# Patient Record
Sex: MALE | Race: WHITE | NOT HISPANIC OR LATINO | Employment: OTHER | URBAN - METROPOLITAN AREA
[De-identification: names, ages, dates, MRNs, and addresses within clinical notes are randomized per-mention and may not be internally consistent; named-entity substitution may affect disease eponyms.]

---

## 2018-04-19 ENCOUNTER — TRANSFERRED RECORDS (OUTPATIENT)
Dept: HEALTH INFORMATION MANAGEMENT | Facility: CLINIC | Age: 78
End: 2018-04-19

## 2019-03-07 ENCOUNTER — TRANSFERRED RECORDS (OUTPATIENT)
Dept: HEALTH INFORMATION MANAGEMENT | Facility: CLINIC | Age: 79
End: 2019-03-07

## 2019-03-08 ENCOUNTER — TRANSFERRED RECORDS (OUTPATIENT)
Dept: HEALTH INFORMATION MANAGEMENT | Facility: CLINIC | Age: 79
End: 2019-03-08

## 2019-06-10 NOTE — PROGRESS NOTES
Subjective     Flash Brown is a 78 year old male who presents to clinic today for the following health issues:    HPI   Chief Complaint   Patient presents with     hospitals Care     Past Medical History:   Diagnosis Date     CAD (coronary artery disease)      Depression      Diabetes type 2, controlled (H)      Hyperlipidemia      Hypertension      Lymphoma (H)      Melanoma (H)        Past Surgical History:   Procedure Laterality Date     AXILLARY SURGERY      S/P resection and adjuvant radiation     CHOLECYSTECTOMY       HERNIA REPAIR, UMBILICAL       STENT      PTCA with drug-eluting stent of RCA, circumflex, and LAD       Family History   Problem Relation Age of Onset     Asthma Other      Cancer Other         melanoma     Blood Disease Other         bleeding disorder     Lung Cancer Mother         Smoker     Prostate Cancer Father        Social History     Tobacco Use     Smoking status: Never Smoker     Smokeless tobacco: Never Used   Substance Use Topics     Alcohol use: Yes     Frequency: Monthly or less     Drinks per session: 1 or 2     Binge frequency: Never     Comment: glass of wine couple times per week     Current Outpatient Medications   Medication     amLODIPine (NORVASC) 5 MG tablet     aspirin (ASA) 81 MG EC tablet     atorvastatin (LIPITOR) 20 MG tablet     busPIRone (BUSPAR) 10 MG tablet     carvedilol (COREG) 12.5 MG tablet     CENTRUM OR TABS     cloNIDine (CATAPRES) 0.1 MG tablet     clopidogrel (PLAVIX) 75 MG tablet     COQ10 100 MG OR CAPS     glimepiride (AMARYL) 4 MG tablet     hydrALAZINE (APRESOLINE) 50 MG tablet     NIACIN 500 MG OR TABS     NITROGLYCERIN 0.4 MG SL SUBL     OMEGA-3 1000 MG OR CAPS     pioglitazone (ACTOS) 30 MG tablet     ramipril (ALTACE) 10 MG capsule     tamsulosin (FLOMAX) 0.4 MG capsule     ORDER FOR DME     VOSOL-HC 2-1 % OT SOLN     VYTORIN 10-40 MG OR TABS     No current facility-administered medications for this visit.         Allergies   Allergen  Reactions     Iodine Swelling       Reviewed and updated as needed this visit by Provider  Allergies  Med Hx  Surg Hx  Fam Hx       1. Establish care: Patient just moved here from [place] and currently living with sister.     2. CAD: Patient was hospitalized for unstable angina in 2010 s/p PTCA with drug-eluting stent of the RCA, circumflex, and LAD at [place], [place].  Patient denies any chest pain.  States of intermittent SOB.  Patient is currently on plavix, lipitor, coreg, ramipril, and aspirin.     3. Depression: Patient has been a private person.  With the recent move, it has been stressful.  He moved here 10 days ago.     4. Diabetes f/u: Based on medication list glimepiride and actos.  He does not check his blood sugars.  He has not had his eye checked recently.  Scheduled to have his eye appointment tomorrow.    5. History of melanoma: Patient had lesion of his left nose, right temporal region, and his back region.    6. History of Non-Hodgkin lymphoma: Involving axilla.  S/P radiation treatment.     Review of Systems   Constitutional: Negative for chills and fever.   HENT: Negative for congestion, ear pain, hearing loss and sore throat.    Eyes: Negative for pain and visual disturbance.   Respiratory: Negative for cough and shortness of breath.    Cardiovascular: Negative for chest pain, palpitations and peripheral edema.   Gastrointestinal: Negative for abdominal pain, constipation, diarrhea, heartburn, hematochezia and nausea.   Genitourinary: Negative for discharge, dysuria, frequency, genital sores, hematuria, impotence and urgency.   Musculoskeletal: Negative for arthralgias, joint swelling and myalgias.   Skin: Negative for rash.   Neurological: Negative for dizziness, weakness, headaches and paresthesias.   Psychiatric/Behavioral: Negative for mood changes. The patient is not nervous/anxious.             Objective    /68 (BP Location: Left arm, Cuff Size: Adult Large)   " Pulse 77   Temp 97  F (36.1  C) (Tympanic)   Ht 1.689 m (5' 6.5\")   Wt 111.2 kg (245 lb 3.2 oz)   SpO2 96%   BMI 38.98 kg/m    Body mass index is 38.98 kg/m .  Physical Exam   Constitutional: He is oriented to person, place, and time. He appears well-developed and well-nourished. No distress.   HENT:   Head: Normocephalic and atraumatic.   Nose: Nose normal.   Eyes: Conjunctivae and EOM are normal.   Neck: Normal range of motion. No tracheal deviation present.   Cardiovascular: Normal heart sounds.   No murmur heard.  Pulmonary/Chest: Effort normal and breath sounds normal. No respiratory distress. He has no wheezes.   Musculoskeletal: Normal range of motion.   Neurological: He is alert and oriented to person, place, and time.   Skin: No rash noted.   Healing surgical excision involving left nare   Psychiatric: He has a normal mood and affect. His behavior is normal.      Assessment & Plan     1. Coronary artery disease involving native coronary artery of native heart without angina pectoris  s/p PTCA with drug-eluting stent of the RCA, circumflex, and LAD at [place], [place] in 2010.   - carvedilol (COREG) 12.5 MG tablet; Take 1 tablet (12.5 mg) by mouth 2 times daily (with meals)  Dispense: 180 tablet; Refill: 3  - CARDIOLOGY EVAL ADULT REFERRAL    2. Advanced directives, counseling/discussion  - HONORING CHOICES REFERRAL    3. Morbid obesity (H)    4. 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 Other hyperlipidemia  - Lipid panel reflex to direct LDL Fasting  - atorvastatin (LIPITOR) 20 MG tablet; Take 1 tablet (20 mg) by mouth daily  Dispense: 90 tablet; Refill: 3    6. Type 2 diabetes mellitus with complication, without long-term current use of insulin (H)  Advised to keep upcoming eye appointment tomorrow  - HEMOGLOBIN A1C  - BASIC METABOLIC PANEL  - Albumin Random Urine Quantitative with Creat Ratio  - pioglitazone (ACTOS) 30 MG " tablet; Take 1 tablet (30 mg) by mouth daily    7. Essential hypertension  Stable  - cloNIDine (CATAPRES) 0.1 MG tablet; Take 1 tablet (0.1 mg) by mouth 2 times daily  - amLODIPine (NORVASC) 5 MG tablet; Take 1 tablet (5 mg) by mouth daily  - ramipril (ALTACE) 10 MG capsule; Take 1 capsule (10 mg) by mouth daily  Dispense: 90 capsule; Refill: 3  - hydrALAZINE (APRESOLINE) 50 MG tablet; Take 1 tablet (50 mg) by mouth 2 times daily  - glimepiride (AMARYL) 4 MG tablet; Take 1 tablet (4 mg) by mouth 2 times daily    8. Melanoma of skin (H)  - DERMATOLOGY REFERRAL    9. Other depression  - DEPRESSION ACTION PLAN (DAP)  - busPIRone (BUSPAR) 10 MG tablet; Take 1 tablet (10 mg) by mouth 2 times daily    10. Benign prostatic hyperplasia without lower urinary tract symptoms  - tamsulosin (FLOMAX) 0.4 MG capsule; Take 1 capsule (0.4 mg) by mouth daily     I spent 45 minutes with patient of which 50% was spent counseling and coordinating patient's care as well as discussing patient's plan of care.    See Patient Instructions    Return in about 2 months (around 8/11/2019) for Heart f/u (40 minutes).    Thomas Jackson [facility]

## 2019-06-11 ENCOUNTER — OFFICE VISIT (OUTPATIENT)
Dept: FAMILY MEDICINE | Facility: CLINIC | Age: 79
End: 2019-06-11
Payer: MEDICARE

## 2019-06-11 ENCOUNTER — TELEPHONE (OUTPATIENT)
Dept: ONCOLOGY | Facility: CLINIC | Age: 79
End: 2019-06-11

## 2019-06-11 VITALS
OXYGEN SATURATION: 96 % | HEIGHT: 67 IN | HEART RATE: 77 BPM | TEMPERATURE: 97 F | DIASTOLIC BLOOD PRESSURE: 68 MMHG | WEIGHT: 245.2 LBS | SYSTOLIC BLOOD PRESSURE: 130 MMHG | BODY MASS INDEX: 38.48 KG/M2

## 2019-06-11 DIAGNOSIS — I25.10 CORONARY ARTERY DISEASE INVOLVING NATIVE CORONARY ARTERY OF NATIVE HEART WITHOUT ANGINA PECTORIS: Primary | ICD-10-CM

## 2019-06-11 DIAGNOSIS — C43.9 MELANOMA OF SKIN (H): ICD-10-CM

## 2019-06-11 DIAGNOSIS — F32.89 OTHER DEPRESSION: ICD-10-CM

## 2019-06-11 DIAGNOSIS — I10 ESSENTIAL HYPERTENSION: ICD-10-CM

## 2019-06-11 DIAGNOSIS — E78.49 OTHER HYPERLIPIDEMIA: ICD-10-CM

## 2019-06-11 DIAGNOSIS — N40.0 BENIGN PROSTATIC HYPERPLASIA WITHOUT LOWER URINARY TRACT SYMPTOMS: ICD-10-CM

## 2019-06-11 DIAGNOSIS — E11.8 TYPE 2 DIABETES MELLITUS WITH COMPLICATION, WITHOUT LONG-TERM CURRENT USE OF INSULIN (H): ICD-10-CM

## 2019-06-11 DIAGNOSIS — Z71.89 ADVANCED DIRECTIVES, COUNSELING/DISCUSSION: ICD-10-CM

## 2019-06-11 DIAGNOSIS — E66.01 MORBID OBESITY (H): ICD-10-CM

## 2019-06-11 DIAGNOSIS — C82.24 GRADE 3 FOLLICULAR LYMPHOMA OF LYMPH NODES OF AXILLA (H): ICD-10-CM

## 2019-06-11 LAB
ANION GAP SERPL CALCULATED.3IONS-SCNC: 1 MMOL/L (ref 3–14)
BUN SERPL-MCNC: 17 MG/DL (ref 7–30)
CALCIUM SERPL-MCNC: 9 MG/DL (ref 8.5–10.1)
CHLORIDE SERPL-SCNC: 107 MMOL/L (ref 94–109)
CHOLEST SERPL-MCNC: 110 MG/DL
CO2 SERPL-SCNC: 31 MMOL/L (ref 20–32)
CREAT SERPL-MCNC: 1.04 MG/DL (ref 0.66–1.25)
CREAT UR-MCNC: 223 MG/DL
GFR SERPL CREATININE-BSD FRML MDRD: 69 ML/MIN/{1.73_M2}
GLUCOSE SERPL-MCNC: 95 MG/DL (ref 70–99)
HBA1C MFR BLD: 5.4 % (ref 0–5.6)
HDLC SERPL-MCNC: 47 MG/DL
LDLC SERPL CALC-MCNC: 47 MG/DL
MICROALBUMIN UR-MCNC: 18 MG/L
MICROALBUMIN/CREAT UR: 7.89 MG/G CR (ref 0–17)
NONHDLC SERPL-MCNC: 63 MG/DL
POTASSIUM SERPL-SCNC: 4.2 MMOL/L (ref 3.4–5.3)
SODIUM SERPL-SCNC: 139 MMOL/L (ref 133–144)
TRIGL SERPL-MCNC: 80 MG/DL

## 2019-06-11 PROCEDURE — 80061 LIPID PANEL: CPT | Performed by: FAMILY MEDICINE

## 2019-06-11 PROCEDURE — 99214 OFFICE O/P EST MOD 30 MIN: CPT | Performed by: FAMILY MEDICINE

## 2019-06-11 PROCEDURE — 80048 BASIC METABOLIC PNL TOTAL CA: CPT | Performed by: FAMILY MEDICINE

## 2019-06-11 PROCEDURE — 36415 COLL VENOUS BLD VENIPUNCTURE: CPT | Performed by: FAMILY MEDICINE

## 2019-06-11 PROCEDURE — 82043 UR ALBUMIN QUANTITATIVE: CPT | Performed by: FAMILY MEDICINE

## 2019-06-11 PROCEDURE — 83036 HEMOGLOBIN GLYCOSYLATED A1C: CPT | Performed by: FAMILY MEDICINE

## 2019-06-11 RX ORDER — RAMIPRIL 10 MG/1
10 CAPSULE ORAL DAILY
Qty: 90 CAPSULE | Refills: 3 | Status: SHIPPED | OUTPATIENT
Start: 2019-06-11 | End: 2020-05-27

## 2019-06-11 RX ORDER — GLIMEPIRIDE 4 MG/1
4 TABLET ORAL 2 TIMES DAILY
COMMUNITY
Start: 2019-06-11 | End: 2019-12-26

## 2019-06-11 RX ORDER — PIOGLITAZONEHYDROCHLORIDE 30 MG/1
30 TABLET ORAL DAILY
COMMUNITY
Start: 2019-06-11 | End: 2020-02-27

## 2019-06-11 RX ORDER — CARVEDILOL 12.5 MG/1
12.5 TABLET ORAL 2 TIMES DAILY WITH MEALS
Qty: 180 TABLET | Refills: 3 | Status: SHIPPED | OUTPATIENT
Start: 2019-06-11 | End: 2020-06-02

## 2019-06-11 RX ORDER — CLONIDINE HYDROCHLORIDE 0.1 MG/1
0.1 TABLET ORAL 2 TIMES DAILY
COMMUNITY
Start: 2019-06-11 | End: 2020-03-09

## 2019-06-11 RX ORDER — BUSPIRONE HYDROCHLORIDE 10 MG/1
10 TABLET ORAL 2 TIMES DAILY
COMMUNITY
Start: 2019-06-11 | End: 2020-02-27

## 2019-06-11 RX ORDER — CLOPIDOGREL BISULFATE 75 MG/1
75 TABLET ORAL DAILY
COMMUNITY
Start: 2019-06-11 | End: 2019-09-10

## 2019-06-11 RX ORDER — ATORVASTATIN CALCIUM 20 MG/1
20 TABLET, FILM COATED ORAL DAILY
Qty: 90 TABLET | Refills: 3 | COMMUNITY
Start: 2019-06-11 | End: 2020-03-09

## 2019-06-11 RX ORDER — TAMSULOSIN HYDROCHLORIDE 0.4 MG/1
0.4 CAPSULE ORAL DAILY
COMMUNITY
Start: 2019-06-11 | End: 2019-08-15

## 2019-06-11 RX ORDER — AMLODIPINE BESYLATE 5 MG/1
5 TABLET ORAL DAILY
COMMUNITY
Start: 2019-06-11 | End: 2020-03-03

## 2019-06-11 RX ORDER — HYDRALAZINE HYDROCHLORIDE 50 MG/1
50 TABLET, FILM COATED ORAL 2 TIMES DAILY
COMMUNITY
Start: 2019-06-11 | End: 2020-02-27

## 2019-06-11 SDOH — HEALTH STABILITY: MENTAL HEALTH: HOW MANY STANDARD DRINKS CONTAINING ALCOHOL DO YOU HAVE ON A TYPICAL DAY?: 1 OR 2

## 2019-06-11 SDOH — HEALTH STABILITY: MENTAL HEALTH: HOW OFTEN DO YOU HAVE 6 OR MORE DRINKS ON ONE OCCASION?: NEVER

## 2019-06-11 SDOH — HEALTH STABILITY: MENTAL HEALTH: HOW OFTEN DO YOU HAVE A DRINK CONTAINING ALCOHOL?: MONTHLY OR LESS

## 2019-06-11 ASSESSMENT — ENCOUNTER SYMPTOMS
FEVER: 0
HEMATURIA: 0
NAUSEA: 0
MYALGIAS: 0
HEADACHES: 0
NERVOUS/ANXIOUS: 0
SORE THROAT: 0
CHILLS: 0
DIZZINESS: 0
EYE PAIN: 0
WEAKNESS: 0
DIARRHEA: 0
FREQUENCY: 0
HEMATOCHEZIA: 0
ABDOMINAL PAIN: 0
JOINT SWELLING: 0
COUGH: 0
CONSTIPATION: 0
PALPITATIONS: 0
DYSURIA: 0
PARESTHESIAS: 0
HEARTBURN: 0
ARTHRALGIAS: 0
SHORTNESS OF BREATH: 0

## 2019-06-11 ASSESSMENT — MIFFLIN-ST. JEOR: SCORE: 1792.91

## 2019-06-11 NOTE — PATIENT INSTRUCTIONS
Candis Daniel Brown,    Thank you for allowing [facility] to manage your care.    I ordered some blood work and urine test, please go to the laboratory to get your laboratory studies.    I sent your prescriptions to your pharmacy.    Please get your diabetic eye exam tomorrow.     I made a cardiology, dermatology, and oncology referral, please call to scheduled/they will be in 1-2 weeks to set up your appointment.  If you do not hear from them, please call the specialty number on your after visit.     Please allow 1-2 business days for our office to call you in regards to your laboratory/radiological studies.  If not done so, I encourage you to login into Mychart [email or web address] to review your results as well.     If you have any questions or concerns, please feel free to call us at [phone].    Sincerely,    Dr. Jackson

## 2019-06-11 NOTE — LETTER
My Depression Action Plan  Name: Flash Brown   Date of Birth 1940  Date: 6/11/2019    My doctor: Thomas Jackson   My clinic: [address] [address] [phone]          GREEN    ZONE   Good Control    What it looks like:     Things are going generally well. You have normal up s and down s. You may even feel depressed from time to time, but bad moods usually last less than a day.   What you need to do:  1. Continue to care for yourself (see self care plan)  2. Check your depression survival kit and update it as needed  3. Follow your physician s recommendations including any medication.  4. Do not stop taking medication unless you consult with your physician first.           YELLOW         ZONE Getting Worse    What it looks like:     Depression is starting to interfere with your life.     It may be hard to get out of bed; you may be starting to isolate yourself from others.    Symptoms of depression are starting to last most all day and this has happened for several days.     You may have suicidal thoughts but they are not constant.   What you need to do:     1. Call your care team, your response to treatment will improve if you keep your care team informed of your progress. Yellow periods are signs an adjustment may need to be made.     2. Continue your self-care, even if you have to fake it!    3. Talk to someone in your support network    4. Open up your depression survival kit           RED    ZONE Medical Alert - Get Help    What it looks like:     Depression is seriously interfering with your life.     You may experience these or other symptoms: You can t get out of bed most days, can t work or engage in other necessary activities, you have trouble taking care of basic hygiene, or basic responsibilities, thoughts of suicide or death that will not go away, self-injurious behavior.     What you need to do:  1. Call your care team and  request a same-day appointment. If they are not available (weekends or after hours) call your local crisis line, emergency room or 911.            Depression Self Care Plan / Survival Kit    Self-Care for Depression  Here s the deal. Your body and mind are really not as separate as most people think.  What you do and think affects how you feel and how you feel influences what you do and think. This means if you do things that people who feel good do, it will help you feel better.  Sometimes this is all it takes.  There is also a place for medication and therapy depending on how severe your depression is, so be sure to consult with your medical provider and/ or Behavioral Health Consultant if your symptoms are worsening or not improving.     In order to better manage my stress, I will:    Exercise  Get some form of exercise, every day. This will help reduce pain and release endorphins, the  feel good  chemicals in your brain. This is almost as good as taking antidepressants!  This is not the same as joining a gym and then never going! (they count on that by the way ) It can be as simple as just going for a walk or doing some gardening, anything that will get you moving.      Hygiene   Maintain good hygiene (Get out of bed in the morning, Make your bed, Brush your teeth, Take a shower, and Get dressed like you were going to work, even if you are unemployed).  If your clothes don't fit try to get ones that do.    Diet  I will strive to eat foods that are good for me, drink plenty of water, and avoid excessive sugar, caffeine, alcohol, and other mood-altering substances.  Some foods that are helpful in depression are: complex carbohydrates, B vitamins, flaxseed, fish or fish oil, fresh fruits and vegetables.    Psychotherapy  I agree to participate in Individual Therapy (if recommended).    Medication  If prescribed medications, I agree to take them.  Missing doses can result in serious side effects.  I understand that  drinking alcohol, or other illicit drug use, may cause potential side effects.  I will not stop my medication abruptly without first discussing it with my provider.    Staying Connected With Others  I will stay in touch with my friends, family members, and my primary care provider/team.    Use your imagination  Be creative.  We all have a creative side; it doesn t matter if it s oil painting, sand castles, or mud pies! This will also kick up the endorphins.    Witness Beauty  (AKA stop and smell the roses) Take a look outside, even in mid-winter. Notice colors, textures. Watch the squirrels and birds.     Service to others  Be of service to others.  There is always someone else in need.  By helping others we can  get out of ourselves  and remember the really important things.  This also provides opportunities for practicing all the other parts of the program.    Humor  Laugh and be silly!  Adjust your TV habits for less news and crime-drama and more comedy.    Control your stress  Try breathing deep, massage therapy, biofeedback, and meditation. Find time to relax each day.     My support system    Clinic Contact:  Phone number:    Contact 1:  Phone number:    Contact 2:  Phone number:    [facility]/:  Phone number:    Therapist:  Phone number:    Local crisis center:    Phone number:    Other community support:  Phone number:

## 2019-06-12 ENCOUNTER — TRANSFERRED RECORDS (OUTPATIENT)
Dept: HEALTH INFORMATION MANAGEMENT | Facility: CLINIC | Age: 79
End: 2019-06-12

## 2019-06-12 LAB — RETINOPATHY: NEGATIVE

## 2019-06-14 NOTE — TELEPHONE ENCOUNTER
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 No - Internal referral    Has patient been seen for any external appt for this diagnosis? Per Daylin, PT may still have records in [place].  She couldn't recall if it was [facility] or [facility].  She also said that he was seen in [facility] about 19 years ago but then went to [place] right away.    ADDITIONAL INFORMATION:  PT not on wait list due to requested time for Daylin's work schedule  IB to records team with notification of [facility] records

## 2019-06-17 ENCOUNTER — TRANSFERRED RECORDS (OUTPATIENT)
Dept: HEALTH INFORMATION MANAGEMENT | Facility: CLINIC | Age: 79
End: 2019-06-17

## 2019-06-17 NOTE — TELEPHONE ENCOUNTER
Rec'd call from [facility] - they have no record of the patient in their system.  11:11 AM

## 2019-06-18 ENCOUNTER — OFFICE VISIT (OUTPATIENT)
Dept: DERMATOLOGY | Facility: CLINIC | Age: 79
End: 2019-06-18
Payer: MEDICARE

## 2019-06-18 VITALS — HEART RATE: 67 BPM | OXYGEN SATURATION: 97 % | SYSTOLIC BLOOD PRESSURE: 130 MMHG | DIASTOLIC BLOOD PRESSURE: 62 MMHG

## 2019-06-18 DIAGNOSIS — C44.612 BASAL CELL CARCINOMA OF SHOULDER, RIGHT: ICD-10-CM

## 2019-06-18 DIAGNOSIS — D18.01 HEMANGIOMA OF SKIN: ICD-10-CM

## 2019-06-18 DIAGNOSIS — L81.4 LENTIGO: Primary | ICD-10-CM

## 2019-06-18 DIAGNOSIS — C44.519 BCC (BASAL CELL CARCINOMA), TRUNK: ICD-10-CM

## 2019-06-18 DIAGNOSIS — C44.99 RECURRENT SKIN CANCER: ICD-10-CM

## 2019-06-18 DIAGNOSIS — C44.41 BASAL CELL CARCINOMA (BCC) OF NECK: ICD-10-CM

## 2019-06-18 DIAGNOSIS — C44.311 BASAL CELL CARCINOMA OF NOSE: ICD-10-CM

## 2019-06-18 DIAGNOSIS — C44.519 BASAL CELL CARCINOMA OF ANTERIOR CHEST: ICD-10-CM

## 2019-06-18 DIAGNOSIS — C44.212 BASAL CELL CARCINOMA (BCC) OF ANTIHELIX OF RIGHT EAR: ICD-10-CM

## 2019-06-18 DIAGNOSIS — C44.519 BASAL CELL CARCINOMA (BCC) OF BACK: ICD-10-CM

## 2019-06-18 DIAGNOSIS — C44.310 BASAL CELL CARCINOMA OF FACE: ICD-10-CM

## 2019-06-18 DIAGNOSIS — L82.1 SEBORRHEIC KERATOSIS: ICD-10-CM

## 2019-06-18 DIAGNOSIS — D23.9 DERMAL NEVUS: ICD-10-CM

## 2019-06-18 PROCEDURE — 88342 IMHCHEM/IMCYTCHM 1ST ANTB: CPT | Mod: TC | Performed by: DERMATOLOGY

## 2019-06-18 PROCEDURE — 17311 MOHS 1 STAGE H/N/HF/G: CPT | Performed by: DERMATOLOGY

## 2019-06-18 PROCEDURE — 99203 OFFICE O/P NEW LOW 30 MIN: CPT | Mod: 25 | Performed by: DERMATOLOGY

## 2019-06-18 PROCEDURE — 17313 MOHS 1 STAGE T/A/L: CPT | Mod: 59 | Performed by: DERMATOLOGY

## 2019-06-18 PROCEDURE — 11106 INCAL BX SKN SINGLE LES: CPT | Mod: 59 | Performed by: DERMATOLOGY

## 2019-06-18 PROCEDURE — 88305 TISSUE EXAM BY PATHOLOGIST: CPT | Mod: TC | Performed by: DERMATOLOGY

## 2019-06-18 PROCEDURE — 11107 INCAL BX SKN EA SEP/ADDL: CPT | Mod: 59 | Performed by: DERMATOLOGY

## 2019-06-18 PROCEDURE — 69100 BIOPSY OF EXTERNAL EAR: CPT | Mod: 59 | Performed by: DERMATOLOGY

## 2019-06-18 PROCEDURE — 17311 MOHS 1 STAGE H/N/HF/G: CPT | Mod: 59 | Performed by: DERMATOLOGY

## 2019-06-18 PROCEDURE — 17313 MOHS 1 STAGE T/A/L: CPT | Mod: 51 | Performed by: DERMATOLOGY

## 2019-06-18 PROCEDURE — 88331 PATH CONSLTJ SURG 1 BLK 1SPC: CPT | Mod: 59 | Performed by: DERMATOLOGY

## 2019-06-18 NOTE — PROGRESS NOTES
Flash Brown , a 78 year old year old male patient, I was asked to see by Dr. Jackson for multiple spots on skin.  .     Patient states this has been present for  years.  Patient reports the following symptoms:  bleeding .  Patient reports the following previous treatments excision, ed+C and topicals.  .  Patient reports the following modifying factors none.  Associated symptoms: none.  Patient has no other skin complaints today.  Remainder of the HPI, Meds, PMH, Allergies, FH, and SH was reviewed in chart.      Past Medical History:   Diagnosis Date     CAD (coronary artery disease)      Depression      Diabetes type 2, controlled (H)      Hyperlipidemia      Hypertension      Lymphoma (H)      Melanoma (H)        Past Surgical History:   Procedure Laterality Date     AXILLARY SURGERY      S/P resection and adjuvant radiation     CHOLECYSTECTOMY       HERNIA REPAIR, UMBILICAL       STENT      PTCA with drug-eluting stent of RCA, circumflex, and LAD        Family History   Problem Relation Age of Onset     Asthma Other      Cancer Other         melanoma     Blood Disease Other         bleeding disorder     Lung Cancer Mother         Smoker     Prostate Cancer Father        Social History     Socioeconomic History     Marital status: Single     Spouse name: Not on file     Number of children: 0     Years of education: Not on file     Highest education level: Not on file   Occupational History     Occupation: Retired     Comment: [employer]    Social Needs     Financial resource strain: Not on file     Food insecurity:     Worry: Not on file     Inability: Not on file     Transportation needs:     Medical: Not on file     Non-medical: Not on file   Tobacco Use     Smoking status: Never Smoker     Smokeless tobacco: Never Used   Substance and Sexual Activity     Alcohol use: Yes     Frequency: Monthly or less     Drinks per session: 1 or 2     Binge frequency: Never     Comment: glass of wine couple times  per week     Drug use: Never     Sexual activity: Not on file   Lifestyle     Physical activity:     Days per week: Not on file     Minutes per session: Not on file     Stress: Not on file   Relationships     Social connections:     Talks on phone: Not on file     Gets together: Not on file     Attends [employer] witness service: Not on file     Active member of club or organization: Not on file     Attends meetings of clubs or organizations: Not on file     Relationship status: Not on file     Intimate partner violence:     Fear of current or ex partner: Not on file     Emotionally abused: Not on file     Physically abused: Not on file     Forced sexual activity: Not on file   Other Topics Concern     Not on file   Social History Narrative     Not on file       Outpatient Encounter Medications as of 6/18/2019   Medication Sig Dispense Refill     amLODIPine (NORVASC) 5 MG tablet Take 1 tablet (5 mg) by mouth daily       aspirin (ASA) 81 MG EC tablet Take 1 tablet (81 mg) by mouth daily       atorvastatin (LIPITOR) 20 MG tablet Take 1 tablet (20 mg) by mouth daily 90 tablet 3     busPIRone (BUSPAR) 10 MG tablet Take 1 tablet (10 mg) by mouth 2 times daily       carvedilol (COREG) 12.5 MG tablet Take 1 tablet (12.5 mg) by mouth 2 times daily (with meals) 180 tablet 3     CENTRUM OR TABS 1 TABLET DAILY       cloNIDine (CATAPRES) 0.1 MG tablet Take 1 tablet (0.1 mg) by mouth 2 times daily       clopidogrel (PLAVIX) 75 MG tablet Take 1 tablet (75 mg) by mouth daily       COQ10 100 MG OR CAPS None Entered       glimepiride (AMARYL) 4 MG tablet Take 1 tablet (4 mg) by mouth 2 times daily       hydrALAZINE (APRESOLINE) 50 MG tablet Take 1 tablet (50 mg) by mouth 2 times daily       NIACIN 500 MG OR TABS Take one orally twice daily 180 3     NITROGLYCERIN 0.4 MG SL SUBL ONE TABLET UNDER TONGUE, FOR CHEST PAIN, AS DIRECTED 1 BOTTLE 3 per year     OMEGA-3 1000 MG OR CAPS None Entered       ORDER FOR DME Light Therapy with Full Spectrum  Light (50451 lux) Start with 10-15 minute exposure per day, Increase exposure to 30 to 45 minutes per day, Maximum exposure: 90 minutes per day,Look periodically at light during each session  1 0     pioglitazone (ACTOS) 30 MG tablet Take 1 tablet (30 mg) by mouth daily       ramipril (ALTACE) 10 MG capsule Take 1 capsule (10 mg) by mouth daily 90 capsule 3     tamsulosin (FLOMAX) 0.4 MG capsule Take 1 capsule (0.4 mg) by mouth daily       VOSOL-HC 2-1 % OT SOLN 2-3 drops in the affected ear 3-4 times daily for 7-10 days for itching in the ear 1 bottle 0     VYTORIN 10-40 MG OR TABS 1 TABLET EVERY EVENING 3 months 1     No facility-administered encounter medications on file as of 6/18/2019.              Review Of Systems  Skin: As above  Eyes: negative  Ears/Nose/Throat: negative  Respiratory: No shortness of breath, dyspnea on exertion, cough, or hemoptysis  Cardiovascular: negative  Gastrointestinal: negative  Genitourinary: negative  Musculoskeletal: negative  Neurologic: negative  Psychiatric: negative  Hematologic/Lymphatic/Immunologic: negative  Endocrine: negative      O:   NAD, WDWN, Alert & Oriented, Mood & Affect wnl, Vitals stable   Here today alone   There were no vitals taken for this visit.   General appearance kelly ii   Vitals stable   Alert, oriented and in no acute distress      Following lymph nodes palpated: Occipital, Cervical, Supraclavicular no lad  Mid upper back 1.8cm pink pearly papule in white scar   L upper back 2cm pink pearly papule in white scar   R post shoulder 1.7cm pink pearly papule in scar   R post neck 1.5cm pink pearly papule    L helix 1cm pink pearly papule    Mid sternum 1.6cm red plaque   L sup sternum 1.5cm red plaque   R sternal notch 1.4cm red plaque   R zygoma 5mm pink pearly papule    R jawline 1cm pink pearly papule    L ala/mid cheek 2.5cx2cm pink pearly papule    R ala 7mm pink pearly papule    R post shoulder 1.4cm pink pearly papule in white scar    R antihelix 5mm  pink pearly papule    R IA neck 1.5cm red scaly papule    L cheek irregularly pigmented patch 1.5cm     Stuck on papules and brown macules on trunk and ext    Red papules on trunk   Flesh colored papules on trunk      The remainder of the full exam was unremarkable; the following areas were examined:  conjunctiva/lids, oral mucosa, neck, peripheral vascular system, abdomen, lymph nodes, digits/nails, eccrine and apocrine glands, scalp/hair, face, neck, chest, abdomen, buttocks, back, RUE, LUE, RLE, LLE       Eyes: Conjunctivae/lids:Normal     ENT: Lips, buccal mucosa, tongue: normal    MSK:Normal    Cardiovascular: peripheral edema none    Pulm: Breathing Normal    Lymph Nodes: No Head and Neck Lymphadenopathy     Neuro/Psych: Orientation:Normal; Mood/Affect:Normal      MICRO:     L upper back:Orthokeratosis of epidermis with a proliferation of nests of basaloid cells, with peripheral palisading and a haphazard arrangement in the center extending into the dermis, forming nodules.  The tumor cells have hyperchromatic nuclei. Poor cytoplasm and intercellular bridging.    Mid back:Orthokeratosis of epidermis with a proliferation of nests of basaloid cells, with peripheral palisading and a haphazard arrangement in the center extending into the dermis, forming nodules.  The tumor cells have hyperchromatic nuclei. Poor cytoplasm and intercellular bridging.    R post shouldeR:Orthokeratosis of epidermis with a proliferation of nests of basaloid cells, with peripheral palisading and a haphazard arrangement in the center extending into the dermis, forming nodules.  The tumor cells have hyperchromatic nuclei. Poor cytoplasm and intercellular bridging.    R post neck:Orthokeratosis of epidermis with a proliferation of nests of basaloid cells, with peripheral palisading and a haphazard arrangement in the center extending into the dermis, forming nodules.  The tumor cells have hyperchromatic nuclei. Poor cytoplasm and  intercellular bridging.    R antihelix:Orthokeratosis of epidermis with a proliferation of nests of basaloid cells, with peripheral palisading and a haphazard arrangement in the center extending into the dermis, forming nodules.  The tumor cells have hyperchromatic nuclei. Poor cytoplasm and intercellular bridging.        A/P:  1. Seborrheic keratosis, lentigo, angioma, dermal nevus, hx of non-melanoma skin cancer   2. L cheek r/o melanoma  Incisional BIOPSY SENT OUT:  After consent, anesthesia with LEC and prep, Incisional performed and specimen sent out for permanent section histology.  No complications and routine wound care. Patient told to call our office in 1-2 weeks for result.         3. Multiple concerning sites    Photos of all sites today   Incisional BIOPSY IN HOUSE:  After consent, anesthesia with LEC and prep, incision performed and dx above confirmed with frozen section histology.  No complications and routine wound care.  Patient told result   L upper back basal cell carcinoma   Mid back basal cell carcinoma   R post shoulder basal cell carcinoma   R post neck basal cell carcinoma   R antihelix basal cell carcinoma           BENIGN LESIONS DISCUSSED WITH PATIENT:  I discussed the specifics of tumor, prognosis, and genetics of benign lesions.  I explained that treatment of these lesions would be purely cosmetic and not medically neccessary.  I discussed with patient different removal options including excision, cautery and /or laser.      Nature and genetics of benign skin lesions dicussed with patient.  Signs and Symptoms of skin cancer discussed with patient.  ABCDEs of melanoma reviewed with patient.  Patient encouraged to perform monthly skin exams.  UV precautions reviewed with patient.  Patient to follow up with Primary Care provider regarding elevated blood pressure.    Skin care regimen reviewed with patient: Eliminate harsh soaps, i.e. Dial, zest, irsih spring; Mild soaps such as Cetaphil or  Dove sensitive skin, avoid hot or cold showers, aggressive use of emollients including vanicream, cetaphil or cerave discussed with patient.    Risks of non-melanoma skin cancer discussed with patient   Return to clinic next appt    PROCEDURE NOTE    L upper back basal cell carcinoma rec  MOHS:   Recurrent    After PGACAC discussed with patient, decision for Mohs surgery was made. Indication for Mohs was Recurrent. Patient confirmed the site with Dr. Roque.  After anesthesia with LEC, the tumor was excised using standard Mohs technique in 1 stages(s).  CLEAR MARGINS OBTAINED and Final defect size was 2.5 x 2.4 cm.       REPAIR SECOND INTENT: We discussed the options for wound management in full with the patient including risks/benefits/possible outcomes. Decision made to allow the wound to heal by second intention. EBL minimal; complications none; wound care routine.  The patient was discharged in good condition and will return in one month or prn for wound evaluation.    Mid back basal cell carcinoma rec  MOHS:   Recurrent    After PGACAC discussed with patient, decision for Mohs surgery was made. Indication for Mohs was Recurrent. Patient confirmed the site with Dr. Roque.  After anesthesia with LEC, the tumor was excised using standard Mohs technique in 1 stages(s).  CLEAR MARGINS OBTAINED and Final defect size was 2.2 x 2.5 cm.       REPAIR SECOND INTENT: We discussed the options for wound management in full with the patient including risks/benefits/possible outcomes. Decision made to allow the wound to heal by second intention. EBL minimal; complications none; wound care routine.  The patient was discharged in good condition and will return in one month or prn for wound evaluation.    R post shoulder basal cell carcinoma rec  MOHS:   Recurrent    After PGACAC discussed with patient, decision for Mohs surgery was made. Indication for Mohs was Recurrent. Patient confirmed the site with Dr. Roque.  After  anesthesia with LEC, the tumor was excised using standard Mohs technique in 1 stages(s).  CLEAR MARGINS OBTAINED and Final defect size was 2 x 2.5 cm.       REPAIR SECOND INTENT: We discussed the options for wound management in full with the patient including risks/benefits/possible outcomes. Decision made to allow the wound to heal by second intention. EBL minimal; complications none; wound care routine.  The patient was discharged in good condition and will return in one month or prn for wound evaluation.    R post neck basal cell carcinoma   MOHS:   Location    After PGACAC discussed with patient, decision for Mohs surgery was made. Indication for Mohs was Location. Patient confirmed the site with Dr. Roque.  After anesthesia with LEC, the tumor was excised using standard Mohs technique in 1 stages(s).  CLEAR MARGINS OBTAINED and Final defect size was 2 x 1.8 cm.       REPAIR SECOND INTENT: We discussed the options for wound management in full with the patient including risks/benefits/possible outcomes. Decision made to allow the wound to heal by second intention. EBL minimal; complications none; wound care routine.  The patient was discharged in good condition and will return in one month or prn for wound evaluation.    L antihelix basal cell carcinoma   MOHS:   Location    After PGACAC discussed with patient, decision for Mohs surgery was made. Indication for Mohs was Location. Patient confirmed the site with Dr. Roque.  After anesthesia with LEC, the tumor was excised using standard Mohs technique in 1 stages(s).  CLEAR MARGINS OBTAINED and Final defect size was 0.9 x 0.8 cm.       /REPAIR SECOND INTENT: We discussed the options for wound management in full with the patient including risks/benefits/possible outcomes. Decision made to allow the wound to heal by second intention. EBL minimal; complications none; wound care routine.  The patient was discharged in good condition and will return in one month or  prn for wound evaluation.

## 2019-06-18 NOTE — NURSING NOTE
"Initial /62   Pulse 67   SpO2 97%  Estimated body mass index is 38.98 kg/m  as calculated from the following:    Height as of 6/11/19: 1.689 m (5' 6.5\").    Weight as of 6/11/19: 111.2 kg (245 lb 3.2 oz). .      "

## 2019-06-18 NOTE — PATIENT INSTRUCTIONS
Open Wound Care     for ______________        ? No strenuous activity for 48 hours    ? Take Tylenol as needed for discomfort.                                                .         ? Do not drink alcoholic beverages for 48 hours.    ? Keep the pressure bandage in place for 24 hours. If the bandage becomes blood tinged or loose, reinforce it with gauze and tape.        (Refer to the reverse side of this page for management of bleeding).    ? Remove bandage in 24 hours and begin wound care as follows:     1. Clean area with tap water using a Q tip or gauze pad, (shower / bathe normally)  2. Dry wound with Q tip or gauze pad  3. Apply Aquaphor, Vaseline, Polysporin or Bacitracin Ointment with a Q tip    Do NOT use Neosporin Ointment *  4. Cover the wound with a band-aid or nonstick gauze pad and paper tape.  5. Repeat wound care once a day until wound is completely healed.    It is an old wives tale that a wound heals better when it is exposed to air and allowed to dry out. The wound will heal faster with a better cosmetic result if it is kept moist with ointment and covered with a bandage.  Do not let the wound dry out.      Supplies Needed:                Qtips or gauze pads                Polysporin or Bacitracin Ointment                Bandaids or nonstick gauze pads and paper tape    Wound care kits and brown paper tape are available for purchase at   the pharmacy.    BLEEDIN. Use tightly rolled up gauze or cloth to apply direct pressure over the bandage for 20   minutes.  2. Reapply pressure for an additional 20 minutes if necessary  3. Call the office or go to the nearest emergency room if pressure fails to stop the bleeding.  4. Use additional gauze and tape to maintain pressure once the bleeding has stopped.  5. Begin wound care 24 hours after surgery as directed.                  WOUND HEALING    1. One week after surgery a pink / red halo will form around the outside of the wound.   This is new  skin.  2. The center of the wound will appear yellowish white and produce some drainage.  3. The pink halo will slowly migrate in toward the center of the wound until the wound is covered with new shiny pink skin.  4. There will be no more drainage when the wound is completely healed.  5. It will take six months to one year for the redness to fade.  6. The scar may be itchy, tight and sensitive to extreme temperatures for a year after the surgery.  7. Massaging the area several times a day for several minutes after the wound is completely healed will help the scar soften and normalize faster. Begin massage only after healing is complete.      In case of emergency call: Dr Roque: [phone]     [facility]: [phone]    [facility]: [phone]

## 2019-06-18 NOTE — LETTER
6/18/2019         RE: Flash Brown  [address] [place] [id number]        Dear Colleague,    Thank you for referring your patient, Flash Brown, to the [facility]. Please see a copy of my visit note below.    Flash Brown , a 78 year old year old male patient, I was asked to see by Dr. Jackson for multiple spots on skin.  .     Patient states this has been present for  years.  Patient reports the following symptoms:  bleeding .  Patient reports the following previous treatments excision, ed+C and topicals.  .  Patient reports the following modifying factors none.  Associated symptoms: none.  Patient has no other skin complaints today.  Remainder of the HPI, Meds, PMH, Allergies, FH, and SH was reviewed in chart.      Past Medical History:   Diagnosis Date     CAD (coronary artery disease)      Depression      Diabetes type 2, controlled (H)      Hyperlipidemia      Hypertension      Lymphoma (H)      Melanoma (H)        Past Surgical History:   Procedure Laterality Date     AXILLARY SURGERY      S/P resection and adjuvant radiation     CHOLECYSTECTOMY       HERNIA REPAIR, UMBILICAL       STENT      PTCA with drug-eluting stent of RCA, circumflex, and LAD        Family History   Problem Relation Age of Onset     Asthma Other      Cancer Other         melanoma     Blood Disease Other         bleeding disorder     Lung Cancer Mother         Smoker     Prostate Cancer Father        Social History     Socioeconomic History     Marital status: Single     Spouse name: Not on file     Number of children: 0     Years of education: Not on file     Highest education level: Not on file   Occupational History     Occupation: Retired     Comment: [employer]    Social Needs     Financial resource strain: Not on file     Food insecurity:     Worry: Not on file     Inability: Not on file     Transportation needs:     Medical: Not on file     Non-medical: Not on file   Tobacco Use      Smoking status: Never Smoker     Smokeless tobacco: Never Used   Substance and Sexual Activity     Alcohol use: Yes     Frequency: Monthly or less     Drinks per session: 1 or 2     Binge frequency: Never     Comment: glass of wine couple times per week     Drug use: Never     Sexual activity: Not on file   Lifestyle     Physical activity:     Days per week: Not on file     Minutes per session: Not on file     Stress: Not on file   Relationships     Social connections:     Talks on phone: Not on file     Gets together: Not on file     Attends Druze service: Not on file     Active member of club or organization: Not on file     Attends meetings of clubs or organizations: Not on file     Relationship status: Not on file     Intimate partner violence:     Fear of current or ex partner: Not on file     Emotionally abused: Not on file     Physically abused: Not on file     Forced sexual activity: Not on file   Other Topics Concern     Not on file   Social History Narrative     Not on file       Outpatient Encounter Medications as of 6/18/2019   Medication Sig Dispense Refill     amLODIPine (NORVASC) 5 MG tablet Take 1 tablet (5 mg) by mouth daily       aspirin (ASA) 81 MG EC tablet Take 1 tablet (81 mg) by mouth daily       atorvastatin (LIPITOR) 20 MG tablet Take 1 tablet (20 mg) by mouth daily 90 tablet 3     busPIRone (BUSPAR) 10 MG tablet Take 1 tablet (10 mg) by mouth 2 times daily       carvedilol (COREG) 12.5 MG tablet Take 1 tablet (12.5 mg) by mouth 2 times daily (with meals) 180 tablet 3     CENTRUM OR TABS 1 TABLET DAILY       cloNIDine (CATAPRES) 0.1 MG tablet Take 1 tablet (0.1 mg) by mouth 2 times daily       clopidogrel (PLAVIX) 75 MG tablet Take 1 tablet (75 mg) by mouth daily       COQ10 100 MG OR CAPS None Entered       glimepiride (AMARYL) 4 MG tablet Take 1 tablet (4 mg) by mouth 2 times daily       hydrALAZINE (APRESOLINE) 50 MG tablet Take 1 tablet (50 mg) by mouth 2 times daily       NIACIN 500  MG OR TABS Take one orally twice daily 180 3     NITROGLYCERIN 0.4 MG SL SUBL ONE TABLET UNDER TONGUE, FOR CHEST PAIN, AS DIRECTED 1 BOTTLE 3 per year     OMEGA-3 1000 MG OR CAPS None Entered       ORDER FOR DME Light Therapy with Full Spectrum Light (86274 lux) Start with 10-15 minute exposure per day, Increase exposure to 30 to 45 minutes per day, Maximum exposure: 90 minutes per day,Look periodically at light during each session  1 0     pioglitazone (ACTOS) 30 MG tablet Take 1 tablet (30 mg) by mouth daily       ramipril (ALTACE) 10 MG capsule Take 1 capsule (10 mg) by mouth daily 90 capsule 3     tamsulosin (FLOMAX) 0.4 MG capsule Take 1 capsule (0.4 mg) by mouth daily       VOSOL-HC 2-1 % OT SOLN 2-3 drops in the affected ear 3-4 times daily for 7-10 days for itching in the ear 1 bottle 0     VYTORIN 10-40 MG OR TABS 1 TABLET EVERY EVENING 3 months 1     No facility-administered encounter medications on file as of 6/18/2019.              Review Of Systems  Skin: As above  Eyes: negative  Ears/Nose/Throat: negative  Respiratory: No shortness of breath, dyspnea on exertion, cough, or hemoptysis  Cardiovascular: negative  Gastrointestinal: negative  Genitourinary: negative  Musculoskeletal: negative  Neurologic: negative  Psychiatric: negative  Hematologic/Lymphatic/Immunologic: negative  Endocrine: negative      O:   NAD, WDWN, Alert & Oriented, Mood & Affect wnl, Vitals stable   Here today alone   There were no vitals taken for this visit.   General appearance kelly ii   Vitals stable   Alert, oriented and in no acute distress      Following lymph nodes palpated: Occipital, Cervical, Supraclavicular no lad  Mid upper back 1.8cm pink pearly papule in white scar   L upper back 2cm pink pearly papule in white scar   R post shoulder 1.7cm pink pearly papule in scar   R post neck 1.5cm pink pearly papule    L helix 1cm pink pearly papule    Mid sternum 1.6cm red plaque   L sup sternum 1.5cm red plaque   R sternal  notch 1.4cm red plaque   R zygoma 5mm pink pearly papule    R jawline 1cm pink pearly papule    L ala/mid cheek 2.5cx2cm pink pearly papule    R ala 7mm pink pearly papule    R post shoulder 1.4cm pink pearly papule in white scar    R antihelix 5mm pink pearly papule    R IA neck 1.5cm red scaly papule    L cheek irregularly pigmented patch 1.5cm     Stuck on papules and brown macules on trunk and ext    Red papules on trunk   Flesh colored papules on trunk      The remainder of the full exam was unremarkable; the following areas were examined:  conjunctiva/lids, oral mucosa, neck, peripheral vascular system, abdomen, lymph nodes, digits/nails, eccrine and apocrine glands, scalp/hair, face, neck, chest, abdomen, buttocks, back, RUE, LUE, RLE, LLE       Eyes: Conjunctivae/lids:Normal     ENT: Lips, buccal mucosa, tongue: normal    MSK:Normal    Cardiovascular: peripheral edema none    Pulm: Breathing Normal    Lymph Nodes: No Head and Neck Lymphadenopathy     Neuro/Psych: Orientation:Normal; Mood/Affect:Normal      MICRO:     L upper back:Orthokeratosis of epidermis with a proliferation of nests of basaloid cells, with peripheral palisading and a haphazard arrangement in the center extending into the dermis, forming nodules.  The tumor cells have hyperchromatic nuclei. Poor cytoplasm and intercellular bridging.    Mid back:Orthokeratosis of epidermis with a proliferation of nests of basaloid cells, with peripheral palisading and a haphazard arrangement in the center extending into the dermis, forming nodules.  The tumor cells have hyperchromatic nuclei. Poor cytoplasm and intercellular bridging.    R post shouldeR:Orthokeratosis of epidermis with a proliferation of nests of basaloid cells, with peripheral palisading and a haphazard arrangement in the center extending into the dermis, forming nodules.  The tumor cells have hyperchromatic nuclei. Poor cytoplasm and intercellular bridging.    R post neck:Orthokeratosis  of epidermis with a proliferation of nests of basaloid cells, with peripheral palisading and a haphazard arrangement in the center extending into the dermis, forming nodules.  The tumor cells have hyperchromatic nuclei. Poor cytoplasm and intercellular bridging.    R antihelix:Orthokeratosis of epidermis with a proliferation of nests of basaloid cells, with peripheral palisading and a haphazard arrangement in the center extending into the dermis, forming nodules.  The tumor cells have hyperchromatic nuclei. Poor cytoplasm and intercellular bridging.        A/P:  1. Seborrheic keratosis, lentigo, angioma, dermal nevus, hx of non-melanoma skin cancer   2. L cheek r/o melanoma  Incisional BIOPSY SENT OUT:  After consent, anesthesia with LEC and prep, Incisional performed and specimen sent out for permanent section histology.  No complications and routine wound care. Patient told to call our office in 1-2 weeks for result.         3. Multiple concerning sites    Photos of all sites today   Incisional BIOPSY IN HOUSE:  After consent, anesthesia with LEC and prep, incision performed and dx above confirmed with frozen section histology.  No complications and routine wound care.  Patient told result   L upper back basal cell carcinoma   Mid back basal cell carcinoma   R post shoulder basal cell carcinoma   R post neck basal cell carcinoma   R antihelix basal cell carcinoma           BENIGN LESIONS DISCUSSED WITH PATIENT:  I discussed the specifics of tumor, prognosis, and genetics of benign lesions.  I explained that treatment of these lesions would be purely cosmetic and not medically neccessary.  I discussed with patient different removal options including excision, cautery and /or laser.      Nature and genetics of benign skin lesions dicussed with patient.  Signs and Symptoms of skin cancer discussed with patient.  ABCDEs of melanoma reviewed with patient.  Patient encouraged to perform monthly skin exams.  UV  precautions reviewed with patient.  Patient to follow up with Primary Care provider regarding elevated blood pressure.    Skin care regimen reviewed with patient: Eliminate harsh soaps, i.e. Dial, zest, irsih spring; Mild soaps such as Cetaphil or Dove sensitive skin, avoid hot or cold showers, aggressive use of emollients including vanicream, cetaphil or cerave discussed with patient.    Risks of non-melanoma skin cancer discussed with patient   Return to clinic next appt    PROCEDURE NOTE    L upper back basal cell carcinoma rec  MOHS:   Recurrent    After PGACAC discussed with patient, decision for Mohs surgery was made. Indication for Mohs was Recurrent. Patient confirmed the site with Dr. Roque.  After anesthesia with LEC, the tumor was excised using standard Mohs technique in 1 stages(s).  CLEAR MARGINS OBTAINED and Final defect size was 2.5 x 2.4 cm.       REPAIR SECOND INTENT: We discussed the options for wound management in full with the patient including risks/benefits/possible outcomes. Decision made to allow the wound to heal by second intention. EBL minimal; complications none; wound care routine.  The patient was discharged in good condition and will return in one month or prn for wound evaluation.    Mid back basal cell carcinoma rec  MOHS:   Recurrent    After PGACAC discussed with patient, decision for Mohs surgery was made. Indication for Mohs was Recurrent. Patient confirmed the site with Dr. Roque.  After anesthesia with LEC, the tumor was excised using standard Mohs technique in 1 stages(s).  CLEAR MARGINS OBTAINED and Final defect size was 2.2 x 2.5 cm.       REPAIR SECOND INTENT: We discussed the options for wound management in full with the patient including risks/benefits/possible outcomes. Decision made to allow the wound to heal by second intention. EBL minimal; complications none; wound care routine.  The patient was discharged in good condition and will return in one month or prn for  wound evaluation.    R post shoulder basal cell carcinoma rec  MOHS:   Recurrent    After PGACAC discussed with patient, decision for Mohs surgery was made. Indication for Mohs was Recurrent. Patient confirmed the site with Dr. Roque.  After anesthesia with LEC, the tumor was excised using standard Mohs technique in 1 stages(s).  CLEAR MARGINS OBTAINED and Final defect size was 2 x 2.5 cm.       REPAIR SECOND INTENT: We discussed the options for wound management in full with the patient including risks/benefits/possible outcomes. Decision made to allow the wound to heal by second intention. EBL minimal; complications none; wound care routine.  The patient was discharged in good condition and will return in one month or prn for wound evaluation.    R post neck basal cell carcinoma   MOHS:   Location    After PGACAC discussed with patient, decision for Mohs surgery was made. Indication for Mohs was Location. Patient confirmed the site with Dr. Roque.  After anesthesia with LEC, the tumor was excised using standard Mohs technique in 1 stages(s).  CLEAR MARGINS OBTAINED and Final defect size was 2 x 1.8 cm.       REPAIR SECOND INTENT: We discussed the options for wound management in full with the patient including risks/benefits/possible outcomes. Decision made to allow the wound to heal by second intention. EBL minimal; complications none; wound care routine.  The patient was discharged in good condition and will return in one month or prn for wound evaluation.    L antihelix basal cell carcinoma   MOHS:   Location    After PGACAC discussed with patient, decision for Mohs surgery was made. Indication for Mohs was Location. Patient confirmed the site with Dr. Roque.  After anesthesia with LEC, the tumor was excised using standard Mohs technique in 1 stages(s).  CLEAR MARGINS OBTAINED and Final defect size was 0.9 x 0.8 cm.       /REPAIR SECOND INTENT: We discussed the options for wound management in full with the  patient including risks/benefits/possible outcomes. Decision made to allow the wound to heal by second intention. EBL minimal; complications none; wound care routine.  The patient was discharged in good condition and will return in one month or prn for wound evaluation.          Again, thank you for allowing me to participate in the care of your patient.        Sincerely,        Dinesh Roque MD

## 2019-06-24 LAB — COPATH REPORT: NORMAL

## 2019-06-26 ENCOUNTER — OFFICE VISIT (OUTPATIENT)
Dept: DERMATOLOGY | Facility: CLINIC | Age: 79
End: 2019-06-26
Payer: MEDICARE

## 2019-06-26 VITALS
BODY MASS INDEX: 39.58 KG/M2 | HEIGHT: 66 IN | SYSTOLIC BLOOD PRESSURE: 81 MMHG | DIASTOLIC BLOOD PRESSURE: 32 MMHG | HEART RATE: 61 BPM

## 2019-06-26 DIAGNOSIS — D03.39 MELANOMA IN SITU OF CHEEK (H): Primary | ICD-10-CM

## 2019-06-26 PROCEDURE — 88342 IMHCHEM/IMCYTCHM 1ST ANTB: CPT | Mod: 59 | Performed by: DERMATOLOGY

## 2019-06-26 PROCEDURE — 88341 IMHCHEM/IMCYTCHM EA ADD ANTB: CPT | Performed by: DERMATOLOGY

## 2019-06-26 PROCEDURE — 17311 MOHS 1 STAGE H/N/HF/G: CPT | Performed by: DERMATOLOGY

## 2019-06-26 PROCEDURE — 13133 CMPLX RPR F/C/C/M/N/AX/G/H/F: CPT | Performed by: DERMATOLOGY

## 2019-06-26 PROCEDURE — 13132 CMPLX RPR F/C/C/M/N/AX/G/H/F: CPT | Mod: 51 | Performed by: DERMATOLOGY

## 2019-06-26 RX ORDER — DIAZEPAM 5 MG
5 TABLET ORAL EVERY 6 HOURS PRN
Qty: 1 TABLET | Refills: 0 | Status: SHIPPED | OUTPATIENT
Start: 2019-06-26 | End: 2019-09-30

## 2019-06-26 NOTE — LETTER
6/26/2019         RE: Flash Brown  [address] [place] [id number]        Dear Colleague,    Thank you for referring your patient, Flash Brown, to the [facility]. Please see a copy of my visit note below.    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 Previous sites healing well. Patient reports the following modifying factors none.  Associated symptoms: none.  Patient has no other skin complaints today.  Remainder of the HPI, Meds, PMH, Allergies, FH, and SH was reviewed in chart.      Past Medical History:   Diagnosis Date     Basal cell carcinoma      CAD (coronary artery disease)      Depression      Diabetes type 2, controlled (H)      Hyperlipidemia      Hypertension      Lymphoma (H)      Malignant melanoma (H)      Melanoma (H)      Squamous cell carcinoma        Past Surgical History:   Procedure Laterality Date     AXILLARY SURGERY      S/P resection and adjuvant radiation     CHOLECYSTECTOMY       HERNIA REPAIR, UMBILICAL       STENT      PTCA with drug-eluting stent of RCA, circumflex, and LAD        Family History   Problem Relation Age of Onset     Asthma Other      Cancer Other         melanoma     Blood Disease Other         bleeding disorder     Lung Cancer Mother         Smoker     Prostate Cancer Father        Social History     Socioeconomic History     Marital status: Single     Spouse name: Not on file     Number of children: 0     Years of education: Not on file     Highest education level: Not on file   Occupational History     Occupation: Retired     Comment: [employer]    Social Needs     Financial resource strain: Not on file     Food insecurity:     Worry: Not on file     Inability: Not on file     Transportation needs:     Medical: Not on file     Non-medical: Not on file   Tobacco Use     Smoking status: Never Smoker     Smokeless tobacco: Never Used    Substance and Sexual Activity     Alcohol use: Yes     Frequency: Monthly or less     Drinks per session: 1 or 2     Binge frequency: Never     Comment: glass of wine couple times per week     Drug use: Never     Sexual activity: Not on file   Lifestyle     Physical activity:     Days per week: Not on file     Minutes per session: Not on file     Stress: Not on file   Relationships     Social connections:     Talks on phone: Not on file     Gets together: Not on file     Attends Orthodox service: Not on file     Active member of club or organization: Not on file     Attends meetings of clubs or organizations: Not on file     Relationship status: Not on file     Intimate partner violence:     Fear of current or ex partner: Not on file     Emotionally abused: Not on file     Physically abused: Not on file     Forced sexual activity: Not on file   Other Topics Concern     Not on file   Social History Narrative     Not on file       Outpatient Encounter Medications as of 6/26/2019   Medication Sig Dispense Refill     diazepam (VALIUM) 5 MG tablet Take 1 tablet (5 mg) by mouth every 6 hours as needed for anxiety 1 tablet 0     amLODIPine (NORVASC) 5 MG tablet Take 1 tablet (5 mg) by mouth daily       aspirin (ASA) 81 MG EC tablet Take 1 tablet (81 mg) by mouth daily       atorvastatin (LIPITOR) 20 MG tablet Take 1 tablet (20 mg) by mouth daily 90 tablet 3     busPIRone (BUSPAR) 10 MG tablet Take 1 tablet (10 mg) by mouth 2 times daily       carvedilol (COREG) 12.5 MG tablet Take 1 tablet (12.5 mg) by mouth 2 times daily (with meals) 180 tablet 3     CENTRUM OR TABS 1 TABLET DAILY       cloNIDine (CATAPRES) 0.1 MG tablet Take 1 tablet (0.1 mg) by mouth 2 times daily       clopidogrel (PLAVIX) 75 MG tablet Take 1 tablet (75 mg) by mouth daily       COQ10 100 MG OR CAPS None Entered       glimepiride (AMARYL) 4 MG tablet Take 1 tablet (4 mg) by mouth 2 times daily       hydrALAZINE (APRESOLINE) 50 MG tablet Take 1 tablet  "(50 mg) by mouth 2 times daily       NIACIN 500 MG OR TABS Take one orally twice daily 180 3     NITROGLYCERIN 0.4 MG SL SUBL ONE TABLET UNDER TONGUE, FOR CHEST PAIN, AS DIRECTED 1 BOTTLE 3 per year     OMEGA-3 1000 MG OR CAPS None Entered       ORDER FOR DME Light Therapy with Full Spectrum Light (01491 lux) Start with 10-15 minute exposure per day, Increase exposure to 30 to 45 minutes per day, Maximum exposure: 90 minutes per day,Look periodically at light during each session  1 0     pioglitazone (ACTOS) 30 MG tablet Take 1 tablet (30 mg) by mouth daily       ramipril (ALTACE) 10 MG capsule Take 1 capsule (10 mg) by mouth daily 90 capsule 3     tamsulosin (FLOMAX) 0.4 MG capsule Take 1 capsule (0.4 mg) by mouth daily       VOSOL-HC 2-1 % OT SOLN 2-3 drops in the affected ear 3-4 times daily for 7-10 days for itching in the ear 1 bottle 0     VYTORIN 10-40 MG OR TABS 1 TABLET EVERY EVENING 3 months 1     No facility-administered encounter medications on file as of 6/26/2019.              Review Of Systems  Skin: As above  Eyes: negative  Ears/Nose/Throat: negative  Respiratory: No shortness of breath, dyspnea on exertion, cough, or hemoptysis  Cardiovascular: negative  Gastrointestinal: negative  Genitourinary: negative  Musculoskeletal: negative  Neurologic: negative  Psychiatric: negative  Hematologic/Lymphatic/Immunologic: negative  Endocrine: negative      O:   NAD, WDWN, Alert & Oriented, Mood & Affect wnl, Vitals stable   Here today alone   BP (!) 81/32   Pulse 61   Ht 1.676 m (5' 6\")   BMI 39.58 kg/m      General appearance normal   Vitals stable   Alert, oriented and in no acute distress      Following lymph nodes palpated: Occipital, Cervical, Supraclavicular no lad   L cheek 1.5cm red plaque       Eyes: Conjunctivae/lids:Normal     ENT: Lips, buccal mucosa, tongue: normal    MSK:Normal    Cardiovascular: peripheral edema none    Pulm: Breathing Normal    Lymph Nodes: No Head and Neck Lymphadenopathy "     Neuro/Psych: Orientation:Normal; Mood/Affect:Normal      A/P:  1. L cheek melanoma in situ   MELANOMA DISCUSSED WITH PATIENT:  I discussed the specifics of tumor, prognosis, metachronous melanoma, self exam, and genetics with the patient. I explained the need for monthly skin exams including and taught the patient how to do this. Patient was asked about new or changing moles . I discussed with patient signs and symptoms that could arise in the setting of recurrent locoregional or metastatic disease. In addition, the need to undergo every 4 month dermatologic full skin survey and evaluation given that patients with a diagnosis of melanoma are at risk of recurrence (local and distant) and of subsequent de emilie melanoma.  . I reviewed treatment options, including a discussion of wide excision (the gold standard) versus Mohs surgery with MART-1 immunostains.     Note: MART-1 (Melanoma Antigen Recognized by T-cells) antibody immunostaining was used during Mohs surgery as per standard protocol, in addition to routine processing of all specimens with hematoxylin and eosin. The peripheral margins/edges were processed with the MART-1 stain (4 specimens total). The center was examined with hematoxylin & eosin and MART-1 immunostains. The patient was informed of the procedure and its risk/benefits during the consent for the procedure.    One or more of the reagents used in immunohistochemical testing in this case may not have been cleared or approved by the U.S. Food and Drug Administration (FDA). The FDA has determined that such clearance or approval is not necessary. These tests are used for clinical purposes. They should not be regarded as investigational or for research. These reagents  performance characteristics have been determined by [facility]. This laboratory is certified under the Clinical Laboratory Improvement Amendments of 1988 (CLIA-88) as qualified to perform high complexity clinical  laboratory testing.    After PGACAC discussed with patient, decision for Mohs surgery was made. Indication for Mohs was aggressive histology. Patient confirmed the site with Dr. Roque. After anesthesia with LEC, the tumor was excised using standard Mohs technique in 1 stages(s).  MART 1 stains were performed on 4 specimens. CLEAR MARGINS OBTAINED and Final defect size was 2.7 x 2.3 cm.   REPAIR COMPLEX: Because of the tightness of the surrounding skin and Because of the size and full thickness nature of the defect, a complex closure was planned. After LEC anesthesia and prep, Burow's triangles were excised in the relaxed skin tension lines. The wound edges were widely undermined by dissection in the subcutaneous plane until adequate tissue mobility was obtained. Hemostasis was obtained. The wound edges were closed in a layered fashion using Vicryl and Fast Absorbing Plain Gut sutures. Postoperative length was 7.8 cm.   EBL minimal; complications none; wound care routine.  The patient was discharged in good condition and will return in one week for wound evaluation.  BENIGN LESIONS DISCUSSED WITH PATIENT:  I discussed the specifics of tumor, prognosis, and genetics of benign lesions.  I explained that treatment of these lesions would be purely cosmetic and not medically neccessary.  I discussed with patient different removal options including excision, cautery and /or laser.      Nature and genetics of benign skin lesions dicussed with patient.  Signs and Symptoms of skin cancer discussed with patient.  ABCDEs of melanoma reviewed with patient.  Patient encouraged to perform monthly skin exams.  UV precautions reviewed with patient.  Skin care regimen reviewed with patient: Eliminate harsh soaps, i.e. Dial, zest, irsih spring; Mild soaps such as Cetaphil or Dove sensitive skin, avoid hot or cold showers, aggressive use of emollients including vanicream, cetaphil or cerave discussed with patient.    Risks of  non-melanoma skin cancer discussed with patient   Return to clinic next appt       Again, thank you for allowing me to participate in the care of your patient.        Sincerely,        Dinesh Roque MD

## 2019-06-26 NOTE — NURSING NOTE
The following medication was given:     MEDICATION: Diazepam  ROUTE: PO  TIME: 0815  DOSE: 5 mg  AMOUNT: 1 tablet  LOT #: [id number]  :  mylan  EXPIRATION DATE:  6.2020  NDC: [id number]    Antonia Stack LPN.................6/26/2019

## 2019-06-26 NOTE — PROGRESS NOTES
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 Previous sites healing well. Patient reports the following modifying factors none.  Associated symptoms: none.  Patient has no other skin complaints today.  Remainder of the HPI, Meds, PMH, Allergies, FH, and SH was reviewed in chart.      Past Medical History:   Diagnosis Date     Basal cell carcinoma      CAD (coronary artery disease)      Depression      Diabetes type 2, controlled (H)      Hyperlipidemia      Hypertension      Lymphoma (H)      Malignant melanoma (H)      Melanoma (H)      Squamous cell carcinoma        Past Surgical History:   Procedure Laterality Date     AXILLARY SURGERY      S/P resection and adjuvant radiation     CHOLECYSTECTOMY       HERNIA REPAIR, UMBILICAL       STENT      PTCA with drug-eluting stent of RCA, circumflex, and LAD        Family History   Problem Relation Age of Onset     Asthma Other      Cancer Other         melanoma     Blood Disease Other         bleeding disorder     Lung Cancer Mother         Smoker     Prostate Cancer Father        Social History     Socioeconomic History     Marital status: Single     Spouse name: Not on file     Number of children: 0     Years of education: Not on file     Highest education level: Not on file   Occupational History     Occupation: Retired     Comment: [employer]    Social Needs     Financial resource strain: Not on file     Food insecurity:     Worry: Not on file     Inability: Not on file     Transportation needs:     Medical: Not on file     Non-medical: Not on file   Tobacco Use     Smoking status: Never Smoker     Smokeless tobacco: Never Used   Substance and Sexual Activity     Alcohol use: Yes     Frequency: Monthly or less     Drinks per session: 1 or 2     Binge frequency: Never     Comment: glass of wine couple times per week     Drug use: Never     Sexual activity: Not on file    Lifestyle     Physical activity:     Days per week: Not on file     Minutes per session: Not on file     Stress: Not on file   Relationships     Social connections:     Talks on phone: Not on file     Gets together: Not on file     Attends Sabianist service: Not on file     Active member of club or organization: Not on file     Attends meetings of clubs or organizations: Not on file     Relationship status: Not on file     Intimate partner violence:     Fear of current or ex partner: Not on file     Emotionally abused: Not on file     Physically abused: Not on file     Forced sexual activity: Not on file   Other Topics Concern     Not on file   Social History Narrative     Not on file       Outpatient Encounter Medications as of 6/26/2019   Medication Sig Dispense Refill     diazepam (VALIUM) 5 MG tablet Take 1 tablet (5 mg) by mouth every 6 hours as needed for anxiety 1 tablet 0     amLODIPine (NORVASC) 5 MG tablet Take 1 tablet (5 mg) by mouth daily       aspirin (ASA) 81 MG EC tablet Take 1 tablet (81 mg) by mouth daily       atorvastatin (LIPITOR) 20 MG tablet Take 1 tablet (20 mg) by mouth daily 90 tablet 3     busPIRone (BUSPAR) 10 MG tablet Take 1 tablet (10 mg) by mouth 2 times daily       carvedilol (COREG) 12.5 MG tablet Take 1 tablet (12.5 mg) by mouth 2 times daily (with meals) 180 tablet 3     CENTRUM OR TABS 1 TABLET DAILY       cloNIDine (CATAPRES) 0.1 MG tablet Take 1 tablet (0.1 mg) by mouth 2 times daily       clopidogrel (PLAVIX) 75 MG tablet Take 1 tablet (75 mg) by mouth daily       COQ10 100 MG OR CAPS None Entered       glimepiride (AMARYL) 4 MG tablet Take 1 tablet (4 mg) by mouth 2 times daily       hydrALAZINE (APRESOLINE) 50 MG tablet Take 1 tablet (50 mg) by mouth 2 times daily       NIACIN 500 MG OR TABS Take one orally twice daily 180 3     NITROGLYCERIN 0.4 MG SL SUBL ONE TABLET UNDER TONGUE, FOR CHEST PAIN, AS DIRECTED 1 BOTTLE 3 per year     OMEGA-3 1000 MG OR CAPS None Entered    "    ORDER FOR DME Light Therapy with Full Spectrum Light (58540 lux) Start with 10-15 minute exposure per day, Increase exposure to 30 to 45 minutes per day, Maximum exposure: 90 minutes per day,Look periodically at light during each session  1 0     pioglitazone (ACTOS) 30 MG tablet Take 1 tablet (30 mg) by mouth daily       ramipril (ALTACE) 10 MG capsule Take 1 capsule (10 mg) by mouth daily 90 capsule 3     tamsulosin (FLOMAX) 0.4 MG capsule Take 1 capsule (0.4 mg) by mouth daily       VOSOL-HC 2-1 % OT SOLN 2-3 drops in the affected ear 3-4 times daily for 7-10 days for itching in the ear 1 bottle 0     VYTORIN 10-40 MG OR TABS 1 TABLET EVERY EVENING 3 months 1     No facility-administered encounter medications on file as of 6/26/2019.              Review Of Systems  Skin: As above  Eyes: negative  Ears/Nose/Throat: negative  Respiratory: No shortness of breath, dyspnea on exertion, cough, or hemoptysis  Cardiovascular: negative  Gastrointestinal: negative  Genitourinary: negative  Musculoskeletal: negative  Neurologic: negative  Psychiatric: negative  Hematologic/Lymphatic/Immunologic: negative  Endocrine: negative      O:   NAD, WDWN, Alert & Oriented, Mood & Affect wnl, Vitals stable   Here today alone   BP (!) 81/32   Pulse 61   Ht 1.676 m (5' 6\")   BMI 39.58 kg/m     General appearance normal   Vitals stable   Alert, oriented and in no acute distress      Following lymph nodes palpated: Occipital, Cervical, Supraclavicular no lad   L cheek 1.5cm red plaque       Eyes: Conjunctivae/lids:Normal     ENT: Lips, buccal mucosa, tongue: normal    MSK:Normal    Cardiovascular: peripheral edema none    Pulm: Breathing Normal    Lymph Nodes: No Head and Neck Lymphadenopathy     Neuro/Psych: Orientation:Normal; Mood/Affect:Normal      A/P:  1. L cheek melanoma in situ   MELANOMA DISCUSSED WITH PATIENT:  I discussed the specifics of tumor, prognosis, metachronous melanoma, self exam, and genetics with the patient. " I explained the need for monthly skin exams including and taught the patient how to do this. Patient was asked about new or changing moles . I discussed with patient signs and symptoms that could arise in the setting of recurrent locoregional or metastatic disease. In addition, the need to undergo every 4 month dermatologic full skin survey and evaluation given that patients with a diagnosis of melanoma are at risk of recurrence (local and distant) and of subsequent de emilie melanoma.  . I reviewed treatment options, including a discussion of wide excision (the gold standard) versus Mohs surgery with MART-1 immunostains.     Note: MART-1 (Melanoma Antigen Recognized by T-cells) antibody immunostaining was used during Mohs surgery as per standard protocol, in addition to routine processing of all specimens with hematoxylin and eosin. The peripheral margins/edges were processed with the MART-1 stain (4 specimens total). The center was examined with hematoxylin & eosin and MART-1 immunostains. The patient was informed of the procedure and its risk/benefits during the consent for the procedure.    One or more of the reagents used in immunohistochemical testing in this case may not have been cleared or approved by the U.S. Food and Drug Administration (FDA). The FDA has determined that such clearance or approval is not necessary. These tests are used for clinical purposes. They should not be regarded as investigational or for research. These reagents  performance characteristics have been determined by [facility]. This laboratory is certified under the Clinical Laboratory Improvement Amendments of 1988 (CLIA-88) as qualified to perform high complexity clinical laboratory testing.    After [facility] discussed with patient, decision for Mohs surgery was made. Indication for Mohs was aggressive histology. Patient confirmed the site with Dr. Roque. After anesthesia with LEC, the tumor was excised using standard  Mohs technique in 1 stages(s).  MART 1 stains were performed on 4 specimens. CLEAR MARGINS OBTAINED and Final defect size was 2.7 x 2.3 cm.   REPAIR COMPLEX: Because of the tightness of the surrounding skin and Because of the size and full thickness nature of the defect, a complex closure was planned. After LEC anesthesia and prep, Burow's triangles were excised in the relaxed skin tension lines. The wound edges were widely undermined by dissection in the subcutaneous plane until adequate tissue mobility was obtained. Hemostasis was obtained. The wound edges were closed in a layered fashion using Vicryl and Fast Absorbing Plain Gut sutures. Postoperative length was 7.8 cm.   EBL minimal; complications none; wound care routine.  The patient was discharged in good condition and will return in one week for wound evaluation.  BENIGN LESIONS DISCUSSED WITH PATIENT:  I discussed the specifics of tumor, prognosis, and genetics of benign lesions.  I explained that treatment of these lesions would be purely cosmetic and not medically neccessary.  I discussed with patient different removal options including excision, cautery and /or laser.      Nature and genetics of benign skin lesions dicussed with patient.  Signs and Symptoms of skin cancer discussed with patient.  ABCDEs of melanoma reviewed with patient.  Patient encouraged to perform monthly skin exams.  UV precautions reviewed with patient.  Skin care regimen reviewed with patient: Eliminate harsh soaps, i.e. Dial, zest, irsih spring; Mild soaps such as Cetaphil or Dove sensitive skin, avoid hot or cold showers, aggressive use of emollients including vanicream, cetaphil or cerave discussed with patient.    Risks of non-melanoma skin cancer discussed with patient   Return to clinic next appt

## 2019-06-26 NOTE — PATIENT INSTRUCTIONS
Sutured Wound Care     [facility]: [phone]    [facility]: [phone]    Left cheek  ? No strenuous activity for 48 hours. Resume moderate activity in 48 hours. No heavy exercising until you are seen for follow up in one week.     ? Take Tylenol as needed for discomfort.                         ? Do not drink alcoholic beverages for 48 hours.     ? Keep the pressure bandage in place for 24 hours. If the bandage becomes blood tinged or loose, reinforce it with gauze and tape.        (Refer to the reverse side of this page for management of bleeding).    ? Remove pressure bandage in 24 hours (white gauze and tape)    ? Leave the flat bandage in place until your follow up appointment.(brown)    ? Keep the bandage dry. Wash around it carefully.    ? If the tape becomes soiled or starts to come off, reinforce it with additional paper tape.    ? Do not smoke for 3 weeks; smoking is detrimental to wound healing.    ? It is normal to have swelling and bruising around the surgical site. The bruising will fade in approximately 10-14 days. Elevate the area to reduce swelling.    ? Numbness, itchiness and sensitivity to temperature changes can occur after surgery and may take up to 18 months to normalize.      POSSIBLE COMPLICATIONS    BLEEDIN. Leave the bandage in place.  2. Use tightly rolled up gauze or a cloth to apply direct pressure over the bandage for 20   minutes.  3. Reapply pressure for an additional 20 minutes if necessary  4. Call the office or go to the nearest emergency room if pressure fails to stop the bleeding.  5. Use additional gauze and tape to maintain pressure once the bleeding has stopped.        PAIN:    1. Post operative pain should slowly get better, never worse.  2. A severe increase in pain may indicate a problem. Call the office if this occurs.    In case of emergency phone:Dr Roque [phone]

## 2019-06-27 NOTE — TELEPHONE ENCOUNTER
Called [place] Phone: [phone]. Left a vm requesting an urgent callback. Direct line provided for call back.  Rose Mary Santillan RN     Name band;

## 2019-06-28 ENCOUNTER — HOSPITAL ENCOUNTER (OUTPATIENT)
Dept: LAB | Facility: CLINIC | Age: 79
Discharge: HOME | End: 2019-06-28
Attending: INTERNAL MEDICINE | Admitting: INTERNAL MEDICINE
Payer: MEDICARE

## 2019-06-28 ENCOUNTER — PRE VISIT (OUTPATIENT)
Dept: ONCOLOGY | Facility: CLINIC | Age: 79
End: 2019-06-28

## 2019-06-28 ENCOUNTER — ONCOLOGY VISIT (OUTPATIENT)
Dept: ONCOLOGY | Facility: CLINIC | Age: 79
End: 2019-06-28
Attending: FAMILY MEDICINE
Payer: MEDICARE

## 2019-06-28 VITALS
OXYGEN SATURATION: 96 % | BODY MASS INDEX: 39.98 KG/M2 | HEART RATE: 57 BPM | DIASTOLIC BLOOD PRESSURE: 43 MMHG | WEIGHT: 248.8 LBS | HEIGHT: 66 IN | RESPIRATION RATE: 20 BRPM | TEMPERATURE: 97.7 F | SYSTOLIC BLOOD PRESSURE: 96 MMHG

## 2019-06-28 DIAGNOSIS — E78.2 MIXED HYPERLIPIDEMIA: ICD-10-CM

## 2019-06-28 DIAGNOSIS — C43.59 MALIGNANT MELANOMA OF SKIN OF TRUNK, EXCEPT SCROTUM (H): ICD-10-CM

## 2019-06-28 DIAGNOSIS — I10 ESSENTIAL HYPERTENSION, BENIGN: ICD-10-CM

## 2019-06-28 DIAGNOSIS — E11.8 TYPE 2 DIABETES MELLITUS WITH COMPLICATION, WITHOUT LONG-TERM CURRENT USE OF INSULIN (H): ICD-10-CM

## 2019-06-28 DIAGNOSIS — G47.33 OBSTRUCTIVE SLEEP APNEA: ICD-10-CM

## 2019-06-28 DIAGNOSIS — C82.24 GRADE 3 FOLLICULAR LYMPHOMA OF LYMPH NODES OF AXILLA (H): Primary | ICD-10-CM

## 2019-06-28 LAB
ALBUMIN SERPL-MCNC: 3.1 G/DL (ref 3.4–5)
ALP SERPL-CCNC: 85 U/L (ref 40–150)
ALT SERPL W P-5'-P-CCNC: 19 U/L (ref 0–70)
ANION GAP SERPL CALCULATED.3IONS-SCNC: 6 MMOL/L (ref 3–14)
AST SERPL W P-5'-P-CCNC: 19 U/L (ref 0–45)
BASOPHILS # BLD AUTO: 0 10E9/L (ref 0–0.2)
BASOPHILS NFR BLD AUTO: 1.1 %
BILIRUB SERPL-MCNC: 0.6 MG/DL (ref 0.2–1.3)
BUN SERPL-MCNC: 17 MG/DL (ref 7–30)
CALCIUM SERPL-MCNC: 8.5 MG/DL (ref 8.5–10.1)
CHLORIDE SERPL-SCNC: 108 MMOL/L (ref 94–109)
CO2 SERPL-SCNC: 27 MMOL/L (ref 20–32)
CREAT SERPL-MCNC: 1.14 MG/DL (ref 0.66–1.25)
DIFFERENTIAL METHOD BLD: ABNORMAL
EOSINOPHIL # BLD AUTO: 0.1 10E9/L (ref 0–0.7)
EOSINOPHIL NFR BLD AUTO: 2.7 %
ERYTHROCYTE [DISTWIDTH] IN BLOOD BY AUTOMATED COUNT: 15.4 % (ref 10–15)
GFR SERPL CREATININE-BSD FRML MDRD: 62 ML/MIN/{1.73_M2}
GLUCOSE SERPL-MCNC: 149 MG/DL (ref 70–99)
HCT VFR BLD AUTO: 35.3 % (ref 40–53)
HGB BLD-MCNC: 11 G/DL (ref 13.3–17.7)
IMM GRANULOCYTES # BLD: 0 10E9/L (ref 0–0.4)
IMM GRANULOCYTES NFR BLD: 0.4 %
LDH SERPL L TO P-CCNC: 181 U/L (ref 85–227)
LYMPHOCYTES # BLD AUTO: 0.7 10E9/L (ref 0.8–5.3)
LYMPHOCYTES NFR BLD AUTO: 27.4 %
MCH RBC QN AUTO: 27.8 PG (ref 26.5–33)
MCHC RBC AUTO-ENTMCNC: 31.2 G/DL (ref 31.5–36.5)
MCV RBC AUTO: 89 FL (ref 78–100)
MONOCYTES # BLD AUTO: 0.3 10E9/L (ref 0–1.3)
MONOCYTES NFR BLD AUTO: 11 %
NEUTROPHILS # BLD AUTO: 1.5 10E9/L (ref 1.6–8.3)
NEUTROPHILS NFR BLD AUTO: 57.4 %
NRBC # BLD AUTO: 0 10*3/UL
NRBC BLD AUTO-RTO: 0 /100
PLATELET # BLD AUTO: 85 10E9/L (ref 150–450)
POTASSIUM SERPL-SCNC: 4.1 MMOL/L (ref 3.4–5.3)
PROT SERPL-MCNC: 6.8 G/DL (ref 6.8–8.8)
RBC # BLD AUTO: 3.96 10E12/L (ref 4.4–5.9)
SODIUM SERPL-SCNC: 141 MMOL/L (ref 133–144)
WBC # BLD AUTO: 2.6 10E9/L (ref 4–11)

## 2019-06-28 PROCEDURE — 83615 LACTATE (LD) (LDH) ENZYME: CPT | Performed by: INTERNAL MEDICINE

## 2019-06-28 PROCEDURE — 99205 OFFICE O/P NEW HI 60 MIN: CPT | Performed by: INTERNAL MEDICINE

## 2019-06-28 PROCEDURE — 36415 COLL VENOUS BLD VENIPUNCTURE: CPT | Performed by: INTERNAL MEDICINE

## 2019-06-28 PROCEDURE — 80053 COMPREHEN METABOLIC PANEL: CPT | Performed by: INTERNAL MEDICINE

## 2019-06-28 PROCEDURE — G0463 HOSPITAL OUTPT CLINIC VISIT: HCPCS

## 2019-06-28 PROCEDURE — 85025 COMPLETE CBC W/AUTO DIFF WBC: CPT | Performed by: INTERNAL MEDICINE

## 2019-06-28 ASSESSMENT — MIFFLIN-ST. JEOR: SCORE: 1805.27

## 2019-06-28 ASSESSMENT — PAIN SCALES - GENERAL: PAINLEVEL: NO PAIN (0)

## 2019-06-28 NOTE — PROGRESS NOTES
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      Initial Vitals: BP 96/43 (BP Location: Right arm, Patient Position: Sitting, Cuff Size: Adult Large)   Pulse 57   Temp 97.7  F (36.5  C) (Tympanic)   Resp 20   Ht 1.683 m (5' 6.25\")   Wt 112.9 kg (248 lb 12.8 oz)   SpO2 96%   BMI 39.85 kg/m   Estimated body mass index is 39.85 kg/m  as calculated from the following:    Height as of this encounter: 1.683 m (5' 6.25\").    Weight as of this encounter: 112.9 kg (248 lb 12.8 oz). Body surface area is 2.3 meters squared.  No Pain (0) Comment: Data Unavailable   No LMP for male patient.  Allergies reviewed: Yes  Medications reviewed: Yes    Medications: Medication refills not needed today.  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       Jelly Eagle CMA              "

## 2019-06-28 NOTE — H&P (VIEW-ONLY)
DATE OF VISIT: Jun 28, 2019    REASON FOR REFERRAL: Management of non-Hodgkin lymphoma.    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        HISTORY OF PRESENT ILLNESS:   Flash Brown is a 78-year-old gentleman with multiple comorbidities including type 2 diabetes, hypertension, depression and sleep apnea.  He is known to have history of non-Hodgkin lymphoma which Treated in 1983 on [facility] at that time he underwent right axillary lymph node dissection followed by radiation.  He did not receive any chemotherapy at that time.  Patient has been followed by his primary care physician in [place].  There was no evidence of recurrence up until now.  CT scan done 2010 was negative for any recurrence.  He moved here to live with his sister.  He came to establish care for follow-up for his lymphoma.  His main complaint including fatigue.  He is known to have sleep apnea.  He has been reluctant to use CPAP machine.  He denies any recent history of weight loss or night sweats or fever or chills.  He denies any pain.    REVIEW OF SYSTEMS:   Constitutional: Negative for fever, chills, and night sweats.  Increasing fatigue  Skin: negative.  Eyes: negative.  Ears/Nose/Throat: negative.  Respiratory: No shortness of breath, dyspnea on exertion, cough, or hemoptysis.  Cardiovascular: negative.  Gastrointestinal: negative.  Genitourinary: negative.  Musculoskeletal: negative.  Neurologic: negative.  Psychiatric: negative.  Hematologic/Lymphatic/Immunologic: negative.  Endocrine: negative.    PAST MEDICAL HISTORY:   Past Medical History:   Diagnosis Date     Basal cell carcinoma      CAD (coronary artery disease)      Depression      Diabetes type 2, controlled (H)      Hyperlipidemia      Hypertension      Lymphoma (H)      Malignant melanoma (H)      Melanoma (H)      Squamous cell carcinoma        PAST SURGICAL  HISTORY:   Past Surgical History:   Procedure Laterality Date     AXILLARY SURGERY      S/P resection and adjuvant radiation     CHOLECYSTECTOMY       HERNIA REPAIR, UMBILICAL       STENT      PTCA with drug-eluting stent of RCA, circumflex, and LAD       ALLERGIES:   Allergies as of 06/28/2019 - Reviewed 06/28/2019   Allergen Reaction Noted     Iodine Swelling 01/30/2008       MEDICATIONS:   Current Outpatient Medications   Medication Sig Dispense Refill     amLODIPine (NORVASC) 5 MG tablet Take 1 tablet (5 mg) by mouth daily       aspirin (ASA) 81 MG EC tablet Take 1 tablet (81 mg) by mouth daily       atorvastatin (LIPITOR) 20 MG tablet Take 1 tablet (20 mg) by mouth daily 90 tablet 3     busPIRone (BUSPAR) 10 MG tablet Take 1 tablet (10 mg) by mouth 2 times daily       carvedilol (COREG) 12.5 MG tablet Take 1 tablet (12.5 mg) by mouth 2 times daily (with meals) 180 tablet 3     CENTRUM OR TABS 1 TABLET DAILY       cloNIDine (CATAPRES) 0.1 MG tablet Take 1 tablet (0.1 mg) by mouth 2 times daily       clopidogrel (PLAVIX) 75 MG tablet Take 1 tablet (75 mg) by mouth daily       COQ10 100 MG OR CAPS None Entered       diazepam (VALIUM) 5 MG tablet Take 1 tablet (5 mg) by mouth every 6 hours as needed for anxiety 1 tablet 0     glimepiride (AMARYL) 4 MG tablet Take 1 tablet (4 mg) by mouth 2 times daily       hydrALAZINE (APRESOLINE) 50 MG tablet Take 1 tablet (50 mg) by mouth 2 times daily       NIACIN 500 MG OR TABS Take one orally twice daily 180 3     NITROGLYCERIN 0.4 MG SL SUBL ONE TABLET UNDER TONGUE, FOR CHEST PAIN, AS DIRECTED 1 BOTTLE 3 per year     OMEGA-3 1000 MG OR CAPS None Entered       ORDER FOR DME Light Therapy with Full Spectrum Light (04481 lux) Start with 10-15 minute exposure per day, Increase exposure to 30 to 45 minutes per day, Maximum exposure: 90 minutes per day,Look periodically at light during each session  1 0     pioglitazone (ACTOS) 30 MG tablet Take 1 tablet (30 mg) by mouth daily        ramipril (ALTACE) 10 MG capsule Take 1 capsule (10 mg) by mouth daily 90 capsule 3     tamsulosin (FLOMAX) 0.4 MG capsule Take 1 capsule (0.4 mg) by mouth daily       VOSOL-HC 2-1 % OT SOLN 2-3 drops in the affected ear 3-4 times daily for 7-10 days for itching in the ear 1 bottle 0     VYTORIN 10-40 MG OR TABS 1 TABLET EVERY EVENING 3 months 1        FAMILY HISTORY:   Family History   Problem Relation Age of Onset     Asthma Other      Cancer Other         melanoma     Blood Disease Other         bleeding disorder     Lung Cancer Mother         Smoker     Prostate Cancer Father         SOCIAL HISTORY:   Social History     Socioeconomic History     Marital status: Single     Spouse name: None     Number of children: 0     Years of education: None     Highest education level: None   Occupational History     Occupation: Retired     Comment: [employer]    Social Needs     Financial resource strain: None     Food insecurity:     Worry: None     Inability: None     Transportation needs:     Medical: None     Non-medical: None   Tobacco Use     Smoking status: Never Smoker     Smokeless tobacco: Never Used   Substance and Sexual Activity     Alcohol use: Yes     Frequency: Monthly or less     Drinks per session: 1 or 2     Binge frequency: Never     Comment: glass of wine couple times per week     Drug use: Never     Sexual activity: None   Lifestyle     Physical activity:     Days per week: None     Minutes per session: None     Stress: None   Relationships     Social connections:     Talks on phone: None     Gets together: None     Attends Mormon service: None     Active member of club or organization: None     Attends meetings of clubs or organizations: None     Relationship status: None     Intimate partner violence:     Fear of current or ex partner: None     Emotionally abused: None     Physically abused: None     Forced sexual activity: None   Other Topics Concern     None   Social History  "Narrative     None       PHYSICAL EXAMINATION:   BP 96/43 (BP Location: Right arm, Patient Position: Sitting, Cuff Size: Adult Large)   Pulse 57   Temp 97.7  F (36.5  C) (Tympanic)   Resp 20   Ht 1.683 m (5' 6.25\")   Wt 112.9 kg (248 lb 12.8 oz)   SpO2 96%   BMI 39.85 kg/m    Wt Readings from Last 10 Encounters:   06/28/19 112.9 kg (248 lb 12.8 oz)   06/11/19 111.2 kg (245 lb 3.2 oz)   05/08/08 101.7 kg (224 lb 2 oz)   04/15/08 101.2 kg (223 lb)   03/13/08 102.7 kg (226 lb 6 oz)   01/30/08 106.2 kg (234 lb 2 oz)      GENERAL APPEARANCE: Healthy, alert and in no acute distress.  HEENT: Sclerae anicteric. PERRLA. Oropharynx without ulcers, lesions, or thrush.  NECK: Supple. No asymmetry or masses.  LYMPHATICS: No palpable cervical, supraclavicular, axillary, or inguinal lymphadenopathy.  RESP: Lungs clear to auscultation bilaterally without rales, rhonchi or wheezes.  CARDIOVASCULAR: Regular rate and rhythm. Normal S1, S2; no S3 or S4. No murmur, gallop, or rub.  ABDOMEN: Soft, nontender. Bowel sounds normal. No palpable organomegaly or masses.  MUSCULOSKELETAL: Extremities without gross deformities noted. No edema of bilateral lower extremities.  SKIN: No suspicious lesions or rashes.  NEURO: Alert and oriented x 3. Cranial nerves II-XII grossly intact.  PSYCHIATRIC: Mentation and affect appear normal.    LABORATORY RESULTS:  Office Visit on 06/18/2019   Component Date Value Ref Range Status     Copath Report 06/18/2019    Final                    Value:Patient Name: HOLLIE CARRERO  MR#: [id number]  Specimen #: [id number]  Collected: 6/18/2019  Received: 6/19/2019  Reported: 6/24/2019 14:50  Ordering Phy(s): JAMES BERGERON    For improved result formatting, select 'View Enhanced Report Format' under   Linked Documents section.    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  I have personally reviewed all specimens  and/or slides, including the   listed special stains, and used them  with my medical judgement to determine or confirm the final diagnosis.    Electronically signed out by:  Elvis Hitchcock M.D., [facility]    CLINICAL HISTORY:  The patient is a 78-year-old male.    GROSS:  The specimen is received in formalin with proper patient identification,   labeled \"left cheek\".  The specimen  consists of a 1.1                           x 0.9 x 0.1 cm irregular tan skin shave.  The skin   surface is remarkable for a brown-black,  irregular and indistinct possible lesion.  The resection margin is inked   blue, the specimen is trisected and  submitted in its entirety in cassette A1. (Dictated by: Venkat Patterson   6/19/2019 04:56 PM)    MICROSCOPIC:  Sections of this trisected shave biopsy specimen demonstrate a broad   junctional melanocytic proliferation  characterized by severely cytologically atypical, Melan-A positive   melanocytes demonstrating contiguous  junctional growth with broad pagetoid scatter to all levels of the spinous   layer and focal elimination into  the corneum as well as in irregular fusiform horizontally oriented nests.   Mild to moderate solar elastosis is  present in the visualized dermis. Deeper sections have been cut and   examined. The lesion extends to both  lateral margins and focally extends the deep margin via tracking along   follicular epithelium.    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  Patient was treated with axillary dissection followed by radiation therapy.  No chemotherapy was offered at that time.  There is no clear evidence of recurrence currently.  However CBC shows pancytopenia.  I would recommend to arrange CT scan chest abdomen and pelvis and bone marrow biopsy.      (C43.59) Malignant melanoma s/p resection in [place], [place]  Patient has been followed by dermatology    (I10) Essential hypertension, benign  Blood pressures currently well controlled with amlodipine 5 mg orally daily.  She is also on ramipril 10 mg orally daily.    (E78.2) Mixed hyperlipidemia  Currently on Lipitor 20 mg orally daily    (E11.8) Type 2 diabetes mellitus with complication, without long-term current use of insulin (H)  Diabetes is currently controlled    (G47.33) Obstructive sleep apnea  I strongly emphasized the importance of using CPAP machine and evaluated for sleep apnea.    The patient is ready to learn, no apparent learning barriers were identified, Diagnosis and treatment plans were explained to the patient. The patient expressed understanding of the content. The patient questions were answered to his satisfaction.    Crystal Camacho MD    Chart documentation with Dragon Voice recognition Software. Although reviewed after completion, some words and grammatical errors may remain.

## 2019-06-28 NOTE — PROGRESS NOTES
DATE OF VISIT: Jun 28, 2019    REASON FOR REFERRAL: Management of non-Hodgkin lymphoma.    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        HISTORY OF PRESENT ILLNESS:   Flash Brown is a 78-year-old gentleman with multiple comorbidities including type 2 diabetes, hypertension, depression and sleep apnea.  He is known to have history of non-Hodgkin lymphoma which Treated in 1983 on [facility] at that time he underwent right axillary lymph node dissection followed by radiation.  He did not receive any chemotherapy at that time.  Patient has been followed by his primary care physician in [place].  There was no evidence of recurrence up until now.  CT scan done 2010 was negative for any recurrence.  He moved here to live with his sister.  He came to establish care for follow-up for his lymphoma.  His main complaint including fatigue.  He is known to have sleep apnea.  He has been reluctant to use CPAP machine.  He denies any recent history of weight loss or night sweats or fever or chills.  He denies any pain.    REVIEW OF SYSTEMS:   Constitutional: Negative for fever, chills, and night sweats.  Increasing fatigue  Skin: negative.  Eyes: negative.  Ears/Nose/Throat: negative.  Respiratory: No shortness of breath, dyspnea on exertion, cough, or hemoptysis.  Cardiovascular: negative.  Gastrointestinal: negative.  Genitourinary: negative.  Musculoskeletal: negative.  Neurologic: negative.  Psychiatric: negative.  Hematologic/Lymphatic/Immunologic: negative.  Endocrine: negative.    PAST MEDICAL HISTORY:   Past Medical History:   Diagnosis Date     Basal cell carcinoma      CAD (coronary artery disease)      Depression      Diabetes type 2, controlled (H)      Hyperlipidemia      Hypertension      Lymphoma (H)      Malignant melanoma (H)      Melanoma (H)      Squamous cell carcinoma        PAST SURGICAL  HISTORY:   Past Surgical History:   Procedure Laterality Date     AXILLARY SURGERY      S/P resection and adjuvant radiation     CHOLECYSTECTOMY       HERNIA REPAIR, UMBILICAL       STENT      PTCA with drug-eluting stent of RCA, circumflex, and LAD       ALLERGIES:   Allergies as of 06/28/2019 - Reviewed 06/28/2019   Allergen Reaction Noted     Iodine Swelling 01/30/2008       MEDICATIONS:   Current Outpatient Medications   Medication Sig Dispense Refill     amLODIPine (NORVASC) 5 MG tablet Take 1 tablet (5 mg) by mouth daily       aspirin (ASA) 81 MG EC tablet Take 1 tablet (81 mg) by mouth daily       atorvastatin (LIPITOR) 20 MG tablet Take 1 tablet (20 mg) by mouth daily 90 tablet 3     busPIRone (BUSPAR) 10 MG tablet Take 1 tablet (10 mg) by mouth 2 times daily       carvedilol (COREG) 12.5 MG tablet Take 1 tablet (12.5 mg) by mouth 2 times daily (with meals) 180 tablet 3     CENTRUM OR TABS 1 TABLET DAILY       cloNIDine (CATAPRES) 0.1 MG tablet Take 1 tablet (0.1 mg) by mouth 2 times daily       clopidogrel (PLAVIX) 75 MG tablet Take 1 tablet (75 mg) by mouth daily       COQ10 100 MG OR CAPS None Entered       diazepam (VALIUM) 5 MG tablet Take 1 tablet (5 mg) by mouth every 6 hours as needed for anxiety 1 tablet 0     glimepiride (AMARYL) 4 MG tablet Take 1 tablet (4 mg) by mouth 2 times daily       hydrALAZINE (APRESOLINE) 50 MG tablet Take 1 tablet (50 mg) by mouth 2 times daily       NIACIN 500 MG OR TABS Take one orally twice daily 180 3     NITROGLYCERIN 0.4 MG SL SUBL ONE TABLET UNDER TONGUE, FOR CHEST PAIN, AS DIRECTED 1 BOTTLE 3 per year     OMEGA-3 1000 MG OR CAPS None Entered       ORDER FOR DME Light Therapy with Full Spectrum Light (47080 lux) Start with 10-15 minute exposure per day, Increase exposure to 30 to 45 minutes per day, Maximum exposure: 90 minutes per day,Look periodically at light during each session  1 0     pioglitazone (ACTOS) 30 MG tablet Take 1 tablet (30 mg) by mouth daily        ramipril (ALTACE) 10 MG capsule Take 1 capsule (10 mg) by mouth daily 90 capsule 3     tamsulosin (FLOMAX) 0.4 MG capsule Take 1 capsule (0.4 mg) by mouth daily       VOSOL-HC 2-1 % OT SOLN 2-3 drops in the affected ear 3-4 times daily for 7-10 days for itching in the ear 1 bottle 0     VYTORIN 10-40 MG OR TABS 1 TABLET EVERY EVENING 3 months 1        FAMILY HISTORY:   Family History   Problem Relation Age of Onset     Asthma Other      Cancer Other         melanoma     Blood Disease Other         bleeding disorder     Lung Cancer Mother         Smoker     Prostate Cancer Father         SOCIAL HISTORY:   Social History     Socioeconomic History     Marital status: Single     Spouse name: None     Number of children: 0     Years of education: None     Highest education level: None   Occupational History     Occupation: Retired     Comment: [employer]    Social Needs     Financial resource strain: None     Food insecurity:     Worry: None     Inability: None     Transportation needs:     Medical: None     Non-medical: None   Tobacco Use     Smoking status: Never Smoker     Smokeless tobacco: Never Used   Substance and Sexual Activity     Alcohol use: Yes     Frequency: Monthly or less     Drinks per session: 1 or 2     Binge frequency: Never     Comment: glass of wine couple times per week     Drug use: Never     Sexual activity: None   Lifestyle     Physical activity:     Days per week: None     Minutes per session: None     Stress: None   Relationships     Social connections:     Talks on phone: None     Gets together: None     Attends Zoroastrian service: None     Active member of club or organization: None     Attends meetings of clubs or organizations: None     Relationship status: None     Intimate partner violence:     Fear of current or ex partner: None     Emotionally abused: None     Physically abused: None     Forced sexual activity: None   Other Topics Concern     None   Social History  "Narrative     None       PHYSICAL EXAMINATION:   BP 96/43 (BP Location: Right arm, Patient Position: Sitting, Cuff Size: Adult Large)   Pulse 57   Temp 97.7  F (36.5  C) (Tympanic)   Resp 20   Ht 1.683 m (5' 6.25\")   Wt 112.9 kg (248 lb 12.8 oz)   SpO2 96%   BMI 39.85 kg/m    Wt Readings from Last 10 Encounters:   06/28/19 112.9 kg (248 lb 12.8 oz)   06/11/19 111.2 kg (245 lb 3.2 oz)   05/08/08 101.7 kg (224 lb 2 oz)   04/15/08 101.2 kg (223 lb)   03/13/08 102.7 kg (226 lb 6 oz)   01/30/08 106.2 kg (234 lb 2 oz)      GENERAL APPEARANCE: Healthy, alert and in no acute distress.  HEENT: Sclerae anicteric. PERRLA. Oropharynx without ulcers, lesions, or thrush.  NECK: Supple. No asymmetry or masses.  LYMPHATICS: No palpable cervical, supraclavicular, axillary, or inguinal lymphadenopathy.  RESP: Lungs clear to auscultation bilaterally without rales, rhonchi or wheezes.  CARDIOVASCULAR: Regular rate and rhythm. Normal S1, S2; no S3 or S4. No murmur, gallop, or rub.  ABDOMEN: Soft, nontender. Bowel sounds normal. No palpable organomegaly or masses.  MUSCULOSKELETAL: Extremities without gross deformities noted. No edema of bilateral lower extremities.  SKIN: No suspicious lesions or rashes.  NEURO: Alert and oriented x 3. Cranial nerves II-XII grossly intact.  PSYCHIATRIC: Mentation and affect appear normal.    LABORATORY RESULTS:  Office Visit on 06/18/2019   Component Date Value Ref Range Status     Copath Report 06/18/2019    Final                    Value:Patient Name: HOLLIE CARRERO  MR#: [id number]  Specimen #: [id number]  Collected: 6/18/2019  Received: 6/19/2019  Reported: 6/24/2019 14:50  Ordering Phy(s): JAMES BERGERON    For improved result formatting, select 'View Enhanced Report Format' under   Linked Documents section.    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  I have personally reviewed all specimens  and/or slides, including the   listed special stains, and used them  with my medical judgement to determine or confirm the final diagnosis.    Electronically signed out by:  Elvis Hitchcock M.D., [facility]    CLINICAL HISTORY:  The patient is a 78-year-old male.    GROSS:  The specimen is received in formalin with proper patient identification,   labeled \"left cheek\".  The specimen  consists of a 1.1                           x 0.9 x 0.1 cm irregular tan skin shave.  The skin   surface is remarkable for a brown-black,  irregular and indistinct possible lesion.  The resection margin is inked   blue, the specimen is trisected and  submitted in its entirety in cassette A1. (Dictated by: Venkat Patterson   6/19/2019 04:56 PM)    MICROSCOPIC:  Sections of this trisected shave biopsy specimen demonstrate a broad   junctional melanocytic proliferation  characterized by severely cytologically atypical, Melan-A positive   melanocytes demonstrating contiguous  junctional growth with broad pagetoid scatter to all levels of the spinous   layer and focal elimination into  the corneum as well as in irregular fusiform horizontally oriented nests.   Mild to moderate solar elastosis is  present in the visualized dermis. Deeper sections have been cut and   examined. The lesion extends to both  lateral margins and focally extends the deep margin via tracking along   follicular epithelium.    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  Patient was treated with axillary dissection followed by radiation therapy.  No chemotherapy was offered at that time.  There is no clear evidence of recurrence currently.  However CBC shows pancytopenia.  I would recommend to arrange CT scan chest abdomen and pelvis and bone marrow biopsy.      (C43.59) Malignant melanoma s/p resection in [place], [place]  Patient has been followed by dermatology    (I10) Essential hypertension, benign  Blood pressures currently well controlled with amlodipine 5 mg orally daily.  She is also on ramipril 10 mg orally daily.    (E78.2) Mixed hyperlipidemia  Currently on Lipitor 20 mg orally daily    (E11.8) Type 2 diabetes mellitus with complication, without long-term current use of insulin (H)  Diabetes is currently controlled    (G47.33) Obstructive sleep apnea  I strongly emphasized the importance of using CPAP machine and evaluated for sleep apnea.    The patient is ready to learn, no apparent learning barriers were identified, Diagnosis and treatment plans were explained to the patient. The patient expressed understanding of the content. The patient questions were answered to his satisfaction.    Crystal Camacho MD    Chart documentation with Dragon Voice recognition Software. Although reviewed after completion, some words and grammatical errors may remain.

## 2019-06-28 NOTE — LETTER
"    6/28/2019         RE: Flash Brown  [address] [facility] [address]        Dear Colleague,    Thank you for referring your patient, Flash Brown, to the [facility]. Please see a copy of my visit note below.    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      Initial Vitals: BP 96/43 (BP Location: Right arm, Patient Position: Sitting, Cuff Size: Adult Large)   Pulse 57   Temp 97.7  F (36.5  C) (Tympanic)   Resp 20   Ht 1.683 m (5' 6.25\")   Wt 112.9 kg (248 lb 12.8 oz)   SpO2 96%   BMI 39.85 kg/m    Estimated body mass index is 39.85 kg/m  as calculated from the following:    Height as of this encounter: 1.683 m (5' 6.25\").    Weight as of this encounter: 112.9 kg (248 lb 12.8 oz). Body surface area is 2.3 meters squared.  No Pain (0) Comment: Data Unavailable   No LMP for male patient.  Allergies reviewed: Yes  Medications reviewed: Yes    Medications: Medication refills not needed today.  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       Jelly Eagle CMA                DATE OF VISIT: Jun 28, 2019    REASON FOR REFERRAL: Management of non-Hodgkin lymphoma.    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        HISTORY OF PRESENT ILLNESS:   Flash Brown is a 78-year-old gentleman with multiple comorbidities including type 2 diabetes, hypertension, depression and sleep apnea.  He is known to have history of non-Hodgkin lymphoma which Treated in 1983 on [facility] at that time he underwent right axillary lymph node dissection followed " by radiation.  He did not receive any chemotherapy at that time.  Patient has been followed by his primary care physician in [place].  There was no evidence of recurrence up until now.  CT scan done 2010 was negative for any recurrence.  He moved here to live with his sister.  He came to establish care for follow-up for his lymphoma.  His main complaint including fatigue.  He is known to have sleep apnea.  He has been reluctant to use CPAP machine.  He denies any recent history of weight loss or night sweats or fever or chills.  He denies any pain.    REVIEW OF SYSTEMS:   Constitutional: Negative for fever, chills, and night sweats.  Increasing fatigue  Skin: negative.  Eyes: negative.  Ears/Nose/Throat: negative.  Respiratory: No shortness of breath, dyspnea on exertion, cough, or hemoptysis.  Cardiovascular: negative.  Gastrointestinal: negative.  Genitourinary: negative.  Musculoskeletal: negative.  Neurologic: negative.  Psychiatric: negative.  Hematologic/Lymphatic/Immunologic: negative.  Endocrine: negative.    PAST MEDICAL HISTORY:   Past Medical History:   Diagnosis Date     Basal cell carcinoma      CAD (coronary artery disease)      Depression      Diabetes type 2, controlled (H)      Hyperlipidemia      Hypertension      Lymphoma (H)      Malignant melanoma (H)      Melanoma (H)      Squamous cell carcinoma        PAST SURGICAL HISTORY:   Past Surgical History:   Procedure Laterality Date     AXILLARY SURGERY      S/P resection and adjuvant radiation     CHOLECYSTECTOMY       HERNIA REPAIR, UMBILICAL       STENT      PTCA with drug-eluting stent of RCA, circumflex, and LAD       ALLERGIES:   Allergies as of 06/28/2019 - Reviewed 06/28/2019   Allergen Reaction Noted     Iodine Swelling 01/30/2008       MEDICATIONS:   Current Outpatient Medications   Medication Sig Dispense Refill     amLODIPine (NORVASC) 5 MG tablet Take 1 tablet (5 mg) by mouth daily       aspirin (ASA) 81 MG EC tablet Take 1 tablet (81 mg)  by mouth daily       atorvastatin (LIPITOR) 20 MG tablet Take 1 tablet (20 mg) by mouth daily 90 tablet 3     busPIRone (BUSPAR) 10 MG tablet Take 1 tablet (10 mg) by mouth 2 times daily       carvedilol (COREG) 12.5 MG tablet Take 1 tablet (12.5 mg) by mouth 2 times daily (with meals) 180 tablet 3     CENTRUM OR TABS 1 TABLET DAILY       cloNIDine (CATAPRES) 0.1 MG tablet Take 1 tablet (0.1 mg) by mouth 2 times daily       clopidogrel (PLAVIX) 75 MG tablet Take 1 tablet (75 mg) by mouth daily       COQ10 100 MG OR CAPS None Entered       diazepam (VALIUM) 5 MG tablet Take 1 tablet (5 mg) by mouth every 6 hours as needed for anxiety 1 tablet 0     glimepiride (AMARYL) 4 MG tablet Take 1 tablet (4 mg) by mouth 2 times daily       hydrALAZINE (APRESOLINE) 50 MG tablet Take 1 tablet (50 mg) by mouth 2 times daily       NIACIN 500 MG OR TABS Take one orally twice daily 180 3     NITROGLYCERIN 0.4 MG SL SUBL ONE TABLET UNDER TONGUE, FOR CHEST PAIN, AS DIRECTED 1 BOTTLE 3 per year     OMEGA-3 1000 MG OR CAPS None Entered       ORDER FOR DME Light Therapy with Full Spectrum Light (51526 lux) Start with 10-15 minute exposure per day, Increase exposure to 30 to 45 minutes per day, Maximum exposure: 90 minutes per day,Look periodically at light during each session  1 0     pioglitazone (ACTOS) 30 MG tablet Take 1 tablet (30 mg) by mouth daily       ramipril (ALTACE) 10 MG capsule Take 1 capsule (10 mg) by mouth daily 90 capsule 3     tamsulosin (FLOMAX) 0.4 MG capsule Take 1 capsule (0.4 mg) by mouth daily       VOSOL-HC 2-1 % OT SOLN 2-3 drops in the affected ear 3-4 times daily for 7-10 days for itching in the ear 1 bottle 0     VYTORIN 10-40 MG OR TABS 1 TABLET EVERY EVENING 3 months 1        FAMILY HISTORY:   Family History   Problem Relation Age of Onset     Asthma Other      Cancer Other         melanoma     Blood Disease Other         bleeding disorder     Lung Cancer Mother         Smoker     Prostate Cancer Father   "       SOCIAL HISTORY:   Social History     Socioeconomic History     Marital status: Single     Spouse name: None     Number of children: 0     Years of education: None     Highest education level: None   Occupational History     Occupation: Retired     Comment: [employer]    Social Needs     Financial resource strain: None     Food insecurity:     Worry: None     Inability: None     Transportation needs:     Medical: None     Non-medical: None   Tobacco Use     Smoking status: Never Smoker     Smokeless tobacco: Never Used   Substance and Sexual Activity     Alcohol use: Yes     Frequency: Monthly or less     Drinks per session: 1 or 2     Binge frequency: Never     Comment: glass of wine couple times per week     Drug use: Never     Sexual activity: None   Lifestyle     Physical activity:     Days per week: None     Minutes per session: None     Stress: None   Relationships     Social connections:     Talks on phone: None     Gets together: None     Attends Taoist service: None     Active member of club or organization: None     Attends meetings of clubs or organizations: None     Relationship status: None     Intimate partner violence:     Fear of current or ex partner: None     Emotionally abused: None     Physically abused: None     Forced sexual activity: None   Other Topics Concern     None   Social History Narrative     None       PHYSICAL EXAMINATION:   BP 96/43 (BP Location: Right arm, Patient Position: Sitting, Cuff Size: Adult Large)   Pulse 57   Temp 97.7  F (36.5  C) (Tympanic)   Resp 20   Ht 1.683 m (5' 6.25\")   Wt 112.9 kg (248 lb 12.8 oz)   SpO2 96%   BMI 39.85 kg/m     Wt Readings from Last 10 Encounters:   06/28/19 112.9 kg (248 lb 12.8 oz)   06/11/19 111.2 kg (245 lb 3.2 oz)   05/08/08 101.7 kg (224 lb 2 oz)   04/15/08 101.2 kg (223 lb)   03/13/08 102.7 kg (226 lb 6 oz)   01/30/08 106.2 kg (234 lb 2 oz)      GENERAL APPEARANCE: Healthy, alert and in no acute " "distress.  HEENT: Sclerae anicteric. PERRLA. Oropharynx without ulcers, lesions, or thrush.  NECK: Supple. No asymmetry or masses.  LYMPHATICS: No palpable cervical, supraclavicular, axillary, or inguinal lymphadenopathy.  RESP: Lungs clear to auscultation bilaterally without rales, rhonchi or wheezes.  CARDIOVASCULAR: Regular rate and rhythm. Normal S1, S2; no S3 or S4. No murmur, gallop, or rub.  ABDOMEN: Soft, nontender. Bowel sounds normal. No palpable organomegaly or masses.  MUSCULOSKELETAL: Extremities without gross deformities noted. No edema of bilateral lower extremities.  SKIN: No suspicious lesions or rashes.  NEURO: Alert and oriented x 3. Cranial nerves II-XII grossly intact.  PSYCHIATRIC: Mentation and affect appear normal.    LABORATORY RESULTS:  Office Visit on 06/18/2019   Component Date Value Ref Range Status     Copath Report 06/18/2019    Final                    Value:Patient Name: HOLLIE CARRERO  MR#: [id number]  Specimen #: [id number]  Collected: 6/18/2019  Received: 6/19/2019  Reported: 6/24/2019 14:50  Ordering Phy(s): JAMES BERGERON    For improved result formatting, select 'View Enhanced Report Format' under   Linked Documents section.    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  I have personally reviewed all specimens  and/or slides, including the   listed special stains, and used them  with my medical judgement to determine or confirm the final diagnosis.    Electronically signed out by:  Elvis Hitchcock M.D., [facility]    CLINICAL HISTORY:  The patient is a 78-year-old male.    GROSS:  The specimen is received in formalin with proper patient identification,   labeled \"left cheek\".  The specimen  consists of a 1.1                           x 0.9 x 0.1 cm " irregular tan skin shave.  The skin   surface is remarkable for a brown-black,  irregular and indistinct possible lesion.  The resection margin is inked   blue, the specimen is trisected and  submitted in its entirety in cassette A1. (Dictated by: Venkat Patterson   6/19/2019 04:56 PM)    MICROSCOPIC:  Sections of this trisected shave biopsy specimen demonstrate a broad   junctional melanocytic proliferation  characterized by severely cytologically atypical, Melan-A positive   melanocytes demonstrating contiguous  junctional growth with broad pagetoid scatter to all levels of the spinous   layer and focal elimination into  the corneum as well as in irregular fusiform horizontally oriented nests.   Mild to moderate solar elastosis is  present in the visualized dermis. Deeper sections have been cut and   examined. The lesion extends to both  lateral margins and focally extends the deep margin via tracking along   follicular epithelium.    The technical component of this te                          sting was completed at the [facility], with the professional component performed   at the [facility], [address] [place] [id number] ([phone])    CPT Codes:  A: 36209-XD9.P, 94762-GD2.T, 56820-NYHYD    COLLECTION SITE:  Client: [facility]  Location: [facility])           IMAGING RESULTS:  Medical records from [facility] was reviewed today    ASSESSMENT AND PLAN:  (C82.24) Grade 3 follicular lymphoma of lymph nodes of axilla (H)  (primary encounter diagnosis)  This is 78-year-old gentleman with history of non-Hodgkin lymphoma since 1983.  Patient was treated with axillary dissection followed by radiation therapy.  No chemotherapy was offered at that time.  There is no clear evidence of recurrence currently.  Today we will arrange for laboratory tests including CBC,  CMP and LDH.  I discussed with the patient early symptoms of recurrence of lymphoma.  I will see the patient again in 6 months or sooner if there are new developments or concerns.    (C43.59) Malignant melanoma s/p resection in [place], [place]  Patient has been followed by dermatology    (I10) Essential hypertension, benign  Blood pressures currently well controlled with amlodipine 5 mg orally daily.  She is also on ramipril 10 mg orally daily.    (E78.2) Mixed hyperlipidemia  Currently on Lipitor 20 mg orally daily    (E11.8) Type 2 diabetes mellitus with complication, without long-term current use of insulin (H)  Diabetes is currently controlled    (G47.33) Obstructive sleep apnea  I strongly emphasized the importance of using CPAP machine and evaluated for sleep apnea.    The patient is ready to learn, no apparent learning barriers were identified, Diagnosis and treatment plans were explained to the patient. The patient expressed understanding of the content. The patient questions were answered to his satisfaction.    Crystal Camacho MD    Chart documentation with Dragon Voice recognition Software. Although reviewed after completion, some words and grammatical errors may remain.    Again, thank you for allowing me to participate in the care of your patient.        Sincerely,        Crystal Camacho MD

## 2019-06-28 NOTE — RESULT ENCOUNTER NOTE
Please inform the patient.  Pancytopenia.  We will recommend to arrange for a bone marrow biopsy and CT scan of chest abdomen and pelvis and follow-up with the results

## 2019-07-01 ENCOUNTER — TELEPHONE (OUTPATIENT)
Dept: SPIRITUAL SERVICES | Facility: CLINIC | Age: 79
End: 2019-07-01

## 2019-07-01 NOTE — PROGRESS NOTES
Patient's sister Daylin notified of recommendations for CT an BMBX. Phone number given to schedule CT. She will talk with patient this afternoon to see if he is willing to do the BMBX. Will call sister back to confirm decision.  Rose Mary Santillan RN on 7.1.19 at 10:55 AM.

## 2019-07-01 NOTE — TELEPHONE ENCOUNTER
SPIRITUAL HEALTH SERVICES Phone Encounter Note  [place] [facility]    Reason for Contact: Flash Brown was contacted by phone per reported concerns about spiritual well-being on the Oncology Distress Screening tool.    Intervention: Daniel provided his sister's cell number and gave permission for her to receive information.  I spoke with Daylin and offered the on-going support of  for Daniel in the days ahead.  She stated that she has been receiving many calls from the hospital relating to appointments and CT's and other tests that he will need.  She also commented about Daniel recently moving from [place] because of health reasons.  She expressed appreciation for the offer of support.    Plan: I am available for follow up call if Daniel prefers to talk by phone or to meet with pt at the hospital when he is here for an appointment.  Information was provided to Daylin for either of those options.    Minor Thakkar M.A., [facility]  Staff [facility]  Office [phone]  Cell [phone]  Pager [phone]

## 2019-07-02 DIAGNOSIS — C82.24 GRADE 3 FOLLICULAR LYMPHOMA OF LYMPH NODES OF AXILLA (H): Primary | ICD-10-CM

## 2019-07-02 NOTE — PROGRESS NOTES
Spoke with patient's sister Daylin. She said patient is willing to schedule appointments for BMBX and CT. Patient has an allergy to Iodine. Patient reports that he had a reaction in 1983 when he has surgery on a lymph node under his arm. He had swelling, but could not recall the details.  He did not recall if he was treated for it either. Will send message to Dr. Camacho to review CT orders.  Rose Mary Santillan RN

## 2019-07-02 NOTE — PROGRESS NOTES
CT pended. Message sent to provider to review.  Rose Mary Santillan RN    Order was signed for CT. Left message on patient's sister's personal answering machine to call back clinic. Direct line provided. Need to give scheduling phone number for CT.  Rose Mary Santillan RN on 7.2.19 at 3:35 PM

## 2019-07-03 ENCOUNTER — ALLIED HEALTH/NURSE VISIT (OUTPATIENT)
Dept: DERMATOLOGY | Facility: CLINIC | Age: 79
End: 2019-07-03
Payer: MEDICARE

## 2019-07-03 DIAGNOSIS — Z48.01 ENCOUNTER FOR CHANGE OR REMOVAL OF SURGICAL WOUND DRESSING: Primary | ICD-10-CM

## 2019-07-03 PROCEDURE — 99207 ZZC NO CHARGE NURSE ONLY: CPT

## 2019-07-03 NOTE — PROGRESS NOTES
Per Dr. Camacho, no dye for CT scan due to iodine allergy. CT order is for CAP without contrast.    Patient and Daylin aware of this. Call transferred to our imaging department to set up scan. Advised to call back with any questions or concerns. Direct line provided.

## 2019-07-03 NOTE — PATIENT INSTRUCTIONS
WOUND CARE INSTRUCTIONS  for  ONE WEEK AFTER SURGERY (Left Cheek)          1) Leave flat bandage on your skin for one week after today s bandage change.  2) In one week when you remove the bandage, you may resume your regular skin care routine, including washing with mild soap and water, applying moisturizer, make-up and sunscreen.    3) If there are any open or bleeding areas at the incision/graft site you should begin to cover the area with a bandage daily as follows:    1) Clean and dry the area with plain tap water using a Q-tip or sterile gauze pad.  2) Apply Polysporin or Bacitracin ointment to the open area.  3) Cover the wound with a band-aid or a sterile non-stick gauze pad and micropore paper tape.         SIGNS OF INFECTION  - If you notice any of these signs of infection, call your doctor right away: expanding redness around the wound.  - Yellow or greenish-colored pus or cloudy wound drainage.    - Red streaking spreading from the wound.  - Increased swelling, tenderness, or pain around the wound.   - Fever.    Please remember that yellow and clear drainage from a wound can be normal and related to normal wound healing.  Isolated drainage from a wound without a combination of the above features does not indicate infection.       *Once the bandages are removed, the scar will be red and firm (especially in the lip/chin area). This is normal and will fade in time. It might take 6-12 months for this to happen.     *Massaging the area will help the scar soften and fade quicker. Begin to massage the area one month after the bandages have been removed. To massage apply pressure directly and firmly over the scar with the fingertips and move in a circular motion. Massage the area for a few minutes several times a day. Continue to massage the site for several months.    *Approximately 6-8 weeks after surgery it is not uncommon to see the formation of  tender pimple-like  bump along the scar. This is normal. As  the scar continues to mature and the stitches underneath the skin begin to dissolve, this might occur. Do not pick or squeeze, this will resolve on it s own. Should one break open producing a small amount of drainage, apply Polysporin or Bacitracin ointment a few times a day until the wound is completely healed.    *Numbness in the surgical area is expected. It might take 12-18 months for the feeling to return to normal. During this time sensations of itchiness, tingling and occasional sharp pains might be noted. These feelings are normal and will subside once the nerves have completely healed.         IN CASE OF EMERGENCY: Dr Roque [phone]     If you were seen in [place] call: [phone]    If you were seen in [place] call: [phone]

## 2019-07-03 NOTE — PROGRESS NOTES
Per Dr. Camacho, no dye for CT scan due to iodine allergy. CT order is for CAP without contrast.     CT is scheduled for 007.11.19.    Message left for patient's sister Daylin to return call to clinic to set up f/u with Dr. Camacho for results (CT and BMBX). Direct line provided. Lisseth Guillen RN, BSN, OCN

## 2019-07-03 NOTE — PROGRESS NOTES
Patient returned to clinic for post surgery 1 week follow up bandage change to left cheek . Patient has no complaints, denies pain. Bandage removed, area cleansed with normal saline. Site is healing and wound edges approximating well. Reapplied new steri strips and paper tape. Advised to watch for signs/symptoms of infection; spreading redness,drainage, odor, fever. Call or report promptly to clinic. Patient given written instructions and informed to return to clinic as needed. Patient verbalized understanding.     Buzz BARRY RN   Specialty Clinics

## 2019-07-05 ENCOUNTER — ANESTHESIA EVENT (OUTPATIENT)
Dept: GASTROENTEROLOGY | Facility: CLINIC | Age: 79
End: 2019-07-05
Payer: MEDICARE

## 2019-07-08 ENCOUNTER — ANESTHESIA (OUTPATIENT)
Dept: GASTROENTEROLOGY | Facility: CLINIC | Age: 79
End: 2019-07-08
Payer: MEDICARE

## 2019-07-08 ENCOUNTER — HOSPITAL ENCOUNTER (OUTPATIENT)
Facility: CLINIC | Age: 79
Discharge: HOME | End: 2019-07-08
Attending: PATHOLOGY | Admitting: PATHOLOGY
Payer: MEDICARE

## 2019-07-08 VITALS
TEMPERATURE: 98.6 F | SYSTOLIC BLOOD PRESSURE: 180 MMHG | HEIGHT: 66 IN | DIASTOLIC BLOOD PRESSURE: 78 MMHG | RESPIRATION RATE: 16 BRPM | OXYGEN SATURATION: 98 % | BODY MASS INDEX: 39.86 KG/M2 | WEIGHT: 248 LBS | HEART RATE: 60 BPM

## 2019-07-08 DIAGNOSIS — C82.24 GRADE 3 FOLLICULAR LYMPHOMA OF LYMPH NODES OF AXILLA (H): ICD-10-CM

## 2019-07-08 DIAGNOSIS — D69.6 THROMBOCYTOPENIA (H): Primary | ICD-10-CM

## 2019-07-08 LAB
BASOPHILS # BLD AUTO: 0 10E9/L (ref 0–0.2)
BASOPHILS NFR BLD AUTO: 0.9 %
DIFFERENTIAL METHOD BLD: ABNORMAL
EOSINOPHIL # BLD AUTO: 0.1 10E9/L (ref 0–0.7)
EOSINOPHIL NFR BLD AUTO: 3.2 %
ERYTHROCYTE [DISTWIDTH] IN BLOOD BY AUTOMATED COUNT: 15.6 % (ref 10–15)
HCT VFR BLD AUTO: 35.4 % (ref 40–53)
HGB BLD-MCNC: 11.2 G/DL (ref 13.3–17.7)
IMM GRANULOCYTES # BLD: 0 10E9/L (ref 0–0.4)
IMM GRANULOCYTES NFR BLD: 0 %
LYMPHOCYTES # BLD AUTO: 0.8 10E9/L (ref 0.8–5.3)
LYMPHOCYTES NFR BLD AUTO: 25 %
MCH RBC QN AUTO: 27.6 PG (ref 26.5–33)
MCHC RBC AUTO-ENTMCNC: 31.6 G/DL (ref 31.5–36.5)
MCV RBC AUTO: 87 FL (ref 78–100)
MONOCYTES # BLD AUTO: 0.3 10E9/L (ref 0–1.3)
MONOCYTES NFR BLD AUTO: 10.8 %
NEUTROPHILS # BLD AUTO: 1.9 10E9/L (ref 1.6–8.3)
NEUTROPHILS NFR BLD AUTO: 60.1 %
NRBC # BLD AUTO: 0 10*3/UL
NRBC BLD AUTO-RTO: 0 /100
PLATELET # BLD AUTO: 82 10E9/L (ref 150–450)
RBC # BLD AUTO: 4.06 10E12/L (ref 4.4–5.9)
WBC # BLD AUTO: 3.2 10E9/L (ref 4–11)

## 2019-07-08 PROCEDURE — 37000009 ZZH ANESTHESIA TECHNICAL FEE, EACH ADDTL 15 MIN: Performed by: PATHOLOGY

## 2019-07-08 PROCEDURE — 88305 TISSUE EXAM BY PATHOLOGIST: CPT | Performed by: PATHOLOGY

## 2019-07-08 PROCEDURE — 36415 COLL VENOUS BLD VENIPUNCTURE: CPT | Performed by: PATHOLOGY

## 2019-07-08 PROCEDURE — 88313 SPECIAL STAINS GROUP 2: CPT | Performed by: PATHOLOGY

## 2019-07-08 PROCEDURE — 88305 TISSUE EXAM BY PATHOLOGIST: CPT | Mod: 26,76 | Performed by: PATHOLOGY

## 2019-07-08 PROCEDURE — 40000424 ZZHCL STATISTIC BONE MARROW CORE PERF TC 38221: Performed by: PATHOLOGY

## 2019-07-08 PROCEDURE — 40001005 ZZHCL STATISTIC FLOW >15 ABY TC 88189: Performed by: PATHOLOGY

## 2019-07-08 PROCEDURE — 38221 DX BONE MARROW BIOPSIES: CPT | Performed by: PATHOLOGY

## 2019-07-08 PROCEDURE — 85097 BONE MARROW INTERPRETATION: CPT | Performed by: PATHOLOGY

## 2019-07-08 PROCEDURE — 88161 CYTOPATH SMEAR OTHER SOURCE: CPT | Performed by: PATHOLOGY

## 2019-07-08 PROCEDURE — 25000128 H RX IP 250 OP 636: Performed by: NURSE ANESTHETIST, CERTIFIED REGISTERED

## 2019-07-08 PROCEDURE — 25800030 ZZH RX IP 258 OP 636: Performed by: NURSE ANESTHETIST, CERTIFIED REGISTERED

## 2019-07-08 PROCEDURE — 88185 FLOWCYTOMETRY/TC ADD-ON: CPT | Performed by: PATHOLOGY

## 2019-07-08 PROCEDURE — 88305 TISSUE EXAM BY PATHOLOGIST: CPT | Mod: 26 | Performed by: PATHOLOGY

## 2019-07-08 PROCEDURE — 88280 CHROMOSOME KARYOTYPE STUDY: CPT | Performed by: INTERNAL MEDICINE

## 2019-07-08 PROCEDURE — 88311 DECALCIFY TISSUE: CPT | Mod: 26 | Performed by: PATHOLOGY

## 2019-07-08 PROCEDURE — 88311 DECALCIFY TISSUE: CPT | Performed by: PATHOLOGY

## 2019-07-08 PROCEDURE — 88161 CYTOPATH SMEAR OTHER SOURCE: CPT | Mod: 26 | Performed by: PATHOLOGY

## 2019-07-08 PROCEDURE — 00000008 ZZHCL STATISTIC ADDL BM ASP PERF PF 38220: Performed by: PATHOLOGY

## 2019-07-08 PROCEDURE — 40000948 ZZHCL STATISTIC BONE MARROW ASP TC 85097: Performed by: PATHOLOGY

## 2019-07-08 PROCEDURE — 88237 TISSUE CULTURE BONE MARROW: CPT | Performed by: INTERNAL MEDICINE

## 2019-07-08 PROCEDURE — 85025 COMPLETE CBC W/AUTO DIFF WBC: CPT | Performed by: PATHOLOGY

## 2019-07-08 PROCEDURE — 85060 BLOOD SMEAR INTERPRETATION: CPT | Performed by: PATHOLOGY

## 2019-07-08 PROCEDURE — 25000125 ZZHC RX 250: Performed by: NURSE ANESTHETIST, CERTIFIED REGISTERED

## 2019-07-08 PROCEDURE — 00000058 ZZHCL STATISTIC BONE MARROW ASP PERF TC 38220: Performed by: PATHOLOGY

## 2019-07-08 PROCEDURE — 40000847 ZZHCL STATISTIC MORPHOLOGY W/INTERP HISTOLOGY TC 85060: Performed by: PATHOLOGY

## 2019-07-08 PROCEDURE — 37000008 ZZH ANESTHESIA TECHNICAL FEE, 1ST 30 MIN: Performed by: PATHOLOGY

## 2019-07-08 PROCEDURE — 38222 DX BONE MARROW BX & ASPIR: CPT | Performed by: PATHOLOGY

## 2019-07-08 PROCEDURE — 88313 SPECIAL STAINS GROUP 2: CPT | Mod: 26 | Performed by: PATHOLOGY

## 2019-07-08 PROCEDURE — 88184 FLOWCYTOMETRY/ TC 1 MARKER: CPT | Performed by: PATHOLOGY

## 2019-07-08 PROCEDURE — 88264 CHROMOSOME ANALYSIS 20-25: CPT | Performed by: INTERNAL MEDICINE

## 2019-07-08 RX ORDER — LIDOCAINE HYDROCHLORIDE 10 MG/ML
INJECTION, SOLUTION EPIDURAL; INFILTRATION; INTRACAUDAL; PERINEURAL PRN
Status: DISCONTINUED | OUTPATIENT
Start: 2019-07-08 | End: 2019-07-08

## 2019-07-08 RX ORDER — PROPOFOL 10 MG/ML
INJECTION, EMULSION INTRAVENOUS PRN
Status: DISCONTINUED | OUTPATIENT
Start: 2019-07-08 | End: 2019-07-08

## 2019-07-08 RX ORDER — LIDOCAINE 40 MG/G
CREAM TOPICAL
Status: DISCONTINUED | OUTPATIENT
Start: 2019-07-08 | End: 2019-07-08 | Stop reason: HOSPADM

## 2019-07-08 RX ORDER — OXYCODONE HCL 10 MG/1
10 TABLET, FILM COATED, EXTENDED RELEASE ORAL
Status: DISCONTINUED | OUTPATIENT
Start: 2019-07-08 | End: 2019-07-08 | Stop reason: HOSPADM

## 2019-07-08 RX ORDER — PROPOFOL 10 MG/ML
INJECTION, EMULSION INTRAVENOUS CONTINUOUS PRN
Status: DISCONTINUED | OUTPATIENT
Start: 2019-07-08 | End: 2019-07-08

## 2019-07-08 RX ORDER — SODIUM CHLORIDE, SODIUM LACTATE, POTASSIUM CHLORIDE, CALCIUM CHLORIDE 600; 310; 30; 20 MG/100ML; MG/100ML; MG/100ML; MG/100ML
INJECTION, SOLUTION INTRAVENOUS CONTINUOUS
Status: DISCONTINUED | OUTPATIENT
Start: 2019-07-08 | End: 2019-07-08 | Stop reason: HOSPADM

## 2019-07-08 RX ADMIN — SODIUM CHLORIDE, POTASSIUM CHLORIDE, SODIUM LACTATE AND CALCIUM CHLORIDE: 600; 310; 30; 20 INJECTION, SOLUTION INTRAVENOUS at 06:40

## 2019-07-08 RX ADMIN — PROPOFOL 200 MCG/KG/MIN: 10 INJECTION, EMULSION INTRAVENOUS at 07:42

## 2019-07-08 RX ADMIN — PROPOFOL 100 MG: 10 INJECTION, EMULSION INTRAVENOUS at 07:42

## 2019-07-08 RX ADMIN — LIDOCAINE HYDROCHLORIDE 5 ML: 10 INJECTION, SOLUTION EPIDURAL; INFILTRATION; INTRACAUDAL; PERINEURAL at 07:42

## 2019-07-08 RX ADMIN — LIDOCAINE HYDROCHLORIDE 1 ML: 10 INJECTION, SOLUTION EPIDURAL; INFILTRATION; INTRACAUDAL; PERINEURAL at 06:41

## 2019-07-08 ASSESSMENT — MIFFLIN-ST. JEOR: SCORE: 1801.64

## 2019-07-08 NOTE — ANESTHESIA POSTPROCEDURE EVALUATION
Patient: Flash Brown    Procedure(s):  BIOPSY, BONE MARROW    Diagnosis:Pancytopenia  Diagnosis Additional Information: No value filed.    Anesthesia Type:  MAC    Note:  Anesthesia Post Evaluation    Patient location during evaluation: Phase 2 and Bedside  Patient participation: Able to fully participate in evaluation  Level of consciousness: awake and alert  Pain management: adequate  Airway patency: patent  Cardiovascular status: acceptable  Respiratory status: acceptable  Hydration status: acceptable  PONV: none     Anesthetic complications: None          Last vitals:  Vitals:    07/08/19 0624 07/08/19 0826   BP: 137/71 160/70   Pulse:  57   Resp: 20 16   Temp: 37  C (98.6  F)    SpO2: 96% 97%         Electronically Signed By: Govind Fiore CRNA, APRN CRNA  July 8, 2019  9:04 AM

## 2019-07-08 NOTE — ANESTHESIA PREPROCEDURE EVALUATION
Anesthesia Pre-Procedure Evaluation    Patient: Flash Brown   MRN: [id number] : 1940          Preoperative Diagnosis: Pancytopenia    Procedure(s):  BIOPSY, BONE MARROW    Past Medical History:   Diagnosis Date     Basal cell carcinoma      CAD (coronary artery disease)      Depression      Diabetes type 2, controlled (H)      Hyperlipidemia      Hypertension      Lymphoma (H)      Malignant melanoma (H)      Melanoma (H)      Squamous cell carcinoma      Past Surgical History:   Procedure Laterality Date     AXILLARY SURGERY      S/P resection and adjuvant radiation     CHOLECYSTECTOMY       HERNIA REPAIR, UMBILICAL       STENT      PTCA with drug-eluting stent of RCA, circumflex, and LAD       Anesthesia Evaluation     . Pt has had prior anesthetic. Type: General and MAC    No history of anesthetic complications          ROS/MED HX    ENT/Pulmonary:     (+)sleep apnea, doesn't use CPAP , . .    Neurologic:  - neg neurologic ROS     Cardiovascular: Comment: PTCA with drug-eluting stent of RCA, circumflex, and LAD    (+) hypertension--CAD, --stent,. : . . . :. .       METS/Exercise Tolerance:  >4 METS   Hematologic:     (+) Other Hematologic Disorder-Thrombocytopenia (      Musculoskeletal:  - neg musculoskeletal ROS       GI/Hepatic:  - neg GI/hepatic ROS       Renal/Genitourinary:  - ROS Renal section negative       Endo:     (+) type II DM Obesity, .      Psychiatric:     (+) psychiatric history depression      Infectious Disease:  - neg infectious disease ROS       Malignancy:   (+) Malignancy History of Lymphoma/Leukemia and Skin  Skin CA Active status post Surgery,         Other:    - neg other ROS                      Physical Exam  Normal systems: cardiovascular, pulmonary and dental    Airway   Mallampati: II  TM distance: >3 FB  Neck ROM: full    Dental     Cardiovascular   Rhythm and rate: regular and normal      Pulmonary    breath sounds clear to auscultation            Lab Results  "  Component Value Date    WBC 2.6 (L) 06/28/2019    HGB 11.0 (L) 06/28/2019    HCT 35.3 (L) 06/28/2019    PLT 85 (L) 06/28/2019     06/28/2019    POTASSIUM 4.1 06/28/2019    CHLORIDE 108 06/28/2019    CO2 27 06/28/2019    BUN 17 06/28/2019    CR 1.14 06/28/2019     (H) 06/28/2019    NATALIIA 8.5 06/28/2019    ALBUMIN 3.1 (L) 06/28/2019    PROTTOTAL 6.8 06/28/2019    ALT 19 06/28/2019    AST 19 06/28/2019    ALKPHOS 85 06/28/2019    BILITOTAL 0.6 06/28/2019    TSH 3.14 03/13/2008       Preop Vitals  BP Readings from Last 3 Encounters:   07/08/19 137/71   06/28/19 96/43   06/26/19 (!) 81/32    Pulse Readings from Last 3 Encounters:   06/28/19 57   06/26/19 61   06/18/19 67      Resp Readings from Last 3 Encounters:   07/08/19 20   06/28/19 20    SpO2 Readings from Last 3 Encounters:   07/08/19 96%   06/28/19 96%   06/18/19 97%      Temp Readings from Last 1 Encounters:   07/08/19 37  C (98.6  F) (Oral)    Ht Readings from Last 1 Encounters:   07/08/19 1.683 m (5' 6.25\")      Wt Readings from Last 1 Encounters:   07/08/19 112.5 kg (248 lb)    Estimated body mass index is 39.73 kg/m  as calculated from the following:    Height as of this encounter: 1.683 m (5' 6.25\").    Weight as of this encounter: 112.5 kg (248 lb).       Anesthesia Plan      History & Physical Review  History and physical reviewed and following examination; no interval change.    ASA Status:  3 .    NPO Status:  > 8 hours    Plan for MAC Reason for MAC:  Deep or markedly invasive procedure (G8)         Postoperative Care  Postoperative pain management:  Oral pain medications.      Consents  Anesthetic plan, risks, benefits and alternatives discussed with:  Patient..                 Govind Fiore CRNA, APRN CRNA  "

## 2019-07-08 NOTE — OP NOTE
Bone Marrow Procedure Note    Date: 7/8/2019  Diagnosis or Indication: Pancytopenia  Location: [facility]     Premedication per physician order.    Patient's identification was positively verified by: Husam Issa M.D.  After informed consent was obtained (see the completed Affirmation of Consent for Bone Marrow Aspiration and/or Biopsy procedures form in the patient's chart), the patient was placed in the left lateral decubitus position and the bony landmarks of the pelvis were identified.     Medical staff reconfirmed the patient's name, date of birth, procedure, and procedure site markings.  Medication was administered.(See Anesthesia documentation). The skin surface over the right posterior iliac crest was scrubbed with chlordexidine and draped in a sterile fashion with sterile towels to create a sterile field.  The skin and periosteal surface of the right posterior iliac crest were anesthetized with a total of 5 mL of 1% Lidocaine and a small incision was made through the skin over the corresponding site with a scalpel.     One trephine bone marrow core was obtained from the right posterior iliac crest by Dr. Shane Posada, as an assistant to Dr. Husam Issa.  Dr. Shane Posada obtained bone marrow aspirates from the right posterior iliac crest for routine processing, flow cytometry and cytogenetic studies.     Direct pressure was applied to the biopsy site with sterile gauze. The biopsy site was cleaned with alcohol and sterile dressing was placed over the biopsy incision using a pressure bandage by the [facility] nurse. The patient was then placed in the supine position to maintain pressure on the biopsy site.  The patient's bed/examination table was lowered and the railings were raised.  Post-procedure wound care instructions, including routine dressing instructions and analgesia, were given to the patient and his sister by the [facility] nurse.     The patient tolerated the procedure well.  Complications: None.    Husam Issa MD

## 2019-07-08 NOTE — ANESTHESIA CARE TRANSFER NOTE
Patient: Flash Brown    Procedure(s):  BIOPSY, BONE MARROW    Diagnosis: Pancytopenia  Diagnosis Additional Information: No value filed.    Anesthesia Type:   MAC     Note:  Airway :Nasal Cannula  Patient transferred to:Phase II  Handoff Report: Identifed the Patient, Identified the Reponsible Provider, Reviewed the pertinent medical history, Discussed the surgical course, Reviewed Intra-OP anesthesia mangement and issues during anesthesia, Set expectations for post-procedure period and Allowed opportunity for questions and acknowledgement of understanding      Vitals: (Last set prior to Anesthesia Care Transfer)    CRNA VITALS  7/8/2019 0752 - 7/8/2019 0822      7/8/2019             Pulse:  63    SpO2:  96 %                Electronically Signed By: Govind Fiore CRNA, APRN CRNA  July 8, 2019  8:22 AM

## 2019-07-09 ENCOUNTER — OFFICE VISIT (OUTPATIENT)
Dept: DERMATOLOGY | Facility: CLINIC | Age: 79
End: 2019-07-09
Payer: MEDICARE

## 2019-07-09 VITALS — DIASTOLIC BLOOD PRESSURE: 58 MMHG | OXYGEN SATURATION: 97 % | SYSTOLIC BLOOD PRESSURE: 130 MMHG | HEART RATE: 62 BPM

## 2019-07-09 DIAGNOSIS — Z85.828 HISTORY OF SKIN CANCER: Primary | ICD-10-CM

## 2019-07-09 DIAGNOSIS — C44.41 BASAL CELL CARCINOMA (BCC) OF RIGHT SIDE OF NECK: ICD-10-CM

## 2019-07-09 DIAGNOSIS — C44.529 SQUAMOUS CELL CARCINOMA OF STERNUM: ICD-10-CM

## 2019-07-09 DIAGNOSIS — C44.311 BASAL CELL CARCINOMA (BCC) OF RIGHT ALA NASI: ICD-10-CM

## 2019-07-09 DIAGNOSIS — C44.212 BASAL CELL CARCINOMA (BCC) OF RIGHT EARLOBE: ICD-10-CM

## 2019-07-09 LAB — COPATH REPORT: NORMAL

## 2019-07-09 PROCEDURE — 17313 MOHS 1 STAGE T/A/L: CPT | Mod: 51 | Performed by: DERMATOLOGY

## 2019-07-09 PROCEDURE — 17311 MOHS 1 STAGE H/N/HF/G: CPT | Mod: 59 | Performed by: DERMATOLOGY

## 2019-07-09 PROCEDURE — 15260 FTH/GFT FR N/E/E/L 20 SQCM/<: CPT | Performed by: DERMATOLOGY

## 2019-07-09 PROCEDURE — 88331 PATH CONSLTJ SURG 1 BLK 1SPC: CPT | Mod: 59 | Performed by: DERMATOLOGY

## 2019-07-09 PROCEDURE — 17312 MOHS ADDL STAGE: CPT | Performed by: DERMATOLOGY

## 2019-07-09 PROCEDURE — 11103 TANGNTL BX SKIN EA SEP/ADDL: CPT | Mod: 59 | Performed by: DERMATOLOGY

## 2019-07-09 PROCEDURE — 13132 CMPLX RPR F/C/C/M/N/AX/G/H/F: CPT | Mod: 59 | Performed by: DERMATOLOGY

## 2019-07-09 PROCEDURE — 17314 MOHS ADDL STAGE T/A/L: CPT | Performed by: DERMATOLOGY

## 2019-07-09 PROCEDURE — 99213 OFFICE O/P EST LOW 20 MIN: CPT | Mod: 25 | Performed by: DERMATOLOGY

## 2019-07-09 PROCEDURE — 17311 MOHS 1 STAGE H/N/HF/G: CPT | Mod: 51 | Performed by: DERMATOLOGY

## 2019-07-09 PROCEDURE — 69100 BIOPSY OF EXTERNAL EAR: CPT | Mod: 51 | Performed by: DERMATOLOGY

## 2019-07-09 PROCEDURE — 11102 TANGNTL BX SKIN SINGLE LES: CPT | Mod: 59 | Performed by: DERMATOLOGY

## 2019-07-09 RX ORDER — DIAZEPAM 10 MG
TABLET ORAL
Qty: 1 TABLET | Refills: 0 | Status: SHIPPED | OUTPATIENT
Start: 2019-07-09 | End: 2019-09-30

## 2019-07-09 NOTE — NURSING NOTE
"Initial /58   Pulse 62   SpO2 97%  Estimated body mass index is 39.73 kg/m  as calculated from the following:    Height as of 7/8/19: 1.683 m (5' 6.25\").    Weight as of 7/8/19: 112.5 kg (248 lb). .    Lucila Carey LPN    "

## 2019-07-09 NOTE — PROGRESS NOTES
Flash Brown is a 78 year old year old male patient here today for ongoing non-melanoma skin cancer.  His previous sites have healed well.  HE would like to do one spot on nose today.  Patient reports the following modifying factors none.  Associated symptoms: none.  Patient has no other skin complaints today.  Remainder of the HPI, Meds, PMH, Allergies, FH, and SH was reviewed in chart.      Past Medical History:   Diagnosis Date     Basal cell carcinoma      CAD (coronary artery disease)      Depression      Diabetes type 2, controlled (H)      Hyperlipidemia      Hypertension      Lymphoma (H)      Malignant melanoma (H)      Melanoma (H)      Squamous cell carcinoma        Past Surgical History:   Procedure Laterality Date     AXILLARY SURGERY      S/P resection and adjuvant radiation     BONE MARROW BIOPSY, BONE SPECIMEN, NEEDLE/TROCAR N/A 7/8/2019    Procedure: BIOPSY, BONE MARROW;  Surgeon: Husam Issa MD;  Location: [facility]     CHOLECYSTECTOMY       HERNIA REPAIR, UMBILICAL       STENT      PTCA with drug-eluting stent of RCA, circumflex, and LAD        Family History   Problem Relation Age of Onset     Asthma Other      Cancer Other         melanoma     Blood Disease Other         bleeding disorder     Lung Cancer Mother         Smoker     Prostate Cancer Father      Melanoma No family hx of        Social History     Socioeconomic History     Marital status: Single     Spouse name: Not on file     Number of children: 0     Years of education: Not on file     Highest education level: Not on file   Occupational History     Occupation: Retired     Comment: [employer]    Social Needs     Financial resource strain: Not on file     Food insecurity:     Worry: Not on file     Inability: Not on file     Transportation needs:     Medical: Not on file     Non-medical: Not on file   Tobacco Use     Smoking status: Never Smoker     Smokeless tobacco: Never Used   Substance and Sexual Activity      Alcohol use: Yes     Frequency: Monthly or less     Drinks per session: 1 or 2     Binge frequency: Never     Comment: glass of wine couple times per week     Drug use: Never     Sexual activity: Not on file   Lifestyle     Physical activity:     Days per week: Not on file     Minutes per session: Not on file     Stress: Not on file   Relationships     Social connections:     Talks on phone: Not on file     Gets together: Not on file     Attends Mandaen service: Not on file     Active member of club or organization: Not on file     Attends meetings of clubs or organizations: Not on file     Relationship status: Not on file     Intimate partner violence:     Fear of current or ex partner: Not on file     Emotionally abused: Not on file     Physically abused: Not on file     Forced sexual activity: Not on file   Other Topics Concern     Not on file   Social History Narrative     Not on file       Outpatient Encounter Medications as of 7/9/2019   Medication Sig Dispense Refill     amLODIPine (NORVASC) 5 MG tablet Take 1 tablet (5 mg) by mouth daily       aspirin (ASA) 81 MG EC tablet Take 1 tablet (81 mg) by mouth daily       atorvastatin (LIPITOR) 20 MG tablet Take 1 tablet (20 mg) by mouth daily 90 tablet 3     busPIRone (BUSPAR) 10 MG tablet Take 1 tablet (10 mg) by mouth 2 times daily       carvedilol (COREG) 12.5 MG tablet Take 1 tablet (12.5 mg) by mouth 2 times daily (with meals) 180 tablet 3     CENTRUM OR TABS 1 TABLET DAILY       cloNIDine (CATAPRES) 0.1 MG tablet Take 1 tablet (0.1 mg) by mouth 2 times daily       clopidogrel (PLAVIX) 75 MG tablet Take 1 tablet (75 mg) by mouth daily       COQ10 100 MG OR CAPS None Entered       diazepam (VALIUM) 10 MG tablet Before procedure 1 tablet 0     diazepam (VALIUM) 5 MG tablet Take 1 tablet (5 mg) by mouth every 6 hours as needed for anxiety 1 tablet 0     glimepiride (AMARYL) 4 MG tablet Take 1 tablet (4 mg) by mouth 2 times daily       hydrALAZINE (APRESOLINE)  50 MG tablet Take 1 tablet (50 mg) by mouth 2 times daily       NIACIN 500 MG OR TABS Take one orally twice daily 180 3     NITROGLYCERIN 0.4 MG SL SUBL ONE TABLET UNDER TONGUE, FOR CHEST PAIN, AS DIRECTED 1 BOTTLE 3 per year     OMEGA-3 1000 MG OR CAPS None Entered       ORDER FOR DME Light Therapy with Full Spectrum Light (07189 lux) Start with 10-15 minute exposure per day, Increase exposure to 30 to 45 minutes per day, Maximum exposure: 90 minutes per day,Look periodically at light during each session  1 0     pioglitazone (ACTOS) 30 MG tablet Take 1 tablet (30 mg) by mouth daily       ramipril (ALTACE) 10 MG capsule Take 1 capsule (10 mg) by mouth daily 90 capsule 3     tamsulosin (FLOMAX) 0.4 MG capsule Take 1 capsule (0.4 mg) by mouth daily       VOSOL-HC 2-1 % OT SOLN 2-3 drops in the affected ear 3-4 times daily for 7-10 days for itching in the ear 1 bottle 0     VYTORIN 10-40 MG OR TABS 1 TABLET EVERY EVENING 3 months 1     [DISCONTINUED] lactated ringers infusion        [DISCONTINUED] lidocaine (LMX4) kit        [DISCONTINUED] lidocaine 1 % 0.1-1 mL        [DISCONTINUED] oxyCODONE (oxyCONTIN) 12 hr tablet 10 mg        [DISCONTINUED] sodium chloride (PF) 0.9% PF flush 3 mL        [DISCONTINUED] sodium chloride (PF) 0.9% PF flush 3 mL        No facility-administered encounter medications on file as of 7/9/2019.              Review Of Systems  Skin: As above  Eyes: negative  Ears/Nose/Throat: negative  Respiratory: No shortness of breath, dyspnea on exertion, cough, or hemoptysis  Cardiovascular: negative  Gastrointestinal: negative  Genitourinary: negative  Musculoskeletal: negative  Neurologic: negative  Psychiatric: negative  Hematologic/Lymphatic/Immunologic: negative  Endocrine: negative      O:   NAD, WDWN, Alert & Oriented, Mood & Affect wnl, Vitals stable   Here today alone   /58   Pulse 62   SpO2 97%    General appearance normal   Vitals stable   Alert, oriented and in no acute  distress     R ala 7mm pp p   R IA neck 1.5cm red scaly papule    R sternal notch 1.4cm red plaque   R earlobe 5mm scaly papule    Other concerning lesions    The remainder of expanded problem focused exam was unremarkable; the following areas were examined:  scalp/hair, conjunctiva/lids, face, neck, lips, chest, digits/nails, RUE, LUE.      Eyes: Conjunctivae/lids:Normal     ENT: Lips, buccal mucosa, tongue: normal    MSK:Normal    Cardiovascular: peripheral edema none    Pulm: Breathing Normal    Lymph Nodes: No Head and Neck Lymphadenopathy     Neuro/Psych: Orientation:Normal; Mood/Affect:Normal      MICRO:     R ala:Orthokeratosis of epidermis with a proliferation of nests of basaloid cells, with peripheral palisading and a haphazard arrangement in the center extending into the dermis, forming nodules.  The tumor cells have hyperchromatic nuclei. Poor cytoplasm and intercellular bridging.    R IA neck:Orthokeratosis of epidermis with a proliferation of nests of basaloid cells, with peripheral palisading and a haphazard arrangement in the center extending into the dermis, forming nodules.  The tumor cells have hyperchromatic nuclei. Poor cytoplasm and intercellular bridging.    R sternal notch:There is hyperkeratosis & parakeratosis of the epidermis, with full thickness epidermal involvement by atypical keratinocytes with rare pale vacuolated cells invading dermis.    R earlobe:Orthokeratosis of epidermis with a proliferation of nests of basaloid cells, with peripheral palisading and a haphazard arrangement in the center extending into the dermis, forming nodules.  The tumor cells have hyperchromatic nuclei. Poor cytoplasm and intercellular bridging.    A/P:  1. Hx of non-melanoma skin cancer, r/o non-melanoma skin cancer   TANGENTIAL BIOPSY IN HOUSE:  After consent, anesthesia with LEC and prep, tangential excision performed and dx above confirmed with frozen section histology.  No complications and routine  wound care.  Patient told result   R ala basal cell carcinoma   R IA neck basal cell carcinoma   R sternal notch squamous cell carcinoma   R earlobe basal cell carcinoma         BENIGN LESIONS DISCUSSED WITH PATIENT:  I discussed the specifics of tumor, prognosis, and genetics of benign lesions.  I explained that treatment of these lesions would be purely cosmetic and not medically neccessary.  I discussed with patient different removal options including excision, cautery and /or laser.      Nature and genetics of benign skin lesions dicussed with patient.  Signs and Symptoms of skin cancer discussed with patient.  Patient encouraged to perform monthly skin exams.  UV precautions reviewed with patient.  Skin care regimen reviewed with patient: Eliminate harsh soaps, i.e. Dial, zest, irsih spring; Mild soaps such as Cetaphil or Dove sensitive skin, avoid hot or cold showers, aggressive use of emollients including vanicream, cetaphil or cerave discussed with patient.    Risks of non-melanoma skin cancer discussed with patient   Return to clinic next appt    PROCEDURE NOTE  R ala basal cell carcinoma   MOHS:   Location    After PGACAC discussed with patient, decision for Mohs surgery was made. Indication for Mohs was Location. Patient confirmed the site with Dr. Roque.  After anesthesia with LEC, the tumor was excised using standard Mohs technique in 2 stages(s).  CLEAR MARGINS OBTAINED and Final defect size was 1.5 x 1.3 cm.       REPAIR FTSG FROM POSTAURICULAR: Because of the full-thickness nature of the defect and to avoid distortion, a full-thickness skin graft was planned. After LEC anesthesia and prep, a template was made of the defect and the graft was harvested from the ipsilateral post-auricular sulcus.  The graft was defatted and trimmed to fit the defect. It was sutured into place with Fast Absorbing Plain Gut suture and a taped Bolster dressing was applied.    The donor site was converted to a fusiform  defect, and after hemostasis, was closed with subcutaneous stitches using Vicryl sutures.   EBL minimal; complications none; wound care routine.  The patient was discharged in good condition and will return on one week  for wound evaluation.  R IA neck basal cell carcinoma   MOHS:   Location    After PGACAC discussed with patient, decision for Mohs surgery was made. Indication for Mohs was Location. Patient confirmed the site with Dr. Roque.  After anesthesia with LEC, the tumor was excised using standard Mohs technique in 2 stages(s).  CLEAR MARGINS OBTAINED and Final defect size was 2.2 x 2.3 cm.       REPAIR COMPLEX: Because of the tightness of the surrounding skin and Because of the size and full thickness nature of the defect, a complex closure was planned. After LEC anesthesia and prep, Burow's triangles were excised in the relaxed skin tension lines. The wound edges were widely undermined by dissection in the subcutaneous plane until adequate tissue mobility was obtained. Hemostasis was obtained. The wound edges were closed in a layered fashion using Vicryl and Fast Absorbing Plain Gut sutures. Postoperative length was 4.6 cm.   EBL minimal; complications none; wound care routine.  The patient was discharged in good condition and will return in one week for wound evaluation.  R sternal notch squamous cell carcinoma   MOHS:   Size     After PGACAC discussed with patient, decision for Mohs surgery was made. Indication for Mohs was Size. Patient confirmed the site with Dr. Roque.  After anesthesia with LEC, the tumor was excised using standard Mohs technique in 2 stages(s).  CLEAR MARGINS OBTAINED and Final defect size was 2.4 x 2.5 cm.       REPAIR SECOND INTENT: We discussed the options for wound management in full with the patient including risks/benefits/possible outcomes. Decision made to allow the wound to heal by second intention. EBL minimal; complications none; wound care routine.  The patient was  discharged in good condition and will return in one month or prn for wound evaluation.    R earlobe basal cell carcinoma   MOHS:   Location    After PGACAC discussed with patient, decision for Mohs surgery was made. Indication for Mohs was Location. Patient confirmed the site with Dr. Roque.  After anesthesia with LEC, the tumor was excised using standard Mohs technique in 1 stages(s).  CLEAR MARGINS OBTAINED and Final defect size was 0.9 x 1 cm.       REPAIR SECOND INTENT: We discussed the options for wound management in full with the patient including risks/benefits/possible outcomes. Decision made to allow the wound to heal by second intention. EBL minimal; complications none; wound care routine.  The patient was discharged in good condition and will return in one month or prn for wound evaluation.

## 2019-07-09 NOTE — PATIENT INSTRUCTIONS
Sutured Wound Care     [facility]: [phone]    [facility]: [phone]          ? No strenuous activity for 48 hours. Resume moderate activity in 48 hours. No heavy exercising until you are seen for follow up in one week.     ? Take Tylenol as needed for discomfort.                         ? Do not drink alcoholic beverages for 48 hours.     ? Keep the pressure bandage in place for 24 hours. If the bandage becomes blood tinged or loose, reinforce it with gauze and tape.        (Refer to the reverse side of this page for management of bleeding).    ? Remove pressure bandage in 24 hours     ? Leave the flat bandage in place until your follow up appointment.    ? Keep the bandage dry. Wash around it carefully.    ? If the tape becomes soiled or starts to come off, reinforce it with additional paper tape.    ? Do not smoke for 3 weeks; smoking is detrimental to wound healing.    ? It is normal to have swelling and bruising around the surgical site. The bruising will fade in approximately 10-14 days. Elevate the area to reduce swelling.    ? Numbness, itchiness and sensitivity to temperature changes can occur after surgery and may take up to 18 months to normalize.      POSSIBLE COMPLICATIONS    BLEEDIN. Leave the bandage in place.  2. Use tightly rolled up gauze or a cloth to apply direct pressure over the bandage for 20   minutes.  3. Reapply pressure for an additional 20 minutes if necessary  4. Call the office or go to the nearest emergency room if pressure fails to stop the bleeding.  5. Use additional gauze and tape to maintain pressure once the bleeding has stopped.        PAIN:    1. Post operative pain should slowly get better, never worse.  2. A severe increase in pain may indicate a problem. Call the office if this occurs.    In case of emergency phone:Dr Roque [phone]        Skin Graft Wound Care     [facility] [phone]     ? No strenuous activity for 48  hours. Resume moderate activity in 48 hours.  No heavy exercising until you are seen for follow up in one week.    ? Take Tylenol as needed for discomfort.                       ? No alcoholic beverages for 48 hours.    ? Leave the bandage in place until you come in for follow up in one week.  If the bandage becomes blood tinged or loose, reinforce it with gauze and tape.     ? Keep the bandage dry. Wash around it carefully.    ? If the tape becomes soiled or starts to come off, reinforce it with additional paper tape.    ? Do not smoke for 3 weeks; smoking is detrimental to wound healing and may cause the graft to die.    ? Avoid prolonged exposure to extremely cold temperatures for 3 weeks.    ? It is normal to have swelling and bruising around the surgical site. The bruising will fade in approximately 10-14 days. Elevate the area to reduce swelling.    ? Numbness, itchiness and sensitivity to temperature changes can occur after surgery and may take up to 18 months to normalize.    POSSIBLE COMPLICATIONS    BLEEDIN. Leave the bandage in place.  7. Use tightly rolled up gauze or a cloth to apply direct pressure over the bandage for 20 minutes.  8. Reapply pressure for an additional 20 minutes if necessary  9. Call the office or go to the nearest emergency room if pressure fails to stop the bleeding.  10. Use additional gauze and tape to maintain pressure once the bleeding has stopped.    PAIN:    3. Post operative pain should slowly get better, beginning the evening after surgery.  4. A sudden or severe increase in pain may indicate a problem. Call the office if this occurs.    In case of emergency phone: [facility]: [phone]   or Dr Roque [phone]            Wound Care Instructions     FOR SUPERFICIAL WOUNDS     [facility] [phone]    [facility] [phone]                       AFTER 24 HOURS YOU SHOULD REMOVE THE BANDAGE AND BEGIN DAILY DRESSING CHANGES  AS FOLLOWS:     1) Remove Dressing.     2) Clean and dry the area with tap water using a Q-tip or sterile gauze pad.     3) Apply Vaseline, Aquaphor, Polysporin ointment or Bacitracin ointment over entire wound.  Do NOT use Neosporin ointment.     4) Cover the wound with a band-aid, or a sterile non-stick gauze pad and micropore paper tape      REPEAT THESE INSTRUCTIONS AT LEAST ONCE A DAY UNTIL THE WOUND HAS COMPLETELY HEALED.    It is an old wives tale that a wound heals better when it is exposed to air and allowed to dry out. The wound will heal faster with a better cosmetic result if it is kept moist with ointment and covered with a bandage.    **Do not let the wound dry out.**      Supplies Needed:      *Cotton tipped applicators (Q-tips)    *Polysporin Ointment or Bacitracin Ointment (NOT NEOSPORIN)    *Band-aids or non-stick gauze pads and micropore paper tape.      PATIENT INFORMATION:    During the healing process you will notice a number of changes. All wounds develop a small halo of redness surrounding the wound.  This means healing is occurring. Severe itching with extensive redness usually indicates sensitivity to the ointment or bandage tape used to dress the wound.  You should call our office if this develops.      Swelling  and/or discoloration around your surgical site is common, particularly when performed around the eye.    All wounds normally drain.  The larger the wound the more drainage there will be.  After 7-10 days, you will notice the wound beginning to shrink and new skin will begin to grow.  The wound is healed when you can see skin has formed over the entire area.  A healed wound has a healthy, shiny look to the surface and is red to dark pink in color to normalize.  Wounds may take approximately 4-6 weeks to heal.  Larger wounds may take 6-8 weeks.  After the wound is healed you may discontinue dressing changes.    You may experience a sensation of tightness as your wound heals. This is  normal and will gradually subside.    Your healed wound may be sensitive to temperature changes. This sensitivity improves with time, but if you re having a lot of discomfort, try to avoid temperature extremes.    Patients frequently experience itching after their wound appears to have healed because of the continue healing under the skin.  Plain Vaseline will help relieve the itching.        POSSIBLE COMPLICATIONS    BLEEDIN. Leave the bandage in place.  12. Use tightly rolled up gauze or a cloth to apply direct pressure over the bandage for 30  minutes.  13. Reapply pressure for an additional 30 minutes if necessary  14. Use additional gauze and tape to maintain pressure once the bleeding has stopped.

## 2019-07-09 NOTE — NURSING NOTE
The following medication was given @ 1056    MEDICATION:  Diazepam  ROUTE: PO  SITE: mouth  DOSE: 5mg  LOT #: [id number]  : [employer]   EXPIRATION DATE: 06/2020  NDC#: 01 [id number]   Was there drug waste? No  Multi-dose vial: No    Lucila Carey LPN  July 9, 2019

## 2019-07-09 NOTE — NURSING NOTE
The following medication was given @ 0820.    MEDICATION:  Diazepam  ROUTE: PO  SITE: mouth  DOSE: 5mg  LOT #: [id number]  : [employer]  EXPIRATION DATE: 06/2020  NDC#: 01 [id number]   Was there drug waste? No. Patient will take home (1) other 5mg tablet if not given in clinic.   Multi-dose vial: No    Lucila Carey LPN  July 9, 2019

## 2019-07-09 NOTE — LETTER
7/9/2019         RE: Flash Brown  [address] [facility] [address]        Dear Colleague,    Thank you for referring your patient, Flash Brown, to the [facility]. Please see a copy of my visit note below.    Surgical Office Location :   [facility]  [address] [place] [address]      Flash Brown is a 78 year old year old male patient here today for ongoing non-melanoma skin cancer.  His previous sites have healed well.  HE would like to do one spot on nose today.  Patient reports the following modifying factors none.  Associated symptoms: none.  Patient has no other skin complaints today.  Remainder of the HPI, Meds, PMH, Allergies, FH, and SH was reviewed in chart.      Past Medical History:   Diagnosis Date     Basal cell carcinoma      CAD (coronary artery disease)      Depression      Diabetes type 2, controlled (H)      Hyperlipidemia      Hypertension      Lymphoma (H)      Malignant melanoma (H)      Melanoma (H)      Squamous cell carcinoma        Past Surgical History:   Procedure Laterality Date     AXILLARY SURGERY      S/P resection and adjuvant radiation     BONE MARROW BIOPSY, BONE SPECIMEN, NEEDLE/TROCAR N/A 7/8/2019    Procedure: BIOPSY, BONE MARROW;  Surgeon: Husam Issa MD;  Location: [facility]     CHOLECYSTECTOMY       HERNIA REPAIR, UMBILICAL       STENT      PTCA with drug-eluting stent of RCA, circumflex, and LAD        Family History   Problem Relation Age of Onset     Asthma Other      Cancer Other         melanoma     Blood Disease Other         bleeding disorder     Lung Cancer Mother         Smoker     Prostate Cancer Father      Melanoma No family hx of        Social History     Socioeconomic History     Marital status: Single     Spouse name: Not on file     Number of children: 0     Years of education: Not on file     Highest education level: Not on file   Occupational History     Occupation: Retired     Comment: [employer]     Social Needs     Financial resource strain: Not on file     Food insecurity:     Worry: Not on file     Inability: Not on file     Transportation needs:     Medical: Not on file     Non-medical: Not on file   Tobacco Use     Smoking status: Never Smoker     Smokeless tobacco: Never Used   Substance and Sexual Activity     Alcohol use: Yes     Frequency: Monthly or less     Drinks per session: 1 or 2     Binge frequency: Never     Comment: glass of wine couple times per week     Drug use: Never     Sexual activity: Not on file   Lifestyle     Physical activity:     Days per week: Not on file     Minutes per session: Not on file     Stress: Not on file   Relationships     Social connections:     Talks on phone: Not on file     Gets together: Not on file     Attends Qu[employer] service: Not on file     Active member of club or organization: Not on file     Attends meetings of clubs or organizations: Not on file     Relationship status: Not on file     Intimate partner violence:     Fear of current or ex partner: Not on file     Emotionally abused: Not on file     Physically abused: Not on file     Forced sexual activity: Not on file   Other Topics Concern     Not on file   Social History Narrative     Not on file       Outpatient Encounter Medications as of 7/9/2019   Medication Sig Dispense Refill     amLODIPine (NORVASC) 5 MG tablet Take 1 tablet (5 mg) by mouth daily       aspirin (ASA) 81 MG EC tablet Take 1 tablet (81 mg) by mouth daily       atorvastatin (LIPITOR) 20 MG tablet Take 1 tablet (20 mg) by mouth daily 90 tablet 3     busPIRone (BUSPAR) 10 MG tablet Take 1 tablet (10 mg) by mouth 2 times daily       carvedilol (COREG) 12.5 MG tablet Take 1 tablet (12.5 mg) by mouth 2 times daily (with meals) 180 tablet 3     CENTRUM OR TABS 1 TABLET DAILY       cloNIDine (CATAPRES) 0.1 MG tablet Take 1 tablet (0.1 mg) by mouth 2 times daily       clopidogrel (PLAVIX) 75 MG tablet Take 1 tablet (75 mg) by  mouth daily       COQ10 100 MG OR CAPS None Entered       diazepam (VALIUM) 10 MG tablet Before procedure 1 tablet 0     diazepam (VALIUM) 5 MG tablet Take 1 tablet (5 mg) by mouth every 6 hours as needed for anxiety 1 tablet 0     glimepiride (AMARYL) 4 MG tablet Take 1 tablet (4 mg) by mouth 2 times daily       hydrALAZINE (APRESOLINE) 50 MG tablet Take 1 tablet (50 mg) by mouth 2 times daily       NIACIN 500 MG OR TABS Take one orally twice daily 180 3     NITROGLYCERIN 0.4 MG SL SUBL ONE TABLET UNDER TONGUE, FOR CHEST PAIN, AS DIRECTED 1 BOTTLE 3 per year     OMEGA-3 1000 MG OR CAPS None Entered       ORDER FOR DME Light Therapy with Full Spectrum Light (85615 lux) Start with 10-15 minute exposure per day, Increase exposure to 30 to 45 minutes per day, Maximum exposure: 90 minutes per day,Look periodically at light during each session  1 0     pioglitazone (ACTOS) 30 MG tablet Take 1 tablet (30 mg) by mouth daily       ramipril (ALTACE) 10 MG capsule Take 1 capsule (10 mg) by mouth daily 90 capsule 3     tamsulosin (FLOMAX) 0.4 MG capsule Take 1 capsule (0.4 mg) by mouth daily       VOSOL-HC 2-1 % OT SOLN 2-3 drops in the affected ear 3-4 times daily for 7-10 days for itching in the ear 1 bottle 0     VYTORIN 10-40 MG OR TABS 1 TABLET EVERY EVENING 3 months 1     [DISCONTINUED] lactated ringers infusion        [DISCONTINUED] lidocaine (LMX4) kit        [DISCONTINUED] lidocaine 1 % 0.1-1 mL        [DISCONTINUED] oxyCODONE (oxyCONTIN) 12 hr tablet 10 mg        [DISCONTINUED] sodium chloride (PF) 0.9% PF flush 3 mL        [DISCONTINUED] sodium chloride (PF) 0.9% PF flush 3 mL        No facility-administered encounter medications on file as of 7/9/2019.              Review Of Systems  Skin: As above  Eyes: negative  Ears/Nose/Throat: negative  Respiratory: No shortness of breath, dyspnea on exertion, cough, or hemoptysis  Cardiovascular: negative  Gastrointestinal: negative  Genitourinary: negative  Musculoskeletal:  negative  Neurologic: negative  Psychiatric: negative  Hematologic/Lymphatic/Immunologic: negative  Endocrine: negative      O:   NAD, WDWN, Alert & Oriented, Mood & Affect wnl, Vitals stable   Here today alone   /58   Pulse 62   SpO2 97%    General appearance normal   Vitals stable   Alert, oriented and in no acute distress     R ala 7mm pp p   R IA neck 1.5cm red scaly papule    R sternal notch 1.4cm red plaque   R earlobe 5mm scaly papule    Other concerning lesions    The remainder of expanded problem focused exam was unremarkable; the following areas were examined:  scalp/hair, conjunctiva/lids, face, neck, lips, chest, digits/nails, RUE, LUE.      Eyes: Conjunctivae/lids:Normal     ENT: Lips, buccal mucosa, tongue: normal    MSK:Normal    Cardiovascular: peripheral edema none    Pulm: Breathing Normal    Lymph Nodes: No Head and Neck Lymphadenopathy     Neuro/Psych: Orientation:Normal; Mood/Affect:Normal      MICRO:     R ala:Orthokeratosis of epidermis with a proliferation of nests of basaloid cells, with peripheral palisading and a haphazard arrangement in the center extending into the dermis, forming nodules.  The tumor cells have hyperchromatic nuclei. Poor cytoplasm and intercellular bridging.    R IA neck:Orthokeratosis of epidermis with a proliferation of nests of basaloid cells, with peripheral palisading and a haphazard arrangement in the center extending into the dermis, forming nodules.  The tumor cells have hyperchromatic nuclei. Poor cytoplasm and intercellular bridging.    R sternal notch:There is hyperkeratosis & parakeratosis of the epidermis, with full thickness epidermal involvement by atypical keratinocytes with rare pale vacuolated cells invading dermis.    R earlobe:Orthokeratosis of epidermis with a proliferation of nests of basaloid cells, with peripheral palisading and a haphazard arrangement in the center extending into the dermis, forming nodules.  The tumor cells have  hyperchromatic nuclei. Poor cytoplasm and intercellular bridging.    A/P:  1. Hx of non-melanoma skin cancer, r/o non-melanoma skin cancer   TANGENTIAL BIOPSY IN HOUSE:  After consent, anesthesia with LEC and prep, tangential excision performed and dx above confirmed with frozen section histology.  No complications and routine wound care.  Patient told result   R ala basal cell carcinoma   R IA neck basal cell carcinoma   R sternal notch squamous cell carcinoma   R earlobe basal cell carcinoma         BENIGN LESIONS DISCUSSED WITH PATIENT:  I discussed the specifics of tumor, prognosis, and genetics of benign lesions.  I explained that treatment of these lesions would be purely cosmetic and not medically neccessary.  I discussed with patient different removal options including excision, cautery and /or laser.      Nature and genetics of benign skin lesions dicussed with patient.  Signs and Symptoms of skin cancer discussed with patient.  Patient encouraged to perform monthly skin exams.  UV precautions reviewed with patient.  Skin care regimen reviewed with patient: Eliminate harsh soaps, i.e. Dial, zest, irsih spring; Mild soaps such as Cetaphil or Dove sensitive skin, avoid hot or cold showers, aggressive use of emollients including vanicream, cetaphil or cerave discussed with patient.    Risks of non-melanoma skin cancer discussed with patient   Return to clinic next appt    PROCEDURE NOTE  R ala basal cell carcinoma   MOHS:   Location    After PGACAC discussed with patient, decision for Mohs surgery was made. Indication for Mohs was Location. Patient confirmed the site with Dr. Roque.  After anesthesia with LEC, the tumor was excised using standard Mohs technique in 2 stages(s).  CLEAR MARGINS OBTAINED and Final defect size was 1.5 x 1.3 cm.       REPAIR FTSG FROM POSTAURICULAR: Because of the full-thickness nature of the defect and to avoid distortion, a full-thickness skin graft was planned. After LEC  anesthesia and prep, a template was made of the defect and the graft was harvested from the ipsilateral post-auricular sulcus.  The graft was defatted and trimmed to fit the defect. It was sutured into place with Fast Absorbing Plain Gut suture and a taped Bolster dressing was applied.    The donor site was converted to a fusiform defect, and after hemostasis, was closed with subcutaneous stitches using Vicryl sutures.   EBL minimal; complications none; wound care routine.  The patient was discharged in good condition and will return on one week  for wound evaluation.  R IA neck basal cell carcinoma   MOHS:   Location    After PGACAC discussed with patient, decision for Mohs surgery was made. Indication for Mohs was Location. Patient confirmed the site with Dr. Roque.  After anesthesia with LEC, the tumor was excised using standard Mohs technique in 2 stages(s).  CLEAR MARGINS OBTAINED and Final defect size was 2.2 x 2.3 cm.       REPAIR COMPLEX: Because of the tightness of the surrounding skin and Because of the size and full thickness nature of the defect, a complex closure was planned. After LEC anesthesia and prep, Burow's triangles were excised in the relaxed skin tension lines. The wound edges were widely undermined by dissection in the subcutaneous plane until adequate tissue mobility was obtained. Hemostasis was obtained. The wound edges were closed in a layered fashion using Vicryl and Fast Absorbing Plain Gut sutures. Postoperative length was 4.6 cm.   EBL minimal; complications none; wound care routine.  The patient was discharged in good condition and will return in one week for wound evaluation.  R sternal notch squamous cell carcinoma   MOHS:   Size     After PGACAC discussed with patient, decision for Mohs surgery was made. Indication for Mohs was Size. Patient confirmed the site with Dr. Roque.  After anesthesia with LEC, the tumor was excised using standard Mohs technique in 2 stages(s).  CLEAR  MARGINS OBTAINED and Final defect size was 2.4 x 2.5 cm.       REPAIR SECOND INTENT: We discussed the options for wound management in full with the patient including risks/benefits/possible outcomes. Decision made to allow the wound to heal by second intention. EBL minimal; complications none; wound care routine.  The patient was discharged in good condition and will return in one month or prn for wound evaluation.    R earlobe basal cell carcinoma   MOHS:   Location    After PGACAC discussed with patient, decision for Mohs surgery was made. Indication for Mohs was Location. Patient confirmed the site with Dr. Roque.  After anesthesia with LEC, the tumor was excised using standard Mohs technique in 1 stages(s).  CLEAR MARGINS OBTAINED and Final defect size was 0.9 x 1 cm.       REPAIR SECOND INTENT: We discussed the options for wound management in full with the patient including risks/benefits/possible outcomes. Decision made to allow the wound to heal by second intention. EBL minimal; complications none; wound care routine.  The patient was discharged in good condition and will return in one month or prn for wound evaluation.        Again, thank you for allowing me to participate in the care of your patient.        Sincerely,        Dinesh Roque MD

## 2019-07-10 LAB — COPATH REPORT: NORMAL

## 2019-07-11 ENCOUNTER — HOSPITAL ENCOUNTER (OUTPATIENT)
Dept: CT IMAGING | Facility: CLINIC | Age: 79
Discharge: HOME | End: 2019-07-11
Attending: INTERNAL MEDICINE | Admitting: INTERNAL MEDICINE
Payer: MEDICARE

## 2019-07-11 DIAGNOSIS — C82.24 GRADE 3 FOLLICULAR LYMPHOMA OF LYMPH NODES OF AXILLA (H): ICD-10-CM

## 2019-07-11 PROCEDURE — 25000125 ZZHC RX 250: Performed by: INTERNAL MEDICINE

## 2019-07-11 PROCEDURE — 74177 CT ABD & PELVIS W/CONTRAST: CPT

## 2019-07-11 PROCEDURE — 71260 CT THORAX DX C+: CPT

## 2019-07-11 PROCEDURE — 25000128 H RX IP 250 OP 636: Performed by: INTERNAL MEDICINE

## 2019-07-11 RX ORDER — IOPAMIDOL 755 MG/ML
100 INJECTION, SOLUTION INTRAVASCULAR ONCE
Status: COMPLETED | OUTPATIENT
Start: 2019-07-11 | End: 2019-07-11

## 2019-07-11 RX ADMIN — SODIUM CHLORIDE 70 ML: 9 INJECTION, SOLUTION INTRAVENOUS at 09:48

## 2019-07-11 RX ADMIN — IOPAMIDOL 100 ML: 755 INJECTION, SOLUTION INTRAVENOUS at 09:48

## 2019-07-16 ENCOUNTER — ALLIED HEALTH/NURSE VISIT (OUTPATIENT)
Dept: DERMATOLOGY | Facility: CLINIC | Age: 79
End: 2019-07-16
Payer: MEDICARE

## 2019-07-16 ENCOUNTER — OFFICE VISIT (OUTPATIENT)
Dept: DERMATOLOGY | Facility: CLINIC | Age: 79
End: 2019-07-16
Payer: MEDICARE

## 2019-07-16 VITALS — SYSTOLIC BLOOD PRESSURE: 112 MMHG | OXYGEN SATURATION: 96 % | HEART RATE: 73 BPM | DIASTOLIC BLOOD PRESSURE: 51 MMHG

## 2019-07-16 DIAGNOSIS — D04.5: ICD-10-CM

## 2019-07-16 DIAGNOSIS — D04.61 SQUAMOUS CELL CARCINOMA IN SITU (SCCIS) OF SKIN OF RIGHT UPPER ARM: ICD-10-CM

## 2019-07-16 DIAGNOSIS — C44.219 BASAL CELL CARCINOMA (BCC) OF HELIX OF LEFT EAR: Primary | ICD-10-CM

## 2019-07-16 DIAGNOSIS — Z48.01 ENCOUNTER FOR CHANGE OR REMOVAL OF SURGICAL WOUND DRESSING: Primary | ICD-10-CM

## 2019-07-16 DIAGNOSIS — C44.329 SQUAMOUS CELL CARCINOMA OF FOREHEAD: ICD-10-CM

## 2019-07-16 PROCEDURE — 11602 EXC TR-EXT MAL+MARG 1.1-2 CM: CPT | Mod: 59 | Performed by: DERMATOLOGY

## 2019-07-16 PROCEDURE — 17312 MOHS ADDL STAGE: CPT | Performed by: DERMATOLOGY

## 2019-07-16 PROCEDURE — 88331 PATH CONSLTJ SURG 1 BLK 1SPC: CPT | Mod: 59 | Performed by: DERMATOLOGY

## 2019-07-16 PROCEDURE — 11102 TANGNTL BX SKIN SINGLE LES: CPT | Mod: 59 | Performed by: DERMATOLOGY

## 2019-07-16 PROCEDURE — 11103 TANGNTL BX SKIN EA SEP/ADDL: CPT | Performed by: DERMATOLOGY

## 2019-07-16 PROCEDURE — 17311 MOHS 1 STAGE H/N/HF/G: CPT | Mod: 79 | Performed by: DERMATOLOGY

## 2019-07-16 PROCEDURE — 17314 MOHS ADDL STAGE T/A/L: CPT | Performed by: DERMATOLOGY

## 2019-07-16 PROCEDURE — 99207 ZZC NO CHARGE NURSE ONLY: CPT

## 2019-07-16 PROCEDURE — 17311 MOHS 1 STAGE H/N/HF/G: CPT | Mod: 59 | Performed by: DERMATOLOGY

## 2019-07-16 PROCEDURE — 17313 MOHS 1 STAGE T/A/L: CPT | Mod: 79 | Performed by: DERMATOLOGY

## 2019-07-16 PROCEDURE — 15260 FTH/GFT FR N/E/E/L 20 SQCM/<: CPT | Mod: 79 | Performed by: DERMATOLOGY

## 2019-07-16 RX ORDER — DIAZEPAM 10 MG
10 TABLET ORAL EVERY 6 HOURS PRN
Qty: 1 TABLET | Refills: 0 | Status: SHIPPED | OUTPATIENT
Start: 2019-07-16 | End: 2019-09-30

## 2019-07-16 NOTE — NURSING NOTE
The following medication was given @ 0802.     MEDICATION:  Diazepam  ROUTE: PO  SITE: mouth  DOSE: 5mg tablets x2  To equal 10 mg   LOT #: [id number]  : [employer]  EXPIRATION DATE: 06/2020  NDC#: 01 [id number]   Was there drug waste? No  Multi-dose vial: No    Lucila Carey LPN  July 16, 2019

## 2019-07-16 NOTE — PATIENT INSTRUCTIONS
ONE WEEK DRESSING CHANGE  For SKIN GRAFTS  Right side of nose    The following information has been compiled to offer you assistance with the dressing change or wound evaluation. Please feel free to call our office to speak with one of the nurses if you have any questions or concerns about the progress of the wound healing process especially if there are any signs of graft necrosis or infection. We will be happy to answer any questions you might have.                                                               AFTER 24 HOURS YOU SHOULD REMOVE THE BANDAGE AND BEGIN DAILY DRESSING CHANGES AS FOLLOWS:     1) Remove Dressing.     2) Clean and dry the area with tap water using a Q-tip or sterile gauze pad.     3) Apply Vaseline, Polysporin ointment, Aquaphor or Bacitracin ointment over entire wound.  Do NOT use Neosporin ointment.     4) Cover the wound with a band-aid, or a sterile non-stick gauze pad and micropore paper tape      REPEAT THESE INSTRUCTIONS AT LEAST ONCE A DAY UNTIL THE WOUND HAS COMPLETELY HEALED. DO NOT LET THE WOUND SCAB OVER.    It is an old wives tale that a wound heals better when it is exposed to air and allowed to dry out. The wound will heal faster with a better cosmetic result if it is kept moist with ointment and covered with a bandage.       Massaging the wound site hastens the healing process by softening the scar tissue and fading the scar. Begin massaging the area one month after surgery as often as possible. Continue to massage the area for 2-3 months or until you feel the scar tissue has softened. Moisturizers can be used during the massaging but are not necessary. Ultimately it takes six months for the graft to heal and blend into the surrounding skin.      WOUND CARE INSTRUCTIONS  for  ONE WEEK AFTER SURGERY  Behind right ear          1) Leave flat bandage on your skin for one week after today s bandage change.  2) In one week when you remove the bandage, you may resume your regular  skin care routine, including washing with mild soap and water, applying moisturizer, make-up and sunscreen.    3) If there are any open or bleeding areas at the incision/graft site you should begin to cover the area with a bandage daily as follows:    1) Clean and dry the area with plain tap water using a Q-tip or sterile gauze pad.  2) Apply Polysporin or Bacitracin ointment to the open area.  3) Cover the wound with a band-aid or a sterile non-stick gauze pad and micropore paper tape.         SIGNS OF INFECTION  - If you notice any of these signs of infection, call your doctor right away: expanding redness around the wound.  - Yellow or greenish-colored pus or cloudy wound drainage.    - Red streaking spreading from the wound.  - Increased swelling, tenderness, or pain around the wound.   - Fever.    Please remember that yellow and clear drainage from a wound can be normal and related to normal wound healing.  Isolated drainage from a wound without a combination of the above features does not indicate infection.       *Once the bandages are removed, the scar will be red and firm (especially in the lip/chin area). This is normal and will fade in time. It might take 6-12 months for this to happen.     *Massaging the area will help the scar soften and fade quicker. Begin to massage the area one month after the bandages have been removed. To massage apply pressure directly and firmly over the scar with the fingertips and move in a circular motion. Massage the area for a few minutes several times a day. Continue to massage the site for several months.    *Approximately 6-8 weeks after surgery it is not uncommon to see the formation of  tender pimple-like  bump along the scar. This is normal. As the scar continues to mature and the stitches underneath the skin begin to dissolve, this might occur. Do not pick or squeeze, this will resolve on it s own. Should one break open producing a small amount of drainage, apply  Polysporin or Bacitracin ointment a few times a day until the wound is completely healed.    *Numbness in the surgical area is expected. It might take 12-18 months for the feeling to return to normal. During this time sensations of itchiness, tingling and occasional sharp pains might be noted. These feelings are normal and will subside once the nerves have completely healed.         IN CASE OF EMERGENCY: Dr Roque [phone]     If you were seen in [place] call: [phone]    If you were seen in [place] call: [phone]        Skin Graft Wound Care   LEFT EAR    D[facility]ology [facility] [phone]     ? No strenuous activity for 48 hours. Resume moderate activity in 48 hours.  No heavy exercising until you are seen for follow up in one week.    ? Take Tylenol as needed for discomfort.                       ? No alcoholic beverages for 48 hours.    ? Leave the bandage in place until you come in for follow up in one week.  If the bandage becomes blood tinged or loose, reinforce it with gauze and tape.     ? Keep the bandage dry. Wash around it carefully.    ? If the tape becomes soiled or starts to come off, reinforce it with additional paper tape.    ? Do not smoke for 3 weeks; smoking is detrimental to wound healing and may cause the graft to die.    ? Avoid prolonged exposure to extremely cold temperatures for 3 weeks.    ? It is normal to have swelling and bruising around the surgical site. The bruising will fade in approximately 10-14 days. Elevate the area to reduce swelling.    ? Numbness, itchiness and sensitivity to temperature changes can occur after surgery and may take up to 18 months to normalize.    POSSIBLE COMPLICATIONS    BLEEDIN. Leave the bandage in place.  2. Use tightly rolled up gauze or a cloth to apply direct pressure over the bandage for 20 minutes.  3. Reapply pressure for an additional 20 minutes if necessary  4. Call the office or go to the nearest emergency room if pressure  fails to stop the bleeding.  5. Use additional gauze and tape to maintain pressure once the bleeding has stopped.    PAIN:    1. Post operative pain should slowly get better, beginning the evening after surgery.  2. A sudden or severe increase in pain may indicate a problem. Call the office if this occurs.    In case of emergency phone: Dermatology Clinic: [phone]   or Dr Roque [phone]            Wound Care Instructions STERNUM, RIGHT UPPER ARM, LEFT FOREHEAD    FOR SUPERFICIAL WOUNDS     [facility] [phone]    [facility] [phone]                       AFTER 24 HOURS YOU SHOULD REMOVE THE BANDAGE AND BEGIN DAILY DRESSING CHANGES AS FOLLOWS:     1) Remove Dressing.     2) Clean and dry the area with tap water using a Q-tip or sterile gauze pad.     3) Apply Vaseline, Aquaphor, Polysporin ointment or Bacitracin ointment over entire wound.  Do NOT use Neosporin ointment.     4) Cover the wound with a band-aid, or a sterile non-stick gauze pad and micropore paper tape      REPEAT THESE INSTRUCTIONS AT LEAST ONCE A DAY UNTIL THE WOUND HAS COMPLETELY HEALED.    It is an old wives tale that a wound heals better when it is exposed to air and allowed to dry out. The wound will heal faster with a better cosmetic result if it is kept moist with ointment and covered with a bandage.    **Do not let the wound dry out.**      Supplies Needed:      *Cotton tipped applicators (Q-tips)    *Polysporin Ointment or Bacitracin Ointment (NOT NEOSPORIN)    *Band-aids or non-stick gauze pads and micropore paper tape.      PATIENT INFORMATION:    During the healing process you will notice a number of changes. All wounds develop a small halo of redness surrounding the wound.  This means healing is occurring. Severe itching with extensive redness usually indicates sensitivity to the ointment or bandage tape used to dress the wound.  You should call our office if this develops.      Swelling   and/or discoloration around your surgical site is common, particularly when performed around the eye.    All wounds normally drain.  The larger the wound the more drainage there will be.  After 7-10 days, you will notice the wound beginning to shrink and new skin will begin to grow.  The wound is healed when you can see skin has formed over the entire area.  A healed wound has a healthy, shiny look to the surface and is red to dark pink in color to normalize.  Wounds may take approximately 4-6 weeks to heal.  Larger wounds may take 6-8 weeks.  After the wound is healed you may discontinue dressing changes.    You may experience a sensation of tightness as your wound heals. This is normal and will gradually subside.    Your healed wound may be sensitive to temperature changes. This sensitivity improves with time, but if you re having a lot of discomfort, try to avoid temperature extremes.    Patients frequently experience itching after their wound appears to have healed because of the continue healing under the skin.  Plain Vaseline will help relieve the itching.        POSSIBLE COMPLICATIONS    BLEEDIN. Leave the bandage in place.  7. Use tightly rolled up gauze or a cloth to apply direct pressure over the bandage for 30  minutes.  8. Reapply pressure for an additional 30 minutes if necessary  9. Use additional gauze and tape to maintain pressure once the bleeding has stopped.

## 2019-07-16 NOTE — PROGRESS NOTES
Flash Brown is a 78 year old year old male patient here today for for ongoing evaluation and managment of non-melanoma skin cancer.  He notes his nose is healing well.  He sutton snot want to do left side today.  Patient reports the following modifying factors none.  Associated symptoms: none.  Patient has no other skin complaints today.  Remainder of the HPI, Meds, PMH, Allergies, FH, and SH was reviewed in chart.      Past Medical History:   Diagnosis Date     Basal cell carcinoma      CAD (coronary artery disease)      Depression      Diabetes type 2, controlled (H)      Hyperlipidemia      Hypertension      Lymphoma (H)      Malignant melanoma (H)      Melanoma (H)      Squamous cell carcinoma        Past Surgical History:   Procedure Laterality Date     AXILLARY SURGERY      S/P resection and adjuvant radiation     BONE MARROW BIOPSY, BONE SPECIMEN, NEEDLE/TROCAR N/A 7/8/2019    Procedure: BIOPSY, BONE MARROW;  Surgeon: Husam Issa MD;  Location: [facility]     CHOLECYSTECTOMY       HERNIA REPAIR, UMBILICAL       STENT      PTCA with drug-eluting stent of RCA, circumflex, and LAD        Family History   Problem Relation Age of Onset     Asthma Other      Cancer Other         melanoma     Blood Disease Other         bleeding disorder     Lung Cancer Mother         Smoker     Prostate Cancer Father      Melanoma No family hx of        Social History     Socioeconomic History     Marital status: Single     Spouse name: Not on file     Number of children: 0     Years of education: Not on file     Highest education level: Not on file   Occupational History     Occupation: Retired     Comment: [employer]    Social Needs     Financial resource strain: Not on file     Food insecurity:     Worry: Not on file     Inability: Not on file     Transportation needs:     Medical: Not on file     Non-medical: Not on file   Tobacco Use     Smoking status: Never Smoker     Smokeless tobacco: Never Used   Substance and  Sexual Activity     Alcohol use: Yes     Frequency: Monthly or less     Drinks per session: 1 or 2     Binge frequency: Never     Comment: glass of wine couple times per week     Drug use: Never     Sexual activity: Not on file   Lifestyle     Physical activity:     Days per week: Not on file     Minutes per session: Not on file     Stress: Not on file   Relationships     Social connections:     Talks on phone: Not on file     Gets together: Not on file     Attends Voodoo service: Not on file     Active member of club or organization: Not on file     Attends meetings of clubs or organizations: Not on file     Relationship status: Not on file     Intimate partner violence:     Fear of current or ex partner: Not on file     Emotionally abused: Not on file     Physically abused: Not on file     Forced sexual activity: Not on file   Other Topics Concern     Not on file   Social History Narrative     Not on file       Outpatient Encounter Medications as of 7/16/2019   Medication Sig Dispense Refill     amLODIPine (NORVASC) 5 MG tablet Take 1 tablet (5 mg) by mouth daily       aspirin (ASA) 81 MG EC tablet Take 1 tablet (81 mg) by mouth daily       atorvastatin (LIPITOR) 20 MG tablet Take 1 tablet (20 mg) by mouth daily 90 tablet 3     busPIRone (BUSPAR) 10 MG tablet Take 1 tablet (10 mg) by mouth 2 times daily       carvedilol (COREG) 12.5 MG tablet Take 1 tablet (12.5 mg) by mouth 2 times daily (with meals) 180 tablet 3     CENTRUM OR TABS 1 TABLET DAILY       cloNIDine (CATAPRES) 0.1 MG tablet Take 1 tablet (0.1 mg) by mouth 2 times daily       clopidogrel (PLAVIX) 75 MG tablet Take 1 tablet (75 mg) by mouth daily       COQ10 100 MG OR CAPS None Entered       diazepam (VALIUM) 10 MG tablet Take 1 tablet (10 mg) by mouth every 6 hours as needed for anxiety 1 tablet 0     diazepam (VALIUM) 10 MG tablet Before procedure 1 tablet 0     diazepam (VALIUM) 5 MG tablet Take 1 tablet (5 mg) by mouth every 6 hours as needed  for anxiety 1 tablet 0     glimepiride (AMARYL) 4 MG tablet Take 1 tablet (4 mg) by mouth 2 times daily       hydrALAZINE (APRESOLINE) 50 MG tablet Take 1 tablet (50 mg) by mouth 2 times daily       NIACIN 500 MG OR TABS Take one orally twice daily 180 3     NITROGLYCERIN 0.4 MG SL SUBL ONE TABLET UNDER TONGUE, FOR CHEST PAIN, AS DIRECTED 1 BOTTLE 3 per year     OMEGA-3 1000 MG OR CAPS None Entered       ORDER FOR DME Light Therapy with Full Spectrum Light (85975 lux) Start with 10-15 minute exposure per day, Increase exposure to 30 to 45 minutes per day, Maximum exposure: 90 minutes per day,Look periodically at light during each session  1 0     pioglitazone (ACTOS) 30 MG tablet Take 1 tablet (30 mg) by mouth daily       ramipril (ALTACE) 10 MG capsule Take 1 capsule (10 mg) by mouth daily 90 capsule 3     tamsulosin (FLOMAX) 0.4 MG capsule Take 1 capsule (0.4 mg) by mouth daily       VOSOL-HC 2-1 % OT SOLN 2-3 drops in the affected ear 3-4 times daily for 7-10 days for itching in the ear 1 bottle 0     VYTORIN 10-40 MG OR TABS 1 TABLET EVERY EVENING 3 months 1     No facility-administered encounter medications on file as of 7/16/2019.              Review Of Systems  Skin: As above  Eyes: negative  Ears/Nose/Throat: negative  Respiratory: No shortness of breath, dyspnea on exertion, cough, or hemoptysis  Cardiovascular: negative  Gastrointestinal: negative  Genitourinary: negative  Musculoskeletal: negative  Neurologic: negative  Psychiatric: negative  Hematologic/Lymphatic/Immunologic: negative  Endocrine: negative      O:   NAD, WDWN, Alert & Oriented, Mood & Affect wnl, Vitals stable   Here today alone   /51   Pulse 73   SpO2 96%    General appearance normal   Vitals stable   Alert, oriented and in no acute distress     R ala healing well  L forehead 1.1cm bleeding scaly papule   R bicep bleeding 1.1cm bleeding scaly papule   L helix 1cm pink pearly papule   L helix 1cm pink pearly papule  L sup sternum  1.5cm bleeding red scaly plaque         Eyes: Conjunctivae/lids:Normal     ENT: Lips, buccal mucosa, tongue: normal    MSK:Normal    Cardiovascular: peripheral edema none    Pulm: Breathing Normal    Lymph Nodes: No Head and Neck Lymphadenopathy     Neuro/Psych: Orientation:Normal; Mood/Affect:Normal      MICRO:     L forehead:There is hyperkeratosis, ulceration of the epidermis, with full thickness epidermal involvement by atypical keratinocytes with rare pale vacuolated cells invading dermis.    R bicep:There is hyperkeratosis ulceration of the epidermis, with full thickness epidermal involvement by atypical keratinocytes with rare pale vacuolated cells.  Unremarkable dermis.    Lhelix:Orthokeratosis of epidermis with a proliferation of nests of basaloid cells, with peripheral palisading and a haphazard arrangement in the center extending into the dermis, forming nodules.  The tumor cells have hyperchromatic nuclei. Poor cytoplasm and intercellular bridging.    L sup sternum: There is hyperkeratosis & parakeratosis of the epidermis, with full thickness epidermal involvement by atypical keratinocytes with rare pale vacuolated cells.  Unremarkable dermis.      EXCISION MICRO  R bicep:Unremarkable epidermis and the superficial dermis.  No concerning areas for malignancy.     A/P:  1. R/o non-melanoma skin cancer     TANGENTIAL BIOPSY IN HOUSE:  After consent, anesthesia with LEC and prep, tangential excision performed and dx above confirmed with frozen section histology.  No complications and routine wound care.  Patient told result       L forehead squamous cell carcinoma   MOHS:   Location    After PGACAC discussed with patient, decision for Mohs surgery was made. Indication for Mohs was Location. Patient confirmed the site with Dr. Roque.  After anesthesia with LEC, the tumor was excised using standard Mohs technique in 1 stages(s).  CLEAR MARGINS OBTAINED and Final defect size was 1.5 cm.       REPAIR SECOND  INTENT: We discussed the options for wound management in full with the patient including risks/benefits/possible outcomes. Decision made to allow the wound to heal by second intention. EBL minimal; complications none; wound care routine.  The patient was discharged in good condition and will return in one month or prn for wound evaluation.    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 The incisions were made through the skin and down to and including the superficial dermis.  The lesion was removed en bloc and submitted for frozen section pathologic review. Clear margins obtained (1.6x1.7cm).    REPAIR SECOND INTENT: We discussed the options for wound management in full with the patient including risks/benefits/possible outcomes. Decision made to allow the wound to heal by second intention. EBL minimal; complications none; wound care routine.  The patient was discharged in good condition and will return in one month or prn for wound evaluation.     L helix basal cell carcinoma   MOHS:   Location    After PGACAC discussed with patient, decision for Mohs surgery was made. Indication for Mohs was Location. Patient confirmed the site with Dr. Roque.  After anesthesia with LEC, the tumor was excised using standard Mohs technique in 2 stages(s).  CLEAR MARGINS OBTAINED and Final defect size was 1.4 x 1.6 cm.       REPAIR FTSG FROM POSTAURICULAR: Because of the full-thickness nature of the defect and to avoid distortion, a full-thickness skin graft was planned. After LEC anesthesia and prep, a template was made of the defect and the graft was harvested from the ipsilateral post-auricular sulcus.  The graft was defatted and  trimmed to fit the defect. It was sutured into place with Fast Absorbing Plain Gut suture and a taped Bolster dressing was applied.    The donor site was converted to a fusiform defect, and after hemostasis, was closed with subcutaneous stitches using Vicryl sutures.   EBL minimal; complications none; wound care routine.  The patient was discharged in good condition and will return on one week  for wound evaluation.    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 Indication for Mohs was Size. Patient confirmed the site with Dr. Roque.  After anesthesia with LEC, the tumor was excised using standard Mohs technique in 2 stages(s).  CLEAR MARGINS OBTAINED and Final defect size was 2.5 x 2.2 cm.       REPAIR SECOND INTENT: We discussed the options for wound management in full with the patient including risks/benefits/possible outcomes. Decision made to allow the wound to heal by second intention. EBL minimal; complications none; wound care routine.  The patient was discharged in good condition and will return in one month or prn for wound evaluation.      BENIGN LESIONS DISCUSSED WITH PATIENT:  I discussed the specifics of tumor, prognosis, and genetics of benign lesions.  I explained that treatment of these lesions would be purely cosmetic and not medically neccessary.  I discussed with patient different removal options including excision, cautery and /or laser.      Nature and genetics of benign skin lesions dicussed with patient.  Signs and Symptoms of skin cancer discussed with patient.  Patient encouraged to perform monthly skin exams.  UV precautions reviewed with patient.  Skin care regimen reviewed with patient: Eliminate harsh soaps, i.e. Dial, zest, irsih spring; Mild soaps such as Cetaphil or Dove sensitive skin, avoid hot or cold showers, aggressive use of emollients including vanicream, cetaphil or cerave discussed  with patient.    Risks of non-melanoma skin cancer discussed with patient   Return to clinic next appt

## 2019-07-16 NOTE — LETTER
7/16/2019         RE: Flash Brown  [address] [place] [address]        Dear Colleague,    Thank you for referring your patient, Flash Brown, to the [facility]. Please see a copy of my visit note below.    Surgical Office Location :   [facility]  [address] [place] [address]      Flash Brown is a 78 year old year old male patient here today for for ongoing evaluation and managment of non-melanoma skin cancer.  He notes his nose is healing well.  He sutton snot want to do left side today.  Patient reports the following modifying factors none.  Associated symptoms: none.  Patient has no other skin complaints today.  Remainder of the HPI, Meds, PMH, Allergies, FH, and SH was reviewed in chart.      Past Medical History:   Diagnosis Date     Basal cell carcinoma      CAD (coronary artery disease)      Depression      Diabetes type 2, controlled (H)      Hyperlipidemia      Hypertension      Lymphoma (H)      Malignant melanoma (H)      Melanoma (H)      Squamous cell carcinoma        Past Surgical History:   Procedure Laterality Date     AXILLARY SURGERY      S/P resection and adjuvant radiation     BONE MARROW BIOPSY, BONE SPECIMEN, NEEDLE/TROCAR N/A 7/8/2019    Procedure: BIOPSY, BONE MARROW;  Surgeon: Husam Issa MD;  Location: [facility]     CHOLECYSTECTOMY       HERNIA REPAIR, UMBILICAL       STENT      PTCA with drug-eluting stent of RCA, circumflex, and LAD        Family History   Problem Relation Age of Onset     Asthma Other      Cancer Other         melanoma     Blood Disease Other         bleeding disorder     Lung Cancer Mother         Smoker     Prostate Cancer Father      Melanoma No family hx of        Social History     Socioeconomic History     Marital status: Single     Spouse name: Not on file     Number of children: 0     Years of education: Not on file     Highest education level: Not on file   Occupational History     Occupation: Retired      Comment: [employer]    Social Needs     Financial resource strain: Not on file     Food insecurity:     Worry: Not on file     Inability: Not on file     Transportation needs:     Medical: Not on file     Non-medical: Not on file   Tobacco Use     Smoking status: Never Smoker     Smokeless tobacco: Never Used   Substance and Sexual Activity     Alcohol use: Yes     Frequency: Monthly or less     Drinks per session: 1 or 2     Binge frequency: Never     Comment: glass of wine couple times per week     Drug use: Never     Sexual activity: Not on file   Lifestyle     Physical activity:     Days per week: Not on file     Minutes per session: Not on file     Stress: Not on file   Relationships     Social connections:     Talks on phone: Not on file     Gets together: Not on file     Attends Buddhist service: Not on file     Active member of club or organization: Not on file     Attends meetings of clubs or organizations: Not on file     Relationship status: Not on file     Intimate partner violence:     Fear of current or ex partner: Not on file     Emotionally abused: Not on file     Physically abused: Not on file     Forced sexual activity: Not on file   Other Topics Concern     Not on file   Social History Narrative     Not on file       Outpatient Encounter Medications as of 7/16/2019   Medication Sig Dispense Refill     amLODIPine (NORVASC) 5 MG tablet Take 1 tablet (5 mg) by mouth daily       aspirin (ASA) 81 MG EC tablet Take 1 tablet (81 mg) by mouth daily       atorvastatin (LIPITOR) 20 MG tablet Take 1 tablet (20 mg) by mouth daily 90 tablet 3     busPIRone (BUSPAR) 10 MG tablet Take 1 tablet (10 mg) by mouth 2 times daily       carvedilol (COREG) 12.5 MG tablet Take 1 tablet (12.5 mg) by mouth 2 times daily (with meals) 180 tablet 3     CENTRUM OR TABS 1 TABLET DAILY       cloNIDine (CATAPRES) 0.1 MG tablet Take 1 tablet (0.1 mg) by mouth 2 times daily       clopidogrel (PLAVIX) 75 MG tablet  Take 1 tablet (75 mg) by mouth daily       COQ10 100 MG OR CAPS None Entered       diazepam (VALIUM) 10 MG tablet Take 1 tablet (10 mg) by mouth every 6 hours as needed for anxiety 1 tablet 0     diazepam (VALIUM) 10 MG tablet Before procedure 1 tablet 0     diazepam (VALIUM) 5 MG tablet Take 1 tablet (5 mg) by mouth every 6 hours as needed for anxiety 1 tablet 0     glimepiride (AMARYL) 4 MG tablet Take 1 tablet (4 mg) by mouth 2 times daily       hydrALAZINE (APRESOLINE) 50 MG tablet Take 1 tablet (50 mg) by mouth 2 times daily       NIACIN 500 MG OR TABS Take one orally twice daily 180 3     NITROGLYCERIN 0.4 MG SL SUBL ONE TABLET UNDER TONGUE, FOR CHEST PAIN, AS DIRECTED 1 BOTTLE 3 per year     OMEGA-3 1000 MG OR CAPS None Entered       ORDER FOR DME Light Therapy with Full Spectrum Light (30413 lux) Start with 10-15 minute exposure per day, Increase exposure to 30 to 45 minutes per day, Maximum exposure: 90 minutes per day,Look periodically at light during each session  1 0     pioglitazone (ACTOS) 30 MG tablet Take 1 tablet (30 mg) by mouth daily       ramipril (ALTACE) 10 MG capsule Take 1 capsule (10 mg) by mouth daily 90 capsule 3     tamsulosin (FLOMAX) 0.4 MG capsule Take 1 capsule (0.4 mg) by mouth daily       VOSOL-HC 2-1 % OT SOLN 2-3 drops in the affected ear 3-4 times daily for 7-10 days for itching in the ear 1 bottle 0     VYTORIN 10-40 MG OR TABS 1 TABLET EVERY EVENING 3 months 1     No facility-administered encounter medications on file as of 7/16/2019.              Review Of Systems  Skin: As above  Eyes: negative  Ears/Nose/Throat: negative  Respiratory: No shortness of breath, dyspnea on exertion, cough, or hemoptysis  Cardiovascular: negative  Gastrointestinal: negative  Genitourinary: negative  Musculoskeletal: negative  Neurologic: negative  Psychiatric: negative  Hematologic/Lymphatic/Immunologic: negative  Endocrine: negative      O:   NAD, WDWN, Alert & Oriented, Mood & Affect wnl,  Vitals stable   Here today alone   /51   Pulse 73   SpO2 96%    General appearance normal   Vitals stable   Alert, oriented and in no acute distress     R ala healing well  L forehead 1.1cm bleeding scaly papule   R bicep bleeding 1.1cm bleeding scaly papule   L helix 1cm pink pearly papule   L helix 1cm pink pearly papule  L sup sternum 1.5cm bleeding red scaly plaque         Eyes: Conjunctivae/lids:Normal     ENT: Lips, buccal mucosa, tongue: normal    MSK:Normal    Cardiovascular: peripheral edema none    Pulm: Breathing Normal    Lymph Nodes: No Head and Neck Lymphadenopathy     Neuro/Psych: Orientation:Normal; Mood/Affect:Normal      MICRO:     L forehead:There is hyperkeratosis, ulceration of the epidermis, with full thickness epidermal involvement by atypical keratinocytes with rare pale vacuolated cells invading dermis.    R bicep:There is hyperkeratosis ulceration of the epidermis, with full thickness epidermal involvement by atypical keratinocytes with rare pale vacuolated cells.  Unremarkable dermis.    Lhelix:Orthokeratosis of epidermis with a proliferation of nests of basaloid cells, with peripheral palisading and a haphazard arrangement in the center extending into the dermis, forming nodules.  The tumor cells have hyperchromatic nuclei. Poor cytoplasm and intercellular bridging.    L sup sternum: There is hyperkeratosis & parakeratosis of the epidermis, with full thickness epidermal involvement by atypical keratinocytes with rare pale vacuolated cells.  Unremarkable dermis.      EXCISION MICRO  R bicep:Unremarkable epidermis and the superficial dermis.  No concerning areas for malignancy.     A/P:  1. R/o non-melanoma skin cancer     TANGENTIAL BIOPSY IN HOUSE:  After consent, anesthesia with LEC and prep, tangential excision performed and dx above confirmed with frozen section histology.  No complications and routine wound care.  Patient told result       L forehead squamous cell carcinoma    MOHS:   Location    After PGACAC discussed with patient, decision for Mohs surgery was made. Indication for Mohs was Location. Patient confirmed the site with Dr. Roque.  After anesthesia with LEC, the tumor was excised using standard Mohs technique in 1 stages(s).  CLEAR MARGINS OBTAINED and Final defect size was 1.5 cm.       REPAIR SECOND INTENT: We discussed the options for wound management in full with the patient including risks/benefits/possible outcomes. Decision made to allow the wound to heal by second intention. EBL minimal; complications none; wound care routine.  The patient was discharged in good condition and will return in one month or prn for wound evaluation.    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 The incisions were made through the skin and down to and including the superficial dermis.  The lesion was removed en bloc and submitted for frozen section pathologic review. Clear margins obtained (1.6x1.7cm).    REPAIR SECOND INTENT: We discussed the options for wound management in full with the patient including risks/benefits/possible outcomes. Decision made to allow the wound to heal by second intention. EBL minimal; complications none; wound care routine.  The patient was discharged in good condition and will return in one month or prn for wound evaluation.     L helix basal cell carcinoma   MOHS:   Location    After PGACAC discussed with patient, decision for Mohs surgery was made. Indication for Mohs was Location. Patient confirmed the site with Dr. Roque.  After anesthesia with LEC, the tumor was excised using standard Mohs technique in 2 stages(s).  CLEAR MARGINS OBTAINED and Final defect  size was 1.4 x 1.6 cm.       REPAIR FTSG FROM POSTAURICULAR: Because of the full-thickness nature of the defect and to avoid distortion, a full-thickness skin graft was planned. After LEC anesthesia and prep, a template was made of the defect and the graft was harvested from the ipsilateral post-auricular sulcus.  The graft was defatted and trimmed to fit the defect. It was sutured into place with Fast Absorbing Plain Gut suture and a taped Bolster dressing was applied.    The donor site was converted to a fusiform defect, and after hemostasis, was closed with subcutaneous stitches using Vicryl sutures.   EBL minimal; complications none; wound care routine.  The patient was discharged in good condition and will return on one week  for wound evaluation.    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 Indication for Mohs was Size. Patient confirmed the site with Dr. Roque.  After anesthesia with LEC, the tumor was excised using standard Mohs technique in 2 stages(s).  CLEAR MARGINS OBTAINED and Final defect size was 2.5 x 2.2 cm.       REPAIR SECOND INTENT: We discussed the options for wound management in full with the patient including risks/benefits/possible outcomes. Decision made to allow the wound to heal by second intention. EBL minimal; complications none; wound care routine.  The patient was discharged in good condition and will return in one month or prn for wound evaluation.      BENIGN LESIONS DISCUSSED WITH PATIENT:  I discussed the specifics of tumor, prognosis, and genetics of benign lesions.  I explained that treatment of these lesions would be purely cosmetic and not medically neccessary.  I discussed with patient different removal options including excision, cautery and /or laser.      Nature and genetics of benign skin lesions dicussed with patient.  Signs and Symptoms of skin cancer discussed with  patient.  Patient encouraged to perform monthly skin exams.  UV precautions reviewed with patient.  Skin care regimen reviewed with patient: Eliminate harsh soaps, i.e. Dial, zest, irsih spring; Mild soaps such as Cetaphil or Dove sensitive skin, avoid hot or cold showers, aggressive use of emollients including vanicream, cetaphil or cerave discussed with patient.    Risks of non-melanoma skin cancer discussed with patient   Return to clinic next appt     Again, thank you for allowing me to participate in the care of your patient.        Sincerely,        Dinesh Roque MD

## 2019-07-16 NOTE — PATIENT INSTRUCTIONS
ONE WEEK DRESSING CHANGE  for  SKIN GRAFTS  nose  The following information has been compiled to offer you assistance with the dressing change or wound evaluation. Please feel free to call our office to speak with one of the nurses if you have any questions or concerns about the progress of the wound healing process especially if there are any signs of graft necrosis or infection. We will be happy to answer any questions you might have.                                                               AFTER 24 HOURS YOU SHOULD REMOVE THE BANDAGE AND BEGIN DAILY DRESSING CHANGES AS FOLLOWS:     1) Remove Dressing.     2) Clean and dry the area with tap water using a Q-tip or sterile gauze pad.     3) Apply Vaseline, Polysporin ointment, Aquaphor or Bacitracin ointment over entire wound.  Do NOT use Neosporin ointment.     4) Cover the wound with a band-aid, or a sterile non-stick gauze pad and micropore paper tape      REPEAT THESE INSTRUCTIONS AT LEAST ONCE A DAY UNTIL THE WOUND HAS COMPLETELY HEALED. DO NOT LET THE WOUND SCAB OVER.    It is an old wives tale that a wound heals better when it is exposed to air and allowed to dry out. The wound will heal faster with a better cosmetic result if it is kept moist with ointment and covered with a bandage.       Massaging the wound site hastens the healing process by softening the scar tissue and fading the scar. Begin massaging the area one month after surgery as often as possible. Continue to massage the area for 2-3 months or until you feel the scar tissue has softened. Moisturizers can be used during the massaging but are not necessary. Ultimately it takes six months for the graft to heal and blend into the surrounding skin.    WOUND CARE INSTRUCTIONS  for  ONE WEEK AFTER SURGERY    Behind right ear      1) Leave flat bandage on your skin for one week after today s bandage change.  2) In one week when you remove the bandage, you may resume your regular skin care routine,  including washing with mild soap and water, applying moisturizer, make-up and sunscreen.    3) If there are any open or bleeding areas at the incision/graft site you should begin to cover the area with a bandage daily as follows:    1) Clean and dry the area with plain tap water using a Q-tip or sterile gauze pad.  2) Apply Polysporin or Bacitracin ointment to the open area.  3) Cover the wound with a band-aid or a sterile non-stick gauze pad and micropore paper tape.         SIGNS OF INFECTION  - If you notice any of these signs of infection, call your doctor right away: expanding redness around the wound.  - Yellow or greenish-colored pus or cloudy wound drainage.    - Red streaking spreading from the wound.  - Increased swelling, tenderness, or pain around the wound.   - Fever.    Please remember that yellow and clear drainage from a wound can be normal and related to normal wound healing.  Isolated drainage from a wound without a combination of the above features does not indicate infection.       *Once the bandages are removed, the scar will be red and firm (especially in the lip/chin area). This is normal and will fade in time. It might take 6-12 months for this to happen.     *Massaging the area will help the scar soften and fade quicker. Begin to massage the area one month after the bandages have been removed. To massage apply pressure directly and firmly over the scar with the fingertips and move in a circular motion. Massage the area for a few minutes several times a day. Continue to massage the site for several months.    *Approximately 6-8 weeks after surgery it is not uncommon to see the formation of  tender pimple-like  bump along the scar. This is normal. As the scar continues to mature and the stitches underneath the skin begin to dissolve, this might occur. Do not pick or squeeze, this will resolve on it s own. Should one break open producing a small amount of drainage, apply Polysporin or  Bacitracin ointment a few times a day until the wound is completely healed.    *Numbness in the surgical area is expected. It might take 12-18 months for the feeling to return to normal. During this time sensations of itchiness, tingling and occasional sharp pains might be noted. These feelings are normal and will subside once the nerves have completely healed.         IN CASE OF EMERGENCY: Dr Roque [phone]     If you were seen in [place] call: [phone]    If you were seen in [place] call: [phone]

## 2019-07-16 NOTE — NURSING NOTE
Pt returned to clinic for post surgery 1 week follow up bandage change. Pt has no complaints, denies pain. Bandage removed from behind right ear and right nose, area cleansed with normal saline. Site is healing and wound edges approximating well. Reapplied new steri strips and paper tape.  Area on nose bandage removed, area cleaned, applied ointment.    Advised to watch for signs/sx of infection; spreading redness, drainage, odor, fever. Call or report promptly to clinic. Pt given written instructions and informed to rtc as needed. Patient verbalized understanding.     Antonia Stack LPN.................7/16/2019

## 2019-07-16 NOTE — NURSING NOTE
Chief Complaint   Patient presents with     Derm Problem     mohs       Vitals:    07/16/19 0751   BP: 112/51   Pulse: 73   SpO2: 96%     Wt Readings from Last 1 Encounters:   07/08/19 112.5 kg (248 lb)       Antonia Stack LPN.................7/16/2019

## 2019-07-17 ENCOUNTER — ONCOLOGY VISIT (OUTPATIENT)
Dept: ONCOLOGY | Facility: CLINIC | Age: 79
End: 2019-07-17
Attending: INTERNAL MEDICINE
Payer: MEDICARE

## 2019-07-17 VITALS
WEIGHT: 246 LBS | SYSTOLIC BLOOD PRESSURE: 134 MMHG | OXYGEN SATURATION: 95 % | HEART RATE: 70 BPM | HEIGHT: 66 IN | BODY MASS INDEX: 39.53 KG/M2 | TEMPERATURE: 98.8 F | RESPIRATION RATE: 20 BRPM | DIASTOLIC BLOOD PRESSURE: 53 MMHG

## 2019-07-17 DIAGNOSIS — K70.31 ALCOHOLIC CIRRHOSIS OF LIVER WITH ASCITES (H): ICD-10-CM

## 2019-07-17 DIAGNOSIS — G47.33 OBSTRUCTIVE SLEEP APNEA: ICD-10-CM

## 2019-07-17 DIAGNOSIS — E78.2 MIXED HYPERLIPIDEMIA: ICD-10-CM

## 2019-07-17 DIAGNOSIS — C43.59 MALIGNANT MELANOMA OF SKIN OF TRUNK, EXCEPT SCROTUM (H): ICD-10-CM

## 2019-07-17 DIAGNOSIS — I10 ESSENTIAL HYPERTENSION, BENIGN: ICD-10-CM

## 2019-07-17 DIAGNOSIS — E11.8 TYPE 2 DIABETES MELLITUS WITH COMPLICATION, WITHOUT LONG-TERM CURRENT USE OF INSULIN (H): ICD-10-CM

## 2019-07-17 DIAGNOSIS — C82.24 GRADE 3 FOLLICULAR LYMPHOMA OF LYMPH NODES OF AXILLA (H): Primary | ICD-10-CM

## 2019-07-17 DIAGNOSIS — E78.49 OTHER HYPERLIPIDEMIA: ICD-10-CM

## 2019-07-17 PROCEDURE — G0463 HOSPITAL OUTPT CLINIC VISIT: HCPCS

## 2019-07-17 PROCEDURE — 99214 OFFICE O/P EST MOD 30 MIN: CPT | Performed by: INTERNAL MEDICINE

## 2019-07-17 ASSESSMENT — MIFFLIN-ST. JEOR: SCORE: 1792.73

## 2019-07-17 ASSESSMENT — PAIN SCALES - GENERAL: PAINLEVEL: NO PAIN (0)

## 2019-07-17 NOTE — ASSESSMENT & PLAN NOTE
Flash Brown is a 78-year-old gentleman with multiple comorbidities including type 2 diabetes, hypertension, depression and sleep apnea.  He is known to have history of non-Hodgkin lymphoma which Treated in 1983 on [facility] at that time he underwent right axillary lymph node dissection followed by radiation.  He did not receive any chemotherapy at that time.  Patient has been followed by his primary care physician in [place].  There was no evidence of recurrence up until now.  CT scan done 2010 was negative for any recurrence.

## 2019-07-17 NOTE — LETTER
"    7/17/2019         RE: Flash Brown  [address] [facility] [place] [address]        Dear Colleague,    Thank you for referring your patient, Flash Brown, to the [facility]. Please see a copy of my visit note below.    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    Weight as of this encounter: 111.6 kg (246 lb). Body surface area is 2.28 meters squared.  No Pain (0) Comment: Data Unavailable   No LMP for male patient.  Allergies reviewed: Yes  Medications reviewed: Yes    Medications: Medication refills not needed today.  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  He is known to have history of non-Hodgkin lymphoma which Treated in 1983 on [facility] at that time he underwent right axillary " lymph node dissection followed by radiation.  He did not receive any chemotherapy at that time.  Patient has been followed by his primary care physician in [place].  There was no evidence of recurrence up until now.  CT scan done 2010 was negative for any recurrence.       Interval History:  Patient returning today for follow-up visit and to review most recent imaging studies and bone marrow biopsy.  He has been feeling well without any recent complaints weight loss night sweats fever or chills.  He denies any nausea vomiting or diarrhea.  He denies any shortness of breath or cough or wheezing.    Review Of Systems:  Constitutional: Negative for fever, chills, and night sweats.  Skin: negative.  Eyes: negative.  Ears/Nose/Throat: negative.  Respiratory: No shortness of breath, dyspnea on exertion, cough, or hemoptysis.  Cardiovascular: negative.  Gastrointestinal: negative.  Genitourinary: negative.  Musculoskeletal: negative.  Neurologic: negative.  Psychiatric: negative.  Hematologic/Lymphatic/Immunologic: negative.  Endocrine: negative.    All other ROS negative unless mentioned in interval history.    Past medical, social, surgical, and family histories reviewed.    Allergies:  Allergies as of 07/17/2019 - Reviewed 07/17/2019   Allergen Reaction Noted     Iodine Swelling 01/30/2008       Current Medications:  Current Outpatient Medications   Medication Sig Dispense Refill     amLODIPine (NORVASC) 5 MG tablet Take 1 tablet (5 mg) by mouth daily       aspirin (ASA) 81 MG EC tablet Take 1 tablet (81 mg) by mouth daily       atorvastatin (LIPITOR) 20 MG tablet Take 1 tablet (20 mg) by mouth daily 90 tablet 3     busPIRone (BUSPAR) 10 MG tablet Take 1 tablet (10 mg) by mouth 2 times daily       carvedilol (COREG) 12.5 MG tablet Take 1 tablet (12.5 mg) by mouth 2 times daily (with meals) 180 tablet 3     CENTRUM OR TABS 1 TABLET DAILY       cloNIDine (CATAPRES) 0.1 MG tablet Take 1 tablet (0.1 mg) by mouth 2 times daily   "     clopidogrel (PLAVIX) 75 MG tablet Take 1 tablet (75 mg) by mouth daily       COQ10 100 MG OR CAPS None Entered       diazepam (VALIUM) 10 MG tablet Take 1 tablet (10 mg) by mouth every 6 hours as needed for anxiety 1 tablet 0     diazepam (VALIUM) 10 MG tablet Before procedure 1 tablet 0     diazepam (VALIUM) 5 MG tablet Take 1 tablet (5 mg) by mouth every 6 hours as needed for anxiety 1 tablet 0     glimepiride (AMARYL) 4 MG tablet Take 1 tablet (4 mg) by mouth 2 times daily       hydrALAZINE (APRESOLINE) 50 MG tablet Take 1 tablet (50 mg) by mouth 2 times daily       NIACIN 500 MG OR TABS Take one orally twice daily 180 3     NITROGLYCERIN 0.4 MG SL SUBL ONE TABLET UNDER TONGUE, FOR CHEST PAIN, AS DIRECTED 1 BOTTLE 3 per year     OMEGA-3 1000 MG OR CAPS None Entered       ORDER FOR DME Light Therapy with Full Spectrum Light (17284 lux) Start with 10-15 minute exposure per day, Increase exposure to 30 to 45 minutes per day, Maximum exposure: 90 minutes per day,Look periodically at light during each session  1 0     pioglitazone (ACTOS) 30 MG tablet Take 1 tablet (30 mg) by mouth daily       ramipril (ALTACE) 10 MG capsule Take 1 capsule (10 mg) by mouth daily 90 capsule 3     tamsulosin (FLOMAX) 0.4 MG capsule Take 1 capsule (0.4 mg) by mouth daily       VOSOL-HC 2-1 % OT SOLN 2-3 drops in the affected ear 3-4 times daily for 7-10 days for itching in the ear 1 bottle 0     VYTORIN 10-40 MG OR TABS 1 TABLET EVERY EVENING 3 months 1        Physical Exam:  /53 (BP Location: Right arm, Patient Position: Sitting, Cuff Size: Adult Large)   Pulse 70   Temp 98.8  F (37.1  C) (Tympanic)   Resp 20   Ht 1.683 m (5' 6.26\")   Wt 111.6 kg (246 lb)   SpO2 95%   BMI 39.39 kg/m     Wt Readings from Last 12 Encounters:   07/17/19 111.6 kg (246 lb)   07/08/19 112.5 kg (248 lb)   06/28/19 112.9 kg (248 lb 12.8 oz)   06/11/19 111.2 kg (245 lb 3.2 oz)   05/08/08 101.7 kg (224 lb 2 oz)   04/15/08 101.2 kg (223 lb) "   03/13/08 102.7 kg (226 lb 6 oz)   01/30/08 106.2 kg (234 lb 2 oz)     ECOG performance status: 0  GENERAL APPEARANCE: Healthy, alert and in no acute distress.  HEENT: Sclerae anicteric. PERRLA. Oropharynx without ulcers, lesions, or thrush.  NECK: Supple. No asymmetry or masses.  LYMPHATICS: No palpable cervical, supraclavicular, axillary, or inguinal lymphadenopathy.  RESP: Lungs clear to auscultation bilaterally without rales, rhonchi or wheezes.  CARDIOVASCULAR: Regular rate and rhythm. Normal S1, S2; no S3 or S4. No murmur, gallop, or rub.  ABDOMEN: Soft, nontender. Bowel sounds normal. No palpable organomegaly or masses.  MUSCULOSKELETAL: Extremities without gross deformities noted. No edema of bilateral lower extremities.  SKIN: No suspicious lesions or rashes.  NEURO: Alert and oriented x 3. Cranial nerves II-XII grossly intact.  PSYCHIATRIC: Mentation and affect appear normal.    Laboratory/Imaging Studies:  No visits with results within 2 Week(s) from this visit.   Latest known visit with results is:   Oncology Visit on 06/28/2019   Component Date Value Ref Range Status     Lactate Dehydrogenase 06/28/2019 181  85 - 227 U/L Final     Sodium 06/28/2019 141  133 - 144 mmol/L Final     Potassium 06/28/2019 4.1  3.4 - 5.3 mmol/L Final     Chloride 06/28/2019 108  94 - 109 mmol/L Final     Carbon Dioxide 06/28/2019 27  20 - 32 mmol/L Final     Anion Gap 06/28/2019 6  3 - 14 mmol/L Final     Glucose 06/28/2019 149* 70 - 99 mg/dL Final     Urea Nitrogen 06/28/2019 17  7 - 30 mg/dL Final     Creatinine 06/28/2019 1.14  0.66 - 1.25 mg/dL Final     GFR Estimate 06/28/2019 62  >60 mL/min/[1.73_m2] Final    Comment: Non  GFR Calc  Starting 12/18/2018, serum creatinine based estimated GFR (eGFR) will be   calculated using the Chronic Kidney Disease Epidemiology Collaboration   (CKD-EPI) equation.       GFR Estimate If Black 06/28/2019 72  >60 mL/min/[1.73_m2] Final    Comment:  GFR  Calc  Starting 12/18/2018, serum creatinine based estimated GFR (eGFR) will be   calculated using the Chronic Kidney Disease Epidemiology Collaboration   (CKD-EPI) equation.       Calcium 06/28/2019 8.5  8.5 - 10.1 mg/dL Final     Bilirubin Total 06/28/2019 0.6  0.2 - 1.3 mg/dL Final     Albumin 06/28/2019 3.1* 3.4 - 5.0 g/dL Final     Protein Total 06/28/2019 6.8  6.8 - 8.8 g/dL Final     Alkaline Phosphatase 06/28/2019 85  40 - 150 U/L Final     ALT 06/28/2019 19  0 - 70 U/L Final     AST 06/28/2019 19  0 - 45 U/L Final     WBC 06/28/2019 2.6* 4.0 - 11.0 10e9/L Final     RBC Count 06/28/2019 3.96* 4.4 - 5.9 10e12/L Final     Hemoglobin 06/28/2019 11.0* 13.3 - 17.7 g/dL Final     Hematocrit 06/28/2019 35.3* 40.0 - 53.0 % Final     MCV 06/28/2019 89  78 - 100 fl Final     MCH 06/28/2019 27.8  26.5 - 33.0 pg Final     MCHC 06/28/2019 31.2* 31.5 - 36.5 g/dL Final     RDW 06/28/2019 15.4* 10.0 - 15.0 % Final     Platelet Count 06/28/2019 85* 150 - 450 10e9/L Final     Diff Method 06/28/2019 Automated Method   Final     % Neutrophils 06/28/2019 57.4  % Final     % Lymphocytes 06/28/2019 27.4  % Final     % Monocytes 06/28/2019 11.0  % Final     % Eosinophils 06/28/2019 2.7  % Final     % Basophils 06/28/2019 1.1  % Final     % Immature Granulocytes 06/28/2019 0.4  % Final     Nucleated RBCs 06/28/2019 0  0 /100 Final     Absolute Neutrophil 06/28/2019 1.5* 1.6 - 8.3 10e9/L Final     Absolute Lymphocytes 06/28/2019 0.7* 0.8 - 5.3 10e9/L Final     Absolute Monocytes 06/28/2019 0.3  0.0 - 1.3 10e9/L Final     Absolute Eosinophils 06/28/2019 0.1  0.0 - 0.7 10e9/L Final     Absolute Basophils 06/28/2019 0.0  0.0 - 0.2 10e9/L Final     Abs Immature Granulocytes 06/28/2019 0.0  0 - 0.4 10e9/L Final     Absolute Nucleated RBC 06/28/2019 0.0   Final        Recent Results (from the past 744 hour(s))   CT Chest/Abdomen/Pelvis w Contrast    Narrative    CT CHEST/ABDOMEN/PELVIS WITH CONTRAST   7/11/2019 9:58 AM     HISTORY: Grade 3  follicular lymphoma.    TECHNIQUE: 100 mL Isovue 370 IV were administered. After contrast  administration, volumetric helical sections were acquired from the  thoracic inlet to the ischial tuberosities. Coronal images were also  reconstructed. Radiation dose for this scan was reduced using  automated exposure control, adjustment of the mA and/or kV according  to patient size, or iterative reconstruction technique.    COMPARISON: None.    FINDINGS:    Chest: There are a few small calcified granulomas in both lungs. There  is a 1.7 cm mixed solid and groundglass nodule along the right minor  fissure laterally (series 6 image 94). Mild scattered scarring and/or  atelectasis at both lung bases posteriorly. Atherosclerotic  calcification of the thoracic aorta and coronary arteries. No pleural  or pericardial effusions. No enlarged lymph nodes are identified in  the chest.    Abdomen and Pelvis: Prior cholecystectomy. Mild nodularity of the  liver contour suggests underlying cirrhosis. There is mild  splenomegaly. The spleen measures up to 15.5 cm in length. The adrenal  glands, pancreas, and kidneys are unremarkable. No hydronephrosis.  Mild atherosclerotic aortoiliac calcification. No bowel obstruction.  Colonic diverticulosis, without convincing evidence for  diverticulitis. No free fluid in the pelvis. Mild prostatic  enlargement. No enlarged lymph nodes are identified in the abdomen or  pelvis. Degenerative changes are noted throughout the thoracolumbar  spine.       Impression    IMPRESSION:   1. Mild splenomegaly.  2. Mild nodularity of the liver contour suggests underlying cirrhosis.  3. A 1.7 cm mixed solid and groundglass nodule along the right minor  fissure is indeterminate. Please refer to pulmonary nodule follow-up  guidelines below.   4. Colonic diverticulosis, without convincing evidence for  diverticulitis.    Recommendations for incidental groundglass nodule(s):  Solitary part-solid nodule = or > 6mm: CT  at 3-6 months to confirm  persistence. If unchanged and solid component remains < 6mm, annual CT  should be performed for 5 years.    *GGN = Ground glass nodule (subsolid nodule).  *Recommendations based on Guidelines for the Management of Incidental  Pulmonary Nodules Detected at CT: From the Fleischner Society 2017,  Radiology 2017.     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  There is no evidence of lymphoma recurrence.  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  I will see the patient on annual basis or sooner if there are new developments or concerns.    (C43.59) Malignant melanoma s/p resection in [place], [place]  There is no evidence of recurrence of melanoma.  The patient has been following with dermatology.    (I10) Essential hypertension, benign  Pressures currently well controlled on Norvasc 5 mg orally daily, he is also on clonidine and ramipril, hydralazine    (E11.8) Type 2 diabetes mellitus with complication, without long-term current use of insulin (H)  Diabetes is currently well controlled.  The patient currently on Amaryl, Actos    (G47.33) Obstructive sleep apnea  I discussed with the patient the use of CPAP machine regular basis.    (E78.49) Other hyperlipidemia  Currently on Lipitor 20 mg orally daily.    The patient is ready to learn, no apparent learning barriers were identified.  Diagnosis and treatment plans were explained to the patient. The patient expressed understanding of the content. The patient asked appropriate questions. The patient questions were answered to his satisfaction.    Chart documentation with Dragon Voice recognition Software. Although reviewed after completion, some words and grammatical errors may remain.    Again, thank you for allowing  me to participate in the care of your patient.        Sincerely,        Crystal Camacho MD     Improved

## 2019-07-17 NOTE — PROGRESS NOTES
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    Weight as of this encounter: 111.6 kg (246 lb). Body surface area is 2.28 meters squared.  No Pain (0) Comment: Data Unavailable   No LMP for male patient.  Allergies reviewed: Yes  Medications reviewed: Yes    Medications: Medication refills not needed today.  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       Jelly Eagle CMA              "

## 2019-07-18 ENCOUNTER — OFFICE VISIT (OUTPATIENT)
Dept: CARDIOLOGY | Facility: CLINIC | Age: 79
End: 2019-07-18
Attending: FAMILY MEDICINE
Payer: MEDICARE

## 2019-07-18 VITALS
BODY MASS INDEX: 39.07 KG/M2 | DIASTOLIC BLOOD PRESSURE: 61 MMHG | HEART RATE: 66 BPM | SYSTOLIC BLOOD PRESSURE: 119 MMHG | WEIGHT: 244 LBS

## 2019-07-18 DIAGNOSIS — I25.10 CORONARY ARTERY DISEASE INVOLVING NATIVE CORONARY ARTERY OF NATIVE HEART WITHOUT ANGINA PECTORIS: Primary | ICD-10-CM

## 2019-07-18 PROCEDURE — 99204 OFFICE O/P NEW MOD 45 MIN: CPT | Mod: 24 | Performed by: INTERNAL MEDICINE

## 2019-07-18 NOTE — PROGRESS NOTES
HPI and Plan:   Today I had the pleasure of seeing Flash Brown at [facility] in [facility]. He is a pleasant 78 year old patient who has recently moved here from [place] and wants to establish care in our clinic.  He has a history of significant coronary artery disease which was managed previously in [place]. He is status post PTCA with LUDIVINA to proximal and mid RCA, distal circumflex, proximal and mid LAD in 08/2010.   He had another coronary angiogram on 11/2011 for recurrent chest pain on which he was noted to have 70% angiographic stenosis of proximal circumflex.  FFR of this lesion was 0.93 and hence no intervention was performed.  He then had a Lexiscan done on 06/2015 which showed a moderate area of ischemia in the anterolateral walls.  However, as far as I can tell the patient did not have intervention on this vessel.  The most recent echocardiogram that I have is from 2/29/2016 which showed normal sized left ventricle with an ejection fraction of 60 to 65% and no segmental wall motion abnormalities.  There is no valvular disease noted.  His other medical history is significant for hyperlipidemia, depression, non-Hodgkin's lymphoma status post treatment and now in remission.    Today, he tells me that he has gained weight over the last year or so.  He has not been exercising and blames it on being lazy.  He can walk slowly with a walker and does not develop chest pain or shortness of breath when walking.  He says the only reason he does not exercise is because he does not want to do it.  He has been taking all his medications without missing doses.    Assessment and plan  1.  Coronary artery disease status post PCI of LAD, RCA and circumflex in 2010  2.  Proximal circumflex stenosis of 70% with positive nuclear stress test in 2015  3.  Hypertension  4.  Hyperlipidemia  5.  History of Hodgkin's lymphoma 1983    As far as the history of coronary artery disease is  concerned the patient is currently asymptomatic.  I will continue to monitor him for now.  He did have positive stress test in 2015 and I am not sure why coronary angiogram was not performed.  If he develops symptoms I would send him straight for left heart catheterization due to a known high risk lesion. On physical examination today he has multiple ecchymosis and bruises and I think it is fair to stop Plavix at this time.  I would continue all his other medications at the current dose. His most recent LDL was 43 mg/dL.  His blood pressure today in clinic is 119/61.    I have discussed with him the need to eat healthy, exercise regularly lose weight ,and continue to take all his medications as prescribed.  The patient understands and will try to make necessary lifestyle modifications.  He is currently taking both, aspirin and Plavix.  Thank you for allowing me to participate in the care of Flash Tate MD  Cardiology    Orders Placed This Encounter   Procedures     Follow-Up with Cardiologist       Encounter Diagnosis   Name Primary?     Coronary artery disease involving native coronary artery of native heart without angina pectoris Yes       CURRENT MEDICATIONS:  Current Outpatient Medications   Medication Sig Dispense Refill     amLODIPine (NORVASC) 5 MG tablet Take 1 tablet (5 mg) by mouth daily       aspirin (ASA) 81 MG EC tablet Take 1 tablet (81 mg) by mouth daily       atorvastatin (LIPITOR) 20 MG tablet Take 1 tablet (20 mg) by mouth daily 90 tablet 3     busPIRone (BUSPAR) 10 MG tablet Take 1 tablet (10 mg) by mouth 2 times daily       carvedilol (COREG) 12.5 MG tablet Take 1 tablet (12.5 mg) by mouth 2 times daily (with meals) 180 tablet 3     CENTRUM OR TABS 1 TABLET DAILY       cloNIDine (CATAPRES) 0.1 MG tablet Take 1 tablet (0.1 mg) by mouth 2 times daily       clopidogrel (PLAVIX) 75 MG tablet Take 1 tablet (75 mg) by mouth daily       COQ10 100 MG OR CAPS None Entered        diazepam (VALIUM) 10 MG tablet Take 1 tablet (10 mg) by mouth every 6 hours as needed for anxiety 1 tablet 0     diazepam (VALIUM) 10 MG tablet Before procedure 1 tablet 0     diazepam (VALIUM) 5 MG tablet Take 1 tablet (5 mg) by mouth every 6 hours as needed for anxiety 1 tablet 0     glimepiride (AMARYL) 4 MG tablet Take 1 tablet (4 mg) by mouth 2 times daily       hydrALAZINE (APRESOLINE) 50 MG tablet Take 1 tablet (50 mg) by mouth 2 times daily       NIACIN 500 MG OR TABS Take one orally twice daily 180 3     NITROGLYCERIN 0.4 MG SL SUBL ONE TABLET UNDER TONGUE, FOR CHEST PAIN, AS DIRECTED 1 BOTTLE 3 per year     OMEGA-3 1000 MG OR CAPS None Entered       ORDER FOR DME Light Therapy with Full Spectrum Light (49574 lux) Start with 10-15 minute exposure per day, Increase exposure to 30 to 45 minutes per day, Maximum exposure: 90 minutes per day,Look periodically at light during each session  1 0     pioglitazone (ACTOS) 30 MG tablet Take 1 tablet (30 mg) by mouth daily       ramipril (ALTACE) 10 MG capsule Take 1 capsule (10 mg) by mouth daily 90 capsule 3     tamsulosin (FLOMAX) 0.4 MG capsule Take 1 capsule (0.4 mg) by mouth daily       VOSOL-HC 2-1 % OT SOLN 2-3 drops in the affected ear 3-4 times daily for 7-10 days for itching in the ear 1 bottle 0     VYTORIN 10-40 MG OR TABS 1 TABLET EVERY EVENING 3 months 1       ALLERGIES     Allergies   Allergen Reactions     Iodine Swelling       PAST MEDICAL HISTORY:  Past Medical History:   Diagnosis Date     Basal cell carcinoma      CAD (coronary artery disease)      Depression      Diabetes type 2, controlled (H)      Hyperlipidemia      Hypertension      Lymphoma (H)      Malignant melanoma (H)      Melanoma (H)      Squamous cell carcinoma        PAST SURGICAL HISTORY:  Past Surgical History:   Procedure Laterality Date     AXILLARY SURGERY      S/P resection and adjuvant radiation     BONE MARROW BIOPSY, BONE SPECIMEN, NEEDLE/TROCAR N/A 7/8/2019    Procedure:  BIOPSY, BONE MARROW;  Surgeon: Husam Issa MD;  Location: [facility]     CHOLECYSTECTOMY       HERNIA REPAIR, UMBILICAL       STENT      PTCA with drug-eluting stent of RCA, circumflex, and LAD       FAMILY HISTORY:  Family History   Problem Relation Age of Onset     Asthma Other      Cancer Other         melanoma     Blood Disease Other         bleeding disorder     Lung Cancer Mother         Smoker     Prostate Cancer Father      Melanoma No family hx of        SOCIAL HISTORY:  Social History     Socioeconomic History     Marital status: Single     Spouse name: None     Number of children: 0     Years of education: None     Highest education level: None   Occupational History     Occupation: Retired     Comment: [employer]    Social Needs     Financial resource strain: None     Food insecurity:     Worry: None     Inability: None     Transportation needs:     Medical: None     Non-medical: None   Tobacco Use     Smoking status: Never Smoker     Smokeless tobacco: Never Used   Substance and Sexual Activity     Alcohol use: Yes     Frequency: Monthly or less     Drinks per session: 1 or 2     Binge frequency: Never     Comment: glass of wine couple times per week     Drug use: Never     Sexual activity: None   Lifestyle     Physical activity:     Days per week: None     Minutes per session: None     Stress: None   Relationships     Social connections:     Talks on phone: None     Gets together: None     Attends Orthodox service: None     Active member of club or organization: None     Attends meetings of clubs or organizations: None     Relationship status: None     Intimate partner violence:     Fear of current or ex partner: None     Emotionally abused: None     Physically abused: None     Forced sexual activity: None   Other Topics Concern     None   Social History Narrative     None       Review of Systems:  Skin:  Negative       Eyes:  Negative      ENT:  Negative      Respiratory:  Negative        Cardiovascular:  Negative      Gastroenterology: Negative      Genitourinary:  Negative      Musculoskeletal:  Negative      Neurologic:  Negative      Psychiatric:  Negative      Heme/Lymph/Imm:  Negative      Endocrine:  Positive for diabetes      Physical Exam:  Vitals: /61   Pulse 66   Wt 110.7 kg (244 lb)   BMI 39.07 kg/m    Constitutional: awake, alert, no distress  Skin: Warm and dry to touch multiple areas of ecchymosis and bruising on the skin,   Head: Normocephalic, atraumatic  Eyes: Conjunctivae and lids unremarkable, sclera white  ENT: No pallor or cyanosis  Respiratory: Normal breath sounds, clear to auscultation  Cardiac: Regular rate and rhythm, S1-S2 normal.  No murmurs gallops or rubs.   No pedal edema.   Extremities and musculoskeletal: No gross motor deficit  Neurological.  Affect normal  Psych: Alert and oriented x3    Recent Lab Results:  LIPID RESULTS:  Lab Results   Component Value Date    CHOL 110 06/11/2019    HDL 47 06/11/2019    LDL 47 06/11/2019    TRIG 80 06/11/2019    CHOLHDLRATIO 4.0 03/13/2008       LIVER ENZYME RESULTS:  Lab Results   Component Value Date    AST 19 06/28/2019    ALT 19 06/28/2019       CBC RESULTS:  Lab Results   Component Value Date    WBC 3.2 (L) 07/08/2019    RBC 4.06 (L) 07/08/2019    HGB 11.2 (L) 07/08/2019    HCT 35.4 (L) 07/08/2019    MCV 87 07/08/2019    MCH 27.6 07/08/2019    MCHC 31.6 07/08/2019    RDW 15.6 (H) 07/08/2019    PLT 82 (L) 07/08/2019       BMP RESULTS:  Lab Results   Component Value Date     06/28/2019    POTASSIUM 4.1 06/28/2019    CHLORIDE 108 06/28/2019    CO2 27 06/28/2019    ANIONGAP 6 06/28/2019     (H) 06/28/2019    BUN 17 06/28/2019    CR 1.14 06/28/2019    GFRESTIMATED 62 06/28/2019    GFRESTBLACK 72 06/28/2019    NATALIIA 8.5 06/28/2019        A1C RESULTS:  Lab Results   Component Value Date    A1C 5.4 06/11/2019       INR RESULTS:  No results found for: INR    CC  Thomas Jackson DO  [address] [place]  [place] [id number]    All medical records were reviewed in detail and discussed with the patient. Greater than 45 mins were spent with the patient, 50% of this time was spent on counseling and coordination of care.  After visit summary was printed and given to the patient.

## 2019-07-18 NOTE — LETTER
7/18/2019    Thomas GHADA Jackson,   [address]  [place] [address]    RE: Flash FELICIA Brown       Dear Colleague,    [facility]    HPI and Plan:   Today I had the pleasure of seeing Flash Brown at [facility] in [facility]. He is a pleasant 78 year old patient who has recently moved here from [place] and wants to establish care in our clinic.  He has a history of significant coronary artery disease which was managed previously in [place]. He is status post PTCA with LUDIVINA to proximal and mid RCA, distal circumflex, proximal and mid LAD in 08/2010.   He had another coronary angiogram on 11/2011 for recurrent chest pain on which he was noted to have 70% angiographic stenosis of proximal circumflex.  FFR of this lesion was 0.93 and hence no intervention was performed.  He then had a Lexiscan done on 06/2015 which showed a moderate area of ischemia in the anterolateral walls.  However, as far as I can tell the patient did not have intervention on this vessel.  The most recent echocardiogram that I have is from 2/29/2016 which showed normal sized left ventricle with an ejection fraction of 60 to 65% and no segmental wall motion abnormalities.  There is no valvular disease noted.  His other medical history is significant for hyperlipidemia, depression, non-Hodgkin's lymphoma status post treatment and now in remission.    Today, he tells me that he has gained weight over the last year or so.  He has not been exercising and blames it on being lazy.  He can walk slowly with a walker and does not develop chest pain or shortness of breath when walking.  He says the only reason he does not exercise is because he does not want to do it.  He has been taking all his medications without missing doses.    Assessment and plan  1.  Coronary artery disease status post PCI of LAD, RCA and circumflex in  2010  2.  Proximal circumflex stenosis of 70% with positive nuclear stress test in 2015  3.  Hypertension  4.  Hyperlipidemia  5.  History of Hodgkin's lymphoma 1983    As far as the history of coronary artery disease is concerned the patient is currently asymptomatic.  I will continue to monitor him for now.  He did have positive stress test in 2015 and I am not sure why coronary angiogram was not performed.  If he develops symptoms I would send him straight for left heart catheterization due to a known high risk lesion. On physical examination today he has multiple ecchymosis and bruises and I think it is fair to stop Plavix at this time.  I would continue all his other medications at the current dose. His most recent LDL was 43 mg/dL.  His blood pressure today in clinic is 119/61.    I have discussed with him the need to eat healthy, exercise regularly lose weight ,and continue to take all his medications as prescribed.  The patient understands and will try to make necessary lifestyle modifications.  He is currently taking both, aspirin and Plavix.  Thank you for allowing me to participate in the care of Flash Tate MD  Cardiology    Orders Placed This Encounter   Procedures     Follow-Up with Cardiologist       Encounter Diagnosis   Name Primary?     Coronary artery disease involving native coronary artery of native heart without angina pectoris Yes       CURRENT MEDICATIONS:  Current Outpatient Medications   Medication Sig Dispense Refill     amLODIPine (NORVASC) 5 MG tablet Take 1 tablet (5 mg) by mouth daily       aspirin (ASA) 81 MG EC tablet Take 1 tablet (81 mg) by mouth daily       atorvastatin (LIPITOR) 20 MG tablet Take 1 tablet (20 mg) by mouth daily 90 tablet 3     busPIRone (BUSPAR) 10 MG tablet Take 1 tablet (10 mg) by mouth 2 times daily       carvedilol (COREG) 12.5 MG tablet Take 1 tablet (12.5 mg) by mouth 2 times daily (with meals) 180 tablet 3     CENTRUM OR TABS 1 TABLET  DAILY       cloNIDine (CATAPRES) 0.1 MG tablet Take 1 tablet (0.1 mg) by mouth 2 times daily       clopidogrel (PLAVIX) 75 MG tablet Take 1 tablet (75 mg) by mouth daily       COQ10 100 MG OR CAPS None Entered       diazepam (VALIUM) 10 MG tablet Take 1 tablet (10 mg) by mouth every 6 hours as needed for anxiety 1 tablet 0     diazepam (VALIUM) 10 MG tablet Before procedure 1 tablet 0     diazepam (VALIUM) 5 MG tablet Take 1 tablet (5 mg) by mouth every 6 hours as needed for anxiety 1 tablet 0     glimepiride (AMARYL) 4 MG tablet Take 1 tablet (4 mg) by mouth 2 times daily       hydrALAZINE (APRESOLINE) 50 MG tablet Take 1 tablet (50 mg) by mouth 2 times daily       NIACIN 500 MG OR TABS Take one orally twice daily 180 3     NITROGLYCERIN 0.4 MG SL SUBL ONE TABLET UNDER TONGUE, FOR CHEST PAIN, AS DIRECTED 1 BOTTLE 3 per year     OMEGA-3 1000 MG OR CAPS None Entered       ORDER FOR DME Light Therapy with Full Spectrum Light (83146 lux) Start with 10-15 minute exposure per day, Increase exposure to 30 to 45 minutes per day, Maximum exposure: 90 minutes per day,Look periodically at light during each session  1 0     pioglitazone (ACTOS) 30 MG tablet Take 1 tablet (30 mg) by mouth daily       ramipril (ALTACE) 10 MG capsule Take 1 capsule (10 mg) by mouth daily 90 capsule 3     tamsulosin (FLOMAX) 0.4 MG capsule Take 1 capsule (0.4 mg) by mouth daily       VOSOL-HC 2-1 % OT SOLN 2-3 drops in the affected ear 3-4 times daily for 7-10 days for itching in the ear 1 bottle 0     VYTORIN 10-40 MG OR TABS 1 TABLET EVERY EVENING 3 months 1       ALLERGIES     Allergies   Allergen Reactions     Iodine Swelling       PAST MEDICAL HISTORY:  Past Medical History:   Diagnosis Date     Basal cell carcinoma      CAD (coronary artery disease)      Depression      Diabetes type 2, controlled (H)      Hyperlipidemia      Hypertension      Lymphoma (H)      Malignant melanoma (H)      Melanoma (H)      Squamous cell carcinoma        PAST  SURGICAL HISTORY:  Past Surgical History:   Procedure Laterality Date     AXILLARY SURGERY      S/P resection and adjuvant radiation     BONE MARROW BIOPSY, BONE SPECIMEN, NEEDLE/TROCAR N/A 7/8/2019    Procedure: BIOPSY, BONE MARROW;  Surgeon: Husam Issa MD;  Location: [facility]     CHOLECYSTECTOMY       HERNIA REPAIR, UMBILICAL       STENT      PTCA with drug-eluting stent of RCA, circumflex, and LAD       FAMILY HISTORY:  Family History   Problem Relation Age of Onset     Asthma Other      Cancer Other         melanoma     Blood Disease Other         bleeding disorder     Lung Cancer Mother         Smoker     Prostate Cancer Father      Melanoma No family hx of        SOCIAL HISTORY:  Social History     Socioeconomic History     Marital status: Single     Spouse name: None     Number of children: 0     Years of education: None     Highest education level: None   Occupational History     Occupation: Retired     Comment: [employer]    Social Needs     Financial resource strain: None     Food insecurity:     Worry: None     Inability: None     Transportation needs:     Medical: None     Non-medical: None   Tobacco Use     Smoking status: Never Smoker     Smokeless tobacco: Never Used   Substance and Sexual Activity     Alcohol use: Yes     Frequency: Monthly or less     Drinks per session: 1 or 2     Binge frequency: Never     Comment: glass of wine couple times per week     Drug use: Never     Sexual activity: None   Lifestyle     Physical activity:     Days per week: None     Minutes per session: None     Stress: None   Relationships     Social connections:     Talks on phone: None     Gets together: None     Attends Yarsani service: None     Active member of club or organization: None     Attends meetings of clubs or organizations: None     Relationship status: None     Intimate partner violence:     Fear of current or ex partner: None     Emotionally abused: None     Physically abused: None      Forced sexual activity: None   Other Topics Concern     None   Social History Narrative     None       Review of Systems:  Skin:  Negative       Eyes:  Negative      ENT:  Negative      Respiratory:  Negative       Cardiovascular:  Negative      Gastroenterology: Negative      Genitourinary:  Negative      Musculoskeletal:  Negative      Neurologic:  Negative      Psychiatric:  Negative      Heme/Lymph/Imm:  Negative      Endocrine:  Positive for diabetes      Physical Exam:  Vitals: /61   Pulse 66   Wt 110.7 kg (244 lb)   BMI 39.07 kg/m     Constitutional: awake, alert, no distress  Skin: Warm and dry to touch multiple areas of ecchymosis and bruising on the skin,   Head: Normocephalic, atraumatic  Eyes: Conjunctivae and lids unremarkable, sclera white  ENT: No pallor or cyanosis  Respiratory: Normal breath sounds, clear to auscultation  Cardiac: Regular rate and rhythm, S1-S2 normal.  No murmurs gallops or rubs.   No pedal edema.   Extremities and musculoskeletal: No gross motor deficit  Neurological.  Affect normal  Psych: Alert and oriented x3    Recent Lab Results:  LIPID RESULTS:  Lab Results   Component Value Date    CHOL 110 06/11/2019    HDL 47 06/11/2019    LDL 47 06/11/2019    TRIG 80 06/11/2019    CHOLHDLRATIO 4.0 03/13/2008       LIVER ENZYME RESULTS:  Lab Results   Component Value Date    AST 19 06/28/2019    ALT 19 06/28/2019       CBC RESULTS:  Lab Results   Component Value Date    WBC 3.2 (L) 07/08/2019    RBC 4.06 (L) 07/08/2019    HGB 11.2 (L) 07/08/2019    HCT 35.4 (L) 07/08/2019    MCV 87 07/08/2019    MCH 27.6 07/08/2019    MCHC 31.6 07/08/2019    RDW 15.6 (H) 07/08/2019    PLT 82 (L) 07/08/2019       BMP RESULTS:  Lab Results   Component Value Date     06/28/2019    POTASSIUM 4.1 06/28/2019    CHLORIDE 108 06/28/2019    CO2 27 06/28/2019    ANIONGAP 6 06/28/2019     (H) 06/28/2019    BUN 17 06/28/2019    CR 1.14 06/28/2019    GFRESTIMATED 62 06/28/2019    GFRESTBLACK 72  06/28/2019    NATALIIA 8.5 06/28/2019        A1C RESULTS:  Lab Results   Component Value Date    A1C 5.4 06/11/2019       INR RESULTS:  No results found for: INR    CC  Thomas Fishman DO Renetta  [address] [place] [address]    All medical records were reviewed in detail and discussed with the patient. Greater than 45 mins were spent with the patient, 50% of this time was spent on counseling and coordination of care.  After visit summary was printed and given to the patient.    Thank you for allowing me to participate in the care of your patient.    Sincerely,     Micah Tate MD     [facility]

## 2019-07-19 ENCOUNTER — TELEPHONE (OUTPATIENT)
Dept: NUTRITION | Facility: CLINIC | Age: 79
End: 2019-07-19

## 2019-07-22 ENCOUNTER — OFFICE VISIT (OUTPATIENT)
Dept: DERMATOLOGY | Facility: CLINIC | Age: 79
End: 2019-07-22
Payer: MEDICARE

## 2019-07-22 VITALS — DIASTOLIC BLOOD PRESSURE: 84 MMHG | SYSTOLIC BLOOD PRESSURE: 149 MMHG | OXYGEN SATURATION: 97 % | HEART RATE: 66 BPM

## 2019-07-22 DIAGNOSIS — C44.320 SQUAMOUS CELL CARCINOMA OF FACE: ICD-10-CM

## 2019-07-22 DIAGNOSIS — C44.329 SQUAMOUS CELL CANCER OF SKIN OF RIGHT CHEEK: ICD-10-CM

## 2019-07-22 DIAGNOSIS — Z85.828 HISTORY OF SKIN CANCER: Primary | ICD-10-CM

## 2019-07-22 LAB — COPATH REPORT: NORMAL

## 2019-07-22 PROCEDURE — 88331 PATH CONSLTJ SURG 1 BLK 1SPC: CPT | Mod: 59 | Performed by: DERMATOLOGY

## 2019-07-22 PROCEDURE — 17312 MOHS ADDL STAGE: CPT | Performed by: DERMATOLOGY

## 2019-07-22 PROCEDURE — 17311 MOHS 1 STAGE H/N/HF/G: CPT | Mod: 79 | Performed by: DERMATOLOGY

## 2019-07-22 PROCEDURE — 11102 TANGNTL BX SKIN SINGLE LES: CPT | Mod: 59 | Performed by: DERMATOLOGY

## 2019-07-22 PROCEDURE — 11103 TANGNTL BX SKIN EA SEP/ADDL: CPT | Performed by: DERMATOLOGY

## 2019-07-22 PROCEDURE — 17311 MOHS 1 STAGE H/N/HF/G: CPT | Mod: 59 | Performed by: DERMATOLOGY

## 2019-07-22 PROCEDURE — 13132 CMPLX RPR F/C/C/M/N/AX/G/H/F: CPT | Mod: 59 | Performed by: DERMATOLOGY

## 2019-07-22 RX ORDER — DIAZEPAM 10 MG
10 TABLET ORAL EVERY 6 HOURS PRN
Qty: 1 TABLET | Refills: 0 | Status: SHIPPED | OUTPATIENT
Start: 2019-07-22 | End: 2019-09-30

## 2019-07-22 NOTE — PATIENT INSTRUCTIONS
ONE WEEK DRESSING CHANGE  for  SKIN GRAFTS  FOR LEFT EAR    The following information has been compiled to offer you assistance with the dressing change or wound evaluation. Please feel free to call our office to speak with one of the nurses if you have any questions or concerns about the progress of the wound healing process especially if there are any signs of graft necrosis or infection. We will be happy to answer any questions you might have.                                                               AFTER 24 HOURS YOU SHOULD REMOVE THE BANDAGE AND BEGIN DAILY DRESSING CHANGES AS FOLLOWS:     1) Remove Dressing.     2) Clean and dry the area with tap water using a Q-tip or sterile gauze pad.     3) Apply Vaseline, Polysporin ointment, Aquaphor or Bacitracin ointment over entire wound.  Do NOT use Neosporin ointment.     4) Cover the wound with a band-aid, or a sterile non-stick gauze pad and micropore paper tape      REPEAT THESE INSTRUCTIONS AT LEAST ONCE A DAY UNTIL THE WOUND HAS COMPLETELY HEALED. DO NOT LET THE WOUND SCAB OVER.    It is an old wives tale that a wound heals better when it is exposed to air and allowed to dry out. The wound will heal faster with a better cosmetic result if it is kept moist with ointment and covered with a bandage.       Massaging the wound site hastens the healing process by softening the scar tissue and fading the scar. Begin massaging the area one month after surgery as often as possible. Continue to massage the area for 2-3 months or until you feel the scar tissue has softened. Moisturizers can be used during the massaging but are not necessary. Ultimately it takes six months for the graft to heal and blend into the surrounding skin.        Sutured Wound Care     [facility]: [phone]    [facility]: [phone]    RIGHT CHEEK      ? No strenuous activity for 48 hours. Resume moderate activity in 48 hours. No heavy exercising until you are seen for  follow up in one week.     ? Take Tylenol as needed for discomfort.                         ? Do not drink alcoholic beverages for 48 hours.     ? Keep the pressure bandage in place for 24 hours. If the bandage becomes blood tinged or loose, reinforce it with gauze and tape.        (Refer to the reverse side of this page for management of bleeding).    ? Remove pressure bandage in 24 hours     ? Leave the flat bandage in place until your follow up appointment.    ? Keep the bandage dry. Wash around it carefully.    ? If the tape becomes soiled or starts to come off, reinforce it with additional paper tape.    ? Do not smoke for 3 weeks; smoking is detrimental to wound healing.    ? It is normal to have swelling and bruising around the surgical site. The bruising will fade in approximately 10-14 days. Elevate the area to reduce swelling.    ? Numbness, itchiness and sensitivity to temperature changes can occur after surgery and may take up to 18 months to normalize.      POSSIBLE COMPLICATIONS    BLEEDIN. Leave the bandage in place.  2. Use tightly rolled up gauze or a cloth to apply direct pressure over the bandage for 20   minutes.  3. Reapply pressure for an additional 20 minutes if necessary  4. Call the office or go to the nearest emergency room if pressure fails to stop the bleeding.  5. Use additional gauze and tape to maintain pressure once the bleeding has stopped.        PAIN:    1. Post operative pain should slowly get better, never worse.  2. A severe increase in pain may indicate a problem. Call the office if this occurs.    In case of emergency phone:Dr Roque [phone]        Open Wound Care     for RIGHT FOREHEAD ABOVE EYEBROW        ? No strenuous activity for 48 hours    ? Take Tylenol as needed for discomfort.                                                .         ? Do not drink alcoholic beverages for 48 hours.    ? Keep the pressure bandage in place for 24 hours. If the bandage  becomes blood tinged or loose, reinforce it with gauze and tape.        (Refer to the reverse side of this page for management of bleeding).    ? Remove bandage in 24 hours and begin wound care as follows:     1. Clean area with tap water using a Q tip or gauze pad, (shower / bathe normally)  2. Dry wound with Q tip or gauze pad  3. Apply Aquaphor, Vaseline, Polysporin or Bacitracin Ointment with a Q tip    Do NOT use Neosporin Ointment *  4. Cover the wound with a band-aid or nonstick gauze pad and paper tape.  5. Repeat wound care once a day until wound is completely healed.    It is an old wives tale that a wound heals better when it is exposed to air and allowed to dry out. The wound will heal faster with a better cosmetic result if it is kept moist with ointment and covered with a bandage.  Do not let the wound dry out.      Supplies Needed:                Qtips or gauze pads                Polysporin or Bacitracin Ointment                Bandaids or nonstick gauze pads and paper tape    Wound care kits and brown paper tape are available for purchase at   the pharmacy.    BLEEDIN. Use tightly rolled up gauze or cloth to apply direct pressure over the bandage for 20   minutes.  2. Reapply pressure for an additional 20 minutes if necessary  3. Call the office or go to the nearest emergency room if pressure fails to stop the bleeding.  4. Use additional gauze and tape to maintain pressure once the bleeding has stopped.  5. Begin wound care 24 hours after surgery as directed.                  WOUND HEALING    1. One week after surgery a pink / red halo will form around the outside of the wound.   This is new skin.  2. The center of the wound will appear yellowish white and produce some drainage.  3. The pink halo will slowly migrate in toward the center of the wound until the wound is covered with new shiny pink skin.  4. There will be no more drainage when the wound is completely healed.  5. It will take  six months to one year for the redness to fade.  6. The scar may be itchy, tight and sensitive to extreme temperatures for a year after the surgery.  7. Massaging the area several times a day for several minutes after the wound is completely healed will help the scar soften and normalize faster. Begin massage only after healing is complete.      In case of emergency call: Dr Roque: [phone]     [facility]: [phone]    [facility]: [phone]

## 2019-07-22 NOTE — NURSING NOTE
The following medication was given:     MEDICATION: Diazapm  ROUTE: PO  TIME: 0841  DOSE: 5 mg  AMOUNT: 2 tabs  LOT #: [id number]  :  [employer]  EXPIRATION DATE:  02/21    Enedelia iN LPN

## 2019-07-22 NOTE — PROGRESS NOTES
Flash Brown is a 78 year old year old male patient here today for ongoing non-melanoma skin cancer.  Previous sites healing well.  Patient reports the following modifying factors none.  Associated symptoms: none.  Patient has no other skin complaints today.  Remainder of the HPI, Meds, PMH, Allergies, FH, and SH was reviewed in chart.      Past Medical History:   Diagnosis Date     Basal cell carcinoma      CAD (coronary artery disease)      Depression      Diabetes type 2, controlled (H)      Hyperlipidemia      Hypertension      Lymphoma (H)      Malignant melanoma (H)      Melanoma (H)      Squamous cell carcinoma        Past Surgical History:   Procedure Laterality Date     AXILLARY SURGERY      S/P resection and adjuvant radiation     BONE MARROW BIOPSY, BONE SPECIMEN, NEEDLE/TROCAR N/A 7/8/2019    Procedure: BIOPSY, BONE MARROW;  Surgeon: Husam Issa MD;  Location: [facility]     CHOLECYSTECTOMY       HERNIA REPAIR, UMBILICAL       STENT      PTCA with drug-eluting stent of RCA, circumflex, and LAD        Family History   Problem Relation Age of Onset     Asthma Other      Cancer Other         melanoma     Blood Disease Other         bleeding disorder     Lung Cancer Mother         Smoker     Prostate Cancer Father      Melanoma No family hx of        Social History     Socioeconomic History     Marital status: Single     Spouse name: Not on file     Number of children: 0     Years of education: Not on file     Highest education level: Not on file   Occupational History     Occupation: Retired     Comment: [employer]    Social Needs     Financial resource strain: Not on file     Food insecurity:     Worry: Not on file     Inability: Not on file     Transportation needs:     Medical: Not on file     Non-medical: Not on file   Tobacco Use     Smoking status: Never Smoker     Smokeless tobacco: Never Used   Substance and Sexual Activity     Alcohol use: Yes     Frequency: Monthly or less     Drinks  per session: 1 or 2     Binge frequency: Never     Comment: glass of wine couple times per week     Drug use: Never     Sexual activity: Not on file   Lifestyle     Physical activity:     Days per week: Not on file     Minutes per session: Not on file     Stress: Not on file   Relationships     Social connections:     Talks on phone: Not on file     Gets together: Not on file     Attends Sabianist service: Not on file     Active member of club or organization: Not on file     Attends meetings of clubs or organizations: Not on file     Relationship status: Not on file     Intimate partner violence:     Fear of current or ex partner: Not on file     Emotionally abused: Not on file     Physically abused: Not on file     Forced sexual activity: Not on file   Other Topics Concern     Not on file   Social History Narrative     Not on file       Outpatient Encounter Medications as of 7/22/2019   Medication Sig Dispense Refill     amLODIPine (NORVASC) 5 MG tablet Take 1 tablet (5 mg) by mouth daily       aspirin (ASA) 81 MG EC tablet Take 1 tablet (81 mg) by mouth daily       atorvastatin (LIPITOR) 20 MG tablet Take 1 tablet (20 mg) by mouth daily 90 tablet 3     busPIRone (BUSPAR) 10 MG tablet Take 1 tablet (10 mg) by mouth 2 times daily       carvedilol (COREG) 12.5 MG tablet Take 1 tablet (12.5 mg) by mouth 2 times daily (with meals) 180 tablet 3     CENTRUM OR TABS 1 TABLET DAILY       cloNIDine (CATAPRES) 0.1 MG tablet Take 1 tablet (0.1 mg) by mouth 2 times daily       clopidogrel (PLAVIX) 75 MG tablet Take 1 tablet (75 mg) by mouth daily       COQ10 100 MG OR CAPS None Entered       diazepam (VALIUM) 10 MG tablet Take 1 tablet (10 mg) by mouth every 6 hours as needed for anxiety 1 tablet 0     diazepam (VALIUM) 10 MG tablet Take 1 tablet (10 mg) by mouth every 6 hours as needed for anxiety 1 tablet 0     diazepam (VALIUM) 10 MG tablet Before procedure 1 tablet 0     diazepam (VALIUM) 5 MG tablet Take 1 tablet (5 mg)  by mouth every 6 hours as needed for anxiety 1 tablet 0     glimepiride (AMARYL) 4 MG tablet Take 1 tablet (4 mg) by mouth 2 times daily       hydrALAZINE (APRESOLINE) 50 MG tablet Take 1 tablet (50 mg) by mouth 2 times daily       NIACIN 500 MG OR TABS Take one orally twice daily 180 3     OMEGA-3 1000 MG OR CAPS None Entered       ORDER FOR DME Light Therapy with Full Spectrum Light (32108 lux) Start with 10-15 minute exposure per day, Increase exposure to 30 to 45 minutes per day, Maximum exposure: 90 minutes per day,Look periodically at light during each session  1 0     pioglitazone (ACTOS) 30 MG tablet Take 1 tablet (30 mg) by mouth daily       ramipril (ALTACE) 10 MG capsule Take 1 capsule (10 mg) by mouth daily 90 capsule 3     tamsulosin (FLOMAX) 0.4 MG capsule Take 1 capsule (0.4 mg) by mouth daily       VOSOL-HC 2-1 % OT SOLN 2-3 drops in the affected ear 3-4 times daily for 7-10 days for itching in the ear 1 bottle 0     VYTORIN 10-40 MG OR TABS 1 TABLET EVERY EVENING 3 months 1     NITROGLYCERIN 0.4 MG SL SUBL ONE TABLET UNDER TONGUE, FOR CHEST PAIN, AS DIRECTED (Patient not taking: No sig reported) 1 BOTTLE 3 per year     No facility-administered encounter medications on file as of 7/22/2019.              Review Of Systems  Skin: As above  Eyes: negative  Ears/Nose/Throat: negative  Respiratory: No shortness of breath, dyspnea on exertion, cough, or hemoptysis  Cardiovascular: negative  Gastrointestinal: negative  Genitourinary: negative  Musculoskeletal: negative  Neurologic: negative  Psychiatric: negative  Hematologic/Lymphatic/Immunologic: negative  Endocrine: negative      O:   NAD, WDWN, Alert & Oriented, Mood & Affect wnl, Vitals stable   Here today alone   /84   Pulse 66   SpO2 97%    General appearance normal   Vitals stable   Alert, oriented and in no acute distress      R zygoma 5mm pink scaly papule    R brow 9mm tender keratotic scaly papule       Eyes: Conjunctivae/lids:Normal      ENT: Lips, buccal mucosa, tongue: normal    MSK:Normal    Cardiovascular: peripheral edema none    Pulm: Breathing Normal    Lymph Nodes: No Head and Neck Lymphadenopathy     Neuro/Psych: Orientation:Normal; Mood/Affect:Normal      MICRO:     R zygoma: There is hyperkeratosis & parakeratosis of the epidermis, with full thickness epidermal involvement by atypical keratinocytes with rare pale vacuolated cells invading dermis.    R brow:There is hyperkeratosis & parakeratosis of the epidermis, with full thickness epidermal involvement by atypical keratinocytes with rare pale vacuolated cells invading dermis.    A/P:  1. R/o squamous cell carcinoma   TANGENTIAL BIOPSY IN HOUSE:  After consent, anesthesia with LEC and prep, tangential excision performed and dx above confirmed with frozen section histology.  No complications and routine wound care.  Patient told result   R brow squamous cell carcinoma   MOHS:   Location    After PGACAC discussed with patient, decision for Mohs surgery was made. Indication for Mohs was Location. Patient confirmed the site with Dr. Roque.  After anesthesia with LEC, the tumor was excised using standard Mohs technique in 2 stages(s).  CLEAR MARGINS OBTAINED and Final defect size was 1.3 x 1.5 cm.       REPAIR SECOND INTENT: We discussed the options for wound management in full with the patient including risks/benefits/possible outcomes. Decision made to allow the wound to heal by second intention. EBL minimal; complications none; wound care routine.  The patient was discharged in good condition and will return in one month or prn for wound evaluation.    R cheek squamous cell carcinoma   MOHS:   Location    After PGACAC discussed with patient, decision for Mohs surgery was made. Indication for Mohs was Location. Patient confirmed the site with Dr. Roque.  After anesthesia with LEC, the tumor was excised using standard Mohs technique in 1 stages(s).  CLEAR MARGINS OBTAINED and Final  defect size was 1 cm.       REPAIR COMPLEX: Because of the tightness of the surrounding skin and Because of the size and full thickness nature of the defect, a complex closure was planned. After LEC anesthesia and prep, Burow's triangles were excised in the relaxed skin tension lines. The wound edges were widely undermined by dissection in the subcutaneous plane until adequate tissue mobility was obtained. Hemostasis was obtained. The wound edges were closed in a layered fashion using Vicryl and Fast Absorbing Plain Gut sutures. Postoperative length was 3.6 cm.   EBL minimal; complications none; wound care routine.  The patient was discharged in good condition and will return in one week for wound evaluation.        BENIGN LESIONS DISCUSSED WITH PATIENT:  I discussed the specifics of tumor, prognosis, and genetics of benign lesions.  I explained that treatment of these lesions would be purely cosmetic and not medically neccessary.  I discussed with patient different removal options including excision, cautery and /or laser.      Nature and genetics of benign skin lesions dicussed with patient.  Signs and Symptoms of skin cancer discussed with patient.  Patient encouraged to perform monthly skin exams.  UV precautions reviewed with patient.  Skin care regimen reviewed with patient: Eliminate harsh soaps, i.e. Dial, zest, irsih spring; Mild soaps such as Cetaphil or Dove sensitive skin, avoid hot or cold showers, aggressive use of emollients including vanicream, cetaphil or cerave discussed with patient.    Risks of non-melanoma skin cancer discussed with patient   Return to clinic next appt

## 2019-07-22 NOTE — LETTER
7/22/2019         RE: Flash Brown  [address] [address]        Dear Colleague,    Thank you for referring your patient, Flash Brown, to the [facility]. Please see a copy of my visit note below.    Surgical Office Location :   [facility]  [address] [place] [address]      Flash Brown is a 78 year old year old male patient here today for ongoing non-melanoma skin cancer.  Previous sites healing well.  Patient reports the following modifying factors none.  Associated symptoms: none.  Patient has no other skin complaints today.  Remainder of the HPI, Meds, PMH, Allergies, FH, and SH was reviewed in chart.      Past Medical History:   Diagnosis Date     Basal cell carcinoma      CAD (coronary artery disease)      Depression      Diabetes type 2, controlled (H)      Hyperlipidemia      Hypertension      Lymphoma (H)      Malignant melanoma (H)      Melanoma (H)      Squamous cell carcinoma        Past Surgical History:   Procedure Laterality Date     AXILLARY SURGERY      S/P resection and adjuvant radiation     BONE MARROW BIOPSY, BONE SPECIMEN, NEEDLE/TROCAR N/A 7/8/2019    Procedure: BIOPSY, BONE MARROW;  Surgeon: Husam Issa MD;  Location: [facility]     CHOLECYSTECTOMY       HERNIA REPAIR, UMBILICAL       STENT      PTCA with drug-eluting stent of RCA, circumflex, and LAD        Family History   Problem Relation Age of Onset     Asthma Other      Cancer Other         melanoma     Blood Disease Other         bleeding disorder     Lung Cancer Mother         Smoker     Prostate Cancer Father      Melanoma No family hx of        Social History     Socioeconomic History     Marital status: Single     Spouse name: Not on file     Number of children: 0     Years of education: Not on file     Highest education level: Not on file   Occupational History     Occupation: Retired     Comment: [employer]    Social Needs     Financial resource  strain: Not on file     Food insecurity:     Worry: Not on file     Inability: Not on file     Transportation needs:     Medical: Not on file     Non-medical: Not on file   Tobacco Use     Smoking status: Never Smoker     Smokeless tobacco: Never Used   Substance and Sexual Activity     Alcohol use: Yes     Frequency: Monthly or less     Drinks per session: 1 or 2     Binge frequency: Never     Comment: glass of wine couple times per week     Drug use: Never     Sexual activity: Not on file   Lifestyle     Physical activity:     Days per week: Not on file     Minutes per session: Not on file     Stress: Not on file   Relationships     Social connections:     Talks on phone: Not on file     Gets together: Not on file     Attends Restoration service: Not on file     Active member of club or organization: Not on file     Attends meetings of clubs or organizations: Not on file     Relationship status: Not on file     Intimate partner violence:     Fear of current or ex partner: Not on file     Emotionally abused: Not on file     Physically abused: Not on file     Forced sexual activity: Not on file   Other Topics Concern     Not on file   Social History Narrative     Not on file       Outpatient Encounter Medications as of 7/22/2019   Medication Sig Dispense Refill     amLODIPine (NORVASC) 5 MG tablet Take 1 tablet (5 mg) by mouth daily       aspirin (ASA) 81 MG EC tablet Take 1 tablet (81 mg) by mouth daily       atorvastatin (LIPITOR) 20 MG tablet Take 1 tablet (20 mg) by mouth daily 90 tablet 3     busPIRone (BUSPAR) 10 MG tablet Take 1 tablet (10 mg) by mouth 2 times daily       carvedilol (COREG) 12.5 MG tablet Take 1 tablet (12.5 mg) by mouth 2 times daily (with meals) 180 tablet 3     CENTRUM OR TABS 1 TABLET DAILY       cloNIDine (CATAPRES) 0.1 MG tablet Take 1 tablet (0.1 mg) by mouth 2 times daily       clopidogrel (PLAVIX) 75 MG tablet Take 1 tablet (75 mg) by mouth daily       COQ10 100 MG OR CAPS None Entered        diazepam (VALIUM) 10 MG tablet Take 1 tablet (10 mg) by mouth every 6 hours as needed for anxiety 1 tablet 0     diazepam (VALIUM) 10 MG tablet Take 1 tablet (10 mg) by mouth every 6 hours as needed for anxiety 1 tablet 0     diazepam (VALIUM) 10 MG tablet Before procedure 1 tablet 0     diazepam (VALIUM) 5 MG tablet Take 1 tablet (5 mg) by mouth every 6 hours as needed for anxiety 1 tablet 0     glimepiride (AMARYL) 4 MG tablet Take 1 tablet (4 mg) by mouth 2 times daily       hydrALAZINE (APRESOLINE) 50 MG tablet Take 1 tablet (50 mg) by mouth 2 times daily       NIACIN 500 MG OR TABS Take one orally twice daily 180 3     OMEGA-3 1000 MG OR CAPS None Entered       ORDER FOR DME Light Therapy with Full Spectrum Light (10293 lux) Start with 10-15 minute exposure per day, Increase exposure to 30 to 45 minutes per day, Maximum exposure: 90 minutes per day,Look periodically at light during each session  1 0     pioglitazone (ACTOS) 30 MG tablet Take 1 tablet (30 mg) by mouth daily       ramipril (ALTACE) 10 MG capsule Take 1 capsule (10 mg) by mouth daily 90 capsule 3     tamsulosin (FLOMAX) 0.4 MG capsule Take 1 capsule (0.4 mg) by mouth daily       VOSOL-HC 2-1 % OT SOLN 2-3 drops in the affected ear 3-4 times daily for 7-10 days for itching in the ear 1 bottle 0     VYTORIN 10-40 MG OR TABS 1 TABLET EVERY EVENING 3 months 1     NITROGLYCERIN 0.4 MG SL SUBL ONE TABLET UNDER TONGUE, FOR CHEST PAIN, AS DIRECTED (Patient not taking: No sig reported) 1 BOTTLE 3 per year     No facility-administered encounter medications on file as of 7/22/2019.              Review Of Systems  Skin: As above  Eyes: negative  Ears/Nose/Throat: negative  Respiratory: No shortness of breath, dyspnea on exertion, cough, or hemoptysis  Cardiovascular: negative  Gastrointestinal: negative  Genitourinary: negative  Musculoskeletal: negative  Neurologic: negative  Psychiatric: negative  Hematologic/Lymphatic/Immunologic:  negative  Endocrine: negative      O:   NAD, WDWN, Alert & Oriented, Mood & Affect wnl, Vitals stable   Here today alone   /84   Pulse 66   SpO2 97%    General appearance normal   Vitals stable   Alert, oriented and in no acute distress      R zygoma 5mm pink scaly papule    R brow 9mm tender keratotic scaly papule       Eyes: Conjunctivae/lids:Normal     ENT: Lips, buccal mucosa, tongue: normal    MSK:Normal    Cardiovascular: peripheral edema none    Pulm: Breathing Normal    Lymph Nodes: No Head and Neck Lymphadenopathy     Neuro/Psych: Orientation:Normal; Mood/Affect:Normal      MICRO:     R zygoma: There is hyperkeratosis & parakeratosis of the epidermis, with full thickness epidermal involvement by atypical keratinocytes with rare pale vacuolated cells invading dermis.    R brow:There is hyperkeratosis & parakeratosis of the epidermis, with full thickness epidermal involvement by atypical keratinocytes with rare pale vacuolated cells invading dermis.    A/P:  1. R/o squamous cell carcinoma   TANGENTIAL BIOPSY IN HOUSE:  After consent, anesthesia with LEC and prep, tangential excision performed and dx above confirmed with frozen section histology.  No complications and routine wound care.  Patient told result   R brow squamous cell carcinoma   MOHS:   Location    After PGACAC discussed with patient, decision for Mohs surgery was made. Indication for Mohs was Location. Patient confirmed the site with Dr. Roque.  After anesthesia with LEC, the tumor was excised using standard Mohs technique in 2 stages(s).  CLEAR MARGINS OBTAINED and Final defect size was 1.3 x 1.5 cm.       REPAIR SECOND INTENT: We discussed the options for wound management in full with the patient including risks/benefits/possible outcomes. Decision made to allow the wound to heal by second intention. EBL minimal; complications none; wound care routine.  The patient was discharged in good condition and will return in one month or prn  for wound evaluation.    R cheek squamous cell carcinoma   MOHS:   Location    After PGACAC discussed with patient, decision for Mohs surgery was made. Indication for Mohs was Location. Patient confirmed the site with Dr. Roque.  After anesthesia with LEC, the tumor was excised using standard Mohs technique in 1 stages(s).  CLEAR MARGINS OBTAINED and Final defect size was 1 cm.       REPAIR COMPLEX: Because of the tightness of the surrounding skin and Because of the size and full thickness nature of the defect, a complex closure was planned. After LEC anesthesia and prep, Burow's triangles were excised in the relaxed skin tension lines. The wound edges were widely undermined by dissection in the subcutaneous plane until adequate tissue mobility was obtained. Hemostasis was obtained. The wound edges were closed in a layered fashion using Vicryl and Fast Absorbing Plain Gut sutures. Postoperative length was 3.6 cm.   EBL minimal; complications none; wound care routine.  The patient was discharged in good condition and will return in one week for wound evaluation.        BENIGN LESIONS DISCUSSED WITH PATIENT:  I discussed the specifics of tumor, prognosis, and genetics of benign lesions.  I explained that treatment of these lesions would be purely cosmetic and not medically neccessary.  I discussed with patient different removal options including excision, cautery and /or laser.      Nature and genetics of benign skin lesions dicussed with patient.  Signs and Symptoms of skin cancer discussed with patient.  Patient encouraged to perform monthly skin exams.  UV precautions reviewed with patient.  Skin care regimen reviewed with patient: Eliminate harsh soaps, i.e. Dial, zest, irsih spring; Mild soaps such as Cetaphil or Dove sensitive skin, avoid hot or cold showers, aggressive use of emollients including vanicream, cetaphil or cerave discussed with patient.    Risks of non-melanoma skin cancer discussed with patient    Return to clinic next appt      Again, thank you for allowing me to participate in the care of your patient.        Sincerely,        Dinesh Roque MD

## 2019-07-24 NOTE — PROGRESS NOTES
Pt returned to clinic for post surgery 1 week follow up bandage change. Pt has no complaints, denies pain. Bandage removed from the face and right cheek, area cleansed with normal saline. Site is healing and wound edges approximating well. Reapplied new steri strips and paper tape. Also, applied Aquaphor and Band-Aid to the nose on both side and the left and right ear.    Advised to watch for signs/sx of infection; spreading redness, drainage, odor, fever. Call or report promptly to clinic. Pt given written instructions and informed to rtc as needed. Patient verbalized understanding.     Jon MIRELES CMA ([facility])

## 2019-07-25 ENCOUNTER — TELEPHONE (OUTPATIENT)
Dept: DERMATOLOGY | Facility: CLINIC | Age: 79
End: 2019-07-25

## 2019-07-25 NOTE — TELEPHONE ENCOUNTER
Spoke to patient's Sister, adalid Mao, (Consent to communicate on file) and advised to expect swelling and bruising to increase for the first 24 to 48 hours after Mohs surgery. Eyes can even swell shut and bruising can be all the way to the jaw area. Expect swelling and bruising to gradually decrease over 2 weeks time.     She denies any symptoms of infection or spreading redness are present.     Kay Paul RN

## 2019-07-25 NOTE — TELEPHONE ENCOUNTER
Reason for Call:  Other call back    Detailed comments: Daylin calling stating pt had mohs done on Monday around his eye. Has some swelling, is this normal.     Phone Number Patient can be reached at: Home number on file [phone] (home)    Best Time: any     Can we leave a detailed message on this number? YES    Call taken on 7/25/2019 at 10:08 AM by Virginia Ruvalcaba

## 2019-07-30 ENCOUNTER — ALLIED HEALTH/NURSE VISIT (OUTPATIENT)
Dept: DERMATOLOGY | Facility: CLINIC | Age: 79
End: 2019-07-30
Payer: MEDICARE

## 2019-07-30 DIAGNOSIS — Z48.01 ENCOUNTER FOR CHANGE OR REMOVAL OF SURGICAL WOUND DRESSING: Primary | ICD-10-CM

## 2019-07-30 PROCEDURE — 99207 ZZC NO CHARGE NURSE ONLY: CPT

## 2019-07-30 NOTE — PATIENT INSTRUCTIONS
Apply a large amount of Aquaphor or Vaseline to the nose and the left ear to keep it moist. Please do not use Neosporin on these areas.    WOUND CARE INSTRUCTIONS  for  ONE WEEK AFTER SURGERY          1) Leave flat bandage on your skin for one week after today s bandage change.  2) In one week when you remove the bandage, you may resume your regular skin care routine, including washing with mild soap and water, applying moisturizer, make-up and sunscreen.    3) If there are any open or bleeding areas at the incision/graft site you should begin to cover the area with a bandage daily as follows:    1) Clean and dry the area with plain tap water using a Q-tip or sterile gauze pad.  2) Apply Polysporin or Bacitracin ointment to the open area.  3) Cover the wound with a band-aid or a sterile non-stick gauze pad and micropore paper tape.         SIGNS OF INFECTION  - If you notice any of these signs of infection, call your doctor right away: expanding redness around the wound.  - Yellow or greenish-colored pus or cloudy wound drainage.    - Red streaking spreading from the wound.  - Increased swelling, tenderness, or pain around the wound.   - Fever.    Please remember that yellow and clear drainage from a wound can be normal and related to normal wound healing.  Isolated drainage from a wound without a combination of the above features does not indicate infection.       *Once the bandages are removed, the scar will be red and firm (especially in the lip/chin area). This is normal and will fade in time. It might take 6-12 months for this to happen.     *Massaging the area will help the scar soften and fade quicker. Begin to massage the area one month after the bandages have been removed. To massage apply pressure directly and firmly over the scar with the fingertips and move in a circular motion. Massage the area for a few minutes several times a day. Continue to massage the site for several months.    *Approximately  6-8 weeks after surgery it is not uncommon to see the formation of  tender pimple-like  bump along the scar. This is normal. As the scar continues to mature and the stitches underneath the skin begin to dissolve, this might occur. Do not pick or squeeze, this will resolve on it s own. Should one break open producing a small amount of drainage, apply Polysporin or Bacitracin ointment a few times a day until the wound is completely healed.    *Numbness in the surgical area is expected. It might take 12-18 months for the feeling to return to normal. During this time sensations of itchiness, tingling and occasional sharp pains might be noted. These feelings are normal and will subside once the nerves have completely healed.         IN CASE OF EMERGENCY: Dr Roque [phone]     If you were seen in [place] call: [phone]    If you were seen in [place] call: [phone]

## 2019-07-30 NOTE — LETTER
7/30/2019         RE: Flash Brown  [address] [place] [id number]        Dear Colleague,    Thank you for referring your patient, Flash Brown, to the [facility]. Please see a copy of my visit note below.    Pt returned to clinic for post surgery 1 week follow up bandage change. Pt has no complaints, denies pain. Bandage removed from the face and right cheek, area cleansed with normal saline. Site is healing and wound edges approximating well. Reapplied new steri strips and paper tape. Also, applied Aquaphor and Band-Aid to the nose on both side and the left and right ear.    Advised to watch for signs/sx of infection; spreading redness, drainage, odor, fever. Call or report promptly to clinic. Pt given written instructions and informed to rtc as needed. Patient verbalized understanding.     Jon MIRELES CMA ([facility])      Again, thank you for allowing me to participate in the care of your patient.        Sincerely,        Dermatology Nurse

## 2019-08-02 ENCOUNTER — TELEPHONE (OUTPATIENT)
Dept: DERMATOLOGY | Facility: CLINIC | Age: 79
End: 2019-08-02

## 2019-08-02 NOTE — TELEPHONE ENCOUNTER
Reason for Call:  Other     Detailed comments: Pt's sister-in-law, Daylin, calling (consent on file to communicate):  Pt is scheduled for Mohs on 08/05 at 7:30. Requesting a prescription for valium to take before appt. States they were told to come to clinic at 7:15 to take the valium - rx needs to be placed. Please contact Daylin once this has been sent in.     Phone Number Patient can be reached at: Home number on file [phone] (home)    Best Time: Any    Can we leave a detailed message on this number? YES    Call taken on 8/2/2019 at 9:23 AM by Denise Behrendt

## 2019-08-02 NOTE — TELEPHONE ENCOUNTER
Spoke to Sister-in-law, Daylin, and advised we will give Valium in clinic the am of Surgery. She verbalized understanding. Kay Paul RN

## 2019-08-05 ENCOUNTER — OFFICE VISIT (OUTPATIENT)
Dept: DERMATOLOGY | Facility: CLINIC | Age: 79
End: 2019-08-05
Payer: MEDICARE

## 2019-08-05 VITALS — OXYGEN SATURATION: 98 % | HEART RATE: 64 BPM | DIASTOLIC BLOOD PRESSURE: 72 MMHG | SYSTOLIC BLOOD PRESSURE: 160 MMHG

## 2019-08-05 DIAGNOSIS — Z85.828 HISTORY OF SKIN CANCER: Primary | ICD-10-CM

## 2019-08-05 DIAGNOSIS — C44.329 SQUAMOUS CELL CANCER OF SKIN OF JAWLINE: ICD-10-CM

## 2019-08-05 PROCEDURE — 88331 PATH CONSLTJ SURG 1 BLK 1SPC: CPT | Mod: 59 | Performed by: DERMATOLOGY

## 2019-08-05 PROCEDURE — 13132 CMPLX RPR F/C/C/M/N/AX/G/H/F: CPT | Mod: 59 | Performed by: DERMATOLOGY

## 2019-08-05 PROCEDURE — 11102 TANGNTL BX SKIN SINGLE LES: CPT | Mod: 59 | Performed by: DERMATOLOGY

## 2019-08-05 PROCEDURE — 17311 MOHS 1 STAGE H/N/HF/G: CPT | Mod: 79 | Performed by: DERMATOLOGY

## 2019-08-05 RX ORDER — DIAZEPAM 10 MG
10 TABLET ORAL EVERY 6 HOURS PRN
Qty: 1 TABLET | Refills: 0 | Status: SHIPPED | OUTPATIENT
Start: 2019-08-05 | End: 2019-09-30

## 2019-08-05 NOTE — PROGRESS NOTES
Flash Brown is a 78 year old year old male patient here today for evaluation and managment of lesion on right jalwine. Previous siteds helaing well.  Patient reports the following modifying factors none.  Associated symptoms: none.  Patient has no other skin complaints today.  Remainder of the HPI, Meds, PMH, Allergies, FH, and SH was reviewed in chart.      Past Medical History:   Diagnosis Date     Basal cell carcinoma      CAD (coronary artery disease)      Depression      Diabetes type 2, controlled (H)      Hyperlipidemia      Hypertension      Lymphoma (H)      Malignant melanoma (H)      Melanoma (H)      Squamous cell carcinoma        Past Surgical History:   Procedure Laterality Date     AXILLARY SURGERY      S/P resection and adjuvant radiation     BONE MARROW BIOPSY, BONE SPECIMEN, NEEDLE/TROCAR N/A 7/8/2019    Procedure: BIOPSY, BONE MARROW;  Surgeon: Husam Issa MD;  Location: [facility]     CHOLECYSTECTOMY       HERNIA REPAIR, UMBILICAL       STENT      PTCA with drug-eluting stent of RCA, circumflex, and LAD        Family History   Problem Relation Age of Onset     Asthma Other      Cancer Other         melanoma     Blood Disease Other         bleeding disorder     Lung Cancer Mother         Smoker     Prostate Cancer Father      Melanoma No family hx of        Social History     Socioeconomic History     Marital status: Single     Spouse name: Not on file     Number of children: 0     Years of education: Not on file     Highest education level: Not on file   Occupational History     Occupation: Retired     Comment: [employer]    Social Needs     Financial resource strain: Not on file     Food insecurity:     Worry: Not on file     Inability: Not on file     Transportation needs:     Medical: Not on file     Non-medical: Not on file   Tobacco Use     Smoking status: Never Smoker     Smokeless tobacco: Never Used   Substance and Sexual Activity     Alcohol use: Yes     Frequency: Monthly  or less     Drinks per session: 1 or 2     Binge frequency: Never     Comment: glass of wine couple times per week     Drug use: Never     Sexual activity: Not on file   Lifestyle     Physical activity:     Days per week: Not on file     Minutes per session: Not on file     Stress: Not on file   Relationships     Social connections:     Talks on phone: Not on file     Gets together: Not on file     Attends Religion service: Not on file     Active member of club or organization: Not on file     Attends meetings of clubs or organizations: Not on file     Relationship status: Not on file     Intimate partner violence:     Fear of current or ex partner: Not on file     Emotionally abused: Not on file     Physically abused: Not on file     Forced sexual activity: Not on file   Other Topics Concern     Not on file   Social History Narrative     Not on file       Outpatient Encounter Medications as of 8/5/2019   Medication Sig Dispense Refill     amLODIPine (NORVASC) 5 MG tablet Take 1 tablet (5 mg) by mouth daily       aspirin (ASA) 81 MG EC tablet Take 1 tablet (81 mg) by mouth daily       atorvastatin (LIPITOR) 20 MG tablet Take 1 tablet (20 mg) by mouth daily 90 tablet 3     busPIRone (BUSPAR) 10 MG tablet Take 1 tablet (10 mg) by mouth 2 times daily       carvedilol (COREG) 12.5 MG tablet Take 1 tablet (12.5 mg) by mouth 2 times daily (with meals) 180 tablet 3     CENTRUM OR TABS 1 TABLET DAILY       cloNIDine (CATAPRES) 0.1 MG tablet Take 1 tablet (0.1 mg) by mouth 2 times daily       clopidogrel (PLAVIX) 75 MG tablet Take 1 tablet (75 mg) by mouth daily       COQ10 100 MG OR CAPS None Entered       diazepam (VALIUM) 10 MG tablet Take 1 tablet (10 mg) by mouth every 6 hours as needed for anxiety 1 tablet 0     diazepam (VALIUM) 10 MG tablet Take 1 tablet (10 mg) by mouth every 6 hours as needed for anxiety 1 tablet 0     diazepam (VALIUM) 10 MG tablet Take 1 tablet (10 mg) by mouth every 6 hours as needed for  anxiety 1 tablet 0     diazepam (VALIUM) 10 MG tablet Before procedure 1 tablet 0     diazepam (VALIUM) 5 MG tablet Take 1 tablet (5 mg) by mouth every 6 hours as needed for anxiety 1 tablet 0     glimepiride (AMARYL) 4 MG tablet Take 1 tablet (4 mg) by mouth 2 times daily       hydrALAZINE (APRESOLINE) 50 MG tablet Take 1 tablet (50 mg) by mouth 2 times daily       NIACIN 500 MG OR TABS Take one orally twice daily 180 3     OMEGA-3 1000 MG OR CAPS None Entered       ORDER FOR DME Light Therapy with Full Spectrum Light (48504 lux) Start with 10-15 minute exposure per day, Increase exposure to 30 to 45 minutes per day, Maximum exposure: 90 minutes per day,Look periodically at light during each session  1 0     pioglitazone (ACTOS) 30 MG tablet Take 1 tablet (30 mg) by mouth daily       ramipril (ALTACE) 10 MG capsule Take 1 capsule (10 mg) by mouth daily 90 capsule 3     tamsulosin (FLOMAX) 0.4 MG capsule Take 1 capsule (0.4 mg) by mouth daily       VOSOL-HC 2-1 % OT SOLN 2-3 drops in the affected ear 3-4 times daily for 7-10 days for itching in the ear 1 bottle 0     VYTORIN 10-40 MG OR TABS 1 TABLET EVERY EVENING 3 months 1     NITROGLYCERIN 0.4 MG SL SUBL ONE TABLET UNDER TONGUE, FOR CHEST PAIN, AS DIRECTED (Patient not taking: No sig reported) 1 BOTTLE 3 per year     No facility-administered encounter medications on file as of 8/5/2019.              Review Of Systems  Skin: As above  Eyes: negative  Ears/Nose/Throat: negative  Respiratory: No shortness of breath, dyspnea on exertion, cough, or hemoptysis  Cardiovascular: negative  Gastrointestinal: negative  Genitourinary: negative  Musculoskeletal: negative  Neurologic: negative  Psychiatric: negative  Hematologic/Lymphatic/Immunologic: negative  Endocrine: negative      O:   NAD, WDWN, Alert & Oriented, Mood & Affect wnl, Vitals stable   Here today alone   BP (!) 160/72   Pulse 64   SpO2 98%    General appearance normal   Vitals stable   Alert, oriented and  in no acute distress      Following lymph nodes palpated: Occipital, Cervical, Supraclavicular no lad   R jawlien 1cm red plaque      Eyes: Conjunctivae/lids:Normal     ENT: Lips, buccal mucosa, tongue: normal    MSK:Normal    Cardiovascular: peripheral edema none    Pulm: Breathing Normal    Lymph Nodes: No Head and Neck Lymphadenopathy     Neuro/Psych: Orientation:Normal; Mood/Affect:Normal      MICRO:   R jawline:There is hyperkeratosis & parakeratosis of the epidermis, with full thickness epidermal involvement by atypical keratinocytes with rare pale vacuolated cells invading dermis.    A/P:  1. R jawline r/o squamous cell carcinoma   TANGENTIAL BIOPSY IN HOUSE:  After consent, anesthesia with LEC and prep, tangential excision performed and dx above confirmed with frozen section histology.  No complications and routine wound care.  Patient told result squamous cell carcinoma  MOHS:   Location    After PGACAC discussed with patient, decision for Mohs surgery was made. Indication for Mohs was Location. Patient confirmed the site with Dr. Roque.  After anesthesia with LEC, the tumor was excised using standard Mohs technique in 1 stages(s).  CLEAR MARGINS OBTAINED and Final defect size was 1.5 x1.2cm.       REPAIR COMPLEX: Because of the tightness of the surrounding skin and Because of the size and full thickness nature of the defect, a complex closure was planned. After LEC anesthesia and prep, Burow's triangles were excised in the relaxed skin tension lines. The wound edges were widely undermined by dissection in the subcutaneous plane until adequate tissue mobility was obtained. Hemostasis was obtained. The wound edges were closed in a layered fashion using Vicryl and Fast Absorbing Plain Gut sutures. Postoperative length was 4.3 cm.   EBL minimal; complications none; wound care routine.  The patient was discharged in good condition and will return in one week for wound evaluation.  .      BENIGN LESIONS  DISCUSSED WITH PATIENT:  I discussed the specifics of tumor, prognosis, and genetics of benign lesions.  I explained that treatment of these lesions would be purely cosmetic and not medically neccessary.  I discussed with patient different removal options including excision, cautery and /or laser.      Nature and genetics of benign skin lesions dicussed with patient.  Signs and Symptoms of skin cancer discussed with patient.  Patient encouraged to perform monthly skin exams.  UV precautions reviewed with patient.  Skin care regimen reviewed with patient: Eliminate harsh soaps, i.e. Dial, zest, irsih spring; Mild soaps such as Cetaphil or Dove sensitive skin, avoid hot or cold showers, aggressive use of emollients including vanicream, cetaphil or cerave discussed with patient.    Risks of non-melanoma skin cancer discussed with patient   Return to clinic one week

## 2019-08-05 NOTE — PATIENT INSTRUCTIONS
Sutured Wound Care     [facility]: [phone]    [facility]: [phone]    Right jaw    ? No strenuous activity for 48 hours. Resume moderate activity in 48 hours. No heavy exercising until you are seen for follow up in one week.     ? Take Tylenol as needed for discomfort.                         ? Do not drink alcoholic beverages for 48 hours.     ? Keep the pressure bandage in place for 24 hours. If the bandage becomes blood tinged or loose, reinforce it with gauze and tape.        (Refer to the reverse side of this page for management of bleeding).    ? Remove pressure bandage in 24 hours (WHITE)    ? Leave the flat bandage in place until your follow up appointment. (BROWN)    ? Keep the bandage dry. Wash around it carefully.    ? If the tape becomes soiled or starts to come off, reinforce it with additional paper tape.    ? Do not smoke for 3 weeks; smoking is detrimental to wound healing.    ? It is normal to have swelling and bruising around the surgical site. The bruising will fade in approximately 10-14 days. Elevate the area to reduce swelling.    ? Numbness, itchiness and sensitivity to temperature changes can occur after surgery and may take up to 18 months to normalize.  POSSIBLE COMPLICATIONS    BLEEDIN. Leave the bandage in place.  2. Use tightly rolled up gauze or a cloth to apply direct pressure over the bandage for 20   minutes.  3. Reapply pressure for an additional 20 minutes if necessary  4. Call the office or go to the nearest emergency room if pressure fails to stop the bleeding.  5. Use additional gauze and tape to maintain pressure once the bleeding has stopped.  PAIN:    1. Post operative pain should slowly get better, never worse.  2. A severe increase in pain may indicate a problem. Call the office if this occurs.    In case of emergency phone:Dr Roque [phone]      Keep ointment on the right side of nose.

## 2019-08-05 NOTE — LETTER
8/5/2019         RE: Flash Brown  [address] [address]        Dear Colleague,    Thank you for referring your patient, Flash Brown, to the [facility]. Please see a copy of my visit note below.    The following medication was given @ 0744.    MEDICATION: Diazepam   ROUTE: PO  SITE: mouth  DOSE: 10 mg (5mg tablets x 2)   LOT #: [id number]  : [employer]  EXPIRATION DATE: 02/2021  NDC#: [id number]   Was there drug waste? No  Multi-dose vial: No    Lucila Carey, TIFFANY  August 5, 2019                              Flash Brown is a 78 year old year old male patient here today for evaluation and managment of lesion on right jalwine. Previous siteds helaing well.  Patient reports the following modifying factors none.  Associated symptoms: none.  Patient has no other skin complaints today.  Remainder of the HPI, Meds, PMH, Allergies, FH, and SH was reviewed in chart.      Past Medical History:   Diagnosis Date     Basal cell carcinoma      CAD (coronary artery disease)      Depression      Diabetes type 2, controlled (H)      Hyperlipidemia      Hypertension      Lymphoma (H)      Malignant melanoma (H)      Melanoma (H)      Squamous cell carcinoma        Past Surgical History:   Procedure Laterality Date     AXILLARY SURGERY      S/P resection and adjuvant radiation     BONE MARROW BIOPSY, BONE SPECIMEN, NEEDLE/TROCAR N/A 7/8/2019    Procedure: BIOPSY, BONE MARROW;  Surgeon: Husam Issa MD;  Location: [facility]     CHOLECYSTECTOMY       HERNIA REPAIR, UMBILICAL       STENT      PTCA with drug-eluting stent of RCA, circumflex, and LAD        Family History   Problem Relation Age of Onset     Asthma Other      Cancer Other         melanoma     Blood Disease Other         bleeding disorder     Lung Cancer Mother         Smoker     Prostate Cancer Father      Melanoma No family hx of        Social History     Socioeconomic History     Marital status: Single      Spouse name: Not on file     Number of children: 0     Years of education: Not on file     Highest education level: Not on file   Occupational History     Occupation: Retired     Comment: [employer]    Social Needs     Financial resource strain: Not on file     Food insecurity:     Worry: Not on file     Inability: Not on file     Transportation needs:     Medical: Not on file     Non-medical: Not on file   Tobacco Use     Smoking status: Never Smoker     Smokeless tobacco: Never Used   Substance and Sexual Activity     Alcohol use: Yes     Frequency: Monthly or less     Drinks per session: 1 or 2     Binge frequency: Never     Comment: glass of wine couple times per week     Drug use: Never     Sexual activity: Not on file   Lifestyle     Physical activity:     Days per week: Not on file     Minutes per session: Not on file     Stress: Not on file   Relationships     Social connections:     Talks on phone: Not on file     Gets together: Not on file     Attends Sabianist service: Not on file     Active member of club or organization: Not on file     Attends meetings of clubs or organizations: Not on file     Relationship status: Not on file     Intimate partner violence:     Fear of current or ex partner: Not on file     Emotionally abused: Not on file     Physically abused: Not on file     Forced sexual activity: Not on file   Other Topics Concern     Not on file   Social History Narrative     Not on file       Outpatient Encounter Medications as of 8/5/2019   Medication Sig Dispense Refill     amLODIPine (NORVASC) 5 MG tablet Take 1 tablet (5 mg) by mouth daily       aspirin (ASA) 81 MG EC tablet Take 1 tablet (81 mg) by mouth daily       atorvastatin (LIPITOR) 20 MG tablet Take 1 tablet (20 mg) by mouth daily 90 tablet 3     busPIRone (BUSPAR) 10 MG tablet Take 1 tablet (10 mg) by mouth 2 times daily       carvedilol (COREG) 12.5 MG tablet Take 1 tablet (12.5 mg) by mouth 2 times daily (with meals) 180  tablet 3     CENTRUM OR TABS 1 TABLET DAILY       cloNIDine (CATAPRES) 0.1 MG tablet Take 1 tablet (0.1 mg) by mouth 2 times daily       clopidogrel (PLAVIX) 75 MG tablet Take 1 tablet (75 mg) by mouth daily       COQ10 100 MG OR CAPS None Entered       diazepam (VALIUM) 10 MG tablet Take 1 tablet (10 mg) by mouth every 6 hours as needed for anxiety 1 tablet 0     diazepam (VALIUM) 10 MG tablet Take 1 tablet (10 mg) by mouth every 6 hours as needed for anxiety 1 tablet 0     diazepam (VALIUM) 10 MG tablet Take 1 tablet (10 mg) by mouth every 6 hours as needed for anxiety 1 tablet 0     diazepam (VALIUM) 10 MG tablet Before procedure 1 tablet 0     diazepam (VALIUM) 5 MG tablet Take 1 tablet (5 mg) by mouth every 6 hours as needed for anxiety 1 tablet 0     glimepiride (AMARYL) 4 MG tablet Take 1 tablet (4 mg) by mouth 2 times daily       hydrALAZINE (APRESOLINE) 50 MG tablet Take 1 tablet (50 mg) by mouth 2 times daily       NIACIN 500 MG OR TABS Take one orally twice daily 180 3     OMEGA-3 1000 MG OR CAPS None Entered       ORDER FOR DME Light Therapy with Full Spectrum Light (07684 lux) Start with 10-15 minute exposure per day, Increase exposure to 30 to 45 minutes per day, Maximum exposure: 90 minutes per day,Look periodically at light during each session  1 0     pioglitazone (ACTOS) 30 MG tablet Take 1 tablet (30 mg) by mouth daily       ramipril (ALTACE) 10 MG capsule Take 1 capsule (10 mg) by mouth daily 90 capsule 3     tamsulosin (FLOMAX) 0.4 MG capsule Take 1 capsule (0.4 mg) by mouth daily       VOSOL-HC 2-1 % OT SOLN 2-3 drops in the affected ear 3-4 times daily for 7-10 days for itching in the ear 1 bottle 0     VYTORIN 10-40 MG OR TABS 1 TABLET EVERY EVENING 3 months 1     NITROGLYCERIN 0.4 MG SL SUBL ONE TABLET UNDER TONGUE, FOR CHEST PAIN, AS DIRECTED (Patient not taking: No sig reported) 1 BOTTLE 3 per year     No facility-administered encounter medications on file as of 8/5/2019.               Review Of Systems  Skin: As above  Eyes: negative  Ears/Nose/Throat: negative  Respiratory: No shortness of breath, dyspnea on exertion, cough, or hemoptysis  Cardiovascular: negative  Gastrointestinal: negative  Genitourinary: negative  Musculoskeletal: negative  Neurologic: negative  Psychiatric: negative  Hematologic/Lymphatic/Immunologic: negative  Endocrine: negative      O:   NAD, WDWN, Alert & Oriented, Mood & Affect wnl, Vitals stable   Here today alone   BP (!) 160/72   Pulse 64   SpO2 98%    General appearance normal   Vitals stable   Alert, oriented and in no acute distress      Following lymph nodes palpated: Occipital, Cervical, Supraclavicular no lad   R jawlien 1cm red plaque      Eyes: Conjunctivae/lids:Normal     ENT: Lips, buccal mucosa, tongue: normal    MSK:Normal    Cardiovascular: peripheral edema none    Pulm: Breathing Normal    Lymph Nodes: No Head and Neck Lymphadenopathy     Neuro/Psych: Orientation:Normal; Mood/Affect:Normal      MICRO:   R jawline:There is hyperkeratosis & parakeratosis of the epidermis, with full thickness epidermal involvement by atypical keratinocytes with rare pale vacuolated cells invading dermis.    A/P:  1. R jawline r/o squamous cell carcinoma   TANGENTIAL BIOPSY IN HOUSE:  After consent, anesthesia with LEC and prep, tangential excision performed and dx above confirmed with frozen section histology.  No complications and routine wound care.  Patient told result squamous cell carcinoma  MOHS:   Location    After PGACAC discussed with patient, decision for Mohs surgery was made. Indication for Mohs was Location. Patient confirmed the site with Dr. Roque.  After anesthesia with LEC, the tumor was excised using standard Mohs technique in 1 stages(s).  CLEAR MARGINS OBTAINED and Final defect size was 1.5 x1.2cm.       REPAIR COMPLEX: Because of the tightness of the surrounding skin and Because of the size and full thickness nature of the defect, a  complex closure was planned. After LEC anesthesia and prep, Burow's triangles were excised in the relaxed skin tension lines. The wound edges were widely undermined by dissection in the subcutaneous plane until adequate tissue mobility was obtained. Hemostasis was obtained. The wound edges were closed in a layered fashion using Vicryl and Fast Absorbing Plain Gut sutures. Postoperative length was 4.3 cm.   EBL minimal; complications none; wound care routine.  The patient was discharged in good condition and will return in one week for wound evaluation.  .      BENIGN LESIONS DISCUSSED WITH PATIENT:  I discussed the specifics of tumor, prognosis, and genetics of benign lesions.  I explained that treatment of these lesions would be purely cosmetic and not medically neccessary.  I discussed with patient different removal options including excision, cautery and /or laser.      Nature and genetics of benign skin lesions dicussed with patient.  Signs and Symptoms of skin cancer discussed with patient.  Patient encouraged to perform monthly skin exams.  UV precautions reviewed with patient.  Skin care regimen reviewed with patient: Eliminate harsh soaps, i.e. Dial, zest, irsih spring; Mild soaps such as Cetaphil or Dove sensitive skin, avoid hot or cold showers, aggressive use of emollients including vanicream, cetaphil or cerave discussed with patient.    Risks of non-melanoma skin cancer discussed with patient   Return to clinic one week       Again, thank you for allowing me to participate in the care of your patient.        Sincerely,        Dinesh Roque MD

## 2019-08-05 NOTE — PROGRESS NOTES
The following medication was given @ 0744.    MEDICATION: Diazepam   ROUTE: PO  SITE: mouth  DOSE: 10 mg (5mg tablets x 2)   LOT #: [id number]  : [employer]  EXPIRATION DATE: 02/2021  NDC#: [id number]   Was there drug waste? No  Multi-dose vial: No    Lucila Carey LPN  August 5, 2019

## 2019-08-12 ENCOUNTER — OFFICE VISIT (OUTPATIENT)
Dept: DERMATOLOGY | Facility: CLINIC | Age: 79
End: 2019-08-12
Payer: MEDICARE

## 2019-08-12 ENCOUNTER — ALLIED HEALTH/NURSE VISIT (OUTPATIENT)
Dept: DERMATOLOGY | Facility: CLINIC | Age: 79
End: 2019-08-12
Payer: MEDICARE

## 2019-08-12 VITALS — HEART RATE: 66 BPM | SYSTOLIC BLOOD PRESSURE: 139 MMHG | DIASTOLIC BLOOD PRESSURE: 62 MMHG | OXYGEN SATURATION: 98 %

## 2019-08-12 DIAGNOSIS — C44.311 BASAL CELL CARCINOMA (BCC) OF DORSUM OF NOSE: Primary | ICD-10-CM

## 2019-08-12 DIAGNOSIS — Z48.01 ENCOUNTER FOR CHANGE OR REMOVAL OF SURGICAL WOUND DRESSING: Primary | ICD-10-CM

## 2019-08-12 PROCEDURE — 13152 CMPLX RPR E/N/E/L 2.6-7.5 CM: CPT | Mod: 59 | Performed by: DERMATOLOGY

## 2019-08-12 PROCEDURE — 99207 ZZC NO CHARGE NURSE ONLY: CPT

## 2019-08-12 PROCEDURE — 14041 TIS TRNFR F/C/C/M/N/A/G/H/F: CPT | Mod: 79 | Performed by: DERMATOLOGY

## 2019-08-12 PROCEDURE — 17311 MOHS 1 STAGE H/N/HF/G: CPT | Mod: 79 | Performed by: DERMATOLOGY

## 2019-08-12 PROCEDURE — 15731 FOREHEAD FLAP W/VASC PEDICLE: CPT | Mod: 79 | Performed by: DERMATOLOGY

## 2019-08-12 PROCEDURE — 17312 MOHS ADDL STAGE: CPT | Performed by: DERMATOLOGY

## 2019-08-12 PROCEDURE — 15002 WOUND PREP TRK/ARM/LEG: CPT | Mod: 79 | Performed by: DERMATOLOGY

## 2019-08-12 PROCEDURE — 21235 EAR CARTILAGE GRAFT: CPT | Mod: 79 | Performed by: DERMATOLOGY

## 2019-08-12 RX ORDER — OXYCODONE AND ACETAMINOPHEN 5; 325 MG/1; MG/1
1 TABLET ORAL EVERY 6 HOURS PRN
Qty: 15 TABLET | Refills: 0 | Status: SHIPPED | OUTPATIENT
Start: 2019-08-12 | End: 2019-09-10

## 2019-08-12 RX ORDER — DIAZEPAM 10 MG
10 TABLET ORAL EVERY 6 HOURS PRN
Qty: 1 TABLET | Refills: 0 | Status: SHIPPED | OUTPATIENT
Start: 2019-08-12 | End: 2019-09-30

## 2019-08-12 NOTE — PATIENT INSTRUCTIONS
WOUND CARE INSTRUCTIONS  for  ONE WEEK AFTER SURGERY      RIGHT JAWLINE  1) Leave flat bandage on your skin for one week after today s bandage change.  2) In one week when you remove the bandage, you may resume your regular skin care routine, including washing with mild soap and water, applying moisturizer, make-up and sunscreen.    3) If there are any open or bleeding areas at the incision/graft site you should begin to cover the area with a bandage daily as follows:    1) Clean and dry the area with plain tap water using a Q-tip or sterile gauze pad.  2) Apply Polysporin or Bacitracin ointment to the open area.  3) Cover the wound with a band-aid or a sterile non-stick gauze pad and micropore paper tape.     SIGNS OF INFECTION  - If you notice any of these signs of infection, call your doctor right away: expanding redness around the wound.  - Yellow or greenish-colored pus or cloudy wound drainage.    - Red streaking spreading from the wound.  - Increased swelling, tenderness, or pain around the wound.   - Fever.    Please remember that yellow and clear drainage from a wound can be normal and related to normal wound healing.  Isolated drainage from a wound without a combination of the above features does not indicate infection.     *Once the bandages are removed, the scar will be red and firm (especially in the lip/chin area). This is normal and will fade in time. It might take 6-12 months for this to happen.     *Massaging the area will help the scar soften and fade quicker. Begin to massage the area one month after the bandages have been removed. To massage apply pressure directly and firmly over the scar with the fingertips and move in a circular motion. Massage the area for a few minutes several times a day. Continue to massage the site for several months.    *Approximately 6-8 weeks after surgery it is not uncommon to see the formation of  tender pimple-like  bump along the scar. This is normal. As the scar  continues to mature and the stitches underneath the skin begin to dissolve, this might occur. Do not pick or squeeze, this will resolve on it s own. Should one break open producing a small amount of drainage, apply Polysporin or Bacitracin ointment a few times a day until the wound is completely healed.    *Numbness in the surgical area is expected. It might take 12-18 months for the feeling to return to normal. During this time sensations of itchiness, tingling and occasional sharp pains might be noted. These feelings are normal and will subside once the nerves have completely healed.     IN CASE OF EMERGENCY: Dr Roque [phone]     If you were seen in [place] call: [phone]    If you were seen in [place] call: [phone]    Open Wound Care and Staples    for Upper Forehead, left ear, flap of skin connected to forehead and nose  ? No strenuous activity for 48 hours    ? Take Tylenol as needed for discomfort.                                                .         ? Do not drink alcoholic beverages for 48 hours.    ? Keep the pressure bandage in place for 24 hours. If the bandage becomes blood tinged or loose, reinforce it with gauze and tape.        (Refer to the reverse side of this page for management of bleeding).    ? Remove bandage in 24 hours and begin wound care as follows:     1. Clean area with tap water using a Q tip or gauze pad, (shower / bathe normally)  2. Dry wound with Q tip or gauze pad  3. Apply Aquaphor, Vaseline, Polysporin or Bacitracin Ointment with a Q tip    Do NOT use Neosporin Ointment *  4. Cover the wound with a band-aid or nonstick gauze pad and paper tape.  5. Repeat wound care once a day until wound is completely healed.    It is an old wives tale that a wound heals better when it is exposed to air and allowed to dry out. The wound will heal faster with a better cosmetic result if it is kept moist with ointment and covered with a bandage.  Do not let the wound dry  out.  Supplies Needed:                Qtips or gauze pads                Polysporin or Bacitracin Ointment                Bandaids or nonstick gauze pads and paper tape    Wound care kits and brown paper tape are available for purchase at   the pharmacy.    BLEEDIN. Use tightly rolled up gauze or cloth to apply direct pressure over the bandage for 20   minutes.  2. Reapply pressure for an additional 20 minutes if necessary  3. Call the office or go to the nearest emergency room if pressure fails to stop the bleeding.  4. Use additional gauze and tape to maintain pressure once the bleeding has stopped.  5. Begin wound care 24 hours after surgery as directed.             WOUND HEALING    1. One week after surgery a pink / red halo will form around the outside of the wound.   This is new skin.  2. The center of the wound will appear yellowish white and produce some drainage.  3. The pink halo will slowly migrate in toward the center of the wound until the wound is covered with new shiny pink skin.  4. There will be no more drainage when the wound is completely healed.  5. It will take six months to one year for the redness to fade.  6. The scar may be itchy, tight and sensitive to extreme temperatures for a year after the surgery.  7. Massaging the area several times a day for several minutes after the wound is completely healed will help the scar soften and normalize faster. Begin massage only after healing is complete.    In case of emergency call: Dr Roque: [phone]     [facility]: [phone]    [facility]: [phone]  Sutured Wound Care     [facility]: [phone]    [facility]: [phone]  Left Nasal Tip and Lower forehead  ? No strenuous activity for 48 hours. Resume moderate activity in 48 hours. No heavy exercising until you are seen for follow up in one week.     ? Take Tylenol as needed for discomfort.                         ? Do not drink alcoholic  beverages for 48 hours.     ? Keep the pressure bandage in place for 24 hours. If the bandage becomes blood tinged or loose, reinforce it with gauze and tape.        (Refer to the reverse side of this page for management of bleeding).    ? Remove pressure bandage in 24 hours     ? Leave the flat bandage in place until your follow up appointment.    ? Keep the bandage dry. Wash around it carefully.    ? If the tape becomes soiled or starts to come off, reinforce it with additional paper tape.    ? Do not smoke for 3 weeks; smoking is detrimental to wound healing.    ? It is normal to have swelling and bruising around the surgical site. The bruising will fade in approximately 10-14 days. Elevate the area to reduce swelling.    ? Numbness, itchiness and sensitivity to temperature changes can occur after surgery and may take up to 18 months to normalize.      POSSIBLE COMPLICATIONS    BLEEDIN. Leave the bandage in place.  2. Use tightly rolled up gauze or a cloth to apply direct pressure over the bandage for 20   minutes.  3. Reapply pressure for an additional 20 minutes if necessary  4. Call the office or go to the nearest emergency room if pressure fails to stop the bleeding.  5. Use additional gauze and tape to maintain pressure once the bleeding has stopped.    PAIN:    1. Post operative pain should slowly get better, never worse.  2. A severe increase in pain may indicate a problem. Call the office if this occurs.    In case of emergency phone:Dr Roque [phone]

## 2019-08-12 NOTE — NURSING NOTE
The following medication was given:     MEDICATION: Diazepam  ROUTE: PO  TIME: 0745  DOSE: 5mg  AMOUNT: 2 tabs  LOT #: [id number]  :  Britta  EXPIRATION DATE:  2/21    Enedelia Balbuena LPN

## 2019-08-12 NOTE — NURSING NOTE
Pt returned to clinic for post surgery 1 week follow up bandage change. Pt has no complaints, denies pain. Bandage removed from right jawline, area cleansed with normal saline. Site is healing and wound edges approximating well. Reapplied new steri strips and paper tape.    Advised to watch for signs/sx of infection; spreading redness, drainage, odor, fever. Call or report promptly to clinic. Pt given written instructions and informed to rtc as needed. Patient verbalized understanding.

## 2019-08-12 NOTE — LETTER
8/12/2019         RE: Flash Brown  [address] [address]        Dear Colleague,    Thank you for referring your patient, Flash Brown, to the [facility]. Please see a copy of my visit note below.    Surgical Office Location :   [facility]  [address] [place] [address]      Flash Brown is a 78 year old year old male patient here today for evaluation and managment of basal cell carcinoma on left ala and cheek.  Patient reports the following modifying factors none.  Associated symptoms: none.  Patient has no other skin complaints today.  Remainder of the HPI, Meds, PMH, Allergies, FH, and SH was reviewed in chart.      Past Medical History:   Diagnosis Date     Basal cell carcinoma      CAD (coronary artery disease)      Depression      Diabetes type 2, controlled (H)      Hyperlipidemia      Hypertension      Lymphoma (H)      Malignant melanoma (H)      Melanoma (H)      Squamous cell carcinoma        Past Surgical History:   Procedure Laterality Date     AXILLARY SURGERY      S/P resection and adjuvant radiation     BONE MARROW BIOPSY, BONE SPECIMEN, NEEDLE/TROCAR N/A 7/8/2019    Procedure: BIOPSY, BONE MARROW;  Surgeon: Husam Issa MD;  Location: [facility]     CHOLECYSTECTOMY       HERNIA REPAIR, UMBILICAL       STENT      PTCA with drug-eluting stent of RCA, circumflex, and LAD        Family History   Problem Relation Age of Onset     Asthma Other      Cancer Other         melanoma     Blood Disease Other         bleeding disorder     Lung Cancer Mother         Smoker     Prostate Cancer Father      Melanoma No family hx of        Social History     Socioeconomic History     Marital status: Single     Spouse name: Not on file     Number of children: 0     Years of education: Not on file     Highest education level: Not on file   Occupational History     Occupation: Retired     Comment: [employer]    Social Needs     Financial  resource strain: Not on file     Food insecurity:     Worry: Not on file     Inability: Not on file     Transportation needs:     Medical: Not on file     Non-medical: Not on file   Tobacco Use     Smoking status: Never Smoker     Smokeless tobacco: Never Used   Substance and Sexual Activity     Alcohol use: Yes     Frequency: Monthly or less     Drinks per session: 1 or 2     Binge frequency: Never     Comment: glass of wine couple times per week     Drug use: Never     Sexual activity: Not on file   Lifestyle     Physical activity:     Days per week: Not on file     Minutes per session: Not on file     Stress: Not on file   Relationships     Social connections:     Talks on phone: Not on file     Gets together: Not on file     Attends Holiness service: Not on file     Active member of club or organization: Not on file     Attends meetings of clubs or organizations: Not on file     Relationship status: Not on file     Intimate partner violence:     Fear of current or ex partner: Not on file     Emotionally abused: Not on file     Physically abused: Not on file     Forced sexual activity: Not on file   Other Topics Concern     Not on file   Social History Narrative     Not on file       Outpatient Encounter Medications as of 8/12/2019   Medication Sig Dispense Refill     amLODIPine (NORVASC) 5 MG tablet Take 1 tablet (5 mg) by mouth daily       aspirin (ASA) 81 MG EC tablet Take 1 tablet (81 mg) by mouth daily       atorvastatin (LIPITOR) 20 MG tablet Take 1 tablet (20 mg) by mouth daily 90 tablet 3     busPIRone (BUSPAR) 10 MG tablet Take 1 tablet (10 mg) by mouth 2 times daily       carvedilol (COREG) 12.5 MG tablet Take 1 tablet (12.5 mg) by mouth 2 times daily (with meals) 180 tablet 3     CENTRUM OR TABS 1 TABLET DAILY       cloNIDine (CATAPRES) 0.1 MG tablet Take 1 tablet (0.1 mg) by mouth 2 times daily       clopidogrel (PLAVIX) 75 MG tablet Take 1 tablet (75 mg) by mouth daily       COQ10 100 MG OR CAPS  None Entered       diazepam (VALIUM) 10 MG tablet Take 1 tablet (10 mg) by mouth every 6 hours as needed for anxiety 1 tablet 0     diazepam (VALIUM) 10 MG tablet Take 1 tablet (10 mg) by mouth every 6 hours as needed for anxiety 1 tablet 0     diazepam (VALIUM) 10 MG tablet Take 1 tablet (10 mg) by mouth every 6 hours as needed for anxiety 1 tablet 0     diazepam (VALIUM) 10 MG tablet Take 1 tablet (10 mg) by mouth every 6 hours as needed for anxiety 1 tablet 0     diazepam (VALIUM) 10 MG tablet Before procedure 1 tablet 0     diazepam (VALIUM) 5 MG tablet Take 1 tablet (5 mg) by mouth every 6 hours as needed for anxiety 1 tablet 0     glimepiride (AMARYL) 4 MG tablet Take 1 tablet (4 mg) by mouth 2 times daily       hydrALAZINE (APRESOLINE) 50 MG tablet Take 1 tablet (50 mg) by mouth 2 times daily       NIACIN 500 MG OR TABS Take one orally twice daily 180 3     NITROGLYCERIN 0.4 MG SL SUBL ONE TABLET UNDER TONGUE, FOR CHEST PAIN, AS DIRECTED 1 BOTTLE 3 per year     OMEGA-3 1000 MG OR CAPS None Entered       ORDER FOR DME Light Therapy with Full Spectrum Light (34046 lux) Start with 10-15 minute exposure per day, Increase exposure to 30 to 45 minutes per day, Maximum exposure: 90 minutes per day,Look periodically at light during each session  1 0     oxyCODONE-acetaminophen (PERCOCET) 5-325 MG tablet Take 1 tablet by mouth every 6 hours as needed for pain 15 tablet 0     pioglitazone (ACTOS) 30 MG tablet Take 1 tablet (30 mg) by mouth daily       ramipril (ALTACE) 10 MG capsule Take 1 capsule (10 mg) by mouth daily 90 capsule 3     tamsulosin (FLOMAX) 0.4 MG capsule Take 1 capsule (0.4 mg) by mouth daily       VOSOL-HC 2-1 % OT SOLN 2-3 drops in the affected ear 3-4 times daily for 7-10 days for itching in the ear 1 bottle 0     VYTORIN 10-40 MG OR TABS 1 TABLET EVERY EVENING 3 months 1     No facility-administered encounter medications on file as of 8/12/2019.              Review Of Systems  Skin: As above  Eyes:  negative  Ears/Nose/Throat: negative  Respiratory: No shortness of breath, dyspnea on exertion, cough, or hemoptysis  Cardiovascular: negative  Gastrointestinal: negative  Genitourinary: negative  Musculoskeletal: negative  Neurologic: negative  Psychiatric: negative  Hematologic/Lymphatic/Immunologic: negative  Endocrine: negative      O:   NAD, WDWN, Alert & Oriented, Mood & Affect wnl, Vitals stable   Here today alone   /62   Pulse 66   SpO2 98%    General appearance normal   Vitals stable   Alert, oriented and in no acute distress     L ala and medial cheek 2.5x2cm pink pearly papule       Eyes: Conjunctivae/lids:Normal     ENT: Lips, buccal mucosa, tongue: normal    MSK:Normal    Cardiovascular: peripheral edema none    Pulm: Breathing Normal    Neuro/Psych: Orientation:Normal; Mood/Affect:Normal      A/P:  1. L ala and cheek basal cell carcinoma   MOHS:   Location    After PGACAC discussed with patient, decision for Mohs surgery was made. Indication for Mohs was Location. Patient confirmed the site with Dr. Roque.  After anesthesia with LEC, the tumor was excised using standard Mohs technique in 3 stages(s).  CLEAR MARGINS OBTAINED and Final defect size was 3.9 x 3.3 cm.       This left thru and thru defect on lfet ala, and defect on left NSW and L cheek    L cheek and L NSW   REPAIR COMPLEX: Because of the tightness of the surrounding skin and Because of the size and full thickness nature of the defect, a complex closure was planned. After LEC anesthesia and prep, Burow's triangles were excised in the relaxed skin [facility]. The wound edges were widely undermined by dissection in the subcutaneous plane until adequate tissue mobility was obtained. Hemostasis was obtained. The wound edges were closed in a layered fashion using Vicryl and Fast Absorbing Plain Gut sutures. Postoperative length was 4.6 cm.   EBL minimal; complications none; wound care routine.  The patient was  discharged in good condition and will return in one week for wound evaluation.  cartilage graft  In order to avoid distortion on lft ala a cartilage graft was planned.  After LEC anesthesia and prep, a strip of exposed cartilage was also elevated from the conchal bowl measuring 2.5 cm in length.  On the nose, two pockets were made medially and laterally to receive the cartilage and this was inset into the pockets and sutured into place with Vicryl suture.  Hemostasis was obtained at the donor site which was then bandaged to heal by second intention.  EBL minimal; complications none; wound care routine.  The patient was discharged in good condition and will return in one week for wound evaluation.  Because of the size and full-thickness nature of the defect, and to maintain form and function of the nose, a forehead flap with bilateral forehead advancement flap closure of the donor site was planned.  Excisional preparation of the recipient site was performed by outlining the cosmetic unit of the nasal tip.  Hemostasis was obtained.  A template was then made of the defect and transferred with the appropriate length to the upper forehead.  The forehead flap was incised and elevated based on the supratrochlear neurovascular bundle.  This was carefully dissected over the orbital rim to the nasion.  It was distally thinned and transferred to the nasal tip.  This was sutured in a layered fashion.  To close the donor site defect on the forehead, a large Burow s triangle was excised superiorly and posteriorly into the scalp, and two large flaps were elevated by undermining the entire anterior scalp and forehead to the temples bilaterally, and over the supraorbital rim inferiorly.  After hemostasis was obtained, these flaps were advanced and closed in a layered fashion and a dressing applied.  The patient tolerated the procedure well without complications; EBL minimal.  Patient will return in one week for bandage change and in  3 weeks for possible division and inset of the pedicle.            Again, thank you for allowing me to participate in the care of your patient.        Sincerely,        Dinesh Roque MD

## 2019-08-12 NOTE — PATIENT INSTRUCTIONS
WOUND CARE INSTRUCTIONS  for  ONE WEEK AFTER SURGERY    RIGHT JAWLINE      1) Leave flat bandage on your skin for one week after today s bandage change.  2) In one week when you remove the bandage, you may resume your regular skin care routine, including washing with mild soap and water, applying moisturizer, make-up and sunscreen.    3) If there are any open or bleeding areas at the incision/graft site you should begin to cover the area with a bandage daily as follows:    1) Clean and dry the area with plain tap water using a Q-tip or sterile gauze pad.  2) Apply Polysporin or Bacitracin ointment to the open area.  3) Cover the wound with a band-aid or a sterile non-stick gauze pad and micropore paper tape.         SIGNS OF INFECTION  - If you notice any of these signs of infection, call your doctor right away: expanding redness around the wound.  - Yellow or greenish-colored pus or cloudy wound drainage.    - Red streaking spreading from the wound.  - Increased swelling, tenderness, or pain around the wound.   - Fever.    Please remember that yellow and clear drainage from a wound can be normal and related to normal wound healing.  Isolated drainage from a wound without a combination of the above features does not indicate infection.       *Once the bandages are removed, the scar will be red and firm (especially in the lip/chin area). This is normal and will fade in time. It might take 6-12 months for this to happen.     *Massaging the area will help the scar soften and fade quicker. Begin to massage the area one month after the bandages have been removed. To massage apply pressure directly and firmly over the scar with the fingertips and move in a circular motion. Massage the area for a few minutes several times a day. Continue to massage the site for several months.    *Approximately 6-8 weeks after surgery it is not uncommon to see the formation of  tender pimple-like  bump along the scar. This is normal. As  the scar continues to mature and the stitches underneath the skin begin to dissolve, this might occur. Do not pick or squeeze, this will resolve on it s own. Should one break open producing a small amount of drainage, apply Polysporin or Bacitracin ointment a few times a day until the wound is completely healed.    *Numbness in the surgical area is expected. It might take 12-18 months for the feeling to return to normal. During this time sensations of itchiness, tingling and occasional sharp pains might be noted. These feelings are normal and will subside once the nerves have completely healed.         IN CASE OF EMERGENCY: Dr Roque [phone]     If you were seen in [place] call: [phone]    If you were seen in [place] call: [phone]

## 2019-08-12 NOTE — PROGRESS NOTES
Flash Brown is a 78 year old year old male patient here today for evaluation and managment of basal cell carcinoma on left ala and cheek.  Patient reports the following modifying factors none.  Associated symptoms: none.  Patient has no other skin complaints today.  Remainder of the HPI, Meds, PMH, Allergies, FH, and SH was reviewed in chart.      Past Medical History:   Diagnosis Date     Basal cell carcinoma      CAD (coronary artery disease)      Depression      Diabetes type 2, controlled (H)      Hyperlipidemia      Hypertension      Lymphoma (H)      Malignant melanoma (H)      Melanoma (H)      Squamous cell carcinoma        Past Surgical History:   Procedure Laterality Date     AXILLARY SURGERY      S/P resection and adjuvant radiation     BONE MARROW BIOPSY, BONE SPECIMEN, NEEDLE/TROCAR N/A 7/8/2019    Procedure: BIOPSY, BONE MARROW;  Surgeon: Husam Issa MD;  Location: [facility]     CHOLECYSTECTOMY       HERNIA REPAIR, UMBILICAL       STENT      PTCA with drug-eluting stent of RCA, circumflex, and LAD        Family History   Problem Relation Age of Onset     Asthma Other      Cancer Other         melanoma     Blood Disease Other         bleeding disorder     Lung Cancer Mother         Smoker     Prostate Cancer Father      Melanoma No family hx of        Social History     Socioeconomic History     Marital status: Single     Spouse name: Not on file     Number of children: 0     Years of education: Not on file     Highest education level: Not on file   Occupational History     Occupation: Retired     Comment: [employer]    Social Needs     Financial resource strain: Not on file     Food insecurity:     Worry: Not on file     Inability: Not on file     Transportation needs:     Medical: Not on file     Non-medical: Not on file   Tobacco Use     Smoking status: Never Smoker     Smokeless tobacco: Never Used   Substance and Sexual Activity     Alcohol use: Yes     Frequency: Monthly or less      Drinks per session: 1 or 2     Binge frequency: Never     Comment: glass of wine couple times per week     Drug use: Never     Sexual activity: Not on file   Lifestyle     Physical activity:     Days per week: Not on file     Minutes per session: Not on file     Stress: Not on file   Relationships     Social connections:     Talks on phone: Not on file     Gets together: Not on file     Attends [employer] service: Not on file     Active member of club or organization: Not on file     Attends meetings of clubs or organizations: Not on file     Relationship status: Not on file     Intimate partner violence:     Fear of current or ex partner: Not on file     Emotionally abused: Not on file     Physically abused: Not on file     Forced sexual activity: Not on file   Other Topics Concern     Not on file   Social History Narrative     Not on file       Outpatient Encounter Medications as of 8/12/2019   Medication Sig Dispense Refill     amLODIPine (NORVASC) 5 MG tablet Take 1 tablet (5 mg) by mouth daily       aspirin (ASA) 81 MG EC tablet Take 1 tablet (81 mg) by mouth daily       atorvastatin (LIPITOR) 20 MG tablet Take 1 tablet (20 mg) by mouth daily 90 tablet 3     busPIRone (BUSPAR) 10 MG tablet Take 1 tablet (10 mg) by mouth 2 times daily       carvedilol (COREG) 12.5 MG tablet Take 1 tablet (12.5 mg) by mouth 2 times daily (with meals) 180 tablet 3     CENTRUM OR TABS 1 TABLET DAILY       cloNIDine (CATAPRES) 0.1 MG tablet Take 1 tablet (0.1 mg) by mouth 2 times daily       clopidogrel (PLAVIX) 75 MG tablet Take 1 tablet (75 mg) by mouth daily       COQ10 100 MG OR CAPS None Entered       diazepam (VALIUM) 10 MG tablet Take 1 tablet (10 mg) by mouth every 6 hours as needed for anxiety 1 tablet 0     diazepam (VALIUM) 10 MG tablet Take 1 tablet (10 mg) by mouth every 6 hours as needed for anxiety 1 tablet 0     diazepam (VALIUM) 10 MG tablet Take 1 tablet (10 mg) by mouth every 6 hours as needed for anxiety 1 tablet  0     diazepam (VALIUM) 10 MG tablet Take 1 tablet (10 mg) by mouth every 6 hours as needed for anxiety 1 tablet 0     diazepam (VALIUM) 10 MG tablet Before procedure 1 tablet 0     diazepam (VALIUM) 5 MG tablet Take 1 tablet (5 mg) by mouth every 6 hours as needed for anxiety 1 tablet 0     glimepiride (AMARYL) 4 MG tablet Take 1 tablet (4 mg) by mouth 2 times daily       hydrALAZINE (APRESOLINE) 50 MG tablet Take 1 tablet (50 mg) by mouth 2 times daily       NIACIN 500 MG OR TABS Take one orally twice daily 180 3     NITROGLYCERIN 0.4 MG SL SUBL ONE TABLET UNDER TONGUE, FOR CHEST PAIN, AS DIRECTED 1 BOTTLE 3 per year     OMEGA-3 1000 MG OR CAPS None Entered       ORDER FOR DME Light Therapy with Full Spectrum Light (15264 lux) Start with 10-15 minute exposure per day, Increase exposure to 30 to 45 minutes per day, Maximum exposure: 90 minutes per day,Look periodically at light during each session  1 0     oxyCODONE-acetaminophen (PERCOCET) 5-325 MG tablet Take 1 tablet by mouth every 6 hours as needed for pain 15 tablet 0     pioglitazone (ACTOS) 30 MG tablet Take 1 tablet (30 mg) by mouth daily       ramipril (ALTACE) 10 MG capsule Take 1 capsule (10 mg) by mouth daily 90 capsule 3     tamsulosin (FLOMAX) 0.4 MG capsule Take 1 capsule (0.4 mg) by mouth daily       VOSOL-HC 2-1 % OT SOLN 2-3 drops in the affected ear 3-4 times daily for 7-10 days for itching in the ear 1 bottle 0     VYTORIN 10-40 MG OR TABS 1 TABLET EVERY EVENING 3 months 1     No facility-administered encounter medications on file as of 8/12/2019.              Review Of Systems  Skin: As above  Eyes: negative  Ears/Nose/Throat: negative  Respiratory: No shortness of breath, dyspnea on exertion, cough, or hemoptysis  Cardiovascular: negative  Gastrointestinal: negative  Genitourinary: negative  Musculoskeletal: negative  Neurologic: negative  Psychiatric: negative  Hematologic/Lymphatic/Immunologic: negative  Endocrine: negative      O:   NAD,  WDWN, Alert & Oriented, Mood & Affect wnl, Vitals stable   Here today alone   /62   Pulse 66   SpO2 98%    General appearance normal   Vitals stable   Alert, oriented and in no acute distress     L ala and medial cheek 2.5x2cm pink pearly papule       Eyes: Conjunctivae/lids:Normal     ENT: Lips, buccal mucosa, tongue: normal    MSK:Normal    Cardiovascular: peripheral edema none    Pulm: Breathing Normal    Neuro/Psych: Orientation:Normal; Mood/Affect:Normal      A/P:  1. L ala and cheek basal cell carcinoma   MOHS:   Location    After PGACAC discussed with patient, decision for Mohs surgery was made. Indication for Mohs was Location. Patient confirmed the site with Dr. Roque.  After anesthesia with LEC, the tumor was excised using standard Mohs technique in 3 stages(s).  CLEAR MARGINS OBTAINED and Final defect size was 3.9 x 3.3 cm.       This left thru and thru defect on lfet ala, and defect on left NSW and L cheek    L cheek and L NSW   REPAIR COMPLEX: Because of the tightness of the surrounding skin and Because of the size and full thickness nature of the defect, a complex closure was planned. After LEC anesthesia and prep, Burow's triangles were excised in the relaxed skin tension lines on to [facility]. The wound edges were widely undermined by dissection in the subcutaneous plane until adequate tissue mobility was obtained. Hemostasis was obtained. The wound edges were closed in a layered fashion using Vicryl and Fast Absorbing Plain Gut sutures. Postoperative length was 4.6 cm.   EBL minimal; complications none; wound care routine.  The patient was discharged in good condition and will return in one week for wound evaluation.  cartilage graft  In order to avoid distortion on lft ala a cartilage graft was planned.  After LEC anesthesia and prep, a strip of exposed cartilage was also elevated from the conchal bowl measuring 2.5 cm in length.  On the nose, two pockets were made medially and laterally  to receive the cartilage and this was inset into the pockets and sutured into place with Vicryl suture.  Hemostasis was obtained at the donor site which was then bandaged to heal by second intention.  EBL minimal; complications none; wound care routine.  The patient was discharged in good condition and will return in one week for wound evaluation.  Because of the size and full-thickness nature of the defect, and to maintain form and function of the nose, a forehead flap with bilateral forehead advancement flap closure of the donor site was planned.  Excisional preparation of the recipient site was performed by outlining the cosmetic unit of the nasal tip.  Hemostasis was obtained.  A template was then made of the defect and transferred with the appropriate length to the upper forehead.  The forehead flap was incised and elevated based on the supratrochlear neurovascular bundle.  This was carefully dissected over the orbital rim to the nasion.  It was distally thinned and transferred to the nasal tip.  This was sutured in a layered fashion.  To close the donor site defect on the forehead, a large Burow s triangle was excised superiorly and posteriorly into the scalp, and two large flaps were elevated by undermining the entire anterior scalp and forehead to the temples bilaterally, and over the supraorbital rim inferiorly.  After hemostasis was obtained, these flaps were advanced and closed in a layered fashion and a dressing applied.  The patient tolerated the procedure well without complications; EBL minimal.  Patient will return in one week for bandage change and in 3 weeks for possible division and inset of the pedicle.

## 2019-08-14 ENCOUNTER — ALLIED HEALTH/NURSE VISIT (OUTPATIENT)
Dept: DERMATOLOGY | Facility: CLINIC | Age: 79
End: 2019-08-14
Payer: MEDICARE

## 2019-08-14 ENCOUNTER — TELEPHONE (OUTPATIENT)
Dept: DERMATOLOGY | Facility: CLINIC | Age: 79
End: 2019-08-14

## 2019-08-14 DIAGNOSIS — Z48.01 ENCOUNTER FOR CHANGE OR REMOVAL OF SURGICAL WOUND DRESSING: Primary | ICD-10-CM

## 2019-08-14 PROCEDURE — 99207 ZZC NO CHARGE NURSE ONLY: CPT

## 2019-08-14 NOTE — TELEPHONE ENCOUNTER
Spoke with patients sister Daylin.  Advised Daylin to bring Don to be seen.   Patient to be in around 10am-nurse schedule    Melly Levin RN

## 2019-08-14 NOTE — TELEPHONE ENCOUNTER
Reason for Call:  Other     Detailed comments: Pt's sister, Daylin, calling (consent to communicate on file):  Pt had BCC removal from nose on 08/12. Had a tube inserted in nose and now he is having trouble breathing. States there is a lot of bleeding and they are wondering if the tube needs to be changed - Please advise    Phone Number Patient can be reached at: Home number on file [phone]     Best Time: Any    Can we leave a detailed message on this number? YES    Call taken on 8/14/2019 at 8:08 AM by Denise Behrendt

## 2019-08-15 DIAGNOSIS — N40.0 BENIGN PROSTATIC HYPERPLASIA WITHOUT LOWER URINARY TRACT SYMPTOMS: ICD-10-CM

## 2019-08-15 NOTE — PROGRESS NOTES
Patient returned to clinic to have site looked at and to assist with the bandage change.     Forehead, nose and left nasolabial fold bandages were removed and cleaned with normal saline. Sites are healing well. Bruising and swelling seen today, which is normal. There was a small amount of oozing from the tissue that is connected to the nose.     Ointment, telfa and paper tape applied to upper forehead and the tissue connected to the nose.    Steri strips and brown paper tape applied to the left nasolabial fold and lower forehead.     Patient and patient's sister were advised to call with questions or concerns and that they could return for help changing the bandages if needed.     Katherine Lema MA

## 2019-08-15 NOTE — PATIENT INSTRUCTIONS
Patient is to follow the same instructions from his Monday appointment. No print out needed today.

## 2019-08-15 NOTE — TELEPHONE ENCOUNTER
"Requested Prescriptions   Pending Prescriptions Disp Refills     tamsulosin (FLOMAX) 0.4 MG capsule       Sig: Take 1 capsule (0.4 mg) by mouth daily  Last Written Prescription Date:  None, historical  Last filled - not provided  Last office visit: 6/11/2019 TAMEKA Jackson     Future Office Visit:   Next 5 appointments (look out 90 days)    Aug 20, 2019  1:00 PM CDT  Nurse Only with Wy Derm Nurse  [facility] [address] [address]  [phone]   Aug 27, 2019  1:00 PM CDT  Nurse Only with Wy Derm Nurse  [facility] [address] [address]  [phone]   Sep 04, 2019  8:15 AM CDT  Return Visit with Dinesh Roque MD  [facility] ([facility] [address] [address]  [phone]   Sep 10, 2019  8:00 AM CDT  SHORT with Thomas Jackson DO  [facility] ([facility]) [address] [address] [phone]                Alpha Blockers Passed - 8/15/2019 11:09 AM        Passed - Blood pressure under 140/90 in past 12 months     BP Readings from Last 3 Encounters:   08/12/19 139/62   08/05/19 (!) 160/72   07/22/19 149/84                 Passed - Recent (12 mo) or future (30 days) visit within the authorizing provider's specialty     Patient had office visit in the last 12 months or has a visit in the next 30 days with authorizing provider or within the authorizing provider's specialty.  See \"Patient Info\" tab in inbasket, or \"Choose Columns\" in Meds & Orders section of the refill encounter.              Passed - Patient does not have Tadalafil, Vardenafil, or Sildenafil on their medication list        Passed - Medication is active on med list        Passed - Patient is 18 years of age or older          "

## 2019-08-16 NOTE — TELEPHONE ENCOUNTER
Routing refill request to provider for review/approval because:  Medication is reported/historical  Karissa EATON RN

## 2019-08-16 NOTE — TELEPHONE ENCOUNTER
"Patient came to clinic accompanied by sister 8/12/19. See dressing change note. Patient tolerated well, no obvious signs of infection or excess bleeding. Discussed sleeping with HOB elevated to allow drainage, minimize swelling,  and hopefully alleviate some anxiety upon waking.     Called patient this am to ensure no further needs/questions prior to weekend.     Patient's sister stated he is \"doing much better, less swelling and no bleeding\". No questions at this time.    Buzz BARRY RN   Specialty Clinics    "

## 2019-08-19 RX ORDER — TAMSULOSIN HYDROCHLORIDE 0.4 MG/1
0.4 CAPSULE ORAL DAILY
Qty: 90 CAPSULE | Refills: 3 | Status: SHIPPED | OUTPATIENT
Start: 2019-08-19 | End: 2020-05-01

## 2019-08-20 ENCOUNTER — ALLIED HEALTH/NURSE VISIT (OUTPATIENT)
Dept: DERMATOLOGY | Facility: CLINIC | Age: 79
End: 2019-08-20
Payer: MEDICARE

## 2019-08-20 DIAGNOSIS — Z48.01 ENCOUNTER FOR CHANGE OR REMOVAL OF SURGICAL WOUND DRESSING: Primary | ICD-10-CM

## 2019-08-20 PROCEDURE — 99207 ZZC NO CHARGE NURSE ONLY: CPT

## 2019-08-20 NOTE — PROGRESS NOTES
Pt returned to clinic for post surgery 1 week follow up bandage change.  Bandage removed from forehead, nose and left upper lip, area cleansed with normal saline. #9 Staples were removed from the upper forehead/frontal scalp. Site is healing and wound edges approximating well. Reapplied new steri strips and paper tape and the lower part of the forehead. Ointment applied to the upper forehead, flap on nose and nasal tip.     No steri strips were put on the left upper lip due to long facial hair.     Advised to watch for signs/sx of infection; spreading redness, drainage, odor, fever. Call or report promptly to clinic. Pt given written instructions and informed to rtc as needed. Patient verbalized understanding.     Katherine Lema MA

## 2019-08-27 ENCOUNTER — ALLIED HEALTH/NURSE VISIT (OUTPATIENT)
Dept: DERMATOLOGY | Facility: CLINIC | Age: 79
End: 2019-08-27
Payer: MEDICARE

## 2019-08-27 DIAGNOSIS — Z48.01 ENCOUNTER FOR CHANGE OR REMOVAL OF SURGICAL WOUND DRESSING: Primary | ICD-10-CM

## 2019-08-27 PROCEDURE — 99207 ZZC NO CHARGE NURSE ONLY: CPT

## 2019-08-27 NOTE — PATIENT INSTRUCTIONS
Wound Care Instructions     FOR SUPERFICIAL WOUNDS     [facility] [phone]    [facility] [phone]                         AFTER 24 HOURS YOU SHOULD REMOVE THE BANDAGE AND BEGIN DAILY DRESSING CHANGES AS FOLLOWS:     1) Remove Dressing.     2) Clean and dry the area with tap water using a Q-tip or sterile gauze pad.     3) Apply Vaseline, Aquaphor, Polysporin ointment or Bacitracin ointment over entire wound.  Do NOT use Neosporin ointment.     4) Cover the wound with a band-aid, or a sterile non-stick gauze pad and micropore paper tape      REPEAT THESE INSTRUCTIONS AT LEAST ONCE A DAY UNTIL THE WOUND HAS COMPLETELY HEALED.    It is an old wives tale that a wound heals better when it is exposed to air and allowed to dry out. The wound will heal faster with a better cosmetic result if it is kept moist with ointment and covered with a bandage.    **Do not let the wound dry out.**      Supplies Needed:      *Cotton tipped applicators (Q-tips)    *Polysporin Ointment or Bacitracin Ointment (NOT NEOSPORIN)    *Band-aids or non-stick gauze pads and micropore paper tape.      PATIENT INFORMATION:    During the healing process you will notice a number of changes. All wounds develop a small halo of redness surrounding the wound.  This means healing is occurring. Severe itching with extensive redness usually indicates sensitivity to the ointment or bandage tape used to dress the wound.  You should call our office if this develops.      Swelling  and/or discoloration around your surgical site is common, particularly when performed around the eye.    All wounds normally drain.  The larger the wound the more drainage there will be.  After 7-10 days, you will notice the wound beginning to shrink and new skin will begin to grow.  The wound is healed when you can see skin has formed over the entire area.  A healed wound has a healthy, shiny look to the surface and is red to dark pink in  color to normalize.  Wounds may take approximately 4-6 weeks to heal.  Larger wounds may take 6-8 weeks.  After the wound is healed you may discontinue dressing changes.    You may experience a sensation of tightness as your wound heals. This is normal and will gradually subside.    Your healed wound may be sensitive to temperature changes. This sensitivity improves with time, but if you re having a lot of discomfort, try to avoid temperature extremes.    Patients frequently experience itching after their wound appears to have healed because of the continue healing under the skin.  Plain Vaseline will help relieve the itching.        POSSIBLE COMPLICATIONS    BLEEDIN. Leave the bandage in place.  2. Use tightly rolled up gauze or a cloth to apply direct pressure over the bandage for 30  minutes.  3. Reapply pressure for an additional 30 minutes if necessary  4. Use additional gauze and tape to maintain pressure once the bleeding has stopped.

## 2019-08-27 NOTE — PROGRESS NOTES
Pt returned to clinic for post surgery 2 week follow up forehead flap bandage change. Pt has no complaints, denies pain. Bandage removed from forehead and nose. Areas cleansed with normal saline. Sites are healing and wound edges approximating well. Dr. Roque removed the nasal airway dressing from the left nostril. Left nostril was cleaned with normal saline. Open wound care done on open area on forehead. Aquaphor and telfa applied under and on forehead flap. Aquaphor also applied in left nostril.   Patient will follow-up next Wednesday for removal of the forehead flap with Dr. Roque.     Advised to watch for signs/sx of infection; spreading redness, drainage, odor, fever. Call or report promptly to clinic. Pt given written instructions and informed to rtc as needed. Patient verbalized understanding.     Lucial Carey LPN

## 2019-09-04 ENCOUNTER — OFFICE VISIT (OUTPATIENT)
Dept: DERMATOLOGY | Facility: CLINIC | Age: 79
End: 2019-09-04
Payer: MEDICARE

## 2019-09-04 VITALS — SYSTOLIC BLOOD PRESSURE: 118 MMHG | DIASTOLIC BLOOD PRESSURE: 52 MMHG | OXYGEN SATURATION: 97 % | HEART RATE: 72 BPM

## 2019-09-04 DIAGNOSIS — C44.311 BASAL CELL CARCINOMA OF NOSE: Primary | ICD-10-CM

## 2019-09-04 PROCEDURE — 15630 DELAY FLAP EYE/NOS/EAR/LIP: CPT | Mod: 58 | Performed by: DERMATOLOGY

## 2019-09-04 RX ORDER — DIAZEPAM 5 MG
TABLET ORAL
Qty: 2 TABLET | Refills: 0 | Status: SHIPPED | OUTPATIENT
Start: 2019-09-04 | End: 2019-10-07

## 2019-09-04 NOTE — NURSING NOTE
The following medication was given @ 0809.    MEDICATION:  Diazepam   ROUTE: PO  SITE: mouth  DOSE: 10mg (5mg x2 tablets)  LOT #: [id number]  : [employer]  EXPIRATION DATE: 06/2020 and 02/2021  NDC#: [id number]   Was there drug waste? No  Multi-dose vial: No    Patient was also given 1,000mg of acetaminophen (500mg x2) @0810 per Dr. Roque.    Lucila Carey, TIFFANY  September 4, 2019

## 2019-09-04 NOTE — PATIENT INSTRUCTIONS
Sutured Wound Care     [facility]: [phone]    [facility]: [phone]          ? No strenuous activity for 48 hours. Resume moderate activity in 48 hours. No heavy exercising until you are seen for follow up in one week.     ? Take Tylenol as needed for discomfort.                         ? Do not drink alcoholic beverages for 48 hours.     ? Keep the pressure bandage in place for 24 hours. If the bandage becomes blood tinged or loose, reinforce it with gauze and tape.        (Refer to the reverse side of this page for management of bleeding).    ? Remove pressure bandage in 24 hours     ? Leave the flat bandage in place until your follow up appointment.    ? Keep the bandage dry. Wash around it carefully.    ? If the tape becomes soiled or starts to come off, reinforce it with additional paper tape.    ? Do not smoke for 3 weeks; smoking is detrimental to wound healing.    ? It is normal to have swelling and bruising around the surgical site. The bruising will fade in approximately 10-14 days. Elevate the area to reduce swelling.    ? Numbness, itchiness and sensitivity to temperature changes can occur after surgery and may take up to 18 months to normalize.      POSSIBLE COMPLICATIONS    BLEEDIN. Leave the bandage in place.  2. Use tightly rolled up gauze or a cloth to apply direct pressure over the bandage for 20   minutes.  3. Reapply pressure for an additional 20 minutes if necessary  4. Call the office or go to the nearest emergency room if pressure fails to stop the bleeding.  5. Use additional gauze and tape to maintain pressure once the bleeding has stopped.        PAIN:    1. Post operative pain should slowly get better, never worse.  2. A severe increase in pain may indicate a problem. Call the office if this occurs.    In case of emergency phone:Dr Roque [phone]

## 2019-09-04 NOTE — LETTER
9/4/2019         RE: Flash Brown  [address] [place] [id number]        Dear Colleague,    Thank you for referring your patient, Flash Brown, to the [facility]. Please see a copy of my visit note below.    Flash Brown is a 78 year old year old male patient here today for take down fhf, doing well. Patient reports the following modifying factors none.  Associated symptoms: none.  Patient has no other skin complaints today.  Remainder of the HPI, Meds, PMH, Allergies, FH, and SH was reviewed in chart.      Past Medical History:   Diagnosis Date     Basal cell carcinoma      CAD (coronary artery disease)      Depression      Diabetes type 2, controlled (H)      Hyperlipidemia      Hypertension      Lymphoma (H)      Malignant melanoma (H)      Melanoma (H)      Squamous cell carcinoma        Past Surgical History:   Procedure Laterality Date     AXILLARY SURGERY      S/P resection and adjuvant radiation     BONE MARROW BIOPSY, BONE SPECIMEN, NEEDLE/TROCAR N/A 7/8/2019    Procedure: BIOPSY, BONE MARROW;  Surgeon: Husam Issa MD;  Location: [facility]     CHOLECYSTECTOMY       HERNIA REPAIR, UMBILICAL       STENT      PTCA with drug-eluting stent of RCA, circumflex, and LAD        Family History   Problem Relation Age of Onset     Asthma Other      Cancer Other         melanoma     Blood Disease Other         bleeding disorder     Lung Cancer Mother         Smoker     Prostate Cancer Father      Melanoma No family hx of        Social History     Socioeconomic History     Marital status: Single     Spouse name: Not on file     Number of children: 0     Years of education: Not on file     Highest education level: Not on file   Occupational History     Occupation: Retired     Comment: [employer]    Social Needs     Financial resource strain: Not on file     Food insecurity:     Worry: Not on file     Inability: Not on file     Transportation needs:     Medical: Not on file      Non-medical: Not on file   Tobacco Use     Smoking status: Never Smoker     Smokeless tobacco: Never Used   Substance and Sexual Activity     Alcohol use: Yes     Frequency: Monthly or less     Drinks per session: 1 or 2     Binge frequency: Never     Comment: glass of wine couple times per week     Drug use: Never     Sexual activity: Not on file   Lifestyle     Physical activity:     Days per week: Not on file     Minutes per session: Not on file     Stress: Not on file   Relationships     Social connections:     Talks on phone: Not on file     Gets together: Not on file     Attends Latter day service: Not on file     Active member of club or organization: Not on file     Attends meetings of clubs or organizations: Not on file     Relationship status: Not on file     Intimate partner violence:     Fear of current or ex partner: Not on file     Emotionally abused: Not on file     Physically abused: Not on file     Forced sexual activity: Not on file   Other Topics Concern     Not on file   Social History Narrative     Not on file       Outpatient Encounter Medications as of 9/4/2019   Medication Sig Dispense Refill     amLODIPine (NORVASC) 5 MG tablet Take 1 tablet (5 mg) by mouth daily       aspirin (ASA) 81 MG EC tablet Take 1 tablet (81 mg) by mouth daily       atorvastatin (LIPITOR) 20 MG tablet Take 1 tablet (20 mg) by mouth daily 90 tablet 3     busPIRone (BUSPAR) 10 MG tablet Take 1 tablet (10 mg) by mouth 2 times daily       carvedilol (COREG) 12.5 MG tablet Take 1 tablet (12.5 mg) by mouth 2 times daily (with meals) 180 tablet 3     CENTRUM OR TABS 1 TABLET DAILY       cloNIDine (CATAPRES) 0.1 MG tablet Take 1 tablet (0.1 mg) by mouth 2 times daily       clopidogrel (PLAVIX) 75 MG tablet Take 1 tablet (75 mg) by mouth daily       COQ10 100 MG OR CAPS None Entered       diazepam (VALIUM) 10 MG tablet Take 1 tablet (10 mg) by mouth every 6 hours as needed for anxiety 1 tablet 0     diazepam (VALIUM) 10 MG  tablet Take 1 tablet (10 mg) by mouth every 6 hours as needed for anxiety 1 tablet 0     diazepam (VALIUM) 10 MG tablet Take 1 tablet (10 mg) by mouth every 6 hours as needed for anxiety 1 tablet 0     diazepam (VALIUM) 10 MG tablet Take 1 tablet (10 mg) by mouth every 6 hours as needed for anxiety 1 tablet 0     diazepam (VALIUM) 10 MG tablet Before procedure 1 tablet 0     diazepam (VALIUM) 5 MG tablet Two tablets before procedure 2 tablet 0     diazepam (VALIUM) 5 MG tablet Take 1 tablet (5 mg) by mouth every 6 hours as needed for anxiety 1 tablet 0     glimepiride (AMARYL) 4 MG tablet Take 1 tablet (4 mg) by mouth 2 times daily       hydrALAZINE (APRESOLINE) 50 MG tablet Take 1 tablet (50 mg) by mouth 2 times daily       naloxone (NARCAN) 4 MG/0.1ML nasal spray Spray 1 spray (4 mg) into one nostril alternating nostrils once as needed for opioid reversal Every 2-3 minutes until patient responsive or EMS arrives 0.2 mL 0     NIACIN 500 MG OR TABS Take one orally twice daily 180 3     NITROGLYCERIN 0.4 MG SL SUBL ONE TABLET UNDER TONGUE, FOR CHEST PAIN, AS DIRECTED 1 BOTTLE 3 per year     OMEGA-3 1000 MG OR CAPS None Entered       ORDER FOR DME Light Therapy with Full Spectrum Light (94022 lux) Start with 10-15 minute exposure per day, Increase exposure to 30 to 45 minutes per day, Maximum exposure: 90 minutes per day,Look periodically at light during each session  1 0     pioglitazone (ACTOS) 30 MG tablet Take 1 tablet (30 mg) by mouth daily       ramipril (ALTACE) 10 MG capsule Take 1 capsule (10 mg) by mouth daily 90 capsule 3     tamsulosin (FLOMAX) 0.4 MG capsule Take 1 capsule (0.4 mg) by mouth daily 90 capsule 3     VOSOL-HC 2-1 % OT SOLN 2-3 drops in the affected ear 3-4 times daily for 7-10 days for itching in the ear 1 bottle 0     VYTORIN 10-40 MG OR TABS 1 TABLET EVERY EVENING 3 months 1     [] oxyCODONE-acetaminophen (PERCOCET) 5-325 MG tablet Take 1 tablet by mouth every 6 hours as needed for  pain 15 tablet 0     No facility-administered encounter medications on file as of 9/4/2019.              Review Of Systems  Skin: As above  Eyes: negative  Ears/Nose/Throat: negative  Respiratory: No shortness of breath, dyspnea on exertion, cough, or hemoptysis  Cardiovascular: negative  Gastrointestinal: negative  Genitourinary: negative  Musculoskeletal: negative  Neurologic: negative  Psychiatric: negative  Hematologic/Lymphatic/Immunologic: negative  Endocrine: negative      O:   NAD, WDWN, Alert & Oriented, Mood & Affect wnl, Vitals stable   Here today alone   /52   Pulse 72   SpO2 97%    General appearance normal   Vitals stable   Alert, oriented and in no acute distress     Flap well vascularized healing well    A/p basal cell carcinoma nose  DEBULKING AND INSET OF FLAP:  After review of the procedure and PGACAC, anesthesia with LEC obtained.  The patient was prepped and draped in the usual fashion.  A triangular incision was made at the base of the proximal pedicle on the glabella and the pedicle was divided.  A proximal stump was incised in a triangular fashion and debulked of fibrofatty tissue.  The surrounding skin was undermined and after hemostasis was obtained, the triangular flap was inset and sutured in a layered fashion.  Next, attention was directed to the nose.  A transverse incision was made on the nose then extended inferiorly one ach side where Burow s triangles were excised on the distal stmp and a horizontal incision was made.  This was debulked of fibrofatty tissue and the surrounding skin was undermined.  After hemostasis obtained, the flap was inset, and the wound edges were closed in a layered fashion, using Vicryl and Fast Absorbing Gut sutures.  Dressing applied.  The final size of the flap measured 4.5 x 4.3 cm.  EBL minimal; complications none; wound care routine.  The patient was discharged in good condition and will return in one week for wound evaluation.      Again, thank  you for allowing me to participate in the care of your patient.        Sincerely,        Dinesh Roque MD

## 2019-09-04 NOTE — PROGRESS NOTES
Flash Brown is a 78 year old year old male patient here today for take down fhf, doing well. Patient reports the following modifying factors none.  Associated symptoms: none.  Patient has no other skin complaints today.  Remainder of the HPI, Meds, PMH, Allergies, FH, and SH was reviewed in chart.      Past Medical History:   Diagnosis Date     Basal cell carcinoma      CAD (coronary artery disease)      Depression      Diabetes type 2, controlled (H)      Hyperlipidemia      Hypertension      Lymphoma (H)      Malignant melanoma (H)      Melanoma (H)      Squamous cell carcinoma        Past Surgical History:   Procedure Laterality Date     AXILLARY SURGERY      S/P resection and adjuvant radiation     BONE MARROW BIOPSY, BONE SPECIMEN, NEEDLE/TROCAR N/A 7/8/2019    Procedure: BIOPSY, BONE MARROW;  Surgeon: Husam Issa MD;  Location: [facility]     CHOLECYSTECTOMY       HERNIA REPAIR, UMBILICAL       STENT      PTCA with drug-eluting stent of RCA, circumflex, and LAD        Family History   Problem Relation Age of Onset     Asthma Other      Cancer Other         melanoma     Blood Disease Other         bleeding disorder     Lung Cancer Mother         Smoker     Prostate Cancer Father      Melanoma No family hx of        Social History     Socioeconomic History     Marital status: Single     Spouse name: Not on file     Number of children: 0     Years of education: Not on file     Highest education level: Not on file   Occupational History     Occupation: Retired     Comment: [employer]line    Social Needs     Financial resource strain: Not on file     Food insecurity:     Worry: Not on file     Inability: Not on file     Transportation needs:     Medical: Not on file     Non-medical: Not on file   Tobacco Use     Smoking status: Never Smoker     Smokeless tobacco: Never Used   Substance and Sexual Activity     Alcohol use: Yes     Frequency: Monthly or less     Drinks per session: 1 or 2     Binge  frequency: Never     Comment: glass of wine couple times per week     Drug use: Never     Sexual activity: Not on file   Lifestyle     Physical activity:     Days per week: Not on file     Minutes per session: Not on file     Stress: Not on file   Relationships     Social connections:     Talks on phone: Not on file     Gets together: Not on file     Attends Anabaptism service: Not on file     Active member of club or organization: Not on file     Attends meetings of clubs or organizations: Not on file     Relationship status: Not on file     Intimate partner violence:     Fear of current or ex partner: Not on file     Emotionally abused: Not on file     Physically abused: Not on file     Forced sexual activity: Not on file   Other Topics Concern     Not on file   Social History Narrative     Not on file       Outpatient Encounter Medications as of 9/4/2019   Medication Sig Dispense Refill     amLODIPine (NORVASC) 5 MG tablet Take 1 tablet (5 mg) by mouth daily       aspirin (ASA) 81 MG EC tablet Take 1 tablet (81 mg) by mouth daily       atorvastatin (LIPITOR) 20 MG tablet Take 1 tablet (20 mg) by mouth daily 90 tablet 3     busPIRone (BUSPAR) 10 MG tablet Take 1 tablet (10 mg) by mouth 2 times daily       carvedilol (COREG) 12.5 MG tablet Take 1 tablet (12.5 mg) by mouth 2 times daily (with meals) 180 tablet 3     CENTRUM OR TABS 1 TABLET DAILY       cloNIDine (CATAPRES) 0.1 MG tablet Take 1 tablet (0.1 mg) by mouth 2 times daily       clopidogrel (PLAVIX) 75 MG tablet Take 1 tablet (75 mg) by mouth daily       COQ10 100 MG OR CAPS None Entered       diazepam (VALIUM) 10 MG tablet Take 1 tablet (10 mg) by mouth every 6 hours as needed for anxiety 1 tablet 0     diazepam (VALIUM) 10 MG tablet Take 1 tablet (10 mg) by mouth every 6 hours as needed for anxiety 1 tablet 0     diazepam (VALIUM) 10 MG tablet Take 1 tablet (10 mg) by mouth every 6 hours as needed for anxiety 1 tablet 0     diazepam (VALIUM) 10 MG tablet  Take 1 tablet (10 mg) by mouth every 6 hours as needed for anxiety 1 tablet 0     diazepam (VALIUM) 10 MG tablet Before procedure 1 tablet 0     diazepam (VALIUM) 5 MG tablet Two tablets before procedure 2 tablet 0     diazepam (VALIUM) 5 MG tablet Take 1 tablet (5 mg) by mouth every 6 hours as needed for anxiety 1 tablet 0     glimepiride (AMARYL) 4 MG tablet Take 1 tablet (4 mg) by mouth 2 times daily       hydrALAZINE (APRESOLINE) 50 MG tablet Take 1 tablet (50 mg) by mouth 2 times daily       naloxone (NARCAN) 4 MG/0.1ML nasal spray Spray 1 spray (4 mg) into one nostril alternating nostrils once as needed for opioid reversal Every 2-3 minutes until patient responsive or EMS arrives 0.2 mL 0     NIACIN 500 MG OR TABS Take one orally twice daily 180 3     NITROGLYCERIN 0.4 MG SL SUBL ONE TABLET UNDER TONGUE, FOR CHEST PAIN, AS DIRECTED 1 BOTTLE 3 per year     OMEGA-3 1000 MG OR CAPS None Entered       ORDER FOR DME Light Therapy with Full Spectrum Light (13771 lux) Start with 10-15 minute exposure per day, Increase exposure to 30 to 45 minutes per day, Maximum exposure: 90 minutes per day,Look periodically at light during each session  1 0     pioglitazone (ACTOS) 30 MG tablet Take 1 tablet (30 mg) by mouth daily       ramipril (ALTACE) 10 MG capsule Take 1 capsule (10 mg) by mouth daily 90 capsule 3     tamsulosin (FLOMAX) 0.4 MG capsule Take 1 capsule (0.4 mg) by mouth daily 90 capsule 3     VOSOL-HC 2-1 % OT SOLN 2-3 drops in the affected ear 3-4 times daily for 7-10 days for itching in the ear 1 bottle 0     VYTORIN 10-40 MG OR TABS 1 TABLET EVERY EVENING 3 months 1     [] oxyCODONE-acetaminophen (PERCOCET) 5-325 MG tablet Take 1 tablet by mouth every 6 hours as needed for pain 15 tablet 0     No facility-administered encounter medications on file as of 2019.              Review Of Systems  Skin: As above  Eyes: negative  Ears/Nose/Throat: negative  Respiratory: No shortness of breath, dyspnea on  exertion, cough, or hemoptysis  Cardiovascular: negative  Gastrointestinal: negative  Genitourinary: negative  Musculoskeletal: negative  Neurologic: negative  Psychiatric: negative  Hematologic/Lymphatic/Immunologic: negative  Endocrine: negative      O:   NAD, WDWN, Alert & Oriented, Mood & Affect wnl, Vitals stable   Here today alone   /52   Pulse 72   SpO2 97%    General appearance normal   Vitals stable   Alert, oriented and in no acute distress     Flap well vascularized healing well    A/p basal cell carcinoma nose  DEBULKING AND INSET OF FLAP:  After review of the procedure and PGACAC, anesthesia with LEC obtained.  The patient was prepped and draped in the usual fashion.  A triangular incision was made at the base of the proximal pedicle on the glabella and the pedicle was divided.  A proximal stump was incised in a triangular fashion and debulked of fibrofatty tissue.  The surrounding skin was undermined and after hemostasis was obtained, the triangular flap was inset and sutured in a layered fashion.  Next, attention was directed to the nose.  A transverse incision was made on the nose then extended inferiorly one ach side where Burow s triangles were excised on the distal stmp and a horizontal incision was made.  This was debulked of fibrofatty tissue and the surrounding skin was undermined.  After hemostasis obtained, the flap was inset, and the wound edges were closed in a layered fashion, using Vicryl and Fast Absorbing Gut sutures.  Dressing applied.  The final size of the flap measured 4.5 x 4.3 cm.  EBL minimal; complications none; wound care routine.  The patient was discharged in good condition and will return in one week for wound evaluation.

## 2019-09-09 NOTE — PROGRESS NOTES
Subjective     Flash Brown is a 78 year old male who presents to clinic today for the following health issues:    HPI       Patient Active Problem List   Diagnosis     Essential hypertension, benign     Malignant melanoma s/p resection in [place], [place]     RT axillary lymphoma s/p resection, with adjuvant radiation - [place], [place]     Basal cell skin cancer over nose s/p resection - [place], [place]     Mixed hyperlipidemia     Diabetes mellitus, type 2 (H)     Obstructive sleep apnea     Depressive disorder, not elsewhere classified     ? 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)     Hyperlipidemia     Alcoholic cirrhosis of liver with ascites (H)     Past Surgical History:   Procedure Laterality Date     AXILLARY SURGERY      S/P resection and adjuvant radiation     BONE MARROW BIOPSY, BONE SPECIMEN, NEEDLE/TROCAR N/A 7/8/2019    Procedure: BIOPSY, BONE MARROW;  Surgeon: Husam Issa MD;  Location: [facility]     CHOLECYSTECTOMY       HERNIA REPAIR, UMBILICAL       STENT      PTCA with drug-eluting stent of RCA, circumflex, and LAD       Social History     Tobacco Use     Smoking status: Never Smoker     Smokeless tobacco: Never Used   Substance Use Topics     Alcohol use: Yes     Frequency: Monthly or less     Drinks per session: 1 or 2     Binge frequency: Never     Comment: glass of wine couple times per week     Family History   Problem Relation Age of Onset     Asthma Other      Cancer Other         melanoma     Blood Disease Other         bleeding disorder     Lung Cancer Mother         Smoker     Prostate Cancer Father      Melanoma No family hx of          Current Outpatient Medications   Medication Sig Dispense Refill     amLODIPine (NORVASC) 5 MG tablet Take 1 tablet (5 mg) by mouth daily       aspirin (ASA) 81  MG EC tablet Take 1 tablet (81 mg) by mouth daily       atorvastatin (LIPITOR) 20 MG tablet Take 1 tablet (20 mg) by mouth daily 90 tablet 3     busPIRone (BUSPAR) 10 MG tablet Take 1 tablet (10 mg) by mouth 2 times daily       carvedilol (COREG) 12.5 MG tablet Take 1 tablet (12.5 mg) by mouth 2 times daily (with meals) 180 tablet 3     CENTRUM OR TABS 1 TABLET DAILY       cloNIDine (CATAPRES) 0.1 MG tablet Take 1 tablet (0.1 mg) by mouth 2 times daily       clopidogrel (PLAVIX) 75 MG tablet Take 1 tablet (75 mg) by mouth daily       COQ10 100 MG OR CAPS None Entered       glimepiride (AMARYL) 4 MG tablet Take 1 tablet (4 mg) by mouth 2 times daily       hydrALAZINE (APRESOLINE) 50 MG tablet Take 1 tablet (50 mg) by mouth 2 times daily       OMEGA-3 1000 MG OR CAPS None Entered       pioglitazone (ACTOS) 30 MG tablet Take 1 tablet (30 mg) by mouth daily       ramipril (ALTACE) 10 MG capsule Take 1 capsule (10 mg) by mouth daily 90 capsule 3     tamsulosin (FLOMAX) 0.4 MG capsule Take 1 capsule (0.4 mg) by mouth daily 90 capsule 3     diazepam (VALIUM) 10 MG tablet Take 1 tablet (10 mg) by mouth every 6 hours as needed for anxiety 1 tablet 0     diazepam (VALIUM) 10 MG tablet Take 1 tablet (10 mg) by mouth every 6 hours as needed for anxiety 1 tablet 0     diazepam (VALIUM) 10 MG tablet Take 1 tablet (10 mg) by mouth every 6 hours as needed for anxiety 1 tablet 0     diazepam (VALIUM) 10 MG tablet Take 1 tablet (10 mg) by mouth every 6 hours as needed for anxiety 1 tablet 0     diazepam (VALIUM) 10 MG tablet Before procedure 1 tablet 0     diazepam (VALIUM) 5 MG tablet Two tablets before procedure 2 tablet 0     diazepam (VALIUM) 5 MG tablet Take 1 tablet (5 mg) by mouth every 6 hours as needed for anxiety 1 tablet 0     naloxone (NARCAN) 4 MG/0.1ML nasal spray Spray 1 spray (4 mg) into one nostril alternating nostrils once as needed for opioid reversal Every 2-3 minutes until patient responsive or EMS arrives 0.2 mL  0     NIACIN 500 MG OR TABS Take one orally twice daily 180 3     NITROGLYCERIN 0.4 MG SL SUBL ONE TABLET UNDER TONGUE, FOR CHEST PAIN, AS DIRECTED 1 BOTTLE 3 per year     ORDER FOR DME Light Therapy with Full Spectrum Light (19025 lux) Start with 10-15 minute exposure per day, Increase exposure to 30 to 45 minutes per day, Maximum exposure: 90 minutes per day,Look periodically at light during each session  1 0     VOSOL-HC 2-1 % OT SOLN 2-3 drops in the affected ear 3-4 times daily for 7-10 days for itching in the ear 1 bottle 0     VYTORIN 10-40 MG OR TABS 1 TABLET EVERY EVENING 3 months 1     Allergies   Allergen Reactions     Iodine Swelling     1. Basal Cell Carcinoma f/u: Patient has had multiple biopsies performed by dermatology involving left cheek, neck, and nose.  Patient's sister is currently helping with the wound changes.    2. History of lymphoma: Patient had a CT scan chest/Abd/pelvis 7/11/19 which did not show any suspicious findings in regards to lymphoma reoccurrence.     3. CAD: Seen by cardiology.  Had plavix discontinued but was advised to continue with his current medications.  Unfortunately, he is unable to to exercise at this time.    4. Depression: Patient is currently buspar.  Patient feels like he is losing all his independence.  He feels he can not drive around anymore and this is affecting him mentally.  He would like some freedom to get away.  Again, he moved from [place] to [facility] to be his sister who is main caregiver.  He would like to meet other people at the senior center.  He stressed again the need for independence.  He appreciates his sister but wish that he can do things on his own.     5. Diabetes f/u: He is currently on actos.  He has an upcoming eye appointment.  His last HgA1c is 5.4.  He understands that he has to lose some weight.     Review of Systems   Constitutional: Negative for chills and fever.   HENT: Negative for congestion, ear pain, hearing loss and sore throat.   "  Respiratory: Negative for cough and shortness of breath.    Cardiovascular: Negative for chest pain, palpitations and peripheral edema.   Musculoskeletal: Negative for arthralgias, joint swelling and myalgias.   Skin: Negative for rash.   Neurological: Negative for dizziness, weakness, headaches and paresthesias.   Psychiatric/Behavioral: Negative for mood changes. The patient is not nervous/anxious.             Objective    /56   Pulse 90   Temp 97.3  F (36.3  C) (Tympanic)   Resp 16   Ht 1.683 m (5' 6.26\")   Wt 109 kg (240 lb 6 oz)   BMI 38.49 kg/m    Body mass index is 38.49 kg/m .  Physical Exam   Constitutional: He is oriented to person, place, and time. He appears well-developed and well-nourished. No distress.   HENT:   Head: Normocephalic and atraumatic.   Nose: Nose normal.   Eyes: Conjunctivae and EOM are normal.   Neck: Normal range of motion. No tracheal deviation present.   Cardiovascular: Normal rate, regular rhythm and normal heart sounds.   Pulmonary/Chest: Effort normal. He has wheezes.   Musculoskeletal: Normal range of motion.   Neurological: He is alert and oriented to person, place, and time.   Diabetic foot exam: decreased sensation to microfilament test, trophic changes involving bilateral feet, dry cracking heels     Skin: No rash noted. No erythema.   Multiple healing surgical scars involving frontal region, nose and cheek region from skin biopsies   Psychiatric: He has a normal mood and affect. His behavior is normal.            Component      Latest Ref Rng & Units 3/13/2008 6/11/2019   Cholesterol      <200 mg/dL 137 110   Triglycerides      <150 mg/dL 116 80   HDL Cholesterol      >39 mg/dL 37 (L) 47   LDL Cholesterol Calculated      <100 mg/dL 76 47   VLDL-Cholesterol      0 - 30 mg/dL 23    Cholesterol/HDL Ratio      0.0 - 5.0 4.0    Hemoglobin A1C      0 - 5.6 % 6.1 (H) 5.4   Non HDL Cholesterol      <130 mg/dL  63       Assessment & Plan     1. Depression, unspecified " "depression type  Currently on buspar.  Patient would like a sense of independence.  Feels like he is a burden on his sister (primary care giver).  Recently moved her from [place].  He would like resources in regards to transportation and senior centers so he can interact with others.   - MENTAL HEALTH REFERRAL  - Adult; Outpatient Treatment; Individual/Couples/Family/Group Therapy/Health Psychology; G: [facility] [phone]; We will contact you to schedule the appointment or please call with any questions  - CARE COORDINATION REFERRAL    2. Need for pneumococcal vaccination  - Pneumococcal vaccine 13 valent PCV13 IM (Prevnar) [17820]  - ADMIN MEDICARE: Pneumococcal Vaccine ()    3. Coronary artery disease involving native coronary artery of native heart without angina pectoris  Stable.  Continue with current medications.     4. BCC (basal cell carcinoma), face  S/P multiple biopsies.  Appreciate dermatology follow-up.     5. Non-insulin dependent type 2 diabetes mellitus (H)  Stable.  Stressed the importance of keeping eye appointment.        BMI:   Estimated body mass index is 39.07 kg/m  as calculated from the following:    Height as of 7/17/19: 1.683 m (5' 6.26\").    Weight as of 7/18/19: 110.7 kg (244 lb).   Weight management plan: Discussed healthy diet and exercise guidelines    See Patient Instructions    No follow-ups on file.    Thomas Jackson, DO  [facility]        "

## 2019-09-10 ENCOUNTER — OFFICE VISIT (OUTPATIENT)
Dept: FAMILY MEDICINE | Facility: CLINIC | Age: 79
End: 2019-09-10
Payer: MEDICARE

## 2019-09-10 VITALS
WEIGHT: 240.38 LBS | RESPIRATION RATE: 16 BRPM | SYSTOLIC BLOOD PRESSURE: 100 MMHG | DIASTOLIC BLOOD PRESSURE: 56 MMHG | TEMPERATURE: 97.3 F | HEART RATE: 90 BPM | BODY MASS INDEX: 38.63 KG/M2 | HEIGHT: 66 IN

## 2019-09-10 DIAGNOSIS — E11.9 NON-INSULIN DEPENDENT TYPE 2 DIABETES MELLITUS (H): ICD-10-CM

## 2019-09-10 DIAGNOSIS — Z23 NEED FOR PNEUMOCOCCAL VACCINATION: ICD-10-CM

## 2019-09-10 DIAGNOSIS — I25.10 CORONARY ARTERY DISEASE INVOLVING NATIVE CORONARY ARTERY OF NATIVE HEART WITHOUT ANGINA PECTORIS: ICD-10-CM

## 2019-09-10 DIAGNOSIS — F32.A DEPRESSION, UNSPECIFIED DEPRESSION TYPE: Primary | ICD-10-CM

## 2019-09-10 DIAGNOSIS — C44.310 BCC (BASAL CELL CARCINOMA), FACE: ICD-10-CM

## 2019-09-10 PROCEDURE — G0009 ADMIN PNEUMOCOCCAL VACCINE: HCPCS | Performed by: FAMILY MEDICINE

## 2019-09-10 PROCEDURE — 90670 PCV13 VACCINE IM: CPT | Performed by: FAMILY MEDICINE

## 2019-09-10 PROCEDURE — 99214 OFFICE O/P EST MOD 30 MIN: CPT | Mod: 25 | Performed by: FAMILY MEDICINE

## 2019-09-10 ASSESSMENT — ENCOUNTER SYMPTOMS
SORE THROAT: 0
FEVER: 0
PARESTHESIAS: 0
PALPITATIONS: 0
SHORTNESS OF BREATH: 0
CHILLS: 0
HEADACHES: 0
COUGH: 0
NERVOUS/ANXIOUS: 0
WEAKNESS: 0
JOINT SWELLING: 0
MYALGIAS: 0
ARTHRALGIAS: 0
DIZZINESS: 0

## 2019-09-10 ASSESSMENT — PAIN SCALES - GENERAL: PAINLEVEL: NO PAIN (0)

## 2019-09-10 ASSESSMENT — MIFFLIN-ST. JEOR: SCORE: 1767.21

## 2019-09-10 NOTE — PATIENT INSTRUCTIONS
Abeletings Don,    Thank you for allowing [facility] to manage your care.    Please keep your upcoming eye appointment.     I made a therapy and care coordinator referral, they will be in 1-2 weeks to set up your appointment.  If you do not hear from them, please call the specialty number on your after visit.     If you have any questions or concerns, please feel free to call us at [phone].    Sincerely,    Dr. Jackson

## 2019-09-11 ENCOUNTER — ALLIED HEALTH/NURSE VISIT (OUTPATIENT)
Dept: DERMATOLOGY | Facility: CLINIC | Age: 79
End: 2019-09-11
Payer: MEDICARE

## 2019-09-11 DIAGNOSIS — Z48.02 ATTENTION TO DRESSINGS AND SUTURES: Primary | ICD-10-CM

## 2019-09-11 DIAGNOSIS — Z48.00 ATTENTION TO DRESSINGS AND SUTURES: Primary | ICD-10-CM

## 2019-09-11 PROCEDURE — 99207 ZZC NO CHARGE NURSE ONLY: CPT

## 2019-09-11 NOTE — PATIENT INSTRUCTIONS

## 2019-09-11 NOTE — PROGRESS NOTES
Pt returned to clinic for post surgery 1 week follow up bandage change. Pt has no complaints, denies pain. Bandage removed from top of nose and left ala area cleansed with normal saline. Site is healing and wound edges approximating well. Reapplied new steri strips and paper tape.    Advised to watch for signs/sx of infection; spreading redness, drainage, odor, fever. Call or report promptly to clinic. Pt given written instructions and informed to rtc as needed. Patient verbalized understanding.

## 2019-09-12 ENCOUNTER — PATIENT OUTREACH (OUTPATIENT)
Dept: CARE COORDINATION | Facility: CLINIC | Age: 79
End: 2019-09-12

## 2019-09-12 ASSESSMENT — ACTIVITIES OF DAILY LIVING (ADL): DEPENDENT_IADLS:: TRANSPORTATION

## 2019-09-12 NOTE — PROGRESS NOTES
Clinic Care Coordination Contact    Clinic Care Coordination Contact  OUTREACH    Referral Information:  Referral Source: PCP.  Complex Medical Concerns/Education: Chronic diagnosis Depression and Patient/Caregiver Support: Transportation, Senior Centers:  79 yo male with multiple medical conditions feels like he has loss his independence.  This has made his depression worse.  He would like transportation information and senior center information so he does not feel alone.     Primary Diagnosis: Oncology    ANETTE reviewed pt's EMR and then placed a call to the pt via his sister Daylin, per EMR.  SWCC introduced self, title, role & explained that a referral for care coordination was placed by pt's care team and this writer is calling to offer assistance.  Daylin put the pt on her cell phone speaker phone & both agreed to speak with this writer.    Pt is interested in community activities & becoming less reliant on his sister for transportation, social activities, etc.  ANETTE did some research prior to the phone call and discussed options such as the [facility], 's , metro mobility, etc.  Pt said all sound good and requested this writer mail him this information.  Daylin also said they will research via the Internet as well.    SW to mail pt a packet of information that includes:  1.  [facility]   2.  [facility] Resource Guide  3.  [facility] Information & Application  4.  [facility] 's Services information  5.  [facility] Family & Caregiver Connection    Chief Complaint   Patient presents with     Clinic Care Coordination - Initial     social work      Universal Utilization: Clinic Utilization  Difficulty keeping appointments:: No  Compliance Concerns: No  No-Show Concerns: No  No PCP office visit in Past Year: No  Utilization    Last refreshed: 9/12/2019  1:07 PM:  Hospital Admissions 0           Last refreshed: 9/12/2019  1:07 PM:  ED  Visits 0           Last refreshed: 9/12/2019  1:07 PM:  No Show Count (past year) 0              Current as of: 9/12/2019  1:07 PM            Clinical Concerns:  Current Medical Concerns:  Complex medical issues    Current Behavioral Concerns: Depression    Education Provided to patient: See above     Pain  Pain (GOAL):: No  Health Maintenance Reviewed: Due/Overdue:  Health Maintenance Due   Topic Date Due     EYE EXAM  1940     PHQ-9  1940     ZOSTER IMMUNIZATION (1 of 2) 11/02/1992     FALL RISK ASSESSMENT  11/02/2007     MEDICARE ANNUAL WELLNESS VISIT  03/13/2009     TSH W/FREE T4 REFLEX  03/13/2010     DTAP/TDAP/TD IMMUNIZATION (2 - Td) 03/13/2014     COLONOSCOPY  03/13/2016     INFLUENZA VACCINE (1) 09/01/2019     Clinical Pathway: None    Medication Management:  Pt states that he & Daylin set up his medications.     Functional Status:  Dependent ADLs:: Independent  Dependent IADLs:: Transportation  Bed or wheelchair confined:: No  Mobility Status: Independent  Fallen 2 or more times in the past year?: No  Any fall with injury in the past year?: No    Living Situation:  Current living arrangement:: I live in a private home with family(Sister, Daylin)  Type of residence:: Private home - stairs    Diet/Exercise/Sleep:  Diet:: Diabetic diet  Inadequate nutrition (GOAL):: No  Food Insecurity: No  Tube Feeding: No  Exercise:: Currently not exercising  Inadequate activity/exercise (GOAL):: No  Significant changes in sleep pattern (GOAL): No    Transportation:  Transportation concerns (GOAL):: No  Transportation means:: Family, Regular car(Pt would like to have transporation for social events.)     Psychosocial:  Anabaptist or spiritual beliefs that impact treatment:: No  Mental health DX:: Yes  Mental health DX how managed:: Medication  Mental health management concern (GOAL):: No  Informal Support system:: Family     Financial/Insurance: Medicare & AARP/ Social Security  Financial/Insurance concerns (GOAL)::  No     Resources and Interventions:  Current Resources: Family   Community Resources: None  Supplies used at home:: Wound Care Supplies, Diabetic Supplies  Equipment Currently Used at Home: none    Advance Care Plan/Directive  Advanced Care Plans/Directives on file:: No  Advanced Care Plan/Directive Status: Considering Options    Referrals Placed: Transportation, [facility], Senior Center activities & [facility] guide     Goals:   Goals        General    Functional (pt-stated)     Notes - Note created  9/12/2019  1:18 PM by Paola Rapp LSW    Goal Statement: I will find transportation for social events  Measure of Success: Pt will complete the [employer] application  Supportive Steps to Achieve: Pt's sister, pt and SW to assist with completion the [employer] application.  Barriers: None  Strengths: Pt motivated to regain independence.  Date to Achieve By: December 1, 2019  Patient expressed understanding of goal: Yes            Psychosocial (pt-stated)     Notes - Note edited  9/12/2019  1:16 PM by Paola Rapp LSW    #2 Goal Statement: I will become more active in my community  Measure of Success: Pt will engage in community programs for senior citizens  Supportive Steps to Achieve: Pt, his sister & SW will find programs that appeal to the pt to engage in.  Barriers: Pt cannot drive, relies upon family & transportation resources  Strengths: Pt appears motivated to engage in the community  Date to Achieve By: December 1, 2019  Patient expressed understanding of goal: Yes                  Patient/Caregiver understanding: Pt to review the packet of information this writer sent to him, complete applications as desired, and follow up with SW in 1 month.    Outreach Frequency: monthly  Future Appointments              In 2 months Dinehs Roque MD [facility]    In 2 months Thomas Jackson DO [facility]    In 3 months Doug  Crystal ANDERSON MD [facility]    In 10 months  [facility]    In 10 months Crystal Camacho MD [facility]          Plan: SW to continue to follow.    Paola Santos  Primary Care Clinic- Social Work Care Coordinator  [place] [facility]  9/12/2019 1:39 PM  [phone]

## 2019-09-12 NOTE — LETTER
[facility]  [address] [place] [address]  [phone]      September 12, 2019    Flash Pollardman  [address] [address]      Dear Flash,    I am a clinic care coordinator- that works with Thomas Jackson DO at the [facility].  Thank you so much for speaking with me today regarding ways you can regain some of your independence & engage with your community.    Per your request, I am sending you information on the following programs that may appeal to you:  1.  [facility] - They offer many programs right in your area.  2.  [facility] Resource Guide - lots of great information in here.  3.  [employer] Information & Application  4.  [facility]'s Services information  5.  [facility] Family & Caregiver Connection    I would love to continue to work with you and assist with further needs.  Please feel free to contact me at [phone], with any questions or concerns. We at [facility] are focused on providing you with the highest-quality healthcare experience possible and that all starts with you.     Sincerely,     Paola Ray  Primary Care Clinic- Social Work Care Coordinator  [facility]  [phone]

## 2019-09-27 ENCOUNTER — ANESTHESIA EVENT (OUTPATIENT)
Dept: SURGERY | Facility: CLINIC | Age: 79
End: 2019-09-27
Payer: MEDICARE

## 2019-09-29 NOTE — PROGRESS NOTES
Chief Complaint   Patient presents with     Ear Problem     needs ears cleaned     History of Present Illness   Flash Brown is a 78 year old male who presents to me today for ear evaluation.  The patient after he presents today looking quite diaphoretic.  His systolic blood pressure was 70 mmHg.  He had several recent Mohs procedures and skin cancer removals.  He recently had a forehead flap surgery.  He is not sure what medications he is been taking.  He has had problems with his ears being plugged and feels like there is earwax plugging his ears after a shower.  He is not having any ear pain or drainage.  He does feel very lightheaded but no vicente vertigo.  No previous ear surgery.      Past Medical History  Patient Active Problem List   Diagnosis     Essential hypertension, benign     Malignant melanoma s/p resection in [place], [place]     RT axillary lymphoma s/p resection, with adjuvant radiation - [place], [place]     Basal cell skin cancer over nose s/p resection - [place], [place]     Mixed hyperlipidemia     Diabetes mellitus, type 2 (H)     Obstructive sleep apnea     Depressive disorder, not elsewhere classified     ? 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     Current Outpatient Medications:      amLODIPine (NORVASC) 5 MG tablet, Take 1 tablet (5 mg) by mouth daily, Disp: , Rfl:      aspirin (ASA) 81 MG EC tablet, Take 1 tablet (81 mg) by mouth daily, Disp: , Rfl:      atorvastatin (LIPITOR) 20 MG tablet, Take 1 tablet (20 mg) by mouth daily, Disp: 90 tablet, Rfl: 3     busPIRone (BUSPAR) 10 MG tablet, Take 1 tablet (10 mg) by mouth 2 times daily, Disp: , Rfl:       carvedilol (COREG) 12.5 MG tablet, Take 1 tablet (12.5 mg) by mouth 2 times daily (with meals), Disp: 180 tablet, Rfl: 3     CENTRUM OR TABS, 1 TABLET DAILY, Disp: , Rfl:      cloNIDine (CATAPRES) 0.1 MG tablet, Take 1 tablet (0.1 mg) by mouth 2 times daily, Disp: , Rfl:      COQ10 100 MG OR CAPS, None Entered, Disp: , Rfl:      diazepam (VALIUM) 10 MG tablet, Take 1 tablet (10 mg) by mouth every 6 hours as needed for anxiety, Disp: 1 tablet, Rfl: 0     diazepam (VALIUM) 10 MG tablet, Take 1 tablet (10 mg) by mouth every 6 hours as needed for anxiety, Disp: 1 tablet, Rfl: 0     diazepam (VALIUM) 10 MG tablet, Take 1 tablet (10 mg) by mouth every 6 hours as needed for anxiety, Disp: 1 tablet, Rfl: 0     diazepam (VALIUM) 10 MG tablet, Take 1 tablet (10 mg) by mouth every 6 hours as needed for anxiety, Disp: 1 tablet, Rfl: 0     diazepam (VALIUM) 10 MG tablet, Before procedure, Disp: 1 tablet, Rfl: 0     diazepam (VALIUM) 5 MG tablet, Two tablets before procedure, Disp: 2 tablet, Rfl: 0     diazepam (VALIUM) 5 MG tablet, Take 1 tablet (5 mg) by mouth every 6 hours as needed for anxiety, Disp: 1 tablet, Rfl: 0     glimepiride (AMARYL) 4 MG tablet, Take 1 tablet (4 mg) by mouth 2 times daily, Disp: , Rfl:      hydrALAZINE (APRESOLINE) 50 MG tablet, Take 1 tablet (50 mg) by mouth 2 times daily, Disp: , Rfl:      naloxone (NARCAN) 4 MG/0.1ML nasal spray, Spray 1 spray (4 mg) into one nostril alternating nostrils once as needed for opioid reversal Every 2-3 minutes until patient responsive or EMS arrives, Disp: 0.2 mL, Rfl: 0     NIACIN 500 MG OR TABS, Take one orally twice daily, Disp: 180, Rfl: 3     NITROGLYCERIN 0.4 MG SL SUBL, ONE TABLET UNDER TONGUE, FOR CHEST PAIN, AS DIRECTED, Disp: 1 BOTTLE, Rfl: 3 per year     OMEGA-3 1000 MG OR CAPS, None Entered, Disp: , Rfl:      ORDER FOR DME, Light Therapy with Full Spectrum Light (69597 lux) Start with 10-15 minute exposure per day, Increase exposure to 30 to 45  minutes per day, Maximum exposure: 90 minutes per day,Look periodically at light during each session , Disp: 1, Rfl: 0     pioglitazone (ACTOS) 30 MG tablet, Take 1 tablet (30 mg) by mouth daily, Disp: , Rfl:      ramipril (ALTACE) 10 MG capsule, Take 1 capsule (10 mg) by mouth daily, Disp: 90 capsule, Rfl: 3     tamsulosin (FLOMAX) 0.4 MG capsule, Take 1 capsule (0.4 mg) by mouth daily, Disp: 90 capsule, Rfl: 3     VOSOL-HC 2-1 % OT SOLN, 2-3 drops in the affected ear 3-4 times daily for 7-10 days for itching in the ear, Disp: 1 bottle, Rfl: 0     VYTORIN 10-40 MG OR TABS, 1 TABLET EVERY EVENING, Disp: 3 months, Rfl: 1    Facility-Administered Medications Ordered in Other Visits:      0.9% sodium chloride BOLUS, 1,000 mL, Intravenous, Once **FOLLOWED BY** sodium chloride 0.9% infusion, 1,000 mL, Intravenous, Continuous, Shane Hall MD    Allergies  Allergies   Allergen Reactions     Iodine Swelling       Social History  Social History     Socioeconomic History     Marital status: Single     Spouse name: Not on file     Number of children: 0     Years of education: Not on file     Highest education level: Not on file   Occupational History     Occupation: Retired     Comment: [employer]    Social Needs     Financial resource strain: Not on file     Food insecurity:     Worry: Not on file     Inability: Not on file     Transportation needs:     Medical: Not on file     Non-medical: Not on file   Tobacco Use     Smoking status: Never Smoker     Smokeless tobacco: Never Used   Substance and Sexual Activity     Alcohol use: Yes     Frequency: Monthly or less     Drinks per session: 1 or 2     Binge frequency: Never     Comment: glass of wine couple times per week     Drug use: Never     Sexual activity: Not on file   Lifestyle     Physical activity:     Days per week: Not on file     Minutes per session: Not on file     Stress: Not on file   Relationships     Social connections:     Talks on phone:  "Not on file     Gets together: Not on file     Attends [employer] service: Not on file     Active member of club or organization: Not on file     Attends meetings of clubs or organizations: Not on file     Relationship status: Not on file     Intimate partner violence:     Fear of current or ex partner: Not on file     Emotionally abused: Not on file     Physically abused: Not on file     Forced sexual activity: Not on file   Other Topics Concern     Not on file   Social History Narrative     Not on file       Family History  Family History   Problem Relation Age of Onset     Asthma Other      Cancer Other         melanoma     Blood Disease Other         bleeding disorder     Lung Cancer Mother         Smoker     Prostate Cancer Father      Melanoma No family hx of        Review of Systems  As per HPI and PMHx, otherwise 7 system review of the head and neck negative.    Physical Exam  BP (!) 73/48 (BP Location: Right arm, Patient Position: Chair, Cuff Size: Adult Large)   Pulse 69   Temp 97.2  F (36.2  C) (Tympanic)   Resp 18   Ht 1.683 m (5' 6.26\")   Wt 109 kg (240 lb 4.8 oz)   BMI 38.48 kg/m    GENERAL: Patient is clammy and diaphoretic.  He is cooperative.  He is in no acute distress but is very somnolent.  HEAD: Normocephalic, atraumatic.  Hair and scalp are normal.  EYES: Pupils are equal, round, reactive to light and accommodation.  Extraocular movements are intact.  The sclera nonicteric without injection.  The extraocular structures are normal.  EARS: See below.  NOSE: The patient appears to have had a paramedian forehead flap reconstruction of a nasal skin cancer removal.  Nares are patent.  Nasal mucosa is pink and moist.  Negative anterior rhinoscopy.  NEUROLOGIC: Cranial nerves II through XII are grossly intact.  Voice is strong.  Patient is House-Brackman I/VI bilaterally.  CARDIOVASCULAR: Extremities cool and clammy.  No significant peripheral edema.    RESPIRATORY: Patient has nonlabored " breathing without cough, wheeze, stridor.  PSYCHIATRIC: Patient is alert and oriented.  Mood and affect appear normal.  SKIN: Cold and clammy.  Patient is diaphoretic.  Patient has well-healing facial incisions.      Physical Exam and Procedure    EARS: Normal shape and symmetry.  No tenderness when palpating the mastoid or tragal areas bilaterally.  The ears were then examined under the otic binocular microscope to perform cerumen removal.  Otoscopic exam on the right reveals impacted cerumen down to the level of the tympanic membrane.  The cerumen was removed with #7 suction.  The right tympanic membrane was round, intact without evidence of effusion.  No retraction, granulation, drainage, or evidence of cholesteatoma.      Attention was turned to the left ear.  Otoscopic exam on the left reveals impacted cerumen down to the level of the tympanic membrane.  The cerumen was removed with #7 suction.  The left tympanic membrane was round, intact without evidence of effusion.  No retraction, granulation, drainage, or evidence of cholesteatoma.      Assessment and Plan     ICD-10-CM    1. Bilateral impacted cerumen H61.23 Remove Cerumen     It was my pleasure seeing Flash Brown today in clinic.  Bilateral cerumen impactions that we removed today in office.  Given the patient's appearance and his significant hypotension, I am concerned the patient had adverse cardiovascular health.  We did call a rapid response and the patient was taken to the emergency room for further evaluation.     Tahir Quiroz MD  Department of Otolarygology-Head and Neck Surgery  [facility]

## 2019-09-29 NOTE — ANESTHESIA PREPROCEDURE EVALUATION
Anesthesia Pre-Procedure Evaluation    Patient: Flash Brown   MRN: [id number] : 1940          Preoperative Diagnosis: Cataract [H26.9]    Procedure(s):  Cataract Removal with Implant    Past Medical History:   Diagnosis Date     Basal cell carcinoma      CAD (coronary artery disease)      Depression      Diabetes type 2, controlled (H)      Hyperlipidemia      Hypertension      Lymphoma (H)      Malignant melanoma (H)      Melanoma (H)      Squamous cell carcinoma      Past Surgical History:   Procedure Laterality Date     AXILLARY SURGERY      S/P resection and adjuvant radiation     BONE MARROW BIOPSY, BONE SPECIMEN, NEEDLE/TROCAR N/A 2019    Procedure: BIOPSY, BONE MARROW;  Surgeon: Husam Issa MD;  Location: [facility]     CHOLECYSTECTOMY       HERNIA REPAIR, UMBILICAL       STENT      PTCA with drug-eluting stent of RCA, circumflex, and LAD       Anesthesia Evaluation     . Pt has had prior anesthetic. Type: General and MAC    No history of anesthetic complications          ROS/MED HX    ENT/Pulmonary:     (+)sleep apnea, , . .    Neurologic:  - neg neurologic ROS     Cardiovascular:     (+) Dyslipidemia, hypertension--CAD, --stent,. : . . . :. . Previous cardiac testing Echodate:8-25-21wgnujpt:LVH, EF 60 - 65%, left atrial enlargement, grade I diastolic dysfunction, mild atrial and tricuspid regurgdate: results:ECG reviewed date:18 results:SB; 1st degree AV block, occasional PVC; volt criteria for LVH; T wave abnormality, consider lateral ischemia; abnormal EKGCath date: 11-15-11 results:70% stenosis proximal circumflex          METS/Exercise Tolerance:     Hematologic: Comments: Thrombocytopenia         Musculoskeletal:   (+) arthritis,  -       GI/Hepatic: Comment: Alcoholic cirrhosis of liver with ascites    (+) liver disease, Other GI/Hepatic       Renal/Genitourinary: Comment: Proteinuria        Endo:     (+) type II DM Last HgA1c: 5.4 date:  Not using insulin - not using  "insulin pump Obesity, Other Endocrine Disorder morbic obeisty.      Psychiatric:     (+) psychiatric history depression      Infectious Disease:  - neg infectious disease ROS       Malignancy:   (+) Malignancy History of Skin and Lymphoma/Leukemia  Skin CA Remission status post Surgery, Lymph CA status post Radiation and Surgery, Lymphoma  SCC  Melanoma  BCC  Malignant melanoma s/p resection in [place], [place]  RT axillary lymphoma s/p resection, with adjuvant radiation - [place], [place]  Basal cell skin cancer over nose s/p resection - [place], [place]  ? 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        Other:    - neg other ROS                      Physical Exam  Normal systems: pulmonary    Airway   Mallampati: IV  TM distance: <3 FB  Neck ROM: limited    Dental   (+) missing    Cardiovascular   Rhythm and rate: irregular and normal      Pulmonary             Lab Results   Component Value Date    WBC 3.2 (L) 07/08/2019    HGB 11.2 (L) 07/08/2019    HCT 35.4 (L) 07/08/2019    PLT 82 (L) 07/08/2019     06/28/2019    POTASSIUM 4.1 06/28/2019    CHLORIDE 108 06/28/2019    CO2 27 06/28/2019    BUN 17 06/28/2019    CR 1.14 06/28/2019     (H) 06/28/2019    NATALIIA 8.5 06/28/2019    ALBUMIN 3.1 (L) 06/28/2019    PROTTOTAL 6.8 06/28/2019    ALT 19 06/28/2019    AST 19 06/28/2019    ALKPHOS 85 06/28/2019    BILITOTAL 0.6 06/28/2019    TSH 3.14 03/13/2008       Preop Vitals  BP Readings from Last 3 Encounters:   09/10/19 100/56   09/04/19 118/52   08/12/19 139/62    Pulse Readings from Last 3 Encounters:   09/10/19 90   09/04/19 72   08/12/19 66      Resp Readings from Last 3 Encounters:   09/10/19 16   07/17/19 20   07/08/19 16    SpO2 Readings from Last 3 Encounters:   09/04/19 97%   08/12/19 98%   08/05/19 98%      Temp Readings from Last 1 Encounters:   09/10/19 36.3  C (97.3  F) (Tympanic)    Ht Readings from Last 1 Encounters:   09/10/19 1.683 m (5' 6.26\") " "     Wt Readings from Last 1 Encounters:   09/10/19 109 kg (240 lb 6 oz)    Estimated body mass index is 38.49 kg/m  as calculated from the following:    Height as of 9/10/19: 1.683 m (5' 6.26\").    Weight as of 9/10/19: 109 kg (240 lb 6 oz).       Anesthesia Plan      History & Physical Review  History and physical reviewed and following examination; no interval change.    ASA Status:  3 .    NPO Status:  > 6 hours    Plan for MAC Maintenance will be Balanced.  Reason for MAC:  Procedure to face, neck, head or breast         Postoperative Care  Postoperative pain management:  IV analgesics.      Consents  Anesthetic plan, risks, benefits and alternatives discussed with:  Patient..                 Dinesh Hawkins CRNA, APRN CRNA  "

## 2019-09-30 ENCOUNTER — APPOINTMENT (OUTPATIENT)
Dept: GENERAL RADIOLOGY | Facility: CLINIC | Age: 79
End: 2019-09-30
Attending: EMERGENCY MEDICINE
Payer: MEDICARE

## 2019-09-30 ENCOUNTER — HOSPITAL ENCOUNTER (EMERGENCY)
Facility: CLINIC | Age: 79
Discharge: HOME | End: 2019-09-30
Attending: EMERGENCY MEDICINE | Admitting: EMERGENCY MEDICINE
Payer: MEDICARE

## 2019-09-30 ENCOUNTER — APPOINTMENT (OUTPATIENT)
Dept: CT IMAGING | Facility: CLINIC | Age: 79
End: 2019-09-30
Attending: EMERGENCY MEDICINE
Payer: MEDICARE

## 2019-09-30 ENCOUNTER — OFFICE VISIT (OUTPATIENT)
Dept: OTOLARYNGOLOGY | Facility: CLINIC | Age: 79
End: 2019-09-30
Payer: MEDICARE

## 2019-09-30 VITALS
WEIGHT: 240.3 LBS | HEIGHT: 66 IN | BODY MASS INDEX: 38.62 KG/M2 | DIASTOLIC BLOOD PRESSURE: 48 MMHG | RESPIRATION RATE: 18 BRPM | HEART RATE: 69 BPM | TEMPERATURE: 97.2 F | SYSTOLIC BLOOD PRESSURE: 73 MMHG

## 2019-09-30 VITALS
DIASTOLIC BLOOD PRESSURE: 69 MMHG | HEART RATE: 48 BPM | RESPIRATION RATE: 19 BRPM | WEIGHT: 240 LBS | SYSTOLIC BLOOD PRESSURE: 148 MMHG | BODY MASS INDEX: 38.57 KG/M2 | HEIGHT: 66 IN | TEMPERATURE: 98.1 F | OXYGEN SATURATION: 100 %

## 2019-09-30 DIAGNOSIS — H61.23 BILATERAL IMPACTED CERUMEN: Primary | ICD-10-CM

## 2019-09-30 DIAGNOSIS — R55 SYNCOPE, UNSPECIFIED SYNCOPE TYPE: ICD-10-CM

## 2019-09-30 LAB
ALBUMIN SERPL-MCNC: 3.2 G/DL (ref 3.4–5)
ALBUMIN UR-MCNC: NEGATIVE MG/DL
ALP SERPL-CCNC: 85 U/L (ref 40–150)
ALT SERPL W P-5'-P-CCNC: 25 U/L (ref 0–70)
ANION GAP SERPL CALCULATED.3IONS-SCNC: 10 MMOL/L (ref 3–14)
APPEARANCE UR: ABNORMAL
AST SERPL W P-5'-P-CCNC: 30 U/L (ref 0–45)
BASOPHILS # BLD AUTO: 0 10E9/L (ref 0–0.2)
BASOPHILS NFR BLD AUTO: 1 %
BILIRUB SERPL-MCNC: 1 MG/DL (ref 0.2–1.3)
BILIRUB UR QL STRIP: NEGATIVE
BUN SERPL-MCNC: 16 MG/DL (ref 7–30)
CALCIUM SERPL-MCNC: 8.5 MG/DL (ref 8.5–10.1)
CHLORIDE SERPL-SCNC: 106 MMOL/L (ref 94–109)
CO2 SERPL-SCNC: 20 MMOL/L (ref 20–32)
COLOR UR AUTO: YELLOW
CREAT SERPL-MCNC: 1.03 MG/DL (ref 0.66–1.25)
DIFFERENTIAL METHOD BLD: ABNORMAL
EOSINOPHIL # BLD AUTO: 0.1 10E9/L (ref 0–0.7)
EOSINOPHIL NFR BLD AUTO: 2.3 %
ERYTHROCYTE [DISTWIDTH] IN BLOOD BY AUTOMATED COUNT: 15.1 % (ref 10–15)
GFR SERPL CREATININE-BSD FRML MDRD: 70 ML/MIN/{1.73_M2}
GLUCOSE BLDC GLUCOMTR-MCNC: 195 MG/DL (ref 70–99)
GLUCOSE SERPL-MCNC: 210 MG/DL (ref 70–99)
GLUCOSE UR STRIP-MCNC: NEGATIVE MG/DL
HCT VFR BLD AUTO: 37.8 % (ref 40–53)
HGB BLD-MCNC: 11.9 G/DL (ref 13.3–17.7)
HGB UR QL STRIP: NEGATIVE
HYALINE CASTS #/AREA URNS LPF: 37 /LPF (ref 0–2)
IMM GRANULOCYTES # BLD: 0.1 10E9/L (ref 0–0.4)
IMM GRANULOCYTES NFR BLD: 1.3 %
KETONES UR STRIP-MCNC: NEGATIVE MG/DL
LACTATE BLD-SCNC: 1.3 MMOL/L (ref 0.7–2)
LACTATE BLD-SCNC: 3 MMOL/L (ref 0.7–2)
LEUKOCYTE ESTERASE UR QL STRIP: NEGATIVE
LYMPHOCYTES # BLD AUTO: 1 10E9/L (ref 0.8–5.3)
LYMPHOCYTES NFR BLD AUTO: 24.6 %
MCH RBC QN AUTO: 27 PG (ref 26.5–33)
MCHC RBC AUTO-ENTMCNC: 31.5 G/DL (ref 31.5–36.5)
MCV RBC AUTO: 86 FL (ref 78–100)
MONOCYTES # BLD AUTO: 0.5 10E9/L (ref 0–1.3)
MONOCYTES NFR BLD AUTO: 11.9 %
MUCOUS THREADS #/AREA URNS LPF: PRESENT /LPF
NEUTROPHILS # BLD AUTO: 2.3 10E9/L (ref 1.6–8.3)
NEUTROPHILS NFR BLD AUTO: 58.9 %
NITRATE UR QL: NEGATIVE
NRBC # BLD AUTO: 0 10*3/UL
NRBC BLD AUTO-RTO: 0 /100
PH UR STRIP: 6 PH (ref 5–7)
PLATELET # BLD AUTO: 103 10E9/L (ref 150–450)
POTASSIUM SERPL-SCNC: 4 MMOL/L (ref 3.4–5.3)
PROT SERPL-MCNC: 7 G/DL (ref 6.8–8.8)
RBC # BLD AUTO: 4.4 10E12/L (ref 4.4–5.9)
RBC #/AREA URNS AUTO: 0 /HPF (ref 0–2)
SODIUM SERPL-SCNC: 136 MMOL/L (ref 133–144)
SOURCE: ABNORMAL
SP GR UR STRIP: 1.02 (ref 1–1.03)
SQUAMOUS #/AREA URNS AUTO: <1 /HPF (ref 0–1)
TROPONIN I SERPL-MCNC: <0.015 UG/L (ref 0–0.04)
TROPONIN I SERPL-MCNC: <0.015 UG/L (ref 0–0.04)
UROBILINOGEN UR STRIP-MCNC: 2 MG/DL (ref 0–2)
WBC # BLD AUTO: 3.9 10E9/L (ref 4–11)
WBC #/AREA URNS AUTO: 2 /HPF (ref 0–5)

## 2019-09-30 PROCEDURE — 69210 REMOVE IMPACTED EAR WAX UNI: CPT | Performed by: OTOLARYNGOLOGY

## 2019-09-30 PROCEDURE — 70450 CT HEAD/BRAIN W/O DYE: CPT

## 2019-09-30 PROCEDURE — 85025 COMPLETE CBC W/AUTO DIFF WBC: CPT | Performed by: EMERGENCY MEDICINE

## 2019-09-30 PROCEDURE — 99285 EMERGENCY DEPT VISIT HI MDM: CPT | Mod: 25 | Performed by: EMERGENCY MEDICINE

## 2019-09-30 PROCEDURE — 93010 ELECTROCARDIOGRAM REPORT: CPT | Mod: Z6 | Performed by: EMERGENCY MEDICINE

## 2019-09-30 PROCEDURE — 93005 ELECTROCARDIOGRAM TRACING: CPT | Performed by: EMERGENCY MEDICINE

## 2019-09-30 PROCEDURE — 84484 ASSAY OF TROPONIN QUANT: CPT | Performed by: EMERGENCY MEDICINE

## 2019-09-30 PROCEDURE — 81001 URINALYSIS AUTO W/SCOPE: CPT | Performed by: EMERGENCY MEDICINE

## 2019-09-30 PROCEDURE — 00000146 ZZHCL STATISTIC GLUCOSE BY METER IP

## 2019-09-30 PROCEDURE — 83605 ASSAY OF LACTIC ACID: CPT | Mod: 91 | Performed by: EMERGENCY MEDICINE

## 2019-09-30 PROCEDURE — 71046 X-RAY EXAM CHEST 2 VIEWS: CPT

## 2019-09-30 PROCEDURE — 99203 OFFICE O/P NEW LOW 30 MIN: CPT | Mod: 25 | Performed by: OTOLARYNGOLOGY

## 2019-09-30 PROCEDURE — 25000128 H RX IP 250 OP 636: Performed by: EMERGENCY MEDICINE

## 2019-09-30 PROCEDURE — 96360 HYDRATION IV INFUSION INIT: CPT | Performed by: EMERGENCY MEDICINE

## 2019-09-30 PROCEDURE — 80053 COMPREHEN METABOLIC PANEL: CPT | Performed by: EMERGENCY MEDICINE

## 2019-09-30 RX ORDER — SODIUM CHLORIDE 9 MG/ML
1000 INJECTION, SOLUTION INTRAVENOUS CONTINUOUS
Status: DISCONTINUED | OUTPATIENT
Start: 2019-09-30 | End: 2019-09-30 | Stop reason: HOSPADM

## 2019-09-30 RX ADMIN — SODIUM CHLORIDE 1000 ML: 9 INJECTION, SOLUTION INTRAVENOUS at 10:28

## 2019-09-30 ASSESSMENT — MIFFLIN-ST. JEOR
SCORE: 1766.88
SCORE: 1761.38

## 2019-09-30 NOTE — ED NOTES
Pt had RAPID RESPONSE in ENT clinic - found to be very diaphoretic and minimally responsive and pale - transferred to stretcher without incident - patient had BM along the way - denies any pain at this time - does admit to shortness of breath at times.

## 2019-09-30 NOTE — ED PROVIDER NOTES
History     Chief Complaint   Patient presents with     Loss of Consciousness     Pt had a syncopal event in clinic. Unresponsive when ED RN avviced. Pt had loss of bowel and is pale, diaphoretic, and low BP. Moved to room 18 with a RAT team awaiting pt.     HPI  Flash Brown is a 78 year old male who presents the emergency department from ENT clinic after a rapid response reaction secondary to syncopal episode.  Patient was at ENT clinic to get his ears cleaned out.  Patient was in the ENT chair when he suddenly became unresponsive.  Rapid response was called patient lost bowel function became diaphoretic and pale with low blood pressure.  He was very confused and lethargic for some time and then has slowly come out of it.  On arrival to the emergency department he is alert and oriented x3 stating he is tired and apologizing for losing his bowels.  He states he just felt really weak and dizzy all of a sudden he does not recall any chest pain abdominal pain.  He has been in his normal state of health recently.  He currently denies any headache visual changes chest pain states he is a bit short of breath but that may be usual.  He denies any abdominal pain or back pain.  He denies any current focal numbness weakness in extremity.  Patient states he was aware of his surroundings throughout the time of his episode and does not think he completely passed out.    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 Provider to review       Proteinuria 04/15/2008     Priority: Medium     Basal cell skin cancer over nose s/p resection - [place], [place] 01/30/2008     Priority: Medium     January 30, 2008  Recurrence of basal cell on back, arm, shoulder, face - pending excision  Dermatology: Dr. Cynthia FRYE update changed this record. Please review for accuracy       Mixed hyperlipidemia 01/30/2008     Priority: Medium     Last Exam: April 15, 2008  Last Lipids: CHOL      137   03/13/2008 LDL       76   03/13/2008 HDL       37   03/13/2008  Last LFTs:: N  ALT       47   03/13/2008    Meds: Vytorin 10/40, Niacin 500 bid       Diabetes mellitus, type 2 (H) 01/30/2008     Priority: Medium     April 15, 2008  BP: 128/74  Body mass index is 36.01 kg/(m^2).  A1C      6.1   03/13/2008 GLC      104   03/13/2008  LDL       76   03/13/2008    Meds: Metformin 1000      Problem list name updated by automated process. Provider to review       Obstructive sleep apnea 01/30/2008     Priority: Medium     Problem list name updated by automated process. Provider to review       Depressive disorder, not elsewhere classified 01/30/2008     Priority: Medium     January 30, 2008  Meds: Starting Celexa 20  PHQ-9: 11.       ? exposure predisposing to CA 01/30/2008     Priority: Medium     Essential hypertension, benign 01/30/2004     Priority: Medium     Last exam: April 15, 2008  BPs: 128/74  Meds: Atenolol 50, ASA 81, Prinizide 20/25         RT axillary lymphoma s/p resection, with adjuvant radiation - [place], [place] 01/30/1983     Priority: Medium     Malignant melanoma s/p resection in [place], [place] 01/30/1978     Priority: Medium        Past Medical History:    Past Medical History:   Diagnosis Date     Basal cell carcinoma      CAD (coronary artery disease)      Depression      Diabetes type 2, controlled (H)      Hyperlipidemia      Hypertension      Lymphoma  (H)      Malignant melanoma (H)      Melanoma (H)      Squamous cell carcinoma        Past Surgical History:    Past Surgical History:   Procedure Laterality Date     AXILLARY SURGERY      S/P resection and adjuvant radiation     BONE MARROW BIOPSY, BONE SPECIMEN, NEEDLE/TROCAR N/A 7/8/2019    Procedure: BIOPSY, BONE MARROW;  Surgeon: Husam Issa MD;  Location: [facility]     CHOLECYSTECTOMY       HERNIA REPAIR, UMBILICAL       STENT      PTCA with drug-eluting stent of RCA, circumflex, and LAD       Family History:    Family History   Problem Relation Age of Onset     Asthma Other      Cancer Other         melanoma     Blood Disease Other         bleeding disorder     Lung Cancer Mother         Smoker     Prostate Cancer Father      Melanoma No family hx of        Social History:  Marital Status:  Single [1]  Social History     Tobacco Use     Smoking status: Never Smoker     Smokeless tobacco: Never Used   Substance Use Topics     Alcohol use: Yes     Frequency: Monthly or less     Drinks per session: 1 or 2     Binge frequency: Never     Comment: glass of wine couple times per week     Drug use: Never        Medications:    amLODIPine (NORVASC) 5 MG tablet  aspirin (ASA) 81 MG EC tablet  atorvastatin (LIPITOR) 20 MG tablet  busPIRone (BUSPAR) 10 MG tablet  carvedilol (COREG) 12.5 MG tablet  CENTRUM OR TABS  cloNIDine (CATAPRES) 0.1 MG tablet  COQ10 100 MG OR CAPS  diazepam (VALIUM) 10 MG tablet  diazepam (VALIUM) 10 MG tablet  diazepam (VALIUM) 10 MG tablet  diazepam (VALIUM) 10 MG tablet  diazepam (VALIUM) 10 MG tablet  diazepam (VALIUM) 5 MG tablet  diazepam (VALIUM) 5 MG tablet  glimepiride (AMARYL) 4 MG tablet  hydrALAZINE (APRESOLINE) 50 MG tablet  naloxone (NARCAN) 4 MG/0.1ML nasal spray  NIACIN 500 MG OR TABS  NITROGLYCERIN 0.4 MG SL SUBL  OMEGA-3 1000 MG OR CAPS  ORDER FOR DME  pioglitazone (ACTOS) 30 MG tablet  ramipril (ALTACE) 10 MG capsule  tamsulosin (FLOMAX) 0.4 MG capsule  VOSOL-HC 2-1 % OT  "SOLN  VYTORIN 10-40 MG OR TABS          Review of Systems  All systems reviewed and other than pertinent positives and negatives in HPI all other systems are negative.  Physical Exam   BP: 96/57  Pulse: 61  Resp: 16  Height: 167.6 cm (5' 6\")  Weight: 108.9 kg (240 lb)  SpO2: 98 %      Physical Exam  Vitals signs and nursing note reviewed.   Constitutional:       General: He is not in acute distress.     Appearance: Normal appearance. He is not ill-appearing or toxic-appearing.      Comments: Multiple healing scars on face secondary to removal of skin cancer.   HENT:      Head: Normocephalic.      Comments: Right ear with moderate cerumen visualized TM is clear.  Left ear with significant cerumen.  Minimal visualization of TM.     Nose: Nose normal.      Mouth/Throat:      Mouth: Mucous membranes are moist.      Pharynx: Oropharynx is clear. No oropharyngeal exudate.   Eyes:      Extraocular Movements: Extraocular movements intact.      Conjunctiva/sclera: Conjunctivae normal.      Pupils: Pupils are equal, round, and reactive to light.      Comments: Nystagmus is not present.   Neck:      Musculoskeletal: Normal range of motion and neck supple. No neck rigidity or muscular tenderness.      Vascular: No carotid bruit.   Cardiovascular:      Rate and Rhythm: Normal rate and regular rhythm.      Pulses: Normal pulses.      Heart sounds: No murmur.   Pulmonary:      Effort: Pulmonary effort is normal.      Breath sounds: Normal breath sounds. No wheezing.   Chest:      Chest wall: No tenderness.   Abdominal:      General: Abdomen is flat. There is no distension.      Palpations: Abdomen is soft.      Tenderness: There is no tenderness.   Musculoskeletal: Normal range of motion.         General: No swelling, tenderness, deformity or signs of injury.      Comments: Mild bilateral peripheral edema.  Nonpitting in nature.  No erythema present moving all extremities well.  No calf tenderness is present.  Patient had no " midline back tenderness.  Pulses and sensation are symmetrical.   Skin:     General: Skin is warm and dry.      Capillary Refill: Capillary refill takes less than 2 seconds.      Findings: No rash.   Neurological:      Mental Status: He is alert.      Comments: Patient is alert and oriented x3.  Cranial nerves intact.  There is no focal neurologic deficit.  Patient has no drift in his upper or lower extremities.         ED Course        Procedures               EKG Interpretation:      Interpreted by Shane Hall MD  Rhythm: normal sinus   Rate: normal  Axis: normal  Ectopy: none  Conduction: normal  ST Segments/ T Waves: No ST-T wave changes  Q Waves: none  Comparison to prior: Unchanged from 4/2018    Clinical Impression: normal EKG    Critical Care time:  none             Labs Ordered and Resulted from Time of ED Arrival Up to the Time of Departure from the ED   CBC WITH PLATELETS DIFFERENTIAL - Abnormal; Notable for the following components:       Result Value    WBC 3.9 (*)     Hemoglobin 11.9 (*)     Hematocrit 37.8 (*)     RDW 15.1 (*)     Platelet Count 103 (*)     All other components within normal limits   COMPREHENSIVE METABOLIC PANEL - Abnormal; Notable for the following components:    Glucose 210 (*)     Albumin 3.2 (*)     All other components within normal limits   LACTIC ACID WHOLE BLOOD - Abnormal; Notable for the following components:    Lactic Acid 3.0 (*)     All other components within normal limits   URINE MACROSCOPIC WITH REFLEX TO MICRO - Abnormal; Notable for the following components:    Mucous Urine Present (*)     Hyaline Casts 37 (*)     All other components within normal limits   GLUCOSE BY METER - Abnormal; Notable for the following components:    Glucose 195 (*)     All other components within normal limits   TROPONIN I   LACTIC ACID WHOLE BLOOD   TROPONIN I   PERIPHERAL IV CATHETER   IV ACCESS     Results for orders placed or performed during the hospital encounter of 09/30/19    CT Head w/o Contrast    Narrative    CT SCAN OF THE HEAD WITHOUT CONTRAST   9/30/2019 10:03 AM     HISTORY:  Syncopal episode.    TECHNIQUE:  Axial images of the head and coronal reformations without  IV contrast material. Radiation dose for this scan was reduced using  automated exposure control, adjustment of the mA and/or kV according  to patient size, or iterative reconstruction technique.    COMPARISON: None.    FINDINGS:  The ventricles are normal in size and configuration. There  is moderate patchy hypoattenuation in the deep cerebral white matter,  nonspecific, but most likely representing sequela of small vessel  ischemic disease. There is mild global age-commensurate volume loss  without a lobar predilection. No findings of acute intracranial  hemorrhage, mass lesion, mass effect or shift/herniation. Moderate  scattered intracranial atherosclerotic calcifications are present,  predominantly involving the left vertebral artery, basilar artery, and  carotid siphons.    There is mild soft tissue swelling along the midline frontal scalp,  without evidence for underlying fracture. There is mild asymmetric  attenuation of the subcutaneous soft tissues along the right lateral  frontal calvarial region, which appears chronic in nature. The  paranasal sinuses are free of significant disease. The mastoid and  middle ear cavities are clear. No fracture is appreciated.      Impression    IMPRESSION:  1. No CT findings of acute intracranial abnormality. Specifically, no  intracranial hemorrhage or mass effect.  2. Scattered patchy hypoattenuation in the deep cerebral white matter,  nonspecific, but most likely related to small vessel ischemic disease.    CELSO KINNEY MD   Chest XR,  PA & LAT    Narrative    CHEST TWO VIEWS   9/30/2019 10:02 AM    HISTORY:  Shortness of breath.    COMPARISON:  None.    FINDINGS:  The heart size is normal. No mediastinal pathology is seen.  The lungs are clear. The pulmonary  vasculature is normal. No  pneumothorax or pleural effusion is seen. There are surgical clips  along the right lateral chest wall.       Impression    IMPRESSION:  Unremarkable chest.     JARRETT WILLIS MD         Medications   0.9% sodium chloride BOLUS (0 mLs Intravenous Stopped 9/30/19 1144)       Assessments & Plan (with Medical Decision Making) records were reviewed.  Labs were obtained.  Patient was given IV fluids.  CT scan of the head was obtained.  Chest x-ray was obtained.  EKG revealed a normal sinus rhythm with no ST-T wave changes.  Patient was placed on monitor.  Patient's white count was 3.9 which is baseline for patient.  Hemoglobin is 11.9.  This is improved from previous readings.  Patient's platelet count was 103.  Comprehensive metabolic panel significant for glucose of 210.  UA without evidence of infection.   lactic acid was 1.3.  Troponin was within normal limits.  Chest x-ray revealed no obvious infiltrate or other abnormality.  CT scan of the head was consistent with small vessel ischemic disease.  No other acute intracranial abnormality was noted.  Findings were discussed in detail with patient.  He feels fine at this time.  On reexamination there is no focal abnormality he is alert and orientated and his vital signs are stable.  I discussed his symptoms with the patient and his wife.  He had a possible syncopal event with incontinence.  I felt it would be best to admit the patient overnight for observation and cardiac monitoring as well as possible MRI scan if any further neurologic symptoms.  Patient understands that I feel he needs to be admitted but he stated he feels fine and thinks his incontinence was secondary just to needing to go to the bathroom and not feeling well at the time when he was on the chair.  He does not think he completely lost consciousness.  Patient does not want to be admitted to the hospital.  He and his wife state he has had similar episodes in the past.  He  has been worked up for these and they have not found anything.  They feel comfortable going home at this time and will follow up with their primary care.  I again advised admission but patient did not want to be admitted.  He understands he could have another syncopal event a cardiac event or a stroke.  He understands this and wants to go home.  He will return if symptoms return or new symptoms develop.     I have reviewed the nursing notes.    I have reviewed the findings, diagnosis, plan and need for follow up with the patient.       New Prescriptions    No medications on file       Final diagnoses:   Syncope, unspecified syncope type - Versus near syncope       9/30/2019   [facility] EMERGENCY DEPARTMENT     Shane Hall MD  10/01/19 1927

## 2019-09-30 NOTE — ED AVS SNAPSHOT
[facility] Emergency Department  [address] [address]  Phone:  [phone]  Fax:  [phone]                                    Flash Brown   MRN: [id number]    Department:  [facility] Emergency Department   Date of Visit:  9/30/2019           After Visit Summary Signature Page    I have received my discharge instructions, and my questions have been answered. I have discussed any challenges I see with this plan with the nurse or doctor.    ..........................................................................................................................................  Patient/Patient Representative Signature      ..........................................................................................................................................  Patient Representative Print Name and Relationship to Patient    ..................................................               ................................................  Date                                   Time    ..........................................................................................................................................  Reviewed by Signature/Title    ...................................................              ..............................................  Date                                               Time          22EPIC Rev 08/18

## 2019-09-30 NOTE — LETTER
9/30/2019         RE: Flash Brown  [address]  [facility] [address]        Dear Colleague,    Thank you for referring your patient, Flash Brown, to the [facility]. Please see a copy of my visit note below.    Chief Complaint   Patient presents with     Ear Problem     needs ears cleaned     History of Present Illness   Flash Brown is a 78 year old male who presents to me today for ear evaluation.  The patient after he presents today looking quite diaphoretic.  His systolic blood pressure was 70 mmHg.  He had several recent Mohs procedures and skin cancer removals.  He recently had a forehead flap surgery.  He is not sure what medications he is been taking.  He has had problems with his ears being plugged and feels like there is earwax plugging his ears after a shower.  He is not having any ear pain or drainage.  He does feel very lightheaded but no vicente vertigo.  No previous ear surgery.      Past Medical History  Patient Active Problem List   Diagnosis     Essential hypertension, benign     Malignant melanoma s/p resection in [place], [place]     RT axillary lymphoma s/p resection, with adjuvant radiation - [place], [place]     Basal cell skin cancer over nose s/p resection - [place], [place]     Mixed hyperlipidemia     Diabetes mellitus, type 2 (H)     Obstructive sleep apnea     Depressive disorder, not elsewhere classified     ? 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     Current Outpatient Medications:      amLODIPine (NORVASC) 5 MG tablet, Take 1 tablet (5 mg) by mouth daily, Disp: , Rfl:      aspirin (ASA) 81 MG EC tablet, Take 1  tablet (81 mg) by mouth daily, Disp: , Rfl:      atorvastatin (LIPITOR) 20 MG tablet, Take 1 tablet (20 mg) by mouth daily, Disp: 90 tablet, Rfl: 3     busPIRone (BUSPAR) 10 MG tablet, Take 1 tablet (10 mg) by mouth 2 times daily, Disp: , Rfl:      carvedilol (COREG) 12.5 MG tablet, Take 1 tablet (12.5 mg) by mouth 2 times daily (with meals), Disp: 180 tablet, Rfl: 3     CENTRUM OR TABS, 1 TABLET DAILY, Disp: , Rfl:      cloNIDine (CATAPRES) 0.1 MG tablet, Take 1 tablet (0.1 mg) by mouth 2 times daily, Disp: , Rfl:      COQ10 100 MG OR CAPS, None Entered, Disp: , Rfl:      diazepam (VALIUM) 10 MG tablet, Take 1 tablet (10 mg) by mouth every 6 hours as needed for anxiety, Disp: 1 tablet, Rfl: 0     diazepam (VALIUM) 10 MG tablet, Take 1 tablet (10 mg) by mouth every 6 hours as needed for anxiety, Disp: 1 tablet, Rfl: 0     diazepam (VALIUM) 10 MG tablet, Take 1 tablet (10 mg) by mouth every 6 hours as needed for anxiety, Disp: 1 tablet, Rfl: 0     diazepam (VALIUM) 10 MG tablet, Take 1 tablet (10 mg) by mouth every 6 hours as needed for anxiety, Disp: 1 tablet, Rfl: 0     diazepam (VALIUM) 10 MG tablet, Before procedure, Disp: 1 tablet, Rfl: 0     diazepam (VALIUM) 5 MG tablet, Two tablets before procedure, Disp: 2 tablet, Rfl: 0     diazepam (VALIUM) 5 MG tablet, Take 1 tablet (5 mg) by mouth every 6 hours as needed for anxiety, Disp: 1 tablet, Rfl: 0     glimepiride (AMARYL) 4 MG tablet, Take 1 tablet (4 mg) by mouth 2 times daily, Disp: , Rfl:      hydrALAZINE (APRESOLINE) 50 MG tablet, Take 1 tablet (50 mg) by mouth 2 times daily, Disp: , Rfl:      naloxone (NARCAN) 4 MG/0.1ML nasal spray, Spray 1 spray (4 mg) into one nostril alternating nostrils once as needed for opioid reversal Every 2-3 minutes until patient responsive or EMS arrives, Disp: 0.2 mL, Rfl: 0     NIACIN 500 MG OR TABS, Take one orally twice daily, Disp: 180, Rfl: 3     NITROGLYCERIN 0.4 MG SL SUBL, ONE TABLET UNDER TONGUE, FOR CHEST PAIN, AS  DIRECTED, Disp: 1 BOTTLE, Rfl: 3 per year     OMEGA-3 1000 MG OR CAPS, None Entered, Disp: , Rfl:      ORDER FOR DME, Light Therapy with Full Spectrum Light (06517 lux) Start with 10-15 minute exposure per day, Increase exposure to 30 to 45 minutes per day, Maximum exposure: 90 minutes per day,Look periodically at light during each session , Disp: 1, Rfl: 0     pioglitazone (ACTOS) 30 MG tablet, Take 1 tablet (30 mg) by mouth daily, Disp: , Rfl:      ramipril (ALTACE) 10 MG capsule, Take 1 capsule (10 mg) by mouth daily, Disp: 90 capsule, Rfl: 3     tamsulosin (FLOMAX) 0.4 MG capsule, Take 1 capsule (0.4 mg) by mouth daily, Disp: 90 capsule, Rfl: 3     VOSOL-HC 2-1 % OT SOLN, 2-3 drops in the affected ear 3-4 times daily for 7-10 days for itching in the ear, Disp: 1 bottle, Rfl: 0     VYTORIN 10-40 MG OR TABS, 1 TABLET EVERY EVENING, Disp: 3 months, Rfl: 1    Facility-Administered Medications Ordered in Other Visits:      0.9% sodium chloride BOLUS, 1,000 mL, Intravenous, Once **FOLLOWED BY** sodium chloride 0.9% infusion, 1,000 mL, Intravenous, Continuous, Shane Hall MD    Allergies  Allergies   Allergen Reactions     Iodine Swelling       Social History  Social History     Socioeconomic History     Marital status: Single     Spouse name: Not on file     Number of children: 0     Years of education: Not on file     Highest education level: Not on file   Occupational History     Occupation: Retired     Comment: [employer]    Social Needs     Financial resource strain: Not on file     Food insecurity:     Worry: Not on file     Inability: Not on file     Transportation needs:     Medical: Not on file     Non-medical: Not on file   Tobacco Use     Smoking status: Never Smoker     Smokeless tobacco: Never Used   Substance and Sexual Activity     Alcohol use: Yes     Frequency: Monthly or less     Drinks per session: 1 or 2     Binge frequency: Never     Comment: glass of wine couple times per week  "    Drug use: Never     Sexual activity: Not on file   Lifestyle     Physical activity:     Days per week: Not on file     Minutes per session: Not on file     Stress: Not on file   Relationships     Social connections:     Talks on phone: Not on file     Gets together: Not on file     Attends Faith service: Not on file     Active member of club or organization: Not on file     Attends meetings of clubs or organizations: Not on file     Relationship status: Not on file     Intimate partner violence:     Fear of current or ex partner: Not on file     Emotionally abused: Not on file     Physically abused: Not on file     Forced sexual activity: Not on file   Other Topics Concern     Not on file   Social History Narrative     Not on file       Family History  Family History   Problem Relation Age of Onset     Asthma Other      Cancer Other         melanoma     Blood Disease Other         bleeding disorder     Lung Cancer Mother         Smoker     Prostate Cancer Father      Melanoma No family hx of        Review of Systems  As per HPI and PMHx, otherwise 7 system review of the head and neck negative.    Physical Exam  BP (!) 73/48 (BP Location: Right arm, Patient Position: Chair, Cuff Size: Adult Large)   Pulse 69   Temp 97.2  F (36.2  C) (Tympanic)   Resp 18   Ht 1.683 m (5' 6.26\")   Wt 109 kg (240 lb 4.8 oz)   BMI 38.48 kg/m     GENERAL: Patient is clammy and diaphoretic.  He is cooperative.  He is in no acute distress but is very somnolent.  HEAD: Normocephalic, atraumatic.  Hair and scalp are normal.  EYES: Pupils are equal, round, reactive to light and accommodation.  Extraocular movements are intact.  The sclera nonicteric without injection.  The extraocular structures are normal.  EARS: See below.  NOSE: The patient appears to have had a paramedian forehead flap reconstruction of a nasal skin cancer removal.  Nares are patent.  Nasal mucosa is pink and moist.  Negative anterior rhinoscopy.  NEUROLOGIC: " Cranial nerves II through XII are grossly intact.  Voice is strong.  Patient is House-Brackman I/VI bilaterally.  CARDIOVASCULAR: Extremities cool and clammy.  No significant peripheral edema.    RESPIRATORY: Patient has nonlabored breathing without cough, wheeze, stridor.  PSYCHIATRIC: Patient is alert and oriented.  Mood and affect appear normal.  SKIN: Cold and clammy.  Patient is diaphoretic.  Patient has well-healing facial incisions.      Physical Exam and Procedure    EARS: Normal shape and symmetry.  No tenderness when palpating the mastoid or tragal areas bilaterally.  The ears were then examined under the otic binocular microscope to perform cerumen removal.  Otoscopic exam on the right reveals impacted cerumen down to the level of the tympanic membrane.  The cerumen was removed with #7 suction.  The right tympanic membrane was round, intact without evidence of effusion.  No retraction, granulation, drainage, or evidence of cholesteatoma.      Attention was turned to the left ear.  Otoscopic exam on the left reveals impacted cerumen down to the level of the tympanic membrane.  The cerumen was removed with #7 suction.  The left tympanic membrane was round, intact without evidence of effusion.  No retraction, granulation, drainage, or evidence of cholesteatoma.      Assessment and Plan     ICD-10-CM    1. Bilateral impacted cerumen H61.23 Remove Cerumen     It was my pleasure seeing Flash Brown today in clinic.  Bilateral cerumen impactions that we removed today in office.  Given the patient's appearance and his significant hypotension, I am concerned the patient had adverse cardiovascular health.  We did call a rapid response and the patient was taken to the emergency room for further evaluation.     Tahir Quiroz MD  Department of Otolarygology-Head and Neck Surgery  [facility]    Patient noted to have BP 73/48 P-68. Noted to become diaphoretic and have increased lethargy. Denied pain,  "SOA. O2 95% room air. Stated feeling dizzy and \"confused\". RRT called. ED RN and ERT arrived with stretcher. Transferred patient from chair to cart. Flash stated he had \"not taken some of my medications for the last couple days\". Could not remember which ones at time of discussion. Transferred to ED room 19.     Buzz BARRY RN   Specialty Clinics     Again, thank you for allowing me to participate in the care of your patient.        Sincerely,        Tahir Quiroz MD    "

## 2019-09-30 NOTE — PATIENT INSTRUCTIONS
Per physician instructions.    If you have questions or concerns on any instructions given to you by your provider today or if you need to schedule an appointment, you can reach us at [phone].    Thank you!

## 2019-09-30 NOTE — DISCHARGE INSTRUCTIONS
Return if symptoms worsen or new symptoms develop.  Follow-up with primary care t later this week for recheck.  You are offered admission which was what I felt would be the safest route to be observed overnight but you felt comfortable going home at this time.  Take your medications as directed.  If any further dizziness syncopal events chest pain shortness of breath abdominal pain back pain or other symptoms present please return for further evaluation and care.

## 2019-09-30 NOTE — PROGRESS NOTES
"Patient noted to have BP 73/48 P-68. Noted to become diaphoretic and have increased lethargy. Denied pain, SOA. O2 95% room air. Stated feeling dizzy and \"confused\". RRT called. ED RN and ERT arrived with stretcher. Transferred patient from chair to cart. Flash stated he had \"not taken some of my medications for the last couple days\". Could not remember which ones at time of discussion. Transferred to ED room 19.     Buzz BARRY RN   Specialty Clinics   "

## 2019-09-30 NOTE — NURSING NOTE
"Chief Complaint   Patient presents with     Ear Problem     needs ears cleaned       Initial BP (!) 73/48 (BP Location: Right arm, Patient Position: Chair, Cuff Size: Adult Large)   Pulse 69   Temp 97.2  F (36.2  C) (Tympanic)   Resp 18   Ht 1.683 m (5' 6.26\")   Wt 109 kg (240 lb 4.8 oz)   BMI 38.48 kg/m   Estimated body mass index is 38.48 kg/m  as calculated from the following:    Height as of this encounter: 1.683 m (5' 6.26\").    Weight as of this encounter: 109 kg (240 lb 4.8 oz).  Medications and allergies reviewed.    Jon MIRELES CMA (AAMA)    "

## 2019-10-01 ENCOUNTER — PATIENT OUTREACH (OUTPATIENT)
Dept: CASE MANAGEMENT | Facility: CLINIC | Age: 79
End: 2019-10-01

## 2019-10-01 ASSESSMENT — ACTIVITIES OF DAILY LIVING (ADL): DEPENDENT_IADLS:: TRANSPORTATION

## 2019-10-01 NOTE — H&P
[facility]  [facility]  [address]  [facility] [address]  [phone]  Dept: [phone]    OPHTHALMOLOGY PRE-OPERATIVE  HISTORY AND PHYSICAL    DATE OF H/P:  2019    DATE OF SURGERY:  2019  PROCEDURE:  Procedure(s):  Cataract Removal with Implant, Right Eye  LENS IMPLANT:  [id number] +22.0  REFRACTIVE GOAL:  PL Sph  SURGEON:  Dinesh Ivey MD    ANESTHESIA:  TOPICAL / MAC    OR CASE REQUIREMENTS:    DEMOGRAPHICS:  Demographic Information on Flash Brown:    Flash Brown  Gender: male  : 1940  [address] [address]  [phone] (home)     Medical Record: [id number]  Social Security Number: [id number]  Pharmacy: [facility] [id number] [place] [id number]  Primary Care Provider: Thomas Jackson    Parent's names are: Data Unavailable (mother) and Data Unavailable (father).    Insurance: Payor: MEDICARE / Plan: MEDICARE / Product Type: Medicare /     OCULAR HISTORY:  Cataracts, each eye.    HISTORIES:  Past Medical History:   Diagnosis Date     Basal cell carcinoma      CAD (coronary artery disease)      Depression      Diabetes type 2, controlled (H)      Hyperlipidemia      Hypertension      Lymphoma (H)      Malignant melanoma (H)      Melanoma (H)      Squamous cell carcinoma        Past Surgical History:   Procedure Laterality Date     AXILLARY SURGERY      S/P resection and adjuvant radiation     BONE MARROW BIOPSY, BONE SPECIMEN, NEEDLE/TROCAR N/A 2019    Procedure: BIOPSY, BONE MARROW;  Surgeon: Husam Issa MD;  Location: [facility]     CHOLECYSTECTOMY       HERNIA REPAIR, UMBILICAL       STENT      PTCA with drug-eluting stent of RCA, circumflex, and LAD       Family History   Problem Relation Age of Onset     Asthma Other      Cancer Other         melanoma     Blood Disease Other         bleeding disorder     Lung Cancer Mother         Smoker     Prostate Cancer Father      Melanoma No family hx of         Social History     Tobacco Use     Smoking status: Never Smoker     Smokeless tobacco: Never Used   Substance Use Topics     Alcohol use: Yes     Frequency: Monthly or less     Drinks per session: 1 or 2     Binge frequency: Never     Comment: glass of wine couple times per week       MEDICATIONS:  No current facility-administered medications for this encounter.      Current Outpatient Medications   Medication Sig     amLODIPine (NORVASC) 5 MG tablet Take 1 tablet (5 mg) by mouth daily     aspirin (ASA) 81 MG EC tablet Take 1 tablet (81 mg) by mouth daily     atorvastatin (LIPITOR) 20 MG tablet Take 1 tablet (20 mg) by mouth daily     busPIRone (BUSPAR) 10 MG tablet Take 1 tablet (10 mg) by mouth 2 times daily     carvedilol (COREG) 12.5 MG tablet Take 1 tablet (12.5 mg) by mouth 2 times daily (with meals)     CENTRUM OR TABS 1 TABLET DAILY     cloNIDine (CATAPRES) 0.1 MG tablet Take 1 tablet (0.1 mg) by mouth 2 times daily     COQ10 100 MG OR CAPS Take 1 capsule by mouth daily      diazepam (VALIUM) 5 MG tablet Two tablets before procedure     glimepiride (AMARYL) 4 MG tablet Take 1 tablet (4 mg) by mouth 2 times daily     hydrALAZINE (APRESOLINE) 50 MG tablet Take 1 tablet (50 mg) by mouth 2 times daily     naloxone (NARCAN) 4 MG/0.1ML nasal spray Spray 1 spray (4 mg) into one nostril alternating nostrils once as needed for opioid reversal Every 2-3 minutes until patient responsive or EMS arrives     NIACIN 500 MG OR TABS Take one orally twice daily (Patient taking differently: Take 500 mg by mouth 2 times daily (with meals) )     NITROGLYCERIN 0.4 MG SL SUBL ONE TABLET UNDER TONGUE, FOR CHEST PAIN, AS DIRECTED (Patient taking differently: Place 0.4 mg under the tongue every 5 minutes as needed )     OMEGA-3 1000 MG OR CAPS Take 1 g by mouth daily      ORDER FOR DME Light Therapy with Full Spectrum Light (72550 lux) Start with 10-15 minute exposure per day, Increase exposure to 30 to 45 minutes per day,  Maximum exposure: 90 minutes per day,Look periodically at light during each session      pioglitazone (ACTOS) 30 MG tablet Take 1 tablet (30 mg) by mouth daily     ramipril (ALTACE) 10 MG capsule Take 1 capsule (10 mg) by mouth daily     tamsulosin (FLOMAX) 0.4 MG capsule Take 1 capsule (0.4 mg) by mouth daily     VOSOL-HC 2-1 % OT SOLN 2-3 drops in the affected ear 3-4 times daily for 7-10 days for itching in the ear     VYTORIN 10-40 MG OR TABS 1 TABLET EVERY EVENING (Patient taking differently: Take 1 tablet by mouth At Bedtime )       ALLERGIES:     Allergies   Allergen Reactions     Iodine Swelling       PERTINENT SYSTEMS REVIEW:    1. Yes: CAD, s/p stents - Do you have a history of heart attack, stroke, stent, bypass or surgery on an artery in the head, neck, heart or legs?  2. No - Do you ever have any pain or discomfort in your chest?  3. No - Do you have a history of  Heart Failure?  4. No - Are you troubled by shortness of breath when walking: On the level, up a slight hill or at night?  5. No - Do you currently have a cold, bronchitis or other respiratory infection?  6. No - Do you have a cough, shortness of breath or wheezing?  7. No - Do you sometimes get pains in the calves of your legs when you walk?  8. No - Do you or anyone in your family have previous history of blood clots?  9. No - Do you or does anyone in your family have a serious bleeding problem such as prolonged bleeding following surgeries or cuts?  10. No - Have you ever had problems with anemia or been told to take iron pills?  11. No - Have you had any abnormal blood loss such as black, tarry or bloody stools, or abnormal vaginal bleeding?  12. No - Have you ever had a blood transfusion?  13. No - Have you or any of your relatives ever had problems with anesthesia?  14. No - Do you have sleep apnea, excessive snoring or daytime drowsiness?  15. No - Do you have any prosthetic heart valves?  16. No - Do you have prosthetic  joints?    EXAMINATION:  Vitals were reviewed                     Vison:  Va, right - 20/70, left - 20/70;   BAT, right - 20/100, left - 20/100;  HEENT:  Cataract, otherwise unremarkable.  LUNGS:  Clear  CV:  Regular rate and rhythm without murmur  ABD:  Soft and nontender  NEURO:  Alert and nonfocal    IMPRESSION:  Patient cleared for ophthalmic surgery.  Low risk with monitored, light sedation.  I have assessed the patient's DVT risk, and no additional orders necessary.    PLAN:  Procedure(s):  Cataract Removal with Implant, Right Eye      Dinesh Ivey MD

## 2019-10-01 NOTE — PROGRESS NOTES
Clinic Care Coordination Contact    Follow Up Progress Note      Assessment: ANETTE placed call to pt & sister, Daylin, for ED follow up.  LENIN on file.    Daylin shared that the pt is doing much better today, but that they went away for the weekend & the pt lost his medications, so was without all meds for 3 days.    Daylin stated they found the medications & restarted.  Pt has cataract surgery tomorrow & they are doing eye drops & meds as required.    Pt continues to utilize Daylin for transportation resources, but they have reviewed the packet of information this writer mailed to them.  Pt still desires to be more active in the community independently.    Goals addressed this encounter:   Goals Addressed                 This Visit's Progress       Patient Stated      Functional (pt-stated)   On track     Goal Statement: I will find transportation for social events  Measure of Success: Pt will complete the [employer] application  Supportive Steps to Achieve: Pt's sister, pt and SW to assist with completion the [employer] application.  Barriers: None  Strengths: Pt motivated to regain independence.  Date to Achieve By: December 1, 2019  Patient expressed understanding of goal: Yes              Psychosocial (pt-stated)   On track     #2 Goal Statement: I will become more active in my community  Measure of Success: Pt will engage in community programs for senior citizens  Supportive Steps to Achieve: Pt, his sister & SW will find programs that appeal to the pt to engage in.  Barriers: Pt cannot drive, relies upon family & transportation resources  Strengths: Pt appears motivated to engage in the community  Date to Achieve By: December 1, 2019  Patient expressed understanding of goal: Yes                Intervention/Education provided during outreach: Pt lost medications for 3 days; SW impressed the importance of sharing that information with his sister in a more timely manner     Outreach Frequency: monthly    Plan:   Pt  to continue to attend appointments.  Care Coordinator will follow up in 1 month.    [facility]  Primary Care Clinic- Social Work Care Coordinator  [place] [facility]  10/1/2019 8:47 AM  [phone]

## 2019-10-02 ENCOUNTER — TRANSFERRED RECORDS (OUTPATIENT)
Dept: HEALTH INFORMATION MANAGEMENT | Facility: CLINIC | Age: 79
End: 2019-10-02

## 2019-10-02 ENCOUNTER — HOSPITAL ENCOUNTER (OUTPATIENT)
Facility: CLINIC | Age: 79
Discharge: HOME | End: 2019-10-02
Attending: OPHTHALMOLOGY | Admitting: OPHTHALMOLOGY
Payer: MEDICARE

## 2019-10-02 ENCOUNTER — ANESTHESIA (OUTPATIENT)
Dept: SURGERY | Facility: CLINIC | Age: 79
End: 2019-10-02
Payer: MEDICARE

## 2019-10-02 VITALS
BODY MASS INDEX: 38.57 KG/M2 | WEIGHT: 240 LBS | HEIGHT: 66 IN | OXYGEN SATURATION: 100 % | RESPIRATION RATE: 16 BRPM | SYSTOLIC BLOOD PRESSURE: 153 MMHG | DIASTOLIC BLOOD PRESSURE: 87 MMHG | TEMPERATURE: 97.6 F

## 2019-10-02 LAB
GLUCOSE BLDC GLUCOMTR-MCNC: 109 MG/DL (ref 70–99)
RETINOPATHY: NORMAL

## 2019-10-02 PROCEDURE — 25000125 ZZHC RX 250: Performed by: NURSE ANESTHETIST, CERTIFIED REGISTERED

## 2019-10-02 PROCEDURE — 25800030 ZZH RX IP 258 OP 636: Performed by: NURSE ANESTHETIST, CERTIFIED REGISTERED

## 2019-10-02 PROCEDURE — 25000128 H RX IP 250 OP 636: Performed by: OPHTHALMOLOGY

## 2019-10-02 PROCEDURE — 37000012 ZZH ANESTHESIA CATARACT PACKAGE: Performed by: OPHTHALMOLOGY

## 2019-10-02 PROCEDURE — 25000125 ZZHC RX 250: Performed by: OPHTHALMOLOGY

## 2019-10-02 PROCEDURE — 82962 GLUCOSE BLOOD TEST: CPT

## 2019-10-02 PROCEDURE — 25000128 H RX IP 250 OP 636: Performed by: NURSE ANESTHETIST, CERTIFIED REGISTERED

## 2019-10-02 PROCEDURE — 36000025 ZZH CATARACT SURGICAL PACKAGE: Performed by: OPHTHALMOLOGY

## 2019-10-02 PROCEDURE — 71000022 ZZH RECOVERY CATRACT PACKAGE: Performed by: OPHTHALMOLOGY

## 2019-10-02 PROCEDURE — V2632 POST CHMBR INTRAOCULAR LENS: HCPCS | Performed by: OPHTHALMOLOGY

## 2019-10-02 DEVICE — EYE IMP IOL AMO PCL TECNIS ZCB00 22.0: Type: IMPLANTABLE DEVICE | Site: EYE | Status: FUNCTIONAL

## 2019-10-02 RX ORDER — ONDANSETRON 4 MG/1
4 TABLET, ORALLY DISINTEGRATING ORAL EVERY 30 MIN PRN
Status: CANCELLED | OUTPATIENT
Start: 2019-10-02

## 2019-10-02 RX ORDER — LIDOCAINE 40 MG/G
CREAM TOPICAL
Status: DISCONTINUED | OUTPATIENT
Start: 2019-10-02 | End: 2019-10-02 | Stop reason: HOSPADM

## 2019-10-02 RX ORDER — SODIUM CHLORIDE, SODIUM LACTATE, POTASSIUM CHLORIDE, CALCIUM CHLORIDE 600; 310; 30; 20 MG/100ML; MG/100ML; MG/100ML; MG/100ML
INJECTION, SOLUTION INTRAVENOUS CONTINUOUS
Status: CANCELLED | OUTPATIENT
Start: 2019-10-02

## 2019-10-02 RX ORDER — ONDANSETRON 2 MG/ML
4 INJECTION INTRAMUSCULAR; INTRAVENOUS EVERY 30 MIN PRN
Status: CANCELLED | OUTPATIENT
Start: 2019-10-02

## 2019-10-02 RX ORDER — DEXAMETHASONE SODIUM PHOSPHATE 4 MG/ML
4 INJECTION, SOLUTION INTRA-ARTICULAR; INTRALESIONAL; INTRAMUSCULAR; INTRAVENOUS; SOFT TISSUE EVERY 10 MIN PRN
Status: CANCELLED | OUTPATIENT
Start: 2019-10-02

## 2019-10-02 RX ORDER — TROPICAMIDE 10 MG/ML
1 SOLUTION/ DROPS OPHTHALMIC
Status: COMPLETED | OUTPATIENT
Start: 2019-10-02 | End: 2019-10-02

## 2019-10-02 RX ORDER — NALOXONE HYDROCHLORIDE 0.4 MG/ML
.1-.4 INJECTION, SOLUTION INTRAMUSCULAR; INTRAVENOUS; SUBCUTANEOUS
Status: CANCELLED | OUTPATIENT
Start: 2019-10-02 | End: 2019-10-03

## 2019-10-02 RX ORDER — SODIUM CHLORIDE, SODIUM LACTATE, POTASSIUM CHLORIDE, CALCIUM CHLORIDE 600; 310; 30; 20 MG/100ML; MG/100ML; MG/100ML; MG/100ML
INJECTION, SOLUTION INTRAVENOUS CONTINUOUS
Status: DISCONTINUED | OUTPATIENT
Start: 2019-10-02 | End: 2019-10-02 | Stop reason: HOSPADM

## 2019-10-02 RX ORDER — PHENYLEPHRINE HYDROCHLORIDE 25 MG/ML
1 SOLUTION/ DROPS OPHTHALMIC
Status: COMPLETED | OUTPATIENT
Start: 2019-10-02 | End: 2019-10-02

## 2019-10-02 RX ORDER — LIDOCAINE HYDROCHLORIDE 20 MG/ML
JELLY TOPICAL PRN
Status: DISCONTINUED | OUTPATIENT
Start: 2019-10-02 | End: 2019-10-02 | Stop reason: HOSPADM

## 2019-10-02 RX ORDER — ALBUTEROL SULFATE 0.83 MG/ML
2.5 SOLUTION RESPIRATORY (INHALATION) EVERY 4 HOURS PRN
Status: CANCELLED | OUTPATIENT
Start: 2019-10-02

## 2019-10-02 RX ORDER — CYCLOPENTOLATE HYDROCHLORIDE 10 MG/ML
1 SOLUTION/ DROPS OPHTHALMIC
Status: COMPLETED | OUTPATIENT
Start: 2019-10-02 | End: 2019-10-02

## 2019-10-02 RX ORDER — PROPARACAINE HYDROCHLORIDE 5 MG/ML
SOLUTION/ DROPS OPHTHALMIC PRN
Status: DISCONTINUED | OUTPATIENT
Start: 2019-10-02 | End: 2019-10-02 | Stop reason: HOSPADM

## 2019-10-02 RX ADMIN — TROPICAMIDE 1 DROP: 10 SOLUTION/ DROPS OPHTHALMIC at 10:56

## 2019-10-02 RX ADMIN — CYCLOPENTOLATE HYDROCHLORIDE 1 DROP: 10 SOLUTION/ DROPS OPHTHALMIC at 11:04

## 2019-10-02 RX ADMIN — PHENYLEPHRINE HYDROCHLORIDE 1 DROP: 25 SOLUTION/ DROPS OPHTHALMIC at 10:41

## 2019-10-02 RX ADMIN — TROPICAMIDE 1 DROP: 10 SOLUTION/ DROPS OPHTHALMIC at 11:05

## 2019-10-02 RX ADMIN — PHENYLEPHRINE HYDROCHLORIDE 1 DROP: 25 SOLUTION/ DROPS OPHTHALMIC at 11:04

## 2019-10-02 RX ADMIN — SODIUM CHLORIDE, POTASSIUM CHLORIDE, SODIUM LACTATE AND CALCIUM CHLORIDE: 600; 310; 30; 20 INJECTION, SOLUTION INTRAVENOUS at 11:09

## 2019-10-02 RX ADMIN — LIDOCAINE HYDROCHLORIDE 1 ML: 10 INJECTION, SOLUTION EPIDURAL; INFILTRATION; INTRACAUDAL; PERINEURAL at 11:09

## 2019-10-02 RX ADMIN — MIDAZOLAM 1 MG: 1 INJECTION INTRAMUSCULAR; INTRAVENOUS at 11:42

## 2019-10-02 RX ADMIN — TROPICAMIDE 1 DROP: 10 SOLUTION/ DROPS OPHTHALMIC at 10:41

## 2019-10-02 RX ADMIN — CYCLOPENTOLATE HYDROCHLORIDE 1 DROP: 10 SOLUTION/ DROPS OPHTHALMIC at 10:41

## 2019-10-02 RX ADMIN — PHENYLEPHRINE HYDROCHLORIDE 1 DROP: 25 SOLUTION/ DROPS OPHTHALMIC at 10:56

## 2019-10-02 RX ADMIN — MIDAZOLAM 1 MG: 1 INJECTION INTRAMUSCULAR; INTRAVENOUS at 11:40

## 2019-10-02 RX ADMIN — CYCLOPENTOLATE HYDROCHLORIDE 1 DROP: 10 SOLUTION/ DROPS OPHTHALMIC at 10:56

## 2019-10-02 ASSESSMENT — MIFFLIN-ST. JEOR: SCORE: 1761.38

## 2019-10-02 NOTE — ANESTHESIA POSTPROCEDURE EVALUATION
Patient: Flash Brown    Procedure(s):  Cataract Removal with Implant    Diagnosis:Cataract [H26.9]  Diagnosis Additional Information: No value filed.    Anesthesia Type:  No value filed.    Note:  Anesthesia Post Evaluation    Patient location during evaluation: Phase 2  Patient participation: Able to fully participate in evaluation  Level of consciousness: awake and alert  Pain management: adequate  Airway patency: patent  Cardiovascular status: acceptable and hemodynamically stable  Respiratory status: acceptable, room air and spontaneous ventilation  Hydration status: acceptable  PONV: none     Anesthetic complications: None          Last vitals:  Vitals:    10/02/19 1033   BP: (!) 145/72   Resp: 18   Temp: 36.4  C (97.6  F)   SpO2: 99%         Electronically Signed By: FIONA Solomon CRNA  October 2, 2019  12:05 PM

## 2019-10-02 NOTE — OP NOTE
CATARACT OPERATIVE NOTE    PATIENT: Flash Brown  DATE OF SURGERY: 10/2/2019  PREOPERATIVE DIAGNOSIS:  Senile Nuclear Cataract, Right eye  POSTOPERATIVE DIAGNOSIS:  Senile Nuclear Cataract, and intraoperative floppy iris syndrome, Right eye  OPERATIVE PROCEDURE:  Complex Phacoemulsification and placement of intraocular lens, requiring Malyugin Ring  SURGEON:  Dinesh Ivey MD  ANESTHESIA:  Topical / MAC  EBL:  None  SPECIMENS:  None  COMPLICATIONS:  None    PROCEDURE:  The patient was brought to the operating room at [facility].  The right eye was prepped and draped in the usual fashion for cataract surgery.  A wire lid speculum was inserted.  A super sharp blade was used to make a paracentesis at the 11 O'clock position.  The super sharp blade was used to make a partial thickness temporal groove, which was 3 mm in length.  0.8 mL of non-preserved epi-Shugarcaine was injected into the anterior chamber. Viscoelastic was used to inflate the anterior chamber through a cannula.  A 2.5 mm microkeratome was used to make a temporal clear corneal incision in a two-plane fashion.  Due to intraoperative floppy iris, a malyugin ring was inserted to dilate and secure the iris in the usual manner.  A cystotome needle and forceps were used to make a capsulorrhexis.  Hydrodissection and hydrodelineation were performed with Balance Salt Solution.  The lens was then phacoemulsified and removed without complications.  The cortical material was removed with bimanual irrigation and aspiration.  The capsular bag was filled with viscoelastic.  A posterior chamber intraocular lens, preselected and recorded, was folded and inserted into the capsular bag.  The malyugin ring was dis-inserted from the anterior chamber in the usual manner.  The viscoelastic was removed with the irrigation and aspiration tip.  Balanced Salt Solution with Vigamox, 150mg/0.1mL, was used to refill the anterior chamber.  The  wounds were checked for water tightness and required no suture.  The wire lid speculum was removed.  The patient's right eye was cleaned and a drop of each post-operative drop was placed, followed by a berg shield.  The patient tolerated the procedure well, and there were no complications.      Dinesh Ivey MD

## 2019-10-04 NOTE — PROGRESS NOTES
Subjective     Flash Brown is a 78 year old male who presents to clinic today for the following health issues:  *declined flu today  *patient does not know what medications he is taking  HPI   ED/UC Followup:    Facility:  [facility]  Date of visit: 09/30/2019  Reason for visit: syncope  Current Status: improved     Patient Active Problem List   Diagnosis     Essential hypertension, benign     Malignant melanoma s/p resection in [place], [place]     RT axillary lymphoma s/p resection, with adjuvant radiation - [place], [place]     Basal cell skin cancer over nose s/p resection - [place], [place]     Mixed hyperlipidemia     Diabetes mellitus, type 2 (H)     Obstructive sleep apnea     Depressive disorder, not elsewhere classified     ? 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)     Hyperlipidemia     Alcoholic cirrhosis of liver with ascites (H)     Past Surgical History:   Procedure Laterality Date     AXILLARY SURGERY      S/P resection and adjuvant radiation     BONE MARROW BIOPSY, BONE SPECIMEN, NEEDLE/TROCAR N/A 7/8/2019    Procedure: BIOPSY, BONE MARROW;  Surgeon: Husam Issa MD;  Location: [facility]     CARDIAC SURGERY       CHOLECYSTECTOMY       HERNIA REPAIR, UMBILICAL       PHACOEMULSIFICATION WITH STANDARD INTRAOCULAR LENS IMPLANT Right 10/2/2019    Procedure: Cataract Removal with Implant;  Surgeon: Dinesh Ivey MD;  Location: [facility]     STENT      PTCA with drug-eluting stent of RCA, circumflex, and LAD     VASCULAR SURGERY         Social History     Tobacco Use     Smoking status: Never Smoker     Smokeless tobacco: Never Used   Substance Use Topics     Alcohol use: Yes     Frequency: Monthly or less     Drinks per session: 1 or 2     Binge frequency: Never     Comment: glass of wine couple times per  week     Family History   Problem Relation Age of Onset     Asthma Other      Cancer Other         melanoma     Blood Disease Other         bleeding disorder     Lung Cancer Mother         Smoker     Prostate Cancer Father      Melanoma No family hx of          Current Outpatient Medications   Medication Sig Dispense Refill     amLODIPine (NORVASC) 5 MG tablet Take 1 tablet (5 mg) by mouth daily       aspirin (ASA) 81 MG EC tablet Take 1 tablet (81 mg) by mouth daily       atorvastatin (LIPITOR) 20 MG tablet Take 1 tablet (20 mg) by mouth daily 90 tablet 3     busPIRone (BUSPAR) 10 MG tablet Take 1 tablet (10 mg) by mouth 2 times daily       carvedilol (COREG) 12.5 MG tablet Take 1 tablet (12.5 mg) by mouth 2 times daily (with meals) 180 tablet 3     CENTRUM OR TABS 1 TABLET DAILY       cloNIDine (CATAPRES) 0.1 MG tablet Take 1 tablet (0.1 mg) by mouth 2 times daily       COQ10 100 MG OR CAPS Take 1 capsule by mouth daily        diazepam (VALIUM) 5 MG tablet Two tablets before procedure 2 tablet 0     glimepiride (AMARYL) 4 MG tablet Take 1 tablet (4 mg) by mouth 2 times daily       hydrALAZINE (APRESOLINE) 50 MG tablet Take 1 tablet (50 mg) by mouth 2 times daily       naloxone (NARCAN) 4 MG/0.1ML nasal spray Spray 1 spray (4 mg) into one nostril alternating nostrils once as needed for opioid reversal Every 2-3 minutes until patient responsive or EMS arrives 0.2 mL 0     NIACIN 500 MG OR TABS Take one orally twice daily (Patient taking differently: Take 500 mg by mouth 2 times daily (with meals) ) 180 3     NITROGLYCERIN 0.4 MG SL SUBL ONE TABLET UNDER TONGUE, FOR CHEST PAIN, AS DIRECTED (Patient taking differently: Place 0.4 mg under the tongue every 5 minutes as needed ) 1 BOTTLE 3 per year     OMEGA-3 1000 MG OR CAPS Take 1 g by mouth daily        ORDER FOR DME Light Therapy with Full Spectrum Light (73686 lux) Start with 10-15 minute exposure per day, Increase exposure to 30 to 45 minutes per day, Maximum  "exposure: 90 minutes per day,Look periodically at light during each session  1 0     pioglitazone (ACTOS) 30 MG tablet Take 1 tablet (30 mg) by mouth daily       ramipril (ALTACE) 10 MG capsule Take 1 capsule (10 mg) by mouth daily 90 capsule 3     tamsulosin (FLOMAX) 0.4 MG capsule Take 1 capsule (0.4 mg) by mouth daily 90 capsule 3     VOSOL-HC 2-1 % OT SOLN 2-3 drops in the affected ear 3-4 times daily for 7-10 days for itching in the ear 1 bottle 0     VYTORIN 10-40 MG OR TABS 1 TABLET EVERY EVENING (Patient taking differently: Take 1 tablet by mouth At Bedtime ) 3 months 1     Allergies   Allergen Reactions     Iodine Swelling     1. ER f/u: Patient had LOC/disoriented while getting his ears cleaned out.  Transferred to ER, CT head, EKG, and labs did not show any acute findings.  Since the ER, patient states that he is now back now.  As of note, patient did not take his medications for 3 days due to unable to locate his medications.  Per patient, he is currently being followed by dermatology for his multiple skin cancers.  He has been compliant with his medications since he was discharged from ER.     Review of Systems   Constitutional: Negative for chills and fever.   HENT: Negative for congestion, ear pain, hearing loss and sore throat.    Respiratory: Negative for cough and shortness of breath.    Cardiovascular: Negative for chest pain, palpitations and peripheral edema.   Musculoskeletal: Negative for arthralgias, joint swelling and myalgias.   Skin: Negative for rash.   Neurological: Negative for dizziness, weakness, headaches and paresthesias.   Psychiatric/Behavioral: Negative for mood changes. The patient is not nervous/anxious.              Objective    /60 (BP Location: Left arm, Cuff Size: Adult Large)   Pulse 61   Temp 97.4  F (36.3  C) (Tympanic)   Resp 20   Ht 1.676 m (5' 6\")   Wt 110.1 kg (242 lb 12.8 oz)   SpO2 98%   BMI 39.19 kg/m    Body mass index is 39.19 kg/m .  Physical " Exam  Constitutional:       General: He is not in acute distress.     Appearance: He is well-developed.   HENT:      Head: Normocephalic and atraumatic.      Nose: Nose normal.   Eyes:      Conjunctiva/sclera: Conjunctivae normal.   Neck:      Musculoskeletal: Normal range of motion.      Trachea: No tracheal deviation.   Cardiovascular:      Rate and Rhythm: Normal rate and regular rhythm.      Heart sounds: Normal heart sounds.   Pulmonary:      Effort: Pulmonary effort is normal.      Breath sounds: No wheezing.   Musculoskeletal: Normal range of motion.   Skin:     Findings: No erythema or rash.   Neurological:      Mental Status: He is alert and oriented to person, place, and time.   Psychiatric:         Behavior: Behavior normal.        9/30/19  IMPRESSION:  1. No CT findings of acute intracranial abnormality. Specifically, no  intracranial hemorrhage or mass effect.  2. Scattered patchy hypoattenuation in the deep cerebral white matter,  nonspecific, but most likely related to small vessel ischemic disease.    Assessment & Plan     1. Near syncope  Now resolved.  Does not appear due to cardiac, neurologic, or metabolic.  Patient was not taking his medications 3 days leading up the the episode.  Close f/u as needed.     No follow-ups on file.    Thomas Jackson DO  [facility]

## 2019-10-07 ENCOUNTER — OFFICE VISIT (OUTPATIENT)
Dept: FAMILY MEDICINE | Facility: CLINIC | Age: 79
End: 2019-10-07
Payer: MEDICARE

## 2019-10-07 VITALS
HEIGHT: 66 IN | TEMPERATURE: 97.4 F | WEIGHT: 242.8 LBS | DIASTOLIC BLOOD PRESSURE: 60 MMHG | SYSTOLIC BLOOD PRESSURE: 110 MMHG | BODY MASS INDEX: 39.02 KG/M2 | RESPIRATION RATE: 20 BRPM | OXYGEN SATURATION: 98 % | HEART RATE: 61 BPM

## 2019-10-07 DIAGNOSIS — R55 NEAR SYNCOPE: Primary | ICD-10-CM

## 2019-10-07 PROCEDURE — 99213 OFFICE O/P EST LOW 20 MIN: CPT | Performed by: FAMILY MEDICINE

## 2019-10-07 ASSESSMENT — ANXIETY QUESTIONNAIRES
7. FEELING AFRAID AS IF SOMETHING AWFUL MIGHT HAPPEN: NOT AT ALL
3. WORRYING TOO MUCH ABOUT DIFFERENT THINGS: NOT AT ALL
GAD7 TOTAL SCORE: 0
1. FEELING NERVOUS, ANXIOUS, OR ON EDGE: NOT AT ALL
6. BECOMING EASILY ANNOYED OR IRRITABLE: NOT AT ALL
IF YOU CHECKED OFF ANY PROBLEMS ON THIS QUESTIONNAIRE, HOW DIFFICULT HAVE THESE PROBLEMS MADE IT FOR YOU TO DO YOUR WORK, TAKE CARE OF THINGS AT HOME, OR GET ALONG WITH OTHER PEOPLE: NOT DIFFICULT AT ALL
5. BEING SO RESTLESS THAT IT IS HARD TO SIT STILL: NOT AT ALL
2. NOT BEING ABLE TO STOP OR CONTROL WORRYING: NOT AT ALL

## 2019-10-07 ASSESSMENT — ENCOUNTER SYMPTOMS
COUGH: 0
NERVOUS/ANXIOUS: 0
SHORTNESS OF BREATH: 0
PALPITATIONS: 0
ARTHRALGIAS: 0
FEVER: 0
WEAKNESS: 0
JOINT SWELLING: 0
PARESTHESIAS: 0
CHILLS: 0
HEADACHES: 0
SORE THROAT: 0
MYALGIAS: 0
DIZZINESS: 0

## 2019-10-07 ASSESSMENT — PATIENT HEALTH QUESTIONNAIRE - PHQ9
SUM OF ALL RESPONSES TO PHQ QUESTIONS 1-9: 5
5. POOR APPETITE OR OVEREATING: NOT AT ALL

## 2019-10-07 ASSESSMENT — MIFFLIN-ST. JEOR: SCORE: 1774.08

## 2019-10-07 NOTE — PATIENT INSTRUCTIONS
Monika Don,    Thank you for allowing [facility] to manage your care.    Please continue with your current medications at this time.     If you have any questions or concerns, please feel free to call us at [phone].    Sincerely,    Dr. Jackson    Did you know?  You can schedule an e-Visit for certain simple non-emergent issue for your convenience.  To learn more about or start an eVisit, simply login to [employer], click  Visits  on top banner, click  Start a Virtual Visit  drop down, and click  Symptom-Specific E-Visit

## 2019-10-08 ASSESSMENT — ANXIETY QUESTIONNAIRES: GAD7 TOTAL SCORE: 0

## 2019-10-16 ENCOUNTER — ANESTHESIA EVENT (OUTPATIENT)
Dept: SURGERY | Facility: CLINIC | Age: 79
End: 2019-10-16
Payer: MEDICARE

## 2019-10-16 RX ORDER — ACETAMINOPHEN 325 MG/1
975 TABLET ORAL ONCE
Status: CANCELLED | OUTPATIENT
Start: 2019-10-16 | End: 2019-10-16

## 2019-10-16 RX ORDER — GABAPENTIN 300 MG/1
300 CAPSULE ORAL ONCE
Status: CANCELLED | OUTPATIENT
Start: 2019-10-16 | End: 2019-10-16

## 2019-10-16 NOTE — ANESTHESIA PREPROCEDURE EVALUATION
Anesthesia Pre-Procedure Evaluation    Patient: Flash Brown   MRN: [id number] : 1940          Preoperative Diagnosis: Cataract [H26.9]    Procedure(s):  Cataract Removal with Implant    Past Medical History:   Diagnosis Date     Basal cell carcinoma      CAD (coronary artery disease)      Depression      Diabetes type 2, controlled (H)      Hyperlipidemia      Hypertension      Lymphoma (H)      Malignant melanoma (H)      Melanoma (H)      Squamous cell carcinoma      Past Surgical History:   Procedure Laterality Date     AXILLARY SURGERY      S/P resection and adjuvant radiation     BONE MARROW BIOPSY, BONE SPECIMEN, NEEDLE/TROCAR N/A 2019    Procedure: BIOPSY, BONE MARROW;  Surgeon: Husam Issa MD;  Location: [facility]     CARDIAC SURGERY       CHOLECYSTECTOMY       HERNIA REPAIR, UMBILICAL       PHACOEMULSIFICATION WITH STANDARD INTRAOCULAR LENS IMPLANT Right 10/2/2019    Procedure: Cataract Removal with Implant;  Surgeon: Dinesh Ivey MD;  Location: [facility]     STENT      PTCA with drug-eluting stent of RCA, circumflex, and LAD     VASCULAR SURGERY         Anesthesia Evaluation     . Pt has had prior anesthetic. Type: General and MAC           ROS/MED HX    ENT/Pulmonary:     (+)sleep apnea, , . .    Neurologic:       Cardiovascular:     (+) Dyslipidemia, hypertension--CAD, --stent,  3 . : . . . :. . Previous cardiac testing date:results:date: results:ECG reviewed date:19 results:Sinus Rhythm   WITHIN NORMAL LIMITS date: results:          METS/Exercise Tolerance:     Hematologic:  - neg hematologic  ROS       Musculoskeletal:   (+) arthritis,  -       GI/Hepatic:     (+) liver disease, Other GI/Hepatic ETOH abuse; Cirrhosis      Renal/Genitourinary:  - ROS Renal section negative       Endo:     (+) type II DM Obesity, .      Psychiatric:     (+) psychiatric history depression      Infectious Disease:  - neg infectious disease ROS       Malignancy:   (+) Malignancy History of  "Skin and Lymphoma/Leukemia  Skin CA Remission status post Surgery, Lymph CA status post Surgery and Radiation,         Other:    - neg other ROS                      Physical Exam  Normal systems: cardiovascular, pulmonary and dental    Airway   Mallampati: III  TM distance: >3 FB  Neck ROM: full    Dental     Cardiovascular       Pulmonary             Lab Results   Component Value Date    WBC 3.9 (L) 09/30/2019    HGB 11.9 (L) 09/30/2019    HCT 37.8 (L) 09/30/2019     (L) 09/30/2019     09/30/2019    POTASSIUM 4.0 09/30/2019    CHLORIDE 106 09/30/2019    CO2 20 09/30/2019    BUN 16 09/30/2019    CR 1.03 09/30/2019     (H) 09/30/2019    ANTALIIA 8.5 09/30/2019    ALBUMIN 3.2 (L) 09/30/2019    PROTTOTAL 7.0 09/30/2019    ALT 25 09/30/2019    AST 30 09/30/2019    ALKPHOS 85 09/30/2019    BILITOTAL 1.0 09/30/2019    TSH 3.14 03/13/2008       Preop Vitals  BP Readings from Last 3 Encounters:   10/07/19 110/60   10/02/19 (!) 153/87   09/30/19 (!) 148/69    Pulse Readings from Last 3 Encounters:   10/07/19 61   09/30/19 (!) 48   09/30/19 69      Resp Readings from Last 3 Encounters:   10/07/19 20   10/02/19 16   09/30/19 19    SpO2 Readings from Last 3 Encounters:   10/07/19 98%   10/02/19 100%   09/30/19 100%      Temp Readings from Last 1 Encounters:   10/07/19 36.3  C (97.4  F) (Tympanic)    Ht Readings from Last 1 Encounters:   10/07/19 1.676 m (5' 6\")      Wt Readings from Last 1 Encounters:   10/07/19 110.1 kg (242 lb 12.8 oz)    Estimated body mass index is 39.19 kg/m  as calculated from the following:    Height as of 10/7/19: 1.676 m (5' 6\").    Weight as of 10/7/19: 110.1 kg (242 lb 12.8 oz).       Anesthesia Plan      History & Physical Review  History and physical reviewed and following examination; no interval change.    ASA Status:  3 .    NPO Status:  > 6 hours    Plan for MAC Reason for MAC:  Procedure to face, neck, head or breast  PONV prophylaxis:  Ondansetron (or other 5HT-3) and " Dexamethasone or Solumedrol       Postoperative Care  Postoperative pain management:  Multi-modal analgesia.      Consents  Anesthetic plan, risks, benefits and alternatives discussed with:  Patient..                 Richi Diaz CRNA, APRN CRNA

## 2019-10-17 NOTE — H&P
[facility]  [facility]  [address]  [facility] [address]  [phone]  Dept: [phone]    OPHTHALMOLOGY PRE-OPERATIVE  HISTORY AND PHYSICAL    DATE OF H/P:  10/16/2019    DATE OF SURGERY:  2019  PROCEDURE:  Procedure(s):  Cataract Removal with Implant, Left Eye  LENS IMPLANT:  [id number] +22.5  REFRACTIVE GOAL:  PL Sph  SURGEON:  Dinesh Ivey MD    ANESTHESIA:  TOPICAL / MAC    OR CASE REQUIREMENTS:    DEMOGRAPHICS:  Demographic Information on Flash Brown:    Flash Brown  Gender: male  : 1940  [address] [place] [address]  [phone] (home)     Medical Record: [id number]  Social Security Number: [id number]  Pharmacy: [facility] [address] [place] [address] [employer]  Primary Care Provider: Thomas Jackson    Parent's names are: Data Unavailable (mother) and Data Unavailable (father).    Insurance: Payor: MEDICARE / Plan: MEDICARE / Product Type: Medicare /     OCULAR HISTORY:  Cataracts, s/p IOL right eye.    HISTORIES:  Past Medical History:   Diagnosis Date     Basal cell carcinoma      CAD (coronary artery disease)      Depression      Diabetes type 2, controlled (H)      Hyperlipidemia      Hypertension      Lymphoma (H)      Malignant melanoma (H)      Melanoma (H)      Squamous cell carcinoma        Past Surgical History:   Procedure Laterality Date     AXILLARY SURGERY      S/P resection and adjuvant radiation     BONE MARROW BIOPSY, BONE SPECIMEN, NEEDLE/TROCAR N/A 2019    Procedure: BIOPSY, BONE MARROW;  Surgeon: Husam Issa MD;  Location: [facility]     CARDIAC SURGERY       CHOLECYSTECTOMY       HERNIA REPAIR, UMBILICAL       PHACOEMULSIFICATION WITH STANDARD INTRAOCULAR LENS IMPLANT Right 10/2/2019    Procedure: Cataract Removal with Implant;  Surgeon: Dinesh Ivey MD;  Location: [facility]     STENT      PTCA with drug-eluting stent of RCA, circumflex, and LAD     VASCULAR SURGERY         Family History    Problem Relation Age of Onset     Asthma Other      Cancer Other         melanoma     Blood Disease Other         bleeding disorder     Lung Cancer Mother         Smoker     Prostate Cancer Father      Melanoma No family hx of        Social History     Tobacco Use     Smoking status: Never Smoker     Smokeless tobacco: Never Used   Substance Use Topics     Alcohol use: Yes     Frequency: Monthly or less     Drinks per session: 1 or 2     Binge frequency: Never     Comment: glass of wine couple times per week       MEDICATIONS:  No current facility-administered medications for this encounter.      Current Outpatient Medications   Medication Sig     amLODIPine (NORVASC) 5 MG tablet Take 1 tablet (5 mg) by mouth daily     aspirin (ASA) 81 MG EC tablet Take 1 tablet (81 mg) by mouth daily     atorvastatin (LIPITOR) 20 MG tablet Take 1 tablet (20 mg) by mouth daily     busPIRone (BUSPAR) 10 MG tablet Take 1 tablet (10 mg) by mouth 2 times daily     carvedilol (COREG) 12.5 MG tablet Take 1 tablet (12.5 mg) by mouth 2 times daily (with meals)     CENTRUM OR TABS 1 TABLET DAILY     cloNIDine (CATAPRES) 0.1 MG tablet Take 1 tablet (0.1 mg) by mouth 2 times daily     COQ10 100 MG OR CAPS Take 1 capsule by mouth daily      glimepiride (AMARYL) 4 MG tablet Take 1 tablet (4 mg) by mouth 2 times daily     hydrALAZINE (APRESOLINE) 50 MG tablet Take 1 tablet (50 mg) by mouth 2 times daily     naloxone (NARCAN) 4 MG/0.1ML nasal spray Spray 1 spray (4 mg) into one nostril alternating nostrils once as needed for opioid reversal Every 2-3 minutes until patient responsive or EMS arrives     NIACIN 500 MG OR TABS Take one orally twice daily (Patient taking differently: Take 500 mg by mouth 2 times daily (with meals) )     NITROGLYCERIN 0.4 MG SL SUBL ONE TABLET UNDER TONGUE, FOR CHEST PAIN, AS DIRECTED (Patient taking differently: Place 0.4 mg under the tongue every 5 minutes as needed )     OMEGA-3 1000 MG OR CAPS Take 1 g by mouth  daily      ORDER FOR DME Light Therapy with Full Spectrum Light (33800 lux) Start with 10-15 minute exposure per day, Increase exposure to 30 to 45 minutes per day, Maximum exposure: 90 minutes per day,Look periodically at light during each session      pioglitazone (ACTOS) 30 MG tablet Take 1 tablet (30 mg) by mouth daily     ramipril (ALTACE) 10 MG capsule Take 1 capsule (10 mg) by mouth daily     tamsulosin (FLOMAX) 0.4 MG capsule Take 1 capsule (0.4 mg) by mouth daily     VOSOL-HC 2-1 % OT SOLN 2-3 drops in the affected ear 3-4 times daily for 7-10 days for itching in the ear     VYTORIN 10-40 MG OR TABS 1 TABLET EVERY EVENING (Patient taking differently: Take 1 tablet by mouth At Bedtime )     Facility-Administered Medications Ordered in Other Encounters   Medication     sodium hyaluronate (HEALON DUET)       ALLERGIES:     Allergies   Allergen Reactions     Iodine Swelling       PERTINENT SYSTEMS REVIEW:    1. Yes: CAD, s/p stent - Do you have a history of heart attack, stroke, stent, bypass or surgery on an artery in the head, neck, heart or legs?  2. No - Do you ever have any pain or discomfort in your chest?  3. No - Do you have a history of  Heart Failure?  4. No - Are you troubled by shortness of breath when walking: On the level, up a slight hill or at night?  5. No - Do you currently have a cold, bronchitis or other respiratory infection?  6. No - Do you have a cough, shortness of breath or wheezing?  7. No - Do you sometimes get pains in the calves of your legs when you walk?  8. No - Do you or anyone in your family have previous history of blood clots?  9. No - Do you or does anyone in your family have a serious bleeding problem such as prolonged bleeding following surgeries or cuts?  10. No - Have you ever had problems with anemia or been told to take iron pills?  11. No - Have you had any abnormal blood loss such as black, tarry or bloody stools, or abnormal vaginal bleeding?  12. No - Have you ever  had a blood transfusion?  13. No - Have you or any of your relatives ever had problems with anesthesia?  14. No - Do you have sleep apnea, excessive snoring or daytime drowsiness?  15. No - Do you have any prosthetic heart valves?  16. No - Do you have prosthetic joints?    EXAMINATION:  Vitals were reviewed                     Vison:  Va, left - 20/70;   BAT, left - 20/100;  HEENT:  Cataract, otherwise unremarkable.  LUNGS:  Clear  CV:  Regular rate and rhythm without murmur  ABD:  Soft and nontender  NEURO:  Alert and nonfocal    IMPRESSION:  Patient cleared for ophthalmic surgery.  Low risk with monitored, light sedation.  I have assessed the patient's DVT risk, and no additional orders necessary.    PLAN:  Procedure(s):  Cataract Removal with Implant, Left Eye      Dinesh Ivey MD

## 2019-10-21 ENCOUNTER — ANESTHESIA (OUTPATIENT)
Dept: SURGERY | Facility: CLINIC | Age: 79
End: 2019-10-21
Payer: MEDICARE

## 2019-10-21 ENCOUNTER — HOSPITAL ENCOUNTER (OUTPATIENT)
Facility: CLINIC | Age: 79
Discharge: HOME | End: 2019-10-21
Attending: OPHTHALMOLOGY | Admitting: OPHTHALMOLOGY
Payer: MEDICARE

## 2019-10-21 VITALS
BODY MASS INDEX: 38.89 KG/M2 | HEIGHT: 66 IN | OXYGEN SATURATION: 97 % | RESPIRATION RATE: 16 BRPM | DIASTOLIC BLOOD PRESSURE: 71 MMHG | WEIGHT: 242 LBS | SYSTOLIC BLOOD PRESSURE: 142 MMHG | TEMPERATURE: 97.6 F

## 2019-10-21 LAB — GLUCOSE BLDC GLUCOMTR-MCNC: 88 MG/DL (ref 70–99)

## 2019-10-21 PROCEDURE — 71000022 ZZH RECOVERY CATRACT PACKAGE: Performed by: OPHTHALMOLOGY

## 2019-10-21 PROCEDURE — 37000012 ZZH ANESTHESIA CATARACT PACKAGE: Performed by: OPHTHALMOLOGY

## 2019-10-21 PROCEDURE — 36000025 ZZH CATARACT SURGICAL PACKAGE: Performed by: OPHTHALMOLOGY

## 2019-10-21 PROCEDURE — 25800030 ZZH RX IP 258 OP 636: Performed by: NURSE ANESTHETIST, CERTIFIED REGISTERED

## 2019-10-21 PROCEDURE — 25000125 ZZHC RX 250: Performed by: OPHTHALMOLOGY

## 2019-10-21 PROCEDURE — 25000125 ZZHC RX 250: Performed by: NURSE ANESTHETIST, CERTIFIED REGISTERED

## 2019-10-21 PROCEDURE — 82962 GLUCOSE BLOOD TEST: CPT

## 2019-10-21 PROCEDURE — 25000128 H RX IP 250 OP 636: Performed by: NURSE ANESTHETIST, CERTIFIED REGISTERED

## 2019-10-21 PROCEDURE — 25000128 H RX IP 250 OP 636: Performed by: OPHTHALMOLOGY

## 2019-10-21 PROCEDURE — V2632 POST CHMBR INTRAOCULAR LENS: HCPCS | Performed by: OPHTHALMOLOGY

## 2019-10-21 DEVICE — EYE IMP IOL AMO PCL TECNIS ZCB00 22.5: Type: IMPLANTABLE DEVICE | Site: EYE | Status: FUNCTIONAL

## 2019-10-21 RX ORDER — LIDOCAINE 40 MG/G
CREAM TOPICAL
Status: DISCONTINUED | OUTPATIENT
Start: 2019-10-21 | End: 2019-10-21 | Stop reason: HOSPADM

## 2019-10-21 RX ORDER — TROPICAMIDE 10 MG/ML
1 SOLUTION/ DROPS OPHTHALMIC
Status: COMPLETED | OUTPATIENT
Start: 2019-10-21 | End: 2019-10-21

## 2019-10-21 RX ORDER — SODIUM CHLORIDE, SODIUM LACTATE, POTASSIUM CHLORIDE, CALCIUM CHLORIDE 600; 310; 30; 20 MG/100ML; MG/100ML; MG/100ML; MG/100ML
INJECTION, SOLUTION INTRAVENOUS CONTINUOUS
Status: DISCONTINUED | OUTPATIENT
Start: 2019-10-21 | End: 2019-10-21 | Stop reason: HOSPADM

## 2019-10-21 RX ORDER — CYCLOPENTOLATE HYDROCHLORIDE 10 MG/ML
1 SOLUTION/ DROPS OPHTHALMIC
Status: DISCONTINUED | OUTPATIENT
Start: 2019-10-21 | End: 2019-10-21 | Stop reason: HOSPADM

## 2019-10-21 RX ORDER — DEXAMETHASONE SODIUM PHOSPHATE 4 MG/ML
4 INJECTION, SOLUTION INTRA-ARTICULAR; INTRALESIONAL; INTRAMUSCULAR; INTRAVENOUS; SOFT TISSUE EVERY 10 MIN PRN
Status: DISCONTINUED | OUTPATIENT
Start: 2019-10-21 | End: 2019-10-21 | Stop reason: HOSPADM

## 2019-10-21 RX ORDER — ONDANSETRON 2 MG/ML
4 INJECTION INTRAMUSCULAR; INTRAVENOUS EVERY 30 MIN PRN
Status: DISCONTINUED | OUTPATIENT
Start: 2019-10-21 | End: 2019-10-21 | Stop reason: HOSPADM

## 2019-10-21 RX ORDER — ONDANSETRON 4 MG/1
4 TABLET, ORALLY DISINTEGRATING ORAL EVERY 30 MIN PRN
Status: DISCONTINUED | OUTPATIENT
Start: 2019-10-21 | End: 2019-10-21 | Stop reason: HOSPADM

## 2019-10-21 RX ORDER — PROPARACAINE HYDROCHLORIDE 5 MG/ML
SOLUTION/ DROPS OPHTHALMIC PRN
Status: DISCONTINUED | OUTPATIENT
Start: 2019-10-21 | End: 2019-10-21 | Stop reason: HOSPADM

## 2019-10-21 RX ORDER — ALBUTEROL SULFATE 0.83 MG/ML
2.5 SOLUTION RESPIRATORY (INHALATION) EVERY 4 HOURS PRN
Status: DISCONTINUED | OUTPATIENT
Start: 2019-10-21 | End: 2019-10-21 | Stop reason: HOSPADM

## 2019-10-21 RX ORDER — BALANCED SALT SOLUTION 6.4; .75; .48; .3; 3.9; 1.7 MG/ML; MG/ML; MG/ML; MG/ML; MG/ML; MG/ML
SOLUTION OPHTHALMIC PRN
Status: DISCONTINUED | OUTPATIENT
Start: 2019-10-21 | End: 2019-10-21 | Stop reason: HOSPADM

## 2019-10-21 RX ORDER — PHENYLEPHRINE HYDROCHLORIDE 25 MG/ML
1 SOLUTION/ DROPS OPHTHALMIC
Status: COMPLETED | OUTPATIENT
Start: 2019-10-21 | End: 2019-10-21

## 2019-10-21 RX ORDER — NALOXONE HYDROCHLORIDE 0.4 MG/ML
.1-.4 INJECTION, SOLUTION INTRAMUSCULAR; INTRAVENOUS; SUBCUTANEOUS
Status: DISCONTINUED | OUTPATIENT
Start: 2019-10-21 | End: 2019-10-21 | Stop reason: HOSPADM

## 2019-10-21 RX ORDER — LIDOCAINE HYDROCHLORIDE 20 MG/ML
JELLY TOPICAL PRN
Status: DISCONTINUED | OUTPATIENT
Start: 2019-10-21 | End: 2019-10-21 | Stop reason: HOSPADM

## 2019-10-21 RX ADMIN — PHENYLEPHRINE HYDROCHLORIDE 1 DROP: 25 SOLUTION/ DROPS OPHTHALMIC at 11:15

## 2019-10-21 RX ADMIN — MIDAZOLAM 1 MG: 1 INJECTION INTRAMUSCULAR; INTRAVENOUS at 12:06

## 2019-10-21 RX ADMIN — CYCLOPENTOLATE HYDROCHLORIDE 1 DROP: 10 SOLUTION/ DROPS OPHTHALMIC at 11:18

## 2019-10-21 RX ADMIN — TROPICAMIDE 1 DROP: 10 SOLUTION/ DROPS OPHTHALMIC at 11:14

## 2019-10-21 RX ADMIN — TROPICAMIDE 1 DROP: 10 SOLUTION/ DROPS OPHTHALMIC at 11:27

## 2019-10-21 RX ADMIN — CYCLOPENTOLATE HYDROCHLORIDE 1 DROP: 10 SOLUTION/ DROPS OPHTHALMIC at 11:15

## 2019-10-21 RX ADMIN — PHENYLEPHRINE HYDROCHLORIDE 1 DROP: 25 SOLUTION/ DROPS OPHTHALMIC at 11:18

## 2019-10-21 RX ADMIN — PHENYLEPHRINE HYDROCHLORIDE 1 DROP: 25 SOLUTION/ DROPS OPHTHALMIC at 11:28

## 2019-10-21 RX ADMIN — SODIUM CHLORIDE, POTASSIUM CHLORIDE, SODIUM LACTATE AND CALCIUM CHLORIDE: 600; 310; 30; 20 INJECTION, SOLUTION INTRAVENOUS at 11:16

## 2019-10-21 RX ADMIN — TROPICAMIDE 1 DROP: 10 SOLUTION/ DROPS OPHTHALMIC at 11:18

## 2019-10-21 RX ADMIN — LIDOCAINE HYDROCHLORIDE 0.2 ML: 10 INJECTION, SOLUTION EPIDURAL; INFILTRATION; INTRACAUDAL; PERINEURAL at 11:16

## 2019-10-21 ASSESSMENT — MIFFLIN-ST. JEOR: SCORE: 1770.45

## 2019-10-21 NOTE — ANESTHESIA POSTPROCEDURE EVALUATION
Patient: Flash Brown    Procedure(s):  Cataract Removal with Implant    Diagnosis:Cataract [H26.9]  Diagnosis Additional Information: No value filed.    Anesthesia Type:  MAC    Note:  Anesthesia Post Evaluation    Patient location during evaluation: Phase 2 and Bedside  Patient participation: Able to fully participate in evaluation  Level of consciousness: awake and alert  Pain management: adequate  Airway patency: patent  Cardiovascular status: acceptable and hemodynamically stable  Respiratory status: acceptable and room air  Hydration status: acceptable  PONV: none     Anesthetic complications: None          Last vitals:  Vitals:    10/21/19 1100   BP: 125/60   Resp: 16   Temp: 36.4  C (97.6  F)   SpO2: 97%         Electronically Signed By: FIONA Newsome CRNA  October 21, 2019  12:30 PM

## 2019-10-21 NOTE — OP NOTE
CATARACT OPERATIVE NOTE    PATIENT: Flash Brown  DATE OF SURGERY: 10/21/2019  PREOPERATIVE DIAGNOSIS:  Senile Nuclear Cataract, Left eye  POSTOPERATIVE DIAGNOSIS:  Senile Nuclear Cataract, and intraoperative floppy iris syndrome, Left eye  OPERATIVE PROCEDURE:  Complex Phacoemulsification and placement of intraocular lens, requiring Malyugin Ring  SURGEON:  Dinesh Ivey MD  ANESTHESIA:  Topical / MAC  EBL:  None  SPECIMENS:  None  COMPLICATIONS:  None    PROCEDURE:  The patient was brought to the operating room at [facility].  The left eye was prepped and draped in the usual fashion for cataract surgery.  A wire lid speculum was inserted.  A super sharp blade was used to make a paracentesis at the 5 O'clock position.  The super sharp blade was used to make a partial thickness temporal groove, which was 3 mm in length.  0.8 mL of non-preserved epi-Shugarcaine was injected into the anterior chamber. Viscoelastic was used to inflate the anterior chamber through a cannula.  A 2.5 mm microkeratome was used to make a temporal clear corneal incision in a two-plane fashion.  Due to intraoperative floppy iris, a malyugin ring was inserted to dilate and secure the iris in the usual manner.  A cystotome needle and forceps were used to make a capsulorrhexis.  Hydrodissection and hydrodelineation were performed with Balance Salt Solution.  The lens was then phacoemulsified and removed without complications.  The cortical material was removed with bimanual irrigation and aspiration.  The capsular bag was filled with viscoelastic.  A posterior chamber intraocular lens, preselected and recorded, was folded and inserted into the capsular bag.  The malyugin ring was dis-inserted from the anterior chamber in the usual manner.  The viscoelastic was removed with the irrigation and aspiration tip.  Balanced Salt Solution with Vigamox, 150mg/0.1mL, was used to refill the anterior chamber.  The wounds  were checked for water tightness and required no suture.  The wire lid speculum was removed.  The patient's left eye was cleaned and a drop of each post-operative drop was placed, followed by a berg shield.  The patient tolerated the procedure well, and there were no complications.      Dinesh Ivey MD

## 2019-10-21 NOTE — ANESTHESIA CARE TRANSFER NOTE
Patient: Flash Brown    Procedure(s):  Cataract Removal with Implant    Diagnosis: Cataract [H26.9]  Diagnosis Additional Information: No value filed.    Anesthesia Type:   MAC     Note:  Airway :Room Air  Patient transferred to:Phase II  Handoff Report: Identifed the Patient, Identified the Reponsible Provider, Reviewed the pertinent medical history, Discussed the surgical course, Reviewed Intra-OP anesthesia mangement and issues during anesthesia, Set expectations for post-procedure period and Allowed opportunity for questions and acknowledgement of understanding      Vitals: (Last set prior to Anesthesia Care Transfer)    CRNA VITALS  10/21/2019 1158 - 10/21/2019 1229      10/21/2019             SpO2:  93 %                Electronically Signed By: FIONA Newsome CRNA  October 21, 2019  12:29 PM

## 2019-10-22 ENCOUNTER — PATIENT OUTREACH (OUTPATIENT)
Dept: CARE COORDINATION | Facility: CLINIC | Age: 79
End: 2019-10-22

## 2019-10-22 ASSESSMENT — ACTIVITIES OF DAILY LIVING (ADL): DEPENDENT_IADLS:: TRANSPORTATION

## 2019-10-22 NOTE — LETTER
[facility] Home  Complex Care Plan  About Me:    Patient Name:  Flash Brown    YOB: 1940  Age:         78 year old   [place] MRN:    [id number] Telephone Information:  Home Phone [phone]   Mobile [phone]       Address:  [address]  [facility] [address] Email address:  [email or web address]      Emergency Contact(s)    Name Relationship Lgl Grd Work Phone Home Phone Mobile Phone   WYATT GUZMÁN Sister   [phone]           Primary language:  English    Other communication barriers: None  Preferred Method of Communication:  Mail    Current living arrangement: I live in a private home with sisterDaylin.  Mobility Status/ Medical Equipment: Independent      Health Maintenance:  Health Maintenance Reviewed: Due/Overdue:  Health Maintenance Due   Topic Date Due     EYE EXAM  1940     ZOSTER IMMUNIZATION (1 of 2) 11/02/1992     MEDICARE ANNUAL WELLNESS VISIT  03/13/2009     TSH W/FREE T4 REFLEX  03/13/2010     DTAP/TDAP/TD IMMUNIZATION (2 - Td) 03/13/2014     COLONOSCOPY  03/13/2016     INFLUENZA VACCINE (1) 09/01/2019       My Access Plan  Medical Emergency 911   Primary Clinic Line [facility] - [phone]   24 Hour Appointment Line [phone] or  [phone]) (toll-free)   24 Hour Nurse Line [phone] (toll-free)   Preferred Urgent Care [facility] [phone]   Preferred Hospital [place], [place]  [phone]   Preferred Pharmacy [facility] [address] [place] [facility] - [place], [place] - [address]     Behavioral Health Crisis Line The National Suicide Prevention Lifeline at 1-770.776.1605 or 911     My Care Team Members  Patient Care Team       Relationship Specialty Notifications Start End    Thomas Jackson DO PCP - General Family Practice  5/21/19     Phone: [phone] Fax: [phone] [address] [place] [address]    Rose Mary Santillan, RN Specialty Care  Coordinator Oncology Admissions 6/28/19     Phone: [phone]         Paola Rapp LSW Lead Care Coordinator Primary Care - CC Admissions 9/12/19     Phone: [phone] Fax: [phone]                My Care Plans  Self Management and Treatment Plan  Goals and (Comments)  Goals        General    Functional (pt-stated)     Notes - Note created  9/12/2019  1:18 PM by Paola Rapp LSW    Goal Statement: I will find transportation for social events  Measure of Success: Pt will complete the [employer] application  Supportive Steps to Achieve: Pt's sister, pt and SW to assist with completion the [employer] application.  Barriers: None  Strengths: Pt motivated to regain independence.  Date to Achieve By: December 1, 2019  Patient expressed understanding of goal: Yes            Psychosocial (pt-stated)     Notes - Note edited  9/12/2019  1:16 PM by Paola Rapp LSW    #2 Goal Statement: I will become more active in my community  Measure of Success: Pt will engage in community programs for senior citizens  Supportive Steps to Achieve: Pt, his sister & SW will find programs that appeal to the pt to engage in.  Barriers: Pt cannot drive, relies upon family & transportation resources  Strengths: Pt appears motivated to engage in the community  Date to Achieve By: December 1, 2019  Patient expressed understanding of goal: Yes                   Action Plans on File:Depression    Advance Care Plans/Directives Type: None       My Medical and Care Information  Problem List   Patient Active Problem List   Diagnosis     Essential hypertension, benign     Malignant melanoma s/p resection in [place], [place]     RT axillary lymphoma s/p resection, with adjuvant radiation - [place], [place]     Basal cell skin cancer over nose s/p resection - [place], [place]     Mixed hyperlipidemia     Diabetes mellitus, type 2 (H)     Obstructive sleep apnea     Depressive disorder, not elsewhere classified     ?  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    Care Coordination Start Date: 9/12/2019   Frequency of Care Coordination: monthly   Form Last Updated: 10/22/2019

## 2019-10-22 NOTE — PROGRESS NOTES
Clinic Care Coordination Contact  [facility] [[email or web address]]/Voicemail    Referral Source: PCP.  SW received Epic notification that pt had been seen for cataract surgery in each eye.      Clinical Data: Care Coordinator Outreach    Outreach attempted x 1.  Left message on patient's voicemail with call back information and requested return call.    Plan: Care Coordinator will send unable to contact letter with care coordinator contact information via mail. Care Coordinator will try to reach patient again in 10 business days.    Per EMR review, pt has an appt with Hadley ANDERSON at [facility] on 10-29-19 for therapy.        Paola Yeh [facility]  Primary Care Clinic- Social Work Care Coordinator  [facility]  10/22/2019 4:39 PM  [phone]

## 2019-10-22 NOTE — LETTER
[facility] Coordination  [address]   [place] [address]  [phone]      October 22, 2019    Flash Brown  [address] [facility] [place] [address]      Dear Flash,    I have been attempting to reach you since our last contact. I would like to continue to work with you and provide any additional support you may need on achieving your health care related goals. I would appreciate if you would give me a call at [phone] to let me know if you would like to continue working together. I know that there are many things that can affect our ability to communicate and I hope we can continue to work together.    All of us at the [facility] are invested in your health and are here to assist you in meeting your goals.     Sincerely,    ROSALIO Rodrigues

## 2019-10-29 ENCOUNTER — OFFICE VISIT (OUTPATIENT)
Dept: PSYCHOLOGY | Facility: CLINIC | Age: 79
End: 2019-10-29
Payer: MEDICARE

## 2019-10-29 DIAGNOSIS — F43.21 ADJUSTMENT DISORDER WITH DEPRESSED MOOD: Primary | ICD-10-CM

## 2019-10-29 PROCEDURE — 90791 PSYCH DIAGNOSTIC EVALUATION: CPT | Performed by: SOCIAL WORKER

## 2019-10-29 ASSESSMENT — COLUMBIA-SUICIDE SEVERITY RATING SCALE - C-SSRS
2. HAVE YOU ACTUALLY HAD ANY THOUGHTS OF KILLING YOURSELF LIFETIME?: NO
1. IN THE PAST MONTH, HAVE YOU WISHED YOU WERE DEAD OR WISHED YOU COULD GO TO SLEEP AND NOT WAKE UP?: NO
TOTAL  NUMBER OF ABORTED OR SELF INTERRUPTED ATTEMPTS PAST LIFETIME: NO
TOTAL  NUMBER OF INTERRUPTED ATTEMPTS LIFETIME: NO
6. HAVE YOU EVER DONE ANYTHING, STARTED TO DO ANYTHING, OR PREPARED TO DO ANYTHING TO END YOUR LIFE?: NO
6. HAVE YOU EVER DONE ANYTHING, STARTED TO DO ANYTHING, OR PREPARED TO DO ANYTHING TO END YOUR LIFE?: NO
ATTEMPT LIFETIME: NO
2. HAVE YOU ACTUALLY HAD ANY THOUGHTS OF KILLING YOURSELF?: NO
1. IN THE PAST MONTH, HAVE YOU WISHED YOU WERE DEAD OR WISHED YOU COULD GO TO SLEEP AND NOT WAKE UP?: NO
TOTAL  NUMBER OF INTERRUPTED ATTEMPTS PAST 3 MONTHS: NO
ATTEMPT PAST THREE MONTHS: NO
TOTAL  NUMBER OF ABORTED OR SELF INTERRUPTED ATTEMPTS PAST 3 MONTHS: NO

## 2019-10-29 ASSESSMENT — ANXIETY QUESTIONNAIRES
2. NOT BEING ABLE TO STOP OR CONTROL WORRYING: NOT AT ALL
7. FEELING AFRAID AS IF SOMETHING AWFUL MIGHT HAPPEN: NOT AT ALL
6. BECOMING EASILY ANNOYED OR IRRITABLE: NOT AT ALL
1. FEELING NERVOUS, ANXIOUS, OR ON EDGE: NOT AT ALL
GAD7 TOTAL SCORE: 0
5. BEING SO RESTLESS THAT IT IS HARD TO SIT STILL: NOT AT ALL
3. WORRYING TOO MUCH ABOUT DIFFERENT THINGS: NOT AT ALL

## 2019-10-29 ASSESSMENT — PATIENT HEALTH QUESTIONNAIRE - PHQ9
5. POOR APPETITE OR OVEREATING: NOT AT ALL
SUM OF ALL RESPONSES TO PHQ QUESTIONS 1-9: 5

## 2019-10-29 NOTE — PROGRESS NOTES
Progress Note - Initial Session    Client Name:  Flash Brown Date: 10/29/19         Service Type: Individual  Video Visit: No     Session Start Time: 12  Session End Time: 12:45 pm     Session Length: 45 min    Session #: 1    Attendees: Client attended alone     DATA:  Diagnostic Assessment in progress.  Unable to complete documentation at the conclusion of the first session due to time constraints.    Interactive Complexity: No  Crisis: No    Intervention:  Assessed functioning. Went over the results of the phq/rik. Educated on confidentiality. Assessed for safety. Educated on depression. Processed feelings about recent move and surgical procedures and wanting to get [place] drivers license.    ASSESSMENT:  Mental Status Assessment:  Appearance:   Appropriate   Eye Contact:   Poor  Psychomotor Behavior: Normal   Attitude:   Cooperative   Orientation:   All  Speech   Rate / Production: Normal    Volume:  Normal   Mood:    Depressed  Normal  Affect:    Appropriate   Thought Content:  Clear   Thought Form:  Coherent  Logical   Insight:    Fair       Safety Issues and Plan for Safety and Risk Management:  Client denies current fears or concerns for personal safety.  Client denies current or recent suicidal ideation or behaviors.  Client denies current or recent homicidal ideation or behaviors.  Client denies current or recent self injurious behavior or ideation.  Client denies other safety concerns.  Recommended that patient call 911 or go to the local ED should there be a change in any of these risk factors.  Client reports there are firearms in the house. The firearms are secured in a locked space.      Diagnostic Criteria:  A. The development of emotional or behavioral symptoms in response to an identifiable stressor(s) occurring within 3 months of the onset of the stressor(s)  B. These symptoms or behaviors are clinically significant, as evidenced by one or both of the following:  C. The  stress-related disturbance does not meet criteria for another disorder & is not not an exacerbation of another mental disorder  D. The symptoms do not represent normal bereavement  E. 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  WHODAS 2.0 (12 item)- see DA please.     Collateral Reports Completed:  Routed note to PCP      PLAN: (Homework, other):  Client stated that he may follow up for ongoing services with [facility].  Scheduled to return.      DEENA Abreu

## 2019-10-30 ENCOUNTER — PATIENT OUTREACH (OUTPATIENT)
Dept: CARE COORDINATION | Facility: CLINIC | Age: 79
End: 2019-10-30

## 2019-10-30 ASSESSMENT — ACTIVITIES OF DAILY LIVING (ADL): DEPENDENT_IADLS:: TRANSPORTATION

## 2019-10-30 ASSESSMENT — ANXIETY QUESTIONNAIRES: GAD7 TOTAL SCORE: 0

## 2019-10-30 NOTE — TELEPHONE ENCOUNTER
Social Work Care Coordination    Received a call from pt. He was responding to a letter he received from Paola SANTAMARIA.  Pt. Reported he is doing very well living with his Sister and Brother in law . They are leaving for [place] , on Friday.   He did see his therapist yesterday and has a follow up appt after he returns.   He feels he is doing well.  Commented on how many appts.  he has had since moving to [place].     Plan: Will route to Primary CC.to decide if case should be closed.        ROSALIO Dorsey / Covering for Paola SANTAMARIA   [facility] Primary Care   Care Coordination  [facility]  10/30/2019 12:24 PM

## 2019-11-07 ENCOUNTER — FCC EXTENDED DOCUMENTATION (OUTPATIENT)
Dept: PSYCHOLOGY | Facility: CLINIC | Age: 79
End: 2019-11-07

## 2019-11-07 NOTE — PROGRESS NOTES
"                                                                                                                                                                      Adult Intake Structured Interview  Standard Diagnostic Assessment      CLIENT'S NAME: Flash Brown  MRN:   [id number]  :   1940  ACCT. NUMBER: [id number]  DATE OF SERVICE: 19  VIDEO VISIT: No    Identifying Information:  Client is a 79 year old, , single male. Client was referred for counseling by \"Dr. Jackson\". Client is currently unemployed. Client attended the session alone.        Client's Statement of Presenting Concern:  Client reports the reason for seeking therapy at this time as \"Dr. SMITH. Dr. JACKSON?\".  Client stated that his symptoms have resulted in the following functional impairments: management of the household and or completion of tasks and self-care      History of Presenting Concern:  Client reports that these problem(s) began prior to his sister encouraging him to move to the area from [place], [place] so she could watch over him. Client has not attempted to resolve these concerns in the past. Client reports that other professional(s) are involved in providing support / services. His PCP recommended counseling.      Social History:  Client reported he grew up in [facility] [place]. They were the first born of 10 children. his parents remained . Client described his current relationships with family of origin as \"I am old, sometime confused about life!!\".    Client reported a history of no committed relationships or marriages. Client reported having no children. Client identified few stable and meaningful social connections. Client reported that he has not been involved with the legal system. Client's highest education level was some college. Client did identify the following learning problems: attention, concentration, hearing, reading and writing. There are no ethnic, cultural or " Zoroastrian factors that may be relevant for therapy. Client identified his preferred language to be English. Client reported he does not need the assistance of an  or other support involved in therapy. Modifications will not be used to assist communication in therapy. Client did not serve in the .      Client reports family history includes Asthma in an other family member; Blood Disease in an other family member; Cancer in an other family member; Lung Cancer in his mother; Prostate Cancer in his father.    Mental Health History:  Client reported no family history of mental health issues.  Client has not been previously diagnosed with a mental health diagnosis.  Client has not received mental health services in the past.  Hospitalizations: None.  Client is not currently receiving any mental health services.       Chemical Health History:  Client reported no family history of chemical health issues. Client has not received chemical dependency treatment in the past. Client is not currently receiving any chemical dependency treatment. Client reports no problems as a result of their drinking / drug use.       Client Reports:  Client reports using alcohol 2-3 times per week and has 1/2 glass of wine when his sister offers glasses of wine at a time. Patient first started drinking at age 27.  Patient reported date of last use was recently.  Patient reports heaviest use was in the past.  Client denies using tobacco.  Client denies using marijuana.  Client reports using caffeine 1 times per day and drinks 1/2 cup at a time. Patient started using caffeine at age mid to late twenties.  Client denies using street drugs.  Client denies the non-medical use of prescription or over the counter drugs.    CAGE: None of the patient's responses to the CAGE screening were positive / Negative CAGE score   Based on the negative Cage-Aid score and clinical interview there  are not indications of drug or alcohol  "abuse.    Discussed the general effects of drugs and alcohol on health and well-being. Therapist gave client printed information about the effects of chemical use on his health and well being.      Significant Losses / Trauma / Abuse / Neglect Issues:  There are indications or report of significant loss, trauma, abuse or neglect issues related to: death of sister 18 years ago and lost his only brother this year.    Issues of possible neglect are not present.      Medical Issues:  Client has had a physical exam to rule out medical causes for current symptoms. Date of last physical exam was within the past year. Client was encouraged to follow up with PCP if symptoms were to develop. The client has a [place] Primary Care Provider, who is named Thomas Jackson. The client reports not having a psychiatrist. Client reports the following current medical concerns: \"stents in heart 4. Alot of skin cancer surgeries. 2 cataract surgeries.. The client denies the presence of chronic or episodic pain. There are significant nutritional concerns. He wrote \"over weight. Laziness\".    Patient reports current meds as:   No outpatient medications have been marked as taking for the 11/7/19 encounter ([facility] Extended Documentation) with Raheem Guo LICSW.       Client Allergies:  Allergies   Allergen Reactions     Iodine Swelling     the following allergies to medications: \"iodine. 1983 surery\"    Medical History:  Past Medical History:   Diagnosis Date     Basal cell carcinoma      CAD (coronary artery disease)      Depression      Diabetes type 2, controlled (H)      Hyperlipidemia      Hypertension      Lymphoma (H)      Malignant melanoma (H)      Melanoma (H)      Squamous cell carcinoma          Medication Adherence:  N/A - Client does not have prescribed psychiatric medications.    Client was provided recommendation to follow-up with prescribing physician.    Mental Status Assessment:  Appearance:   Appropriate   Eye " Contact:   Poor  Psychomotor Behavior: Normal   Attitude:   Cooperative   Orientation:   All  Speech   Rate / Production: Normal    Volume:  Normal   Mood:    Depressed  Normal  Affect:    Appropriate   Thought Content:  Clear   Thought Form:  Coherent  Logical   Insight:    Fair       Review of Symptoms:  Depression: [employer]  Daniella:  No symptoms  Psychosis: No symptoms  Anxiety: No symptoms  Panic:  No symptoms  Post Traumatic Stress Disorder: No symptoms  Obsessive Compulsive Disorder: No symptoms  Eating Disorder: No symptoms  Oppositional Defiant Disorder: No symptoms  ADD / ADHD: No symptoms  Conduct Disorder: No symptoms      Safety Assessment:    History of Safety Concerns:   Client denied a history of suicidal ideation.    Client denied a history of suicide attempts.    Client denied a history of homicidal ideation.    Client denied a history of self-injurious ideation and behaviors.    Client denied a history of personal safety concerns.    Client denied a history of assaultive behaviors.        Current Safety Concerns:  Client denies current suicidal ideation.    Client denies current homicidal ideation and behaviors.  Client denies current self-injurious ideation and behaviors.    Client denies current concerns for personal safety.    Client reports the following protective factors: positive relationships positive family connections, forward/future oriented thinking, dedication to family/friends, safe and stable environment, effectively controls impulses, secure attachment, living with other people and access to a variety of clinical interventions    Client reports there are firearms in the house. The firearms are secured in a locked space.     Plan for Safety and Risk Management:  Recommended that patient call 911 or go to the local ED should there be a change in any of these risk factors.    Client's Strengths and Limitations:  Client identified the following strengths or resources that  "will help him succeed in counseling: \"Family-?-\". Client identified the following supports: family and friends. Things that may interfere with the client's success in counseling include: \"financial confusion\".      Diagnostic Criteria:  A. The development of emotional or behavioral symptoms in response to an identifiable stressor(s) occurring within 3 months of the onset of the stressor(s)  B. These symptoms or behaviors are clinically significant, as evidenced by one or both of the following:  C. The stress-related disturbance does not meet criteria for another disorder & is not not an exacerbation of another mental disorder  D. The symptoms do not represent normal bereavement  E. Once the stressor or its consequences have terminated, the symptoms do not persist for more than an additional 6 months       * Adjustment Disorder with Depressed Mood: The predominant manifestations are symptoms such as low mood, tearfulness, or feelings of hopelessness      Functional Status:  Client's symptoms have caused and are causing reduced functional status in the following areas: Activities of Daily Living -  taking care of household responsibilities, washing whole body and getting dressed  Financial management he mentioned confusion; sister helping him      DSM5 Diagnoses: (Sustained by DSM5 Criteria Listed Above)  Diagnoses: Adjustment Disorders  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  WHODAS 2.0 (12 item)            This questionnaire asks about difficulties due to health conditions. Health conditions  include  disease or illnesses, other health problems that may be short or long lasting,  injuries, mental health or emotional problems, and problems with alcohol or drugs.                     Think back over the past 30 days and answer these questions, thinking about how much  difficulty you had doing the following activities. For each question, please [place] " only  one response.    S1 Standing for long periods such as 30 minutes? Extreme / or cannot do = 5   S2 Taking care of household responsibilities? Mild =           2   S3 Learning a new task, for example, learning how to get to a new place? Mild =           2   S4 How much of a problem do you have joining community activities (for example, festivals, Baptism or other activities) in the same way as anyone else can? Mild =           2   S5 How much have you been emotionally affected by your health problems? Mild =           2     In the past 30 days, how much difficulty did you have in:   S6 Concentrating on doing something for ten minutes? None =         1   S7 Walking a long distance such as a kilometer (or equivalent)? Extreme / or cannot do = 5   S8 Washing your whole body? Mild =           2   S9 Getting dressed? Mild =           2   S10 Dealing with people you do not know? None =         1   S11 Maintaining a friendship? None =         1   S12 Your day to day work? None =         1     H1 Overall, in the past 30 days, how many days were these difficulties present? Record number of days 1 or 2   H2 In the past 30 days, for how many days were you totally unable to carry out your usual activities or work because of any health condition? Record number of days  0   H3 In the past 30 days, not counting the days that you were totally unable, for how many days did you cut back or reduce your usual activities or work because of any health condition? Record number of days 0     Attendance Agreement:  Client has signed Attendance Agreement:Yes      Collaboration:  Treatment team will be advised to coordinate care with the aforementioned support professionals.      Preliminary Treatment Plan:  The client reports no currently identified Baptism, ethnic or cultural issues relevant to therapy.     services are not indicated.    Modifications to assist communication are not indicated.    The concerns identified  by the client will be addressed in therapy.    Initial Treatment will focus on: Adjustment Difficulties related to: new move; sister's involvement; not having his 's license.    As a preliminary treatment goal, client will develop coping/problem-solving skills to facilitate more adaptive adjustment.    The focus of initial interventions will be to alleviate anxiety, alleviate depressed mood, increase coping skills, teach communication skills, teach conflict management skills and teach stress mangement techniques.    Referral to another professional/service is not indicated at this time.    A Release of Information is not needed at this time.    Report to child / adult protection services was NA.    Patient will have open access to their mental health medical record.    Raheem Guo, MaineGeneral Medical CenterSW  November 7, 2019

## 2019-11-08 ENCOUNTER — HEALTH MAINTENANCE LETTER (OUTPATIENT)
Age: 79
End: 2019-11-08

## 2019-12-04 ENCOUNTER — TELEPHONE (OUTPATIENT)
Dept: DERMATOLOGY | Facility: CLINIC | Age: 79
End: 2019-12-04

## 2019-12-04 ENCOUNTER — OFFICE VISIT (OUTPATIENT)
Dept: DERMATOLOGY | Facility: CLINIC | Age: 79
End: 2019-12-04
Payer: MEDICARE

## 2019-12-04 VITALS — SYSTOLIC BLOOD PRESSURE: 130 MMHG | HEART RATE: 55 BPM | DIASTOLIC BLOOD PRESSURE: 64 MMHG | OXYGEN SATURATION: 100 %

## 2019-12-04 DIAGNOSIS — L82.1 SEBORRHEIC KERATOSIS: ICD-10-CM

## 2019-12-04 DIAGNOSIS — Z85.828 HISTORY OF SKIN CANCER: Primary | ICD-10-CM

## 2019-12-04 DIAGNOSIS — C44.519 BASAL CELL CARCINOMA OF ANTERIOR CHEST: ICD-10-CM

## 2019-12-04 DIAGNOSIS — L81.4 LENTIGO: ICD-10-CM

## 2019-12-04 DIAGNOSIS — C44.612 BASAL CELL CARCINOMA (BCC) OF RIGHT SHOULDER: ICD-10-CM

## 2019-12-04 DIAGNOSIS — D18.01 ANGIOMA OF SKIN: ICD-10-CM

## 2019-12-04 DIAGNOSIS — D23.9 DERMAL NEVUS: ICD-10-CM

## 2019-12-04 PROCEDURE — 11102 TANGNTL BX SKIN SINGLE LES: CPT | Performed by: DERMATOLOGY

## 2019-12-04 PROCEDURE — 88331 PATH CONSLTJ SURG 1 BLK 1SPC: CPT | Performed by: DERMATOLOGY

## 2019-12-04 PROCEDURE — 11103 TANGNTL BX SKIN EA SEP/ADDL: CPT | Performed by: DERMATOLOGY

## 2019-12-04 PROCEDURE — 99214 OFFICE O/P EST MOD 30 MIN: CPT | Mod: 25 | Performed by: DERMATOLOGY

## 2019-12-04 NOTE — PROGRESS NOTES
Flash Brown is a 79 year old year old male patient here today for f/u hx of multiple non-melanoma skin cancer.  FHF has helaed well. No issues per patient.  He denies any new or changing skin lesions.  Patient has no other skin complaints today.  Remainder of the HPI, Meds, PMH, Allergies, FH, and SH was reviewed in chart.      Past Medical History:   Diagnosis Date     Basal cell carcinoma      CAD (coronary artery disease)      Depression      Diabetes type 2, controlled (H)      Hyperlipidemia      Hypertension      Lymphoma (H)      Malignant melanoma (H)      Melanoma (H)      Squamous cell carcinoma        Past Surgical History:   Procedure Laterality Date     AXILLARY SURGERY      S/P resection and adjuvant radiation     BONE MARROW BIOPSY, BONE SPECIMEN, NEEDLE/TROCAR N/A 7/8/2019    Procedure: BIOPSY, BONE MARROW;  Surgeon: Husam Issa MD;  Location: [facility]     CARDIAC SURGERY       CHOLECYSTECTOMY       HERNIA REPAIR, UMBILICAL       PHACOEMULSIFICATION WITH STANDARD INTRAOCULAR LENS IMPLANT Right 10/2/2019    Procedure: Cataract Removal with Implant;  Surgeon: Dinesh Ivey MD;  Location: [facility]     PHACOEMULSIFICATION WITH STANDARD INTRAOCULAR LENS IMPLANT Left 10/21/2019    Procedure: Cataract Removal with Implant;  Surgeon: Dinesh Ivey MD;  Location: [facility]     STENT      PTCA with drug-eluting stent of RCA, circumflex, and LAD     VASCULAR SURGERY          Family History   Problem Relation Age of Onset     Asthma Other      Cancer Other         melanoma     Blood Disease Other         bleeding disorder     Lung Cancer Mother         Smoker     Prostate Cancer Father      Melanoma No family hx of        Social History     Socioeconomic History     Marital status: Single     Spouse name: Not on file     Number of children: 0     Years of education: Not on file     Highest education level: Not on file   Occupational History     Occupation: Retired     Comment: [employer]  counter   Social Needs     Financial resource strain: Not on file     Food insecurity:     Worry: Not on file     Inability: Not on file     Transportation needs:     Medical: Not on file     Non-medical: Not on file   Tobacco Use     Smoking status: Never Smoker     Smokeless tobacco: Never Used   Substance and Sexual Activity     Alcohol use: Yes     Frequency: Monthly or less     Drinks per session: 1 or 2     Binge frequency: Never     Comment: glass of wine couple times per week     Drug use: Never     Sexual activity: Not on file   Lifestyle     Physical activity:     Days per week: Not on file     Minutes per session: Not on file     Stress: Not on file   Relationships     Social connections:     Talks on phone: Not on file     Gets together: Not on file     Attends Mu-ism service: Not on file     Active member of club or organization: Not on file     Attends meetings of clubs or organizations: Not on file     Relationship status: Not on file     Intimate partner violence:     Fear of current or ex partner: Not on file     Emotionally abused: Not on file     Physically abused: Not on file     Forced sexual activity: Not on file   Other Topics Concern     Parent/sibling w/ CABG, MI or angioplasty before 65F 55M? Not Asked   Social History Narrative     Not on file       Outpatient Encounter Medications as of 12/4/2019   Medication Sig Dispense Refill     amLODIPine (NORVASC) 5 MG tablet Take 1 tablet (5 mg) by mouth daily       aspirin (ASA) 81 MG EC tablet Take 1 tablet (81 mg) by mouth daily       atorvastatin (LIPITOR) 20 MG tablet Take 1 tablet (20 mg) by mouth daily 90 tablet 3     busPIRone (BUSPAR) 10 MG tablet Take 1 tablet (10 mg) by mouth 2 times daily       carvedilol (COREG) 12.5 MG tablet Take 1 tablet (12.5 mg) by mouth 2 times daily (with meals) 180 tablet 3     CENTRUM OR TABS 1 TABLET DAILY       cloNIDine (CATAPRES) 0.1 MG tablet Take 1 tablet (0.1 mg) by mouth 2 times daily       COQ10  100 MG OR CAPS Take 1 capsule by mouth daily        glimepiride (AMARYL) 4 MG tablet Take 1 tablet (4 mg) by mouth 2 times daily       hydrALAZINE (APRESOLINE) 50 MG tablet Take 1 tablet (50 mg) by mouth 2 times daily       naloxone (NARCAN) 4 MG/0.1ML nasal spray Spray 1 spray (4 mg) into one nostril alternating nostrils once as needed for opioid reversal Every 2-3 minutes until patient responsive or EMS arrives 0.2 mL 0     NIACIN 500 MG OR TABS Take one orally twice daily (Patient taking differently: Take 500 mg by mouth 2 times daily (with meals) ) 180 3     NITROGLYCERIN 0.4 MG SL SUBL ONE TABLET UNDER TONGUE, FOR CHEST PAIN, AS DIRECTED (Patient taking differently: Place 0.4 mg under the tongue every 5 minutes as needed ) 1 BOTTLE 3 per year     OMEGA-3 1000 MG OR CAPS Take 1 g by mouth daily        ORDER FOR DME Light Therapy with Full Spectrum Light (63670 lux) Start with 10-15 minute exposure per day, Increase exposure to 30 to 45 minutes per day, Maximum exposure: 90 minutes per day,Look periodically at light during each session  1 0     pioglitazone (ACTOS) 30 MG tablet Take 1 tablet (30 mg) by mouth daily       ramipril (ALTACE) 10 MG capsule Take 1 capsule (10 mg) by mouth daily 90 capsule 3     tamsulosin (FLOMAX) 0.4 MG capsule Take 1 capsule (0.4 mg) by mouth daily 90 capsule 3     VOSOL-HC 2-1 % OT SOLN 2-3 drops in the affected ear 3-4 times daily for 7-10 days for itching in the ear 1 bottle 0     VYTORIN 10-40 MG OR TABS 1 TABLET EVERY EVENING (Patient taking differently: Take 1 tablet by mouth At Bedtime ) 3 months 1     Facility-Administered Encounter Medications as of 12/4/2019   Medication Dose Route Frequency Provider Last Rate Last Dose     sodium hyaluronate (HEALON DUET)    Dinesh Matos MD   1 kit at 10/02/19 1149             Review Of Systems  Skin: As above  Eyes: negative  Ears/Nose/Throat: negative  Respiratory: No shortness of breath, dyspnea on exertion, cough, or  hemoptysis  Cardiovascular: negative  Gastrointestinal: negative  Genitourinary: negative  Musculoskeletal: negative  Neurologic: negative  Psychiatric: negative  Hematologic/Lymphatic/Immunologic: negative  Endocrine: negative      O:   NAD, WDWN, Alert & Oriented, Mood & Affect wnl, Vitals stable   Here today alone   /64 (BP Location: Left arm, Patient Position: Sitting, Cuff Size: Adult Large)   Pulse 55   SpO2 100%    General appearance normal   Vitals stable   Alert, oriented and in no acute distress      Following lymph nodes palpated: Occipital, Cervical, Supraclavicular no lad   Forehead well healed   Nose healing well with some edema still on left nostril overall looks good   R post shoulder 1.2cm red scaly papule    Mid chest 1.5cm red scaly patch        Stuck on papules and brown macules on trunk and ext   Red papules on trunk  Flesh colored papules on trunk     The remainder of the full exam was unremarkable; the following areas were examined:  conjunctiva/lids, oral mucosa, neck, peripheral vascular system, abdomen, lymph nodes, digits/nails, eccrine and apocrine glands, scalp/hair, face, neck, chest, abdomen, buttocks, back, RUE, LUE, RLE, LLE       Eyes: Conjunctivae/lids:Normal     ENT: Lips, buccal mucosa, tongue: normal    MSK:Normal    Cardiovascular: peripheral edema none    Pulm: Breathing Normal    Lymph Nodes: No Head and Neck Lymphadenopathy     Neuro/Psych: Orientation:Alert and Orientedx3 ; Mood/Affect:normal       MICRO:     R post shoulder:Orthokeratosis of epidermis with a proliferation of nests of basaloid cells, with peripheral palisading and a haphazard arrangement in the center extending into the dermis, forming infiltrative strands.  The tumor cells have hyperchromatic nuclei. Poor cytoplasm and intercellular bridging.    Mid chest :Orthokeratosis of epidermis with a proliferation of nests of basaloid cells, with peripheral palisading and a haphazard arrangement in the  center extending into the dermis, forming infiltrative strands.  The tumor cells have hyperchromatic nuclei. Poor cytoplasm and intercellular bridging.      A/P:  1. Seborrheic keratosis, lentigo, angioma, dermal nevus  2. Hx of non-melanoma skin cancer  3. FHF healing well  Wound healing discussed with patient he is happy today  4. R/o basal cell carcinoma   TANGENTIAL BIOPSY IN HOUSE:  After consent, anesthesia with LEC and prep, tangential excision performed and dx above confirmed with frozen section histology.  No complications and routine wound care.      I have personally reviewed all specimens and/or slides and used them with my medical judgement to determine or confirm the final diagnosis.     Patient told result   R post shoulder basal cell carcinoma   Mid chest  Basal cell carcinoma   Schedule excision one appt    BENIGN LESIONS DISCUSSED WITH PATIENT:  I discussed the specifics of tumor, prognosis, and genetics of benign lesions.  I explained that treatment of these lesions would be purely cosmetic and not medically neccessary.  I discussed with patient different removal options including excision, cautery and /or laser.      Nature and genetics of benign skin lesions dicussed with patient.  Signs and Symptoms of skin cancer discussed with patient.  ABCDEs of melanoma reviewed with patient.  Patient encouraged to perform monthly skin exams.  UV precautions reviewed with patient.  Patient to follow up with Primary Care provider regarding elevated blood pressure.  Skin care regimen reviewed with patient: Eliminate harsh soaps, i.e. Dial, zest, irsih spring; Mild soaps such as Cetaphil or Dove sensitive skin, avoid hot or cold showers, aggressive use of emollients including vanicream, cetaphil or cerave discussed with patient.    Risks of non-melanoma skin cancer discussed with patient   Return to clinic next appt

## 2019-12-04 NOTE — LETTER
[facility]  [address]  [facility] [address]  Phone: [phone]     December 4, 2019     Flash Brown  [address] [facility] [address]            Dear Flash:    You are scheduled for Mohs Surgery on:  Wednesday January 8 th at 7:30 am  Tuesday January 14 th at 7:45 am.        We are located at the [facility] in [place]. Please check in at the Dermatology Clinic located on the 2nd floor at the end of the bob.    You don't need to arrive more than 5-10 minutes prior to your appointment time.    Be sure to eat a good breakfast and bathe and wash your hair prior to Surgery.   If you are taking any anti-coagulants that are prescribed by your Doctor (such as Coumadin/warfarin, Plavix, Aspirin, Ibuprofen), please continue taking them.    However, If you are taking anti-coagulants over the counter without a Doctor's order for a Medical condition, please discontinue them 10 days prior to Surgery.      Please wear comfortable clothing as you could possibly be in the clinic for 4-6 hours for your surgery.  Dinesh Roque PHD/MD/ Kay Paul RN

## 2019-12-04 NOTE — NURSING NOTE
"Initial /64 (BP Location: Left arm, Patient Position: Sitting, Cuff Size: Adult Large)   Pulse 55   SpO2 100%  Estimated body mass index is 39.06 kg/m  as calculated from the following:    Height as of 10/21/19: 1.676 m (5' 6\").    Weight as of 10/21/19: 109.8 kg (242 lb). .      "

## 2019-12-04 NOTE — PATIENT INSTRUCTIONS
Wound Care Instructions     FOR SUPERFICIAL WOUNDS     [facility] [phone]           Mid chest and right back  AFTER 24 HOURS YOU SHOULD REMOVE THE BANDAGE AND BEGIN DAILY DRESSING CHANGES AS FOLLOWS:     1) Remove Dressing.     2) Clean and dry the area with tap water using a Q-tip or sterile gauze pad.     3) Apply Vaseline, Aquaphor, Polysporin ointment or Bacitracin ointment over entire wound.  Do NOT use Neosporin ointment.     4) Cover the wound with a band-aid, or a sterile non-stick gauze pad and micropore paper tape      REPEAT THESE INSTRUCTIONS AT LEAST ONCE A DAY UNTIL THE WOUND HAS COMPLETELY HEALED.    It is an old wives tale that a wound heals better when it is exposed to air and allowed to dry out. The wound will heal faster with a better cosmetic result if it is kept moist with ointment and covered with a bandage.    **Do not let the wound dry out.**    Supplies Needed:      *Cotton tipped applicators (Q-tips)    *Polysporin Ointment or Bacitracin Ointment (NOT NEOSPORIN)    *Band-aids or non-stick gauze pads and micropore paper tape.    PATIENT INFORMATION:    During the healing process you will notice a number of changes. All wounds develop a small halo of redness surrounding the wound.  This means healing is occurring. Severe itching with extensive redness usually indicates sensitivity to the ointment or bandage tape used to dress the wound.  You should call our office if this develops.      Swelling  and/or discoloration around your surgical site is common, particularly when performed around the eye.    All wounds normally drain.  The larger the wound the more drainage there will be.  After 7-10 days, you will notice the wound beginning to shrink and new skin will begin to grow.  The wound is healed when you can see skin has formed over the entire area.  A healed wound has a healthy, shiny look to the surface and is red to dark pink in color to normalize.  Wounds may take  approximately 4-6 weeks to heal.  Larger wounds may take 6-8 weeks.  After the wound is healed you may discontinue dressing changes.    You may experience a sensation of tightness as your wound heals. This is normal and will gradually subside.    Your healed wound may be sensitive to temperature changes. This sensitivity improves with time, but if you re having a lot of discomfort, try to avoid temperature extremes.    Patients frequently experience itching after their wound appears to have healed because of the continue healing under the skin.  Plain Vaseline will help relieve the itching.    POSSIBLE COMPLICATIONS    BLEEDIN. Leave the bandage in place.  2. Use tightly rolled up gauze or a cloth to apply direct pressure over the bandage for 30  minutes.  3. Reapply pressure for an additional 30 minutes if necessary  4. Use additional gauze and tape to maintain pressure once the bleeding has stopped.    We will notify you of your biopsy results in 1-2 business days.

## 2019-12-04 NOTE — LETTER
12/4/2019         RE: Flash Brown  [address]  [facility] [address]        Dear Colleague,    Thank you for referring your patient, Flash Brown, to the [facility]. Please see a copy of my visit note below.    Flash Brown is a 79 year old year old male patient here today for f/u hx of multiple non-melanoma skin cancer.  FHF has helaed well. No issues per patient.  He denies any new or changing skin lesions.  Patient has no other skin complaints today.  Remainder of the HPI, Meds, PMH, Allergies, FH, and SH was reviewed in chart.      Past Medical History:   Diagnosis Date     Basal cell carcinoma      CAD (coronary artery disease)      Depression      Diabetes type 2, controlled (H)      Hyperlipidemia      Hypertension      Lymphoma (H)      Malignant melanoma (H)      Melanoma (H)      Squamous cell carcinoma        Past Surgical History:   Procedure Laterality Date     AXILLARY SURGERY      S/P resection and adjuvant radiation     BONE MARROW BIOPSY, BONE SPECIMEN, NEEDLE/TROCAR N/A 7/8/2019    Procedure: BIOPSY, BONE MARROW;  Surgeon: Husam Issa MD;  Location: [facility]     CARDIAC SURGERY       CHOLECYSTECTOMY       HERNIA REPAIR, UMBILICAL       PHACOEMULSIFICATION WITH STANDARD INTRAOCULAR LENS IMPLANT Right 10/2/2019    Procedure: Cataract Removal with Implant;  Surgeon: Dinesh Ivey MD;  Location: [facility]     PHACOEMULSIFICATION WITH STANDARD INTRAOCULAR LENS IMPLANT Left 10/21/2019    Procedure: Cataract Removal with Implant;  Surgeon: Dinesh Ivey MD;  Location: [facility]     STENT      PTCA with drug-eluting stent of RCA, circumflex, and LAD     VASCULAR SURGERY          Family History   Problem Relation Age of Onset     Asthma Other      Cancer Other         melanoma     Blood Disease Other         bleeding disorder     Lung Cancer Mother         Smoker     Prostate Cancer Father      Melanoma No family hx of        Social History      Socioeconomic History     Marital status: Single     Spouse name: Not on file     Number of children: 0     Years of education: Not on file     Highest education level: Not on file   Occupational History     Occupation: Retired     Comment: [employer]    Social Needs     Financial resource strain: Not on file     Food insecurity:     Worry: Not on file     Inability: Not on file     Transportation needs:     Medical: Not on file     Non-medical: Not on file   Tobacco Use     Smoking status: Never Smoker     Smokeless tobacco: Never Used   Substance and Sexual Activity     Alcohol use: Yes     Frequency: Monthly or less     Drinks per session: 1 or 2     Binge frequency: Never     Comment: glass of wine couple times per week     Drug use: Never     Sexual activity: Not on file   Lifestyle     Physical activity:     Days per week: Not on file     Minutes per session: Not on file     Stress: Not on file   Relationships     Social connections:     Talks on phone: Not on file     Gets together: Not on file     Attends Christianity service: Not on file     Active member of club or organization: Not on file     Attends meetings of clubs or organizations: Not on file     Relationship status: Not on file     Intimate partner violence:     Fear of current or ex partner: Not on file     Emotionally abused: Not on file     Physically abused: Not on file     Forced sexual activity: Not on file   Other Topics Concern     Parent/sibling w/ CABG, MI or angioplasty before 65F 55M? Not Asked   Social History Narrative     Not on file       Outpatient Encounter Medications as of 12/4/2019   Medication Sig Dispense Refill     amLODIPine (NORVASC) 5 MG tablet Take 1 tablet (5 mg) by mouth daily       aspirin (ASA) 81 MG EC tablet Take 1 tablet (81 mg) by mouth daily       atorvastatin (LIPITOR) 20 MG tablet Take 1 tablet (20 mg) by mouth daily 90 tablet 3     busPIRone (BUSPAR) 10 MG tablet Take 1 tablet (10 mg) by mouth 2  times daily       carvedilol (COREG) 12.5 MG tablet Take 1 tablet (12.5 mg) by mouth 2 times daily (with meals) 180 tablet 3     CENTRUM OR TABS 1 TABLET DAILY       cloNIDine (CATAPRES) 0.1 MG tablet Take 1 tablet (0.1 mg) by mouth 2 times daily       COQ10 100 MG OR CAPS Take 1 capsule by mouth daily        glimepiride (AMARYL) 4 MG tablet Take 1 tablet (4 mg) by mouth 2 times daily       hydrALAZINE (APRESOLINE) 50 MG tablet Take 1 tablet (50 mg) by mouth 2 times daily       naloxone (NARCAN) 4 MG/0.1ML nasal spray Spray 1 spray (4 mg) into one nostril alternating nostrils once as needed for opioid reversal Every 2-3 minutes until patient responsive or EMS arrives 0.2 mL 0     NIACIN 500 MG OR TABS Take one orally twice daily (Patient taking differently: Take 500 mg by mouth 2 times daily (with meals) ) 180 3     NITROGLYCERIN 0.4 MG SL SUBL ONE TABLET UNDER TONGUE, FOR CHEST PAIN, AS DIRECTED (Patient taking differently: Place 0.4 mg under the tongue every 5 minutes as needed ) 1 BOTTLE 3 per year     OMEGA-3 1000 MG OR CAPS Take 1 g by mouth daily        ORDER FOR DME Light Therapy with Full Spectrum Light (07210 lux) Start with 10-15 minute exposure per day, Increase exposure to 30 to 45 minutes per day, Maximum exposure: 90 minutes per day,Look periodically at light during each session  1 0     pioglitazone (ACTOS) 30 MG tablet Take 1 tablet (30 mg) by mouth daily       ramipril (ALTACE) 10 MG capsule Take 1 capsule (10 mg) by mouth daily 90 capsule 3     tamsulosin (FLOMAX) 0.4 MG capsule Take 1 capsule (0.4 mg) by mouth daily 90 capsule 3     VOSOL-HC 2-1 % OT SOLN 2-3 drops in the affected ear 3-4 times daily for 7-10 days for itching in the ear 1 bottle 0     VYTORIN 10-40 MG OR TABS 1 TABLET EVERY EVENING (Patient taking differently: Take 1 tablet by mouth At Bedtime ) 3 months 1     Facility-Administered Encounter Medications as of 12/4/2019   Medication Dose Route Frequency Provider Last Rate Last Dose      sodium hyaluronate (HEALON DUET)    Dinesh Matos MD   1 kit at 10/02/19 1149             Review Of Systems  Skin: As above  Eyes: negative  Ears/Nose/Throat: negative  Respiratory: No shortness of breath, dyspnea on exertion, cough, or hemoptysis  Cardiovascular: negative  Gastrointestinal: negative  Genitourinary: negative  Musculoskeletal: negative  Neurologic: negative  Psychiatric: negative  Hematologic/Lymphatic/Immunologic: negative  Endocrine: negative      O:   NAD, WDWN, Alert & Oriented, Mood & Affect wnl, Vitals stable   Here today alone   /64 (BP Location: Left arm, Patient Position: Sitting, Cuff Size: Adult Large)   Pulse 55   SpO2 100%    General appearance normal   Vitals stable   Alert, oriented and in no acute distress      Following lymph nodes palpated: Occipital, Cervical, Supraclavicular no lad   Forehead well healed   Nose healing well with some edema still on left nostril overall looks good   R post shoulder 1.2cm red scaly papule    Mid chest 1.5cm red scaly patch        Stuck on papules and brown macules on trunk and ext   Red papules on trunk  Flesh colored papules on trunk     The remainder of the full exam was unremarkable; the following areas were examined:  conjunctiva/lids, oral mucosa, neck, peripheral vascular system, abdomen, lymph nodes, digits/nails, eccrine and apocrine glands, scalp/hair, face, neck, chest, abdomen, buttocks, back, RUE, LUE, RLE, LLE       Eyes: Conjunctivae/lids:Normal     ENT: Lips, buccal mucosa, tongue: normal    MSK:Normal    Cardiovascular: peripheral edema none    Pulm: Breathing Normal    Lymph Nodes: No Head and Neck Lymphadenopathy     Neuro/Psych: Orientation:Alert and Orientedx3 ; Mood/Affect:normal       MICRO:     R post shoulder:Orthokeratosis of epidermis with a proliferation of nests of basaloid cells, with peripheral palisading and a haphazard arrangement in the center extending into the dermis, forming infiltrative  strands.  The tumor cells have hyperchromatic nuclei. Poor cytoplasm and intercellular bridging.    Mid chest :Orthokeratosis of epidermis with a proliferation of nests of basaloid cells, with peripheral palisading and a haphazard arrangement in the center extending into the dermis, forming infiltrative strands.  The tumor cells have hyperchromatic nuclei. Poor cytoplasm and intercellular bridging.      A/P:  1. Seborrheic keratosis, lentigo, angioma, dermal nevus  2. Hx of non-melanoma skin cancer  3. FHF healing well  Wound healing discussed with patient he is happy today  4. R/o basal cell carcinoma   TANGENTIAL BIOPSY IN HOUSE:  After consent, anesthesia with LEC and prep, tangential excision performed and dx above confirmed with frozen section histology.  No complications and routine wound care.      I have personally reviewed all specimens and/or slides and used them with my medical judgement to determine or confirm the final diagnosis.     Patient told result   R post shoulder basal cell carcinoma   Mid chest  Basal cell carcinoma   Schedule excision one appt    BENIGN LESIONS DISCUSSED WITH PATIENT:  I discussed the specifics of tumor, prognosis, and genetics of benign lesions.  I explained that treatment of these lesions would be purely cosmetic and not medically neccessary.  I discussed with patient different removal options including excision, cautery and /or laser.      Nature and genetics of benign skin lesions dicussed with patient.  Signs and Symptoms of skin cancer discussed with patient.  ABCDEs of melanoma reviewed with patient.  Patient encouraged to perform monthly skin exams.  UV precautions reviewed with patient.  Patient to follow up with Primary Care provider regarding elevated blood pressure.  Skin care regimen reviewed with patient: Eliminate harsh soaps, i.e. Dial, zest, irsih spring; Mild soaps such as Cetaphil or Dove sensitive skin, avoid hot or cold showers, aggressive use of  emollients including vanicream, cetaphil or cerave discussed with patient.    Risks of non-melanoma skin cancer discussed with patient   Return to clinic next appt      Again, thank you for allowing me to participate in the care of your patient.        Sincerely,        Dinesh Roque MD

## 2019-12-04 NOTE — TELEPHONE ENCOUNTER
Spoke to patient and reviewed results. Pt was unable to make appt at time of call and will call back to make appt. He will need one mohs appt for two spots. Letter pended.  Lucero ADAM RN BSN PHN  [facility]ty Clin[facility]

## 2019-12-04 NOTE — TELEPHONE ENCOUNTER
----- Message from Dinesh Roque MD sent at 12/4/2019  1:23 PM CST -----  R post shoulder basal cell carcinoma   Mid chest  Basal cell carcinoma   Schedule excision one appt

## 2019-12-04 NOTE — LETTER
[facility]  [address] [facility] [address]  Phone: [phone]    December 10, 2019    Flash A Stephanie                                                                                                          [address] [place] [id number]            Dear Mr. Brown,    This is a reminder letter.  We recently provided you with the following     skin biopsy test results:     -- Message from Dinesh Roque MD sent at 12/4/2019  1:23 PM CST -----    R post shoulder: basal cell carcinoma   Mid chest:  Basal cell carcinoma     Please call us soon to schedule Mohs surgery to treat these Skin cancers. We are     currently booking Mohs surgery appointments into the 1st week of January at this time.    Thank you,      Dinesh Roque MD/ Kay Paul RN

## 2019-12-09 ENCOUNTER — OFFICE VISIT (OUTPATIENT)
Dept: PSYCHOLOGY | Facility: CLINIC | Age: 79
End: 2019-12-09
Payer: MEDICARE

## 2019-12-09 ENCOUNTER — OFFICE VISIT (OUTPATIENT)
Dept: FAMILY MEDICINE | Facility: CLINIC | Age: 79
End: 2019-12-09
Payer: MEDICARE

## 2019-12-09 VITALS
SYSTOLIC BLOOD PRESSURE: 128 MMHG | TEMPERATURE: 97.3 F | WEIGHT: 240 LBS | HEART RATE: 56 BPM | BODY MASS INDEX: 38.57 KG/M2 | HEIGHT: 66 IN | DIASTOLIC BLOOD PRESSURE: 64 MMHG | RESPIRATION RATE: 18 BRPM | OXYGEN SATURATION: 98 %

## 2019-12-09 DIAGNOSIS — E78.49 OTHER HYPERLIPIDEMIA: ICD-10-CM

## 2019-12-09 DIAGNOSIS — F32.89 OTHER DEPRESSION: ICD-10-CM

## 2019-12-09 DIAGNOSIS — Z23 NEED FOR VACCINATION: ICD-10-CM

## 2019-12-09 DIAGNOSIS — F43.21 ADJUSTMENT DISORDER WITH DEPRESSED MOOD: Primary | ICD-10-CM

## 2019-12-09 DIAGNOSIS — E11.9 NON-INSULIN DEPENDENT TYPE 2 DIABETES MELLITUS (H): Primary | ICD-10-CM

## 2019-12-09 DIAGNOSIS — E08.42 DIABETIC POLYNEUROPATHY ASSOCIATED WITH DIABETES MELLITUS DUE TO UNDERLYING CONDITION (H): ICD-10-CM

## 2019-12-09 LAB
CREAT UR-MCNC: 233 MG/DL
HBA1C MFR BLD: 5.5 % (ref 0–5.6)
MICROALBUMIN UR-MCNC: 16 MG/L
MICROALBUMIN/CREAT UR: 6.91 MG/G CR (ref 0–17)

## 2019-12-09 PROCEDURE — 36415 COLL VENOUS BLD VENIPUNCTURE: CPT | Performed by: FAMILY MEDICINE

## 2019-12-09 PROCEDURE — G0009 ADMIN PNEUMOCOCCAL VACCINE: HCPCS | Performed by: FAMILY MEDICINE

## 2019-12-09 PROCEDURE — 90834 PSYTX W PT 45 MINUTES: CPT | Performed by: SOCIAL WORKER

## 2019-12-09 PROCEDURE — 90670 PCV13 VACCINE IM: CPT | Performed by: FAMILY MEDICINE

## 2019-12-09 PROCEDURE — 99207 C ROUTINE FOOT CARE BY A PHYSICIAN OF A DIABETIC PATIENT WITH DIABETICC SENSORY: CPT | Performed by: FAMILY MEDICINE

## 2019-12-09 PROCEDURE — 83036 HEMOGLOBIN GLYCOSYLATED A1C: CPT | Performed by: FAMILY MEDICINE

## 2019-12-09 PROCEDURE — 99214 OFFICE O/P EST MOD 30 MIN: CPT | Mod: 25 | Performed by: FAMILY MEDICINE

## 2019-12-09 PROCEDURE — 82043 UR ALBUMIN QUANTITATIVE: CPT | Performed by: FAMILY MEDICINE

## 2019-12-09 RX ORDER — CHOLECALCIFEROL (VITAMIN D3) 10(400)/ML
50 DROPS ORAL DAILY
COMMUNITY
End: 2020-08-19

## 2019-12-09 RX ORDER — CLOPIDOGREL BISULFATE 75 MG/1
75 TABLET ORAL DAILY
COMMUNITY
End: 2020-02-27

## 2019-12-09 ASSESSMENT — PATIENT HEALTH QUESTIONNAIRE - PHQ9
5. POOR APPETITE OR OVEREATING: NOT AT ALL
SUM OF ALL RESPONSES TO PHQ QUESTIONS 1-9: 5
SUM OF ALL RESPONSES TO PHQ QUESTIONS 1-9: 2
5. POOR APPETITE OR OVEREATING: NOT AT ALL

## 2019-12-09 ASSESSMENT — ENCOUNTER SYMPTOMS
SORE THROAT: 0
HEADACHES: 0
JOINT SWELLING: 0
SHORTNESS OF BREATH: 0
WEAKNESS: 0
COUGH: 0
NERVOUS/ANXIOUS: 0
CHILLS: 0
MYALGIAS: 0
PALPITATIONS: 0
DIZZINESS: 0
ARTHRALGIAS: 0
FEVER: 0
PARESTHESIAS: 0

## 2019-12-09 ASSESSMENT — ANXIETY QUESTIONNAIRES
GAD7 TOTAL SCORE: 0
6. BECOMING EASILY ANNOYED OR IRRITABLE: NOT AT ALL
7. FEELING AFRAID AS IF SOMETHING AWFUL MIGHT HAPPEN: NOT AT ALL
3. WORRYING TOO MUCH ABOUT DIFFERENT THINGS: NOT AT ALL
5. BEING SO RESTLESS THAT IT IS HARD TO SIT STILL: NOT AT ALL
5. BEING SO RESTLESS THAT IT IS HARD TO SIT STILL: NOT AT ALL
2. NOT BEING ABLE TO STOP OR CONTROL WORRYING: SEVERAL DAYS
2. NOT BEING ABLE TO STOP OR CONTROL WORRYING: NOT AT ALL
IF YOU CHECKED OFF ANY PROBLEMS ON THIS QUESTIONNAIRE, HOW DIFFICULT HAVE THESE PROBLEMS MADE IT FOR YOU TO DO YOUR WORK, TAKE CARE OF THINGS AT HOME, OR GET ALONG WITH OTHER PEOPLE: NOT DIFFICULT AT ALL
1. FEELING NERVOUS, ANXIOUS, OR ON EDGE: NOT AT ALL
6. BECOMING EASILY ANNOYED OR IRRITABLE: NOT AT ALL
7. FEELING AFRAID AS IF SOMETHING AWFUL MIGHT HAPPEN: NOT AT ALL

## 2019-12-09 ASSESSMENT — MIFFLIN-ST. JEOR: SCORE: 1756.38

## 2019-12-09 NOTE — PATIENT INSTRUCTIONS
Monika Don,    Thank you for allowing [facility] to manage your care.    I ordered some blood work and urine sample, please go to the laboratory to get your laboratory studies.    I made an orthoticreferral, they will be calling in approximately 1 week to set up your appointment.  If you do not hear from them, please call the specialty number on your after visit.     Please allow 1-2 business days for our office to call you in regards to your laboratory/radiological studies.  If not done so, I encourage you to login into [employer] ([email or web address] to review your results as well.     Encourage moisturizer (eucercine, vaseline) to both your legs.  Please avoiding scratching your legs.     If you have any questions or concerns, please feel free to call us at [phone].    Renée Cervantes to you and your family!    Sincerely,    Dr. Jackson    Did you know?  You can schedule an e-Visit for certain simple non-emergent issue for your convenience.  To learn more about or start an eVisit, simply login to [employer], click  Visits  on top banner, click  Start a Virtual Visit  drop down, and click  Symptom-Specific E-Visit

## 2019-12-09 NOTE — PROGRESS NOTES
Progress Note    Patient Name: Flash Brown  Date: 12/9/19         Service Type: Individual  Video Visit: No     Session Start Time: 12  Session End Time: 12:45 pm     Session Length: 45 min    Session #: 2    Attendees: Client attended alone     Treatment Plan Last Reviewed: due 3/9/20  PHQ-9 / SHAVONNE-7 : phq=2; shavonne=0.    DATA  Interactive Complexity: No  Crisis: No       Progress Since Last Session (Related to Symptoms / Goals / Homework):   Symptoms: Improving phq improvement    Homework: Partially completed. Practice relaxation ideas from handout given.      Episode of Care Goals: Satisfactory progress - PREPARATION (Decided to change - considering how); Intervened by negotiating a change plan and determining options / strategies for behavior change, identifying triggers, exploring social supports, and working towards setting a date to begin behavior change     Current / Ongoing Stressors and Concerns:   Would like to get his [place] drivers license this month. Saving for a van.     Treatment Objective(s) Addressed in This Session:   Would like to gain more freedom. Appreciated sister and brother in law. Would like to pay them more in rent but they are not open to it.        Intervention:   Assessed functioning. Went over the results of the phq/shavonne. Developing rapport. Wrote goals. Educated on gratitude practice. Gave handout with many relaxation ideas to practice.        ASSESSMENT: Current Emotional / Mental Status (status of significant symptoms):   Risk status (Self / Other harm or suicidal ideation)   Patient denies current fears or concerns for personal safety.   Patient denies current or recent suicidal ideation or behaviors.   Patientdenies current or recent homicidal ideation or behaviors.   Patient denies current or recent self injurious behavior or ideation.   Patient denies other safety concerns.   Patient Patient reports there has been no change in risk factors  "since their last session.     PatientPatient reports there has been no change in protective factors since their last session.     Recommended that patient call 911 or go to the local ED should there be a change in any of these risk factors.     Appearance:   Appropriate    Eye Contact:   Good    Psychomotor Behavior: Normal    Attitude:   Cooperative    Orientation:   All   Speech    Rate / Production: Normal  Slow     Volume:  Normal    Mood:    Depressed  Normal   Affect:    Appropriate    Thought Content:  Clear    Thought Form:  Coherent  Logical    Insight:    Fair      Medication Review:   No changes to current psychiatric medication(s)     Medication Compliance:   Yes     Changes in Health Issues:   None reported     Chemical Use Review:   Substance Use: Chemical use reviewed, no active concerns identified      Tobacco Use: No current tobacco use.      Diagnosis:  No diagnosis found.    Collateral Reports Completed:   Routed note to PCP    PLAN: (Patient Tasks / Therapist Tasks / Other)  He would like to meet 6-8 weeks due to doing well. Practice relaxation ideas daily for 21 consecutive days.        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  WHODAS: 28    Referral / Collaboration:  Referral to another professional/service is not indicated at this time.    Anticipated number of session or this episode of care: 10      MeasurableTreatment Goal(s) related to diagnosis / functional impairment(s)  Goal 1: Patient will report higher levels of personal freedom.    I will know I've met my goal when \"I am self sufficient in transportation and able to relax.  "     Objective #A (Patient Action)    Patient will practice a relaxation idea at least once per day for 21 consecutive days.  Status: New - Date: 12/9/19     Intervention(s)  Therapist will .    Objective #B  Patient will work on obtaining his [place] drivers license by this month.  Status: New - Date: 12/9/19     Intervention(s)  Therapist will monitor progress.    Objective #C  Patient will .  Status:      Intervention(s)  Therapist will .           Patient has reviewed and agreed to the above plan.      Raheem Guo, [facility] CenterSW  December 9, 2019

## 2019-12-09 NOTE — PROGRESS NOTES
Subjective     Flash Brown is a 79 year old male who presents to clinic today for the following health issues:    HPI   Diabetes Follow-up      How often are you checking your blood sugar? Not at all    What concerns do you have today about your diabetes? None     Do you have any of these symptoms? (Select all that apply)  No numbness or tingling in feet.  No redness, sores or blisters on feet.  No complaints of excessive thirst.  No reports of blurry vision.  No significant changes to weight.     Have you had a diabetic eye exam in the last 12 months? Yes- Date of last eye exam: 09/2020    Diabetes Management Resources    Hyperlipidemia Follow-Up      Are you regularly taking any medication or supplement to lower your cholesterol?   No    Are you having muscle aches or other side effects that you think could be caused by your cholesterol lowering medication?  No    Hypertension Follow-up      Do you check your blood pressure regularly outside of the clinic? No     Are you following a low salt diet? Yes    Are your blood pressures ever more than 140 on the top number (systolic) OR more   than 90 on the bottom number (diastolic), for example 140/90? No  - not takling at home    BP Readings from Last 2 Encounters:   12/09/19 128/64   12/04/19 130/64     Hemoglobin A1C (%)   Date Value   06/11/2019 5.4   03/13/2008 6.1 (H)     LDL Cholesterol Calculated (mg/dL)   Date Value   06/11/2019 47   03/13/2008 76       Depression Followup    How are you doing with your depression since your last visit? No change    Are you having other symptoms that might be associated with depression? No    Have you had a significant life event?  No     Are you feeling anxious or having panic attacks?   No    Do you have any concerns with your use of alcohol or other drugs? No    Social History     Tobacco Use     Smoking status: Never Smoker     Smokeless tobacco: Never Used   Substance Use Topics     Alcohol use: Yes     Frequency:  Monthly or less     Drinks per session: 1 or 2     Binge frequency: Never     Comment: glass of wine couple times per week     Drug use: Never     PHQ 10/7/2019 10/29/2019 12/9/2019   PHQ-9 Total Score 5 5 5   Q9: Thoughts of better off dead/self-harm past 2 weeks Not at all Not at all Not at all     SHAVONNE-7 SCORE 10/7/2019 10/29/2019   Total Score 0 0     Last PHQ-9 12/9/2019   1.  Little interest or pleasure in doing things 1   2.  Feeling down, depressed, or hopeless 1   3.  Trouble falling or staying asleep, or sleeping too much 0   4.  Feeling tired or having little energy 3   5.  Poor appetite or overeating 0   6.  Feeling bad about yourself 0   7.  Trouble concentrating 0   8.  Moving slowly or restless 0   Q9: Thoughts of better off dead/self-harm past 2 weeks 0   PHQ-9 Total Score 5   Difficulty at work, home, or with people -     SHAVONNE-7  12/9/2019   1. Feeling nervous, anxious, or on edge -   2. Not being able to stop or control worrying 1   3. Worrying too much about different things -   4. Trouble relaxing 0   5. Being so restless that it is hard to sit still 0   6. Becoming easily annoyed or irritable 0   7. Feeling afraid, as if something awful might happen 0   SHAVONNE-7 Total Score -   If you checked any problems, how difficult have they made it for you to do your work, take care of things at home, or get along with other people? -     Suicide Assessment Five-step Evaluation and Treatment (SAFE-T)      How many servings of fruits and vegetables do you eat daily?  2-3    On average, how many sweetened beverages do you drink each day (Examples: soda, juice, sweet tea, etc.  Do NOT count diet or artificially sweetened beverages)?   0    How many days per week do you miss taking your medication? 0    Patient Active Problem List   Diagnosis     Essential hypertension, benign     Malignant melanoma s/p resection in [place], [place]     RT axillary lymphoma s/p resection, with adjuvant radiation - [place], [place]     Basal  cell skin cancer over nose s/p resection - [place], [place]     Mixed hyperlipidemia     Diabetes mellitus, type 2 (H)     Obstructive sleep apnea     Depressive disorder, not elsewhere classified     ? 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)     Hyperlipidemia     Alcoholic cirrhosis of liver with ascites (H)     Past Surgical History:   Procedure Laterality Date     AXILLARY SURGERY      S/P resection and adjuvant radiation     BONE MARROW BIOPSY, BONE SPECIMEN, NEEDLE/TROCAR N/A 7/8/2019    Procedure: BIOPSY, BONE MARROW;  Surgeon: Husam Issa MD;  Location: [facility]     CARDIAC SURGERY       CHOLECYSTECTOMY       HERNIA REPAIR, UMBILICAL       PHACOEMULSIFICATION WITH STANDARD INTRAOCULAR LENS IMPLANT Right 10/2/2019    Procedure: Cataract Removal with Implant;  Surgeon: Dinesh Ivey MD;  Location: [facility]     PHACOEMULSIFICATION WITH STANDARD INTRAOCULAR LENS IMPLANT Left 10/21/2019    Procedure: Cataract Removal with Implant;  Surgeon: Dinesh Ivey MD;  Location: [facility]     STENT      PTCA with drug-eluting stent of RCA, circumflex, and LAD     VASCULAR SURGERY         Social History     Tobacco Use     Smoking status: Never Smoker     Smokeless tobacco: Never Used   Substance Use Topics     Alcohol use: Yes     Frequency: Monthly or less     Drinks per session: 1 or 2     Binge frequency: Never     Comment: glass of wine couple times per week     Family History   Problem Relation Age of Onset     Asthma Other      Cancer Other         melanoma     Blood Disease Other         bleeding disorder     Lung Cancer Mother         Smoker     Prostate Cancer Father      Melanoma No family hx of          Current Outpatient Medications   Medication Sig Dispense Refill     amLODIPine (NORVASC) 5 MG tablet  Take 1 tablet (5 mg) by mouth daily       aspirin (ASA) 81 MG EC tablet Take 1 tablet (81 mg) by mouth daily       atorvastatin (LIPITOR) 20 MG tablet Take 1 tablet (20 mg) by mouth daily 90 tablet 3     busPIRone (BUSPAR) 10 MG tablet Take 1 tablet (10 mg) by mouth 2 times daily       carvedilol (COREG) 12.5 MG tablet Take 1 tablet (12.5 mg) by mouth 2 times daily (with meals) 180 tablet 3     CENTRUM OR TABS 1 TABLET DAILY       Cholecalciferol (VITAMIN D3) 10 MCG/ML LIQD Take 50 mcg by mouth daily       cloNIDine (CATAPRES) 0.1 MG tablet Take 1 tablet (0.1 mg) by mouth 2 times daily       clopidogrel (PLAVIX) 75 MG tablet Take 75 mg by mouth daily       COQ10 100 MG OR CAPS Take 1 capsule by mouth daily        ferrous fumarate 65 mg, [place]. FE,-Vitamin C 125 mg (VITRON C)  MG TABS tablet Take 1 tablet by mouth daily       glimepiride (AMARYL) 4 MG tablet Take 1 tablet (4 mg) by mouth 2 times daily       hydrALAZINE (APRESOLINE) 50 MG tablet Take 1 tablet (50 mg) by mouth 2 times daily       naloxone (NARCAN) 4 MG/0.1ML nasal spray Spray 1 spray (4 mg) into one nostril alternating nostrils once as needed for opioid reversal Every 2-3 minutes until patient responsive or EMS arrives 0.2 mL 0     NITROGLYCERIN 0.4 MG SL SUBL ONE TABLET UNDER TONGUE, FOR CHEST PAIN, AS DIRECTED (Patient taking differently: Place 0.4 mg under the tongue every 5 minutes as needed ) 1 BOTTLE 3 per year     OMEGA-3 1000 MG OR CAPS Take 1 g by mouth daily        ORDER FOR DME Light Therapy with Full Spectrum Light (97632 lux) Start with 10-15 minute exposure per day, Increase exposure to 30 to 45 minutes per day, Maximum exposure: 90 minutes per day,Look periodically at light during each session  1 0     pioglitazone (ACTOS) 30 MG tablet Take 1 tablet (30 mg) by mouth daily       ramipril (ALTACE) 10 MG capsule Take 1 capsule (10 mg) by mouth daily 90 capsule 3     tamsulosin (FLOMAX) 0.4 MG capsule Take 1 capsule (0.4 mg) by mouth  "daily 90 capsule 3     NIACIN 500 MG OR TABS Take one orally twice daily (Patient not taking: No sig reported) 180 3     VOSOL-HC 2-1 % OT SOLN 2-3 drops in the affected ear 3-4 times daily for 7-10 days for itching in the ear (Patient not taking: No sig reported) 1 bottle 0     VYTORIN 10-40 MG OR TABS 1 TABLET EVERY EVENING (Patient not taking: No sig reported) 3 months 1     Allergies   Allergen Reactions     Iodine Swelling       1. Diabetes f/u: Patient is currently on pioglitatzone and glimeperide.  His last labs show that his diabetes control is good.  He is overweight.     2. Depression: Patient is scheduled to see psychiatrist today.  He would like to pass the  test so he can have his 's license.  [place] is very important to him.     3. Hyperlipidemia: He is currently on lipitor.  Tolerating medications.     Review of Systems   Constitutional: Negative for chills and fever.   HENT: Negative for congestion, ear pain, hearing loss and sore throat.    Respiratory: Negative for cough and shortness of breath.    Cardiovascular: Negative for chest pain, palpitations and peripheral edema.   Musculoskeletal: Negative for arthralgias, joint swelling and myalgias.   Skin: Negative for rash.   Neurological: Negative for dizziness, weakness, headaches and paresthesias.   Psychiatric/Behavioral: Negative for mood changes. The patient is not nervous/anxious.          Objective    /64 (BP Location: Left arm, Patient Position: Sitting, Cuff Size: Adult Large)   Pulse 56   Temp 97.3  F (36.3  C) (Tympanic)   Resp 18   Ht 1.676 m (5' 6\")   Wt 108.9 kg (240 lb)   SpO2 98%   BMI 38.74 kg/m    Body mass index is 38.74 kg/m .  Physical Exam  Constitutional:       General: He is not in acute distress.  HENT:      Head: Normocephalic and atraumatic.   Eyes:      Conjunctiva/sclera: Conjunctivae normal.   Cardiovascular:      Rate and Rhythm: Normal rate and regular rhythm.      Heart sounds: Normal " heart sounds. No murmur.   Pulmonary:      Effort: Pulmonary effort is normal. No respiratory distress.      Breath sounds: No wheezing or rales.   Musculoskeletal: Normal range of motion.      Comments: Diabetic foot exam: normal DP and PT pulses, reduced sensation from microfilament test, trophic changes involving the guzman aspect of both feet and dry cracking heels     Skin:     Findings: No rash.   Neurological:      Mental Status: He is alert and oriented to person, place, and time.          Component      Latest Ref Rng & Units 3/13/2008 6/11/2019   Cholesterol      <200 mg/dL 137 110   Triglycerides      <150 mg/dL 116 80   HDL Cholesterol      >39 mg/dL 37 (L) 47   LDL Cholesterol Calculated      <100 mg/dL 76 47   VLDL-Cholesterol      0 - 30 mg/dL 23    Cholesterol/HDL Ratio      0.0 - 5.0 4.0    Hemoglobin A1C      0 - 5.6 % 6.1 (H) 5.4   Non HDL Cholesterol      <130 mg/dL  63       Assessment & Plan     1. Non-insulin dependent type 2 diabetes mellitus (H)  Per patient he had his retinopathy screening this year when he was evaluated for his cataract surgery.  Otherwise continue with current medications.   - HEMOGLOBIN A1C  - Albumin Random Urine Quantitative with Creat Ratio    2. Diabetic polyneuropathy associated with diabetes mellitus due to underlying condition (H)  Patient would benefit from diabetic shoes  - ORTHOTICS REFERRAL  - ROUTINE FOOT CARE BY A PHYSICIAN OF A DIABETIC PATIENT WITH DIABETIC SENSORY    3. Other hyperlipidemia  Continue with lipitor    4. Other depression  Currently on buspar.  Patient has an upcoming appointment with psychiatry.     5. Need for vaccination  - Pneumococcal vaccine 13 valent PCV13 IM (Prevnar) [43595]  - 1st  Administration  [38707]    I spent 25 minutes with patient of which 50% was spent counseling and coordinating patient's care as well as discussing patient's plan of care.    See Patient Instructions    Return in about 3 months (around 3/9/2020) for  Medicare Annual Wellness Visit (40 minutes).    Thomas Jackson DO  [facility]

## 2019-12-10 ASSESSMENT — ANXIETY QUESTIONNAIRES: GAD7 TOTAL SCORE: 0

## 2019-12-11 ENCOUNTER — PATIENT OUTREACH (OUTPATIENT)
Dept: CARE COORDINATION | Facility: CLINIC | Age: 79
End: 2019-12-11

## 2019-12-11 ENCOUNTER — HOSPITAL ENCOUNTER (OUTPATIENT)
Dept: LAB | Facility: CLINIC | Age: 79
Discharge: HOME | End: 2019-12-11
Attending: INTERNAL MEDICINE | Admitting: INTERNAL MEDICINE
Payer: MEDICARE

## 2019-12-11 ENCOUNTER — ONCOLOGY VISIT (OUTPATIENT)
Dept: ONCOLOGY | Facility: CLINIC | Age: 79
End: 2019-12-11
Attending: INTERNAL MEDICINE
Payer: MEDICARE

## 2019-12-11 VITALS
OXYGEN SATURATION: 99 % | SYSTOLIC BLOOD PRESSURE: 104 MMHG | WEIGHT: 242.3 LBS | HEIGHT: 66 IN | HEART RATE: 63 BPM | DIASTOLIC BLOOD PRESSURE: 46 MMHG | TEMPERATURE: 96.4 F | BODY MASS INDEX: 38.94 KG/M2 | RESPIRATION RATE: 18 BRPM

## 2019-12-11 DIAGNOSIS — E78.49 OTHER HYPERLIPIDEMIA: ICD-10-CM

## 2019-12-11 DIAGNOSIS — I10 ESSENTIAL HYPERTENSION, BENIGN: ICD-10-CM

## 2019-12-11 DIAGNOSIS — C82.24 GRADE 3 FOLLICULAR LYMPHOMA OF LYMPH NODES OF AXILLA (H): Primary | ICD-10-CM

## 2019-12-11 DIAGNOSIS — C82.24 GRADE 3 FOLLICULAR LYMPHOMA OF LYMPH NODES OF AXILLA (H): ICD-10-CM

## 2019-12-11 DIAGNOSIS — E11.9 TYPE 2 DIABETES MELLITUS WITHOUT COMPLICATION, WITHOUT LONG-TERM CURRENT USE OF INSULIN (H): ICD-10-CM

## 2019-12-11 LAB
ALBUMIN SERPL-MCNC: 3.2 G/DL (ref 3.4–5)
ALP SERPL-CCNC: 84 U/L (ref 40–150)
ALT SERPL W P-5'-P-CCNC: 23 U/L (ref 0–70)
ANION GAP SERPL CALCULATED.3IONS-SCNC: 5 MMOL/L (ref 3–14)
AST SERPL W P-5'-P-CCNC: 22 U/L (ref 0–45)
BASOPHILS # BLD AUTO: 0 10E9/L (ref 0–0.2)
BASOPHILS NFR BLD AUTO: 0.9 %
BILIRUB SERPL-MCNC: 0.7 MG/DL (ref 0.2–1.3)
BUN SERPL-MCNC: 17 MG/DL (ref 7–30)
CALCIUM SERPL-MCNC: 8.9 MG/DL (ref 8.5–10.1)
CHLORIDE SERPL-SCNC: 111 MMOL/L (ref 94–109)
CO2 SERPL-SCNC: 27 MMOL/L (ref 20–32)
CREAT SERPL-MCNC: 1.27 MG/DL (ref 0.66–1.25)
DIFFERENTIAL METHOD BLD: ABNORMAL
EOSINOPHIL # BLD AUTO: 0.1 10E9/L (ref 0–0.7)
EOSINOPHIL NFR BLD AUTO: 3.4 %
ERYTHROCYTE [DISTWIDTH] IN BLOOD BY AUTOMATED COUNT: 16.3 % (ref 10–15)
GFR SERPL CREATININE-BSD FRML MDRD: 54 ML/MIN/{1.73_M2}
GLUCOSE SERPL-MCNC: 112 MG/DL (ref 70–99)
HCT VFR BLD AUTO: 35.5 % (ref 40–53)
HGB BLD-MCNC: 11.2 G/DL (ref 13.3–17.7)
IMM GRANULOCYTES # BLD: 0 10E9/L (ref 0–0.4)
IMM GRANULOCYTES NFR BLD: 0.3 %
LDH SERPL L TO P-CCNC: 218 U/L (ref 85–227)
LYMPHOCYTES # BLD AUTO: 0.8 10E9/L (ref 0.8–5.3)
LYMPHOCYTES NFR BLD AUTO: 24.2 %
MCH RBC QN AUTO: 27.6 PG (ref 26.5–33)
MCHC RBC AUTO-ENTMCNC: 31.5 G/DL (ref 31.5–36.5)
MCV RBC AUTO: 87 FL (ref 78–100)
MONOCYTES # BLD AUTO: 0.3 10E9/L (ref 0–1.3)
MONOCYTES NFR BLD AUTO: 9.5 %
NEUTROPHILS # BLD AUTO: 2 10E9/L (ref 1.6–8.3)
NEUTROPHILS NFR BLD AUTO: 61.7 %
NRBC # BLD AUTO: 0 10*3/UL
NRBC BLD AUTO-RTO: 0 /100
PLATELET # BLD AUTO: 78 10E9/L (ref 150–450)
POTASSIUM SERPL-SCNC: 4.3 MMOL/L (ref 3.4–5.3)
PROT SERPL-MCNC: 6.9 G/DL (ref 6.8–8.8)
RBC # BLD AUTO: 4.06 10E12/L (ref 4.4–5.9)
SODIUM SERPL-SCNC: 143 MMOL/L (ref 133–144)
WBC # BLD AUTO: 3.3 10E9/L (ref 4–11)

## 2019-12-11 PROCEDURE — 80053 COMPREHEN METABOLIC PANEL: CPT | Performed by: INTERNAL MEDICINE

## 2019-12-11 PROCEDURE — G0463 HOSPITAL OUTPT CLINIC VISIT: HCPCS

## 2019-12-11 PROCEDURE — 99214 OFFICE O/P EST MOD 30 MIN: CPT | Performed by: INTERNAL MEDICINE

## 2019-12-11 PROCEDURE — 36415 COLL VENOUS BLD VENIPUNCTURE: CPT | Performed by: INTERNAL MEDICINE

## 2019-12-11 PROCEDURE — 83615 LACTATE (LD) (LDH) ENZYME: CPT | Performed by: INTERNAL MEDICINE

## 2019-12-11 PROCEDURE — 85025 COMPLETE CBC W/AUTO DIFF WBC: CPT | Performed by: INTERNAL MEDICINE

## 2019-12-11 ASSESSMENT — MIFFLIN-ST. JEOR: SCORE: 1766.82

## 2019-12-11 ASSESSMENT — ACTIVITIES OF DAILY LIVING (ADL): DEPENDENT_IADLS:: TRANSPORTATION

## 2019-12-11 ASSESSMENT — PAIN SCALES - GENERAL: PAINLEVEL: NO PAIN (0)

## 2019-12-11 NOTE — PROGRESS NOTES
Clinic Care Coordination Contact    Follow Up Progress Note      Assessment: [facility] placed call to pt today for follow up care coordination services.  SW introduced self & title.  Pt & sister, Daylin, agreed to speak with this writer via Daylin's cell speaker-phone.    Pt and Daylin stated that pt is much improved with his depression, no longer sleeping for hours/day out of boredom.  Pt is engaged with his family & goes on outings with them.  Just completed [employer] & mailed his gifts to family out yesterday.      Pt shared he is attending Therapy appts via [facility] and finds this beneficial.  Pt also continues to work towards obtaining his 's license, but states he is aware of community transportation if needed.    Goals addressed this encounter:   Goals Addressed                 This Visit's Progress       Patient Stated      COMPLETED: Functional (pt-stated)        Goal Statement: I will find transportation for social events  Measure of Success: Pt will complete the [employer] application  Supportive Steps to Achieve: Pt's sister, pt and SW to assist with completion the [employer] application.  Barriers: None  Strengths: Pt motivated to regain independence.  Date to Achieve By: December 1, 2019  Patient expressed understanding of goal: Yes              COMPLETED: Psychosocial (pt-stated)        #2 Goal Statement: I will become more active in my community  Measure of Success: Pt will engage in community programs for senior citizens  Supportive Steps to Achieve: Pt, his sister & SW will find programs that appeal to the pt to engage in.  Barriers: Pt cannot drive, relies upon family & transportation resources  Strengths: Pt appears motivated to engage in the community  Date to Achieve By: December 1, 2019  Patient expressed understanding of goal: Yes                Intervention/Education provided during outreach: N/A       Plan:  Pt and Daylin feel that they no longer need Care Coordination services,  stating that the pt is doing well.  They both have this writer's contact information & will call if future needs arise.    Paola Yeh [facility]  Primary Care Clinic- Social Work Care Coordinator  [facility]  12/11/2019 8:48 AM  [phone]

## 2019-12-11 NOTE — PROGRESS NOTES
"Oncology Rooming Note    December 11, 2019 1:42 PM   Flash Brown is a 79 year old male who presents for:    Chief Complaint   Patient presents with     Oncology Clinic Visit     6 month follow up grade 3 follicular lymphoma of lymph nodes of axilla.      Initial Vitals: /46 (BP Location: Right arm, Patient Position: Sitting, Cuff Size: Adult Large)   Pulse 63   Temp 96.4  F (35.8  C) (Tympanic)   Resp 18   Ht 1.676 m (5' 6\")   Wt 109.9 kg (242 lb 4.8 oz)   SpO2 99%   BMI 39.11 kg/m   Estimated body mass index is 39.11 kg/m  as calculated from the following:    Height as of this encounter: 1.676 m (5' 6\").    Weight as of this encounter: 109.9 kg (242 lb 4.8 oz). Body surface area is 2.26 meters squared.  No Pain (0) Comment: Data Unavailable   No LMP for male patient.  Allergies reviewed: Yes  Medications reviewed: Yes    Medications: Medication refills not needed today.  Pharmacy name entered into [facility]: [id number] [place] [address]    Clinical concerns: 6 month follow up grade 3 follicular lymphoma of lymph nodes of axilla.       Yamileth Croft, [facility]            "

## 2019-12-11 NOTE — LETTER
Health Care Home - Access Care Plan    About Me:    Patient Name:  Flash Brown    YOB: 1940  Age:                      79 year old   [place] MRN:     [id number] Telephone Information:   Home Phone [phone]   Mobile [phone]       Address:  [address]  [facility] [address] Email address:  [email or web address]      Emergency Contact(s)   Name Relationship Lgl Grd Work Phone Home Phone Mobile Phone   1. W[place]ATT HENSON Sister   [phone]   2. DECLINED, PER * Declined                 Health Maintenance: Routine Health maintenance Reviewed: Due/Overdue ***    My Access Plan  Medical Emergency 911   Questions or concerns during clinic hours Primary Clinic Line, I will call the clinic directly: [facility] - [phone]   24 Hour Appointment Line [phone] or  [phone]) (toll free)   24 Hour Nurse Line [phone] (toll free)   Questions or concerns outside clinic hours 24 Hour Appointment Line, I will call the after-hours on-call line:   {Clinics - Healthcare Home:[id number]}   Preferred Urgent Care [facility], [phone]   Preferred Hospital [place], [place]  [phone]   Preferred [facility]acy [facility] [place] [facility] [place], [place] - [address]     Behavioral Health Crisis Line The National Suicide Prevention Lifeline at 1-217.222.9575 or 931                     My Care Team Members  Patient Care Team       Relationship Specialty Notifications Start End    Thomas Jackson DO PCP - General Family Practice  5/21/19     Phone: [phone] [address] [place] [address]    Thomas Jackson DO Assigned PCP   5/16/19     Phone: [phone] [address] [place] [address]    Rose Mary Santilaln, RN Specialty Care Coordinator Oncology Admissions 6/28/19     Phone: [phone]                My Medical and Care Information  Problem List   Patient Active  Problem List   Diagnosis     Essential hypertension, benign     Malignant melanoma s/p resection in [place], [place]     RT axillary lymphoma s/p resection, with adjuvant radiation - [place], [place]     Basal cell skin cancer over nose s/p resection - [place], [place]     Mixed hyperlipidemia     Diabetes mellitus, type 2 (H)     Obstructive sleep apnea     Depressive disorder, not elsewhere classified     ? 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

## 2019-12-11 NOTE — LETTER
[facility] Care Coordination  [address] [place] [address]    December 11, 2019    Flash Brown  [address]  [facility] [address]    Dear Flash,  Your Care Team congratulates you on your journey to maintain wellness. This document will help guide you on your journey to maintain a healthy lifestyle.  You can use this to help you overcome any barriers you may encounter.  If you should have any questions or concerns, you can contact the members of your Care Team or contact your Primary Care Clinic for assistance.       Health Maintenance:  Health Maintenance Reviewed: Due/Overdue:  Health Maintenance Due   Topic Date Due     EYE EXAM  1940     ZOSTER IMMUNIZATION (1 of 2) 11/02/1992     MEDICARE ANNUAL WELLNESS VISIT  03/13/2009     TSH W/FREE T4 REFLEX  03/13/2010     DTAP/TDAP/TD IMMUNIZATION (2 - Td) 03/13/2014         My Access Plan  Medical Emergency 911   Primary Clinic Line [facility] - [phone]   24 Hour Appointment Line [phone] or  [phone]) (toll-free)   24 Hour Nurse Line [phone] (toll-free)   Preferred Urgent Care [facility], [phone]   Preferred Hospital [place], [place]  [phone]   Preferred Pharmacy [facility] [address] [facility] [employer] - [place], [place] - [address]     Behavioral Health Crisis Line The National Suicide Prevention Lifeline at 1-947.931.5718 or 911       My Care Team Members  Patient Care Team       Relationship Specialty Notifications Start End    Thomas Jackson DO PCP - General Family Practice  5/21/19     Phone: [phone] [address] [place] [address]    RN Specialty Care Coordinator Oncology Admissions 6/28/19     Phone: [phone]                   Goals        Patient Stated      COMPLETED: Functional (pt-stated)      Goal Statement: I will find transportation for social events  Measure of Success: Pt will complete the [employer]  Mobility application  Supportive Steps to Achieve: Pt's sister, pt and SW to assist with completion the [employer] application.  Barriers: None  Strengths: Pt motivated to regain independence.  Date to Achieve By: December 1, 2019  Patient expressed understanding of goal: Yes              COMPLETED: Psychosocial (pt-stated)      #2 Goal Statement: I will become more active in my community  Measure of Success: Pt will engage in community programs for senior citizens  Supportive Steps to Achieve: Pt, his sister & SW will find programs that appeal to the pt to engage in.  Barriers: Pt cannot drive, relies upon family & transportation resources  Strengths: Pt appears motivated to engage in the community  Date to Achieve By: December 1, 2019  Patient expressed understanding of goal: Yes                   Advance Care Plans/Directives Type:      We notice that you do not have an Advance Directive on file. Enclosed with this letter we have provided you information for our [employer] team. They can assist you in establishing a Health Care Directive and answer any questions you may have.    It has been your Clinic Care Team's pleasure to work with you on your goals.    Regards,  Your Clinic Care Team

## 2019-12-11 NOTE — ASSESSMENT & PLAN NOTE
Flash Brown is a 78-year-old gentleman with multiple comorbidities including type 2 diabetes, hypertension, depression and sleep apnea.  He is known to have history of non-Hodgkin lymphoma which Treated in 1983 on [facility] at that time he underwent right axillary lymph node dissection followed by radiation.  He did not receive any chemotherapy at that time.  Patient has been followed by his primary care physician in [place].  There was no evidence of recurrence up until now.  CT scan done 2010 was negative for any recurrence.

## 2019-12-11 NOTE — LETTER
12/11/2019         RE: Flash Brown  [address]  [place] [address]        Dear Colleague,    Thank you for referring your patient, Flash Brown, to the [facility]. Please see a copy of my visit note below.    Hematology/ Oncology Follow-up Visit:  Dec 11, 2019    Reason for Visit:   Chief Complaint   Patient presents with     Oncology Clinic Visit     6 month follow up grade 3 follicular lymphoma of lymph nodes of axilla.        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  He is known to have history of non-Hodgkin lymphoma which Treated in 1983 on [facility] at that time he underwent right axillary lymph node dissection followed by radiation.  He did not receive any chemotherapy at that time.  Patient has been followed by his primary care physician in [place].  There was no evidence of recurrence up until now.  CT scan done 2010 was negative for any recurrence.       Interval History:  Returning today for follow-up visit.  He has been feeling well without recent complaints weight loss night sweats fever or chills.  He denies any nausea vomiting or diarrhea.  Denies any shortness of breath or cough or wheezing.    Review Of Systems:  Constitutional: Negative for fever, chills, and night sweats.  Skin: negative.  Eyes: negative.  Ears/Nose/Throat: negative.  Respiratory: No shortness of breath, dyspnea on exertion, cough, or hemoptysis.  Cardiovascular: negative.  Gastrointestinal: negative.  Genitourinary: negative.  Musculoskeletal: negative.  Neurologic: negative.  Psychiatric: negative.  Hematologic/Lymphatic/Immunologic: negative.  Endocrine: negative.    All other ROS negative unless mentioned in interval history.    Past medical, social, surgical, and family histories reviewed.    Allergies:  Allergies as of  12/11/2019 - Reviewed 12/11/2019   Allergen Reaction Noted     Iodine Swelling 01/30/2008       Current Medications:  Current Outpatient Medications   Medication Sig Dispense Refill     amLODIPine (NORVASC) 5 MG tablet Take 1 tablet (5 mg) by mouth daily       aspirin (ASA) 81 MG EC tablet Take 1 tablet (81 mg) by mouth daily       atorvastatin (LIPITOR) 20 MG tablet Take 1 tablet (20 mg) by mouth daily 90 tablet 3     busPIRone (BUSPAR) 10 MG tablet Take 1 tablet (10 mg) by mouth 2 times daily       carvedilol (COREG) 12.5 MG tablet Take 1 tablet (12.5 mg) by mouth 2 times daily (with meals) 180 tablet 3     CENTRUM OR TABS 1 TABLET DAILY       Cholecalciferol (VITAMIN D3) 10 MCG/ML LIQD Take 50 mcg by mouth daily       cloNIDine (CATAPRES) 0.1 MG tablet Take 1 tablet (0.1 mg) by mouth 2 times daily       clopidogrel (PLAVIX) 75 MG tablet Take 75 mg by mouth daily       COQ10 100 MG OR CAPS Take 1 capsule by mouth daily        ferrous fumarate 65 mg, [place]. FE,-Vitamin C 125 mg (VITRON C)  MG TABS tablet Take 1 tablet by mouth daily       glimepiride (AMARYL) 4 MG tablet Take 1 tablet (4 mg) by mouth 2 times daily       hydrALAZINE (APRESOLINE) 50 MG tablet Take 1 tablet (50 mg) by mouth 2 times daily       OMEGA-3 1000 MG OR CAPS Take 1 g by mouth daily        naloxone (NARCAN) 4 MG/0.1ML nasal spray Spray 1 spray (4 mg) into one nostril alternating nostrils once as needed for opioid reversal Every 2-3 minutes until patient responsive or EMS arrives (Patient not taking: Reported on 12/11/2019) 0.2 mL 0     NIACIN 500 MG OR TABS Take one orally twice daily (Patient not taking: No sig reported) 180 3     NITROGLYCERIN 0.4 MG SL SUBL ONE TABLET UNDER TONGUE, FOR CHEST PAIN, AS DIRECTED (Patient not taking: No sig reported) 1 BOTTLE 3 per year     ORDER FOR DME Light Therapy with Full Spectrum Light (34104 lux) Start with 10-15 minute exposure per day, Increase exposure to 30 to 45 minutes per day, Maximum  "exposure: 90 minutes per day,Look periodically at light during each session  1 0     pioglitazone (ACTOS) 30 MG tablet Take 1 tablet (30 mg) by mouth daily       ramipril (ALTACE) 10 MG capsule Take 1 capsule (10 mg) by mouth daily 90 capsule 3     tamsulosin (FLOMAX) 0.4 MG capsule Take 1 capsule (0.4 mg) by mouth daily 90 capsule 3     VOSOL-HC 2-1 % OT SOLN 2-3 drops in the affected ear 3-4 times daily for 7-10 days for itching in the ear (Patient not taking: No sig reported) 1 bottle 0     VYTORIN 10-40 MG OR TABS 1 TABLET EVERY EVENING (Patient not taking: No sig reported) 3 months 1        Physical Exam:  /46 (BP Location: Right arm, Patient Position: Sitting, Cuff Size: Adult Large)   Pulse 63   Temp 96.4  F (35.8  C) (Tympanic)   Resp 18   Ht 1.676 m (5' 6\")   Wt 109.9 kg (242 lb 4.8 oz)   SpO2 99%   BMI 39.11 kg/m     Wt Readings from Last 12 Encounters:   12/11/19 109.9 kg (242 lb 4.8 oz)   12/09/19 108.9 kg (240 lb)   10/21/19 109.8 kg (242 lb)   10/07/19 110.1 kg (242 lb 12.8 oz)   10/02/19 108.9 kg (240 lb)   09/30/19 108.9 kg (240 lb)   09/30/19 109 kg (240 lb 4.8 oz)   09/10/19 109 kg (240 lb 6 oz)   07/18/19 110.7 kg (244 lb)   07/17/19 111.6 kg (246 lb)   07/08/19 112.5 kg (248 lb)   06/28/19 112.9 kg (248 lb 12.8 oz)     GENERAL APPEARANCE: Healthy, alert and in no acute distress.  HEENT: Sclerae anicteric. PERRLA. Oropharynx without ulcers, lesions, or thrush.  NECK: Supple. No asymmetry or masses.  LYMPHATICS: No palpable cervical, supraclavicular, axillary, or inguinal lymphadenopathy.  RESP: Lungs clear to auscultation bilaterally without rales, rhonchi or wheezes.  CARDIOVASCULAR: Regular rate and rhythm. Normal S1, S2; no S3 or S4. No murmur, gallop, or rub.  ABDOMEN: Soft, nontender. Bowel sounds normal. No palpable organomegaly or masses.  MUSCULOSKELETAL: Extremities without gross deformities noted. No edema of bilateral lower extremities.  SKIN: No suspicious lesions or " rashes.  NEURO: Alert and oriented x 3. Cranial nerves II-XII grossly intact.  PSYCHIATRIC: Mentation and affect appear normal.    Laboratory/Imaging Studies:  Office Visit on 12/09/2019   Component Date Value Ref Range Status     Hemoglobin A1C 12/09/2019 5.5  0 - 5.6 % Final    Comment: Normal <5.7% Prediabetes 5.7-6.4%  Diabetes 6.5% or higher - adopted from ADA   consensus guidelines.       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  We will continue to monitor patient symptoms.  I will see the patient again annually or sooner if there are new developments or concerns.    (E11.9) Type 2 diabetes mellitus without complication, without long-term current use of insulin (H)  Jamie is currently well controlled    (E78.49) Other hyperlipidemia  She currently on atorvastatin 20 mg orally daily.    (I10) Essential hypertension, benign  Blood pressure is currently well controlled.  Patient currently on Norvasc 5 mg orally daily.  Is also on Altace 10 mg orally daily.  And he is also on carvedilol 12.5 mg 2 times daily.    The patient is ready to learn, no apparent learning barriers were identified.  Diagnosis and treatment plans were explained to the patient. The patient expressed understanding of the content. The patient asked appropriate questions. The patient questions were answered to his satisfaction.    Chart documentation with Dragon Voice recognition Software. Although reviewed after completion, some words and grammatical errors may remain.    Oncology Rooming Note    December 11, 2019 1:42 PM   Flash Brown is a 79 year old male who presents for:    Chief Complaint   Patient presents with      "Oncology Clinic Visit     6 month follow up grade 3 follicular lymphoma of lymph nodes of axilla.      Initial Vitals: /46 (BP Location: Right arm, Patient Position: Sitting, Cuff Size: Adult Large)   Pulse 63   Temp 96.4  F (35.8  C) (Tympanic)   Resp 18   Ht 1.676 m (5' 6\")   Wt 109.9 kg (242 lb 4.8 oz)   SpO2 99%   BMI 39.11 kg/m    Estimated body mass index is 39.11 kg/m  as calculated from the following:    Height as of this encounter: 1.676 m (5' 6\").    Weight as of this encounter: 109.9 kg (242 lb 4.8 oz). Body surface area is 2.26 meters squared.  No Pain (0) Comment: Data Unavailable   No LMP for male patient.  Allergies reviewed: Yes  Medications reviewed: Yes    Medications: Medication refills not needed today.  Pharmacy name entered into [employer]: [id number] [place] [address]    Clinical concerns: 6 month follow up grade 3 follicular lymphoma of lymph nodes of axilla.       Yamileth Croft CMA              Again, thank you for allowing me to participate in the care of your patient.        Sincerely,        Crystal Camacho MD    "

## 2019-12-11 NOTE — PROGRESS NOTES
Hematology/ Oncology Follow-up Visit:  Dec 11, 2019    Reason for Visit:   Chief Complaint   Patient presents with     Oncology Clinic Visit     6 month follow up grade 3 follicular lymphoma of lymph nodes of axilla.        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  He is known to have history of non-Hodgkin lymphoma which Treated in 1983 on [facility] at that time he underwent right axillary lymph node dissection followed by radiation.  He did not receive any chemotherapy at that time.  Patient has been followed by his primary care physician in [place].  There was no evidence of recurrence up until now.  CT scan done 2010 was negative for any recurrence.       Interval History:  Returning today for follow-up visit.  He has been feeling well without recent complaints weight loss night sweats fever or chills.  He denies any nausea vomiting or diarrhea.  Denies any shortness of breath or cough or wheezing.    Review Of Systems:  Constitutional: Negative for fever, chills, and night sweats.  Skin: negative.  Eyes: negative.  Ears/Nose/Throat: negative.  Respiratory: No shortness of breath, dyspnea on exertion, cough, or hemoptysis.  Cardiovascular: negative.  Gastrointestinal: negative.  Genitourinary: negative.  Musculoskeletal: negative.  Neurologic: negative.  Psychiatric: negative.  Hematologic/Lymphatic/Immunologic: negative.  Endocrine: negative.    All other ROS negative unless mentioned in interval history.    Past medical, social, surgical, and family histories reviewed.    Allergies:  Allergies as of 12/11/2019 - Reviewed 12/11/2019   Allergen Reaction Noted     Iodine Swelling 01/30/2008       Current Medications:  Current Outpatient Medications   Medication Sig Dispense Refill     amLODIPine (NORVASC) 5 MG tablet Take 1 tablet (5 mg)  by mouth daily       aspirin (ASA) 81 MG EC tablet Take 1 tablet (81 mg) by mouth daily       atorvastatin (LIPITOR) 20 MG tablet Take 1 tablet (20 mg) by mouth daily 90 tablet 3     busPIRone (BUSPAR) 10 MG tablet Take 1 tablet (10 mg) by mouth 2 times daily       carvedilol (COREG) 12.5 MG tablet Take 1 tablet (12.5 mg) by mouth 2 times daily (with meals) 180 tablet 3     CENTRUM OR TABS 1 TABLET DAILY       Cholecalciferol (VITAMIN D3) 10 MCG/ML LIQD Take 50 mcg by mouth daily       cloNIDine (CATAPRES) 0.1 MG tablet Take 1 tablet (0.1 mg) by mouth 2 times daily       clopidogrel (PLAVIX) 75 MG tablet Take 75 mg by mouth daily       COQ10 100 MG OR CAPS Take 1 capsule by mouth daily        ferrous fumarate 65 mg, [place]. FE,-Vitamin C 125 mg (VITRON C)  MG TABS tablet Take 1 tablet by mouth daily       glimepiride (AMARYL) 4 MG tablet Take 1 tablet (4 mg) by mouth 2 times daily       hydrALAZINE (APRESOLINE) 50 MG tablet Take 1 tablet (50 mg) by mouth 2 times daily       OMEGA-3 1000 MG OR CAPS Take 1 g by mouth daily        naloxone (NARCAN) 4 MG/0.1ML nasal spray Spray 1 spray (4 mg) into one nostril alternating nostrils once as needed for opioid reversal Every 2-3 minutes until patient responsive or EMS arrives (Patient not taking: Reported on 12/11/2019) 0.2 mL 0     NIACIN 500 MG OR TABS Take one orally twice daily (Patient not taking: No sig reported) 180 3     NITROGLYCERIN 0.4 MG SL SUBL ONE TABLET UNDER TONGUE, FOR CHEST PAIN, AS DIRECTED (Patient not taking: No sig reported) 1 BOTTLE 3 per year     ORDER FOR DME Light Therapy with Full Spectrum Light (20154 lux) Start with 10-15 minute exposure per day, Increase exposure to 30 to 45 minutes per day, Maximum exposure: 90 minutes per day,Look periodically at light during each session  1 0     pioglitazone (ACTOS) 30 MG tablet Take 1 tablet (30 mg) by mouth daily       ramipril (ALTACE) 10 MG capsule Take 1 capsule (10 mg) by mouth daily 90 capsule 3  "    tamsulosin (FLOMAX) 0.4 MG capsule Take 1 capsule (0.4 mg) by mouth daily 90 capsule 3     VOSOL-HC 2-1 % OT SOLN 2-3 drops in the affected ear 3-4 times daily for 7-10 days for itching in the ear (Patient not taking: No sig reported) 1 bottle 0     VYTORIN 10-40 MG OR TABS 1 TABLET EVERY EVENING (Patient not taking: No sig reported) 3 months 1        Physical Exam:  /46 (BP Location: Right arm, Patient Position: Sitting, Cuff Size: Adult Large)   Pulse 63   Temp 96.4  F (35.8  C) (Tympanic)   Resp 18   Ht 1.676 m (5' 6\")   Wt 109.9 kg (242 lb 4.8 oz)   SpO2 99%   BMI 39.11 kg/m    Wt Readings from Last 12 Encounters:   12/11/19 109.9 kg (242 lb 4.8 oz)   12/09/19 108.9 kg (240 lb)   10/21/19 109.8 kg (242 lb)   10/07/19 110.1 kg (242 lb 12.8 oz)   10/02/19 108.9 kg (240 lb)   09/30/19 108.9 kg (240 lb)   09/30/19 109 kg (240 lb 4.8 oz)   09/10/19 109 kg (240 lb 6 oz)   07/18/19 110.7 kg (244 lb)   07/17/19 111.6 kg (246 lb)   07/08/19 112.5 kg (248 lb)   06/28/19 112.9 kg (248 lb 12.8 oz)     GENERAL APPEARANCE: Healthy, alert and in no acute distress.  HEENT: Sclerae anicteric. PERRLA. Oropharynx without ulcers, lesions, or thrush.  NECK: Supple. No asymmetry or masses.  LYMPHATICS: No palpable cervical, supraclavicular, axillary, or inguinal lymphadenopathy.  RESP: Lungs clear to auscultation bilaterally without rales, rhonchi or wheezes.  CARDIOVASCULAR: Regular rate and rhythm. Normal S1, S2; no S3 or S4. No murmur, gallop, or rub.  ABDOMEN: Soft, nontender. Bowel sounds normal. No palpable organomegaly or masses.  MUSCULOSKELETAL: Extremities without gross deformities noted. No edema of bilateral lower extremities.  SKIN: No suspicious lesions or rashes.  NEURO: Alert and oriented x 3. Cranial nerves II-XII grossly intact.  PSYCHIATRIC: Mentation and affect appear normal.    Laboratory/Imaging Studies:  Office Visit on 12/09/2019   Component Date Value Ref Range Status     Hemoglobin A1C " 12/09/2019 5.5  0 - 5.6 % Final    Comment: Normal <5.7% Prediabetes 5.7-6.4%  Diabetes 6.5% or higher - adopted from ADA   consensus guidelines.       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  We will continue to monitor patient symptoms.  I will see the patient again annually or sooner if there are new developments or concerns.    (E11.9) Type 2 diabetes mellitus without complication, without long-term current use of insulin (H)  Jamie is currently well controlled    (E78.49) Other hyperlipidemia  She currently on atorvastatin 20 mg orally daily.    (I10) Essential hypertension, benign  Blood pressure is currently well controlled.  Patient currently on Norvasc 5 mg orally daily.  Is also on Altace 10 mg orally daily.  And he is also on carvedilol 12.5 mg 2 times daily.    The patient is ready to learn, no apparent learning barriers were identified.  Diagnosis and treatment plans were explained to the patient. The patient expressed understanding of the content. The patient asked appropriate questions. The patient questions were answered to his satisfaction.    Chart documentation with Dragon Voice recognition Software. Although reviewed after completion, some words and grammatical errors may remain.

## 2019-12-12 NOTE — PROGRESS NOTES
Lab results released to my chart. Message left for patient to return call to clinic with questions or concerns. Direct line provided. Lisseth Guillen RN, BSN, OCN on 12/12/2019 at 11:19 AM

## 2020-01-08 ENCOUNTER — OFFICE VISIT (OUTPATIENT)
Dept: DERMATOLOGY | Facility: CLINIC | Age: 80
End: 2020-01-08
Payer: MEDICARE

## 2020-01-08 VITALS — HEART RATE: 65 BPM | OXYGEN SATURATION: 95 % | SYSTOLIC BLOOD PRESSURE: 117 MMHG | DIASTOLIC BLOOD PRESSURE: 58 MMHG

## 2020-01-08 DIAGNOSIS — C44.612 BASAL CELL CARCINOMA (BCC) OF RIGHT SHOULDER: ICD-10-CM

## 2020-01-08 DIAGNOSIS — C44.519 BASAL CELL CARCINOMA OF ANTERIOR CHEST: ICD-10-CM

## 2020-01-08 DIAGNOSIS — L30.9 DERMATITIS: ICD-10-CM

## 2020-01-08 DIAGNOSIS — Z85.828 HISTORY OF SKIN CANCER: Primary | ICD-10-CM

## 2020-01-08 PROCEDURE — 99213 OFFICE O/P EST LOW 20 MIN: CPT | Mod: 25 | Performed by: DERMATOLOGY

## 2020-01-08 PROCEDURE — 17313 MOHS 1 STAGE T/A/L: CPT | Performed by: DERMATOLOGY

## 2020-01-08 PROCEDURE — 17313 MOHS 1 STAGE T/A/L: CPT | Mod: 59 | Performed by: DERMATOLOGY

## 2020-01-08 PROCEDURE — 17314 MOHS ADDL STAGE T/A/L: CPT | Performed by: DERMATOLOGY

## 2020-01-08 RX ORDER — DIAZEPAM 10 MG
10 TABLET ORAL EVERY 6 HOURS PRN
Qty: 1 TABLET | Refills: 0 | Status: SHIPPED | OUTPATIENT
Start: 2020-01-08 | End: 2020-03-09

## 2020-01-08 RX ORDER — TRIAMCINOLONE ACETONIDE 1 MG/G
CREAM TOPICAL
Qty: 454 G | Refills: 3 | Status: SHIPPED | OUTPATIENT
Start: 2020-01-08 | End: 2024-04-26

## 2020-01-08 NOTE — PATIENT INSTRUCTIONS
Proper skin care from Dr. Roque- [facility]  **Prescription cream was sent to pharmacy for legs     Eliminate harsh soaps, i.e. Dial, Zest, [place];   Use mild soaps such as Cetaphil or Dove Sensitive Skin   Avoid hot or cold showers   After showering, lightly dry off.    Aggressive use of a moisturizer (including Vanicream, Cetaphil, Aquaphor or Cerave)   We recommend using a tub that needs to be scooped out, not a pump. This has more of an oil base. It will hold moisture in your skin much better than a water base moisturizer. The ones recommended are non- pore clogging.       If you have any questions call [phone] and follow the prompts to Dr. Roque's office.       Open Wound Care     for _chest and right shoulder__        ? No strenuous activity for 48 hours    ? Take Tylenol as needed for discomfort.                                                .         ? Do not drink alcoholic beverages for 48 hours.    ? Keep the pressure bandage in place for 24 hours. If the bandage becomes blood tinged or loose, reinforce it with gauze and tape.        (Refer to the reverse side of this page for management of bleeding).    ? Remove bandage in 24 hours and begin wound care as follows:     1. Clean area with tap water using a Q tip or gauze pad, (shower / bathe normally)  2. Dry wound with Q tip or gauze pad  3. Apply Aquaphor, Vaseline, Polysporin or Bacitracin Ointment with a Q tip    Do NOT use Neosporin Ointment *  4. Cover the wound with a band-aid or nonstick gauze pad and paper tape.  5. Repeat wound care once a day until wound is completely healed.    It is an old wives tale that a wound heals better when it is exposed to air and allowed to dry out. The wound will heal faster with a better cosmetic result if it is kept moist with ointment and covered with a bandage.  Do not let the wound dry out.      Supplies Needed:                Qtips or gauze pads                Polysporin or Bacitracin  Ointment                Bandaids or nonstick gauze pads and paper tape    Wound care kits and brown paper tape are available for purchase at   the pharmacy.    BLEEDIN. Use tightly rolled up gauze or cloth to apply direct pressure over the bandage for 20   minutes.  2. Reapply pressure for an additional 20 minutes if necessary  3. Call the office or go to the nearest emergency room if pressure fails to stop the bleeding.  4. Use additional gauze and tape to maintain pressure once the bleeding has stopped.  5. Begin wound care 24 hours after surgery as directed.                  WOUND HEALING    1. One week after surgery a pink / red halo will form around the outside of the wound.   This is new skin.  2. The center of the wound will appear yellowish white and produce some drainage.  3. The pink halo will slowly migrate in toward the center of the wound until the wound is covered with new shiny pink skin.  4. There will be no more drainage when the wound is completely healed.  5. It will take six months to one year for the redness to fade.  6. The scar may be itchy, tight and sensitive to extreme temperatures for a year after the surgery.  7. Massaging the area several times a day for several minutes after the wound is completely healed will help the scar soften and normalize faster. Begin massage only after healing is complete.      In case of emergency call: Dr Roque: [phone]     [facility]: [phone]    [facility]: [phone]

## 2020-01-08 NOTE — PROGRESS NOTES
Flash Brown is a 79 year old year old male patient here today for evaluation and managment of basal cell carcinoma x2. Today he notes itching on legs.  Not sure what soap he is uinsg  NO other treatments.   Patient has no other skin complaints today.  Remainder of the HPI, Meds, PMH, Allergies, FH, and SH was reviewed in chart.      Past Medical History:   Diagnosis Date     Basal cell carcinoma      CAD (coronary artery disease)      Depression      Diabetes type 2, controlled (H)      Hyperlipidemia      Hypertension      Lymphoma (H)      Malignant melanoma (H)      Melanoma (H)      Squamous cell carcinoma        Past Surgical History:   Procedure Laterality Date     AXILLARY SURGERY      S/P resection and adjuvant radiation     BONE MARROW BIOPSY, BONE SPECIMEN, NEEDLE/TROCAR N/A 7/8/2019    Procedure: BIOPSY, BONE MARROW;  Surgeon: Husam Issa MD;  Location: [facility]     CARDIAC SURGERY       CHOLECYSTECTOMY       HERNIA REPAIR, UMBILICAL       PHACOEMULSIFICATION WITH STANDARD INTRAOCULAR LENS IMPLANT Right 10/2/2019    Procedure: Cataract Removal with Implant;  Surgeon: Dinesh Ivey MD;  Location: [facility]     PHACOEMULSIFICATION WITH STANDARD INTRAOCULAR LENS IMPLANT Left 10/21/2019    Procedure: Cataract Removal with Implant;  Surgeon: Dinesh Ivey MD;  Location: [facility]     STENT      PTCA with drug-eluting stent of RCA, circumflex, and LAD     VASCULAR SURGERY          Family History   Problem Relation Age of Onset     Asthma Other      Cancer Other         melanoma     Blood Disease Other         bleeding disorder     Lung Cancer Mother         Smoker     Prostate Cancer Father      Melanoma No family hx of        Social History     Socioeconomic History     Marital status: Single     Spouse name: Not on file     Number of children: 0     Years of education: Not on file     Highest education level: Not on file   Occupational History     Occupation: Retired     Comment: [employer]     Social Needs     Financial resource strain: Not on file     Food insecurity:     Worry: Not on file     Inability: Not on file     Transportation needs:     Medical: Not on file     Non-medical: Not on file   Tobacco Use     Smoking status: Never Smoker     Smokeless tobacco: Never Used   Substance and Sexual Activity     Alcohol use: Yes     Frequency: Monthly or less     Drinks per session: 1 or 2     Binge frequency: Never     Comment: glass of wine couple times per week     Drug use: Never     Sexual activity: Not on file   Lifestyle     Physical activity:     Days per week: Not on file     Minutes per session: Not on file     Stress: Not on file   Relationships     Social connections:     Talks on phone: Not on file     Gets together: Not on file     Attends [employer] service: Not on file     Active member of club or organization: Not on file     Attends meetings of clubs or organizations: Not on file     Relationship status: Not on file     Intimate partner violence:     Fear of current or ex partner: Not on file     Emotionally abused: Not on file     Physically abused: Not on file     Forced sexual activity: Not on file   Other Topics Concern     Parent/sibling w/ CABG, MI or angioplasty before 65F 55M? Not Asked   Social History Narrative     Not on file       Outpatient Encounter Medications as of 1/8/2020   Medication Sig Dispense Refill     diazepam (VALIUM) 10 MG tablet Take 1 tablet (10 mg) by mouth every 6 hours as needed for anxiety 1 tablet 0     amLODIPine (NORVASC) 5 MG tablet Take 1 tablet (5 mg) by mouth daily       aspirin (ASA) 81 MG EC tablet Take 1 tablet (81 mg) by mouth daily       atorvastatin (LIPITOR) 20 MG tablet Take 1 tablet (20 mg) by mouth daily 90 tablet 3     busPIRone (BUSPAR) 10 MG tablet Take 1 tablet (10 mg) by mouth 2 times daily       carvedilol (COREG) 12.5 MG tablet Take 1 tablet (12.5 mg) by mouth 2 times daily (with meals) 180 tablet 3     CENTRUM OR  TABS 1 TABLET DAILY       Cholecalciferol (VITAMIN D3) 10 MCG/ML LIQD Take 50 mcg by mouth daily       cloNIDine (CATAPRES) 0.1 MG tablet Take 1 tablet (0.1 mg) by mouth 2 times daily       clopidogrel (PLAVIX) 75 MG tablet Take 75 mg by mouth daily       COQ10 100 MG OR CAPS Take 1 capsule by mouth daily        ezetimibe-simvastatin (VYTORIN) 10-40 MG tablet Take 1 tablet by mouth At Bedtime 90 tablet 1     ferrous fumarate 65 mg, [place]. FE,-Vitamin C 125 mg (VITRON C)  MG TABS tablet Take 1 tablet by mouth daily       glimepiride (AMARYL) 4 MG tablet Take 1 tablet (4 mg) by mouth 2 times daily 180 tablet 1     hydrALAZINE (APRESOLINE) 50 MG tablet Take 1 tablet (50 mg) by mouth 2 times daily       naloxone (NARCAN) 4 MG/0.1ML nasal spray Spray 1 spray (4 mg) into one nostril alternating nostrils once as needed for opioid reversal Every 2-3 minutes until patient responsive or EMS arrives (Patient not taking: Reported on 12/11/2019) 0.2 mL 0     NIACIN 500 MG OR TABS Take one orally twice daily (Patient not taking: No sig reported) 180 3     NITROGLYCERIN 0.4 MG SL SUBL ONE TABLET UNDER TONGUE, FOR CHEST PAIN, AS DIRECTED (Patient not taking: No sig reported) 1 BOTTLE 3 per year     OMEGA-3 1000 MG OR CAPS Take 1 g by mouth daily        ORDER FOR DME Light Therapy with Full Spectrum Light (89340 lux) Start with 10-15 minute exposure per day, Increase exposure to 30 to 45 minutes per day, Maximum exposure: 90 minutes per day,Look periodically at light during each session  1 0     pioglitazone (ACTOS) 30 MG tablet Take 1 tablet (30 mg) by mouth daily       ramipril (ALTACE) 10 MG capsule Take 1 capsule (10 mg) by mouth daily 90 capsule 3     tamsulosin (FLOMAX) 0.4 MG capsule Take 1 capsule (0.4 mg) by mouth daily 90 capsule 3     VOSOL-HC 2-1 % OT SOLN 2-3 drops in the affected ear 3-4 times daily for 7-10 days for itching in the ear (Patient not taking: No sig reported) 1 bottle 0     Facility-Administered  Encounter Medications as of 1/8/2020   Medication Dose Route Frequency Provider Last Rate Last Dose     sodium hyaluronate (HEALON DUET)    PRN Dinesh Ivey MD   1 kit at 10/02/19 1149             Review Of Systems  Skin: As above  Eyes: negative  Ears/Nose/Throat: negative  Respiratory: No shortness of breath, dyspnea on exertion, cough, or hemoptysis  Cardiovascular: negative  Gastrointestinal: negative  Genitourinary: negative  Musculoskeletal: negative  Neurologic: negative  Psychiatric: negative  Hematologic/Lymphatic/Immunologic: negative  Endocrine: negative      O:   NAD, WDWN, Alert & Oriented, Mood & Affect wnl, Vitals stable   Here today alone   /58 (BP Location: Left arm, Patient Position: Chair, Cuff Size: Adult Large)   Pulse 65   SpO2 95%    General appearance normal   Vitals stable   Alert, oriented and in no acute distress     R post shoulder 1.4cm red scaly plaque   Mid chest 1.6cm red scaly papule   Asteatotic dermatitis on legs   Remainder of head, neck, arms, legs, chest, back, ab, all normal     Eyes: Conjunctivae/lids:Normal     ENT: Lips, buccal mucosa, tongue: normal    MSK:Normal    Cardiovascular: peripheral edema none    Pulm: Breathing Normal    Neuro/Psych: Orientation:Alert and Orientedx3 ; Mood/Affect:normal       A/P:  1. Basal cell carcinoma shoulder and chest  2. Asteatotic dermatitis  Skin care discussed with patient   Emollient use discussed with patient   TAC prn   BENIGN LESIONS DISCUSSED WITH PATIENT:  I discussed the specifics of tumor, prognosis, and genetics of benign lesions.  I explained that treatment of these lesions would be purely cosmetic and not medically neccessary.  I discussed with patient different removal options including excision, cautery and /or laser.      Nature and genetics of benign skin lesions dicussed with patient.  Signs and Symptoms of skin cancer discussed with patient.  ABCDEs of melanoma reviewed with patient.  Patient  encouraged to perform monthly skin exams.  UV precautions reviewed with patient.  Patient to follow up with Primary Care provider regarding elevated blood pressure.  Skin care regimen reviewed with patient: Eliminate harsh soaps, i.e. Dial, zest, irsih spring; Mild soaps such as Cetaphil or Dove sensitive skin, avoid hot or cold showers, aggressive use of emollients including vanicream, cetaphil or cerave discussed with patient.    Risks of non-melanoma skin cancer discussed with patient   Return to clinic 6 months  PROCEDURE NOTE    1. R post shoulder basal cell carcinoma   MOHS:   Size    After PGACAC discussed with patient, decision for Mohs surgery was made. Indication for Mohs was Size. Patient confirmed the site with Dr. Roque.  After anesthesia with LEC, the tumor was excised using standard Mohs technique in 1 stages(s).  CLEAR MARGINS OBTAINED and Final defect size was 2.3 x 2.1 cm.       REPAIR SECOND INTENT: We discussed the options for wound management in full with the patient including risks/benefits/possible outcomes. Decision made to allow the wound to heal by second intention. EBL minimal; complications none; wound care routine.  The patient was discharged in good condition and will return in one month or prn for wound evaluation.    2. Mid chest basal cell carcinoma   MOHS:   Size    After PGACAC discussed with patient, decision for Mohs surgery was made. Indication for Mohs was Size. Patient confirmed the site with Dr. Roque.  After anesthesia with LEC, the tumor was excised using standard Mohs technique in 2 stages(s).  CLEAR MARGINS OBTAINED and Final defect size was 2.5 x 2.4 cm.       REPAIR SECOND INTENT: We discussed the options for wound management in full with the patient including risks/benefits/possible outcomes. Decision made to allow the wound to heal by second intention. EBL minimal; complications none; wound care routine.  The patient was discharged in good condition and will  return in one month or prn for wound evaluation.

## 2020-01-08 NOTE — NURSING NOTE
The following medication was given:     MEDICATION: diazepam  ROUTE: PO  TIME: 0735  DOSE: 5 mg   AMOUNT: 2 tabs  LOT #: [id number]  :  tom  EXPIRATION DATE:  12/22/2020  NDC: 21-L085    Antonia Stack LPN.................1/8/2020

## 2020-01-08 NOTE — LETTER
1/8/2020         RE: Flash Brown  [address]  [facility] [id number]        Dear Colleague,    Thank you for referring your patient, Flash Brown, to the [facility]. Please see a copy of my visit note below.    Surgical Office Location :   [facility]  [address] [place] [address]      Flash Brown is a 79 year old year old male patient here today for evaluation and managment of basal cell carcinoma x2. Today he notes itching on legs.  Not sure what soap he is uinsg  NO other treatments.   Patient has no other skin complaints today.  Remainder of the HPI, Meds, PMH, Allergies, FH, and SH was reviewed in chart.      Past Medical History:   Diagnosis Date     Basal cell carcinoma      CAD (coronary artery disease)      Depression      Diabetes type 2, controlled (H)      Hyperlipidemia      Hypertension      Lymphoma (H)      Malignant melanoma (H)      Melanoma (H)      Squamous cell carcinoma        Past Surgical History:   Procedure Laterality Date     AXILLARY SURGERY      S/P resection and adjuvant radiation     BONE MARROW BIOPSY, BONE SPECIMEN, NEEDLE/TROCAR N/A 7/8/2019    Procedure: BIOPSY, BONE MARROW;  Surgeon: Husam Issa MD;  Location: [facility]     CARDIAC SURGERY       CHOLECYSTECTOMY       HERNIA REPAIR, UMBILICAL       PHACOEMULSIFICATION WITH STANDARD INTRAOCULAR LENS IMPLANT Right 10/2/2019    Procedure: Cataract Removal with Implant;  Surgeon: Dinesh Ivey MD;  Location: [facility]     PHACOEMULSIFICATION WITH STANDARD INTRAOCULAR LENS IMPLANT Left 10/21/2019    Procedure: Cataract Removal with Implant;  Surgeon: Dinesh Ivey MD;  Location: [facility]     STENT      PTCA with drug-eluting stent of RCA, circumflex, and LAD     VASCULAR SURGERY          Family History   Problem Relation Age of Onset     Asthma Other      Cancer Other         melanoma     Blood Disease Other         bleeding disorder     Lung Cancer Mother          Smoker     Prostate Cancer Father      Melanoma No family hx of        Social History     Socioeconomic History     Marital status: Single     Spouse name: Not on file     Number of children: 0     Years of education: Not on file     Highest education level: Not on file   Occupational History     Occupation: Retired     Comment: [employer]    Social Needs     Financial resource strain: Not on file     Food insecurity:     Worry: Not on file     Inability: Not on file     Transportation needs:     Medical: Not on file     Non-medical: Not on file   Tobacco Use     Smoking status: Never Smoker     Smokeless tobacco: Never Used   Substance and Sexual Activity     Alcohol use: Yes     Frequency: Monthly or less     Drinks per session: 1 or 2     Binge frequency: Never     Comment: glass of wine couple times per week     Drug use: Never     Sexual activity: Not on file   Lifestyle     Physical activity:     Days per week: Not on file     Minutes per session: Not on file     Stress: Not on file   Relationships     Social connections:     Talks on phone: Not on file     Gets together: Not on file     Attends [employer] service: Not on file     Active member of club or organization: Not on file     Attends meetings of clubs or organizations: Not on file     Relationship status: Not on file     Intimate partner violence:     Fear of current or ex partner: Not on file     Emotionally abused: Not on file     Physically abused: Not on file     Forced sexual activity: Not on file   Other Topics Concern     Parent/sibling w/ CABG, MI or angioplasty before 65F 55M? Not Asked   Social History Narrative     Not on file       Outpatient Encounter Medications as of 1/8/2020   Medication Sig Dispense Refill     diazepam (VALIUM) 10 MG tablet Take 1 tablet (10 mg) by mouth every 6 hours as needed for anxiety 1 tablet 0     amLODIPine (NORVASC) 5 MG tablet Take 1 tablet (5 mg) by mouth daily       aspirin (ASA) 81 MG EC  tablet Take 1 tablet (81 mg) by mouth daily       atorvastatin (LIPITOR) 20 MG tablet Take 1 tablet (20 mg) by mouth daily 90 tablet 3     busPIRone (BUSPAR) 10 MG tablet Take 1 tablet (10 mg) by mouth 2 times daily       carvedilol (COREG) 12.5 MG tablet Take 1 tablet (12.5 mg) by mouth 2 times daily (with meals) 180 tablet 3     CENTRUM OR TABS 1 TABLET DAILY       Cholecalciferol (VITAMIN D3) 10 MCG/ML LIQD Take 50 mcg by mouth daily       cloNIDine (CATAPRES) 0.1 MG tablet Take 1 tablet (0.1 mg) by mouth 2 times daily       clopidogrel (PLAVIX) 75 MG tablet Take 75 mg by mouth daily       COQ10 100 MG OR CAPS Take 1 capsule by mouth daily        ezetimibe-simvastatin (VYTORIN) 10-40 MG tablet Take 1 tablet by mouth At Bedtime 90 tablet 1     ferrous fumarate 65 mg, [place]. FE,-Vitamin C 125 mg (VITRON C)  MG TABS tablet Take 1 tablet by mouth daily       glimepiride (AMARYL) 4 MG tablet Take 1 tablet (4 mg) by mouth 2 times daily 180 tablet 1     hydrALAZINE (APRESOLINE) 50 MG tablet Take 1 tablet (50 mg) by mouth 2 times daily       naloxone (NARCAN) 4 MG/0.1ML nasal spray Spray 1 spray (4 mg) into one nostril alternating nostrils once as needed for opioid reversal Every 2-3 minutes until patient responsive or EMS arrives (Patient not taking: Reported on 12/11/2019) 0.2 mL 0     NIACIN 500 MG OR TABS Take one orally twice daily (Patient not taking: No sig reported) 180 3     NITROGLYCERIN 0.4 MG SL SUBL ONE TABLET UNDER TONGUE, FOR CHEST PAIN, AS DIRECTED (Patient not taking: No sig reported) 1 BOTTLE 3 per year     OMEGA-3 1000 MG OR CAPS Take 1 g by mouth daily        ORDER FOR DME Light Therapy with Full Spectrum Light (13163 lux) Start with 10-15 minute exposure per day, Increase exposure to 30 to 45 minutes per day, Maximum exposure: 90 minutes per day,Look periodically at light during each session  1 0     pioglitazone (ACTOS) 30 MG tablet Take 1 tablet (30 mg) by mouth daily       ramipril (ALTACE)  10 MG capsule Take 1 capsule (10 mg) by mouth daily 90 capsule 3     tamsulosin (FLOMAX) 0.4 MG capsule Take 1 capsule (0.4 mg) by mouth daily 90 capsule 3     VOSOL-HC 2-1 % OT SOLN 2-3 drops in the affected ear 3-4 times daily for 7-10 days for itching in the ear (Patient not taking: No sig reported) 1 bottle 0     Facility-Administered Encounter Medications as of 1/8/2020   Medication Dose Route Frequency Provider Last Rate Last Dose     sodium hyaluronate (HEALON DUET)    PRDinesh Ornelas MD   1 kit at 10/02/19 1149             Review Of Systems  Skin: As above  Eyes: negative  Ears/Nose/Throat: negative  Respiratory: No shortness of breath, dyspnea on exertion, cough, or hemoptysis  Cardiovascular: negative  Gastrointestinal: negative  Genitourinary: negative  Musculoskeletal: negative  Neurologic: negative  Psychiatric: negative  Hematologic/Lymphatic/Immunologic: negative  Endocrine: negative      O:   NAD, WDWN, Alert & Oriented, Mood & Affect wnl, Vitals stable   Here today alone   /58 (BP Location: Left arm, Patient Position: Chair, Cuff Size: Adult Large)   Pulse 65   SpO2 95%    General appearance normal   Vitals stable   Alert, oriented and in no acute distress     R post shoulder 1.4cm red scaly plaque   Mid chest 1.6cm red scaly papule   Asteatotic dermatitis on legs   Remainder of head, neck, arms, legs, chest, back, ab, all normal     Eyes: Conjunctivae/lids:Normal     ENT: Lips, buccal mucosa, tongue: normal    MSK:Normal    Cardiovascular: peripheral edema none    Pulm: Breathing Normal    Neuro/Psych: Orientation:Alert and Orientedx3 ; Mood/Affect:normal       A/P:  1. Basal cell carcinoma shoulder and chest  2. Asteatotic dermatitis  Skin care discussed with patient   Emollient use discussed with patient   TAC prn   BENIGN LESIONS DISCUSSED WITH PATIENT:  I discussed the specifics of tumor, prognosis, and genetics of benign lesions.  I explained that treatment of these  lesions would be purely cosmetic and not medically neccessary.  I discussed with patient different removal options including excision, cautery and /or laser.      Nature and genetics of benign skin lesions dicussed with patient.  Signs and Symptoms of skin cancer discussed with patient.  ABCDEs of melanoma reviewed with patient.  Patient encouraged to perform monthly skin exams.  UV precautions reviewed with patient.  Patient to follow up with Primary Care provider regarding elevated blood pressure.  Skin care regimen reviewed with patient: Eliminate harsh soaps, i.e. Dial, zest, irsih spring; Mild soaps such as Cetaphil or Dove sensitive skin, avoid hot or cold showers, aggressive use of emollients including vanicream, cetaphil or cerave discussed with patient.    Risks of non-melanoma skin cancer discussed with patient   Return to clinic 6 months  PROCEDURE NOTE    1. R post shoulder basal cell carcinoma   MOHS:   Size    After PGACAC discussed with patient, decision for Mohs surgery was made. Indication for Mohs was Size. Patient confirmed the site with Dr. Roque.  After anesthesia with LEC, the tumor was excised using standard Mohs technique in 1 stages(s).  CLEAR MARGINS OBTAINED and Final defect size was 2.3 x 2.1 cm.       REPAIR SECOND INTENT: We discussed the options for wound management in full with the patient including risks/benefits/possible outcomes. Decision made to allow the wound to heal by second intention. EBL minimal; complications none; wound care routine.  The patient was discharged in good condition and will return in one month or prn for wound evaluation.    2. Mid chest basal cell carcinoma   MOHS:   Size    After PGACAC discussed with patient, decision for Mohs surgery was made. Indication for Mohs was Size. Patient confirmed the site with Dr. Roque.  After anesthesia with LEC, the tumor was excised using standard Mohs technique in 2 stages(s).  CLEAR MARGINS OBTAINED and Final defect  size was 2.5 x 2.4 cm.       REPAIR SECOND INTENT: We discussed the options for wound management in full with the patient including risks/benefits/possible outcomes. Decision made to allow the wound to heal by second intention. EBL minimal; complications none; wound care routine.  The patient was discharged in good condition and will return in one month or prn for wound evaluation.      Again, thank you for allowing me to participate in the care of your patient.        Sincerely,        Dinesh Roque MD

## 2020-01-08 NOTE — NURSING NOTE
"Chief Complaint   Patient presents with     Derm Problem     mohs       Initial /58 (BP Location: Left arm, Patient Position: Chair, Cuff Size: Adult Large)   Pulse 65   SpO2 95%  Estimated body mass index is 39.11 kg/m  as calculated from the following:    Height as of 12/11/19: 1.676 m (5' 6\").    Weight as of 12/11/19: 109.9 kg (242 lb 4.8 oz).  Medications and allergies reviewed.    Jon MIRELES CMA (AAMA)    "

## 2020-01-27 ENCOUNTER — OFFICE VISIT (OUTPATIENT)
Dept: PSYCHOLOGY | Facility: CLINIC | Age: 80
End: 2020-01-27
Payer: MEDICARE

## 2020-01-27 DIAGNOSIS — F43.21 ADJUSTMENT DISORDER WITH DEPRESSED MOOD: Primary | ICD-10-CM

## 2020-01-27 PROCEDURE — 90834 PSYTX W PT 45 MINUTES: CPT | Performed by: SOCIAL WORKER

## 2020-01-27 ASSESSMENT — PATIENT HEALTH QUESTIONNAIRE - PHQ9
SUM OF ALL RESPONSES TO PHQ QUESTIONS 1-9: 2
5. POOR APPETITE OR OVEREATING: SEVERAL DAYS

## 2020-01-27 ASSESSMENT — ANXIETY QUESTIONNAIRES
IF YOU CHECKED OFF ANY PROBLEMS ON THIS QUESTIONNAIRE, HOW DIFFICULT HAVE THESE PROBLEMS MADE IT FOR YOU TO DO YOUR WORK, TAKE CARE OF THINGS AT HOME, OR GET ALONG WITH OTHER PEOPLE: NOT DIFFICULT AT ALL
3. WORRYING TOO MUCH ABOUT DIFFERENT THINGS: NOT AT ALL
5. BEING SO RESTLESS THAT IT IS HARD TO SIT STILL: NOT AT ALL
GAD7 TOTAL SCORE: 1
7. FEELING AFRAID AS IF SOMETHING AWFUL MIGHT HAPPEN: NOT AT ALL
1. FEELING NERVOUS, ANXIOUS, OR ON EDGE: NOT AT ALL
2. NOT BEING ABLE TO STOP OR CONTROL WORRYING: NOT AT ALL
6. BECOMING EASILY ANNOYED OR IRRITABLE: NOT AT ALL

## 2020-01-27 NOTE — Clinical Note
He is ready for discharge maintaining nice low scores and feeling ready. He knows he can return at any time.

## 2020-01-27 NOTE — PROGRESS NOTES
Discharge Summary  Multiple Sessions    Client Name: Flash Brown MRN#: [id number] YOB: 1940    Discharge Date:   January 27, 2020      Service Type: Individual      Session Start Time: 1  Session End Time: 1:45 pm      Session Length: 45 - 50     Session #: 3     Attendees: Client attended alone    Focus of Treatment Objective(s):  Client's presenting concerns included: Adjustment Difficulties related to: move  Stage of Change at time of Discharge: MAINTENANCE (Working to maintain change, with risk of relapse)    Medication Adherence:  NA    Chemical Use:  NA    Assessment: Current Emotional / Mental Status (status of significant symptoms):    Risk status (Self / Other harm or suicidal ideation)  Client denies current fears or concerns for personal safety.  Client denies current or recent suicidal ideation or behaviors.  Client denies current or recent homicidal ideation or behaviors.  Client denies current or recent self injurious behavior or ideation.  Client denies other safety concerns.  A safety and risk management plan has not been developed at this time, however client was given the after-hours number should there be a change in any of these risk factors.    Appearance:   Appropriate   Eye Contact:   Fair   Psychomotor Behavior: Normal   Attitude:   Cooperative   Orientation:   All  Speech   Rate / Production: Normal    Volume:  Normal   Mood:    Normal  Affect:    Appropriate   Thought Content:  Clear   Thought Form:  Coherent  Logical   Insight:   Fair     DSM5 Diagnoses: (Sustained by DSM5 Criteria Listed Above)  Diagnoses: Adjustment Disorders  309.0 (F43.21) With depressed mood  Psychosocial & Contextual Factors: adjusting to move to [place] and in with sister.  WHODAS 2.0 (12 item) Score:      Reason for Discharge:  Client is satisfied with progress      Aftercare Plan:  Client may resume counseling services at any time in the future by calling the [facility] Intake Office, [phone].      Raheem Guo, [facility]

## 2020-01-28 ASSESSMENT — ANXIETY QUESTIONNAIRES: GAD7 TOTAL SCORE: 1

## 2020-02-19 ENCOUNTER — OFFICE VISIT (OUTPATIENT)
Dept: CARDIOLOGY | Facility: CLINIC | Age: 80
End: 2020-02-19
Attending: INTERNAL MEDICINE
Payer: MEDICARE

## 2020-02-19 VITALS
DIASTOLIC BLOOD PRESSURE: 65 MMHG | WEIGHT: 244 LBS | BODY MASS INDEX: 39.38 KG/M2 | OXYGEN SATURATION: 96 % | HEART RATE: 60 BPM | SYSTOLIC BLOOD PRESSURE: 117 MMHG

## 2020-02-19 DIAGNOSIS — I25.10 CORONARY ARTERY DISEASE INVOLVING NATIVE CORONARY ARTERY OF NATIVE HEART WITHOUT ANGINA PECTORIS: ICD-10-CM

## 2020-02-19 PROCEDURE — 99213 OFFICE O/P EST LOW 20 MIN: CPT | Performed by: INTERNAL MEDICINE

## 2020-02-19 NOTE — PROGRESS NOTES
HPI and Plan:   Today I had the pleasure of seeing Flash Brown at [facility] in [facility]. He is a pleasant 78 year old male with history of coronary artery disease and is status post PTCA with LUDIVINA to proximal and mid RCA, distal circumflex, proximal and mid LAD in 08/2010.   He had another coronary angiogram on 11/2011 for recurrent chest pain on which he was noted to have 70% angiographic stenosis of proximal circumflex.  FFR of this lesion was 0.93 and hence no intervention was performed.  He then had a Lexiscan done on 06/2015 which showed a moderate area of ischemia in the anterolateral walls.  However, as far as I can tell the patient did not have intervention on this vessel.  The most recent echocardiogram that I have is from 2/29/2016 which showed normal sized left ventricle with an ejection fraction of 60 to 65% and no segmental wall motion abnormalities.  There is no valvular disease noted.  His other medical history is significant for hyperlipidemia, depression, non-Hodgkin's lymphoma status post treatment and now in remission.    Today, he tells me that he has gained weight over the last year or so.  He has not been exercising and blames it on being lazy.  He can walk slowly with a walker and does not develop chest pain or shortness of breath when walking.  He says the only reason he does not exercise is because he does not want to do it.  He has been taking all his medications without missing doses.    Assessment and plan  1.  Coronary artery disease status post PCI of LAD, RCA and circumflex in 2010  2.  Proximal circumflex stenosis of 70% with positive nuclear stress test in 2015  3.  Hypertension  4.  Hyperlipidemia  5.  History of Hodgkin's lymphoma 1983  6:  CKD    As far as the history of coronary artery disease is concerned the patient is currently asymptomatic.  I will continue to monitor him for now.  He did have positive stress test in 2015 and I  am not sure why coronary angiogram was not performed.  If he develops symptoms I would send him straight for left heart catheterization due to a known proximal Cx lesion. I would continue all his other medications at the current dose. I stopped plavix last visit. His most recent LDL was 43 mg/dL.  His blood pressure today in clinic is 117/65.    I have discussed with him the need to eat healthy, exercise regularly lose weight ,and continue to take all his medications as prescribed.  The patient understands and will try to make necessary lifestyle modifications.      Thank you for allowing me to participate in the care of Flash Tate MD  Cardiology    No orders of the defined types were placed in this encounter.      Encounter Diagnosis   Name Primary?     Coronary artery disease involving native coronary artery of native heart without angina pectoris        CURRENT MEDICATIONS:  Current Outpatient Medications   Medication Sig Dispense Refill     amLODIPine (NORVASC) 5 MG tablet Take 1 tablet (5 mg) by mouth daily       aspirin (ASA) 81 MG EC tablet Take 1 tablet (81 mg) by mouth daily       atorvastatin (LIPITOR) 20 MG tablet Take 1 tablet (20 mg) by mouth daily 90 tablet 3     busPIRone (BUSPAR) 10 MG tablet Take 1 tablet (10 mg) by mouth 2 times daily       carvedilol (COREG) 12.5 MG tablet Take 1 tablet (12.5 mg) by mouth 2 times daily (with meals) 180 tablet 3     CENTRUM OR TABS 1 TABLET DAILY       Cholecalciferol (VITAMIN D3) 10 MCG/ML LIQD Take 50 mcg by mouth daily       cloNIDine (CATAPRES) 0.1 MG tablet Take 1 tablet (0.1 mg) by mouth 2 times daily       clopidogrel (PLAVIX) 75 MG tablet Take 75 mg by mouth daily       COQ10 100 MG OR CAPS Take 1 capsule by mouth daily        diazepam (VALIUM) 10 MG tablet Take 1 tablet (10 mg) by mouth every 6 hours as needed for anxiety 1 tablet 0     ezetimibe-simvastatin (VYTORIN) 10-40 MG tablet Take 1 tablet by mouth At Bedtime 90 tablet 1      ferrous fumarate 65 mg, [place]. FE,-Vitamin C 125 mg (VITRON C)  MG TABS tablet Take 1 tablet by mouth daily       glimepiride (AMARYL) 4 MG tablet Take 1 tablet (4 mg) by mouth 2 times daily 180 tablet 1     hydrALAZINE (APRESOLINE) 50 MG tablet Take 1 tablet (50 mg) by mouth 2 times daily       naloxone (NARCAN) 4 MG/0.1ML nasal spray Spray 1 spray (4 mg) into one nostril alternating nostrils once as needed for opioid reversal Every 2-3 minutes until patient responsive or EMS arrives 0.2 mL 0     NIACIN 500 MG OR TABS Take one orally twice daily 180 3     NITROGLYCERIN 0.4 MG SL SUBL ONE TABLET UNDER TONGUE, FOR CHEST PAIN, AS DIRECTED 1 BOTTLE 3 per year     OMEGA-3 1000 MG OR CAPS Take 1 g by mouth daily        ORDER FOR DME Light Therapy with Full Spectrum Light (35799 lux) Start with 10-15 minute exposure per day, Increase exposure to 30 to 45 minutes per day, Maximum exposure: 90 minutes per day,Look periodically at light during each session  1 0     pioglitazone (ACTOS) 30 MG tablet Take 1 tablet (30 mg) by mouth daily       ramipril (ALTACE) 10 MG capsule Take 1 capsule (10 mg) by mouth daily 90 capsule 3     tamsulosin (FLOMAX) 0.4 MG capsule Take 1 capsule (0.4 mg) by mouth daily 90 capsule 3     triamcinolone (KENALOG) 0.1 % external cream Twice daily to legs prn itching 454 g 3     VOSOL-HC 2-1 % OT SOLN 2-3 drops in the affected ear 3-4 times daily for 7-10 days for itching in the ear 1 bottle 0       ALLERGIES     Allergies   Allergen Reactions     Iodine Swelling       PAST MEDICAL HISTORY:  Past Medical History:   Diagnosis Date     Basal cell carcinoma      CAD (coronary artery disease)      Depression      Diabetes type 2, controlled (H)      Hyperlipidemia      Hypertension      Lymphoma (H)      Malignant melanoma (H)      Melanoma (H)      Squamous cell carcinoma        PAST SURGICAL HISTORY:  Past Surgical History:   Procedure Laterality Date     AXILLARY SURGERY      S/P resection and  adjuvant radiation     BONE MARROW BIOPSY, BONE SPECIMEN, NEEDLE/TROCAR N/A 7/8/2019    Procedure: BIOPSY, BONE MARROW;  Surgeon: Husam Issa MD;  Location: [facility]     CARDIAC SURGERY       CHOLECYSTECTOMY       HERNIA REPAIR, UMBILICAL       PHACOEMULSIFICATION WITH STANDARD INTRAOCULAR LENS IMPLANT Right 10/2/2019    Procedure: Cataract Removal with Implant;  Surgeon: Dinesh Ivey MD;  Location: [facility]     PHACOEMULSIFICATION WITH STANDARD INTRAOCULAR LENS IMPLANT Left 10/21/2019    Procedure: Cataract Removal with Implant;  Surgeon: Dinesh Ivey MD;  Location: [facility]     STENT      PTCA with drug-eluting stent of RCA, circumflex, and LAD     VASCULAR SURGERY         FAMILY HISTORY:  Family History   Problem Relation Age of Onset     Asthma Other      Cancer Other         melanoma     Blood Disease Other         bleeding disorder     Lung Cancer Mother         Smoker     Prostate Cancer Father      Melanoma No family hx of        SOCIAL HISTORY:  Social History     Socioeconomic History     Marital status: Single     Spouse name: None     Number of children: 0     Years of education: None     Highest education level: None   Occupational History     Occupation: Retired     Comment: [employer]    Social Needs     Financial resource strain: None     Food insecurity:     Worry: None     Inability: None     Transportation needs:     Medical: None     Non-medical: None   Tobacco Use     Smoking status: Never Smoker     Smokeless tobacco: Never Used   Substance and Sexual Activity     Alcohol use: Yes     Frequency: Monthly or less     Drinks per session: 1 or 2     Binge frequency: Never     Comment: glass of wine couple times per week     Drug use: Never     Sexual activity: None   Lifestyle     Physical activity:     Days per week: None     Minutes per session: None     Stress: None   Relationships     Social connections:     Talks on phone: None     Gets together: None     Attends  Denominational service: None     Active member of club or organization: None     Attends meetings of clubs or organizations: None     Relationship status: None     Intimate partner violence:     Fear of current or ex partner: None     Emotionally abused: None     Physically abused: None     Forced sexual activity: None   Other Topics Concern     Parent/sibling w/ CABG, MI or angioplasty before 65F 55M? Not Asked   Social History Narrative     None       Review of Systems:  Skin:  Negative       Eyes:  Negative      ENT:  Negative      Respiratory:  Positive for shortness of breath     Cardiovascular:  Negative      Gastroenterology: Negative      Genitourinary:  Negative      Musculoskeletal:  Negative      Neurologic:  Negative      Psychiatric:  Negative      Heme/Lymph/Imm:  Negative      Endocrine:  Positive for diabetes      Physical Exam:  Vitals: /65   Pulse 60   Wt 110.7 kg (244 lb)   SpO2 96%   BMI 39.38 kg/m    Constitutional: awake, alert, no distress  Skin: Warm and dry to touch multiple areas of ecchymosis and bruising on the skin,   Head: Normocephalic, atraumatic  Eyes: Conjunctivae and lids unremarkable, sclera white  ENT: No pallor or cyanosis  Respiratory: Normal breath sounds, clear to auscultation  Cardiac: Regular rate and rhythm, S1-S2 normal.  No murmurs gallops or rubs.   No pedal edema.   Extremities and musculoskeletal: No gross motor deficit  Neurological.  Affect normal  Psych: Alert and oriented x3    Recent Lab Results:  LIPID RESULTS:  Lab Results   Component Value Date    CHOL 110 06/11/2019    HDL 47 06/11/2019    LDL 47 06/11/2019    TRIG 80 06/11/2019    CHOLHDLRATIO 4.0 03/13/2008       LIVER ENZYME RESULTS:  Lab Results   Component Value Date    AST 22 12/11/2019    ALT 23 12/11/2019       CBC RESULTS:  Lab Results   Component Value Date    WBC 3.3 (L) 12/11/2019    RBC 4.06 (L) 12/11/2019    HGB 11.2 (L) 12/11/2019    HCT 35.5 (L) 12/11/2019    MCV 87 12/11/2019    MCH 27.6  12/11/2019    MCHC 31.5 12/11/2019    RDW 16.3 (H) 12/11/2019    PLT 78 (L) 12/11/2019       BMP RESULTS:  Lab Results   Component Value Date     12/11/2019    POTASSIUM 4.3 12/11/2019    CHLORIDE 111 (H) 12/11/2019    CO2 27 12/11/2019    ANIONGAP 5 12/11/2019     (H) 12/11/2019    BUN 17 12/11/2019    CR 1.27 (H) 12/11/2019    GFRESTIMATED 54 (L) 12/11/2019    GFRESTBLACK 63 12/11/2019    NATALIIA 8.9 12/11/2019        A1C RESULTS:  Lab Results   Component Value Date    A1C 5.5 12/09/2019       INR RESULTS:  No results found for: INR    CC  Thomas Jackson DO  [address] [place] [address]    All medical records were reviewed in detail and discussed with the patient. Greater than 30 mins were spent with the patient, 50% of this time was spent on counseling and coordination of care.  After visit summary was printed and given to the patient.

## 2020-02-19 NOTE — LETTER
2/19/2020    Thomas URRUTIA DO Renetta  [address]  [place] [address]    RE: Flash Brown       Dear Colleague,    I had the pleasure of seeing Flash Brown in the [facility].    HPI and Plan:   Today I had the pleasure of seeing Flash Brown at [facility] in [facility]. He is a pleasant 78 year old male with history of coronary artery disease and is status post PTCA with LUDIVINA to proximal and mid RCA, distal circumflex, proximal and mid LAD in 08/2010.   He had another coronary angiogram on 11/2011 for recurrent chest pain on which he was noted to have 70% angiographic stenosis of proximal circumflex.  FFR of this lesion was 0.93 and hence no intervention was performed.  He then had a Lexiscan done on 06/2015 which showed a moderate area of ischemia in the anterolateral walls.  However, as far as I can tell the patient did not have intervention on this vessel.  The most recent echocardiogram that I have is from 2/29/2016 which showed normal sized left ventricle with an ejection fraction of 60 to 65% and no segmental wall motion abnormalities.  There is no valvular disease noted.  His other medical history is significant for hyperlipidemia, depression, non-Hodgkin's lymphoma status post treatment and now in remission.    Today, he tells me that he has gained weight over the last year or so.  He has not been exercising and blames it on being lazy.  He can walk slowly with a walker and does not develop chest pain or shortness of breath when walking.  He says the only reason he does not exercise is because he does not want to do it.  He has been taking all his medications without missing doses.    Assessment and plan  1.  Coronary artery disease status post PCI of LAD, RCA and circumflex in 2010  2.  Proximal circumflex stenosis of 70% with positive nuclear stress test in 2015  3.  Hypertension  4.  Hyperlipidemia  5.  History of  Hodgkin's lymphoma 1983  6:  CKD    As far as the history of coronary artery disease is concerned the patient is currently asymptomatic.  I will continue to monitor him for now.  He did have positive stress test in 2015 and I am not sure why coronary angiogram was not performed.  If he develops symptoms I would send him straight for left heart catheterization due to a known proximal Cx lesion. I would continue all his other medications at the current dose. I stopped plavix last visit. His most recent LDL was 43 mg/dL.  His blood pressure today in clinic is 117/65.    I have discussed with him the need to eat healthy, exercise regularly lose weight ,and continue to take all his medications as prescribed.  The patient understands and will try to make necessary lifestyle modifications.      Thank you for allowing me to participate in the care of Flash Tate MD  Cardiology    No orders of the defined types were placed in this encounter.      Encounter Diagnosis   Name Primary?     Coronary artery disease involving native coronary artery of native heart without angina pectoris        CURRENT MEDICATIONS:  Current Outpatient Medications   Medication Sig Dispense Refill     amLODIPine (NORVASC) 5 MG tablet Take 1 tablet (5 mg) by mouth daily       aspirin (ASA) 81 MG EC tablet Take 1 tablet (81 mg) by mouth daily       atorvastatin (LIPITOR) 20 MG tablet Take 1 tablet (20 mg) by mouth daily 90 tablet 3     busPIRone (BUSPAR) 10 MG tablet Take 1 tablet (10 mg) by mouth 2 times daily       carvedilol (COREG) 12.5 MG tablet Take 1 tablet (12.5 mg) by mouth 2 times daily (with meals) 180 tablet 3     CENTRUM OR TABS 1 TABLET DAILY       Cholecalciferol (VITAMIN D3) 10 MCG/ML LIQD Take 50 mcg by mouth daily       cloNIDine (CATAPRES) 0.1 MG tablet Take 1 tablet (0.1 mg) by mouth 2 times daily       clopidogrel (PLAVIX) 75 MG tablet Take 75 mg by mouth daily       COQ10 100 MG OR CAPS Take 1 capsule by  mouth daily        diazepam (VALIUM) 10 MG tablet Take 1 tablet (10 mg) by mouth every 6 hours as needed for anxiety 1 tablet 0     ezetimibe-simvastatin (VYTORIN) 10-40 MG tablet Take 1 tablet by mouth At Bedtime 90 tablet 1     ferrous fumarate 65 mg, [place]. FE,-Vitamin C 125 mg (VITRON C)  MG TABS tablet Take 1 tablet by mouth daily       glimepiride (AMARYL) 4 MG tablet Take 1 tablet (4 mg) by mouth 2 times daily 180 tablet 1     hydrALAZINE (APRESOLINE) 50 MG tablet Take 1 tablet (50 mg) by mouth 2 times daily       naloxone (NARCAN) 4 MG/0.1ML nasal spray Spray 1 spray (4 mg) into one nostril alternating nostrils once as needed for opioid reversal Every 2-3 minutes until patient responsive or EMS arrives 0.2 mL 0     NIACIN 500 MG OR TABS Take one orally twice daily 180 3     NITROGLYCERIN 0.4 MG SL SUBL ONE TABLET UNDER TONGUE, FOR CHEST PAIN, AS DIRECTED 1 BOTTLE 3 per year     OMEGA-3 1000 MG OR CAPS Take 1 g by mouth daily        ORDER FOR DME Light Therapy with Full Spectrum Light (79178 lux) Start with 10-15 minute exposure per day, Increase exposure to 30 to 45 minutes per day, Maximum exposure: 90 minutes per day,Look periodically at light during each session  1 0     pioglitazone (ACTOS) 30 MG tablet Take 1 tablet (30 mg) by mouth daily       ramipril (ALTACE) 10 MG capsule Take 1 capsule (10 mg) by mouth daily 90 capsule 3     tamsulosin (FLOMAX) 0.4 MG capsule Take 1 capsule (0.4 mg) by mouth daily 90 capsule 3     triamcinolone (KENALOG) 0.1 % external cream Twice daily to legs prn itching 454 g 3     VOSOL-HC 2-1 % OT SOLN 2-3 drops in the affected ear 3-4 times daily for 7-10 days for itching in the ear 1 bottle 0       ALLERGIES     Allergies   Allergen Reactions     Iodine Swelling       PAST MEDICAL HISTORY:  Past Medical History:   Diagnosis Date     Basal cell carcinoma      CAD (coronary artery disease)      Depression      Diabetes type 2, controlled (H)      Hyperlipidemia       Hypertension      Lymphoma (H)      Malignant melanoma (H)      Melanoma (H)      Squamous cell carcinoma        PAST SURGICAL HISTORY:  Past Surgical History:   Procedure Laterality Date     AXILLARY SURGERY      S/P resection and adjuvant radiation     BONE MARROW BIOPSY, BONE SPECIMEN, NEEDLE/TROCAR N/A 7/8/2019    Procedure: BIOPSY, BONE MARROW;  Surgeon: Husam Issa MD;  Location: [facility]     CARDIAC SURGERY       CHOLECYSTECTOMY       HERNIA REPAIR, UMBILICAL       PHACOEMULSIFICATION WITH STANDARD INTRAOCULAR LENS IMPLANT Right 10/2/2019    Procedure: Cataract Removal with Implant;  Surgeon: Dinesh Ivey MD;  Location: [facility]     PHACOEMULSIFICATION WITH STANDARD INTRAOCULAR LENS IMPLANT Left 10/21/2019    Procedure: Cataract Removal with Implant;  Surgeon: Dinesh Ivey MD;  Location: [facility]     STENT      PTCA with drug-eluting stent of RCA, circumflex, and LAD     VASCULAR SURGERY         FAMILY HISTORY:  Family History   Problem Relation Age of Onset     Asthma Other      Cancer Other         melanoma     Blood Disease Other         bleeding disorder     Lung Cancer Mother         Smoker     Prostate Cancer Father      Melanoma No family hx of        SOCIAL HISTORY:  Social History     Socioeconomic History     Marital status: Single     Spouse name: None     Number of children: 0     Years of education: None     Highest education level: None   Occupational History     Occupation: Retired     Comment: [employer]    Social Needs     Financial resource strain: None     Food insecurity:     Worry: None     Inability: None     Transportation needs:     Medical: None     Non-medical: None   Tobacco Use     Smoking status: Never Smoker     Smokeless tobacco: Never Used   Substance and Sexual Activity     Alcohol use: Yes     Frequency: Monthly or less     Drinks per session: 1 or 2     Binge frequency: Never     Comment: glass of wine couple times per week     Drug use: Never      Sexual activity: None   Lifestyle     Physical activity:     Days per week: None     Minutes per session: None     Stress: None   Relationships     Social connections:     Talks on phone: None     Gets together: None     Attends Hindu service: None     Active member of club or organization: None     Attends meetings of clubs or organizations: None     Relationship status: None     Intimate partner violence:     Fear of current or ex partner: None     Emotionally abused: None     Physically abused: None     Forced sexual activity: None   Other Topics Concern     Parent/sibling w/ CABG, MI or angioplasty before 65F 55M? Not Asked   Social History Narrative     None       Review of Systems:  Skin:  Negative       Eyes:  Negative      ENT:  Negative      Respiratory:  Positive for shortness of breath     Cardiovascular:  Negative      Gastroenterology: Negative      Genitourinary:  Negative      Musculoskeletal:  Negative      Neurologic:  Negative      Psychiatric:  Negative      Heme/Lymph/Imm:  Negative      Endocrine:  Positive for diabetes      Physical Exam:  Vitals: /65   Pulse 60   Wt 110.7 kg (244 lb)   SpO2 96%   BMI 39.38 kg/m     Constitutional: awake, alert, no distress  Skin: Warm and dry to touch multiple areas of ecchymosis and bruising on the skin,   Head: Normocephalic, atraumatic  Eyes: Conjunctivae and lids unremarkable, sclera white  ENT: No pallor or cyanosis  Respiratory: Normal breath sounds, clear to auscultation  Cardiac: Regular rate and rhythm, S1-S2 normal.  No murmurs gallops or rubs.   No pedal edema.   Extremities and musculoskeletal: No gross motor deficit  Neurological.  Affect normal  Psych: Alert and oriented x3    Recent Lab Results:  LIPID RESULTS:  Lab Results   Component Value Date    CHOL 110 06/11/2019    HDL 47 06/11/2019    LDL 47 06/11/2019    TRIG 80 06/11/2019    CHOLHDLRATIO 4.0 03/13/2008       LIVER ENZYME RESULTS:  Lab Results   Component Value Date     AST 22 12/11/2019    ALT 23 12/11/2019       CBC RESULTS:  Lab Results   Component Value Date    WBC 3.3 (L) 12/11/2019    RBC 4.06 (L) 12/11/2019    HGB 11.2 (L) 12/11/2019    HCT 35.5 (L) 12/11/2019    MCV 87 12/11/2019    MCH 27.6 12/11/2019    MCHC 31.5 12/11/2019    RDW 16.3 (H) 12/11/2019    PLT 78 (L) 12/11/2019       BMP RESULTS:  Lab Results   Component Value Date     12/11/2019    POTASSIUM 4.3 12/11/2019    CHLORIDE 111 (H) 12/11/2019    CO2 27 12/11/2019    ANIONGAP 5 12/11/2019     (H) 12/11/2019    BUN 17 12/11/2019    CR 1.27 (H) 12/11/2019    GFRESTIMATED 54 (L) 12/11/2019    GFRESTBLACK 63 12/11/2019    NATALIIA 8.9 12/11/2019        A1C RESULTS:  Lab Results   Component Value Date    A1C 5.5 12/09/2019       INR RESULTS:  No results found for: INR      All medical records were reviewed in detail and discussed with the patient. Greater than 30 mins were spent with the patient, 50% of this time was spent on counseling and coordination of care.  After visit summary was printed and given to the patient.    Thank you for allowing me to participate in the care of your patient.    Sincerely,     Micah Tate MD     [facility]

## 2020-02-23 ENCOUNTER — HEALTH MAINTENANCE LETTER (OUTPATIENT)
Age: 80
End: 2020-02-23

## 2020-02-26 DIAGNOSIS — I10 ESSENTIAL HYPERTENSION: ICD-10-CM

## 2020-02-26 DIAGNOSIS — F32.89 OTHER DEPRESSION: ICD-10-CM

## 2020-02-26 DIAGNOSIS — E11.8 TYPE 2 DIABETES MELLITUS WITH COMPLICATION, WITHOUT LONG-TERM CURRENT USE OF INSULIN (H): ICD-10-CM

## 2020-02-26 DIAGNOSIS — I25.10 CORONARY ARTERY DISEASE INVOLVING NATIVE CORONARY ARTERY OF NATIVE HEART WITHOUT ANGINA PECTORIS: Primary | ICD-10-CM

## 2020-02-26 NOTE — TELEPHONE ENCOUNTER
"Routing refill request to provider for review/approval because:  Labs out of range:  See below  Medication is reported/historical        Requested Prescriptions   Pending Prescriptions Disp Refills     pioglitazone (ACTOS) 30 MG tablet       Sig: Take 1 tablet (30 mg) by mouth daily       Thiazolidinedione Agents (TZDs)  Failed - 2/26/2020 11:07 AM        Failed - Patient has a normal serum Creatinine in the past 12 months     Recent Labs   Lab Test 12/11/19  1447   CR 1.27*             Passed - Blood pressure less than 140/90 in past 6 months     BP Readings from Last 3 Encounters:   02/19/20 117/65   01/08/20 117/58   12/11/19 104/46                 Passed - Patient has documented LDL within the past 12 mos.     Recent Labs   Lab Test 06/11/19  0957   LDL 47             Passed - Patient has a normal ALT within the past 12 mos.     Recent Labs   Lab Test 12/11/19  1447   ALT 23             Passed - Patient has a normal AST within the past 12 mos.      Recent Labs   Lab Test 12/11/19  1447   AST 22             Passed - Patient has had a Microalbumin in the past 12 mos.     Recent Labs   Lab Test 12/09/19  1114   MICROL 16   UMALCR 6.91             Passed - Patient has documented A1c within the specified period of time.     If HgbA1C is 8 or greater, it needs to be on file within the past 3 months.  If less than 8, must be on file within the past 6 months.     Recent Labs   Lab Test 12/09/19  1107   A1C 5.5             Passed - Diagnosis not CHF        Passed - Medication is active on med list        Passed - Patient is age 18 or older        Passed - Recent (6 mo) or future (30 days) visit within the authorizing provider's specialty     Patient had office visit in the last 6 months or has a visit in the next 30 days with authorizing provider or within the authorizing provider's specialty.  See \"Patient Info\" tab in inbasket, or \"Choose Columns\" in Meds & Orders section of the refill encounter.              "

## 2020-02-27 RX ORDER — CLOPIDOGREL BISULFATE 75 MG/1
75 TABLET ORAL DAILY
Qty: 90 TABLET | Refills: 1 | Status: SHIPPED | OUTPATIENT
Start: 2020-02-27 | End: 2020-08-31

## 2020-02-27 RX ORDER — PIOGLITAZONEHYDROCHLORIDE 30 MG/1
30 TABLET ORAL DAILY
Qty: 90 TABLET | Refills: 1 | Status: SHIPPED | OUTPATIENT
Start: 2020-02-27 | End: 2020-08-31

## 2020-02-27 RX ORDER — HYDRALAZINE HYDROCHLORIDE 50 MG/1
50 TABLET, FILM COATED ORAL 2 TIMES DAILY
Qty: 180 TABLET | Refills: 1 | Status: SHIPPED | OUTPATIENT
Start: 2020-02-27 | End: 2020-08-31

## 2020-02-27 RX ORDER — BUSPIRONE HYDROCHLORIDE 10 MG/1
10 TABLET ORAL 2 TIMES DAILY
Qty: 180 TABLET | Refills: 1 | Status: SHIPPED | OUTPATIENT
Start: 2020-02-27 | End: 2020-08-31

## 2020-03-03 ENCOUNTER — TELEPHONE (OUTPATIENT)
Dept: FAMILY MEDICINE | Facility: CLINIC | Age: 80
End: 2020-03-03

## 2020-03-03 DIAGNOSIS — I10 ESSENTIAL HYPERTENSION: ICD-10-CM

## 2020-03-03 NOTE — TELEPHONE ENCOUNTER
"Requested Prescriptions   Pending Prescriptions Disp Refills     amLODIPine (NORVASC) 5 MG tablet       Sig: Take 1 tablet (5 mg) by mouth daily  Last Written Prescription Date:  06/11/2019 historical  Last filled - not provided  Last office visit: 12/9/2019 TAMEKA Jackson    Note: medication is historical on the current med list  Note: Requesting a 90 day supply     Future Office Visit:   Next 5 appointments (look out 90 days)    Mar 09, 2020 10:20 AM CDT  [facility] [address] [facility] [id number]  [phone]                Calcium Channel Blockers Protocol  Failed - 3/3/2020  1:19 PM        Failed - Normal serum creatinine on file in past 12 months     Recent Labs   Lab Test 12/11/19  1447   CR 1.27*             Passed - Blood pressure under 140/90 in past 12 months     BP Readings from Last 3 Encounters:   02/19/20 117/65   01/08/20 117/58   12/11/19 104/46                 Passed - Recent (12 mo) or future (30 days) visit within the authorizing provider's specialty     Patient has had an office visit with the authorizing provider or a provider within the authorizing providers department within the previous 12 mos or has a future within next 30 days. See \"Patient Info\" tab in inbasket, or \"Choose Columns\" in Meds & Orders section of the refill encounter.              Passed - Medication is active on med list        Passed - Patient is age 18 or older          "

## 2020-03-04 RX ORDER — AMLODIPINE BESYLATE 5 MG/1
5 TABLET ORAL DAILY
Qty: 90 TABLET | Refills: 3 | Status: SHIPPED | OUTPATIENT
Start: 2020-03-04 | End: 2021-05-25

## 2020-03-04 NOTE — TELEPHONE ENCOUNTER
RECORDS STATUS - ALL OTHER DIAGNOSIS      RECORDS RECEIVED FROM: [employer]   DATE RECEIVED: 6/28/19   NOTES STATUS DETAILS   OFFICE NOTE from referring provider Complete  Thomas Jackson DO   OFFICE NOTE from medical oncologist Complete  EPIC   DISCHARGE SUMMARY from hospital N/A    DISCHARGE REPORT from the ER N/A    OPERATIVE REPORT N/A    MEDICATION LIST Complete  Epic/   CLINICAL TRIAL TREATMENTS TO DATE N/A    LABS     PATHOLOGY REPORTS N/A    ANYTHING RELATED TO DIAGNOSIS     GENONOMIC TESTING     TYPE:     IMAGING (NEED IMAGES & REPORT)     CT SCANS     MRI     MAMMO     ULTRASOUND     PET       Action    Action Taken 6/14/19 10:37am     Called MR at [facility] and they recommended calling [facility] or [facility] Ph: [phone].     Called [facility] and they recommended talking to the Hematology/Oncology Dept. The Oncology Dept is closed today but their contact info is Ph: [phone] Fax: [phone]. Sent a formal fax request to the Onc Dept and follow up with them early next week.      Action    Action Taken 6/17/19 12:44pm     Called Hematology/Oncology private practice to check on fax request. They confirmed that the pt is not in their system.     [facility] is part of [facility] and Donald already got a call back stating that the pt isn't in the system.     Called [facility] back to double check for MR. Pt only came in on 3/7/19 and 4/12/18 for lab work related to Cardiology.     Will call [facility] to see if they have any clues for where the pt was seen last in [place].        Action    Action Taken 6/27/19 8am     Call [facility] after 8:30am Phone: [phone]. Left a  requesting an urgent callback.     Called pt to ask more about his care in [place]. Pt mentioned that he has been seen at the [facility] for the past . The main campus mentioned that they don't have anything.     Pt did provide doctor's name : Dr. Cruz ([facility]). Ph: [phone]. Called number and facility confirmed that they have pt on file. Ph: [phone] and Fax: [phone].  I mentioned that this is an urgent request and will send a formal fax request over.     MR (Dr. Cruz's Office) arrived and were sent urgently to HIM.               Apixaban/Eliquis is used to treat and prevent blood clots. If you are not able to swallow the tablets whole, they may be crushed and mixed in water, apple juice, or applesauce and promptly taken within four hours. Never skip a dose of Apixaban/Eliquis. If you forget to take your Apixaban/Eliquis, take a dose as soon as you remember. If it is almost time for your next Apixaban/Eliquis dose, wait until then and take a regular dose. DO NOT take an extra pill to ‘catch up’.  NEVER TAKE A DOUBLE DOSE. Notify your doctor that you missed a dose. Take Apixaban/Eliquis at the same time each morning and evening. Apixaban/Eliquis may be taken with other medication or food.

## 2020-03-09 ENCOUNTER — OFFICE VISIT (OUTPATIENT)
Dept: FAMILY MEDICINE | Facility: CLINIC | Age: 80
End: 2020-03-09
Payer: MEDICARE

## 2020-03-09 VITALS
SYSTOLIC BLOOD PRESSURE: 122 MMHG | HEART RATE: 67 BPM | OXYGEN SATURATION: 95 % | TEMPERATURE: 97.8 F | DIASTOLIC BLOOD PRESSURE: 66 MMHG | HEIGHT: 66 IN | BODY MASS INDEX: 37.41 KG/M2 | WEIGHT: 232.8 LBS

## 2020-03-09 DIAGNOSIS — Z00.00 ENCOUNTER FOR MEDICARE ANNUAL WELLNESS EXAM: Primary | ICD-10-CM

## 2020-03-09 DIAGNOSIS — I10 ESSENTIAL HYPERTENSION: ICD-10-CM

## 2020-03-09 DIAGNOSIS — I25.10 CORONARY ARTERY DISEASE INVOLVING NATIVE CORONARY ARTERY OF NATIVE HEART WITHOUT ANGINA PECTORIS: ICD-10-CM

## 2020-03-09 DIAGNOSIS — Z23 NEED FOR VACCINATION: ICD-10-CM

## 2020-03-09 DIAGNOSIS — E78.49 OTHER HYPERLIPIDEMIA: ICD-10-CM

## 2020-03-09 PROCEDURE — G0438 PPPS, INITIAL VISIT: HCPCS | Performed by: FAMILY MEDICINE

## 2020-03-09 PROCEDURE — 90732 PPSV23 VACC 2 YRS+ SUBQ/IM: CPT | Performed by: FAMILY MEDICINE

## 2020-03-09 PROCEDURE — G0009 ADMIN PNEUMOCOCCAL VACCINE: HCPCS | Performed by: FAMILY MEDICINE

## 2020-03-09 RX ORDER — ATORVASTATIN CALCIUM 20 MG/1
20 TABLET, FILM COATED ORAL DAILY
Qty: 90 TABLET | Refills: 3 | Status: SHIPPED | OUTPATIENT
Start: 2020-03-09 | End: 2021-02-19

## 2020-03-09 RX ORDER — NITROGLYCERIN 0.4 MG/1
0.4 TABLET SUBLINGUAL SEE ADMIN INSTRUCTIONS
Qty: 30 TABLET | Status: SHIPPED | OUTPATIENT
Start: 2020-03-09 | End: 2020-03-30

## 2020-03-09 RX ORDER — CLONIDINE HYDROCHLORIDE 0.1 MG/1
0.1 TABLET ORAL 2 TIMES DAILY
Qty: 180 TABLET | Refills: 1 | Status: SHIPPED | OUTPATIENT
Start: 2020-03-09 | End: 2020-08-31

## 2020-03-09 ASSESSMENT — ACTIVITIES OF DAILY LIVING (ADL)
CURRENT_FUNCTION: TELEPHONE REQUIRES ASSISTANCE
CURRENT_FUNCTION: LAUNDRY REQUIRES ASSISTANCE
CURRENT_FUNCTION: TRANSPORTATION REQUIRES ASSISTANCE

## 2020-03-09 ASSESSMENT — MIFFLIN-ST. JEOR: SCORE: 1715.78

## 2020-03-09 NOTE — NURSING NOTE
"Initial There were no vitals taken for this visit. Estimated body mass index is 39.38 kg/m  as calculated from the following:    Height as of 12/11/19: 1.676 m (5' 6\").    Weight as of 2/19/20: 110.7 kg (244 lb). .    "

## 2020-03-09 NOTE — PROGRESS NOTES
"SUBJECTIVE:   Flash Brown is a 79 year old male who presents for Preventive Visit.    Are you in the first 12 months of your Medicare coverage?  No    Healthy Habits:     In general, how would you rate your overall health?  Good    Frequency of exercise:  2-3 days/week    Duration of exercise:  Less than 15 minutes    Do you usually eat at least 4 servings of fruit and vegetables a day, include whole grains    & fiber and avoid regularly eating high fat or \"junk\" foods?  No    Taking medications regularly:  Yes    Barriers to taking medications:  Cost of medication    Medication side effects:  None    Ability to successfully perform activities of daily living:  Telephone requires assistance, transportation requires assistance and laundry requires assistance    Home Safety:  No safety concerns identified    Hearing Impairment:  No hearing concerns    In the past 6 months, have you been bothered by leaking of urine?  No    In general, how would you rate your overall mental or emotional health?  Good      PHQ-2 Total Score: 0    Additional concerns today:  No    Do you feel safe in your environment? Yes    Have you ever done Advance Care Planning? (For example, a Health Directive, POLST, or a discussion with a medical provider or your loved ones about your wishes): No, advance care planning information given to patient to review.  Advanced care planning was discussed at today's visit.      Fall risk  Fallen 2 or more times in the past year?: No  Any fall with injury in the past year?: No    Cognitive Screening   1) Repeat 3 items (Leader, Season, Table)    2) Clock draw: NORMAL  3) 3 item recall: Recalls 1 object   Results: NORMAL clock, 1-2 items recalled: COGNITIVE IMPAIRMENT LESS LIKELY    Mini-CogTM Copyright HIRA Boyd. Licensed by the author for use in [facility]; reprinted with permission (lino@.[facility]). All rights reserved.      Do you have sleep apnea, excessive snoring or daytime drowsiness?: " "yes    Reviewed and updated as needed this visit by clinical staff  Tobacco  Allergies  Meds  Med Hx  Surg Hx  Fam Hx  Soc Hx        Reviewed and updated as needed this visit by Provider        Social History     Tobacco Use     Smoking status: Never Smoker     Smokeless tobacco: Never Used   Substance Use Topics     Alcohol use: Yes     Frequency: Monthly or less     Drinks per session: 1 or 2     Binge frequency: Never     Comment: glass of wine couple times per week     If you drink alcohol do you typically have >3 drinks per day or >7 drinks per week? No    No flowsheet data found.            Current providers sharing in care for this patient include:   Patient Care Team:  Thomas Jackson DO as PCP - General (Family Practice)  Thomas Jackson DO as Assigned PCP  Rose Mary Santillan RN as Specialty Care Coordinator (Oncology)    The following health maintenance items are reviewed in Epic and correct as of today:  Health Maintenance   Topic Date Due     EYE EXAM  1940     ZOSTER IMMUNIZATION (1 of 2) 11/02/1992     MEDICARE ANNUAL WELLNESS VISIT  03/13/2009     DTAP/TDAP/TD IMMUNIZATION (2 - Td) 03/13/2014     PNEUMOCOCCAL IMMUNIZATION 65+ HIGH/HIGHEST RISK (2 of 2 - PPSV23) 02/03/2020     A1C  06/09/2020     LIPID  06/11/2020     PHQ-9  07/27/2020     MICROALBUMIN  12/09/2020     DIABETIC FOOT EXAM  12/09/2020     FALL RISK ASSESSMENT  12/09/2020     BMP  12/11/2020     ADVANCE CARE PLANNING  06/11/2024     DEPRESSION ACTION PLAN  Completed     INFLUENZA VACCINE  Completed     IPV IMMUNIZATION  Aged Out     MENINGITIS IMMUNIZATION  Aged Out     Review of Systems  Constitutional, HEENT, cardiovascular, pulmonary, GI, , musculoskeletal, neuro, skin, endocrine and psych systems are negative, except as otherwise noted.    OBJECTIVE:   /66 (BP Location: Left arm, Cuff Size: Adult Large)   Pulse 67   Temp 97.8  F (36.6  C) (Tympanic)   Ht 1.664 m (5' 5.5\")   Wt 105.6 kg (232 lb 12.8 oz)   " "SpO2 95%   BMI 38.15 kg/m   Estimated body mass index is 38.15 kg/m  as calculated from the following:    Height as of this encounter: 1.664 m (5' 5.5\").    Weight as of this encounter: 105.6 kg (232 lb 12.8 oz).  Physical Exam  Constitutional:       General: He is not in acute distress.     Appearance: He is well-developed.   HENT:      Head: Normocephalic and atraumatic.      Nose: Nose normal.   Eyes:      Conjunctiva/sclera: Conjunctivae normal.   Neck:      Musculoskeletal: Normal range of motion.      Trachea: No tracheal deviation.   Cardiovascular:      Rate and Rhythm: Normal rate and regular rhythm.      Heart sounds: Normal heart sounds.   Pulmonary:      Effort: Pulmonary effort is normal.      Breath sounds: No wheezing.   Musculoskeletal: Normal range of motion.   Skin:     Findings: No erythema or rash.   Neurological:      Mental Status: He is alert and oriented to person, place, and time.   Psychiatric:         Behavior: Behavior normal.         ASSESSMENT / PLAN:   1. Encounter for Medicare annual wellness exam    2. Other hyperlipidemia  - atorvastatin (LIPITOR) 20 MG tablet; Take 1 tablet (20 mg) by mouth daily  Dispense: 90 tablet; Refill: 3    3. Essential hypertension  - cloNIDine (CATAPRES) 0.1 MG tablet; Take 1 tablet (0.1 mg) by mouth 2 times daily  Dispense: 180 tablet; Refill: 1    4. Coronary artery disease involving native coronary artery of native heart without angina pectoris  - nitroGLYcerin (NITROSTAT) 0.4 MG sublingual tablet; Place 1 tablet (0.4 mg) under the tongue See Admin Instructions for chest pain  Dispense: 30 tablet; Refill: 3 per year    5. Need for vaccination  - PNEUMOCOCCAL VACCINE,ADULT,SQ OR IM  - ADMIN 1st VACCINE    COUNSELING:  Reviewed preventive health counseling, as reflected in patient instructions       Regular exercise       Healthy diet/nutrition    Estimated body mass index is 38.15 kg/m  as calculated from the following:    Height as of this encounter: " "1.664 m (5' 5.5\").    Weight as of this encounter: 105.6 kg (232 lb 12.8 oz).    Weight management plan: Discussed healthy diet and exercise guidelines     reports that he has never smoked. He has never used smokeless tobacco.      Appropriate preventive services were discussed with this patient, including applicable screening as appropriate for cardiovascular disease, diabetes, osteopenia/osteoporosis, and glaucoma.  As appropriate for age/gender, discussed screening for colorectal cancer, prostate cancer, breast cancer, and cervical cancer. Checklist reviewing preventive services available has been given to the patient.    Reviewed patients plan of care and provided an AVS. The Basic Care Plan (routine screening as documented in Health Maintenance) for Flash meets the Care Plan requirement. This Care Plan has been established and reviewed with the Patient.    Counseling Resources:  ATP IV Guidelines  Pooled Cohorts Equation Calculator  Breast Cancer Risk Calculator  FRAX Risk Assessment  ICSI Preventive Guidelines  Dietary Guidelines for Americans, 2010  [employer] MyPlate  ASA Prophylaxis  Lung CA Screening    Thomas Jackson DO  [facility]    Identified Health Risks:    The patient was counseled and encouraged to consider modifying their diet and eating habits. He was provided with information on recommended healthy diet options.  The patient reports that he has difficulty with activities of daily living. I have asked that the patient make a follow up appointment in 6 months where this issue will be further evaluated and addressed.  "

## 2020-03-09 NOTE — PATIENT INSTRUCTIONS
Monika Sanchez,    Thank you for allowing [facility] to manage your care.    Please discontinue glimepiride.    I sent your prescriptions to your pharmacy.    If you have any questions or concerns, please feel free to call us at [phone].    Sincerely,    Dr. Jackson    Did you know?  You can schedule an e-Visit for certain simple non-emergent issue for your convenience.  To learn more about or start an eVisit, simply login to [employer], click  Visits  on top banner, click  Start a Virtual Visit  drop down, and click  Symptom-Specific E-Visit     Patient Education   Personalized Prevention Plan  You are due for the preventive services outlined below.  Your care team is available to assist you in scheduling these services.  If you have already completed any of these items, please share that information with your care team to update in your medical record.  Health Maintenance Due   Topic Date Due     Eye Exam  1940     Zoster (Shingles) Vaccine (1 of 2) 11/02/1992     Annual Wellness Visit  03/13/2009     Diptheria Tetanus Pertussis (DTAP/TDAP/TD) Vaccine (2 - Td) 03/13/2014     Pneumococcal Vaccine (2 of 2 - PPSV23) 02/03/2020       Understanding USDA MyPlate  The USDA (U.S. Department of Agriculture) has guidelines to help you make healthy food choices. These are called MyPlate. MyPlate shows the food groups that make up healthy meals using the image of a place setting. Before you eat, think about the healthiest choices for what to put onto your plate or into your cup or bowl. To learn more about building a healthy plate, visit www.choosemyplate.gov.    The food groups    Fruits. Any fruit or 100% fruit juice counts as part of the Fruit Group. Fruits may be fresh, canned, frozen, or dried, and may be whole, cut-up, or pureed. Make half your plate fruits and vegetables.    Vegetables. Any vegetable or 100% vegetable juice counts as a member of the Vegetable Group. Vegetables may be fresh, frozen,  canned, or dried. They can be served raw or cooked and may be whole, cut-up, or mashed. Make half your plate fruits and vegetables.    Grains. All foods made from grains are part of the Grains Group. These include wheat, rice, oats, cornmeal, and barley such as bread, pasta, oatmeal, cereal, tortillas, and grits. Grains should be no more than a quarter of your plate. At least half of your grains should be whole grains.    Protein. This group includes meat, poultry, seafood, beans and peas, eggs, processed soy products (like tofu), nuts (including nut butters), and seeds. Make protein choices no more than a quarter of your plate. Meat and poultry choices should be lean or low fat.    Dairy. All fluid milk products and foods made from milk that contain calcium, like yogurt and cheese, are part of the Dairy Group. (Foods that have little calcium, such as cream, butter, and cream cheese, are not part of the group.) Most dairy choices should be low-fat or fat-free.    Oils. These are fats that are liquid at room temperature. They include canola, corn, olive, soybean, and sunflower oil. Foods that are mainly oil include mayonnaise, certain salad dressings, and soft margarines. You should have only 5 to 7 teaspoons of oils a day. You probably already get this much from the food you eat.  Date Last Reviewed: 8/1/2017 2000-2019 [employer]. [address] [place] [address], [place] [address]. All rights reserved. This information is not intended as a substitute for professional medical care. Always follow your healthcare professional's instructions.        Activities of Daily Living    Your Health Risk Assessment indicates you have difficulties with activities of daily living such as housework, bathing, preparing meals, taking medication, etc. Please make a follow up appointment for us to address this issue in more detail.

## 2020-03-30 DIAGNOSIS — I25.10 CORONARY ARTERY DISEASE INVOLVING NATIVE CORONARY ARTERY OF NATIVE HEART WITHOUT ANGINA PECTORIS: ICD-10-CM

## 2020-03-30 RX ORDER — NITROGLYCERIN 0.4 MG/1
0.4 TABLET SUBLINGUAL SEE ADMIN INSTRUCTIONS
Qty: 30 TABLET | Refills: 0 | Status: SHIPPED | OUTPATIENT
Start: 2020-03-30 | End: 2021-04-23

## 2020-03-30 NOTE — TELEPHONE ENCOUNTER
"Called [facility] (as this looks like it was not E-Prescribed on 3/9/2020 - but 'local print'), to see if pt dropped off a script?   Was informed that they do not have anything on file.     Called and spoke with pt. He was in to see Dr. Jackson on 3/9/2020. He says he does not think he was given a printed Rx at that appt for the NTG.  He would like refilled: \"It's just a precaution in case the pills that I do have are not any good.\"    Denies CP or any concerns. He has not had to use any NTG \"never have.\"     Karissa EATON RN      "

## 2020-03-30 NOTE — TELEPHONE ENCOUNTER
"Requested Prescriptions   Pending Prescriptions Disp Refills     nitroGLYcerin (NITROSTAT) 0.4 MG sublingual tablet 30 tablet 3 per year     Sig: Place 1 tablet (0.4 mg) under the tongue See Admin Instructions for chest pain  Last Written Prescription Date:  03/09/2020 #30 x 3  Last filled - not provided  Last office visit: 3/9/2020 TAMEKA Jackson   Future Office Visit:  None         Nitrates Failed - 3/30/2020  2:01 PM        Failed - Sublingual nitro order needs review     If refill exceeds 1 bottle per month, please forward request to provider.           Passed - Blood pressure under 140/90 in past 12 months     BP Readings from Last 3 Encounters:   03/09/20 122/66   02/19/20 117/65   01/08/20 117/58                 Passed - Pt is not on erectile dysfunction medications        Passed - Recent (12 mo) or future (30 days) visit within the authorizing provider's specialty     Patient has had an office visit with the authorizing provider or a provider within the authorizing providers department within the previous 12 mos or has a future within next 30 days. See \"Patient Info\" tab in inbasket, or \"Choose Columns\" in Meds & Orders section of the refill encounter.              Passed - Medication is active on med list        Passed - Patient is age 18 or older             "

## 2020-05-01 DIAGNOSIS — N40.0 BENIGN PROSTATIC HYPERPLASIA WITHOUT LOWER URINARY TRACT SYMPTOMS: ICD-10-CM

## 2020-05-01 RX ORDER — TAMSULOSIN HYDROCHLORIDE 0.4 MG/1
0.4 CAPSULE ORAL DAILY
Qty: 90 CAPSULE | Refills: 3 | Status: ON HOLD | OUTPATIENT
Start: 2020-05-01 | End: 2022-08-20

## 2020-05-01 NOTE — TELEPHONE ENCOUNTER
Patient  Called for a refill on his flomax he only has 3 pills left.    Melly Frazier [facility]

## 2020-05-27 DIAGNOSIS — I10 ESSENTIAL HYPERTENSION: ICD-10-CM

## 2020-05-27 RX ORDER — RAMIPRIL 10 MG/1
10 CAPSULE ORAL DAILY
Qty: 90 CAPSULE | Refills: 0 | Status: SHIPPED | OUTPATIENT
Start: 2020-05-27 | End: 2020-10-12

## 2020-06-02 DIAGNOSIS — I25.10 CORONARY ARTERY DISEASE INVOLVING NATIVE CORONARY ARTERY OF NATIVE HEART WITHOUT ANGINA PECTORIS: ICD-10-CM

## 2020-06-02 RX ORDER — CARVEDILOL 12.5 MG/1
12.5 TABLET ORAL 2 TIMES DAILY WITH MEALS
Qty: 180 TABLET | Refills: 2 | Status: SHIPPED | OUTPATIENT
Start: 2020-06-02 | End: 2020-09-08

## 2020-06-02 NOTE — TELEPHONE ENCOUNTER
"  .  Requested Prescriptions   Pending Prescriptions Disp Refills     carvedilol (COREG) 12.5 MG tablet 180 tablet 3     Sig: Take 1 tablet (12.5 mg) by mouth 2 times daily (with meals)     Last office visit: 3/9/2020 with prescribing provider:  Renetta   Future Office Visit:        Beta-Blockers Protocol Passed - 6/2/2020  9:49 AM        Passed - Blood pressure under 140/90 in past 12 months     BP Readings from Last 3 Encounters:   03/09/20 122/66   02/19/20 117/65   01/08/20 117/58                 Passed - Patient is age 6 or older        Passed - Recent (12 mo) or future (30 days) visit within the authorizing provider's specialty     Patient has had an office visit with the authorizing provider or a provider within the authorizing providers department within the previous 12 mos or has a future within next 30 days. See \"Patient Info\" tab in inbasket, or \"Choose Columns\" in Meds & Orders section of the refill encounter.              Passed - Medication is active on med list             "

## 2020-06-22 DIAGNOSIS — I10 ESSENTIAL HYPERTENSION: ICD-10-CM

## 2020-06-23 RX ORDER — GLIMEPIRIDE 4 MG/1
4 TABLET ORAL 2 TIMES DAILY
Qty: 180 TABLET | Refills: 1 | Status: CANCELLED | OUTPATIENT
Start: 2020-06-23

## 2020-06-23 NOTE — TELEPHONE ENCOUNTER
Can you please call and see if patient is still taking this? I looked through the chart and I don't see anywhere that someone d/c'd it. Cassandra Breen M.D.

## 2020-08-19 ENCOUNTER — APPOINTMENT (OUTPATIENT)
Dept: CT IMAGING | Facility: CLINIC | Age: 80
End: 2020-08-19
Attending: EMERGENCY MEDICINE
Payer: MEDICARE

## 2020-08-19 ENCOUNTER — HOSPITAL ENCOUNTER (EMERGENCY)
Facility: CLINIC | Age: 80
Discharge: ANOTHER HOSPITAL | End: 2020-08-20
Attending: EMERGENCY MEDICINE | Admitting: EMERGENCY MEDICINE
Payer: MEDICARE

## 2020-08-19 ENCOUNTER — APPOINTMENT (OUTPATIENT)
Dept: GENERAL RADIOLOGY | Facility: CLINIC | Age: 80
End: 2020-08-19
Attending: EMERGENCY MEDICINE
Payer: MEDICARE

## 2020-08-19 DIAGNOSIS — R07.9 CHEST PAIN, UNSPECIFIED TYPE: ICD-10-CM

## 2020-08-19 DIAGNOSIS — R06.00 DYSPNEA, UNSPECIFIED TYPE: ICD-10-CM

## 2020-08-19 DIAGNOSIS — I16.1 HYPERTENSIVE EMERGENCY: ICD-10-CM

## 2020-08-19 LAB
ALBUMIN SERPL-MCNC: 3.6 G/DL (ref 3.4–5)
ALBUMIN UR-MCNC: NEGATIVE MG/DL
ALP SERPL-CCNC: 88 U/L (ref 40–150)
ALT SERPL W P-5'-P-CCNC: 24 U/L (ref 0–70)
AMMONIA PLAS-SCNC: 33 UMOL/L (ref 10–50)
AMORPH CRY #/AREA URNS HPF: ABNORMAL /HPF
ANION GAP SERPL CALCULATED.3IONS-SCNC: 6 MMOL/L (ref 3–14)
APPEARANCE UR: CLEAR
AST SERPL W P-5'-P-CCNC: 27 U/L (ref 0–45)
BASE EXCESS BLDV CALC-SCNC: 3.3 MMOL/L
BASOPHILS # BLD AUTO: 0 10E9/L (ref 0–0.2)
BASOPHILS NFR BLD AUTO: 0.9 %
BILIRUB SERPL-MCNC: 1.3 MG/DL (ref 0.2–1.3)
BILIRUB UR QL STRIP: NEGATIVE
BUN SERPL-MCNC: 12 MG/DL (ref 7–30)
CALCIUM SERPL-MCNC: 9.3 MG/DL (ref 8.5–10.1)
CHLORIDE SERPL-SCNC: 106 MMOL/L (ref 94–109)
CO2 SERPL-SCNC: 26 MMOL/L (ref 20–32)
COLOR UR AUTO: YELLOW
CREAT SERPL-MCNC: 0.94 MG/DL (ref 0.66–1.25)
D DIMER PPP FEU-MCNC: 7.7 UG/ML FEU (ref 0–0.5)
DIFFERENTIAL METHOD BLD: ABNORMAL
EOSINOPHIL # BLD AUTO: 0.1 10E9/L (ref 0–0.7)
EOSINOPHIL NFR BLD AUTO: 1.8 %
ERYTHROCYTE [DISTWIDTH] IN BLOOD BY AUTOMATED COUNT: 14.8 % (ref 10–15)
ETHANOL SERPL-MCNC: <0.01 G/DL
GFR SERPL CREATININE-BSD FRML MDRD: 78 ML/MIN/{1.73_M2}
GLUCOSE SERPL-MCNC: 163 MG/DL (ref 70–99)
GLUCOSE UR STRIP-MCNC: NEGATIVE MG/DL
HCO3 BLDV-SCNC: 26 MMOL/L (ref 21–28)
HCT VFR BLD AUTO: 41.5 % (ref 40–53)
HGB BLD-MCNC: 13.7 G/DL (ref 13.3–17.7)
HGB UR QL STRIP: NEGATIVE
IMM GRANULOCYTES # BLD: 0 10E9/L (ref 0–0.4)
IMM GRANULOCYTES NFR BLD: 0.2 %
KETONES UR STRIP-MCNC: NEGATIVE MG/DL
LACTATE BLD-SCNC: 1.5 MMOL/L (ref 0.7–2)
LEUKOCYTE ESTERASE UR QL STRIP: NEGATIVE
LIPASE SERPL-CCNC: 142 U/L (ref 73–393)
LYMPHOCYTES # BLD AUTO: 0.8 10E9/L (ref 0.8–5.3)
LYMPHOCYTES NFR BLD AUTO: 17.7 %
MCH RBC QN AUTO: 28.7 PG (ref 26.5–33)
MCHC RBC AUTO-ENTMCNC: 33 G/DL (ref 31.5–36.5)
MCV RBC AUTO: 87 FL (ref 78–100)
MONOCYTES # BLD AUTO: 0.3 10E9/L (ref 0–1.3)
MONOCYTES NFR BLD AUTO: 7.8 %
NEUTROPHILS # BLD AUTO: 3.1 10E9/L (ref 1.6–8.3)
NEUTROPHILS NFR BLD AUTO: 71.6 %
NITRATE UR QL: NEGATIVE
NRBC # BLD AUTO: 0 10*3/UL
NRBC BLD AUTO-RTO: 0 /100
NT-PROBNP SERPL-MCNC: 196 PG/ML (ref 0–1800)
O2/TOTAL GAS SETTING VFR VENT: ABNORMAL %
PCO2 BLDV: 32 MM HG (ref 40–50)
PH BLDV: 7.51 PH (ref 7.32–7.43)
PH UR STRIP: 8 PH (ref 5–7)
PLATELET # BLD AUTO: 88 10E9/L (ref 150–450)
PO2 BLDV: 36 MM HG (ref 25–47)
POTASSIUM SERPL-SCNC: 3.6 MMOL/L (ref 3.4–5.3)
PROT SERPL-MCNC: 7.7 G/DL (ref 6.8–8.8)
RBC # BLD AUTO: 4.78 10E12/L (ref 4.4–5.9)
RBC #/AREA URNS AUTO: <1 /HPF (ref 0–2)
SARS-COV-2 RNA SPEC QL NAA+PROBE: NORMAL
SODIUM SERPL-SCNC: 138 MMOL/L (ref 133–144)
SOURCE: ABNORMAL
SP GR UR STRIP: 1.01 (ref 1–1.03)
SPECIMEN SOURCE: NORMAL
SQUAMOUS #/AREA URNS AUTO: <1 /HPF (ref 0–1)
TROPONIN I SERPL-MCNC: <0.015 UG/L (ref 0–0.04)
TROPONIN I SERPL-MCNC: <0.015 UG/L (ref 0–0.04)
UROBILINOGEN UR STRIP-MCNC: 0 MG/DL (ref 0–2)
WBC # BLD AUTO: 4.4 10E9/L (ref 4–11)
WBC #/AREA URNS AUTO: <1 /HPF (ref 0–5)

## 2020-08-19 PROCEDURE — U0003 INFECTIOUS AGENT DETECTION BY NUCLEIC ACID (DNA OR RNA); SEVERE ACUTE RESPIRATORY SYNDROME CORONAVIRUS 2 (SARS-COV-2) (CORONAVIRUS DISEASE [COVID-19]), AMPLIFIED PROBE TECHNIQUE, MAKING USE OF HIGH THROUGHPUT TECHNOLOGIES AS DESCRIBED BY CMS-2020-01-R: HCPCS | Performed by: EMERGENCY MEDICINE

## 2020-08-19 PROCEDURE — 80053 COMPREHEN METABOLIC PANEL: CPT | Performed by: EMERGENCY MEDICINE

## 2020-08-19 PROCEDURE — 25000128 H RX IP 250 OP 636: Performed by: EMERGENCY MEDICINE

## 2020-08-19 PROCEDURE — 99291 CRITICAL CARE FIRST HOUR: CPT | Mod: 25 | Performed by: EMERGENCY MEDICINE

## 2020-08-19 PROCEDURE — 81001 URINALYSIS AUTO W/SCOPE: CPT | Mod: 59 | Performed by: EMERGENCY MEDICINE

## 2020-08-19 PROCEDURE — 80320 DRUG SCREEN QUANTALCOHOLS: CPT | Performed by: EMERGENCY MEDICINE

## 2020-08-19 PROCEDURE — 83880 ASSAY OF NATRIURETIC PEPTIDE: CPT | Performed by: EMERGENCY MEDICINE

## 2020-08-19 PROCEDURE — 25000125 ZZHC RX 250: Performed by: EMERGENCY MEDICINE

## 2020-08-19 PROCEDURE — 93010 ELECTROCARDIOGRAM REPORT: CPT | Mod: Z6 | Performed by: EMERGENCY MEDICINE

## 2020-08-19 PROCEDURE — 93005 ELECTROCARDIOGRAM TRACING: CPT | Performed by: EMERGENCY MEDICINE

## 2020-08-19 PROCEDURE — 99285 EMERGENCY DEPT VISIT HI MDM: CPT | Mod: 25 | Performed by: EMERGENCY MEDICINE

## 2020-08-19 PROCEDURE — 85379 FIBRIN DEGRADATION QUANT: CPT | Performed by: EMERGENCY MEDICINE

## 2020-08-19 PROCEDURE — 83690 ASSAY OF LIPASE: CPT | Performed by: EMERGENCY MEDICINE

## 2020-08-19 PROCEDURE — 25000132 ZZH RX MED GY IP 250 OP 250 PS 637: Mod: GY | Performed by: EMERGENCY MEDICINE

## 2020-08-19 PROCEDURE — 85025 COMPLETE CBC W/AUTO DIFF WBC: CPT | Performed by: EMERGENCY MEDICINE

## 2020-08-19 PROCEDURE — 71275 CT ANGIOGRAPHY CHEST: CPT

## 2020-08-19 PROCEDURE — 84484 ASSAY OF TROPONIN QUANT: CPT | Performed by: EMERGENCY MEDICINE

## 2020-08-19 PROCEDURE — C9803 HOPD COVID-19 SPEC COLLECT: HCPCS | Performed by: EMERGENCY MEDICINE

## 2020-08-19 PROCEDURE — 71045 X-RAY EXAM CHEST 1 VIEW: CPT

## 2020-08-19 PROCEDURE — 83605 ASSAY OF LACTIC ACID: CPT | Performed by: EMERGENCY MEDICINE

## 2020-08-19 PROCEDURE — 96365 THER/PROPH/DIAG IV INF INIT: CPT | Mod: 59 | Performed by: EMERGENCY MEDICINE

## 2020-08-19 PROCEDURE — 82803 BLOOD GASES ANY COMBINATION: CPT | Performed by: EMERGENCY MEDICINE

## 2020-08-19 PROCEDURE — 82140 ASSAY OF AMMONIA: CPT | Performed by: EMERGENCY MEDICINE

## 2020-08-19 PROCEDURE — 96366 THER/PROPH/DIAG IV INF ADDON: CPT | Performed by: EMERGENCY MEDICINE

## 2020-08-19 RX ORDER — IOPAMIDOL 755 MG/ML
91 INJECTION, SOLUTION INTRAVASCULAR ONCE
Status: COMPLETED | OUTPATIENT
Start: 2020-08-19 | End: 2020-08-19

## 2020-08-19 RX ORDER — CLONIDINE HYDROCHLORIDE 0.1 MG/1
0.1 TABLET ORAL ONCE
Status: COMPLETED | OUTPATIENT
Start: 2020-08-19 | End: 2020-08-19

## 2020-08-19 RX ORDER — NITROGLYCERIN 20 MG/100ML
10-200 INJECTION INTRAVENOUS CONTINUOUS
Status: DISCONTINUED | OUTPATIENT
Start: 2020-08-19 | End: 2020-08-20 | Stop reason: HOSPADM

## 2020-08-19 RX ORDER — ASPIRIN 81 MG/1
243 TABLET, CHEWABLE ORAL ONCE
Status: COMPLETED | OUTPATIENT
Start: 2020-08-19 | End: 2020-08-19

## 2020-08-19 RX ORDER — NITROGLYCERIN 0.4 MG/1
0.4 TABLET SUBLINGUAL EVERY 5 MIN PRN
Status: DISCONTINUED | OUTPATIENT
Start: 2020-08-19 | End: 2020-08-20 | Stop reason: HOSPADM

## 2020-08-19 RX ORDER — ASPIRIN 81 MG/1
TABLET, CHEWABLE ORAL
Status: DISCONTINUED
Start: 2020-08-19 | End: 2020-08-19 | Stop reason: HOSPADM

## 2020-08-19 RX ORDER — HYDRALAZINE HYDROCHLORIDE 50 MG/1
50 TABLET, FILM COATED ORAL 4 TIMES DAILY
Status: DISCONTINUED | OUTPATIENT
Start: 2020-08-19 | End: 2020-08-20 | Stop reason: HOSPADM

## 2020-08-19 RX ADMIN — LIDOCAINE HYDROCHLORIDE 30 ML: 20 SOLUTION ORAL; TOPICAL at 21:09

## 2020-08-19 RX ADMIN — NITROGLYCERIN 10 MCG/MIN: 20 INJECTION INTRAVENOUS at 20:38

## 2020-08-19 RX ADMIN — ASPIRIN 81 MG 243 MG: 81 TABLET ORAL at 17:44

## 2020-08-19 RX ADMIN — NITROGLYCERIN 0.4 MG: 0.4 TABLET SUBLINGUAL at 17:41

## 2020-08-19 RX ADMIN — IOPAMIDOL 91 ML: 755 INJECTION, SOLUTION INTRAVENOUS at 20:17

## 2020-08-19 RX ADMIN — NITROGLYCERIN 0.4 MG: 0.4 TABLET SUBLINGUAL at 18:03

## 2020-08-19 RX ADMIN — SODIUM CHLORIDE 100 ML: 9 INJECTION, SOLUTION INTRAVENOUS at 20:17

## 2020-08-19 RX ADMIN — CLONIDINE HYDROCHLORIDE 0.1 MG: 0.1 TABLET ORAL at 18:55

## 2020-08-19 RX ADMIN — HYDRALAZINE HYDROCHLORIDE 50 MG: 50 TABLET ORAL at 20:07

## 2020-08-19 RX ADMIN — NITROGLYCERIN 0.4 MG: 0.4 TABLET SUBLINGUAL at 18:53

## 2020-08-19 ASSESSMENT — MIFFLIN-ST. JEOR: SCORE: 1756.38

## 2020-08-19 NOTE — ED PROVIDER NOTES
History     Chief Complaint   Patient presents with     Shortness of Breath     sudden onset 30 min pta     HPI  Flash Brown is a 79 year old male with history of alcoholic cirrhosis with liver disease, coronary artery disease hyperlipidemia thrombocytopenia obstructive sleep apnea and diabetes type 2 presents emergency department complaining of chest pain shortness of breath and altered mental status.  Patient states he was in his normal state of health except for the fact that he had diarrhea this morning.  About an hour prior to arrival he states he began feeling short of breath like he could not get his breath and also developed some left-sided chest pain.  He states he just feels out of it at this point.  He is answering questions appropriately but takes his time.  He keeps his eyes closed.  He denies any trauma.  He denies headache or visual changes.  He has had not had any neck pain he denies any nausea or abdominal pain.  He has not had any blood in his stool denies any focal numbness weakness in extremity.  He has not had any bowel or bladder dysfunction.  He denies any swelling of his legs or or calf pain.  He states he has been taking his medications.  He was seen in cardiology clinic in February and was stated that if he had any further chest pain due to his past history would be recommended patient have a coronary angiogram gram.  He currently rates his chest pain and ache describes it as a 3 out of 10.    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 Provider to review       Proteinuria 04/15/2008     Priority: Medium     Basal cell skin cancer over nose s/p resection - [place], [place] 01/30/2008     Priority: Medium     January 30, 2008  Recurrence of basal cell on back, arm, shoulder, face - pending excision  Dermatology: Dr. Cynthia FRYE update changed this record. Please review for accuracy       Mixed hyperlipidemia 01/30/2008     Priority: Medium     Last Exam: April 15, 2008  Last Lipids: CHOL      137   03/13/2008 LDL       76   03/13/2008 HDL       37   03/13/2008  Last LFTs:: N  ALT       47   03/13/2008    Meds: Vytorin 10/40, Niacin 500 bid       Diabetes mellitus, type 2 (H) 01/30/2008     Priority: Medium     April 15, 2008  BP: 128/74  Body mass index is 36.01 kg/(m^2).  A1C      6.1   03/13/2008 GLC      104   03/13/2008  LDL       76   03/13/2008    Meds: Metformin 1000      Problem list name updated by automated process. Provider to review       Obstructive sleep apnea 01/30/2008     Priority: Medium     Problem list name updated by automated process. Provider to review       Depressive disorder, not elsewhere classified 01/30/2008     Priority: Medium     January 30, 2008  Meds: Starting Celexa 20  PHQ-9: 11.       ? exposure predisposing to CA 01/30/2008     Priority: Medium     Essential hypertension, benign 01/30/2004     Priority: Medium     Last exam: April 15, 2008  BPs: 128/74  Meds: Atenolol 50, ASA 81, Prinizide 20/25         RT axillary lymphoma s/p resection, with adjuvant radiation - [place], [place] 01/30/1983     Priority: Medium     Malignant melanoma s/p resection in [place], [place] 01/30/1978     Priority: Medium        Past Medical History:    Past Medical History:   Diagnosis Date     Basal cell carcinoma      CAD (coronary artery disease)      Depression      Diabetes type 2,  controlled (H)      Hyperlipidemia      Hypertension      Lymphoma (H)      Malignant melanoma (H)      Melanoma (H)      Squamous cell carcinoma        Past Surgical History:    Past Surgical History:   Procedure Laterality Date     AXILLARY SURGERY      S/P resection and adjuvant radiation     BONE MARROW BIOPSY, BONE SPECIMEN, NEEDLE/TROCAR N/A 7/8/2019    Procedure: BIOPSY, BONE MARROW;  Surgeon: Husam Issa MD;  Location: [facility]     CARDIAC SURGERY       CHOLECYSTECTOMY       HERNIA REPAIR, UMBILICAL       PHACOEMULSIFICATION WITH STANDARD INTRAOCULAR LENS IMPLANT Right 10/2/2019    Procedure: Cataract Removal with Implant;  Surgeon: Dinesh Ivey MD;  Location: [facility]     PHACOEMULSIFICATION WITH STANDARD INTRAOCULAR LENS IMPLANT Left 10/21/2019    Procedure: Cataract Removal with Implant;  Surgeon: Dinesh Ivey MD;  Location: [facility]     STENT      PTCA with drug-eluting stent of RCA, circumflex, and LAD     VASCULAR SURGERY         Family History:    Family History   Problem Relation Age of Onset     Asthma Other      Cancer Other         melanoma     Blood Disease Other         bleeding disorder     Lung Cancer Mother         Smoker     Prostate Cancer Father      Melanoma No family hx of        Social History:  Marital Status:  Single [1]  Social History     Tobacco Use     Smoking status: Never Smoker     Smokeless tobacco: Never Used   Substance Use Topics     Alcohol use: Yes     Frequency: Monthly or less     Drinks per session: 1 or 2     Binge frequency: Never     Comment: glass of wine couple times per week     Drug use: Never        Medications:    amLODIPine (NORVASC) 5 MG tablet  aspirin (ASA) 81 MG EC tablet  atorvastatin (LIPITOR) 20 MG tablet  busPIRone (BUSPAR) 10 MG tablet  carvedilol (COREG) 12.5 MG tablet  CENTRUM OR TABS  Cholecalciferol (VITAMIN D3) 10 MCG/ML LIQD  cloNIDine (CATAPRES) 0.1 MG tablet  clopidogrel (PLAVIX) 75 MG tablet  COQ10 100 MG OR CAPS  ferrous  "fumarate 65 mg, [place]. FE,-Vitamin C 125 mg (VITRON C)  MG TABS tablet  hydrALAZINE (APRESOLINE) 50 MG tablet  naloxone (NARCAN) 4 MG/0.1ML nasal spray  NIACIN 500 MG OR TABS  nitroGLYcerin (NITROSTAT) 0.4 MG sublingual tablet  OMEGA-3 1000 MG OR CAPS  ORDER FOR DME  pioglitazone (ACTOS) 30 MG tablet  ramipril (ALTACE) 10 MG capsule  tamsulosin (FLOMAX) 0.4 MG capsule  triamcinolone (KENALOG) 0.1 % external cream  VOSOL-HC 2-1 % OT SOLN          Review of Systems  All systems reviewed and other than pertinent positives and negatives in HPI all other symptoms are negative.  Physical Exam   BP: (!) 194/100  Temp: 97.7  F (36.5  C)  Resp: 22  Height: 167.6 cm (5' 6\")  Weight: 108.9 kg (240 lb)  SpO2: 98 %      Physical Exam  Vitals signs and nursing note reviewed.   Constitutional:       General: He is in acute distress.      Appearance: He is well-developed. He is not ill-appearing, toxic-appearing or diaphoretic.      Comments: Mild respiratory/chest distress   HENT:      Head: Normocephalic and atraumatic.      Nose: Nose normal.      Mouth/Throat:      Mouth: Mucous membranes are moist.      Pharynx: Oropharynx is clear.   Eyes:      Extraocular Movements: Extraocular movements intact.      Conjunctiva/sclera: Conjunctivae normal.      Pupils: Pupils are equal, round, and reactive to light.   Neck:      Musculoskeletal: Normal range of motion and neck supple.   Cardiovascular:      Rate and Rhythm: Bradycardia present. Rhythm irregular.      Pulses: Normal pulses.      Heart sounds: No murmur. No gallop.    Pulmonary:      Effort: Pulmonary effort is normal.      Breath sounds: No wheezing or rales.      Comments: Breath sound decreased at bases bilaterally.  Abdominal:      Comments: Soft, mild distention, no significant tenderness to palpation.  Bowel sounds distant but positive.  No masses palpated.   Musculoskeletal: Normal range of motion.         General: No swelling or tenderness.      Right lower leg: " No edema.      Left lower leg: No edema.   Skin:     General: Skin is warm and dry.      Findings: No erythema or rash.   Neurological:      General: No focal deficit present.      Mental Status: He is alert and oriented to person, place, and time.      Cranial Nerves: No cranial nerve deficit.      Sensory: No sensory deficit.      Motor: No weakness.      Coordination: Coordination normal.   Psychiatric:         Mood and Affect: Mood normal.         ED Course        Procedures               EKG Interpretation:      Interpreted by Shane Hall MD  Rhythm: normal sinus  and sinus arrhythmia  Rate: Normal  Axis: Normal  Ectopy: none  Conduction: normal , possible LVH  ST Segments/ T Waves: Non-specific ST-T wave changes  Q Waves: none  Comparison to prior: Sinus arrhythmia is new from 9/30/2019    Clinical impression: Sinus arrhythmia with nonspecific ST-T wave changes and possible LVH.                Critical Care time:  was 40  minutes for this patient excluding procedures.  For management of hypertensive emergency and chest pain.               Results for orders placed or performed during the hospital encounter of 08/19/20 (from the past 24 hour(s))   CBC with platelets differential   Result Value Ref Range    WBC 4.4 4.0 - 11.0 10e9/L    RBC Count 4.78 4.4 - 5.9 10e12/L    Hemoglobin 13.7 13.3 - 17.7 g/dL    Hematocrit 41.5 40.0 - 53.0 %    MCV 87 78 - 100 fl    MCH 28.7 26.5 - 33.0 pg    MCHC 33.0 31.5 - 36.5 g/dL    RDW 14.8 10.0 - 15.0 %    Platelet Count 88 (L) 150 - 450 10e9/L    Diff Method Automated Method     % Neutrophils 71.6 %    % Lymphocytes 17.7 %    % Monocytes 7.8 %    % Eosinophils 1.8 %    % Basophils 0.9 %    % Immature Granulocytes 0.2 %    Nucleated RBCs 0 0 /100    Absolute Neutrophil 3.1 1.6 - 8.3 10e9/L    Absolute Lymphocytes 0.8 0.8 - 5.3 10e9/L    Absolute Monocytes 0.3 0.0 - 1.3 10e9/L    Absolute Eosinophils 0.1 0.0 - 0.7 10e9/L    Absolute Basophils 0.0 0.0 - 0.2 10e9/L     Abs Immature Granulocytes 0.0 0 - 0.4 10e9/L    Absolute Nucleated RBC 0.0    Comprehensive metabolic panel   Result Value Ref Range    Sodium 138 133 - 144 mmol/L    Potassium 3.6 3.4 - 5.3 mmol/L    Chloride 106 94 - 109 mmol/L    Carbon Dioxide 26 20 - 32 mmol/L    Anion Gap 6 3 - 14 mmol/L    Glucose 163 (H) 70 - 99 mg/dL    Urea Nitrogen 12 7 - 30 mg/dL    Creatinine 0.94 0.66 - 1.25 mg/dL    GFR Estimate 78 >60 mL/min/[1.73_m2]    GFR Estimate If Black >90 >60 mL/min/[1.73_m2]    Calcium 9.3 8.5 - 10.1 mg/dL    Bilirubin Total 1.3 0.2 - 1.3 mg/dL    Albumin 3.6 3.4 - 5.0 g/dL    Protein Total 7.7 6.8 - 8.8 g/dL    Alkaline Phosphatase 88 40 - 150 U/L    ALT 24 0 - 70 U/L    AST 27 0 - 45 U/L   Lipase   Result Value Ref Range    Lipase 142 73 - 393 U/L   Lactic acid whole blood   Result Value Ref Range    Lactic Acid 1.5 0.7 - 2.0 mmol/L   Ammonia   Result Value Ref Range    Ammonia 33 10 - 50 umol/L   Blood gas venous   Result Value Ref Range    Ph Venous 7.51 (H) 7.32 - 7.43 pH    PCO2 Venous 32 (L) 40 - 50 mm Hg    PO2 Venous 36 25 - 47 mm Hg    Bicarbonate Venous 26 21 - 28 mmol/L    Base Excess Venous 3.3 mmol/L    FIO2 HIDE    Alcohol ethyl   Result Value Ref Range    Ethanol g/dL <0.01 <0.01 g/dL   Nt probnp inpatient   Result Value Ref Range    N-Terminal Pro BNP Inpatient 196 0 - 1,800 pg/mL   Troponin I   Result Value Ref Range    Troponin I ES <0.015 0.000 - 0.045 ug/L   D dimer quantitative   Result Value Ref Range    D Dimer 7.7 (H) 0.0 - 0.50 ug/ml FEU   UA with Microscopic   Result Value Ref Range    Color Urine Yellow     Appearance Urine Clear     Glucose Urine Negative NEG^Negative mg/dL    Bilirubin Urine Negative NEG^Negative    Ketones Urine Negative NEG^Negative mg/dL    Specific Gravity Urine 1.011 1.003 - 1.035    Blood Urine Negative NEG^Negative    pH Urine 8.0 (H) 5.0 - 7.0 pH    Protein Albumin Urine Negative NEG^Negative mg/dL    Urobilinogen mg/dL 0.0 0.0 - 2.0 mg/dL    Nitrite  Urine Negative NEG^Negative    Leukocyte Esterase Urine Negative NEG^Negative    Source Midstream Urine     WBC Urine <1 0 - 5 /HPF    RBC Urine <1 0 - 2 /HPF    Squamous Epithelial /HPF Urine <1 0 - 1 /HPF    Amorphous Crystals Few (A) NEG^Negative /HPF   Symptomatic COVID-19 Virus (Coronavirus) by PCR    Specimen: Nasopharyngeal   Result Value Ref Range    COVID-19 Virus PCR to [facility] - Source Nasopharyngeal     COVID-19 Virus PCR to [facility] - Result       Test received-See reflex to IDDL test SARS CoV2 (COVID-19) Virus RT-PCR   XR Chest Port 1 View    Narrative    CHEST PORTABLE ONE VIEW   8/19/2020 6:07 PM     HISTORY: SOB.    COMPARISON: 9/30/2019.      Impression    IMPRESSION: Bilateral increased vascular and interstitial prominence  that may represent edema. Stable cardiomegaly. No focal airspace  disease identified.    BAM MÁRQUEZ MD   Troponin I   Result Value Ref Range    Troponin I ES <0.015 0.000 - 0.045 ug/L   CT Chest Pulmonary Embolism w Contrast    Narrative    EXAM: CT CHEST PULMONARY EMBOLISM W CONTRAST  LOCATION: [facility]  DATE/TIME: 8/19/2020 8:12 PM    INDICATION: Chest pain, elevated d-dimer  COMPARISON: 07/11/2019  TECHNIQUE: CT chest pulmonary angiogram during arterial phase injection of IV contrast. Multiplanar reformats and MIP reconstructions were performed. Dose reduction techniques were used.   CONTRAST: 91 ml Isovue 370    FINDINGS:  ANGIOGRAM CHEST: No evidence for pulmonary embolism. Pulmonary arteries normal in caliber. Thoracic aorta normal in caliber. No aortic dissection or other acute abnormality.    HEART: Cardiac chambers within normal limits. No pericardial effusion. Severe coronary arterial calcification.    LUNGS AND PLEURA: No pulmonary mass, consolidation, or suspicious pulmonary nodule. Focal groundglass density in the right upper lobe laterally, slightly decreased compared to prior exam. No pleural effusion or pneumothorax.    MEDIASTINUM: Small hiatal  hernia. Mild distal esophageal wall thickening. No adenopathy or mass.    LIMITED UPPER ABDOMEN: Prior cholecystectomy. Nodular hepatic contour suggesting cirrhosis. Trace perihepatic ascites.    MUSCULOSKELETAL: Negative.      Impression    IMPRESSION:  1.  No evidence for pulmonary embolism.   2.  Mild distal esophagitis suggested please correlate clinically.       Medications - No data to display    Assessments & Plan (with Medical Decision Making) records reviewed.  Labs EKG and chest x-ray were obtained.  A COVID test was obtained.  Patient is EKG revealed a sinus rhythm with sinus arrhythmia this is not too significantly changed from previous EKGs but on cardiac monitor was noted patient had QRS complexes dropped once in a while consistent with a Mobitz type II block.  White count was 4.4 hemoglobin 13.7 platelet count 88 platelet count has been decreased in the past.  No left shift.  Apprehensive metabolic panel without significant abnormality.  Lactic acid was within normal limits.  BNP was 196.  Alcohol level is less than 0.01 lipase was within normal limits.  UA without evidence of infection.  Protein was within normal limits.  Venous blood gas with a pH is 7.51 CO2 32 O2 at 36 bicarb was 26.  This was on room air.  At this time it was noted patient's blood pressure increased and was over the 200s over 110 in nature.  In discussing with family member and patient he realized he had not taken his medications today he was given aspirin and also given his clonidine and then his hydralazine.  Chest x-ray revealed increased fluid and cardiomegaly which may be consistent with CHF.  BNP is not elevated.  Legs are not significantly swollen.  D dimer came back at 7.7 and therefore CT PE protocol was obtained.  This revealed no evidence of PE or other abnormality lungs.  Patient may have a mild esophagitis.  He is not complaining of trouble swallowing.  Patient had been given several nitro with improvement of pain.   A repeat troponin was within normal limits.  Was not dropping QRS complexes as he initially was.  Blood pressure remained elevated and he did complain of some mild chest pain and therefore I started on a nitro drip with improvement of his blood pressure and his pain did resolve.  I felt patient needed to be further evaluated by cardiology with possible stress test or cardiac angiogram.  Due to the possible Mobitz type II presentation I felt he needed cardiology involved and therefore needed to be transferred.  Blood pressure did improve with nitroglycerin drip patient denies any chest pain at this time.  Contacted the [facility] and [facility] both of which did not have beds.  [facility] also did not have a bed.  [facility] did not have a bed available.  [facility] did not have a bed available finally contacted Dr. Ocampo at [facility] in [place] and he was in agreement with accepting the patient in transfer.  Findings and plan were discussed with the patient throughout his stay and they are in agreement with patient being transferred to [facility].     I have reviewed the nursing notes.    I have reviewed the findings, diagnosis, plan and need for follow up with the patient.       New Prescriptions    No medications on file       Final diagnoses:   Chest pain, unspecified type   Dyspnea, unspecified type   Hypertensive emergency       8/19/2020   [facility] EMERGENCY DEPARTMENT     Shane Hall MD  08/22/20 [id number]

## 2020-08-19 NOTE — ED NOTES
Medication list updated with sister Rea via phone. Sister went through medication bottles with writer over the phone. States patient is not taking 81 mg Aspirin or vitamins at this time. Rea also states there were no bottles of Amlodipine or Buspirone on patients med table.    No medications appear to have been taken today per sister as meds are still in cup.    Medication list has been updated with this information provided.

## 2020-08-20 ENCOUNTER — TRANSFERRED RECORDS (OUTPATIENT)
Dept: HEALTH INFORMATION MANAGEMENT | Facility: CLINIC | Age: 80
End: 2020-08-20

## 2020-08-20 VITALS
SYSTOLIC BLOOD PRESSURE: 134 MMHG | TEMPERATURE: 97.7 F | BODY MASS INDEX: 38.57 KG/M2 | OXYGEN SATURATION: 93 % | DIASTOLIC BLOOD PRESSURE: 78 MMHG | HEART RATE: 63 BPM | HEIGHT: 66 IN | WEIGHT: 240 LBS | RESPIRATION RATE: 11 BRPM

## 2020-08-20 LAB
HBA1C MFR BLD: 5.8 % (ref 0–5.7)
LABORATORY COMMENT REPORT: NORMAL
SARS-COV-2 RNA SPEC QL NAA+PROBE: NEGATIVE
SPECIMEN SOURCE: NORMAL

## 2020-08-20 NOTE — ED NOTES
Had turned off lights and pt resting comfortably. Pt suddenly woke up in a panic that he had to urinate and he was claustrophobic. Able to urinate in the urinal and pt reoriented. Pt more alert and oriented, able to have a conversation.

## 2020-08-20 NOTE — ED NOTES
Message left for pt's sister, Daylin, on pt's disposition and destination for [facility] ICU.

## 2020-08-20 NOTE — ED NOTES
Pt's HR in the low 50's, sinus rody/second degree heart block type 2, skips a beat at times, P wave there, no QRS. Pt still having chest pain and diaphoretic, feels scared. Pt moved to room 19 and defibrillator pads placed on chest. Pt unable to say if he took his meds today, will call sister to verify.   96

## 2020-08-20 NOTE — ED NOTES
Pt c/o shortness of breath, weakness and chest pain. Pt having a hard time answering questions. Pt is diaphoretic. Lives with sister who brought him in.

## 2020-08-28 DIAGNOSIS — E11.8 TYPE 2 DIABETES MELLITUS WITH COMPLICATION, WITHOUT LONG-TERM CURRENT USE OF INSULIN (H): ICD-10-CM

## 2020-08-28 DIAGNOSIS — I25.10 CORONARY ARTERY DISEASE INVOLVING NATIVE CORONARY ARTERY OF NATIVE HEART WITHOUT ANGINA PECTORIS: ICD-10-CM

## 2020-08-28 DIAGNOSIS — I10 ESSENTIAL HYPERTENSION: ICD-10-CM

## 2020-08-28 DIAGNOSIS — F32.89 OTHER DEPRESSION: ICD-10-CM

## 2020-08-28 NOTE — TELEPHONE ENCOUNTER
"Has appointment scheduled for 9/8/20.    Requested Prescriptions   Pending Prescriptions Disp Refills     busPIRone (BUSPAR) 10 MG tablet 180 tablet 1     Sig: Take 1 tablet (10 mg) by mouth 2 times daily       Atypical Antidepressants Protocol Failed - 8/28/2020 12:04 PM        Failed - Patient has PHQ-9 score less than 5 in past 6 months.     Please review last PHQ-9 score.           Passed - Medication active on med list        Passed - Patient is age 18 or older        Passed - Recent (6 mo) or future (30 days) visit within the authorizing provider's specialty     Patient had office visit in the last 6 months or has a visit in the next 30 days with authorizing provider or within the authorizing provider's specialty.  See \"Patient Info\" tab in inbasket, or \"Choose Columns\" in Meds & Orders section of the refill encounter.               cloNIDine (CATAPRES) 0.1 MG tablet 180 tablet 1     Sig: Take 1 tablet (0.1 mg) by mouth 2 times daily       Central Acting Antiadrenergic Agents Passed - 8/28/2020 12:04 PM        Passed - Blood pressure under 140/90 in past 12 months     BP Readings from Last 3 Encounters:   08/20/20 134/78   03/09/20 122/66   02/19/20 117/65                 Passed - Patient is 6 years of age or older        Passed - Recent (12 mo) or future (30 days) visit within the authorizing provider's specialty     Patient has had an office visit with the authorizing provider or a provider within the authorizing providers department within the previous 12 mos or has a future within next 30 days. See \"Patient Info\" tab in inbasket, or \"Choose Columns\" in Meds & Orders section of the refill encounter.              Passed - Medication is active on med list        Passed - Normal serum creatinine on file within past 12 months     Recent Labs   Lab Test 08/19/20  1717   CR 0.94       Ok to refill medication if creatinine is low         Antiadrenergic Antihypertensives Passed - 8/28/2020 12:04 PM        Passed - " "Blood pressure less than 140/90 in past 6 months     BP Readings from Last 3 Encounters:   08/20/20 134/78   03/09/20 122/66   02/19/20 117/65                 Passed - Medication is active on med list        Passed - Patient is age 18 or older        Passed - Normal serum creatinine on file in past 12 months     Recent Labs   Lab Test 08/19/20  1717   CR 0.94       Ok to refill medication if creatinine is low          Passed - Recent (6 mo) or future (30 days) visit within the authorizing provider's specialty     Patient had office visit in the last 6 months or has a visit in the next 30 days with authorizing provider or within the authorizing provider's specialty.  See \"Patient Info\" tab in inbasket, or \"Choose Columns\" in Meds & Orders section of the refill encounter.               hydrALAZINE (APRESOLINE) 50 MG tablet 180 tablet 1     Sig: Take 1 tablet (50 mg) by mouth 2 times daily       Vasodilators Passed - 8/28/2020 12:04 PM        Passed - Most recent BP less than 140/90 on record     BP Readings from Last 3 Encounters:   08/20/20 134/78   03/09/20 122/66   02/19/20 117/65                 Passed - Most recent encounter is not a hospital encounter. Patient has recent (12 mos) or future (1 mos) visit with authorizing provider's specialty     Patient's most recent encounter is NOT a hospital encounter and has had an office visit in the last 12 months or has a visit in the next 30 days with authorizing provider or within the authorizing provider's specialty.      See \"Patient Info\" tab in inbasket, or \"Choose Columns\" in Meds & Orders section of the refill encounter.      If most recent encounter is a hospital encounter AND the patient does NOT have an appointment scheduled with the authorizing provider or authorizing provider's specialty within the next 30 days, forward refill to authorizing provider for medication review.          Passed - Medication is active on med list        Passed - Patient is of age 18 " "years or older           clopidogrel (PLAVIX) 75 MG tablet 90 tablet 1     Sig: Take 1 tablet (75 mg) by mouth daily       Plavix Failed - 8/28/2020 12:04 PM        Failed - Normal Platelets on file in past 12 months     Recent Labs   Lab Test 08/19/20  1717   PLT 88*               Passed - No active PPI on record unless is Protonix        Passed - Normal HGB on file in past 12 months     Recent Labs   Lab Test 08/19/20  1717   HGB 13.7               Passed - Recent (12 mo) or future (30 days) visit within the authorizing provider's specialty     Patient has had an office visit with the authorizing provider or a provider within the authorizing providers department within the previous 12 mos or has a future within next 30 days. See \"Patient Info\" tab in inbasket, or \"Choose Columns\" in Meds & Orders section of the refill encounter.              Passed - Medication is active on med list        Passed - Patient is age 18 or older           pioglitazone (ACTOS) 30 MG tablet 90 tablet 1     Sig: Take 1 tablet (30 mg) by mouth daily       Thiazolidinedione Agents (TZDs)  Failed - 8/28/2020 12:04 PM        Failed - Patient has documented A1c within the specified period of time.     If HgbA1C is 8 or greater, it needs to be on file within the past 3 months.  If less than 8, must be on file within the past 6 months.     Recent Labs   Lab Test 12/09/19  1107   A1C 5.5             Passed - Patient has a normal ALT within the past 12 mos.     Recent Labs   Lab Test 08/19/20  1717   ALT 24             Passed - Patient has a normal AST within the past 12 mos.      Recent Labs   Lab Test 08/19/20  1717   AST 27             Passed - Diagnosis not CHF        Passed - Medication is active on med list        Passed - Patient is age 18 or older        Passed - Patient has a normal serum Creatinine in the past 12 months     Recent Labs   Lab Test 08/19/20  1717   CR 0.94       Ok to refill medication if creatinine is low          Passed " "- Recent (6 mo) or future (30 days) visit within the authorizing provider's specialty     Patient had office visit in the last 6 months or has a visit in the next 30 days with authorizing provider or within the authorizing provider's specialty.  See \"Patient Info\" tab in inbasket, or \"Choose Columns\" in Meds & Orders section of the refill encounter.                 "

## 2020-08-28 NOTE — TELEPHONE ENCOUNTER
Clonidine last dispensed 06/06/2020  Pioglitazone last dispensed 05/254/2020   Buspirone last dispensed 05/24/2020  Hydralazine last dispensed 05/24/2020  Clopidogrel last dispensed 05/24/2020

## 2020-08-31 RX ORDER — HYDRALAZINE HYDROCHLORIDE 50 MG/1
50 TABLET, FILM COATED ORAL 2 TIMES DAILY
Qty: 180 TABLET | Refills: 1 | Status: SHIPPED | OUTPATIENT
Start: 2020-08-31 | End: 2022-09-02

## 2020-08-31 RX ORDER — PIOGLITAZONEHYDROCHLORIDE 30 MG/1
30 TABLET ORAL DAILY
Qty: 90 TABLET | Refills: 1 | Status: SHIPPED | OUTPATIENT
Start: 2020-08-31 | End: 2021-02-19

## 2020-08-31 RX ORDER — CLOPIDOGREL BISULFATE 75 MG/1
75 TABLET ORAL DAILY
Qty: 90 TABLET | Refills: 1 | Status: SHIPPED | OUTPATIENT
Start: 2020-08-31 | End: 2021-02-19

## 2020-08-31 RX ORDER — BUSPIRONE HYDROCHLORIDE 10 MG/1
10 TABLET ORAL 2 TIMES DAILY
Qty: 180 TABLET | Refills: 1 | Status: SHIPPED | OUTPATIENT
Start: 2020-08-31 | End: 2021-08-03

## 2020-08-31 RX ORDER — CLONIDINE HYDROCHLORIDE 0.1 MG/1
0.1 TABLET ORAL 2 TIMES DAILY
Qty: 180 TABLET | Refills: 1 | Status: SHIPPED | OUTPATIENT
Start: 2020-08-31 | End: 2021-02-23

## 2020-09-08 ENCOUNTER — OFFICE VISIT (OUTPATIENT)
Dept: FAMILY MEDICINE | Facility: CLINIC | Age: 80
End: 2020-09-08
Payer: MEDICARE

## 2020-09-08 VITALS
RESPIRATION RATE: 14 BRPM | HEART RATE: 80 BPM | SYSTOLIC BLOOD PRESSURE: 100 MMHG | WEIGHT: 237.38 LBS | DIASTOLIC BLOOD PRESSURE: 62 MMHG | BODY MASS INDEX: 38.15 KG/M2 | HEIGHT: 66 IN

## 2020-09-08 DIAGNOSIS — I10 ESSENTIAL HYPERTENSION, BENIGN: ICD-10-CM

## 2020-09-08 DIAGNOSIS — E11.8 TYPE 2 DIABETES MELLITUS WITH COMPLICATION, WITHOUT LONG-TERM CURRENT USE OF INSULIN (H): Primary | ICD-10-CM

## 2020-09-08 DIAGNOSIS — I25.10 CORONARY ARTERY DISEASE INVOLVING NATIVE CORONARY ARTERY OF NATIVE HEART WITHOUT ANGINA PECTORIS: ICD-10-CM

## 2020-09-08 DIAGNOSIS — F32.0 CURRENT MILD EPISODE OF MAJOR DEPRESSIVE DISORDER WITHOUT PRIOR EPISODE (H): ICD-10-CM

## 2020-09-08 LAB
CHOLEST SERPL-MCNC: 105 MG/DL
HDLC SERPL-MCNC: 39 MG/DL
LDLC SERPL CALC-MCNC: 49 MG/DL
NONHDLC SERPL-MCNC: 66 MG/DL
TRIGL SERPL-MCNC: 87 MG/DL

## 2020-09-08 PROCEDURE — 80061 LIPID PANEL: CPT | Performed by: FAMILY MEDICINE

## 2020-09-08 PROCEDURE — 36415 COLL VENOUS BLD VENIPUNCTURE: CPT | Performed by: FAMILY MEDICINE

## 2020-09-08 PROCEDURE — 99214 OFFICE O/P EST MOD 30 MIN: CPT | Performed by: FAMILY MEDICINE

## 2020-09-08 RX ORDER — CARVEDILOL 12.5 MG/1
12.5 TABLET ORAL 2 TIMES DAILY WITH MEALS
Qty: 180 TABLET | Refills: 2 | Status: SHIPPED | OUTPATIENT
Start: 2020-09-08 | End: 2021-06-10

## 2020-09-08 ASSESSMENT — MIFFLIN-ST. JEOR: SCORE: 1744.48

## 2020-09-08 ASSESSMENT — ANXIETY QUESTIONNAIRES
3. WORRYING TOO MUCH ABOUT DIFFERENT THINGS: NOT AT ALL
2. NOT BEING ABLE TO STOP OR CONTROL WORRYING: NOT AT ALL
1. FEELING NERVOUS, ANXIOUS, OR ON EDGE: NOT AT ALL
GAD7 TOTAL SCORE: 0
7. FEELING AFRAID AS IF SOMETHING AWFUL MIGHT HAPPEN: NOT AT ALL
5. BEING SO RESTLESS THAT IT IS HARD TO SIT STILL: NOT AT ALL
6. BECOMING EASILY ANNOYED OR IRRITABLE: NOT AT ALL

## 2020-09-08 ASSESSMENT — ENCOUNTER SYMPTOMS
WEAKNESS: 0
SORE THROAT: 0
CHILLS: 0
HEADACHES: 0
FEVER: 0
PARESTHESIAS: 0
JOINT SWELLING: 0
SHORTNESS OF BREATH: 0
ARTHRALGIAS: 0
FATIGUE: 1
DIZZINESS: 0
PALPITATIONS: 0
NERVOUS/ANXIOUS: 0
MYALGIAS: 0
COUGH: 0

## 2020-09-08 ASSESSMENT — PATIENT HEALTH QUESTIONNAIRE - PHQ9
5. POOR APPETITE OR OVEREATING: NOT AT ALL
SUM OF ALL RESPONSES TO PHQ QUESTIONS 1-9: 5

## 2020-09-08 ASSESSMENT — PAIN SCALES - GENERAL: PAINLEVEL: NO PAIN (0)

## 2020-09-08 NOTE — PROGRESS NOTES
Subjective     Flash Brown is a 79 year old male who presents to clinic today for the following health issues:    HPI     Diabetes Follow-up      How often are you checking your blood sugar? Not at all    What concerns do you have today about your diabetes? None     Do you have any of these symptoms? (Select all that apply)  No numbness or tingling in feet.  No redness, sores or blisters on feet.  No complaints of excessive thirst.  No reports of blurry vision.  No significant changes to weight.    Have you had a diabetic eye exam in the last 12 months? Yes-  Location: 10/2/2019                Hyperlipidemia Follow-Up      Are you regularly taking any medication or supplement to lower your cholesterol?   Yes- statin    Are you having muscle aches or other side effects that you think could be caused by your cholesterol lowering medication?  No    Hypertension Follow-up      Do you check your blood pressure regularly outside of the clinic? No     Are you following a low salt diet? Yes    Are your blood pressures ever more than 140 on the top number (systolic) OR more   than 90 on the bottom number (diastolic), for example 140/90? N/A    BP Readings from Last 2 Encounters:   09/08/20 100/62   08/20/20 134/78     Hemoglobin A1C (%)   Date Value   12/09/2019 5.5   06/11/2019 5.4     LDL Cholesterol Calculated (mg/dL)   Date Value   06/11/2019 47   03/13/2008 76       Chronic Kidney Disease Follow-up      Do you take any over the counter pain medicine?: No      Depression Followup    How are you doing with your depression since your last visit? No change, they would like to discuss stopping medication    Are you having other symptoms that might be associated with depression? No    Have you had a significant life event?  No     Are you feeling anxious or having panic attacks?   No    Do you have  any concerns with your use of alcohol or other drugs? No    Social History     Tobacco Use     Smoking status: Never Smoker      Smokeless tobacco: Never Used   Substance Use Topics     Alcohol use: Yes     Frequency: Monthly or less     Drinks per session: 1 or 2     Binge frequency: Never     Comment: glass of wine couple times per week     Drug use: Never     PHQ 12/9/2019 1/27/2020 9/8/2020   PHQ-9 Total Score 2 2 5   Q9: Thoughts of better off dead/self-harm past 2 weeks Not at all Not at all Not at all     SHAVONNE-7 SCORE 12/9/2019 1/27/2020 9/8/2020   Total Score 0 1 0     Last PHQ-9 9/8/2020   1.  Little interest or pleasure in doing things 0   2.  Feeling down, depressed, or hopeless 0   3.  Trouble falling or staying asleep, or sleeping too much 3   4.  Feeling tired or having little energy 1   5.  Poor appetite or overeating 0   6.  Feeling bad about yourself 1   7.  Trouble concentrating 0   8.  Moving slowly or restless 0   Q9: Thoughts of better off dead/self-harm past 2 weeks 0   PHQ-9 Total Score 5   Difficulty at work, home, or with people -     Suicide Assessment Five-step Evaluation and Treatment (SAFE-T)      1. High blood pressure f/u: Patient was recently hospitalized for poorly controlled HTN.  Currently, patient is taking clonidine, hydralazine, carvedilol, ramipril, and amolodipine.  Per patient, he has been compliant with his medications.     2. Diabetes f/u: Patient was on glimepiride and pioglitazone.  Last HgA1c was well controlled.  Patient continues to feel fatigue which is ongoing for the past 10 years.  He is active working in his garden.      3. Depression: He is currently on buspirone but feels like this is working.     Review of Systems   Constitutional: Positive for fatigue. Negative for chills and fever.   HENT: Negative for congestion, ear pain, hearing loss and sore throat.    Respiratory: Negative for cough and shortness of breath.    Cardiovascular: Negative for chest pain, palpitations and peripheral edema.   Musculoskeletal: Negative for arthralgias, joint swelling and myalgias.   Skin: Negative for  "rash.   Neurological: Negative for dizziness, weakness, headaches and paresthesias.   Psychiatric/Behavioral: Negative for mood changes. The patient is not nervous/anxious.           Objective    /62   Pulse 80   Resp 14   Ht 1.676 m (5' 6\")   Wt 107.7 kg (237 lb 6 oz)   BMI 38.31 kg/m    Body mass index is 38.31 kg/m .  Physical Exam  Constitutional:       General: He is not in acute distress.     Appearance: He is well-developed.   HENT:      Head: Normocephalic and atraumatic.      Nose: Nose normal.   Eyes:      Conjunctiva/sclera: Conjunctivae normal.   Neck:      Musculoskeletal: Normal range of motion.      Trachea: No tracheal deviation.   Cardiovascular:      Rate and Rhythm: Normal rate and regular rhythm.      Heart sounds: Normal heart sounds.   Pulmonary:      Effort: Pulmonary effort is normal.      Breath sounds: No wheezing.   Musculoskeletal: Normal range of motion.   Skin:     Findings: No erythema or rash.   Neurological:      Mental Status: He is alert and oriented to person, place, and time.   Psychiatric:         Behavior: Behavior normal.        HgA1c 5.8 (8/20/20)    Assessment & Plan     1. Type 2 diabetes mellitus with complication, without long-term current use of insulin (H)  Well controlled.  Will discontinue glimepiride.  Continue pioglitazone.     2. Coronary artery disease involving native coronary artery of native heart without angina pectoris  Continue with plavix and ramipril.   - carvedilol (COREG) 12.5 MG tablet; Take 1 tablet (12.5 mg) by mouth 2 times daily (with meals)  Dispense: 180 tablet; Refill: 2  - Lipid panel reflex to direct LDL Fasting; Future    3. Essential hypertension, benign  Somewhat low today.  Currently on amlodipine, carvedilol, clonidine, and hydralazine.  Will discontinue flomax.  Encourage blood pressure device.  Blood pressure parameters given to patient.  Close follow-up in 1 month.     4. Current mild episode of major depressive disorder " without prior episode (H)  Will taper off buspar.     See Patient Instructions    Return in about 1 month (around 10/8/2020) for Hypertension f/u.    Thomas Jackson,   [facility]

## 2020-09-08 NOTE — PATIENT INSTRUCTIONS
Monika Sanchez,    Thank you for allowing [facility] to manage your care.    I ordered some fasting blood work, please go to the laboratory to get your laboratory studies.    I sent your prescriptions to your pharmacy.    Please schedule your eye appointment this year.     Please discontinue flomax (tamsulosin), glimepiride (amyrl), and buspirone (buspar).     For buspar (buspirone), may taper to once a day for 7 days, then discontinue.     Encourage a portable blood pressure device.    Call us if BP above 180/90 or below 100/50.      Please allow 1-2 business days for our office to call you in regards to your laboratory/radiological studies.  If not done so, I encourage you to login into [email or web address] ([email or web address] to review your results as well.     If you have any questions or concerns, please feel free to call us at [phone].    Sincerely,    Dr. Jackson    Did you know?  You can schedule an e-Visit for certain simple non-emergent issue for your convenience.  To learn more about or start an eVisit, simply login to [employer], click  Visits  on top banner, click  Start a Virtual Visit  drop down, and click  Symptom-Specific E-Visit

## 2020-09-09 ASSESSMENT — ANXIETY QUESTIONNAIRES: GAD7 TOTAL SCORE: 0

## 2020-10-10 DIAGNOSIS — I10 ESSENTIAL HYPERTENSION: ICD-10-CM

## 2020-10-12 RX ORDER — RAMIPRIL 10 MG/1
CAPSULE ORAL
Qty: 90 CAPSULE | Refills: 0 | Status: SHIPPED | OUTPATIENT
Start: 2020-10-12 | End: 2021-01-06

## 2020-10-14 ENCOUNTER — TRANSFERRED RECORDS (OUTPATIENT)
Dept: HEALTH INFORMATION MANAGEMENT | Facility: CLINIC | Age: 80
End: 2020-10-14

## 2020-10-14 LAB — RETINOPATHY: NEGATIVE

## 2020-10-20 ENCOUNTER — OFFICE VISIT (OUTPATIENT)
Dept: FAMILY MEDICINE | Facility: CLINIC | Age: 80
End: 2020-10-20
Payer: MEDICARE

## 2020-10-20 VITALS
HEIGHT: 66 IN | HEART RATE: 60 BPM | DIASTOLIC BLOOD PRESSURE: 45 MMHG | BODY MASS INDEX: 39.05 KG/M2 | SYSTOLIC BLOOD PRESSURE: 93 MMHG | TEMPERATURE: 97.4 F | WEIGHT: 243 LBS | OXYGEN SATURATION: 99 %

## 2020-10-20 DIAGNOSIS — D61.818 OTHER PANCYTOPENIA (H): ICD-10-CM

## 2020-10-20 DIAGNOSIS — K70.31 ALCOHOLIC CIRRHOSIS OF LIVER WITH ASCITES (H): ICD-10-CM

## 2020-10-20 DIAGNOSIS — C82.24 GRADE 3 FOLLICULAR LYMPHOMA OF LYMPH NODES OF AXILLA (H): ICD-10-CM

## 2020-10-20 DIAGNOSIS — E66.01 MORBID OBESITY (H): ICD-10-CM

## 2020-10-20 DIAGNOSIS — D69.6 THROMBOCYTOPENIA (H): ICD-10-CM

## 2020-10-20 DIAGNOSIS — I10 ESSENTIAL HYPERTENSION: Primary | ICD-10-CM

## 2020-10-20 DIAGNOSIS — E11.8 TYPE 2 DIABETES MELLITUS WITH COMPLICATION, WITHOUT LONG-TERM CURRENT USE OF INSULIN (H): ICD-10-CM

## 2020-10-20 LAB
ALBUMIN SERPL-MCNC: 3 G/DL (ref 3.4–5)
ALP SERPL-CCNC: 79 U/L (ref 40–150)
ALT SERPL W P-5'-P-CCNC: 23 U/L (ref 0–70)
ANION GAP SERPL CALCULATED.3IONS-SCNC: 4 MMOL/L (ref 3–14)
AST SERPL W P-5'-P-CCNC: 21 U/L (ref 0–45)
BILIRUB SERPL-MCNC: 0.7 MG/DL (ref 0.2–1.3)
BUN SERPL-MCNC: 15 MG/DL (ref 7–30)
CALCIUM SERPL-MCNC: 8.4 MG/DL (ref 8.5–10.1)
CHLORIDE SERPL-SCNC: 109 MMOL/L (ref 94–109)
CO2 SERPL-SCNC: 28 MMOL/L (ref 20–32)
CREAT SERPL-MCNC: 0.98 MG/DL (ref 0.66–1.25)
ERYTHROCYTE [DISTWIDTH] IN BLOOD BY AUTOMATED COUNT: 16.3 % (ref 10–15)
GFR SERPL CREATININE-BSD FRML MDRD: 74 ML/MIN/{1.73_M2}
GLUCOSE SERPL-MCNC: 163 MG/DL (ref 70–99)
HCT VFR BLD AUTO: 33.3 % (ref 40–53)
HGB BLD-MCNC: 10.6 G/DL (ref 13.3–17.7)
MCH RBC QN AUTO: 28.9 PG (ref 26.5–33)
MCHC RBC AUTO-ENTMCNC: 31.8 G/DL (ref 31.5–36.5)
MCV RBC AUTO: 91 FL (ref 78–100)
PLATELET # BLD AUTO: 76 10E9/L (ref 150–450)
POTASSIUM SERPL-SCNC: 4.1 MMOL/L (ref 3.4–5.3)
PROT SERPL-MCNC: 6.5 G/DL (ref 6.8–8.8)
RBC # BLD AUTO: 3.67 10E12/L (ref 4.4–5.9)
SODIUM SERPL-SCNC: 141 MMOL/L (ref 133–144)
WBC # BLD AUTO: 2.9 10E9/L (ref 4–11)

## 2020-10-20 PROCEDURE — 85027 COMPLETE CBC AUTOMATED: CPT | Performed by: FAMILY MEDICINE

## 2020-10-20 PROCEDURE — 80053 COMPREHEN METABOLIC PANEL: CPT | Performed by: FAMILY MEDICINE

## 2020-10-20 PROCEDURE — 99214 OFFICE O/P EST MOD 30 MIN: CPT | Performed by: FAMILY MEDICINE

## 2020-10-20 PROCEDURE — 36415 COLL VENOUS BLD VENIPUNCTURE: CPT | Performed by: FAMILY MEDICINE

## 2020-10-20 ASSESSMENT — ENCOUNTER SYMPTOMS
ARTHRALGIAS: 0
PALPITATIONS: 0
DIZZINESS: 0
HEADACHES: 0
FATIGUE: 1
COUGH: 0
SHORTNESS OF BREATH: 0
FEVER: 0
NERVOUS/ANXIOUS: 0
WEAKNESS: 0
SORE THROAT: 0
JOINT SWELLING: 0
MYALGIAS: 0
PARESTHESIAS: 0
CHILLS: 0

## 2020-10-20 ASSESSMENT — PAIN SCALES - GENERAL: PAINLEVEL: NO PAIN (0)

## 2020-10-20 ASSESSMENT — MIFFLIN-ST. JEOR: SCORE: 1769.99

## 2020-10-20 NOTE — PROGRESS NOTES
Subjective     Flash Brown is a 79 year old male who presents to clinic today for the following health issues:    HPI         Hypertension Follow-up      Do you check your blood pressure regularly outside of the clinic? No     Are you following a low salt diet? Yes    Are your blood pressures ever more than 140 on the top number (systolic) OR more   than 90 on the bottom number (diastolic), for example 140/90? No      How many servings of fruits and vegetables do you eat daily?  2-3    On average, how many sweetened beverages do you drink each day (Examples: soda, juice, sweet tea, etc.  Do NOT count diet or artificially sweetened beverages)?   1    How many days per week do you exercise enough to make your heart beat faster? 3 or less    How many minutes a day do you exercise enough to make your heart beat faster? 9 or less    How many days per week do you miss taking your medication? 0    -He would like a form filled out for a handicap parking sticker.     1. Hypertension f/u: Patient had flomax discontinued.  Currently on ramipril, clonidine, amolodipine and hydralazine.  Patient does states of fatigue.    2. Diabetes f/u: Last HgA1c was 5.8 on 8/20/20.  This has been a chronic condition. He is currently on pioglitazone.  No chest pain or SOB.     3. History ofnon-Hodgkin lymphoma: Per patient, he is currently in remission.  History righ axillary lympho node dissection and radiation.      Review of Systems   Constitutional: Positive for fatigue. Negative for chills and fever.   HENT: Negative for congestion, ear pain, hearing loss and sore throat.    Respiratory: Negative for cough and shortness of breath.    Cardiovascular: Negative for chest pain, palpitations and peripheral edema.   Musculoskeletal: Negative for arthralgias, joint swelling and myalgias.   Skin: Negative for rash.   Neurological: Negative for dizziness, weakness, headaches and paresthesias.   Psychiatric/Behavioral: Negative for mood  "changes. The patient is not nervous/anxious.           Objective    BP 93/45   Pulse 60   Temp 97.4  F (36.3  C) (Tympanic)   Ht 1.676 m (5' 6\")   Wt 110.2 kg (243 lb)   SpO2 99%   BMI 39.22 kg/m    Body mass index is 39.22 kg/m .  Physical Exam  Constitutional:       General: He is not in acute distress.     Appearance: He is well-developed.   HENT:      Head: Normocephalic and atraumatic.      Nose: Nose normal.   Eyes:      Conjunctiva/sclera: Conjunctivae normal.   Neck:      Musculoskeletal: Normal range of motion.      Trachea: No tracheal deviation.   Cardiovascular:      Rate and Rhythm: Normal rate and regular rhythm.      Heart sounds: Normal heart sounds.   Pulmonary:      Effort: Pulmonary effort is normal.      Breath sounds: No wheezing.   Musculoskeletal: Normal range of motion.   Skin:     Findings: No erythema or rash.   Neurological:      Mental Status: He is alert and oriented to person, place, and time.   Psychiatric:         Behavior: Behavior normal.        Assessment & Plan     1. Essential hypertension  Somewhat low.  Will discontinue hydralazine.  Continue ramipril, amlodipine, and clonidine. Close f/u in 1 month.     2. 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  Patient is currently being followed by hematology.  History of dissection and radiation treatment.     3. Other pancytopenia (H)  Chronic.  Await CBC.     4. Alcoholic cirrhosis of liver with ascites (H)  - Comprehensive metabolic panel (BMP + Alb, Alk Phos, ALT, AST, Total. Bili, TP)    5. Morbid obesity (H)  Encourage weight loss.     6. Thrombocytopenia (H)  - CBC with platelets    7. Type 2 diabetes mellitus with complication, without long-term current use of insulin (H)  Stable.  Currently on pioglitazone.     See Patient Instructions    Return in about 1 month (around 11/20/2020) for Hypertension f/u.    Thomas Jackson DO  [facility]      "

## 2020-10-20 NOTE — PATIENT INSTRUCTIONS
Monika Sanchez,    Thank you for allowing [facility] to manage your care.    Please discontinue hydralazine.    I ordered some blood work, please go to the laboratory to get your laboratory studies.    Please allow 1-2 business days for our office to call you in regards to your laboratory/radiological studies.  If not done so, I encourage you to login into [email or web address] [email or web address] to review your results as well.     If you have any questions or concerns, please feel free to call us at [phone].    Sincerely,    Dr. Jackson    Did you know?  You can schedule an e-Visit for certain simple non-emergent issue for your convenience.  To learn more about or start an eVisit, simply login to [employer], click  Visits  on top banner, click  Start a Virtual Visit  drop down, and click  Symptom-Specific E-Visit

## 2020-12-30 DIAGNOSIS — C82.24 GRADE 3 FOLLICULAR LYMPHOMA OF LYMPH NODES OF AXILLA (H): Primary | ICD-10-CM

## 2021-01-04 DIAGNOSIS — C82.24 GRADE 3 FOLLICULAR LYMPHOMA OF LYMPH NODES OF AXILLA (H): ICD-10-CM

## 2021-01-04 LAB
ALBUMIN SERPL-MCNC: 3.2 G/DL (ref 3.4–5)
ALP SERPL-CCNC: 95 U/L (ref 40–150)
ALT SERPL W P-5'-P-CCNC: 27 U/L (ref 0–70)
ANION GAP SERPL CALCULATED.3IONS-SCNC: 8 MMOL/L (ref 3–14)
AST SERPL W P-5'-P-CCNC: 25 U/L (ref 0–45)
BASOPHILS # BLD AUTO: 0 10E9/L (ref 0–0.2)
BASOPHILS NFR BLD AUTO: 1.2 %
BILIRUB SERPL-MCNC: 0.8 MG/DL (ref 0.2–1.3)
BUN SERPL-MCNC: 15 MG/DL (ref 7–30)
CALCIUM SERPL-MCNC: 8.7 MG/DL (ref 8.5–10.1)
CHLORIDE SERPL-SCNC: 105 MMOL/L (ref 94–109)
CO2 SERPL-SCNC: 26 MMOL/L (ref 20–32)
CREAT SERPL-MCNC: 1.1 MG/DL (ref 0.66–1.25)
DIFFERENTIAL METHOD BLD: ABNORMAL
EOSINOPHIL # BLD AUTO: 0.1 10E9/L (ref 0–0.7)
EOSINOPHIL NFR BLD AUTO: 3.7 %
ERYTHROCYTE [DISTWIDTH] IN BLOOD BY AUTOMATED COUNT: 15.4 % (ref 10–15)
GFR SERPL CREATININE-BSD FRML MDRD: 64 ML/MIN/{1.73_M2}
GLUCOSE SERPL-MCNC: 183 MG/DL (ref 70–99)
HCT VFR BLD AUTO: 37.5 % (ref 40–53)
HGB BLD-MCNC: 12 G/DL (ref 13.3–17.7)
LDH SERPL L TO P-CCNC: 178 U/L (ref 85–227)
LYMPHOCYTES # BLD AUTO: 0.9 10E9/L (ref 0.8–5.3)
LYMPHOCYTES NFR BLD AUTO: 26.5 %
MCH RBC QN AUTO: 28.6 PG (ref 26.5–33)
MCHC RBC AUTO-ENTMCNC: 32 G/DL (ref 31.5–36.5)
MCV RBC AUTO: 90 FL (ref 78–100)
MONOCYTES # BLD AUTO: 0.3 10E9/L (ref 0–1.3)
MONOCYTES NFR BLD AUTO: 9.6 %
NEUTROPHILS # BLD AUTO: 1.9 10E9/L (ref 1.6–8.3)
NEUTROPHILS NFR BLD AUTO: 59 %
PLATELET # BLD AUTO: 92 10E9/L (ref 150–450)
POTASSIUM SERPL-SCNC: 3.8 MMOL/L (ref 3.4–5.3)
PROT SERPL-MCNC: 7.1 G/DL (ref 6.8–8.8)
RBC # BLD AUTO: 4.19 10E12/L (ref 4.4–5.9)
SODIUM SERPL-SCNC: 139 MMOL/L (ref 133–144)
WBC # BLD AUTO: 3.2 10E9/L (ref 4–11)

## 2021-01-04 PROCEDURE — 83615 LACTATE (LD) (LDH) ENZYME: CPT | Performed by: INTERNAL MEDICINE

## 2021-01-04 PROCEDURE — 80053 COMPREHEN METABOLIC PANEL: CPT | Performed by: INTERNAL MEDICINE

## 2021-01-04 PROCEDURE — 36415 COLL VENOUS BLD VENIPUNCTURE: CPT | Performed by: INTERNAL MEDICINE

## 2021-01-04 PROCEDURE — 85025 COMPLETE CBC W/AUTO DIFF WBC: CPT | Performed by: INTERNAL MEDICINE

## 2021-01-08 ENCOUNTER — VIRTUAL VISIT (OUTPATIENT)
Dept: ONCOLOGY | Facility: CLINIC | Age: 81
End: 2021-01-08
Attending: INTERNAL MEDICINE
Payer: MEDICARE

## 2021-01-08 VITALS
BODY MASS INDEX: 38.57 KG/M2 | HEIGHT: 66 IN | WEIGHT: 240 LBS | SYSTOLIC BLOOD PRESSURE: 109 MMHG | DIASTOLIC BLOOD PRESSURE: 75 MMHG

## 2021-01-08 DIAGNOSIS — I10 ESSENTIAL HYPERTENSION, BENIGN: ICD-10-CM

## 2021-01-08 DIAGNOSIS — E78.49 OTHER HYPERLIPIDEMIA: ICD-10-CM

## 2021-01-08 DIAGNOSIS — C82.24 GRADE 3 FOLLICULAR LYMPHOMA OF LYMPH NODES OF AXILLA (H): Primary | ICD-10-CM

## 2021-01-08 PROCEDURE — 99442 PR PHYSICIAN TELEPHONE EVALUATION 11-20 MIN: CPT | Performed by: INTERNAL MEDICINE

## 2021-01-08 PROCEDURE — 999N001193 HC VIDEO/TELEPHONE VISIT; NO CHARGE

## 2021-01-08 ASSESSMENT — MIFFLIN-ST. JEOR: SCORE: 1751.38

## 2021-01-08 ASSESSMENT — PAIN SCALES - GENERAL: PAINLEVEL: NO PAIN (0)

## 2021-01-08 NOTE — PROGRESS NOTES
"Oncology Rooming Note- Telephone visit- contact patient @ #[phone].  Sister would like to be included; Rea.     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      Hematology     Initial Vitals: /75 (BP Location: Left arm)   Ht 1.676 m (5' 6\")   Wt 108.9 kg (240 lb)   BMI 38.74 kg/m   Estimated body mass index is 38.74 kg/m  as calculated from the following:    Height as of this encounter: 1.676 m (5' 6\").    Weight as of this encounter: 108.9 kg (240 lb). Body surface area is 2.25 meters squared.  No Pain (0) Comment: Data Unavailable   No LMP for male patient.  Allergies reviewed: Yes  Medications reviewed: Yes    Medications: Requesting medication refills on all his medications.   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       Yamileth Croft, [facility]              "

## 2021-01-14 NOTE — ASSESSMENT & PLAN NOTE
Flash Brown is a 78-year-old gentleman with multiple comorbidities including type 2 diabetes, hypertension, depression and sleep apnea.  He is known to have history of non-Hodgkin lymphoma which Treated in 1983 on [facility] at that time he underwent right axillary lymph node dissection followed by radiation.  He did not receive any chemotherapy at that time.  Patient has been followed by his primary care physician in [place].  There was no evidence of recurrence up until now.  CT scan done 2010 was negative for any recurrence.

## 2021-01-14 NOTE — PROGRESS NOTES
Hematology/ Oncology Telephone Visit:  Jan 8, 2021    Due to the concerns around COVID-19 and adhering to social distancing we conducted this visit over the telephone.      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  He is known to have history of non-Hodgkin lymphoma which Treated in 1983 on [facility] at that time he underwent right axillary lymph node dissection followed by radiation.  He did not receive any chemotherapy at that time.  Patient has been followed by his primary care physician in [place].  There was no evidence of recurrence up until now.  CT scan done 2010 was negative for any recurrence.       Interval History:  The patient has been feeling well without any recent complaints of weight loss or night sweats or fever or chills.  He denies any nausea vomiting or diarrhea.  He denies any shortness of breath or cough or wheezing.    Review of systems:  Pertinent positives have been included in HPI; remainder of detailed complete 20-point ROS was negative.    Past medical, social, surgical, and family histories reviewed.    Allergies:  Allergies as of 01/08/2021 - Reviewed 01/08/2021   Allergen Reaction Noted     Iodine Swelling 01/30/2008       Current Medications:  Current Outpatient Medications   Medication Sig Dispense Refill     amLODIPine (NORVASC) 5 MG tablet Take 1 tablet (5 mg) by mouth daily 90 tablet 3     atorvastatin (LIPITOR) 20 MG tablet Take 1 tablet (20 mg) by mouth daily 90 tablet 3     busPIRone (BUSPAR) 10 MG tablet Take 1 tablet (10 mg) by mouth 2 times daily 180 tablet 1     carvedilol (COREG) 12.5 MG tablet Take 1 tablet (12.5 mg) by mouth 2  times daily (with meals) 180 tablet 2     cloNIDine (CATAPRES) 0.1 MG tablet Take 1 tablet (0.1 mg) by mouth 2 times daily 180 tablet 1     clopidogrel (PLAVIX) 75 MG tablet Take 1 tablet (75 mg) by mouth daily 90 tablet 1     hydrALAZINE (APRESOLINE) 50 MG tablet Take 1 tablet (50 mg) by mouth 2 times daily 180 tablet 1     NIACIN 500 MG OR TABS Take one orally twice daily 180 3     pioglitazone (ACTOS) 30 MG tablet Take 1 tablet (30 mg) by mouth daily 90 tablet 1     ramipril (ALTACE) 10 MG capsule TAKE 1 CAPSULE BY MOUTH EVERY DAY 90 capsule 0     tamsulosin (FLOMAX) 0.4 MG capsule Take 1 capsule (0.4 mg) by mouth daily 90 capsule 3     triamcinolone (KENALOG) 0.1 % external cream Twice daily to legs prn itching 454 g 3     naloxone (NARCAN) 4 MG/0.1ML nasal spray Spray 1 spray (4 mg) into one nostril alternating nostrils once as needed for opioid reversal Every 2-3 minutes until patient responsive or EMS arrives (Patient not taking: Reported on 1/8/2021) 0.2 mL 0     nitroGLYcerin (NITROSTAT) 0.4 MG sublingual tablet Place 1 tablet (0.4 mg) under the tongue See Admin Instructions for chest pain (Patient not taking: Reported on 1/8/2021) 30 tablet 0     ORDER FOR DME Light Therapy with Full Spectrum Light (54535 lux) Start with 10-15 minute exposure per day, Increase exposure to 30 to 45 minutes per day, Maximum exposure: 90 minutes per day,Look periodically at light during each session  (Patient not taking: Reported on 1/8/2021) 1 0     VOSOL-HC 2-1 % OT SOLN 2-3 drops in the affected ear 3-4 times daily for 7-10 days for itching in the ear (Patient not taking: No sig reported) 1 bottle 0        Laboratory/Imaging Studies:  Orders Only on 01/04/2021   Component Date Value Ref Range Status     Lactate Dehydrogenase 01/04/2021 178  85 - 227 U/L Final     Sodium 01/04/2021 139  133 - 144 mmol/L Final     Potassium 01/04/2021 3.8  3.4 - 5.3 mmol/L Final     Chloride 01/04/2021 105  94 - 109 mmol/L Final     Carbon  Dioxide 01/04/2021 26  20 - 32 mmol/L Final     Anion Gap 01/04/2021 8  3 - 14 mmol/L Final     Glucose 01/04/2021 183* 70 - 99 mg/dL Final     Urea Nitrogen 01/04/2021 15  7 - 30 mg/dL Final     Creatinine 01/04/2021 1.10  0.66 - 1.25 mg/dL Final     GFR Estimate 01/04/2021 64  >60 mL/min/[1.73_m2] Final    Comment: Non  GFR Calc  Starting 12/18/2018, serum creatinine based estimated GFR (eGFR) will be   calculated using the Chronic Kidney Disease Epidemiology Collaboration   (CKD-EPI) equation.       GFR Estimate If Black 01/04/2021 74  >60 mL/min/[1.73_m2] Final    Comment:  GFR Calc  Starting 12/18/2018, serum creatinine based estimated GFR (eGFR) will be   calculated using the Chronic Kidney Disease Epidemiology Collaboration   (CKD-EPI) equation.       Calcium 01/04/2021 8.7  8.5 - 10.1 mg/dL Final     Bilirubin Total 01/04/2021 0.8  0.2 - 1.3 mg/dL Final     Albumin 01/04/2021 3.2* 3.4 - 5.0 g/dL Final     Protein Total 01/04/2021 7.1  6.8 - 8.8 g/dL Final     Alkaline Phosphatase 01/04/2021 95  40 - 150 U/L Final     ALT 01/04/2021 27  0 - 70 U/L Final     AST 01/04/2021 25  0 - 45 U/L Final     WBC 01/04/2021 3.2* 4.0 - 11.0 10e9/L Final     RBC Count 01/04/2021 4.19* 4.4 - 5.9 10e12/L Final     Hemoglobin 01/04/2021 12.0* 13.3 - 17.7 g/dL Final     Hematocrit 01/04/2021 37.5* 40.0 - 53.0 % Final     MCV 01/04/2021 90  78 - 100 fl Final     MCH 01/04/2021 28.6  26.5 - 33.0 pg Final     MCHC 01/04/2021 32.0  31.5 - 36.5 g/dL Final     RDW 01/04/2021 15.4* 10.0 - 15.0 % Final     Platelet Count 01/04/2021 92* 150 - 450 10e9/L Final    Verified by smear review     % Neutrophils 01/04/2021 59.0  % Final     % Lymphocytes 01/04/2021 26.5  % Final     % Monocytes 01/04/2021 9.6  % Final     % Eosinophils 01/04/2021 3.7  % Final     % Basophils 01/04/2021 1.2  % Final     Absolute Neutrophil 01/04/2021 1.9  1.6 - 8.3 10e9/L Final     Absolute Lymphocytes 01/04/2021 0.9  0.8 - 5.3  10e9/L Final     Absolute Monocytes 01/04/2021 0.3  0.0 - 1.3 10e9/L Final     Absolute Eosinophils 01/04/2021 0.1  0.0 - 0.7 10e9/L Final     Absolute Basophils 01/04/2021 0.0  0.0 - 0.2 10e9/L Final     Diff Method 01/04/2021 Automated Method   Final          Assessment and plan:    (C82.24) 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  There is no clear evidence of lymphoma progression at this time.  We will continue to monitor the patient's symptoms.  I will see the patient again in 6 months or sooner if there are new developments or concerns.    (I10) Essential hypertension, benign  Patient currently on Norvasc 5 mg orally daily.  Is also on clonidine 0.1 mg twice daily and carvedilol 12.5 mg twice daily.  Is on hydralazine 50 mg twice daily.    (E78.49) Other hyperlipidemia  The patient currently on atorvastatin 20 mg orally daily.    The patient is ready to learn, no apparent learning barriers were identified.  Diagnosis and treatment plans were explained to the patient. The patient expressed understanding of the content. The patient asked appropriate questions. The patient questions were answered to his satisfaction.    Telephone call lasted 20 minutes.    Crystal Camacho MD    Chart documentation with Dragon Voice recognition Software. Although reviewed after completion, some words and grammatical errors may remain.

## 2021-02-19 DIAGNOSIS — I25.10 CORONARY ARTERY DISEASE INVOLVING NATIVE CORONARY ARTERY OF NATIVE HEART WITHOUT ANGINA PECTORIS: ICD-10-CM

## 2021-02-19 DIAGNOSIS — E78.49 OTHER HYPERLIPIDEMIA: ICD-10-CM

## 2021-02-19 DIAGNOSIS — E11.8 TYPE 2 DIABETES MELLITUS WITH COMPLICATION, WITHOUT LONG-TERM CURRENT USE OF INSULIN (H): ICD-10-CM

## 2021-02-19 RX ORDER — ATORVASTATIN CALCIUM 20 MG/1
20 TABLET, FILM COATED ORAL DAILY
Qty: 90 TABLET | Refills: 0 | Status: SHIPPED | OUTPATIENT
Start: 2021-02-19 | End: 2021-05-20

## 2021-02-19 NOTE — TELEPHONE ENCOUNTER
"Prescription approved per [facility] Refill Protocol for atorvastatin.    Routing refill request to provider for review/approval because:  Labs out of range:  A1c, plt, hgb        Requested Prescriptions   Pending Prescriptions Disp Refills     atorvastatin (LIPITOR) 20 MG tablet 90 tablet 3     Sig: Take 1 tablet (20 mg) by mouth daily       Statins Protocol Passed - 2/19/2021  8:24 AM        Passed - LDL on file in past 12 months     Recent Labs   Lab Test 09/08/20  1002   LDL 49             Passed - No abnormal creatine kinase in past 12 months     No lab results found.             Passed - Recent (12 mo) or future (30 days) visit within the authorizing provider's specialty     Patient has had an office visit with the authorizing provider or a provider within the authorizing providers department within the previous 12 mos or has a future within next 30 days. See \"Patient Info\" tab in inbasket, or \"Choose Columns\" in Meds & Orders section of the refill encounter.              Passed - Medication is active on med list        Passed - Patient is age 18 or older           clopidogrel (PLAVIX) 75 MG tablet 90 tablet 1     Sig: Take 1 tablet (75 mg) by mouth daily       Plavix Failed - 2/19/2021  8:24 AM        Failed - Normal HGB on file in past 12 months     Recent Labs   Lab Test 01/04/21  1030   HGB 12.0*               Failed - Normal Platelets on file in past 12 months     Recent Labs   Lab Test 01/04/21  1030   PLT 92*               Passed - No active PPI on record unless is Protonix        Passed - Recent (12 mo) or future (30 days) visit within the authorizing provider's specialty     Patient has had an office visit with the authorizing provider or a provider within the authorizing providers department within the previous 12 mos or has a future within next 30 days. See \"Patient Info\" tab in inbasket, or \"Choose Columns\" in Meds & Orders section of the refill encounter.              Passed - Medication is active on " "med list        Passed - Patient is age 18 or older           pioglitazone (ACTOS) 30 MG tablet 90 tablet 1     Sig: Take 1 tablet (30 mg) by mouth daily       Thiazolidinedione Agents (TZDs)  Failed - 2/19/2021  8:24 AM        Failed - Patient has documented A1c within the specified period of time.     If HgbA1C is 8 or greater, it needs to be on file within the past 3 months.  If less than 8, must be on file within the past 6 months.     Recent Labs   Lab Test 08/20/20   A1C 5.8*             Passed - Patient has a normal ALT within the past 12 mos.     Recent Labs   Lab Test 01/04/21  1030   ALT 27             Passed - Patient has a normal AST within the past 12 mos.      Recent Labs   Lab Test 01/04/21  1030   AST 25             Passed - Diagnosis not CHF        Passed - Medication is active on med list        Passed - Patient is age 18 or older        Passed - Patient has a normal serum Creatinine in the past 12 months     Recent Labs   Lab Test 01/04/21  1030   CR 1.10       Ok to refill medication if creatinine is low          Passed - Recent (6 mo) or future (30 days) visit within the authorizing provider's specialty     Patient had office visit in the last 6 months or has a visit in the next 30 days with authorizing provider or within the authorizing provider's specialty.  See \"Patient Info\" tab in inbasket, or \"Choose Columns\" in Meds & Orders section of the refill encounter.                       "

## 2021-02-22 DIAGNOSIS — I10 ESSENTIAL HYPERTENSION: ICD-10-CM

## 2021-02-23 RX ORDER — CLONIDINE HYDROCHLORIDE 0.1 MG/1
0.1 TABLET ORAL 2 TIMES DAILY
Qty: 180 TABLET | Refills: 1 | Status: SHIPPED | OUTPATIENT
Start: 2021-02-23 | End: 2021-11-28

## 2021-02-23 NOTE — TELEPHONE ENCOUNTER
Prescription approved per [facility] Refill Protocol.    Kristin Calderon RN  [facility]

## 2021-02-24 RX ORDER — PIOGLITAZONEHYDROCHLORIDE 30 MG/1
30 TABLET ORAL DAILY
Qty: 90 TABLET | Refills: 0 | Status: SHIPPED | OUTPATIENT
Start: 2021-02-24 | End: 2021-07-22

## 2021-02-24 RX ORDER — CLOPIDOGREL BISULFATE 75 MG/1
75 TABLET ORAL DAILY
Qty: 90 TABLET | Refills: 0 | Status: SHIPPED | OUTPATIENT
Start: 2021-02-24 | End: 2021-05-20

## 2021-02-25 ENCOUNTER — IMMUNIZATION (OUTPATIENT)
Dept: NURSING | Facility: CLINIC | Age: 81
End: 2021-02-25
Payer: MEDICARE

## 2021-02-25 PROCEDURE — 91300 PR COVID VAC PFIZER DIL RECON 30 MCG/0.3 ML IM: CPT

## 2021-02-25 PROCEDURE — 0001A PR COVID VAC PFIZER DIL RECON 30 MCG/0.3 ML IM: CPT

## 2021-03-18 ENCOUNTER — IMMUNIZATION (OUTPATIENT)
Dept: NURSING | Facility: CLINIC | Age: 81
End: 2021-03-18
Attending: FAMILY MEDICINE
Payer: MEDICARE

## 2021-03-18 PROCEDURE — 0002A PR COVID VAC PFIZER DIL RECON 30 MCG/0.3 ML IM: CPT

## 2021-03-18 PROCEDURE — 91300 PR COVID VAC PFIZER DIL RECON 30 MCG/0.3 ML IM: CPT

## 2021-04-11 ENCOUNTER — HEALTH MAINTENANCE LETTER (OUTPATIENT)
Age: 81
End: 2021-04-11

## 2021-04-23 ENCOUNTER — ANCILLARY PROCEDURE (OUTPATIENT)
Dept: GENERAL RADIOLOGY | Facility: CLINIC | Age: 81
End: 2021-04-23
Attending: PHYSICIAN ASSISTANT
Payer: MEDICARE

## 2021-04-23 ENCOUNTER — OFFICE VISIT (OUTPATIENT)
Dept: FAMILY MEDICINE | Facility: CLINIC | Age: 81
End: 2021-04-23
Payer: MEDICARE

## 2021-04-23 VITALS
OXYGEN SATURATION: 100 % | TEMPERATURE: 96.6 F | RESPIRATION RATE: 20 BRPM | BODY MASS INDEX: 41.29 KG/M2 | SYSTOLIC BLOOD PRESSURE: 115 MMHG | HEART RATE: 65 BPM | DIASTOLIC BLOOD PRESSURE: 55 MMHG | WEIGHT: 255.8 LBS

## 2021-04-23 DIAGNOSIS — S42.021A CLOSED DISPLACED FRACTURE OF SHAFT OF RIGHT CLAVICLE, INITIAL ENCOUNTER: Primary | ICD-10-CM
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
## 2021-04-23 DIAGNOSIS — I25.10 CORONARY ARTERY DISEASE INVOLVING NATIVE CORONARY ARTERY OF NATIVE HEART WITHOUT ANGINA PECTORIS: ICD-10-CM

## 2021-04-23 DIAGNOSIS — E11.9 TYPE 2 DIABETES MELLITUS WITHOUT COMPLICATION, WITHOUT LONG-TERM CURRENT USE OF INSULIN (H): ICD-10-CM

## 2021-04-23 DIAGNOSIS — R26.89 DECREASED MOBILITY: ICD-10-CM

## 2021-04-23 DIAGNOSIS — M25.511 ACUTE PAIN OF RIGHT SHOULDER: ICD-10-CM

## 2021-04-23 DIAGNOSIS — B35.4 TINEA CORPORIS: ICD-10-CM

## 2021-04-23 LAB
ALBUMIN SERPL-MCNC: 3.1 G/DL (ref 3.4–5)
ALP SERPL-CCNC: 114 U/L (ref 40–150)
ALT SERPL W P-5'-P-CCNC: 25 U/L (ref 0–70)
ANION GAP SERPL CALCULATED.3IONS-SCNC: 4 MMOL/L (ref 3–14)
AST SERPL W P-5'-P-CCNC: 18 U/L (ref 0–45)
BILIRUB SERPL-MCNC: 0.9 MG/DL (ref 0.2–1.3)
BUN SERPL-MCNC: 17 MG/DL (ref 7–30)
CALCIUM SERPL-MCNC: 8.7 MG/DL (ref 8.5–10.1)
CHLORIDE SERPL-SCNC: 107 MMOL/L (ref 94–109)
CO2 SERPL-SCNC: 29 MMOL/L (ref 20–32)
CREAT SERPL-MCNC: 1.04 MG/DL (ref 0.66–1.25)
GFR SERPL CREATININE-BSD FRML MDRD: 68 ML/MIN/{1.73_M2}
GLUCOSE SERPL-MCNC: 134 MG/DL (ref 70–99)
HBA1C MFR BLD: 5.8 % (ref 0–5.6)
POTASSIUM SERPL-SCNC: 4.7 MMOL/L (ref 3.4–5.3)
PROT SERPL-MCNC: 6.7 G/DL (ref 6.8–8.8)
SODIUM SERPL-SCNC: 140 MMOL/L (ref 133–144)

## 2021-04-23 PROCEDURE — 36415 COLL VENOUS BLD VENIPUNCTURE: CPT | Performed by: PHYSICIAN ASSISTANT

## 2021-04-23 PROCEDURE — 83036 HEMOGLOBIN GLYCOSYLATED A1C: CPT | Performed by: PHYSICIAN ASSISTANT

## 2021-04-23 PROCEDURE — 73000 X-RAY EXAM OF COLLAR BONE: CPT | Mod: RT | Performed by: RADIOLOGY

## 2021-04-23 PROCEDURE — 99214 OFFICE O/P EST MOD 30 MIN: CPT | Performed by: PHYSICIAN ASSISTANT

## 2021-04-23 PROCEDURE — 80053 COMPREHEN METABOLIC PANEL: CPT | Performed by: PHYSICIAN ASSISTANT

## 2021-04-23 RX ORDER — CLOTRIMAZOLE 1 %
CREAM (GRAM) TOPICAL 2 TIMES DAILY PRN
Qty: 30 G | Refills: 1 | Status: ON HOLD | OUTPATIENT
Start: 2021-04-23 | End: 2022-08-20

## 2021-04-23 RX ORDER — NITROGLYCERIN 0.4 MG/1
0.4 TABLET SUBLINGUAL SEE ADMIN INSTRUCTIONS
Qty: 30 TABLET | Refills: 0 | Status: SHIPPED | OUTPATIENT
Start: 2021-04-23 | End: 2021-05-27

## 2021-04-23 RX ORDER — HYDROCODONE BITARTRATE AND ACETAMINOPHEN 5; 325 MG/1; MG/1
.5-1 TABLET ORAL EVERY 6 HOURS PRN
Qty: 10 TABLET | Refills: 0 | Status: SHIPPED | OUTPATIENT
Start: 2021-04-23 | End: 2021-04-26

## 2021-04-23 NOTE — LETTER
[facility]  [address]  [place] [address]  [phone]      April 23, 2021      Flash FELICIA Stephanie  [address] [address]        Dear Brown,    We are writing to inform you of your test results. As we discussed. Please follow up with your primary care provider in the next month for a recheck and with an orthopedic provider to discuss your fracture.    XR CLAVICLE RT 2 VIEWS 4/23/2021 1:54 PM      HISTORY: Fall, initial encounter                                                                      IMPRESSION: Right clavicular mid to distal third fracture with one  shaft width inferior displacement of the distal fragment. Amorphous  but somewhat irregular calcification inferior to the glenoid of  indeterminate etiology or significance. Extensive axillary artery  calcification is noted.     ADRI BLANCO MD    Resulted Orders   Hemoglobin A1c   Result Value Ref Range    Hemoglobin A1C 5.8 (H) 0 - 5.6 %      Comment:      Normal <5.7% Prediabetes 5.7-6.4%  Diabetes 6.5% or higher - adopted from ADA   consensus guidelines.     Comprehensive metabolic panel (BMP + Alb, Alk Phos, ALT, AST, Total. Bili, TP)   Result Value Ref Range    Sodium 140 133 - 144 mmol/L    Potassium 4.7 3.4 - 5.3 mmol/L    Chloride 107 94 - 109 mmol/L    Carbon Dioxide 29 20 - 32 mmol/L    Anion Gap 4 3 - 14 mmol/L    Glucose 134 (H) 70 - 99 mg/dL      Comment:      Non Fasting    Urea Nitrogen 17 7 - 30 mg/dL    Creatinine 1.04 0.66 - 1.25 mg/dL    GFR Estimate 68 >60 mL/min/[1.73_m2]      Comment:      Non  GFR Calc  Starting 12/18/2018, serum creatinine based estimated GFR (eGFR) will be   calculated using the Chronic Kidney Disease Epidemiology Collaboration   (CKD-EPI) equation.      GFR Estimate If Black 79 >60 mL/min/[1.73_m2]      Comment:       GFR Calc  Starting 12/18/2018, serum creatinine based estimated GFR (eGFR) will be   calculated using  the Chronic Kidney Disease Epidemiology Collaboration   (CKD-EPI) equation.      Calcium 8.7 8.5 - 10.1 mg/dL    Bilirubin Total 0.9 0.2 - 1.3 mg/dL    Albumin 3.1 (L) 3.4 - 5.0 g/dL    Protein Total 6.7 (L) 6.8 - 8.8 g/dL    Alkaline Phosphatase 114 40 - 150 U/L    ALT 25 0 - 70 U/L    AST 18 0 - 45 U/L       If you have any questions or concerns, please call the clinic at the number listed above.       Sincerely,      BEAR [email or web address]

## 2021-04-23 NOTE — PROGRESS NOTES
Assessment & Plan   Problem List Items Addressed This Visit        Endocrine    Diabetes mellitus, type 2 (H)    Relevant Orders    Hemoglobin A1c (Completed)    Comprehensive metabolic panel (BMP + Alb, Alk Phos, ALT, AST, Total. Bili, TP) (Completed)       Circulatory    CAD (coronary artery disease)    Relevant Medications    nitroGLYcerin (NITROSTAT) 0.4 MG sublingual tablet      Other Visit Diagnoses     Closed displaced fracture of shaft of right clavicle, initial encounter    -  Primary    Relevant Medications    HYDROcodone-acetaminophen (NORCO) 5-325 MG tablet    Other Relevant Orders    Orthopedic & Spine  Referral    ARM SLING L (Completed)    Decreased mobility        Relevant Orders    [facility] REFERRAL    Acute pain of right shoulder        Fall, initial encounter        Relevant Orders    XR Clavicle Right (Completed)    Tinea corporis        Relevant Medications    clotrimazole (LOTRIMIN) 1 % external cream          Flash Brown is a 80 year old male with multiple comorbidities with right clavicular pain status post mechanical fall 10 days ago. DDx sprain/strain/fracture/contusion/dislocation/others. XR showed displaced fracture of the right clavicle. Neurovascularly intact. Sling applied, will tx with analgesics below, ortho eval in the next week and primary care in the next 1-2 months.    Complete history and physical exam as below. AF with normal VS.    DDx and Dx discussed with and explained to the pt to their satisfaction.  All questions were answered at this time. Pt expressed understanding of and agreement with this dx, tx, and plan. No further workup warranted and standard medication warnings given. I have given the patient a list of pertinent indications for re-evaluation. Will go to the Emergency Department if symptoms worsen or new concerning symptoms arise. Patient left in no apparent distress.     38 minutes spent on the date of the encounter doing chart review,  history and exam, documentation and further activities per the note    See Patient Instructions    Return in about 2 weeks (around 5/7/2021) for a recheck of your symptoms if not improving, or go to an ER anytime if worsening/changing..    BEAR Wemes  [facility] [place] is a 80 year old who presents for the following health issues  accompanied by his sister:    HPI   Mechanical fall after tripping 10 days ago. Abrasions to knees and these have healed. Ongoing right shoulder pain. No sob or chest pain.     Has worsening mobility due to weight gain.    Musculoskeletal problem/pain  Onset/Duration: 10 days  Description  Location: Shoulder - right  Joint Swelling: no  Redness: no  Pain: YES  Warmth: no  Intensity:  moderate  Progression of Symptoms:  same  Accompanying signs and symptoms:   Fevers: no  Numbness/tingling/weakness: no  History  Trauma to the area: YES  Recent illness:  no  Previous similar problem: no  Previous evaluation:  no  Precipitating or alleviating factors:  Aggravating factors include: painful to the touch, movement bothers him  Therapies tried and outcome: nothing    Review of Systems   Constitutional, HEENT, cardiovascular, pulmonary, gi and gu systems are negative, except as otherwise noted.      Objective    /55   Pulse 65   Temp 96.6  F (35.9  C) (Tympanic)   Resp 20   Wt 116 kg (255 lb 12.8 oz)   SpO2 100%   BMI 41.29 kg/m    Body mass index is 41.29 kg/m .  Physical Exam  Vitals signs and nursing note reviewed.   Constitutional:       General: He is not in acute distress.     Appearance: He is not ill-appearing or diaphoretic.   HENT:      Head: Normocephalic and atraumatic.      Mouth/Throat:      Mouth: Mucous membranes are moist.   Eyes:      Conjunctiva/sclera: Conjunctivae normal.   Cardiovascular:      Rate and Rhythm: Normal rate and regular rhythm.      Heart sounds: Normal heart sounds. No murmur. No friction rub. No gallop.     Pulmonary:      Effort: Pulmonary effort is normal. No respiratory distress.      Breath sounds: Normal breath sounds. No stridor. No wheezing, rhonchi or rales.   Abdominal:      General: Bowel sounds are normal. There is no distension.      Palpations: Abdomen is soft. There is no mass.      Tenderness: There is no abdominal tenderness. There is no guarding or rebound.      Hernia: No hernia is present.   Musculoskeletal:      Comments: Tenderness along the right clavicle. No skin tenting or open wounds. Distal CMS intact. Remainder of limb non-tender.    Skin:     General: Skin is warm and dry.      Comments: Erythematous plaque to left axilla.   Neurological:      General: No focal deficit present.      Mental Status: He is alert. Mental status is at baseline.   Psychiatric:         Mood and Affect: Mood normal.         Behavior: Behavior normal.          Results for orders placed or performed in visit on 04/23/21   XR Clavicle Right     Status: None    Narrative    XR CLAVICLE RT 2 VIEWS 4/23/2021 1:54 PM     HISTORY: Fall, initial encounter      Impression    IMPRESSION: Right clavicular mid to distal third fracture with one  shaft width inferior displacement of the distal fragment. Amorphous  but somewhat irregular calcification inferior to the glenoid of  indeterminate etiology or significance. Extensive axillary artery  calcification is noted.    ADRI BLANCO MD   Results for orders placed or performed in visit on 04/23/21   Hemoglobin A1c     Status: Abnormal   Result Value Ref Range    Hemoglobin A1C 5.8 (H) 0 - 5.6 %   Comprehensive metabolic panel (BMP + Alb, Alk Phos, ALT, AST, Total. Bili, TP)     Status: Abnormal   Result Value Ref Range    Sodium 140 133 - 144 mmol/L    Potassium 4.7 3.4 - 5.3 mmol/L    Chloride 107 94 - 109 mmol/L    Carbon Dioxide 29 20 - 32 mmol/L    Anion Gap 4 3 - 14 mmol/L    Glucose 134 (H) 70 - 99 mg/dL    Urea Nitrogen 17 7 - 30 mg/dL    Creatinine 1.04 0.66 - 1.25  mg/dL    GFR Estimate 68 >60 mL/min/[1.73_m2]    GFR Estimate If Black 79 >60 mL/min/[1.73_m2]    Calcium 8.7 8.5 - 10.1 mg/dL    Bilirubin Total 0.9 0.2 - 1.3 mg/dL    Albumin 3.1 (L) 3.4 - 5.0 g/dL    Protein Total 6.7 (L) 6.8 - 8.8 g/dL    Alkaline Phosphatase 114 40 - 150 U/L    ALT 25 0 - 70 U/L    AST 18 0 - 45 U/L

## 2021-04-23 NOTE — PATIENT INSTRUCTIONS
Monika Sanchez,    Thank you for allowing [facility] to manage your care.    I sent your prescriptions to your pharmacy.    For your pain, please use Tylenol 650mg every 6 hours.     Max acetaminophen (Tylenol) 3,000mg/24 hours    Narcotics Discharge Instructions: Norco    You have been prescribed a narcotic pain medication that has risk for addiction with prolonged use, so please use sparingly.  Additionally, narcotics are medications that are sedating (will make you sleepy), so do not drive or operate machinery while taking this medication.  Avoid alcohol or other sedating medicines such as benzodiazepines while taking narcotics due to risk for increased sedation and difficulty breathing.      Narcotics will cause constipation.  If you need to take this medication please consider taking an over-the-counter stool softener and laxative, such as Senna Plus, to prevent constipation from developing.  Nausea is a side effect of narcotic use.  Possible additional side effects include vomiting, itching, and dizziness/lightheadedness.    I made referrals to physical therapy and orthopedics. [place] call the specialty number on your after visit summary to schedule.     Please allow 1-2 business days for our office to contact you in regards to your laboratory/radiological studies.  If not done so, I encourage you to login into [employer] ([email or web address] to review your results as well.     If you have any questions or concerns, please feel free to call us at [phone]    Sincerely,    Sam Lizarraga PA-C    Did you know?      You can schedule a video visit for follow-up appointments as well as future appointments for certain conditions.  Please see the below link.     [email or web address]    If you have not already done so,  I encourage you to sign up for [employer] ([email or web address]).  This will allow you to review your results, securely communicate  with a provider, and schedule virtual visits as well.

## 2021-04-26 ENCOUNTER — OFFICE VISIT (OUTPATIENT)
Dept: ORTHOPEDICS | Facility: CLINIC | Age: 81
End: 2021-04-26
Attending: PHYSICIAN ASSISTANT
Payer: MEDICARE

## 2021-04-26 VITALS
BODY MASS INDEX: 40.66 KG/M2 | HEIGHT: 66 IN | SYSTOLIC BLOOD PRESSURE: 114 MMHG | DIASTOLIC BLOOD PRESSURE: 79 MMHG | WEIGHT: 253 LBS

## 2021-04-26 DIAGNOSIS — S42.021A CLOSED DISPLACED FRACTURE OF SHAFT OF RIGHT CLAVICLE, INITIAL ENCOUNTER: ICD-10-CM

## 2021-04-26 PROCEDURE — 23500 CLTX CLAVICULAR FX W/O MNPJ: CPT | Mod: RT | Performed by: ORTHOPAEDIC SURGERY

## 2021-04-26 PROCEDURE — 99203 OFFICE O/P NEW LOW 30 MIN: CPT | Mod: 57 | Performed by: ORTHOPAEDIC SURGERY

## 2021-04-26 ASSESSMENT — PAIN SCALES - GENERAL: PAINLEVEL: MODERATE PAIN (4)

## 2021-04-26 ASSESSMENT — MIFFLIN-ST. JEOR: SCORE: 1810.35

## 2021-04-26 NOTE — LETTER
4/26/2021         RE: Flash Brown  [address]  [facility] [address]        Dear Colleague,    Thank you for referring your patient, Flash Brown, to the [facility]. Please see a copy of my visit note below.    Chief Complaint:   Chief Complaint   Patient presents with     Right Shoulder - Pain     Right clavicle fracture. DOI: 4/13/21. Left hand dominant. Was visiting family in [place], tripped in the yard, fell to knees an fell forward into a metal pole, which contacted the right clavicle.      Clavicle Injury     He did not seek treatment until he drove home as there was no deformity, arm was usable, only about 1/2 dollar size spot of bruising. He's in a sling and here with spouse.      Flash Brown is seen today in the [facility] for evaluation of right clavicle fracture  at the request of Sudarshan Lizarraga PA-C      HPI: Flash Brown is a 80 year old male , left-hand dominant, who presents for evaluation and management of a right clavicle fracture. The injury occurred on 4/13/2021 while in [place], [place], visiting family. Went to water some plants and tripped and fell onto hands/knees, right shoulder hit the pillar at the door. Onset of pain. Was still able to use the arm so didn't think was broken. Only a small bruise. Pain continued so once returned home, went in for evaluation on 4/23/2021 and noted to have a clavicle fracture, given a sling. It has been 2 weeks since the initial injury. Taking ibuprofen for pain, was given a prescription of hydrodocone and only taken it twice.      He reports having mild pain/discomfort around the injury site. He denies numbness or tingling.   Pain severity: 4/10  Pain quality: dull, aching and sharp  Frequency of symptoms: are constant    Treatment up to this point:NSAIDS and sling, hydrocodone    Prior history of related problems: no prior problems with this area in the past    Usual  level of work activity: retired.    Past medical history:  has a past medical history of Basal cell carcinoma, CAD (coronary artery disease), Depression, Diabetes type 2, controlled (H), Hyperlipidemia, Hypertension, Lymphoma (H), Malignant melanoma (H), Melanoma (H), and Squamous cell carcinoma. He also has no past medical history of PONV (postoperative nausea and vomiting).   Patient Active Problem List   Diagnosis     Essential hypertension, benign     Malignant melanoma s/p resection in [place], [place]     RT axillary lymphoma s/p resection, with adjuvant radiation - [place], [place]     Basal cell skin cancer over nose s/p resection - [place], [place]     Mixed hyperlipidemia     Diabetes mellitus, type 2 (H)     Obstructive sleep apnea     Depression     ? 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     Medications:   Current Outpatient Medications:      amLODIPine (NORVASC) 5 MG tablet, Take 1 tablet (5 mg) by mouth daily, Disp: 90 tablet, Rfl: 3     atorvastatin (LIPITOR) 20 MG tablet, Take 1 tablet (20 mg) by mouth daily, Disp: 90 tablet, Rfl: 0     busPIRone (BUSPAR) 10 MG tablet, Take 1 tablet (10 mg) by mouth 2 times daily, Disp: 180 tablet, Rfl: 1     carvedilol (COREG) 12.5 MG tablet, Take 1 tablet (12.5 mg) by mouth 2 times daily  (with meals), Disp: 180 tablet, Rfl: 2     cloNIDine (CATAPRES) 0.1 MG tablet, Take 1 tablet (0.1 mg) by mouth 2 times daily, Disp: 180 tablet, Rfl: 1     clopidogrel (PLAVIX) 75 MG tablet, Take 1 tablet (75 mg) by mouth daily, Disp: 90 tablet, Rfl: 0     clotrimazole (LOTRIMIN) 1 % external cream, Apply topically 2 times daily as needed For ringworm to left armpit., Disp: 30 g, Rfl: 1     hydrALAZINE (APRESOLINE) 50 MG tablet, Take 1 tablet (50 mg) by mouth 2 times daily, Disp: 180 tablet, Rfl: 1     HYDROcodone-acetaminophen (NORCO) 5-325 MG tablet, Take 0.5-1 tablets by mouth every 6 hours as needed for severe pain, Disp: 10 tablet, Rfl: 0     naloxone (NARCAN) 4 MG/0.1ML nasal spray, Spray 1 spray (4 mg) into one nostril alternating nostrils once as needed for opioid reversal Every 2-3 minutes until patient responsive or EMS arrives, Disp: 0.2 mL, Rfl: 0     NIACIN 500 MG OR TABS, Take one orally twice daily, Disp: 180, Rfl: 3     nitroGLYcerin (NITROSTAT) 0.4 MG sublingual tablet, Place 1 tablet (0.4 mg) under the tongue See Admin Instructions for chest pain, Disp: 30 tablet, Rfl: 0     ORDER FOR DME, Light Therapy with Full Spectrum Light (10806 lux) Start with 10-15 minute exposure per day, Increase exposure to 30 to 45 minutes per day, Maximum exposure: 90 minutes per day,Look periodically at light during each session  (Patient not taking: Reported on 1/8/2021), Disp: 1, Rfl: 0     pioglitazone (ACTOS) 30 MG tablet, Take 1 tablet (30 mg) by mouth daily, Disp: 90 tablet, Rfl: 0     ramipril (ALTACE) 10 MG capsule, TAKE 1 CAPSULE BY MOUTH EVERY DAY, Disp: 90 capsule, Rfl: 0     tamsulosin (FLOMAX) 0.4 MG capsule, Take 1 capsule (0.4 mg) by mouth daily, Disp: 90 capsule, Rfl: 3     triamcinolone (KENALOG) 0.1 % external cream, Twice daily to legs prn itching, Disp: 454 g, Rfl: 3  No current facility-administered medications for this visit.     Facility-Administered Medications Ordered in Other Visits:       "sodium hyaluronate (HEALON DUET), , , PRN, [place], Dinesh Vidales MD, 1 kit at 10/02/19 1149       Allergies:     Allergies   Allergen Reactions     Iodine Swelling        Family History: family history includes Asthma in an other family member; Blood Disease in an other family member; Cancer in an other family member; Lung Cancer in his mother; Prostate Cancer in his father.     Social History: retired.  reports that he has never smoked. He has never used smokeless tobacco. He reports current alcohol use. He reports that he does not use drugs.    Review of Systems:  ROS: 10 point ROS neg other than the symptoms noted above in the HPI and past medical history.    Physical Exam  GENERAL APPEARANCE: elderly, alert, no distress.   SKIN: no suspicious lesions or rashes  RESPIRATORY: No increased work of breathing.  NEURO: Normal strength and tone, mentation intact and speech normal  PSYCH:  mentation appears normal and affect normal. Not anxious.  Hands: no clubbing, nail pitting or nodes.    /79   Ht 1.676 m (5' 6\")   Wt 114.8 kg (253 lb)   BMI 40.84 kg/m         right UPPER EXTREMITY:  The sling was removed  There is minimal swelling in the shoulder, clavicle region.  There is mild tenderness in the mid-distal 1/3 clavicle.  There is no ecchymosis.  There is no erythema of the surrounding skin.  There is no maceration of the skin.  There is no gross deformity in the area.    Sensation intact to light touch in median, radial, ulnar and axillary nerve distributions  Palpable 2+ radial pulse, brisk capillary refill to all fingers, wwp  Intact epl fpl fdp edc wrist flexion/extension biceps triceps deltoid        X-rays:  2 views right clavicle from 4/23/2021 were reviewed in clinic today.  Right clavicular mid to distal third fracture with one shaft width inferior displacement of the distal fragment. Amorphous but somewhat irregular calcification inferior to the glenoid of indeterminate etiology or " significance. Extensive axillary artery calcification is noted.      Assessment:: 80 year old male ,right -hand dominant, with right clavicle fracture, displaced.    Plan: nonoperative fracture management.  * discussed with patient injury, has a clavicle (collar bone) fracture. Treatment options historically has been nonoperative treatment with a period of sling immobilization (4-6 weeks), followed by gradual shoulder range of motion exercises, and returning to activities once the fracture has healed. Depending on the severity of the injury, high success of healing, although fracture malunion rate is high. Typically, this is not a problem, other than cosmetic. Surgery has become more indicated for fractures that have a higher chance of not healing with standard nonoperative treatment, such as fractures that are open, shortened > 2cm, completely displaced.    At this time, informed, mutual decision made to proceed with:  1. Immobilization. sling full time X 4 weeks  2. Activity: No lifting, pushing or contact sports. Non weight bearing left upper extremity.  3. Pain Control: Appropriate doses of OTC Tylenol discussed, to be used as needed. Refrain from using NSAIDS at this time.  4. Imaging next visit: 2 views of the right clavicle.  5. Return to clinic in 4 weeks, or sooner as needed, 2 views right clavicle.  6. All questions addressed and answered.    Garrick Ladd M.D., M.S.  Dept. of Orthopaedic Surgery  [facility]        Again, thank you for allowing me to participate in the care of your patient.        Sincerely,        Garrick Ladd MD

## 2021-04-26 NOTE — PROGRESS NOTES
Chief Complaint:   Chief Complaint   Patient presents with     Right Shoulder - Pain     Right clavicle fracture. DOI: 4/13/21. Left hand dominant. Was visiting family in [place], tripped in the yard, fell to knees an fell forward into a metal pole, which contacted the right clavicle.      Clavicle Injury     He did not seek treatment until he drove home as there was no deformity, arm was usable, only about 1/2 dollar size spot of bruising. He's in a sling and here with spouse.      Flash Brown is seen today in the [facility] for evaluation of right clavicle fracture  at the request of Sudarshan Lizarraga PA-C      HPI: Flash Brown is a 80 year old male , left-hand dominant, who presents for evaluation and management of a right clavicle fracture. The injury occurred on 4/13/2021 while in [place], [place], visiting family. Went to water some plants and tripped and fell onto hands/knees, right shoulder hit the pillar at the door. Onset of pain. Was still able to use the arm so didn't think was broken. Only a small bruise. Pain continued so once returned home, went in for evaluation on 4/23/2021 and noted to have a clavicle fracture, given a sling. It has been 2 weeks since the initial injury. Taking ibuprofen for pain, was given a prescription of hydrodocone and only taken it twice.      He reports having mild pain/discomfort around the injury site. He denies numbness or tingling.   Pain severity: 4/10  Pain quality: dull, aching and sharp  Frequency of symptoms: are constant    Treatment up to this point:NSAIDS and sling, hydrocodone    Prior history of related problems: no prior problems with this area in the past    Usual level of work activity: retired.    Past medical history:  has a past medical history of Basal cell carcinoma, CAD (coronary artery disease), Depression, Diabetes type 2, controlled (H), Hyperlipidemia, Hypertension, Lymphoma (H), Malignant melanoma (H),  Melanoma (H), and Squamous cell carcinoma. He also has no past medical history of PONV (postoperative nausea and vomiting).   Patient Active Problem List   Diagnosis     Essential hypertension, benign     Malignant melanoma s/p resection in [place], [place]     RT axillary lymphoma s/p resection, with adjuvant radiation - [place], [place]     Basal cell skin cancer over nose s/p resection - [place], [place]     Mixed hyperlipidemia     Diabetes mellitus, type 2 (H)     Obstructive sleep apnea     Depression     ? 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     Medications:   Current Outpatient Medications:      amLODIPine (NORVASC) 5 MG tablet, Take 1 tablet (5 mg) by mouth daily, Disp: 90 tablet, Rfl: 3     atorvastatin (LIPITOR) 20 MG tablet, Take 1 tablet (20 mg) by mouth daily, Disp: 90 tablet, Rfl: 0     busPIRone (BUSPAR) 10 MG tablet, Take 1 tablet (10 mg) by mouth 2 times daily, Disp: 180 tablet, Rfl: 1     carvedilol (COREG) 12.5 MG tablet, Take 1 tablet (12.5 mg) by mouth 2 times daily (with meals), Disp: 180 tablet, Rfl: 2     cloNIDine (CATAPRES) 0.1 MG tablet, Take 1 tablet (0.1 mg) by mouth 2 times daily, Disp: 180 tablet, Rfl: 1     clopidogrel (PLAVIX) 75 MG tablet, Take 1 tablet (75 mg) by mouth daily, Disp: 90 tablet, Rfl: 0      clotrimazole (LOTRIMIN) 1 % external cream, Apply topically 2 times daily as needed For ringworm to left armpit., Disp: 30 g, Rfl: 1     hydrALAZINE (APRESOLINE) 50 MG tablet, Take 1 tablet (50 mg) by mouth 2 times daily, Disp: 180 tablet, Rfl: 1     HYDROcodone-acetaminophen (NORCO) 5-325 MG tablet, Take 0.5-1 tablets by mouth every 6 hours as needed for severe pain, Disp: 10 tablet, Rfl: 0     naloxone (NARCAN) 4 MG/0.1ML nasal spray, Spray 1 spray (4 mg) into one nostril alternating nostrils once as needed for opioid reversal Every 2-3 minutes until patient responsive or EMS arrives, Disp: 0.2 mL, Rfl: 0     NIACIN 500 MG OR TABS, Take one orally twice daily, Disp: 180, Rfl: 3     nitroGLYcerin (NITROSTAT) 0.4 MG sublingual tablet, Place 1 tablet (0.4 mg) under the tongue See Admin Instructions for chest pain, Disp: 30 tablet, Rfl: 0     ORDER FOR DME, Light Therapy with Full Spectrum Light (66209 lux) Start with 10-15 minute exposure per day, Increase exposure to 30 to 45 minutes per day, Maximum exposure: 90 minutes per day,Look periodically at light during each session  (Patient not taking: Reported on 1/8/2021), Disp: 1, Rfl: 0     pioglitazone (ACTOS) 30 MG tablet, Take 1 tablet (30 mg) by mouth daily, Disp: 90 tablet, Rfl: 0     ramipril (ALTACE) 10 MG capsule, TAKE 1 CAPSULE BY MOUTH EVERY DAY, Disp: 90 capsule, Rfl: 0     tamsulosin (FLOMAX) 0.4 MG capsule, Take 1 capsule (0.4 mg) by mouth daily, Disp: 90 capsule, Rfl: 3     triamcinolone (KENALOG) 0.1 % external cream, Twice daily to legs prn itching, Disp: 454 g, Rfl: 3  No current facility-administered medications for this visit.     Facility-Administered Medications Ordered in Other Visits:      sodium hyaluronate (HEALON DUET), , , PRN, Dinesh Ivey MD, 1 kit at 10/02/19 1144       Allergies:     Allergies   Allergen Reactions     Iodine Swelling        Family History: family history includes Asthma in an other family member; Blood  "Disease in an other family member; Cancer in an other family member; Lung Cancer in his mother; Prostate Cancer in his father.     Social History: retired.  reports that he has never smoked. He has never used smokeless tobacco. He reports current alcohol use. He reports that he does not use drugs.    Review of Systems:  ROS: 10 point ROS neg other than the symptoms noted above in the HPI and past medical history.    Physical Exam  GENERAL APPEARANCE: elderly, alert, no distress.   SKIN: no suspicious lesions or rashes  RESPIRATORY: No increased work of breathing.  NEURO: Normal strength and tone, mentation intact and speech normal  PSYCH:  mentation appears normal and affect normal. Not anxious.  Hands: no clubbing, nail pitting or nodes.    /79   Ht 1.676 m (5' 6\")   Wt 114.8 kg (253 lb)   BMI 40.84 kg/m         right UPPER EXTREMITY:  The sling was removed  There is minimal swelling in the shoulder, clavicle region.  There is mild tenderness in the mid-distal 1/3 clavicle.  There is no ecchymosis.  There is no erythema of the surrounding skin.  There is no maceration of the skin.  There is no gross deformity in the area.    Sensation intact to light touch in median, radial, ulnar and axillary nerve distributions  Palpable 2+ radial pulse, brisk capillary refill to all fingers, wwp  Intact epl fpl fdp edc wrist flexion/extension biceps triceps deltoid        X-rays:  2 views right clavicle from 4/23/2021 were reviewed in clinic today.  Right clavicular mid to distal third fracture with one shaft width inferior displacement of the distal fragment. Amorphous but somewhat irregular calcification inferior to the glenoid of indeterminate etiology or significance. Extensive axillary artery calcification is noted.      Assessment:: 80 year old male ,right -hand dominant, with right clavicle fracture, displaced.    Plan: nonoperative fracture management.  * discussed with patient injury, has a clavicle (collar " bone) fracture. Treatment options historically has been nonoperative treatment with a period of sling immobilization (4-6 weeks), followed by gradual shoulder range of motion exercises, and returning to activities once the fracture has healed. Depending on the severity of the injury, high success of healing, although fracture malunion rate is high. Typically, this is not a problem, other than cosmetic. Surgery has become more indicated for fractures that have a higher chance of not healing with standard nonoperative treatment, such as fractures that are open, shortened > 2cm, completely displaced.    At this time, informed, mutual decision made to proceed with:  1. Immobilization. sling full time X 4 weeks  2. Activity: No lifting, pushing or contact sports. Non weight bearing left upper extremity.  3. Pain Control: Appropriate doses of OTC Tylenol discussed, to be used as needed. Refrain from using NSAIDS at this time.  4. Imaging next visit: 2 views of the right clavicle.  5. Return to clinic in 4 weeks, or sooner as needed, 2 views right clavicle.  6. All questions addressed and answered.    Garrick Ladd M.D., M.S.  Dept. of Orthopaedic Surgery  [facility]

## 2021-05-10 DIAGNOSIS — I10 ESSENTIAL HYPERTENSION: ICD-10-CM

## 2021-05-12 RX ORDER — RAMIPRIL 10 MG/1
CAPSULE ORAL
Qty: 90 CAPSULE | Refills: 0 | Status: SHIPPED | OUTPATIENT
Start: 2021-05-12 | End: 2021-08-03

## 2021-05-12 NOTE — TELEPHONE ENCOUNTER
Prescription approved per [facility] Refill Protocol.    Poornima Wiseman RN

## 2021-05-17 DIAGNOSIS — E78.49 OTHER HYPERLIPIDEMIA: ICD-10-CM

## 2021-05-17 DIAGNOSIS — I25.10 CORONARY ARTERY DISEASE INVOLVING NATIVE CORONARY ARTERY OF NATIVE HEART WITHOUT ANGINA PECTORIS: ICD-10-CM

## 2021-05-20 RX ORDER — ATORVASTATIN CALCIUM 20 MG/1
20 TABLET, FILM COATED ORAL DAILY
Qty: 90 TABLET | Refills: 0 | Status: SHIPPED | OUTPATIENT
Start: 2021-05-20 | End: 2021-08-03

## 2021-05-20 RX ORDER — CLOPIDOGREL BISULFATE 75 MG/1
75 TABLET ORAL DAILY
Qty: 90 TABLET | Refills: 0 | Status: SHIPPED | OUTPATIENT
Start: 2021-05-20 | End: 2021-08-03

## 2021-05-20 NOTE — TELEPHONE ENCOUNTER
Routing refill request to provider for review/approval because:  Labs out of range:  Hgb, Plt      Poornima Wiseman RN

## 2021-05-25 DIAGNOSIS — I25.10 CORONARY ARTERY DISEASE INVOLVING NATIVE CORONARY ARTERY OF NATIVE HEART WITHOUT ANGINA PECTORIS: ICD-10-CM

## 2021-05-26 NOTE — TELEPHONE ENCOUNTER
Spoke with pt. Informed patient of below.  Marylu Herrmann MA    Appt [facility] for 8/3/21 with LAURA. Marylu Herrmann MA

## 2021-05-27 ENCOUNTER — ANCILLARY PROCEDURE (OUTPATIENT)
Dept: GENERAL RADIOLOGY | Facility: CLINIC | Age: 81
End: 2021-05-27
Attending: PHYSICIAN ASSISTANT
Payer: MEDICARE

## 2021-05-27 ENCOUNTER — OFFICE VISIT (OUTPATIENT)
Dept: ORTHOPEDICS | Facility: CLINIC | Age: 81
End: 2021-05-27
Payer: MEDICARE

## 2021-05-27 VITALS
DIASTOLIC BLOOD PRESSURE: 68 MMHG | BODY MASS INDEX: 40.5 KG/M2 | WEIGHT: 252 LBS | SYSTOLIC BLOOD PRESSURE: 114 MMHG | HEIGHT: 66 IN

## 2021-05-27 DIAGNOSIS — S42.021D CLOSED DISPLACED FRACTURE OF SHAFT OF RIGHT CLAVICLE WITH ROUTINE HEALING, SUBSEQUENT ENCOUNTER: ICD-10-CM

## 2021-05-27 DIAGNOSIS — S42.021D CLOSED DISPLACED FRACTURE OF SHAFT OF RIGHT CLAVICLE WITH ROUTINE HEALING, SUBSEQUENT ENCOUNTER: Primary | ICD-10-CM

## 2021-05-27 PROCEDURE — 99207 PR FRACTURE CARE IN GLOBAL PERIOD: CPT | Performed by: ORTHOPAEDIC SURGERY

## 2021-05-27 PROCEDURE — 73000 X-RAY EXAM OF COLLAR BONE: CPT | Mod: RT | Performed by: RADIOLOGY

## 2021-05-27 RX ORDER — NITROGLYCERIN 0.4 MG/1
0.4 TABLET SUBLINGUAL SEE ADMIN INSTRUCTIONS
Qty: 30 TABLET | Refills: 0 | Status: SHIPPED | OUTPATIENT
Start: 2021-05-27

## 2021-05-27 ASSESSMENT — MIFFLIN-ST. JEOR: SCORE: 1805.81

## 2021-05-27 ASSESSMENT — PAIN SCALES - GENERAL: PAINLEVEL: NO PAIN (1)

## 2021-05-27 NOTE — PROGRESS NOTES
"Chief Complaint:   Chief Complaint   Patient presents with     Right Shoulder - Pain     Right clavicle fracture. DOI: 4/13/21. 6 weeks since injury. Doing better, just minor ache, mainly when trying to get comfortable at night. He is sleeping on his right side.      INJURY: right displaced clavicle shaft fracture  DATE of INJURY: 4/13/2021      HPI: Flash Brown is a 80 year old male , left-hand dominant, who presents for followup evaluation and management of a right clavicle fracture, >7 weeks from injury. Returns today doing better, still sore, \"minor ache\", mostly at night trying to get comfortable. Wife states not wearing a sling, using his arm as normal daily, which she states really isn't much to begin with.    The injury occurred on 4/13/2021 while in [place], [place], visiting family. Went to water some plants and tripped and fell onto hands/knees, right shoulder hit the pillar at the door. Onset of pain. Was still able to use the arm so didn't think was broken. Only a small bruise. Pain continued so once returned home, went in for evaluation on 4/23/2021 and noted to have a clavicle fracture, given a sling.         He reports having mild pain/discomfort around the injury site. He denies numbness or tingling.   Pain severity: 1/10  Pain quality: dull, aching and sharp  Frequency of symptoms: are constant    Prior history of related problems: no prior problems with this area in the past    Usual level of work activity: retired.    Past medical history:  has a past medical history of Basal cell carcinoma, CAD (coronary artery disease), Depression, Diabetes type 2, controlled (H), Hyperlipidemia, Hypertension, Lymphoma (H), Malignant melanoma (H), Melanoma (H), and Squamous cell carcinoma. He also has no past medical history of PONV (postoperative nausea and vomiting).   Patient Active Problem List   Diagnosis     Essential hypertension, benign     Malignant melanoma s/p resection in [place], [place]     RT " axillary lymphoma s/p resection, with adjuvant radiation - [place], [place]     Basal cell skin cancer over nose s/p resection - [place], [place]     Mixed hyperlipidemia     Diabetes mellitus, type 2 (H)     Obstructive sleep apnea     Depression     ? 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     Medications:   Current Outpatient Medications:      amLODIPine (NORVASC) 5 MG tablet, Take 1 tablet (5 mg) by mouth daily, Disp: 90 tablet, Rfl: 1     atorvastatin (LIPITOR) 20 MG tablet, Take 1 tablet (20 mg) by mouth daily, Disp: 90 tablet, Rfl: 0     busPIRone (BUSPAR) 10 MG tablet, Take 1 tablet (10 mg) by mouth 2 times daily, Disp: 180 tablet, Rfl: 1     carvedilol (COREG) 12.5 MG tablet, Take 1 tablet (12.5 mg) by mouth 2 times daily (with meals), Disp: 180 tablet, Rfl: 2     cloNIDine (CATAPRES) 0.1 MG tablet, Take 1 tablet (0.1 mg) by mouth 2 times daily, Disp: 180 tablet, Rfl: 1     clopidogrel (PLAVIX) 75 MG tablet, Take 1 tablet (75 mg) by mouth daily, Disp: 90 tablet, Rfl: 0     clotrimazole (LOTRIMIN) 1 % external cream, Apply topically 2 times daily as needed For ringworm to left armpit., Disp: 30 g, Rfl: 1     hydrALAZINE (APRESOLINE) 50 MG tablet, Take 1 tablet (50 mg) by mouth 2 times daily, Disp: 180 tablet, Rfl: 1     naloxone  (NARCAN) 4 MG/0.1ML nasal spray, Spray 1 spray (4 mg) into one nostril alternating nostrils once as needed for opioid reversal Every 2-3 minutes until patient responsive or EMS arrives, Disp: 0.2 mL, Rfl: 0     NIACIN 500 MG OR TABS, Take one orally twice daily, Disp: 180, Rfl: 3     nitroGLYcerin (NITROSTAT) 0.4 MG sublingual tablet, Place 1 tablet (0.4 mg) under the tongue See Admin Instructions for chest pain, Disp: 30 tablet, Rfl: 0     ORDER FOR DME, Light Therapy with Full Spectrum Light (53701 lux) Start with 10-15 minute exposure per day, Increase exposure to 30 to 45 minutes per day, Maximum exposure: 90 minutes per day,Look periodically at light during each session , Disp: 1, Rfl: 0     pioglitazone (ACTOS) 30 MG tablet, Take 1 tablet (30 mg) by mouth daily, Disp: 90 tablet, Rfl: 0     ramipril (ALTACE) 10 MG capsule, TAKE 1 CAPSULE BY MOUTH EVERY DAY, Disp: 90 capsule, Rfl: 0     tamsulosin (FLOMAX) 0.4 MG capsule, Take 1 capsule (0.4 mg) by mouth daily, Disp: 90 capsule, Rfl: 3     triamcinolone (KENALOG) 0.1 % external cream, Twice daily to legs prn itching, Disp: 454 g, Rfl: 3  No current facility-administered medications for this visit.     Facility-Administered Medications Ordered in Other Visits:      sodium hyaluronate (HEALON DUET), , , PRN, Dinesh Ivey MD, 1 kit at 10/02/19 1141       Allergies:     Allergies   Allergen Reactions     Iodine Swelling        Family History: family history includes Asthma in an other family member; Blood Disease in an other family member; Cancer in an other family member; Lung Cancer in his mother; Prostate Cancer in his father.     Social History: retired.  reports that he has never smoked. He has never used smokeless tobacco. He reports current alcohol use. He reports that he does not use drugs.    Review of Systems:     Denies numbness, tingling, parasthesias.   Denies headaches.   Denies fevers, chills, night sweats   Denies chest pain.   Denies  "shortness of breath.   Denies any skin problems, abrasions, rashes, irritation.      Physical Exam  GENERAL APPEARANCE: elderly, alert, no distress. Accompanied by his wife.  SKIN: no suspicious lesions or rashes  RESPIRATORY: No increased work of breathing.  NEURO: Normal strength and tone, mentation intact and speech normal  PSYCH:  mentation appears normal and affect normal. Not anxious.  Hands: no clubbing, nail pitting or nodes.    /68   Ht 1.676 m (5' 6\")   Wt 114.3 kg (252 lb)   BMI 40.67 kg/m         There is noswelling in the shoulder, clavicle region.  There is mild tenderness in the mid-distal 1/3 clavicle.  There is no ecchymosis.  There is no erythema of the surrounding skin.  There is no maceration of the skin.  There is no gross deformity in the area.    Sensation intact to light touch in median, radial, ulnar and axillary nerve distributions  Palpable 2+ radial pulse, brisk capillary refill to all fingers, wwp  Intact epl fpl fdp edc wrist flexion/extension biceps triceps deltoid        X-rays:  2 views right clavicle from 5/27/2021 were reviewed in clinic today.  Right clavicular mid to distal third fracture with one shaft width inferior displacement of the distal fragment. Amorphous but somewhat irregular calcification inferior to the glenoid of indeterminate etiology or significance. Extensive axillary artery calcification is noted. Likely some early callus formation seen.      Assessment:: 80 year old male ,right -hand dominant, with right clavicle fracture, displaced.    Plan: continue nonoperative fracture management.    * images reviewed, possibly early healing. Likely a couple months yet.  * light use of arm ok, nothing heavy or strenuous  * work on gentle shoulder range of motion to prevent stiffness  * reassess 6 weeks, xrays right clavicle, sooner if needed.    Garrick Ladd M.D., M.S.  Dept. of Orthopaedic Surgery  [facility]    "

## 2021-05-27 NOTE — TELEPHONE ENCOUNTER
Prescription approved per [facility] Refill Protocol.  Dana Pacheco RN

## 2021-05-27 NOTE — LETTER
"    5/27/2021         RE: Flash Brown  [address]  [facility] [address]        Dear Colleague,    Thank you for referring your patient, Flash Brown, to the [facility]. Please see a copy of my visit note below.    Chief Complaint:   Chief Complaint   Patient presents with     Right Shoulder - Pain     Right clavicle fracture. DOI: 4/13/21. 6 weeks since injury. Doing better, just minor ache, mainly when trying to get comfortable at night. He is sleeping on his right side.      INJURY: right displaced clavicle shaft fracture  DATE of INJURY: 4/13/2021      HPI: Flash rBown is a 80 year old male , left-hand dominant, who presents for followup evaluation and management of a right clavicle fracture, >7 weeks from injury. Returns today doing better, still sore, \"minor ache\", mostly at night trying to get comfortable. Wife states not wearing a sling, using his arm as normal daily, which she states really isn't much to begin with.    The injury occurred on 4/13/2021 while in [place], [place], visiting family. Went to water some plants and tripped and fell onto hands/knees, right shoulder hit the pillar at the door. Onset of pain. Was still able to use the arm so didn't think was broken. Only a small bruise. Pain continued so once returned home, went in for evaluation on 4/23/2021 and noted to have a clavicle fracture, given a sling.         He reports having mild pain/discomfort around the injury site. He denies numbness or tingling.   Pain severity: 1/10  Pain quality: dull, aching and sharp  Frequency of symptoms: are constant    Prior history of related problems: no prior problems with this area in the past    Usual level of work activity: retired.    Past medical history:  has a past medical history of Basal cell carcinoma, CAD (coronary artery disease), Depression, Diabetes type 2, controlled (H), Hyperlipidemia, Hypertension, Lymphoma (H), Malignant melanoma (H), " Melanoma (H), and Squamous cell carcinoma. He also has no past medical history of PONV (postoperative nausea and vomiting).   Patient Active Problem List   Diagnosis     Essential hypertension, benign     Malignant melanoma s/p resection in [place], [place]     RT axillary lymphoma s/p resection, with adjuvant radiation - [place], [place]     Basal cell skin cancer over nose s/p resection - [place], [place]     Mixed hyperlipidemia     Diabetes mellitus, type 2 (H)     Obstructive sleep apnea     Depression     ? 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     Medications:   Current Outpatient Medications:      amLODIPine (NORVASC) 5 MG tablet, Take 1 tablet (5 mg) by mouth daily, Disp: 90 tablet, Rfl: 1     atorvastatin (LIPITOR) 20 MG tablet, Take 1 tablet (20 mg) by mouth daily, Disp: 90 tablet, Rfl: 0     busPIRone (BUSPAR) 10 MG tablet, Take 1 tablet (10 mg) by mouth 2 times daily, Disp: 180 tablet, Rfl: 1     carvedilol (COREG) 12.5 MG tablet, Take 1 tablet (12.5 mg) by mouth 2 times daily (with meals), Disp: 180 tablet, Rfl: 2     cloNIDine (CATAPRES) 0.1 MG tablet, Take 1 tablet (0.1 mg) by mouth 2 times daily, Disp: 180 tablet, Rfl: 1     clopidogrel (PLAVIX) 75 MG tablet, Take 1 tablet (75 mg) by mouth daily, Disp: 90 tablet, Rfl: 0      clotrimazole (LOTRIMIN) 1 % external cream, Apply topically 2 times daily as needed For ringworm to left armpit., Disp: 30 g, Rfl: 1     hydrALAZINE (APRESOLINE) 50 MG tablet, Take 1 tablet (50 mg) by mouth 2 times daily, Disp: 180 tablet, Rfl: 1     naloxone (NARCAN) 4 MG/0.1ML nasal spray, Spray 1 spray (4 mg) into one nostril alternating nostrils once as needed for opioid reversal Every 2-3 minutes until patient responsive or EMS arrives, Disp: 0.2 mL, Rfl: 0     NIACIN 500 MG OR TABS, Take one orally twice daily, Disp: 180, Rfl: 3     nitroGLYcerin (NITROSTAT) 0.4 MG sublingual tablet, Place 1 tablet (0.4 mg) under the tongue See Admin Instructions for chest pain, Disp: 30 tablet, Rfl: 0     ORDER FOR DME, Light Therapy with Full Spectrum Light (21851 lux) Start with 10-15 minute exposure per day, Increase exposure to 30 to 45 minutes per day, Maximum exposure: 90 minutes per day,Look periodically at light during each session , Disp: 1, Rfl: 0     pioglitazone (ACTOS) 30 MG tablet, Take 1 tablet (30 mg) by mouth daily, Disp: 90 tablet, Rfl: 0     ramipril (ALTACE) 10 MG capsule, TAKE 1 CAPSULE BY MOUTH EVERY DAY, Disp: 90 capsule, Rfl: 0     tamsulosin (FLOMAX) 0.4 MG capsule, Take 1 capsule (0.4 mg) by mouth daily, Disp: 90 capsule, Rfl: 3     triamcinolone (KENALOG) 0.1 % external cream, Twice daily to legs prn itching, Disp: 454 g, Rfl: 3  No current facility-administered medications for this visit.     Facility-Administered Medications Ordered in Other Visits:      sodium hyaluronate (HEALON DUET), , , PRN, Dinesh Ivey MD, 1 kit at 10/02/19 1145       Allergies:     Allergies   Allergen Reactions     Iodine Swelling        Family History: family history includes Asthma in an other family member; Blood Disease in an other family member; Cancer in an other family member; Lung Cancer in his mother; Prostate Cancer in his father.     Social History: retired.  reports that he has never smoked. He has  "never used smokeless tobacco. He reports current alcohol use. He reports that he does not use drugs.    Review of Systems:     Denies numbness, tingling, parasthesias.   Denies headaches.   Denies fevers, chills, night sweats   Denies chest pain.   Denies shortness of breath.   Denies any skin problems, abrasions, rashes, irritation.      Physical Exam  GENERAL APPEARANCE: elderly, alert, no distress. Accompanied by his wife.  SKIN: no suspicious lesions or rashes  RESPIRATORY: No increased work of breathing.  NEURO: Normal strength and tone, mentation intact and speech normal  PSYCH:  mentation appears normal and affect normal. Not anxious.  Hands: no clubbing, nail pitting or nodes.    /68   Ht 1.676 m (5' 6\")   Wt 114.3 kg (252 lb)   BMI 40.67 kg/m         There is noswelling in the shoulder, clavicle region.  There is mild tenderness in the mid-distal 1/3 clavicle.  There is no ecchymosis.  There is no erythema of the surrounding skin.  There is no maceration of the skin.  There is no gross deformity in the area.    Sensation intact to light touch in median, radial, ulnar and axillary nerve distributions  Palpable 2+ radial pulse, brisk capillary refill to all fingers, wwp  Intact epl fpl fdp edc wrist flexion/extension biceps triceps deltoid        X-rays:  2 views right clavicle from 5/27/2021 were reviewed in clinic today.  Right clavicular mid to distal third fracture with one shaft width inferior displacement of the distal fragment. Amorphous but somewhat irregular calcification inferior to the glenoid of indeterminate etiology or significance. Extensive axillary artery calcification is noted. Likely some early callus formation seen.      Assessment:: 80 year old male ,right -hand dominant, with right clavicle fracture, displaced.    Plan: continue nonoperative fracture management.    * images reviewed, possibly early healing. Likely a couple months yet.  * light use of arm ok, nothing heavy or " strenuous  * work on gentle shoulder range of motion to prevent stiffness  * reassess 6 weeks, xrays right clavicle, sooner if needed.    Garrick Ladd M.D., M.S.  Dept. of Orthopaedic Surgery  [facility]        Again, thank you for allowing me to participate in the care of your patient.        Sincerely,        Garrick Ladd MD

## 2021-06-23 ENCOUNTER — THERAPY VISIT (OUTPATIENT)
Dept: PHYSICAL THERAPY | Facility: CLINIC | Age: 81
End: 2021-06-23
Attending: PHYSICIAN ASSISTANT
Payer: MEDICARE

## 2021-06-23 DIAGNOSIS — R29.898 WEAKNESS OF BOTH LEGS: Primary | ICD-10-CM

## 2021-06-23 DIAGNOSIS — S42.021D CLOSED DISPLACED FRACTURE OF SHAFT OF RIGHT CLAVICLE WITH ROUTINE HEALING, SUBSEQUENT ENCOUNTER: ICD-10-CM

## 2021-06-23 PROCEDURE — 97110 THERAPEUTIC EXERCISES: CPT | Mod: GP | Performed by: PHYSICAL THERAPIST

## 2021-06-23 PROCEDURE — 97161 PT EVAL LOW COMPLEX 20 MIN: CPT | Mod: GP | Performed by: PHYSICAL THERAPIST

## 2021-06-23 NOTE — LETTER
"DEPARTMENT OF HEALTH AND HUMAN SERVICES  C[facility]ERS FOR MEDICARE & MEDICAID SERVICES    PLAN/UPDATED PLAN OF PROGRESS FOR OUTPATIENT REHABILITATION          PATIENTS NAME:  Flash Brown     : 1940    PROVIDER NUMBER:    [id number]    [id number]    PROVID[facility] [place]    MEDICAL RECORD NUMBER: [id number]     START OF CARE DATE:  SOC Date: 21   TYPE:  PT    PRIMARY/TREATMENT DIAGNOSIS: (Pertinent Medical Diagnosis)     Closed displaced fracture of shaft of right clavicle with routine healing, subsequent encounter  Weakness of both legs    VISITS FROM START OF CARE:  Rxs Used: 1     Physical Therapy Initial Evaluation  Subjective:  The history is provided by the patient. No  was used.     Therapist Generated HPI Evaluation  Problem details: \"Daniel\" is a pleasant 79 yo male referred to physical therapy under the direction of Dr. Ladd after sustaining a closed distal clavicle fx on 21.     [place] the time of injury. Daniel had tripped, fell forward to hands and knees, on the way down, struck his shoulder to a stationary beam/support. Fracture was noted on exam roughly 10 days later.     Since injury, patient underwent immobilization until consult with Dr. Ladd on 2021 who had referred patient to PT for gentle ROM progression.     Daniel is R hand dominant. He is gradually improving on his own, but, remains limited in motion, strength and function.     Daniel is retired.     DOI: 2021  Date of PT Service: 2021.         Type of problem:  Right shoulder.    This is a new condition.  Condition occurred with:  A fall.  Where condition occurred: at home.  Patient reports pain:  Anterior.  and is intermittent (8/10).  Pain radiates to:  Shoulder and upper arm. Pain is the same all the time.  Since onset symptoms are gradually improving.  Associated symptoms:  Loss of motion/stiffness, loss of strength and " painful arc. Symptoms are exacerbated by carrying, lifting, lying on extremity, using arm at shoulder level, using arm behind back and using arm overhead  and relieved by ice and rest.  Special tests included:  X-ray.    Work activity restrictions: retired   Barriers include:  None as reported by patient.      Objective:  Standing Alignment:    Cervical/Thoracic:  Forward head  Shoulder/UE:  Rounded shoulders    Gait:    Gait Type:  Normal   Weight Bearing Status:  WBAT   Assistive Devices:  None    Flexibility/Screens:   Negative screens: Cervical     Neurological: He is alert. He has normal strength and normal reflexes. No cranial nerve deficit or sensory deficit.       Shoulder Evaluation:  ROM:  AROM:    Flexion:  Left:  160    Right:  70    Extension/Internal Rotation:  Left:  T7    Right:  S1      PROM:    Flexion:  Left:  165    Right: 80-90      Internal Rotation:  Left:  85    Right:  60  External Rotation:  Left:  70    Right:  35    Strength:  not assessed    Palpation:    Right shoulder tenderness present at: Clavicle; Acrimioclavicular and Upper Trap  Right shoulder tenderness not present at:Sternoclavicular         Assessment/Plan:    Patient is a 80 year old male with right side shoulder complaints.    Patient has the following significant findings with corresponding treatment plan.                Diagnosis 1:  R closed, displaced, distal Clavicle Fracture       Therapy Evaluation Codes:   1) History comprised of:   Personal factors that impact the plan of care:      None.    Comorbidity factors that impact the plan of care are:      Asthma, Cancer, Diabetes, Emphysema, Heart problems, High blood pressure, Implanted device and Overweight.     Medications impacting care: High blood pressure, Pain and see medical chart .  2) Examination of Body Systems comprised of:   Body structures and functions that impact the plan of care:      Shoulder.   Activity limitations that impact the plan of care are:   "    Bending, Driving, Dressing, Lifting, Sleeping and global leg weakness .  3) Clinical presentation characteristics are:   Evolving/Changing.  4) Decision-Making    Moderate complexity using standardized patient assessment instrument and/or measureable assessment of functional outcome.  Cumulative Therapy Evaluation is: Low complexity.    Previous and current functional limitations:  (See Goal Flow Sheet for this information)    Short term and Long term goals: (See Goal Flow Sheet for this information)     Communication ability:  Patient appears to be able to clearly communicate and understand verbal and written communication and follow directions correctly.  Treatment Explanation - The following has been discussed with the patient:   RX ordered/plan of care  Anticipated outcomes  Possible risks and side effects  This patient would benefit from PT intervention to resume normal activities.   Rehab potential is good.    Frequency:  1 X week, once daily  Duration:  for 6 weeks  Discharge Plan:  Independent in home treatment program.  Reach maximal therapeutic benefit.    Rehab Plans: address gentle but progressive R shoulder ROM restoration, Lower extremity strength, balance and endurance.     Please refer to the daily flowsheet for treatment today, total treatment time and time spent performing 1:1 timed codes.           Caregiver Signature/Credentials _____________________________ Date ________       Treating Provider: Patrick Calderón PT ATC cert MDT    I have reviewed and certified the need for these services and plan of treatment while under my care.        PHYSICIAN'S SIGNATURE:   _________________________________________  Date___________   Antonio SIFUENTES    Certification period:  Beginning of Cert date period: 06/23/21 to  End of Cert period date: 09/20/21     Functional Level Progress Report: Please see attached \"Goal Flow sheet for Functional level.\"    ____X____ Continue Services or       ________ DC " Services                Service dates: From  SOC Date: 06/23/21 date to present

## 2021-06-28 NOTE — PROGRESS NOTES
"Physical Therapy Initial Evaluation  Subjective:  The history is provided by the patient. No  was used.   Therapist Generated HPI Evaluation  Problem details: \"Daniel\" is a pleasant 81 yo male referred to physical therapy under the direction of Dr. Ladd after sustaining a closed distal clavicle fx on 4/13/21.     [place] the [place] of [place]. Daniel had tripped, fell forward to hands and knees, on the way down, struck his shoulder to a stationary beam/support. Fracture was noted on exam roughly 10 days later.     Since injury, patient underwent immobilization until consult with Dr. Ladd on 5/27/2021 who had referred patient to PT for gentle ROM progression.     Daniel is R hand dominant. He is gradually improving on his own, but, remains limited in motion, strength and function.     Go retired.     DOI: 4/13/2021  Date of PT Service: 6/23/2021.         Type of problem:  Right shoulder.    This is a new condition.  Condition occurred with:  A fall.  Where condition occurred: at home.  Patient reports pain:  Anterior.  and is intermittent (8/10).  Pain radiates to:  Shoulder and upper arm. Pain is the same all the time.  Since onset symptoms are gradually improving.  Associated symptoms:  Loss of motion/stiffness, loss of strength and painful arc. Symptoms are exacerbated by carrying, lifting, lying on extremity, using arm at shoulder level, using arm behind back and using arm overhead  and relieved by ice and rest.  Special tests included:  X-ray.    Work activity restrictions: retired   Barriers include:  None as reported by patient.                        Objective:  Standing Alignment:    Cervical/Thoracic:  Forward head  Shoulder/UE:  Rounded shoulders              Gait:    Gait Type:  Normal   Weight Bearing Status:  WBAT   Assistive Devices:  None      Flexibility/Screens:   Negative screens: Cervical           Neurological: He is alert. He has normal strength and normal reflexes. " No cranial nerve deficit or sensory deficit.                      Shoulder Evaluation:  ROM:  AROM:    Flexion:  Left:  160    Right:  70                      Extension/Internal Rotation:  Left:  T7    Right:  S1    PROM:    Flexion:  Left:  165    Right: 80-90          Internal Rotation:  Left:  85    Right:  60  External Rotation:  Left:  70    Right:  35                    Strength:  not assessed                          Palpation:      Right shoulder tenderness present at: Clavicle; Acrimioclavicular and Upper Trap  Right shoulder tenderness not present at:Sternoclavicular                                     General     ROS    Assessment/Plan:    Patient is a 80 year old male with right side shoulder complaints.    Patient has the following significant findings with corresponding treatment plan.                Diagnosis 1:  R closed, displaced, distal Clavicle Fracture       Therapy Evaluation Codes:   1) History comprised of:   Personal factors that impact the plan of care:      None.    Comorbidity factors that impact the plan of care are:      Asthma, Cancer, Diabetes, Emphysema, Heart problems, High blood pressure, Implanted device and Overweight.     Medications impacting care: High blood pressure, Pain and see medical chart .  2) Examination of Body Systems comprised of:   Body structures and functions that impact the plan of care:      Shoulder.   Activity limitations that impact the plan of care are:      Bending, Driving, Dressing, Lifting, Sleeping and global leg weakness .  3) Clinical presentation characteristics are:   Evolving/Changing.  4) Decision-Making    Moderate complexity using standardized patient assessment instrument and/or measureable assessment of functional outcome.  Cumulative Therapy Evaluation is: Low complexity.    Previous and current functional limitations:  (See Goal Flow Sheet for this information)    Short term and Long term goals: (See Goal Flow Sheet for this information)      Communication ability:  Patient appears to be able to clearly communicate and understand verbal and written communication and follow directions correctly.  Treatment Explanation - The following has been discussed with the patient:   RX ordered/plan of care  Anticipated outcomes  Possible risks and side effects  This patient would benefit from PT intervention to resume normal activities.   Rehab potential is good.    Frequency:  1 X week, once daily  Duration:  for 6 weeks  Discharge Plan:  Independent in home treatment program.  Reach maximal therapeutic benefit.    Rehab Plans: address gentle but progressive R shoulder ROM restoration, Lower extremity strength, balance and endurance.     Please refer to the daily flowsheet for treatment today, total treatment time and time spent performing 1:1 timed codes.

## 2021-07-07 ENCOUNTER — OFFICE VISIT (OUTPATIENT)
Dept: ORTHOPEDICS | Facility: CLINIC | Age: 81
End: 2021-07-07
Payer: MEDICARE

## 2021-07-07 ENCOUNTER — ANCILLARY PROCEDURE (OUTPATIENT)
Dept: GENERAL RADIOLOGY | Facility: CLINIC | Age: 81
End: 2021-07-07
Attending: ORTHOPAEDIC SURGERY
Payer: MEDICARE

## 2021-07-07 ENCOUNTER — THERAPY VISIT (OUTPATIENT)
Dept: PHYSICAL THERAPY | Facility: CLINIC | Age: 81
End: 2021-07-07
Payer: MEDICARE

## 2021-07-07 VITALS
DIASTOLIC BLOOD PRESSURE: 64 MMHG | SYSTOLIC BLOOD PRESSURE: 118 MMHG | WEIGHT: 252 LBS | HEART RATE: 69 BPM | HEIGHT: 66 IN | BODY MASS INDEX: 40.5 KG/M2

## 2021-07-07 DIAGNOSIS — S42.021D CLOSED DISPLACED FRACTURE OF SHAFT OF RIGHT CLAVICLE WITH ROUTINE HEALING, SUBSEQUENT ENCOUNTER: Primary | ICD-10-CM

## 2021-07-07 DIAGNOSIS — M62.81 GENERALIZED MUSCLE WEAKNESS: ICD-10-CM

## 2021-07-07 DIAGNOSIS — S42.021D CLOSED DISPLACED FRACTURE OF SHAFT OF RIGHT CLAVICLE WITH ROUTINE HEALING, SUBSEQUENT ENCOUNTER: ICD-10-CM

## 2021-07-07 DIAGNOSIS — M25.511 ACUTE PAIN OF RIGHT SHOULDER: Primary | ICD-10-CM

## 2021-07-07 PROCEDURE — 97110 THERAPEUTIC EXERCISES: CPT | Mod: GP | Performed by: PHYSICAL THERAPIST

## 2021-07-07 PROCEDURE — 99207 PR FRACTURE CARE IN GLOBAL PERIOD: CPT | Performed by: ORTHOPAEDIC SURGERY

## 2021-07-07 PROCEDURE — 73000 X-RAY EXAM OF COLLAR BONE: CPT | Mod: RT | Performed by: RADIOLOGY

## 2021-07-07 ASSESSMENT — PAIN SCALES - GENERAL: PAINLEVEL: NO PAIN (0)

## 2021-07-07 ASSESSMENT — MIFFLIN-ST. JEOR: SCORE: 1805.81

## 2021-07-07 NOTE — PROGRESS NOTES
Chief Complaint:   Chief Complaint   Patient presents with     Right Shoulder - Follow Up     Right clavicle fracture. DOI: 4/13/21. 12 weeks since injury. Doing well, he has no pain right shoulder. He has daina going to physical therapy and states that it has been going well.        INJURY: right displaced clavicle shaft fracture  DATE of INJURY: 4/13/2021      HPI: Flash Brown is a 80 year old male , left-hand dominant, who presents for followup evaluation and management of a right clavicle fracture, almost 3 months from injury. Returns today doing better, no pain. Has been going to Physical Therapy and doing well.    The injury occurred on 4/13/2021 while in [place], [place], visiting family. Went to water some plants and tripped and fell onto hands/knees, right shoulder hit the pillar at the door. Onset of pain. Was still able to use the arm so didn't think was broken. Only a small bruise. Pain continued so once returned home, went in for evaluation on 4/23/2021 and noted to have a clavicle fracture, given a sling.     Prior history of related problems: no prior problems with this area in the past    Usual level of work activity: retired.    Past medical history:  has a past medical history of Basal cell carcinoma, CAD (coronary artery disease), Depression, Diabetes type 2, controlled (H), Hyperlipidemia, Hypertension, Lymphoma (H), Malignant melanoma (H), Melanoma (H), and Squamous cell carcinoma. He also has no past medical history of PONV (postoperative nausea and vomiting).   Patient Active Problem List   Diagnosis     Essential hypertension, benign     Malignant melanoma s/p resection in [place], [place]     RT axillary lymphoma s/p resection, with adjuvant radiation - [place], [place]     Basal cell skin cancer over nose s/p resection - [place], [place]     Mixed hyperlipidemia     Diabetes mellitus, type 2 (H)     Obstructive sleep apnea     Depression     ? 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     Medications:   Current Outpatient Medications:      amLODIPine (NORVASC) 5 MG tablet, Take 1 tablet (5 mg) by mouth daily, Disp: 90 tablet, Rfl: 1     atorvastatin (LIPITOR) 20 MG tablet, Take 1 tablet (20 mg) by mouth daily, Disp: 90 tablet, Rfl: 0     busPIRone (BUSPAR) 10 MG tablet, Take 1 tablet (10 mg) by mouth 2 times daily, Disp: 180 tablet, Rfl: 1     carvedilol (COREG) 12.5 MG tablet, Take 1 tablet (12.5 mg) by mouth 2 times daily (with meals), Disp: 180 tablet, Rfl: 0     cloNIDine (CATAPRES) 0.1 MG tablet, Take 1 tablet (0.1 mg) by mouth 2 times daily, Disp: 180 tablet, Rfl: 1     clopidogrel (PLAVIX) 75 MG tablet, Take 1 tablet (75 mg) by mouth daily, Disp: 90 tablet, Rfl: 0     clotrimazole (LOTRIMIN) 1 % external cream, Apply topically 2 times daily as needed For ringworm to left armpit., Disp: 30 g, Rfl: 1     hydrALAZINE (APRESOLINE) 50 MG tablet, Take 1 tablet (50 mg) by mouth 2 times daily, Disp: 180 tablet, Rfl: 1     naloxone (NARCAN) 4 MG/0.1ML nasal spray, Spray 1 spray (4 mg) into one nostril alternating nostrils once as needed for opioid reversal Every 2-3 minutes until patient responsive or EMS arrives, Disp: 0.2 mL, Rfl: 0     NIACIN 500 MG OR TABS, Take one orally twice daily, Disp: 180, Rfl: 3      nitroGLYcerin (NITROSTAT) 0.4 MG sublingual tablet, Place 1 tablet (0.4 mg) under the tongue See Admin Instructions for chest pain, Disp: 30 tablet, Rfl: 0     ORDER FOR DME, Light Therapy with Full Spectrum Light (83218 lux) Start with 10-15 minute exposure per day, Increase exposure to 30 to 45 minutes per day, Maximum exposure: 90 minutes per day,Look periodically at light during each session , Disp: 1, Rfl: 0     pioglitazone (ACTOS) 30 MG tablet, Take 1 tablet (30 mg) by mouth daily, Disp: 90 tablet, Rfl: 0     ramipril (ALTACE) 10 MG capsule, TAKE 1 CAPSULE BY MOUTH EVERY DAY, Disp: 90 capsule, Rfl: 0     tamsulosin (FLOMAX) 0.4 MG capsule, Take 1 capsule (0.4 mg) by mouth daily, Disp: 90 capsule, Rfl: 3     triamcinolone (KENALOG) 0.1 % external cream, Twice daily to legs prn itching, Disp: 454 g, Rfl: 3  No current facility-administered medications for this visit.     Facility-Administered Medications Ordered in Other Visits:      sodium hyaluronate (HEALON DUET), , , PRN, Dinesh Ivey MD, 1 kit at 10/02/19 1149       Allergies:     Allergies   Allergen Reactions     Iodine Swelling        Family History: family history includes Asthma in an other family member; Blood Disease in an other family member; Cancer in an other family member; Lung Cancer in his mother; Prostate Cancer in his father.     Social History: retired.  reports that he has never smoked. He has never used smokeless tobacco. He reports current alcohol use. He reports that he does not use drugs.    Review of Systems:     Denies numbness, tingling, parasthesias.   Denies headaches.   Denies fevers, chills, night sweats   Denies chest pain.   Denies shortness of breath.   Denies any skin problems, abrasions, rashes, irritation.      Physical Exam  GENERAL APPEARANCE: elderly, alert, no distress.  SKIN: no suspicious lesions or rashes  RESPIRATORY: No increased work of breathing.  NEURO: Normal strength and tone, mentation intact and  "speech normal  PSYCH:  mentation appears normal and affect normal. Not anxious.    /64   Pulse 69   Ht 1.676 m (5' 6\")   Wt 114.3 kg (252 lb)   BMI 40.67 kg/m         There is no swelling in the shoulder, clavicle region.  There is no tenderness in the mid-distal 1/3 clavicle.  There is no ecchymosis.  There is no erythema of the surrounding skin.  There is no maceration of the skin.  There is no gross deformity in the area.    Sensation intact to light touch in median, radial, ulnar and axillary nerve distributions  Palpable 2+ radial pulse, brisk capillary refill to all fingers, wwp  Intact epl fpl fdp edc wrist flexion/extension biceps triceps deltoid        X-rays:  2 views right clavicle from 7/7/2021  were reviewed in clinic today.  Right clavicular mid to distal third fracture with one shaft width inferior displacement of the distal fragment. Amorphous but somewhat irregular calcification inferior to the glenoid of indeterminate etiology or significance. Extensive axillary artery calcification is noted. Likely some further callus formation seen with visible fracture lucency and no bone bridging seen.      Assessment:: 80 year old male ,right -hand dominant, with right clavicle fracture, displaced.    Plan: continue nonoperative fracture management.    * images reviewed, possibly further healing. Likely a couple months yet.  * light use of arm ok, nothing heavy or strenuous  * work on gentle shoulder range of motion to prevent stiffness  * reassess 8 weeks, xrays right clavicle, sooner if needed.    Garrick Ladd M.D., M.S.  Dept. of Orthopaedic Surgery  [facility]    "

## 2021-07-07 NOTE — LETTER
7/7/2021         RE: Flash Brown  [address]  [facility] [address]        Dear Colleague,    Thank you for referring your patient, Flash Brown, to the [facility]. Please see a copy of my visit note below.    Chief Complaint:   Chief Complaint   Patient presents with     Right Shoulder - Follow Up     Right clavicle fracture. DOI: 4/13/21. 12 weeks since injury. Doing well, he has no pain right shoulder. He has daina going to physical therapy and states that it has been going well.        INJURY: right displaced clavicle shaft fracture  DATE of INJURY: 4/13/2021      HPI: Flash Brown is a 80 year old male , left-hand dominant, who presents for followup evaluation and management of a right clavicle fracture, almost 3 months from injury. Returns today doing better, no pain. Has been going to Physical Therapy and doing well.    The injury occurred on 4/13/2021 while in [place], [place], visiting family. Went to water some plants and tripped and fell onto hands/knees, right shoulder hit the pillar at the door. Onset of pain. Was still able to use the arm so didn't think was broken. Only a small bruise. Pain continued so once returned home, went in for evaluation on 4/23/2021 and noted to have a clavicle fracture, given a sling.     Prior history of related problems: no prior problems with this area in the past    Usual level of work activity: retired.    Past medical history:  has a past medical history of Basal cell carcinoma, CAD (coronary artery disease), Depression, Diabetes type 2, controlled (H), Hyperlipidemia, Hypertension, Lymphoma (H), Malignant melanoma (H), Melanoma (H), and Squamous cell carcinoma. He also has no past medical history of PONV (postoperative nausea and vomiting).   Patient Active Problem List   Diagnosis     Essential hypertension, benign     Malignant melanoma s/p resection in [place], [place]     RT axillary lymphoma s/p resection, with  adjuvant radiation - [place], [place]     Basal cell skin cancer over nose s/p resection - [place], [place]     Mixed hyperlipidemia     Diabetes mellitus, type 2 (H)     Obstructive sleep apnea     Depression     ? 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     Medications:   Current Outpatient Medications:      amLODIPine (NORVASC) 5 MG tablet, Take 1 tablet (5 mg) by mouth daily, Disp: 90 tablet, Rfl: 1     atorvastatin (LIPITOR) 20 MG tablet, Take 1 tablet (20 mg) by mouth daily, Disp: 90 tablet, Rfl: 0     busPIRone (BUSPAR) 10 MG tablet, Take 1 tablet (10 mg) by mouth 2 times daily, Disp: 180 tablet, Rfl: 1     carvedilol (COREG) 12.5 MG tablet, Take 1 tablet (12.5 mg) by mouth 2 times daily (with meals), Disp: 180 tablet, Rfl: 0     cloNIDine (CATAPRES) 0.1 MG tablet, Take 1 tablet (0.1 mg) by mouth 2 times daily, Disp: 180 tablet, Rfl: 1     clopidogrel (PLAVIX) 75 MG tablet, Take 1 tablet (75 mg) by mouth daily, Disp: 90 tablet, Rfl: 0     clotrimazole (LOTRIMIN) 1 % external cream, Apply topically 2 times daily as needed For ringworm to left armpit., Disp: 30 g, Rfl: 1     hydrALAZINE (APRESOLINE) 50 MG tablet, Take 1 tablet (50 mg) by mouth 2 times daily, Disp: 180 tablet, Rfl: 1     naloxone (NARCAN) 4 MG/0.1ML nasal spray, Spray  1 spray (4 mg) into one nostril alternating nostrils once as needed for opioid reversal Every 2-3 minutes until patient responsive or EMS arrives, Disp: 0.2 mL, Rfl: 0     NIACIN 500 MG OR TABS, Take one orally twice daily, Disp: 180, Rfl: 3     nitroGLYcerin (NITROSTAT) 0.4 MG sublingual tablet, Place 1 tablet (0.4 mg) under the tongue See Admin Instructions for chest pain, Disp: 30 tablet, Rfl: 0     ORDER FOR DME, Light Therapy with Full Spectrum Light (02697 lux) Start with 10-15 minute exposure per day, Increase exposure to 30 to 45 minutes per day, Maximum exposure: 90 minutes per day,Look periodically at light during each session , Disp: 1, Rfl: 0     pioglitazone (ACTOS) 30 MG tablet, Take 1 tablet (30 mg) by mouth daily, Disp: 90 tablet, Rfl: 0     ramipril (ALTACE) 10 MG capsule, TAKE 1 CAPSULE BY MOUTH EVERY DAY, Disp: 90 capsule, Rfl: 0     tamsulosin (FLOMAX) 0.4 MG capsule, Take 1 capsule (0.4 mg) by mouth daily, Disp: 90 capsule, Rfl: 3     triamcinolone (KENALOG) 0.1 % external cream, Twice daily to legs prn itching, Disp: 454 g, Rfl: 3  No current facility-administered medications for this visit.     Facility-Administered Medications Ordered in Other Visits:      sodium hyaluronate (HEALON DUET), , , PRN, Dinesh Ivey MD, 1 kit at 10/02/19 1149       Allergies:     Allergies   Allergen Reactions     Iodine Swelling        Family History: family history includes Asthma in an other family member; Blood Disease in an other family member; Cancer in an other family member; Lung Cancer in his mother; Prostate Cancer in his father.     Social History: retired.  reports that he has never smoked. He has never used smokeless tobacco. He reports current alcohol use. He reports that he does not use drugs.    Review of Systems:     Denies numbness, tingling, parasthesias.   Denies headaches.   Denies fevers, chills, night sweats   Denies chest pain.   Denies shortness of breath.   Denies any skin  "problems, abrasions, rashes, irritation.      Physical Exam  GENERAL APPEARANCE: elderly, alert, no distress.  SKIN: no suspicious lesions or rashes  RESPIRATORY: No increased work of breathing.  NEURO: Normal strength and tone, mentation intact and speech normal  PSYCH:  mentation appears normal and affect normal. Not anxious.    /64   Pulse 69   Ht 1.676 m (5' 6\")   Wt 114.3 kg (252 lb)   BMI 40.67 kg/m         There is no swelling in the shoulder, clavicle region.  There is no tenderness in the mid-distal 1/3 clavicle.  There is no ecchymosis.  There is no erythema of the surrounding skin.  There is no maceration of the skin.  There is no gross deformity in the area.    Sensation intact to light touch in median, radial, ulnar and axillary nerve distributions  Palpable 2+ radial pulse, brisk capillary refill to all fingers, wwp  Intact epl fpl fdp edc wrist flexion/extension biceps triceps deltoid        X-rays:  2 views right clavicle from 7/7/2021  were reviewed in clinic today.  Right clavicular mid to distal third fracture with one shaft width inferior displacement of the distal fragment. Amorphous but somewhat irregular calcification inferior to the glenoid of indeterminate etiology or significance. Extensive axillary artery calcification is noted. Likely some further callus formation seen with visible fracture lucency and no bone bridging seen.      Assessment:: 80 year old male ,right -hand dominant, with right clavicle fracture, displaced.    Plan: continue nonoperative fracture management.    * images reviewed, possibly further healing. Likely a couple months yet.  * light use of arm ok, nothing heavy or strenuous  * work on gentle shoulder range of motion to prevent stiffness  * reassess 8 weeks, xrays right clavicle, sooner if needed.    Garrick Ladd M.D., M.S.  Dept. of Orthopaedic Surgery  [facility]        Again, thank you for allowing me to participate in the care of your " patient.        Sincerely,        Garrick Ladd MD

## 2021-07-21 DIAGNOSIS — I25.10 CORONARY ARTERY DISEASE INVOLVING NATIVE CORONARY ARTERY OF NATIVE HEART WITHOUT ANGINA PECTORIS: ICD-10-CM

## 2021-07-21 DIAGNOSIS — E11.8 TYPE 2 DIABETES MELLITUS WITH COMPLICATION, WITHOUT LONG-TERM CURRENT USE OF INSULIN (H): ICD-10-CM

## 2021-07-22 RX ORDER — CARVEDILOL 12.5 MG/1
12.5 TABLET ORAL 2 TIMES DAILY WITH MEALS
Qty: 180 TABLET | Refills: 0 | Status: SHIPPED | OUTPATIENT
Start: 2021-07-22 | End: 2021-08-03

## 2021-07-22 RX ORDER — PIOGLITAZONEHYDROCHLORIDE 30 MG/1
30 TABLET ORAL DAILY
Qty: 90 TABLET | Refills: 0 | Status: SHIPPED | OUTPATIENT
Start: 2021-07-22 | End: 2021-09-30

## 2021-07-26 ENCOUNTER — THERAPY VISIT (OUTPATIENT)
Dept: PHYSICAL THERAPY | Facility: CLINIC | Age: 81
End: 2021-07-26
Payer: MEDICARE

## 2021-07-26 DIAGNOSIS — M25.511 ACUTE PAIN OF RIGHT SHOULDER: Primary | ICD-10-CM

## 2021-07-26 PROCEDURE — 97110 THERAPEUTIC EXERCISES: CPT | Mod: GP | Performed by: PHYSICAL THERAPIST

## 2021-07-26 NOTE — PROGRESS NOTES
"Subjective:  HPI  Physical Exam                    Objective:  System    Physical Exam    General     ROS    Assessment/Plan:    DISCHARGE REPORT    Progress reporting period is from 6/23/2021 to 7/26/2021.  3 visits     SUBJECTIVE  Patient walks with walker up to 1/4-1/2 mile , fatigues quick. He notes of sleeping \"alot\" primarily due to, in his words, \"lazyness\".     Daniel walks into the clinic with assist of SEC.     He reports of R arm gradually improved ROM, strength and function over the month to 80% recovered daily use of the arm.        Current Pain level: 1/10   Initial Pain level: 8/10   Changes in function: Yes, see goal flow sheet for change in function   Adverse reactions: None;   ,         OBJECTIVE   AROM flexion 120 vs 135 opposite side.     Daniel has shuffle gait.     Concern of balance issues.     He relies on family for transportation, therefore he wishes to DC PT at this point, follow by phone. Daniel is still fairly deconditioned globally, but is making restorative gains to his shoulder.     He would benefit from skilled therapy for global conditioning and balance  or pool type program.     Plan to DC PT based on pt. wishes, but recommended to follow every 2-3 weeks by phone and update his HEP accordingly to prevent falls, improve endurance and sleep less during the day.     Also, ensure full AROM of the R arm is achieved.       ASSESSMENT/PLAN  Updated problem list and treatment plan: Diagnosis 1:  R clavicle fracture     Diagnosis 2:  Global deconditioned     STG/LTGs have been met or progress has been made towards goals:  Yes (See Goal flow sheet completed today.)  Assessment of Progress: The patient's condition is improving.  Self Management Plans:  Patient has been instructed in a home treatment program.      Recommendations:  discontinue PT to self care, follow by phone.     Connect with MD as planned         Please refer to the daily flowsheet for treatment today, total treatment time and time " spent performing 1:1 timed codes.

## 2021-08-02 NOTE — PROGRESS NOTES
1. Other pancytopenia (H)  History of lymphoma  - CBC with platelets; Future  - CBC with platelets    2. Current mild episode of major depressive disorder without prior episode (H)  Now resolved.  Currently not on any medications.     3. Alcoholic cirrhosis of liver with ascites (H)  - Comprehensive metabolic panel (BMP + Alb, Alk Phos, ALT, AST, Total. Bili, TP); Future  - Comprehensive metabolic panel (BMP + Alb, Alk Phos, ALT, AST, Total. Bili, TP)    4. Morbid obesity (H)  Stressed the importance of diet and exercise.     5. Thrombocytopenia (H)  CBC pending    6. Essential hypertension  Stable  - amLODIPine (NORVASC) 5 MG tablet; Take 1 tablet (5 mg) by mouth daily  Dispense: 90 tablet; Refill: 3  - ramipril (ALTACE) 10 MG capsule; TAKE 1 CAPSULE BY MOUTH EVERY DAY  Dispense: 90 capsule; Refill: 3    7. Other hyperlipidemia  Stable  - atorvastatin (LIPITOR) 20 MG tablet; Take 1 tablet (20 mg) by mouth daily  Dispense: 90 tablet; Refill: 3    8. Coronary artery disease involving native coronary artery of native heart without angina pectoris  Stable  - carvedilol (COREG) 12.5 MG tablet; Take 1 tablet (12.5 mg) by mouth 2 times daily (with meals)  Dispense: 180 tablet; Refill: 3  - clopidogrel (PLAVIX) 75 MG tablet; Take 1 tablet (75 mg) by mouth daily  Dispense: 90 tablet; Refill: 3    9. History of lymphoma  Currently in remission.     10. Need for Tdap vaccination  - TDAP VACCINE (Adacel, Boostrix)  [[id number]]    11. Type 2 diabetes mellitus with diabetic neuropathy, without long-term current use of insulin (H)   - Albumin Random Urine Quantitative with Creat Ratio; Future  - Albumin Random Urine Quantitative with Creat Ratio  - ROUTINE FOOT CARE BY A PHYSICIAN OF A DIABETIC PATIENT WITH DIABETIC SENSORY    Subjective   Don is a 80 year old who presents for the following health issues   HPI     Diabetes Follow-up      How often are you checking your blood sugar? Not at all    What concerns do you have today  about your diabetes? None     Do you have any of these symptoms? (Select all that apply)  No numbness or tingling in feet.  No redness, sores or blisters on feet.  No complaints of excessive thirst.  No reports of blurry vision.  No significant changes to weight.      Hyperlipidemia Follow-Up      Are you regularly taking any medication or supplement to lower your cholesterol?   Yes- Atorvastatin    Are you having muscle aches or other side effects that you think could be caused by your cholesterol lowering medication?  No    Hypertension Follow-up      Do you check your blood pressure regularly outside of the clinic? No     Are you following a low salt diet? Yes    Are your blood pressures ever more than 140 on the top number (systolic) OR more   than 90 on the bottom number (diastolic), for example 140/90? No    BP Readings from Last 2 Encounters:   08/03/21 128/68   07/07/21 118/64     Hemoglobin A1C (%)   Date Value   04/23/2021 5.8 (H)   08/20/2020 5.8 (A)     LDL Cholesterol Calculated (mg/dL)   Date Value   09/08/2020 49   06/11/2019 47         How many servings of fruits and vegetables do you eat daily?  2-3    On average, how many sweetened beverages do you drink each day (Examples: soda, juice, sweet tea, etc.  Do NOT count diet or artificially sweetened beverages)?   1    How many days per week do you exercise enough to make your heart beat faster? 3 or less    How many minutes a day do you exercise enough to make your heart beat faster? 9 or less    How many days per week do you miss taking your medication? 0      1. Diabetes f/u: Currently on actos.     2. Pantocytopenia: Patient is currently being followed by hematologist.  History of lymphoma in which he went right axillary lymph node dissection followed by radiation.  Currently in remission.     3. Depression: States that his improved.  He is no longer taking buspar.     4. History of alcoholic cirrhosis: He states that he continues to drink  intermittently.      Review of Systems   Constitutional: Negative for chills and fever.   HENT: Negative for congestion, ear pain, hearing loss and sore throat.    Respiratory: Negative for cough and shortness of breath.    Cardiovascular: Negative for chest pain, palpitations and peripheral edema.   Musculoskeletal: Negative for arthralgias, joint swelling and myalgias.   Skin: Negative for rash.   Neurological: Negative for dizziness, weakness, headaches and paresthesias.   Psychiatric/Behavioral: Negative for mood changes. The patient is not nervous/anxious.         Objective    /68 (BP Location: Right arm, Cuff Size: Adult Large)   Pulse 51   Temp 97.5  F (36.4  C) (Tympanic)   Wt 113.2 kg (249 lb 9.6 oz)   SpO2 97%   BMI 40.29 kg/m    Body mass index is 40.29 kg/m .  Physical Exam  Constitutional:       General: He is not in acute distress.     Appearance: He is well-developed.   HENT:      Head: Normocephalic and atraumatic.      Nose: Nose normal.   Eyes:      Conjunctiva/sclera: Conjunctivae normal.   Neck:      Trachea: No tracheal deviation.   Cardiovascular:      Rate and Rhythm: Normal rate and regular rhythm.      Heart sounds: Normal heart sounds.   Pulmonary:      Effort: Pulmonary effort is normal.      Breath sounds: No wheezing.   Musculoskeletal:         General: Normal range of motion.      Cervical back: Normal range of motion.      Comments: Diabetic foot exam: normal DP and PT pulses, trophic changes or ulcerative lesions, decreased sensory exam to monofilament exam     Skin:     Findings: No erythema or rash.   Neurological:      Mental Status: He is alert and oriented to person, place, and time.   Psychiatric:         Behavior: Behavior normal.        Component      Latest Ref Rng & Units 3/13/2008 6/11/2019 12/9/2019 8/20/2020   Hemoglobin A1C      0 - 5.6 % 6.1 (H) 5.4 5.5 5.8 (A)     Component      Latest Ref Rng & Units 4/23/2021   Hemoglobin A1C      0 - 5.6 % 5.8 (H)      ----- Service Performed and Documented by Resident or Fellow ------

## 2021-08-03 ENCOUNTER — OFFICE VISIT (OUTPATIENT)
Dept: FAMILY MEDICINE | Facility: CLINIC | Age: 81
End: 2021-08-03
Payer: MEDICARE

## 2021-08-03 VITALS
SYSTOLIC BLOOD PRESSURE: 128 MMHG | WEIGHT: 249.6 LBS | OXYGEN SATURATION: 97 % | HEART RATE: 51 BPM | TEMPERATURE: 97.5 F | DIASTOLIC BLOOD PRESSURE: 68 MMHG | BODY MASS INDEX: 40.29 KG/M2

## 2021-08-03 DIAGNOSIS — F32.0 CURRENT MILD EPISODE OF MAJOR DEPRESSIVE DISORDER WITHOUT PRIOR EPISODE (H): ICD-10-CM

## 2021-08-03 DIAGNOSIS — D61.818 OTHER PANCYTOPENIA (H): Primary | ICD-10-CM

## 2021-08-03 DIAGNOSIS — K70.31 ALCOHOLIC CIRRHOSIS OF LIVER WITH ASCITES (H): ICD-10-CM

## 2021-08-03 DIAGNOSIS — Z23 NEED FOR TDAP VACCINATION: ICD-10-CM

## 2021-08-03 DIAGNOSIS — E66.01 MORBID OBESITY (H): ICD-10-CM

## 2021-08-03 DIAGNOSIS — I10 ESSENTIAL HYPERTENSION: ICD-10-CM

## 2021-08-03 DIAGNOSIS — E11.40 TYPE 2 DIABETES MELLITUS WITH DIABETIC NEUROPATHY, WITHOUT LONG-TERM CURRENT USE OF INSULIN (H): ICD-10-CM

## 2021-08-03 DIAGNOSIS — D69.6 THROMBOCYTOPENIA (H): ICD-10-CM

## 2021-08-03 DIAGNOSIS — Z85.72 HISTORY OF LYMPHOMA: ICD-10-CM

## 2021-08-03 DIAGNOSIS — I25.10 CORONARY ARTERY DISEASE INVOLVING NATIVE CORONARY ARTERY OF NATIVE HEART WITHOUT ANGINA PECTORIS: ICD-10-CM

## 2021-08-03 DIAGNOSIS — E78.49 OTHER HYPERLIPIDEMIA: ICD-10-CM

## 2021-08-03 LAB
ALBUMIN SERPL-MCNC: 3.3 G/DL (ref 3.4–5)
ALP SERPL-CCNC: 105 U/L (ref 40–150)
ALT SERPL W P-5'-P-CCNC: 27 U/L (ref 0–70)
ANION GAP SERPL CALCULATED.3IONS-SCNC: 4 MMOL/L (ref 3–14)
AST SERPL W P-5'-P-CCNC: 24 U/L (ref 0–45)
BILIRUB SERPL-MCNC: 1.1 MG/DL (ref 0.2–1.3)
BUN SERPL-MCNC: 14 MG/DL (ref 7–30)
CALCIUM SERPL-MCNC: 8.4 MG/DL (ref 8.5–10.1)
CHLORIDE BLD-SCNC: 108 MMOL/L (ref 94–109)
CO2 SERPL-SCNC: 27 MMOL/L (ref 20–32)
CREAT SERPL-MCNC: 1 MG/DL (ref 0.66–1.25)
CREAT UR-MCNC: 400 MG/DL
ERYTHROCYTE [DISTWIDTH] IN BLOOD BY AUTOMATED COUNT: 16.1 % (ref 10–15)
GFR SERPL CREATININE-BSD FRML MDRD: 72 ML/MIN/1.73M2
GLUCOSE BLD-MCNC: 112 MG/DL (ref 70–99)
HCT VFR BLD AUTO: 36.4 % (ref 40–53)
HGB BLD-MCNC: 11.5 G/DL (ref 13.3–17.7)
MCH RBC QN AUTO: 28.3 PG (ref 26.5–33)
MCHC RBC AUTO-ENTMCNC: 31.6 G/DL (ref 31.5–36.5)
MCV RBC AUTO: 89 FL (ref 78–100)
MICROALBUMIN UR-MCNC: 23 MG/L
MICROALBUMIN/CREAT UR: 5.75 MG/G CR (ref 0–17)
PLATELET # BLD AUTO: 84 10E3/UL (ref 150–450)
POTASSIUM BLD-SCNC: 4.2 MMOL/L (ref 3.4–5.3)
PROT SERPL-MCNC: 7.1 G/DL (ref 6.8–8.8)
RBC # BLD AUTO: 4.07 10E6/UL (ref 4.4–5.9)
SODIUM SERPL-SCNC: 139 MMOL/L (ref 133–144)
WBC # BLD AUTO: 2.7 10E3/UL (ref 4–11)

## 2021-08-03 PROCEDURE — 82043 UR ALBUMIN QUANTITATIVE: CPT | Performed by: FAMILY MEDICINE

## 2021-08-03 PROCEDURE — 99214 OFFICE O/P EST MOD 30 MIN: CPT | Mod: 25 | Performed by: FAMILY MEDICINE

## 2021-08-03 PROCEDURE — 85027 COMPLETE CBC AUTOMATED: CPT | Performed by: FAMILY MEDICINE

## 2021-08-03 PROCEDURE — 96127 BRIEF EMOTIONAL/BEHAV ASSMT: CPT | Mod: 59 | Performed by: FAMILY MEDICINE

## 2021-08-03 PROCEDURE — 90471 IMMUNIZATION ADMIN: CPT | Performed by: FAMILY MEDICINE

## 2021-08-03 PROCEDURE — 36415 COLL VENOUS BLD VENIPUNCTURE: CPT | Performed by: FAMILY MEDICINE

## 2021-08-03 PROCEDURE — 90715 TDAP VACCINE 7 YRS/> IM: CPT | Performed by: FAMILY MEDICINE

## 2021-08-03 PROCEDURE — 80053 COMPREHEN METABOLIC PANEL: CPT | Performed by: FAMILY MEDICINE

## 2021-08-03 PROCEDURE — 99207 ZZC ROUTINE FOOT CARE BY A PHYSICIAN OF A DIABETIC PATIENT WITH DIABETICC SENSORY: CPT | Performed by: FAMILY MEDICINE

## 2021-08-03 RX ORDER — CLOPIDOGREL BISULFATE 75 MG/1
75 TABLET ORAL DAILY
Qty: 90 TABLET | Refills: 3 | Status: SHIPPED | OUTPATIENT
Start: 2021-08-03 | End: 2022-09-02

## 2021-08-03 RX ORDER — RAMIPRIL 10 MG/1
CAPSULE ORAL
Qty: 90 CAPSULE | Refills: 3 | Status: SHIPPED | OUTPATIENT
Start: 2021-08-03 | End: 2022-07-31

## 2021-08-03 RX ORDER — AMLODIPINE BESYLATE 5 MG/1
5 TABLET ORAL DAILY
Qty: 90 TABLET | Refills: 3 | Status: SHIPPED | OUTPATIENT
Start: 2021-08-03 | End: 2022-09-02

## 2021-08-03 RX ORDER — ATORVASTATIN CALCIUM 20 MG/1
20 TABLET, FILM COATED ORAL DAILY
Qty: 90 TABLET | Refills: 3 | Status: SHIPPED | OUTPATIENT
Start: 2021-08-03 | End: 2022-08-16

## 2021-08-03 RX ORDER — CARVEDILOL 12.5 MG/1
12.5 TABLET ORAL 2 TIMES DAILY WITH MEALS
Qty: 180 TABLET | Refills: 3 | Status: SHIPPED | OUTPATIENT
Start: 2021-08-03 | End: 2022-09-02

## 2021-08-03 ASSESSMENT — ENCOUNTER SYMPTOMS
PARESTHESIAS: 0
PALPITATIONS: 0
ARTHRALGIAS: 0
COUGH: 0
NERVOUS/ANXIOUS: 0
DIZZINESS: 0
SORE THROAT: 0
FEVER: 0
HEADACHES: 0
CHILLS: 0
MYALGIAS: 0
JOINT SWELLING: 0
WEAKNESS: 0
SHORTNESS OF BREATH: 0

## 2021-08-03 ASSESSMENT — ANXIETY QUESTIONNAIRES
7. FEELING AFRAID AS IF SOMETHING AWFUL MIGHT HAPPEN: NOT AT ALL
3. WORRYING TOO MUCH ABOUT DIFFERENT THINGS: NOT AT ALL
5. BEING SO RESTLESS THAT IT IS HARD TO SIT STILL: NOT AT ALL
1. FEELING NERVOUS, ANXIOUS, OR ON EDGE: NOT AT ALL
2. NOT BEING ABLE TO STOP OR CONTROL WORRYING: NOT AT ALL
GAD7 TOTAL SCORE: 0
6. BECOMING EASILY ANNOYED OR IRRITABLE: NOT AT ALL

## 2021-08-03 ASSESSMENT — PATIENT HEALTH QUESTIONNAIRE - PHQ9
SUM OF ALL RESPONSES TO PHQ QUESTIONS 1-9: 3
5. POOR APPETITE OR OVEREATING: NOT AT ALL

## 2021-08-03 NOTE — PATIENT INSTRUCTIONS
Monika Sanchez,    Thank you for allowing [facility] to manage your care.    I ordered some blood work, please go to the laboratory to get your laboratory studies.    I sent your prescriptions to your pharmacy.    For your convenience, test results are released as soon as they are available  Please allow 1-2 business days for me to send you a comment about your results.  If not done so, I encourage you to login into [employer] ([email or web address] to review your results in real time.     If you have any questions or concerns, please feel free to call us at [phone].    Sincerely,    Dr. Jackson    Did you know?      You can schedule a video visit for follow-up appointments as well as future appointments for certain conditions.  Please see the below link.     [email or web address]    If you have not already done so,  I encourage you to sign up for [employer] ([email or web address]).  This will allow you to review your results, securely communicate with a provider, and schedule virtual visits as well.

## 2021-08-04 ASSESSMENT — ANXIETY QUESTIONNAIRES: GAD7 TOTAL SCORE: 0

## 2021-09-08 ENCOUNTER — ANCILLARY PROCEDURE (OUTPATIENT)
Dept: GENERAL RADIOLOGY | Facility: CLINIC | Age: 81
End: 2021-09-08
Attending: ORTHOPAEDIC SURGERY
Payer: MEDICARE

## 2021-09-08 ENCOUNTER — OFFICE VISIT (OUTPATIENT)
Dept: ORTHOPEDICS | Facility: CLINIC | Age: 81
End: 2021-09-08
Payer: MEDICARE

## 2021-09-08 VITALS
WEIGHT: 253.5 LBS | DIASTOLIC BLOOD PRESSURE: 75 MMHG | SYSTOLIC BLOOD PRESSURE: 121 MMHG | HEART RATE: 62 BPM | BODY MASS INDEX: 40.74 KG/M2 | HEIGHT: 66 IN

## 2021-09-08 DIAGNOSIS — S42.021D CLOSED DISPLACED FRACTURE OF SHAFT OF RIGHT CLAVICLE WITH ROUTINE HEALING, SUBSEQUENT ENCOUNTER: ICD-10-CM

## 2021-09-08 DIAGNOSIS — S42.021G CLOSED DISPLACED FRACTURE OF SHAFT OF RIGHT CLAVICLE WITH DELAYED HEALING, SUBSEQUENT ENCOUNTER: Primary | ICD-10-CM

## 2021-09-08 PROCEDURE — 99213 OFFICE O/P EST LOW 20 MIN: CPT | Performed by: ORTHOPAEDIC SURGERY

## 2021-09-08 PROCEDURE — 73000 X-RAY EXAM OF COLLAR BONE: CPT | Mod: RT | Performed by: RADIOLOGY

## 2021-09-08 ASSESSMENT — MIFFLIN-ST. JEOR: SCORE: 1812.62

## 2021-09-08 ASSESSMENT — PAIN SCALES - GENERAL: PAINLEVEL: NO PAIN (0)

## 2021-09-08 NOTE — PROGRESS NOTES
Chief Complaint:   Chief Complaint   Patient presents with     Right Shoulder - Follow Up     Clavicle fracture. DOI 4/13/21, 4.5 month s/p. Patient notes his clavicle is doing fine. He denies any concerns or problems.       INJURY: right displaced clavicle shaft fracture  DATE of INJURY: 4/13/2021      HPI: Flash Brown is a 80 year old male , left-hand dominant, who presents for followup evaluation and management of a right clavicle fracture, almost 5 months from injury. Returns today doing better, no pain. Has done Physical Therapy. No concerns today.    The injury occurred on 4/13/2021 while in [place], [place], visiting family. Went to water some plants and tripped and fell onto hands/knees, right shoulder hit the pillar at the door. Onset of pain. Was still able to use the arm so didn't think was broken. Only a small bruise. Pain continued so once returned home, went in for evaluation on 4/23/2021 and noted to have a clavicle fracture, given a sling.     Prior history of related problems: no prior problems with this area in the past    Usual level of work activity: retired.    Past medical history:  has a past medical history of Basal cell carcinoma, CAD (coronary artery disease), Depression, Diabetes type 2, controlled (H), Hyperlipidemia, Hypertension, Lymphoma (H), Malignant melanoma (H), Melanoma (H), and Squamous cell carcinoma. He also has no past medical history of PONV (postoperative nausea and vomiting).   Patient Active Problem List   Diagnosis     Essential hypertension, benign     Malignant melanoma s/p resection in [place], [place]     RT axillary lymphoma s/p resection, with adjuvant radiation - [place], [place]     Basal cell skin cancer over nose s/p resection - [place], [place]     Mixed hyperlipidemia     Type 2 diabetes mellitus with complication, without long-term current use of insulin (H)     Obstructive sleep apnea     ? 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     Medications:   Current Outpatient Medications:      amLODIPine (NORVASC) 5 MG tablet, Take 1 tablet (5 mg) by mouth daily, Disp: 90 tablet, Rfl: 3     atorvastatin (LIPITOR) 20 MG tablet, Take 1 tablet (20 mg) by mouth daily, Disp: 90 tablet, Rfl: 3     carvedilol (COREG) 12.5 MG tablet, Take 1 tablet (12.5 mg) by mouth 2 times daily (with meals), Disp: 180 tablet, Rfl: 3     cloNIDine (CATAPRES) 0.1 MG tablet, Take 1 tablet (0.1 mg) by mouth 2 times daily, Disp: 180 tablet, Rfl: 1     clopidogrel (PLAVIX) 75 MG tablet, Take 1 tablet (75 mg) by mouth daily, Disp: 90 tablet, Rfl: 3     clotrimazole (LOTRIMIN) 1 % external cream, Apply topically 2 times daily as needed For ringworm to left armpit. (Patient not taking: Reported on 8/3/2021), Disp: 30 g, Rfl: 1     hydrALAZINE (APRESOLINE) 50 MG tablet, Take 1 tablet (50 mg) by mouth 2 times daily (Patient not taking: Reported on 8/3/2021), Disp: 180 tablet, Rfl: 1     naloxone (NARCAN) 4 MG/0.1ML nasal spray, Spray 1 spray (4 mg) into one nostril alternating nostrils once as needed for opioid reversal Every 2-3 minutes until patient responsive or EMS arrives (Patient not taking: Reported on 8/3/2021), Disp: 0.2 mL, Rfl: 0     NIACIN 500 MG OR TABS, Take one orally twice daily (Patient not taking: Reported on 8/3/2021), Disp: 180,  Rfl: 3     nitroGLYcerin (NITROSTAT) 0.4 MG sublingual tablet, Place 1 tablet (0.4 mg) under the tongue See Admin Instructions for chest pain, Disp: 30 tablet, Rfl: 0     ORDER FOR DME, Light Therapy with Full Spectrum Light (48149 lux) Start with 10-15 minute exposure per day, Increase exposure to 30 to 45 minutes per day, Maximum exposure: 90 minutes per day,Look periodically at light during each session  (Patient not taking: Reported on 8/3/2021), Disp: 1, Rfl: 0     pioglitazone (ACTOS) 30 MG tablet, Take 1 tablet (30 mg) by mouth daily, Disp: 90 tablet, Rfl: 0     ramipril (ALTACE) 10 MG capsule, TAKE 1 CAPSULE BY MOUTH EVERY DAY, Disp: 90 capsule, Rfl: 3     tamsulosin (FLOMAX) 0.4 MG capsule, Take 1 capsule (0.4 mg) by mouth daily (Patient not taking: Reported on 8/3/2021), Disp: 90 capsule, Rfl: 3     triamcinolone (KENALOG) 0.1 % external cream, Twice daily to legs prn itching, Disp: 454 g, Rfl: 3  No current facility-administered medications for this visit.    Facility-Administered Medications Ordered in Other Visits:      sodium hyaluronate (HEALON DUET), , , PRN, Dinesh Ivey MD, 1 kit at 10/02/19 1149       Allergies:     Allergies   Allergen Reactions     Iodine Swelling        Family History: family history includes Asthma in an other family member; Blood Disease in an other family member; Cancer in an other family member; Lung Cancer in his mother; Prostate Cancer in his father.     Social History: retired.  reports that he has never smoked. He has never used smokeless tobacco. He reports current alcohol use. He reports that he does not use drugs.    Review of Systems:     Denies numbness, tingling, parasthesias.   Denies headaches.   Denies fevers, chills, night sweats   Denies chest pain.   Denies shortness of breath.   Denies any skin problems, abrasions, rashes, irritation.      Physical Exam  GENERAL APPEARANCE: elderly, alert, no distress.  SKIN: no suspicious lesions or  "rashes  RESPIRATORY: No increased work of breathing.  NEURO: Normal strength and tone, mentation intact and speech normal  PSYCH:  mentation appears normal and affect normal. Not anxious.    /75   Pulse 62   Ht 1.676 m (5' 6\")   Wt 115 kg (253 lb 8 oz)   BMI 40.92 kg/m         There is no swelling in the shoulder, clavicle region.  There is no tenderness in the mid-distal 1/3 clavicle.  There is no ecchymosis.  There is no erythema of the surrounding skin.  There is no maceration of the skin.  There is no gross deformity in the area.    Sensation intact to light touch in median, radial, ulnar and axillary nerve distributions  Palpable 2+ radial pulse, brisk capillary refill to all fingers, wwp  Intact epl fpl fdp edc wrist flexion/extension biceps triceps deltoid        X-rays:  2 views right clavicle from 9/8/2021 were reviewed in clinic today.  Right clavicular mid to distal third fracture with one shaft width inferior displacement of the distal fragment. Amorphous but somewhat irregular calcification inferior to the glenoid of indeterminate etiology or significance. Extensive axillary artery calcification is noted. Notable,  visible fracture lucency and no bone bridging seen. Patchy callus formation of the distal fragment.      Assessment:: 80 year old male ,right -hand dominant, with right clavicle fracture, displaced, delayed healing.    Plan: continue nonoperative fracture management.    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  * treatment either continued nonsurgical management as asymptomatic, otherwise surgical treatment with open-reduction, internal fixation.  * given doing well, will monitor.  * activities per comfort.  * work on gentle shoulder range of motion to prevent stiffness  * return to clinic as needed.    Garrick Ladd M.D., M.S.  Dept. of Orthopaedic Surgery  [facility]    "

## 2021-09-08 NOTE — LETTER
9/8/2021         RE: Flash Brown  [address]  [facility] [address]        Dear Colleague,    Thank you for referring your patient, Flash Brown, to the [facility]. Please see a copy of my visit note below.    Chief Complaint:   Chief Complaint   Patient presents with     Right Shoulder - Follow Up     Clavicle fracture. DOI 4/13/21, 4.5 month s/p. Patient notes his clavicle is doing fine. He denies any concerns or problems.       INJURY: right displaced clavicle shaft fracture  DATE of INJURY: 4/13/2021      HPI: Flash Brown is a 80 year old male , left-hand dominant, who presents for followup evaluation and management of a right clavicle fracture, almost 5 months from injury. Returns today doing better, no pain. Has done Physical Therapy. No concerns today.    The injury occurred on 4/13/2021 while in [place], [place], visiting family. Went to water some plants and tripped and fell onto hands/knees, right shoulder hit the pillar at the door. Onset of pain. Was still able to use the arm so didn't think was broken. Only a small bruise. Pain continued so once returned home, went in for evaluation on 4/23/2021 and noted to have a clavicle fracture, given a sling.     Prior history of related problems: no prior problems with this area in the past    Usual level of work activity: retired.    Past medical history:  has a past medical history of Basal cell carcinoma, CAD (coronary artery disease), Depression, Diabetes type 2, controlled (H), Hyperlipidemia, Hypertension, Lymphoma (H), Malignant melanoma (H), Melanoma (H), and Squamous cell carcinoma. He also has no past medical history of PONV (postoperative nausea and vomiting).   Patient Active Problem List   Diagnosis     Essential hypertension, benign     Malignant melanoma s/p resection in [place], [place]     RT axillary lymphoma s/p resection, with adjuvant radiation - [place]     Basal cell skin cancer over  nose s/p resection - [place], [place]     Mixed hyperlipidemia     Type 2 diabetes mellitus with complication, without long-term current use of insulin (H)     Obstructive sleep apnea     ? 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     Medications:   Current Outpatient Medications:      amLODIPine (NORVASC) 5 MG tablet, Take 1 tablet (5 mg) by mouth daily, Disp: 90 tablet, Rfl: 3     atorvastatin (LIPITOR) 20 MG tablet, Take 1 tablet (20 mg) by mouth daily, Disp: 90 tablet, Rfl: 3     carvedilol (COREG) 12.5 MG tablet, Take 1 tablet (12.5 mg) by mouth 2 times daily (with meals), Disp: 180 tablet, Rfl: 3     cloNIDine (CATAPRES) 0.1 MG tablet, Take 1 tablet (0.1 mg) by mouth 2 times daily, Disp: 180 tablet, Rfl: 1     clopidogrel (PLAVIX) 75 MG tablet, Take 1 tablet (75 mg) by mouth daily, Disp: 90 tablet, Rfl: 3     clotrimazole (LOTRIMIN) 1 % external cream, Apply topically 2 times daily as needed For ringworm to left armpit. (Patient not taking: Reported on 8/3/2021), Disp: 30 g, Rfl: 1     hydrALAZINE (APRESOLINE) 50 MG tablet, Take 1 tablet (50 mg) by mouth 2 times daily (Patient not taking: Reported on 8/3/2021), Disp: 180 tablet, Rfl: 1     naloxone (NARCAN) 4 MG/0.1ML nasal spray, Spray 1 spray (4 mg) into one nostril alternating nostrils once as  needed for opioid reversal Every 2-3 minutes until patient responsive or EMS arrives (Patient not taking: Reported on 8/3/2021), Disp: 0.2 mL, Rfl: 0     NIACIN 500 MG OR TABS, Take one orally twice daily (Patient not taking: Reported on 8/3/2021), Disp: 180, Rfl: 3     nitroGLYcerin (NITROSTAT) 0.4 MG sublingual tablet, Place 1 tablet (0.4 mg) under the tongue See Admin Instructions for chest pain, Disp: 30 tablet, Rfl: 0     ORDER FOR DME, Light Therapy with Full Spectrum Light (94134 lux) Start with 10-15 minute exposure per day, Increase exposure to 30 to 45 minutes per day, Maximum exposure: 90 minutes per day,Look periodically at light during each session  (Patient not taking: Reported on 8/3/2021), Disp: 1, Rfl: 0     pioglitazone (ACTOS) 30 MG tablet, Take 1 tablet (30 mg) by mouth daily, Disp: 90 tablet, Rfl: 0     ramipril (ALTACE) 10 MG capsule, TAKE 1 CAPSULE BY MOUTH EVERY DAY, Disp: 90 capsule, Rfl: 3     tamsulosin (FLOMAX) 0.4 MG capsule, Take 1 capsule (0.4 mg) by mouth daily (Patient not taking: Reported on 8/3/2021), Disp: 90 capsule, Rfl: 3     triamcinolone (KENALOG) 0.1 % external cream, Twice daily to legs prn itching, Disp: 454 g, Rfl: 3  No current facility-administered medications for this visit.    Facility-Administered Medications Ordered in Other Visits:      sodium hyaluronate (HEALON DUET), , , PRN, Dinesh Ivey MD, 1 kit at 10/02/19 1149       Allergies:     Allergies   Allergen Reactions     Iodine Swelling        Family History: family history includes Asthma in an other family member; Blood Disease in an other family member; Cancer in an other family member; Lung Cancer in his mother; Prostate Cancer in his father.     Social History: retired.  reports that he has never smoked. He has never used smokeless tobacco. He reports current alcohol use. He reports that he does not use drugs.    Review of Systems:     Denies numbness, tingling, parasthesias.   Denies  "headaches.   Denies fevers, chills, night sweats   Denies chest pain.   Denies shortness of breath.   Denies any skin problems, abrasions, rashes, irritation.      Physical Exam  GENERAL APPEARANCE: elderly, alert, no distress.  SKIN: no suspicious lesions or rashes  RESPIRATORY: No increased work of breathing.  NEURO: Normal strength and tone, mentation intact and speech normal  PSYCH:  mentation appears normal and affect normal. Not anxious.    /75   Pulse 62   Ht 1.676 m (5' 6\")   Wt 115 kg (253 lb 8 oz)   BMI 40.92 kg/m         There is no swelling in the shoulder, clavicle region.  There is no tenderness in the mid-distal 1/3 clavicle.  There is no ecchymosis.  There is no erythema of the surrounding skin.  There is no maceration of the skin.  There is no gross deformity in the area.    Sensation intact to light touch in median, radial, ulnar and axillary nerve distributions  Palpable 2+ radial pulse, brisk capillary refill to all fingers, wwp  Intact epl fpl fdp edc wrist flexion/extension biceps triceps deltoid        X-rays:  2 views right clavicle from 9/8/2021 were reviewed in clinic today.  Right clavicular mid to distal third fracture with one shaft width inferior displacement of the distal fragment. Amorphous but somewhat irregular calcification inferior to the glenoid of indeterminate etiology or significance. Extensive axillary artery calcification is noted. Notable,  visible fracture lucency and no bone bridging seen. Patchy callus formation of the distal fragment.      Assessment:: 80 year old male ,right -hand dominant, with right clavicle fracture, displaced, delayed healing.    Plan: continue nonoperative fracture management.    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  * treatment either continued nonsurgical management as asymptomatic, otherwise surgical treatment with open-reduction, internal fixation.  * given doing well, will monitor.  * activities " per comfort.  * work on gentle shoulder range of motion to prevent stiffness  * return to clinic as needed.    Garrick Ladd M.D., M.S.  Dept. of Orthopaedic Surgery  [facility]        Again, thank you for allowing me to participate in the care of your patient.        Sincerely,        Garrick Ladd MD

## 2021-09-25 ENCOUNTER — HEALTH MAINTENANCE LETTER (OUTPATIENT)
Age: 81
End: 2021-09-25

## 2021-09-28 DIAGNOSIS — E11.8 TYPE 2 DIABETES MELLITUS WITH COMPLICATION, WITHOUT LONG-TERM CURRENT USE OF INSULIN (H): ICD-10-CM

## 2021-09-30 RX ORDER — PIOGLITAZONEHYDROCHLORIDE 30 MG/1
30 TABLET ORAL DAILY
Qty: 90 TABLET | Refills: 0 | Status: SHIPPED | OUTPATIENT
Start: 2021-09-30 | End: 2021-11-09

## 2021-10-01 ENCOUNTER — TELEPHONE (OUTPATIENT)
Dept: FAMILY MEDICINE | Facility: CLINIC | Age: 81
End: 2021-10-01

## 2021-10-01 NOTE — TELEPHONE ENCOUNTER
Prescription approved per [facility] Refill Protocol.    Megan Johnson RN    
22

## 2021-10-01 NOTE — TELEPHONE ENCOUNTER
Reason for Call:  Other     Detailed comments: Sister stated that patient needs a letter stating he can receive [place] metro pass. When asked if there were forms she said not just needs a letter and she will  at . Please advise     Phone Number   Rea Guevara ) [phone] (H)         Best Time:     Can we leave a detailed message on this number? YES    Call taken on 10/1/2021 at 11:17 AM by Yolanda De Guzman

## 2021-10-01 NOTE — TELEPHONE ENCOUNTER
Spoke with patient and per him he does not drive anymore (license ) so needs pass.  Please advise on letter

## 2021-11-08 NOTE — PROGRESS NOTES
"SUBJECTIVE:   Flash Brown is a 81 year old male who presents for Preventive Visit.      Patient has been advised of split billing requirements and indicates understanding: Yes   Are you in the first 12 months of your Medicare coverage?  No    Healthy Habits:     In general, how would you rate your overall health?  Good    Frequency of exercise:  None    Do you usually eat at least 4 servings of fruit and vegetables a day, include whole grains    & fiber and avoid regularly eating high fat or \"junk\" foods?  No    Taking medications regularly:  Yes    Medication side effects:  None    Ability to successfully perform activities of daily living:  Transportation requires assistance and medication administration requires assistance    Home Safety:  No safety concerns identified    Hearing Impairment:  No hearing concerns    In the past 6 months, have you been bothered by leaking of urine?  No    In general, how would you rate your overall mental or emotional health?  Good      PHQ-2 Total Score: 0    Additional concerns today:  No    Do you feel safe in your environment? Yes    Have you ever done Advance Care Planning? (For example, a Health Directive, POLST, or a discussion with a medical provider or your loved ones about your wishes): Yes, advance care planning is on file.       Fall risk  Fallen 2 or more times in the past year?: No  Any fall with injury in the past year?: No    Cognitive Screening   1) Repeat 3 items (Leader, Season, Table)    2) Clock draw: NORMAL  3) 3 item recall: Recalls NO objects   Results: 0 items recalled: PROBABLE COGNITIVE IMPAIRMENT, **INFORM PROVIDER**    Mini-CogTM Copyright HIRA Boyd. Licensed by the author for use in [facility]; reprinted with permission (lino@.[facility]). All rights reserved.      Do you have sleep apnea, excessive snoring or daytime drowsiness?: yes    Reviewed and updated as needed this visit by clinical staff  Tobacco  Allergies  Meds     Fam Hx  " Soc Hx       Reviewed and updated as needed this visit by Provider               Social History     Tobacco Use     Smoking status: Never Smoker     Smokeless tobacco: Never Used   Substance Use Topics     Alcohol use: Yes     Comment: glass of wine couple times per week     If you drink alcohol do you typically have >3 drinks per day or >7 drinks per week? No    Alcohol Use 11/9/2021   Prescreen: >3 drinks/day or >7 drinks/week? No   Prescreen: >3 drinks/day or >7 drinks/week? -         Current providers sharing in care for this patient include:   Patient Care Team:  Thomas Jackson DO as PCP - General (Family Practice)  Crystal Camacho MD as Assigned Cancer Care Provider  Thomas Jackson DO as Assigned PCP  Garrick Ladd MD as Assigned Musculoskeletal Provider    The following health maintenance items are reviewed in Epic and correct as of today:  Health Maintenance Due   Topic Date Due     HEPATITIS C SCREENING  Never done     FALL RISK ASSESSMENT  03/09/2021     INFLUENZA VACCINE (1) 09/01/2021     LIPID  09/08/2021     EYE EXAM  10/14/2021     A1C  10/23/2021     1. MAWV    2. Diabetes f/u: Patient is currently taking actos.  Tolerating medications.     Review of Systems   Constitutional: Positive for chills. Negative for fever.   HENT: Negative for congestion, ear pain, hearing loss and sore throat.    Respiratory: Negative for cough and shortness of breath.    Cardiovascular: Negative for chest pain, palpitations and peripheral edema.   Genitourinary: Negative for impotence and penile discharge.   Musculoskeletal: Negative for arthralgias, joint swelling and myalgias.   Skin: Negative for rash.   Neurological: Positive for weakness. Negative for dizziness, headaches and paresthesias.   Psychiatric/Behavioral: Negative for mood changes. The patient is not nervous/anxious.          OBJECTIVE:   /66 (BP Location: Left arm, Cuff Size: Adult Large)   Pulse 60   Temp 97.7  F (36.5  C)  "(Tympanic)   Ht 1.676 m (5' 6\")   Wt 114 kg (251 lb 6.4 oz)   SpO2 98%   BMI 40.58 kg/m   Estimated body mass index is 40.58 kg/m  as calculated from the following:    Height as of this encounter: 1.676 m (5' 6\").    Weight as of this encounter: 114 kg (251 lb 6.4 oz).  Physical Exam  Constitutional:       General: He is not in acute distress.     Appearance: He is well-developed.   HENT:      Head: Normocephalic and atraumatic.      Nose: Nose normal.   Eyes:      Conjunctiva/sclera: Conjunctivae normal.   Neck:      Trachea: No tracheal deviation.   Cardiovascular:      Rate and Rhythm: Normal rate and regular rhythm.      Heart sounds: Normal heart sounds.   Pulmonary:      Effort: Pulmonary effort is normal.      Breath sounds: No wheezing.   Musculoskeletal:         General: Normal range of motion.      Cervical back: Normal range of motion.   Skin:     Findings: No erythema or rash.   Neurological:      Mental Status: He is alert and oriented to person, place, and time.   Psychiatric:         Behavior: Behavior normal.         Component      Latest Ref Rng & Units 9/8/2020 4/23/2021   Cholesterol      <200 mg/dL 105    Triglycerides      <150 mg/dL 87    HDL Cholesterol      >39 mg/dL 39 (L)    LDL Cholesterol Calculated      <100 mg/dL 49    VLDL-Cholesterol      0 - 30 mg/dL     Cholesterol/HDL Ratio      0.0 - 5.0     Hemoglobin A1C      0 - 5.6 %  5.8 (H)   Non HDL Cholesterol      <130 mg/dL 66        ASSESSMENT / PLAN:     1. Encounter for Medicare annual wellness exam    2. Need for prophylactic vaccination and inoculation against influenza  - ADMIN INFLUENZA (For MEDICARE Patients ONLY) []  - INFLUENZA, QUAD, HIGH DOSE, PF, 65YR + (FLUZONE HD)    3. Type 2 diabetes mellitus with complication, without long-term current use of insulin (H)  - Hemoglobin A1c; Future  - Adult Eye Referral; Future  - pioglitazone (ACTOS) 30 MG tablet; Take 1 tablet (30 mg) by mouth daily  Dispense: 90 tablet; " "Refill: 0    4. Mixed hyperlipidemia  - Lipid panel reflex to direct LDL Fasting; Future    5. Screening for thyroid disorder  - TSH with free T4 reflex; Future    Patient has been advised of split billing requirements and indicates understanding: Yes  COUNSELING:  Reviewed preventive health counseling, as reflected in patient instructions       Regular exercise       Healthy diet/nutrition    Estimated body mass index is 40.58 kg/m  as calculated from the following:    Height as of this encounter: 1.676 m (5' 6\").    Weight as of this encounter: 114 kg (251 lb 6.4 oz).    Weight management plan: Discussed healthy diet and exercise guidelines    He reports that he has never smoked. He has never used smokeless tobacco.      Appropriate preventive services were discussed with this patient, including applicable screening as appropriate for cardiovascular disease, diabetes, osteopenia/osteoporosis, and glaucoma.  As appropriate for age/gender, discussed screening for colorectal cancer, prostate cancer, breast cancer, and cervical cancer. Checklist reviewing preventive services available has been given to the patient.    Reviewed patients plan of care and provided an AVS. The Basic Care Plan (routine screening as documented in Health Maintenance) for Flash meets the Care Plan requirement. This Care Plan has been established and reviewed with the Patient.    Counseling Resources:  ATP IV Guidelines  Pooled Cohorts Equation Calculator  Breast Cancer Risk Calculator  Breast Cancer: Medication to Reduce Risk  FRAX Risk Assessment  ICSI Preventive Guidelines  Dietary Guidelines for Americans, 2010  USDA's MyPlate  ASA Prophylaxis  Lung CA Screening    DO LUNA Mora [facility]    Identified Health Risks:  "

## 2021-11-08 NOTE — PATIENT INSTRUCTIONS
Kirt Sanchez,    Thank you for allowing [facility] to manage your care.    I ordered some blood work, please go to the laboratory to get your laboratory studies.    I made an optometry referral, they will be calling in approximately 1 week to set up your appointment.  If you do not hear from them, please call the specialty number on your after visit.     For your convenience, test results are released as soon as they are available  Please allow 1-2 business days for me to send you a comment about your results.  If not done so, I encourage you to login into ERN ([email or web address] to review your results in real time.     If you have any questions or concerns, please feel free to call us at [phone].    Sincerely,    Dr. Jackson    Did you know?      You can schedule a video visit for follow-up appointments as well as future appointments for certain conditions.  Please see the below link.     [email or web address]    If you have not already done so,  I encourage you to sign up for ERN ([email or web address]).  This will allow you to review your results, securely communicate with a provider, and schedule virtual visits as well.        Patient Education   Personalized Prevention Plan  You are due for the preventive services outlined below.  Your care team is available to assist you in scheduling these services.  If you have already completed any of these items, please share that information with your care team to update in your medical record.  Health Maintenance Due   Topic Date Due     Hepatitis C Screening  Never done     Annual Wellness Visit  03/09/2021     FALL RISK ASSESSMENT  03/09/2021     Flu Vaccine (1) 09/01/2021     Cholesterol Lab  09/08/2021     Eye Exam  10/14/2021     A1C Lab  10/23/2021

## 2021-11-09 ENCOUNTER — OFFICE VISIT (OUTPATIENT)
Dept: FAMILY MEDICINE | Facility: CLINIC | Age: 81
End: 2021-11-09
Payer: MEDICARE

## 2021-11-09 VITALS
DIASTOLIC BLOOD PRESSURE: 66 MMHG | WEIGHT: 251.4 LBS | TEMPERATURE: 97.7 F | OXYGEN SATURATION: 98 % | HEIGHT: 66 IN | HEART RATE: 60 BPM | BODY MASS INDEX: 40.4 KG/M2 | SYSTOLIC BLOOD PRESSURE: 105 MMHG

## 2021-11-09 DIAGNOSIS — Z23 NEED FOR PROPHYLACTIC VACCINATION AND INOCULATION AGAINST INFLUENZA: ICD-10-CM

## 2021-11-09 DIAGNOSIS — Z13.29 SCREENING FOR THYROID DISORDER: ICD-10-CM

## 2021-11-09 DIAGNOSIS — E78.2 MIXED HYPERLIPIDEMIA: ICD-10-CM

## 2021-11-09 DIAGNOSIS — Z00.00 ENCOUNTER FOR MEDICARE ANNUAL WELLNESS EXAM: Primary | ICD-10-CM

## 2021-11-09 DIAGNOSIS — E11.8 TYPE 2 DIABETES MELLITUS WITH COMPLICATION, WITHOUT LONG-TERM CURRENT USE OF INSULIN (H): ICD-10-CM

## 2021-11-09 LAB
CHOLEST SERPL-MCNC: 106 MG/DL
FASTING STATUS PATIENT QL REPORTED: NO
HBA1C MFR BLD: 5.8 % (ref 0–5.6)
HDLC SERPL-MCNC: 42 MG/DL
LDLC SERPL CALC-MCNC: 42 MG/DL
NONHDLC SERPL-MCNC: 64 MG/DL
TRIGL SERPL-MCNC: 110 MG/DL
TSH SERPL DL<=0.005 MIU/L-ACNC: 3.43 MU/L (ref 0.4–4)

## 2021-11-09 PROCEDURE — 90662 IIV NO PRSV INCREASED AG IM: CPT | Performed by: FAMILY MEDICINE

## 2021-11-09 PROCEDURE — 84443 ASSAY THYROID STIM HORMONE: CPT | Performed by: FAMILY MEDICINE

## 2021-11-09 PROCEDURE — 80061 LIPID PANEL: CPT | Performed by: FAMILY MEDICINE

## 2021-11-09 PROCEDURE — G0008 ADMIN INFLUENZA VIRUS VAC: HCPCS | Performed by: FAMILY MEDICINE

## 2021-11-09 PROCEDURE — 36415 COLL VENOUS BLD VENIPUNCTURE: CPT | Performed by: FAMILY MEDICINE

## 2021-11-09 PROCEDURE — 99214 OFFICE O/P EST MOD 30 MIN: CPT | Mod: 25 | Performed by: FAMILY MEDICINE

## 2021-11-09 PROCEDURE — G0439 PPPS, SUBSEQ VISIT: HCPCS | Performed by: FAMILY MEDICINE

## 2021-11-09 PROCEDURE — 83036 HEMOGLOBIN GLYCOSYLATED A1C: CPT | Performed by: FAMILY MEDICINE

## 2021-11-09 RX ORDER — PIOGLITAZONEHYDROCHLORIDE 30 MG/1
30 TABLET ORAL DAILY
Qty: 90 TABLET | Refills: 0 | Status: SHIPPED | OUTPATIENT
Start: 2021-11-09 | End: 2022-05-02

## 2021-11-09 SDOH — ECONOMIC STABILITY: FOOD INSECURITY: WITHIN THE PAST 12 MONTHS, THE FOOD YOU BOUGHT JUST DIDN'T LAST AND YOU DIDN'T HAVE MONEY TO GET MORE.: NEVER TRUE

## 2021-11-09 SDOH — ECONOMIC STABILITY: TRANSPORTATION INSECURITY
IN THE PAST 12 MONTHS, HAS THE LACK OF TRANSPORTATION KEPT YOU FROM MEDICAL APPOINTMENTS OR FROM GETTING MEDICATIONS?: NO

## 2021-11-09 SDOH — ECONOMIC STABILITY: FOOD INSECURITY: WITHIN THE PAST 12 MONTHS, YOU WORRIED THAT YOUR FOOD WOULD RUN OUT BEFORE YOU GOT MONEY TO BUY MORE.: NEVER TRUE

## 2021-11-09 SDOH — ECONOMIC STABILITY: INCOME INSECURITY: HOW HARD IS IT FOR YOU TO PAY FOR THE VERY BASICS LIKE FOOD, HOUSING, MEDICAL CARE, AND HEATING?: NOT HARD AT ALL

## 2021-11-09 SDOH — ECONOMIC STABILITY: INCOME INSECURITY: IN THE LAST 12 MONTHS, WAS THERE A TIME WHEN YOU WERE NOT ABLE TO PAY THE MORTGAGE OR RENT ON TIME?: NO

## 2021-11-09 SDOH — HEALTH STABILITY: PHYSICAL HEALTH: ON AVERAGE, HOW MANY DAYS PER WEEK DO YOU ENGAGE IN MODERATE TO STRENUOUS EXERCISE (LIKE A BRISK WALK)?: 0 DAYS

## 2021-11-09 SDOH — HEALTH STABILITY: PHYSICAL HEALTH: ON AVERAGE, HOW MANY MINUTES DO YOU ENGAGE IN EXERCISE AT THIS LEVEL?: 0 MIN

## 2021-11-09 SDOH — ECONOMIC STABILITY: TRANSPORTATION INSECURITY
IN THE PAST 12 MONTHS, HAS LACK OF TRANSPORTATION KEPT YOU FROM MEETINGS, WORK, OR FROM GETTING THINGS NEEDED FOR DAILY LIVING?: NO

## 2021-11-09 ASSESSMENT — ENCOUNTER SYMPTOMS
HEADACHES: 0
SORE THROAT: 0
ARTHRALGIAS: 0
JOINT SWELLING: 0
FEVER: 0
MYALGIAS: 0
SHORTNESS OF BREATH: 0
WEAKNESS: 1
COUGH: 0
CHILLS: 1
PARESTHESIAS: 0
DIZZINESS: 0
PALPITATIONS: 0
NERVOUS/ANXIOUS: 0

## 2021-11-09 ASSESSMENT — LIFESTYLE VARIABLES
HOW MANY STANDARD DRINKS CONTAINING ALCOHOL DO YOU HAVE ON A TYPICAL DAY: 1 OR 2
HOW OFTEN DO YOU HAVE SIX OR MORE DRINKS ON ONE OCCASION: NEVER
HOW OFTEN DO YOU HAVE A DRINK CONTAINING ALCOHOL: 2-3 TIMES A WEEK

## 2021-11-09 ASSESSMENT — ACTIVITIES OF DAILY LIVING (ADL)
CURRENT_FUNCTION: TRANSPORTATION REQUIRES ASSISTANCE
CURRENT_FUNCTION: MEDICATION ADMINISTRATION REQUIRES ASSISTANCE

## 2021-11-09 ASSESSMENT — SOCIAL DETERMINANTS OF HEALTH (SDOH)
DO YOU BELONG TO ANY CLUBS OR ORGANIZATIONS SUCH AS CHURCH GROUPS UNIONS, FRATERNAL OR ATHLETIC GROUPS, OR SCHOOL GROUPS?: NO
ARE YOU MARRIED, WIDOWED, DIVORCED, SEPARATED, NEVER MARRIED, OR LIVING WITH A PARTNER?: NEVER MARRIED
HOW OFTEN DO YOU GET TOGETHER WITH FRIENDS OR RELATIVES?: ONCE A WEEK
IN A TYPICAL WEEK, HOW MANY TIMES DO YOU TALK ON THE PHONE WITH FAMILY, FRIENDS, OR NEIGHBORS?: ONCE A WEEK
HOW OFTEN DO YOU ATTEND CHURCH OR RELIGIOUS SERVICES?: MORE THAN 4 TIMES PER YEAR

## 2021-11-09 ASSESSMENT — MIFFLIN-ST. JEOR: SCORE: 1798.09

## 2021-11-09 NOTE — LETTER
November 15, 2021      Flash Brown  [address] [facility] [address]        Dear Mr. Brown,    We are writing to inform you of your test results.    Your recent laboratory results show the following:  -Cholesterol levels are at your goal levels.  ADVISE: continuing your medication, a regular exercise program with at least 150 minutes of aerobic exercise per week, and eating a low saturated fat/low carbohydrate diet.  Also, you should recheck this fasting cholesterol panel in 12 months.  -A1C (test of diabetes control the last 2-3 months) is at your goal. Please continue with your current plan. Also, you should make an appointment to see me and recheck your A1C test in 6 months.   -TSH (thyroid stimulating hormone) level is normal which indicates normal thyroid function.     This is reassuring news.      Resulted Orders   TSH with free T4 reflex   Result Value Ref Range    TSH 3.43 0.40 - 4.00 mU/L   Hemoglobin A1c   Result Value Ref Range    Hemoglobin A1C 5.8 (H) 0.0 - 5.6 %      Comment:      Normal <5.7%   Prediabetes 5.7-6.4%    Diabetes 6.5% or higher     Note: Adopted from ADA consensus guidelines.   Lipid panel reflex to direct LDL Fasting   Result Value Ref Range    Cholesterol 106 <200 mg/dL    Triglycerides 110 <150 mg/dL    Direct Measure HDL 42 >=40 mg/dL    LDL Cholesterol Calculated 42 <=100 mg/dL    Non HDL Cholesterol 64 <130 mg/dL    Patient Fasting > 8hrs? No     Narrative    Cholesterol  Desirable:  <200 mg/dL    Triglycerides  Normal:  Less than 150 mg/dL  Borderline High:  150-199 mg/dL  High:  200-499 mg/dL  Very High:  Greater than or equal to 500 mg/dL    Direct Measure HDL  Female:  Greater than or equal to 50 mg/dL   Male:  Greater than or equal to 40 mg/dL    LDL Cholesterol  Desirable:  <100mg/dL  Above Desirable:  100-129 mg/dL   Borderline High:  130-159 mg/dL   High:  160-189 mg/dL   Very High:  >= 190 mg/dL    Non HDL Cholesterol  Desirable:  130 mg/dL  Above Desirable:   130-159 mg/dL  Borderline High:  160-189 mg/dL  High:  190-219 mg/dL  Very High:  Greater than or equal to 220 mg/dL       If you have any questions or concerns, please call the clinic at the number listed above.       Sincerely,      Thomas Jackson DO

## 2021-11-24 DIAGNOSIS — I10 ESSENTIAL HYPERTENSION: ICD-10-CM

## 2021-11-28 RX ORDER — CLONIDINE HYDROCHLORIDE 0.1 MG/1
0.1 TABLET ORAL 2 TIMES DAILY
Qty: 180 TABLET | Refills: 3 | Status: SHIPPED | OUTPATIENT
Start: 2021-11-28 | End: 2022-09-02

## 2021-12-23 ENCOUNTER — TRANSFERRED RECORDS (OUTPATIENT)
Dept: FAMILY MEDICINE | Facility: CLINIC | Age: 81
End: 2021-12-23

## 2021-12-23 LAB — RETINOPATHY: NEGATIVE

## 2022-01-26 ENCOUNTER — LAB (OUTPATIENT)
Dept: LAB | Facility: CLINIC | Age: 82
End: 2022-01-26
Payer: MEDICARE

## 2022-01-26 DIAGNOSIS — C82.24 GRADE 3 FOLLICULAR LYMPHOMA OF LYMPH NODES OF AXILLA (H): ICD-10-CM

## 2022-01-26 LAB
ALBUMIN SERPL-MCNC: 3 G/DL (ref 3.4–5)
ALP SERPL-CCNC: 95 U/L (ref 40–150)
ALT SERPL W P-5'-P-CCNC: 25 U/L (ref 0–70)
ANION GAP SERPL CALCULATED.3IONS-SCNC: 3 MMOL/L (ref 3–14)
AST SERPL W P-5'-P-CCNC: 22 U/L (ref 0–45)
BASOPHILS # BLD AUTO: 0 10E3/UL (ref 0–0.2)
BASOPHILS NFR BLD AUTO: 1 %
BILIRUB SERPL-MCNC: 0.9 MG/DL (ref 0.2–1.3)
BUN SERPL-MCNC: 17 MG/DL (ref 7–30)
CALCIUM SERPL-MCNC: 8.6 MG/DL (ref 8.5–10.1)
CHLORIDE BLD-SCNC: 107 MMOL/L (ref 94–109)
CO2 SERPL-SCNC: 30 MMOL/L (ref 20–32)
CREAT SERPL-MCNC: 0.93 MG/DL (ref 0.66–1.25)
EOSINOPHIL # BLD AUTO: 0.1 10E3/UL (ref 0–0.7)
EOSINOPHIL NFR BLD AUTO: 4 %
ERYTHROCYTE [DISTWIDTH] IN BLOOD BY AUTOMATED COUNT: 15.3 % (ref 10–15)
GFR SERPL CREATININE-BSD FRML MDRD: 84 ML/MIN/1.73M2
GLUCOSE BLD-MCNC: 168 MG/DL (ref 70–99)
HCT VFR BLD AUTO: 34.8 % (ref 40–53)
HGB BLD-MCNC: 11.2 G/DL (ref 13.3–17.7)
IMM GRANULOCYTES # BLD: 0 10E3/UL
IMM GRANULOCYTES NFR BLD: 0 %
LDH SERPL L TO P-CCNC: 184 U/L (ref 85–227)
LYMPHOCYTES # BLD AUTO: 0.5 10E3/UL (ref 0.8–5.3)
LYMPHOCYTES NFR BLD AUTO: 21 %
MCH RBC QN AUTO: 29.3 PG (ref 26.5–33)
MCHC RBC AUTO-ENTMCNC: 32.2 G/DL (ref 31.5–36.5)
MCV RBC AUTO: 91 FL (ref 78–100)
MONOCYTES # BLD AUTO: 0.3 10E3/UL (ref 0–1.3)
MONOCYTES NFR BLD AUTO: 12 %
NEUTROPHILS # BLD AUTO: 1.5 10E3/UL (ref 1.6–8.3)
NEUTROPHILS NFR BLD AUTO: 62 %
NRBC # BLD AUTO: 0 10E3/UL
NRBC BLD AUTO-RTO: 0 /100
PLATELET # BLD AUTO: 67 10E3/UL (ref 150–450)
POTASSIUM BLD-SCNC: 3.7 MMOL/L (ref 3.4–5.3)
PROT SERPL-MCNC: 6.6 G/DL (ref 6.8–8.8)
RBC # BLD AUTO: 3.82 10E6/UL (ref 4.4–5.9)
SODIUM SERPL-SCNC: 140 MMOL/L (ref 133–144)
WBC # BLD AUTO: 2.4 10E3/UL (ref 4–11)

## 2022-01-26 PROCEDURE — 83615 LACTATE (LD) (LDH) ENZYME: CPT

## 2022-01-26 PROCEDURE — 85025 COMPLETE CBC W/AUTO DIFF WBC: CPT

## 2022-01-26 PROCEDURE — 80053 COMPREHEN METABOLIC PANEL: CPT

## 2022-01-26 PROCEDURE — 82040 ASSAY OF SERUM ALBUMIN: CPT

## 2022-01-26 PROCEDURE — 36415 COLL VENOUS BLD VENIPUNCTURE: CPT

## 2022-02-02 ENCOUNTER — ONCOLOGY VISIT (OUTPATIENT)
Dept: ONCOLOGY | Facility: CLINIC | Age: 82
End: 2022-02-02
Attending: INTERNAL MEDICINE
Payer: MEDICARE

## 2022-02-02 VITALS
TEMPERATURE: 97.5 F | SYSTOLIC BLOOD PRESSURE: 108 MMHG | BODY MASS INDEX: 40.19 KG/M2 | RESPIRATION RATE: 12 BRPM | HEART RATE: 59 BPM | DIASTOLIC BLOOD PRESSURE: 47 MMHG | WEIGHT: 249 LBS | OXYGEN SATURATION: 98 %

## 2022-02-02 DIAGNOSIS — R35.1 NOCTURIA: ICD-10-CM

## 2022-02-02 DIAGNOSIS — C43.59 MALIGNANT MELANOMA OF SKIN OF TRUNK (H): ICD-10-CM

## 2022-02-02 DIAGNOSIS — D72.819 LEUKOPENIA, UNSPECIFIED TYPE: ICD-10-CM

## 2022-02-02 DIAGNOSIS — C77.9 LYMPH NODE CANCER (H): Primary | ICD-10-CM

## 2022-02-02 PROCEDURE — G0463 HOSPITAL OUTPT CLINIC VISIT: HCPCS

## 2022-02-02 PROCEDURE — 99215 OFFICE O/P EST HI 40 MIN: CPT | Performed by: INTERNAL MEDICINE

## 2022-02-02 ASSESSMENT — PAIN SCALES - GENERAL: PAINLEVEL: NO PAIN (0)

## 2022-02-02 NOTE — H&P (VIEW-ONLY)
"The patient is being seen for the following issue/s:  Personal history of non-Hodgkin lymphoma and  persistent pancytopenia.    Most recent oncology note by Dr. Camacho from January 2021 reviewed:    \"The patient is a gentleman with multiple comorbidities including type 2 diabetes, hypertension, depression and sleep apnea.  He is known to have history of non-Hodgkin lymphoma - treated in 1983 in [place], [place]. At that time he underwent right axillary lymph node dissection followed by radiation. He did not receive any chemotherapy at that time. Patient has been followed by his primary care physician in [place]. CT scan done 2010 was negative for any recurrence.\"    During my interview with the patient today he said that he had a melanoma of the skin of his back removed when he was 37 years old.  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    He has also had numerous other skin cancers removed since then and is now followed by dermatology here at [place] in [place].    His labs have also shown persistent pancytopenia with recently slightly worsened leukopenia.    He had a bone marrow biopsy/aspiration for work-up of pancytopenia in July 2019 which reported polytypic B cells on flow cytometry.    PHYSICAL EXAMINATION:    General: Todays' vital signs reviewed in the EMR.    Cardiovascular: Cor RRR  Respiratory: Lungs clear to auscultation bilaterally  Lymphatic: No palpable cervical, supraclavicular, infraclavicular, or axillary adenopathy.  He is status post right axillary lymph node dissection.    ASSESSMENT:  Encounter Diagnoses   Name Primary?     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   WBC count 2400, hemoglobin 11.2, platelet count 67,000.     White blood cell " count has decreased compared to a year ago.  ANC is now 1500.  His ANC from last year was about 1800.    Platelets about the same compared to 1 year ago.    PLAN:    You will be referred to urology for frequent urination at night    You will be scheduled for a bone marrow biopsy/aspiration for further work-up of your low white blood cell count.    If your bone marrow biopsy/aspiration is normal we will continue to follow you in the hematology/oncology clinic with annual surveillance visits.    I spent a total of 43 minutes on the care of this patient on the day of service including 15 minutes face to face time and the remainder in chart review, care coordination, and documentation on the day of service.

## 2022-02-02 NOTE — LETTER
"    2/2/2022         RE: Flash Brown  [address]  [place] [address]        Dear Colleague,    Thank you for referring your patient, Flash Brown, to the [facility]. Please see a copy of my visit note below.    The patient is being seen for the following issue/s:  Personal history of non-Hodgkin lymphoma and  persistent pancytopenia.    Most recent oncology note by Dr. Camacho from January 2021 reviewed:    \"The patient is a gentleman with multiple comorbidities including type 2 diabetes, hypertension, depression and sleep apnea.  He is known to have history of non-Hodgkin lymphoma - treated in 1983 in [place], [place]. At that time he underwent right axillary lymph node dissection followed by radiation. He did not receive any chemotherapy at that time. Patient has been followed by his primary care physician in [place]. CT scan done 2010 was negative for any recurrence.\"    During my interview with the patient today he said that he had a melanoma of the skin of his back removed when he was 37 years old.  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    He has also had numerous other skin cancers removed since then and is now followed by dermatology here at [place] in [place].    His labs have also shown persistent pancytopenia with recently slightly worsened leukopenia.    He had a bone marrow biopsy/aspiration for work-up of pancytopenia in July 2019 which reported polytypic B cells on flow cytometry.    PHYSICAL EXAMINATION:    General: Todays' vital signs reviewed in the EMR.    Cardiovascular: Cor RRR  Respiratory: Lungs clear to auscultation bilaterally  Lymphatic: No palpable cervical, supraclavicular, infraclavicular, or axillary adenopathy.  He is status post right axillary lymph node dissection.    ASSESSMENT:  Encounter " "Diagnoses   Name Primary?     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   WBC count 2400, hemoglobin 11.2, platelet count 67,000.     White blood cell count has decreased compared to a year ago.  ANC is now 1500.  His ANC from last year was about 1800.    Platelets about the same compared to 1 year ago.    PLAN:    You will be referred to urology for frequent urination at night    You will be scheduled for a bone marrow biopsy/aspiration for further work-up of your low white blood cell count.    If your bone marrow biopsy/aspiration is normal we will continue to follow you in the hematology/oncology clinic with annual surveillance visits.    I spent a total of 43 minutes on the care of this patient on the day of service including 15 minutes face to face time and the remainder in chart review, care coordination, and documentation on the day of service.    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    Weight as of this encounter: 112.9 kg (249 lb). Body surface area is 2.29 meters squared.  No Pain (0) Comment: Data Unavailable   No LMP for male patient.  Allergies reviewed: Yes  Medications reviewed: Yes    Medications: Medication refills not needed today.  Pharmacy name entered into [facility]: [facility] [id number] [place] [facility] [address] [facility]    Clinical concerns: " Patient states he has a spot on his chest from when he had skin cancer that is still bleeding.  He also would like his prostate levels checked.       Marcelle Arias, CMA                Again, thank you for allowing me to participate in the care of your patient.        Sincerely,        Sang Avendaño MD

## 2022-02-02 NOTE — PROGRESS NOTES
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    Weight as of this encounter: 112.9 kg (249 lb). Body surface area is 2.29 meters squared.  No Pain (0) Comment: Data Unavailable   No LMP for male patient.  Allergies reviewed: Yes  Medications reviewed: Yes    Medications: Medication refills not needed today.  Pharmacy name entered into [employer]: [facility] [id number] [place] [facility] [address] [facility]    Clinical concerns: Patient states he has a spot on his chest from when he had skin cancer that is still bleeding.  He also would like his prostate levels checked.       Marcelle Arias, [facility]            "

## 2022-02-02 NOTE — PROGRESS NOTES
"The patient is being seen for the following issue/s:  Personal history of non-Hodgkin lymphoma and  persistent pancytopenia.    Most recent oncology note by Dr. Camacho from January 2021 reviewed:    \"The patient is a gentleman with multiple comorbidities including type 2 diabetes, hypertension, depression and sleep apnea.  He is known to have history of non-Hodgkin lymphoma - treated in 1983 in [place], [place]. At that time he underwent right axillary lymph node dissection followed by radiation. He did not receive any chemotherapy at that time. Patient has been followed by his primary care physician in [place]. CT scan done 2010 was negative for any recurrence.\"    During my interview with the patient today he said that he had a melanoma of the skin of his back removed when he was 37 years old.  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    He has also had numerous other skin cancers removed since then and is now followed by dermatology here at [place] in [place].    His labs have also shown persistent pancytopenia with recently slightly worsened leukopenia.    He had a bone marrow biopsy/aspiration for work-up of pancytopenia in July 2019 which reported polytypic B cells on flow cytometry.    PHYSICAL EXAMINATION:    General: Todays' vital signs reviewed in the EMR.    Cardiovascular: Cor RRR  Respiratory: Lungs clear to auscultation bilaterally  Lymphatic: No palpable cervical, supraclavicular, infraclavicular, or axillary adenopathy.  He is status post right axillary lymph node dissection.    ASSESSMENT:  Encounter Diagnoses   Name Primary?     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   WBC count 2400, hemoglobin 11.2, platelet count 67,000.     White blood cell " count has decreased compared to a year ago.  ANC is now 1500.  His ANC from last year was about 1800.    Platelets about the same compared to 1 year ago.    PLAN:    You will be referred to urology for frequent urination at night    You will be scheduled for a bone marrow biopsy/aspiration for further work-up of your low white blood cell count.    If your bone marrow biopsy/aspiration is normal we will continue to follow you in the hematology/oncology clinic with annual surveillance visits.    I spent a total of 43 minutes on the care of this patient on the day of service including 15 minutes face to face time and the remainder in chart review, care coordination, and documentation on the day of service.

## 2022-02-02 NOTE — PATIENT INSTRUCTIONS
You will be referred to urology for frequent urination at night    You will be scheduled for a bone marrow biopsy/aspiration for further work-up of your low white blood cell count.    If your bone marrow biopsy/aspiration is normal we will continue to follow you in the hematology/oncology clinic with annual surveillance visits.    Today, we will schedule you next office visit in 1 year from today.  I will schedule you to have some labs drawn on the day of your next office visit (CBC with differential and peripheral blood morphology).

## 2022-02-03 ENCOUNTER — PATIENT OUTREACH (OUTPATIENT)
Dept: ONCOLOGY | Facility: CLINIC | Age: 82
End: 2022-02-03
Payer: MEDICARE

## 2022-02-04 ENCOUNTER — PATIENT OUTREACH (OUTPATIENT)
Dept: ONCOLOGY | Facility: CLINIC | Age: 82
End: 2022-02-04
Payer: MEDICARE

## 2022-02-04 DIAGNOSIS — C82.24 GRADE 3 FOLLICULAR LYMPHOMA OF LYMPH NODES OF AXILLA (H): Primary | ICD-10-CM

## 2022-02-11 ENCOUNTER — PATIENT OUTREACH (OUTPATIENT)
Dept: ONCOLOGY | Facility: CLINIC | Age: 82
End: 2022-02-11
Payer: MEDICARE

## 2022-02-11 NOTE — PROGRESS NOTES
Called patient and spoke with sister Rea to let them know, we are still working on getting  His BM BX scheduled at the [facility]. Still waiting on approval. Offered to kaye it sooner at [facility] under sedation and they are declining this and said they are not in a hurry and will wait on the [facility]. Taryn Avila RN on 2/11/2022 at 3:01 PM

## 2022-02-16 ENCOUNTER — PREP FOR PROCEDURE (OUTPATIENT)
Dept: ONCOLOGY | Facility: CLINIC | Age: 82
End: 2022-02-16
Payer: MEDICARE

## 2022-02-16 ENCOUNTER — PATIENT OUTREACH (OUTPATIENT)
Dept: ONCOLOGY | Facility: CLINIC | Age: 82
End: 2022-02-16
Payer: MEDICARE

## 2022-02-16 DIAGNOSIS — D61.818 PANCYTOPENIA (H): ICD-10-CM

## 2022-02-16 DIAGNOSIS — C77.9 LYMPH NODE CANCER (H): Primary | ICD-10-CM

## 2022-02-16 DIAGNOSIS — D72.819 LEUKOPENIA: ICD-10-CM

## 2022-02-17 ENCOUNTER — PATIENT OUTREACH (OUTPATIENT)
Dept: ONCOLOGY | Facility: CLINIC | Age: 82
End: 2022-02-17
Payer: MEDICARE

## 2022-02-17 DIAGNOSIS — Z11.59 ENCOUNTER FOR SCREENING FOR OTHER VIRAL DISEASES: Primary | ICD-10-CM

## 2022-02-17 PROBLEM — D61.818 OTHER PANCYTOPENIA (H): Status: ACTIVE | Noted: 2020-10-20

## 2022-02-17 PROBLEM — C77.9: Status: ACTIVE | Noted: 2022-02-17

## 2022-02-17 NOTE — PROGRESS NOTES
Called patient and gave his sister Daylin all the information on the Bone Marrow Biopsy scheduled at the [facility] on 2/24. All directions regarding bone marrow biopsy  reviewed with patients sister.    COVID test is scheduled:   Date: 2/21   Time: 3 PM     The patient is scheduled for a sedated BMBX on 2/24.     Patient will check in on 2nd floor, [facility]     Labs:  10:45 AM   H&P:  11:30 AM     Patient will proceed to [facility]th [facility] at 12:30 PM     Sedated BMBx: 2:00 PM     He will need an adult .   A pre-admission nurse will call 1-2 days prior with exact times.     Taryn Avila RN on 2/17/2022 at 4:48 PM

## 2022-02-21 ENCOUNTER — LAB (OUTPATIENT)
Dept: LAB | Facility: CLINIC | Age: 82
End: 2022-02-21
Payer: MEDICARE

## 2022-02-21 DIAGNOSIS — Z11.59 NEED FOR HEPATITIS C SCREENING TEST: Primary | ICD-10-CM

## 2022-02-21 DIAGNOSIS — Z11.59 ENCOUNTER FOR SCREENING FOR OTHER VIRAL DISEASES: ICD-10-CM

## 2022-02-21 PROCEDURE — U0005 INFEC AGEN DETEC AMPLI PROBE: HCPCS

## 2022-02-21 PROCEDURE — U0003 INFECTIOUS AGENT DETECTION BY NUCLEIC ACID (DNA OR RNA); SEVERE ACUTE RESPIRATORY SYNDROME CORONAVIRUS 2 (SARS-COV-2) (CORONAVIRUS DISEASE [COVID-19]), AMPLIFIED PROBE TECHNIQUE, MAKING USE OF HIGH THROUGHPUT TECHNOLOGIES AS DESCRIBED BY CMS-2020-01-R: HCPCS

## 2022-02-22 DIAGNOSIS — D61.818 OTHER PANCYTOPENIA (H): Primary | ICD-10-CM

## 2022-02-22 LAB — SARS-COV-2 RNA RESP QL NAA+PROBE: NEGATIVE

## 2022-02-24 ENCOUNTER — ONCOLOGY VISIT (OUTPATIENT)
Dept: TRANSPLANT | Facility: CLINIC | Age: 82
End: 2022-02-24
Attending: PHYSICIAN ASSISTANT
Payer: MEDICARE

## 2022-02-24 ENCOUNTER — APPOINTMENT (OUTPATIENT)
Dept: LAB | Facility: CLINIC | Age: 82
End: 2022-02-24
Attending: PHYSICIAN ASSISTANT
Payer: MEDICARE

## 2022-02-24 ENCOUNTER — ANESTHESIA EVENT (OUTPATIENT)
Dept: SURGERY | Facility: AMBULATORY SURGERY CENTER | Age: 82
End: 2022-02-24
Payer: MEDICARE

## 2022-02-24 ENCOUNTER — ANESTHESIA (OUTPATIENT)
Dept: SURGERY | Facility: AMBULATORY SURGERY CENTER | Age: 82
End: 2022-02-24
Payer: MEDICARE

## 2022-02-24 ENCOUNTER — TELEPHONE (OUTPATIENT)
Dept: NUTRITION | Facility: CLINIC | Age: 82
End: 2022-02-24

## 2022-02-24 ENCOUNTER — HOSPITAL ENCOUNTER (OUTPATIENT)
Facility: AMBULATORY SURGERY CENTER | Age: 82
End: 2022-02-24
Attending: PHYSICIAN ASSISTANT
Payer: MEDICARE

## 2022-02-24 VITALS
BODY MASS INDEX: 40.77 KG/M2 | RESPIRATION RATE: 16 BRPM | DIASTOLIC BLOOD PRESSURE: 63 MMHG | WEIGHT: 252.6 LBS | HEART RATE: 58 BPM | TEMPERATURE: 97.5 F | OXYGEN SATURATION: 99 % | SYSTOLIC BLOOD PRESSURE: 119 MMHG

## 2022-02-24 VITALS
TEMPERATURE: 97.7 F | BODY MASS INDEX: 40.5 KG/M2 | OXYGEN SATURATION: 99 % | RESPIRATION RATE: 18 BRPM | DIASTOLIC BLOOD PRESSURE: 64 MMHG | HEIGHT: 66 IN | WEIGHT: 252 LBS | HEART RATE: 55 BPM | SYSTOLIC BLOOD PRESSURE: 124 MMHG

## 2022-02-24 DIAGNOSIS — D61.818 OTHER PANCYTOPENIA (H): ICD-10-CM

## 2022-02-24 DIAGNOSIS — C77.9 LYMPH NODE CANCER (H): ICD-10-CM

## 2022-02-24 DIAGNOSIS — D61.818 OTHER PANCYTOPENIA (H): Primary | ICD-10-CM

## 2022-02-24 DIAGNOSIS — D72.819 LEUKOPENIA, UNSPECIFIED TYPE: ICD-10-CM

## 2022-02-24 DIAGNOSIS — Z85.72 HISTORY OF LYMPHOMA: Primary | ICD-10-CM

## 2022-02-24 LAB
BASOPHILS # BLD AUTO: 0 10E3/UL (ref 0–0.2)
BASOPHILS NFR BLD AUTO: 1 %
EOSINOPHIL # BLD AUTO: 0.1 10E3/UL (ref 0–0.7)
EOSINOPHIL NFR BLD AUTO: 3 %
ERYTHROCYTE [DISTWIDTH] IN BLOOD BY AUTOMATED COUNT: 15 % (ref 10–15)
HCT VFR BLD AUTO: 36.6 % (ref 40–53)
HGB BLD-MCNC: 11.8 G/DL (ref 13.3–17.7)
IMM GRANULOCYTES # BLD: 0 10E3/UL
IMM GRANULOCYTES NFR BLD: 0 %
LYMPHOCYTES # BLD AUTO: 0.7 10E3/UL (ref 0.8–5.3)
LYMPHOCYTES NFR BLD AUTO: 26 %
MCH RBC QN AUTO: 28.5 PG (ref 26.5–33)
MCHC RBC AUTO-ENTMCNC: 32.2 G/DL (ref 31.5–36.5)
MCV RBC AUTO: 88 FL (ref 78–100)
MONOCYTES # BLD AUTO: 0.3 10E3/UL (ref 0–1.3)
MONOCYTES NFR BLD AUTO: 10 %
NEUTROPHILS # BLD AUTO: 1.6 10E3/UL (ref 1.6–8.3)
NEUTROPHILS NFR BLD AUTO: 60 %
NRBC # BLD AUTO: 0 10E3/UL
NRBC BLD AUTO-RTO: 0 /100
PLATELET # BLD AUTO: 67 10E3/UL (ref 150–450)
RBC # BLD AUTO: 4.14 10E6/UL (ref 4.4–5.9)
WBC # BLD AUTO: 2.6 10E3/UL (ref 4–11)

## 2022-02-24 PROCEDURE — 88189 FLOWCYTOMETRY/READ 16 & >: CPT | Performed by: PATHOLOGY

## 2022-02-24 PROCEDURE — 85004 AUTOMATED DIFF WBC COUNT: CPT

## 2022-02-24 PROCEDURE — 88313 SPECIAL STAINS GROUP 2: CPT | Mod: TC | Performed by: INTERNAL MEDICINE

## 2022-02-24 PROCEDURE — 88185 FLOWCYTOMETRY/TC ADD-ON: CPT | Performed by: INTERNAL MEDICINE

## 2022-02-24 PROCEDURE — 88271 CYTOGENETICS DNA PROBE: CPT

## 2022-02-24 PROCEDURE — 38222 DX BONE MARROW BX & ASPIR: CPT | Mod: LT

## 2022-02-24 PROCEDURE — G0463 HOSPITAL OUTPT CLINIC VISIT: HCPCS

## 2022-02-24 PROCEDURE — 36415 COLL VENOUS BLD VENIPUNCTURE: CPT | Performed by: INTERNAL MEDICINE

## 2022-02-24 PROCEDURE — 36415 COLL VENOUS BLD VENIPUNCTURE: CPT

## 2022-02-24 PROCEDURE — 999N000248 HC STATISTIC IV INSERT WITH US BY RN

## 2022-02-24 PROCEDURE — 88184 FLOWCYTOMETRY/ TC 1 MARKER: CPT | Performed by: PATHOLOGY

## 2022-02-24 PROCEDURE — 88264 CHROMOSOME ANALYSIS 20-25: CPT

## 2022-02-24 PROCEDURE — 88237 TISSUE CULTURE BONE MARROW: CPT

## 2022-02-24 RX ORDER — LIDOCAINE HYDROCHLORIDE 10 MG/ML
8-10 INJECTION, SOLUTION EPIDURAL; INFILTRATION; INTRACAUDAL; PERINEURAL
Status: DISCONTINUED | OUTPATIENT
Start: 2022-02-24 | End: 2022-02-25 | Stop reason: HOSPADM

## 2022-02-24 RX ORDER — PROPOFOL 10 MG/ML
INJECTION, EMULSION INTRAVENOUS CONTINUOUS PRN
Status: DISCONTINUED | OUTPATIENT
Start: 2022-02-24 | End: 2022-02-24

## 2022-02-24 RX ORDER — LIDOCAINE 40 MG/G
CREAM TOPICAL
Status: DISCONTINUED | OUTPATIENT
Start: 2022-02-24 | End: 2022-02-25 | Stop reason: HOSPADM

## 2022-02-24 RX ORDER — SODIUM CHLORIDE, SODIUM LACTATE, POTASSIUM CHLORIDE, CALCIUM CHLORIDE 600; 310; 30; 20 MG/100ML; MG/100ML; MG/100ML; MG/100ML
INJECTION, SOLUTION INTRAVENOUS CONTINUOUS PRN
Status: DISCONTINUED | OUTPATIENT
Start: 2022-02-24 | End: 2022-02-24

## 2022-02-24 RX ORDER — GLYCOPYRROLATE 0.2 MG/ML
INJECTION, SOLUTION INTRAMUSCULAR; INTRAVENOUS PRN
Status: DISCONTINUED | OUTPATIENT
Start: 2022-02-24 | End: 2022-02-24

## 2022-02-24 RX ADMIN — GLYCOPYRROLATE 0.2 MG: 0.2 INJECTION, SOLUTION INTRAMUSCULAR; INTRAVENOUS at 14:09

## 2022-02-24 RX ADMIN — PROPOFOL 200 MCG/KG/MIN: 10 INJECTION, EMULSION INTRAVENOUS at 14:02

## 2022-02-24 RX ADMIN — SODIUM CHLORIDE, SODIUM LACTATE, POTASSIUM CHLORIDE, CALCIUM CHLORIDE: 600; 310; 30; 20 INJECTION, SOLUTION INTRAVENOUS at 14:02

## 2022-02-24 ASSESSMENT — PAIN SCALES - GENERAL: PAINLEVEL: NO PAIN (0)

## 2022-02-24 NOTE — NURSING NOTE
"Oncology Rooming Note    February 24, 2022 11:01 AM   Flash Brown is a 81 year old male who presents for:    Chief Complaint   Patient presents with     Blood Draw     Labs drawn via  by RN. VS taken.     Oncology Clinic Visit     [facility] RETURN - LYMPH NODE CANCER     Initial Vitals: /63   Pulse 58   Temp 97.5  F (36.4  C) (Oral)   Resp 16   Wt 114.6 kg (252 lb 9.6 oz)   SpO2 99%   BMI 40.77 kg/m   Estimated body mass index is 40.77 kg/m  as calculated from the following:    Height as of 11/9/21: 1.676 m (5' 6\").    Weight as of this encounter: 114.6 kg (252 lb 9.6 oz). Body surface area is 2.31 meters squared.  No Pain (0) Comment: Data Unavailable   No LMP for male patient.  Allergies reviewed: Yes  Medications reviewed: Yes    Medications: Medication refills not needed today.  Pharmacy name entered into "[id number] [place] [address]    Clinical concerns: No new concerns. Provider was notified.      Aric Garsia LPN            "

## 2022-02-24 NOTE — ANESTHESIA CARE TRANSFER NOTE
Patient: Flash Brown    Procedure: Procedure(s):  BIOPSY, BONE MARROW       Diagnosis: Lymph node cancer (H) [C77.9]  Pancytopenia (H) [D61.818]  Leukopenia [D72.819]  Diagnosis Additional Information: No value filed.    Anesthesia Type:   MAC     Note:    Oropharynx: spontaneously breathing  Level of Consciousness: awake  Oxygen Supplementation: room air    Independent Airway: airway patency satisfactory and stable  Dentition: dentition unchanged  Vital Signs Stable: post-procedure vital signs reviewed and stable  Report to RN Given: handoff report given  Patient transferred to: Phase II    Handoff Report: Identifed the Patient, Identified the Reponsible Provider, Reviewed the pertinent medical history, Discussed the surgical course, Reviewed Intra-OP anesthesia mangement and issues during anesthesia, Set expectations for post-procedure period and Allowed opportunity for questions and acknowledgement of understanding      Vitals:  Vitals Value Taken Time   BP     Temp     Pulse     Resp     SpO2         Electronically Signed By: FIONA Guerra CRNA  February 24, 2022  2:30 PM

## 2022-02-24 NOTE — OP NOTE
"BMT ONC Adult Bone Marrow Biopsy Procedure Note  February 24, 2022  BP (!) 94/37   Pulse 56   Temp 97.7  F (36.5  C) (Temporal)   Resp 16   Ht 1.676 m (5' 6\")   Wt 114.3 kg (252 lb)   SpO2 95%   BMI 40.67 kg/m       Learning needs assessment complete within 12 months? NO    DIAGNOSIS: Personal history of non-Hodgkin lymphoma and  persistent pancytopenia    PROCEDURE: Unilateral Bone Marrow Biopsy and Unilateral Aspirate    [place] [facility] [place] Room    Patient s identification was positively verified by verbal identification and invasive procedure safety checklist was completed. Informed consent was obtained. Following the administration of Propofol as pre-medication, patient was placed in the prone position and prepped and draped in a sterile manner. Approximately 10 cc of 1% Lidocaine was used over the left posterior iliac spine. Following this a 3 mm incision was made. Trephine bone marrow core(s) was (were) obtained from the [facility]. Bone marrow aspirates were obtained from the [facility]. Aspirates were sent for morphology, immunophenotyping, cytogenetics and ACD syringe for hold. A total of approximately 20 ml of marrow was aspirated. Following this procedure a sterile dressing was applied to the bone marrow biopsy site(s). The patient was placed in the supine position to maintain pressure on the biopsy site. Post-procedure wound care instructions were given.     Complications: NO    Pre-procedural pain: 0 out of 10 on the numeric pain rating scale.     Procedural pain: 0 out of 10 on the numeric pain rating scale.     Post-procedural pain assessment: 0 out of 10 on the numeric pain rating scale.     Interventions: NO    Length of procedure:20 minutes or less      Procedure performed by: Cailin Gillespie PA-C  [phone]    "

## 2022-02-24 NOTE — ANESTHESIA PREPROCEDURE EVALUATION
Anesthesia Pre-Procedure Evaluation    Patient: Flash Brown   MRN: [id number] : 1940        Procedure : Procedure(s):  BIOPSY, BONE MARROW          Past Medical History:   Diagnosis Date     Basal cell carcinoma      CAD (coronary artery disease)      Depression      Hyperlipidemia      Hypertension      Lymphoma (H)      Malignant melanoma (H)      Melanoma (H)      Squamous cell carcinoma       Past Surgical History:   Procedure Laterality Date     AXILLARY SURGERY      S/P resection and adjuvant radiation     BONE MARROW BIOPSY, BONE SPECIMEN, NEEDLE/TROCAR N/A 2019    Procedure: BIOPSY, BONE MARROW;  Surgeon: Husam Issa MD;  Location: [facility]     CARDIAC SURGERY       CHOLECYSTECTOMY       HERNIA REPAIR, UMBILICAL       PHACOEMULSIFICATION WITH STANDARD INTRAOCULAR LENS IMPLANT Right 10/2/2019    Procedure: Cataract Removal with Implant;  Surgeon: Dinesh Ivey MD;  Location: [facility]     PHACOEMULSIFICATION WITH STANDARD INTRAOCULAR LENS IMPLANT Left 10/21/2019    Procedure: Cataract Removal with Implant;  Surgeon: Dinesh Ivey MD;  Location: [facility]     STENT      PTCA with drug-eluting stent of RCA, circumflex, and LAD     VASCULAR SURGERY        Allergies   Allergen Reactions     Iodine Swelling      Social History     Tobacco Use     Smoking status: Never Smoker     Smokeless tobacco: Never Used   Substance Use Topics     Alcohol use: Yes     Comment: glass of wine couple times per week      Wt Readings from Last 1 Encounters:   22 114.3 kg (252 lb)        Anesthesia Evaluation   Pt has had prior anesthetic. Type: General.    History of anesthetic complications  - PONV.      ROS/MED HX  ENT/Pulmonary:     (+) sleep apnea, uses CPAP,     Neurologic:  - neg neurologic ROS     Cardiovascular:     (+) hypertension--CAD ---    METS/Exercise Tolerance:     Hematologic:       Musculoskeletal:       GI/Hepatic:     (+) liver disease,     Renal/Genitourinary:       Endo:      (+) Obesity,     Psychiatric/Substance Use:  - neg psychiatric ROS     Infectious Disease:       Malignancy:   (+) Malignancy,     Other:            Physical Exam    Airway  airway exam normal           Respiratory Devices and Support         Dental  no notable dental history         Cardiovascular   cardiovascular exam normal          Pulmonary   pulmonary exam normal                OUTSIDE LABS:  CBC:   Lab Results   Component Value Date    WBC 2.6 (L) 02/24/2022    WBC 2.4 (L) 01/26/2022    HGB 11.8 (L) 02/24/2022    HGB 11.2 (L) 01/26/2022    HCT 36.6 (L) 02/24/2022    HCT 34.8 (L) 01/26/2022    PLT 67 (L) 02/24/2022    PLT 67 (L) 01/26/2022     BMP:   Lab Results   Component Value Date     01/26/2022     08/03/2021    POTASSIUM 3.7 01/26/2022    POTASSIUM 4.2 08/03/2021    CHLORIDE 107 01/26/2022    CHLORIDE 108 08/03/2021    CO2 30 01/26/2022    CO2 27 08/03/2021    BUN 17 01/26/2022    BUN 14 08/03/2021    CR 0.93 01/26/2022    CR 1.00 08/03/2021     (H) 01/26/2022     (H) 08/03/2021     COAGS: No results found for: PTT, INR, FIBR  POC:   Lab Results   Component Value Date    BGM 88 10/21/2019     HEPATIC:   Lab Results   Component Value Date    ALBUMIN 3.0 (L) 01/26/2022    PROTTOTAL 6.6 (L) 01/26/2022    ALT 25 01/26/2022    AST 22 01/26/2022    ALKPHOS 95 01/26/2022    BILITOTAL 0.9 01/26/2022    MORGAN 33 08/19/2020     OTHER:   Lab Results   Component Value Date    LACT 1.5 08/19/2020    A1C 5.8 (H) 11/09/2021    NATALIIA 8.6 01/26/2022    LIPASE 142 08/19/2020    TSH 3.43 11/09/2021       Anesthesia Plan    ASA Status:  3   NPO Status:  NPO Appropriate    Anesthesia Type: MAC.     - Reason for MAC: straight local not clinically adequate   Induction: Intravenous.   Maintenance: TIVA.        Consents    Anesthesia Plan(s) and associated risks, benefits, and realistic alternatives discussed. Questions answered and patient/representative(s) expressed understanding.     - Discussed:  Risks, Benefits and Alternatives for BOTH SEDATION and the PROCEDURE were discussed     - Discussed with:  Patient         Postoperative Care    Pain management: Oral pain medications.   PONV prophylaxis: Ondansetron (or other 5HT-3), Dexamethasone or Solumedrol     Comments:                Keith Argueta MD, MD

## 2022-02-24 NOTE — NURSING NOTE
Chief Complaint   Patient presents with     Blood Draw     Labs drawn via  by RN. VS taken.     Labs drawn with vpt by rn.  Pt tolerated well.  VS taken.  Pt checked in for next appt.    Terry Yeboah RN

## 2022-02-24 NOTE — PROGRESS NOTES
Patient Name: Flash Brown  Patient MRN: [id number]  Patient : 1940    Abbreviated H&P and Pre-sedation Assessment for bone marrow biopsy (procedure name) with sedation    Chief complaint and/or reason for Procedure: lymphoma    Planned level of sedation: MAC    History of problems with sedation: (patient or family hx): No    ASA Assessment Category: 2 - Mild systemic disease    History of sleep apnea: Yes; does not use CPAP    History of blood thinners: Yes; plavix; held for last 6 days     Appropriate NPO status: Yes; last ate solid food at 5:30 am    Current tobacco use: No    Any recent fever, cough, chest or sinus congestion, SOB, weight loss, chest pain, diarrhea or constipation. No    Medications   Currently Scheduled Medications       Home Med List)  (Not in a hospital admission)      Allergies  Iodine    PMH:  Past Medical History:   Diagnosis Date     Basal cell carcinoma      CAD (coronary artery disease)      Depression      Hyperlipidemia      Hypertension      Lymphoma (H)      Malignant melanoma (H)      Melanoma (H)      Squamous cell carcinoma        Past Surgical History:   Procedure Laterality Date     AXILLARY SURGERY      S/P resection and adjuvant radiation     BONE MARROW BIOPSY, BONE SPECIMEN, NEEDLE/TROCAR N/A 2019    Procedure: BIOPSY, BONE MARROW;  Surgeon: Husam Issa MD;  Location: [facility]     CARDIAC SURGERY       CHOLECYSTECTOMY       HERNIA REPAIR, UMBILICAL       PHACOEMULSIFICATION WITH STANDARD INTRAOCULAR LENS IMPLANT Right 10/2/2019    Procedure: Cataract Removal with Implant;  Surgeon: Dinesh Ivey MD;  Location: [facility]     PHACOEMULSIFICATION WITH STANDARD INTRAOCULAR LENS IMPLANT Left 10/21/2019    Procedure: Cataract Removal with Implant;  Surgeon: Dinesh Ivey MD;  Location: [facility]     STENT      PTCA with drug-eluting stent of RCA, circumflex, and LAD     VASCULAR SURGERY       Social History     Tobacco Use     Smoking status: Never  Smoker     Smokeless tobacco: Never Used   Vaping Use     Vaping Use: Never used   Substance Use Topics     Alcohol use: Yes     Comment: glass of wine couple times per week     Drug use: Never     Family History   Problem Relation Age of Onset     Asthma Other      Cancer Other         melanoma     Blood Disease Other         bleeding disorder     Lung Cancer Mother         Smoker     Prostate Cancer Father      Melanoma No family hx of      covid test neg 2/21/22    Focused Physical exam pertinent to procedure:          (Details of heart, lung, ASA assessment and mallampati assessment in pre procedure assessment flowsheet)  General- alert and over weight   Recent vital signs-  Blood pressure 119/63, pulse 58, temperature 97.5  F (36.4  C), temperature source Oral, resp. rate 16, weight 114.6 kg (252 lb 9.6 oz), SpO2 99 %.    HEART-{HEART BRIEF EXAM: distant heart sounds, RRR  LUNGS-Clear to Ausculation  OROPHARYNGEAL - no oral lesions  No PIV, needs to be placed    A/P:Reviewed history, medications, allergies, clinical information and pre procedure assessment. The patient was informed of the risks and benefits of the procedure.  They would like to proceed.  CBC pending; Pending anesthesia review,  Flash Brown is approved for use of sedation during their procedure as noted above.      MD signature  Lizz Bowens PA-C

## 2022-02-24 NOTE — LETTER
2022         RE: Flash Brown  [address]  [facility] [address]        Dear Colleague,    Thank you for referring your patient, Flash Brown, to the [facility] BLOOD AND MARROW TRANSPLANT PROGRAM [place]. Please see a copy of my visit note below.    Patient Name: Flash Brown  Patient MRN: [id number]  Patient : 1940    Abbreviated H&P and Pre-sedation Assessment for bone marrow biopsy (procedure name) with sedation    Chief complaint and/or reason for Procedure: lymphoma    Planned level of sedation: MAC    History of problems with sedation: (patient or family hx): No    ASA Assessment Category: 2 - Mild systemic disease    History of sleep apnea: Yes; does not use CPAP    History of blood thinners: Yes; plavix; held for last 6 days     Appropriate NPO status: Yes; last ate solid food at 5:30 am    Current tobacco use: No    Any recent fever, cough, chest or sinus congestion, SOB, weight loss, chest pain, diarrhea or constipation. No    Medications   Currently Scheduled Medications       Home Med List)  (Not in a hospital admission)      Allergies  Iodine    PMH:  Past Medical History:   Diagnosis Date     Basal cell carcinoma      CAD (coronary artery disease)      Depression      Hyperlipidemia      Hypertension      Lymphoma (H)      Malignant melanoma (H)      Melanoma (H)      Squamous cell carcinoma        Past Surgical History:   Procedure Laterality Date     AXILLARY SURGERY      S/P resection and adjuvant radiation     BONE MARROW BIOPSY, BONE SPECIMEN, NEEDLE/TROCAR N/A 2019    Procedure: BIOPSY, BONE MARROW;  Surgeon: Husam Issa MD;  Location: [facility]     CARDIAC SURGERY       CHOLECYSTECTOMY       HERNIA REPAIR, UMBILICAL       PHACOEMULSIFICATION WITH STANDARD INTRAOCULAR LENS IMPLANT Right 10/2/2019    Procedure: Cataract Removal with Implant;  Surgeon: Dinesh Ivey MD;  Location: [facility]     PHACOEMULSIFICATION WITH STANDARD INTRAOCULAR  LENS IMPLANT Left 10/21/2019    Procedure: Cataract Removal with Implant;  Surgeon: Dinesh Ivey MD;  Location: [facility]     STENT      PTCA with drug-eluting stent of RCA, circumflex, and LAD     VASCULAR SURGERY       Social History     Tobacco Use     Smoking status: Never Smoker     Smokeless tobacco: Never Used   Vaping Use     Vaping Use: Never used   Substance Use Topics     Alcohol use: Yes     Comment: glass of wine couple times per week     Drug use: Never     Family History   Problem Relation Age of Onset     Asthma Other      Cancer Other         melanoma     Blood Disease Other         bleeding disorder     Lung Cancer Mother         Smoker     Prostate Cancer Father      Melanoma No family hx of      covid test neg 2/21/22    Focused Physical exam pertinent to procedure:          (Details of heart, lung, ASA assessment and mallampati assessment in pre procedure assessment flowsheet)  General- alert and over weight   Recent vital signs-  Blood pressure 119/63, pulse 58, temperature 97.5  F (36.4  C), temperature source Oral, resp. rate 16, weight 114.6 kg (252 lb 9.6 oz), SpO2 99 %.    HEART-{HEART BRIEF EXAM: distant heart sounds, RRR  LUNGS-Clear to Ausculation  OROPHARYNGEAL - no oral lesions  No PIV, needs to be placed    A/P:Reviewed history, medications, allergies, clinical information and pre procedure assessment. The patient was informed of the risks and benefits of the procedure.  They would like to proceed.  CBC pending; Pending anesthesia review,  Flash Brown is approved for use of sedation during their procedure as noted above.      MD signature  Lizz Bowens PA-C        Again, thank you for allowing me to participate in the care of your patient.      Sincerely,    [facility] Advanced Practice Provider

## 2022-02-24 NOTE — LETTER
2/24/2022         RE: Flash Brown  [address]  [facility] [address]        Dear Colleague,    Thank you for referring your patient, Flash Brown, to the [facility] BLOOD AND MARROW TRANSPLANT PROGRAM [place]. Please see a copy of my visit note below.    See op note      Again, thank you for allowing me to participate in the care of your patient.      Sincerely,    [facility] BONE MARROW BIOPSY

## 2022-02-25 LAB
INTERPRETATION: NORMAL
PATH REPORT.COMMENTS IMP SPEC: NORMAL
PATH REPORT.FINAL DX SPEC: NORMAL
PATH REPORT.MICROSCOPIC SPEC OTHER STN: NORMAL
PATH REPORT.RELEVANT HX SPEC: NORMAL

## 2022-02-28 LAB
PATH REPORT.COMMENTS IMP SPEC: NORMAL
PATH REPORT.COMMENTS IMP SPEC: NORMAL
PATH REPORT.FINAL DX SPEC: NORMAL
PATH REPORT.GROSS SPEC: NORMAL
PATH REPORT.MICROSCOPIC SPEC OTHER STN: NORMAL
PATH REPORT.MICROSCOPIC SPEC OTHER STN: NORMAL
PATH REPORT.RELEVANT HX SPEC: NORMAL

## 2022-02-28 PROCEDURE — 88313 SPECIAL STAINS GROUP 2: CPT | Mod: 26 | Performed by: STUDENT IN AN ORGANIZED HEALTH CARE EDUCATION/TRAINING PROGRAM

## 2022-02-28 PROCEDURE — 85097 BONE MARROW INTERPRETATION: CPT | Mod: GC | Performed by: STUDENT IN AN ORGANIZED HEALTH CARE EDUCATION/TRAINING PROGRAM

## 2022-02-28 PROCEDURE — 88305 TISSUE EXAM BY PATHOLOGIST: CPT | Mod: 26 | Performed by: STUDENT IN AN ORGANIZED HEALTH CARE EDUCATION/TRAINING PROGRAM

## 2022-02-28 PROCEDURE — 88311 DECALCIFY TISSUE: CPT | Mod: 26 | Performed by: STUDENT IN AN ORGANIZED HEALTH CARE EDUCATION/TRAINING PROGRAM

## 2022-02-28 PROCEDURE — 88342 IMHCHEM/IMCYTCHM 1ST ANTB: CPT | Mod: 26 | Performed by: STUDENT IN AN ORGANIZED HEALTH CARE EDUCATION/TRAINING PROGRAM

## 2022-02-28 PROCEDURE — 88341 IMHCHEM/IMCYTCHM EA ADD ANTB: CPT | Mod: 26 | Performed by: STUDENT IN AN ORGANIZED HEALTH CARE EDUCATION/TRAINING PROGRAM

## 2022-02-28 PROCEDURE — 85060 BLOOD SMEAR INTERPRETATION: CPT | Mod: GC | Performed by: STUDENT IN AN ORGANIZED HEALTH CARE EDUCATION/TRAINING PROGRAM

## 2022-03-01 ENCOUNTER — TRANSFERRED RECORDS (OUTPATIENT)
Dept: HEALTH INFORMATION MANAGEMENT | Facility: CLINIC | Age: 82
End: 2022-03-01

## 2022-03-01 LAB — RETINOPATHY: NEGATIVE

## 2022-03-06 NOTE — ANESTHESIA POSTPROCEDURE EVALUATION
Patient: Flash Brown    Procedure: Procedure(s):  BIOPSY, BONE MARROW       Anesthesia Type:  MAC    Note:  Disposition: Outpatient   Postop Pain Control: Uneventful            Sign Out: Well controlled pain   PONV: No   Neuro/Psych: Uneventful            Sign Out: Acceptable/Baseline neuro status   Airway/Respiratory: Uneventful            Sign Out: Acceptable/Baseline resp. status   CV/Hemodynamics: Uneventful            Sign Out: Acceptable CV status; No obvious hypovolemia; No obvious fluid overload   Other NRE: NONE   DID A NON-ROUTINE EVENT OCCUR? No           Last vitals:  Vitals Value Taken Time   /64 02/24/22 1500   Temp 36.5  C (97.7  F) 02/24/22 1430   Pulse 55 02/24/22 1500   Resp 18 02/24/22 1500   SpO2 99 % 02/24/22 1500       Electronically Signed By: Winston Mi MD  March 5, 2022  9:06 PM

## 2022-03-14 ENCOUNTER — OFFICE VISIT (OUTPATIENT)
Dept: UROLOGY | Facility: CLINIC | Age: 82
End: 2022-03-14
Attending: INTERNAL MEDICINE
Payer: MEDICARE

## 2022-03-14 VITALS — OXYGEN SATURATION: 93 % | HEART RATE: 53 BPM | SYSTOLIC BLOOD PRESSURE: 99 MMHG | DIASTOLIC BLOOD PRESSURE: 59 MMHG

## 2022-03-14 DIAGNOSIS — R35.1 NOCTURIA: ICD-10-CM

## 2022-03-14 PROCEDURE — 51798 US URINE CAPACITY MEASURE: CPT | Performed by: UROLOGY

## 2022-03-14 PROCEDURE — 99203 OFFICE O/P NEW LOW 30 MIN: CPT | Performed by: UROLOGY

## 2022-03-14 NOTE — PROGRESS NOTES
S: Flash Brown is a pleasant  81 year old male who was requested to be seen by  Thomas Jackson for a consult with regard to patient's urinary complaints.  Patient complains of Nocturia x 2-3.  He has no history of elevated PSA.  Symptoms have been on going for   2 years(s).  Seems to be worsened over time.  His recent PSA was found to be   PSA   Date Value Ref Range Status   03/13/2008 1.30 0 - 4 ug/L Final   .  His AUA Symptom Score:  9.  He has no daytime frequency.  His urinary flow is good.  He has h/o sleep apnea but has not been using CPAP for it.  He is morbidly obese.    Current Outpatient Medications   Medication Sig Dispense Refill     amLODIPine (NORVASC) 5 MG tablet Take 1 tablet (5 mg) by mouth daily 90 tablet 3     atorvastatin (LIPITOR) 20 MG tablet Take 1 tablet (20 mg) by mouth daily 90 tablet 3     carvedilol (COREG) 12.5 MG tablet Take 1 tablet (12.5 mg) by mouth 2 times daily (with meals) 180 tablet 3     cloNIDine (CATAPRES) 0.1 MG tablet Take 1 tablet (0.1 mg) by mouth 2 times daily 180 tablet 3     clopidogrel (PLAVIX) 75 MG tablet Take 1 tablet (75 mg) by mouth daily 90 tablet 3     hydrALAZINE (APRESOLINE) 50 MG tablet Take 1 tablet (50 mg) by mouth 2 times daily 180 tablet 1     nitroGLYcerin (NITROSTAT) 0.4 MG sublingual tablet Place 1 tablet (0.4 mg) under the tongue See Admin Instructions for chest pain 30 tablet 0     ORDER FOR DME Light Therapy with Full Spectrum Light (00573 lux) Start with 10-15 minute exposure per day, Increase exposure to 30 to 45 minutes per day, Maximum exposure: 90 minutes per day,Look periodically at light during each session  1 0     pioglitazone (ACTOS) 30 MG tablet Take 1 tablet (30 mg) by mouth daily 90 tablet 0     ramipril (ALTACE) 10 MG capsule TAKE 1 CAPSULE BY MOUTH EVERY DAY 90 capsule 3     triamcinolone (KENALOG) 0.1 % external cream Twice daily to legs prn itching 454 g 3     clotrimazole (LOTRIMIN) 1 % external cream Apply topically 2  times daily as needed For ringworm to left armpit. (Patient not taking: Reported on 8/3/2021) 30 g 1     naloxone (NARCAN) 4 MG/0.1ML nasal spray Spray 1 spray (4 mg) into one nostril alternating nostrils once as needed for opioid reversal Every 2-3 minutes until patient responsive or EMS arrives (Patient not taking: Reported on 8/3/2021) 0.2 mL 0     NIACIN 500 MG OR TABS Take one orally twice daily (Patient not taking: Reported on 8/3/2021) 180 3     tamsulosin (FLOMAX) 0.4 MG capsule Take 1 capsule (0.4 mg) by mouth daily (Patient not taking: Reported on 3/14/2022) 90 capsule 3     Allergies   Allergen Reactions     Iodine Swelling     Past Medical History:   Diagnosis Date     Basal cell carcinoma      CAD (coronary artery disease)      Depression      Hyperlipidemia      Hypertension      Lymphoma (H)      Malignant melanoma (H)      Melanoma (H)      Squamous cell carcinoma      Past Surgical History:   Procedure Laterality Date     AXILLARY SURGERY      S/P resection and adjuvant radiation     BONE MARROW BIOPSY, BONE SPECIMEN, NEEDLE/TROCAR N/A 7/8/2019    Procedure: BIOPSY, BONE MARROW;  Surgeon: Husam Issa MD;  Location: [facility]     BONE MARROW BIOPSY, BONE SPECIMEN, NEEDLE/TROCAR Left 2/24/2022    Procedure: BIOPSY, BONE MARROW;  Surgeon: Cailin Anthony PA-C;  Location: [facility]     CARDIAC SURGERY       CHOLECYSTECTOMY       HERNIA REPAIR, UMBILICAL       PHACOEMULSIFICATION WITH STANDARD INTRAOCULAR LENS IMPLANT Right 10/2/2019    Procedure: Cataract Removal with Implant;  Surgeon: Dinesh Ivey MD;  Location: [facility]     PHACOEMULSIFICATION WITH STANDARD INTRAOCULAR LENS IMPLANT Left 10/21/2019    Procedure: Cataract Removal with Implant;  Surgeon: Dinesh Ivey MD;  Location: [facility]     STENT      PTCA with drug-eluting stent of RCA, circumflex, and LAD     VASCULAR SURGERY        Family History   Problem Relation Age of Onset     Asthma Other      Cancer Other         melanoma      Blood Disease Other         bleeding disorder     Lung Cancer Mother         Smoker     Prostate Cancer Father      Melanoma No family hx of      He does not have a family history of prostate cancer.  Social History     Socioeconomic History     Marital status: Single     Spouse name: None     Number of children: 0     Years of education: None     Highest education level: None   Occupational History     Occupation: Retired     Comment: [employer]    Tobacco Use     Smoking status: Never Smoker     Smokeless tobacco: Never Used   Vaping Use     Vaping Use: Never used   Substance and Sexual Activity     Alcohol use: Yes     Comment: glass of wine couple times per week     Drug use: Never     Sexual activity: None   Other Topics Concern     Parent/sibling w/ CABG, MI or angioplasty before 65F 55M? Not Asked   Social History Narrative     None     Social Determinants of Health     Financial Resource Strain: Low Risk      Difficulty of Paying Living Expenses: Not hard at all   Food Insecurity: No Food Insecurity     Worried About Running Out of Food in the Last Year: Never true     Ran Out of Food in the Last Year: Never true   Transportation Needs: No Transportation Needs     Lack of Transportation (Medical): No     Lack of Transportation (Non-Medical): No   Physical Activity: Inactive     Days of Exercise per Week: 0 days     Minutes of Exercise per Session: 0 min   Stress: No Stress Concern Present     Feeling of Stress : Not at all   Social Connections: Socially Isolated     Frequency of Communication with Friends and Family: Once a week     Frequency of Social Gatherings with Friends and Family: Once a week     Attends Caoda[employer] Services: More than 4 times per year     Active Member of Clubs or Organizations: No     Attends Club or Organization Meetings: Not on file     Marital Status: Never    Intimate Partner Violence: Not on file   Housing Stability: Low Risk      Unable to Pay for Housing  in the Last Year: No     Number of Places Lived in the Last Year: 1     Unstable Housing in the Last Year: No        REVIEW OF SYSTEMS  =================  C: NEGATIVE for fever, chills, change in weight  I: NEGATIVE for worrisome rashes, moles or lesions  E/M: NEGATIVE for ear, mouth and throat problems  R: NEGATIVE for significant cough or SHORTNESS OF BREATH  CV:  NEGATIVE for chest pain, palpitations or peripheral edema  GI: NEGATIVE for nausea, abdominal pain, heartburn, or change in bowel habits  NEURO: NEGATIVE numbness/weakness  : see HPI  PSYCH: NEGATIVE depression/anxiety  LYmph: no new enlarged lymph nodes  Ortho: no new trauma/movements           O: Exam:BP 99/59 (BP Location: Right arm, Patient Position: Chair, Cuff Size: Adult Large)   Pulse 53   SpO2 93%    Constitutional: healthy, alert and no distress  Cardiovascular: negative, PMI normal.   Respiratory: negative, no evidence of respiratory distress  Gastrointestinal: Abdomen soft, non-tender. BS normal. No masses, organomegaly  : normal male.  No bladder distension.  Musculoskeletal: extremities normal- no gross deformities noted, gait normal and normal muscle tone  Skin: no suspicious lesions or rashes  Neurologic: Alert and oriented  Psychiatric: mentation appears normal. and affect normal/bright  Hematologic/Lymphatic/Immunologic: normal ant/post cervical, axillary, supraclavicular and inguinal nodes    Assessment/Plan:   (R35.1) Nocturia  Comment:  Symptom could be due to sleep apnea  Plan: discussed usage of CPAP.  He did not take it with him from [place].  Will send patient to see Dr. Buckley for consult.

## 2022-03-14 NOTE — PROGRESS NOTES
2 water  1 soda  2-3 juice  1 milk  Bladder scan performed 0ml detected in bladder. Paola Wells, CMA       details…

## 2022-03-15 LAB
ADDITIONAL COMMENTS: NORMAL
CULTURE HARVEST COMPLETE DATE: NORMAL
INTERPRETATION: NORMAL
ISCN: NORMAL
METHODS: NORMAL

## 2022-03-15 PROCEDURE — 88291 CYTO/MOLECULAR REPORT: CPT | Performed by: MEDICAL GENETICS

## 2022-03-17 ENCOUNTER — TELEPHONE (OUTPATIENT)
Dept: ONCOLOGY | Facility: CLINIC | Age: 82
End: 2022-03-17
Payer: MEDICARE

## 2022-03-17 DIAGNOSIS — D61.818 PANCYTOPENIA (H): Primary | ICD-10-CM

## 2022-03-17 NOTE — TELEPHONE ENCOUNTER
----- Message from Sang Avendaño MD sent at 3/17/2022  4:15 PM CDT -----  Regarding: RE: BM BX results  Bone marrow revealed no abnormal findings.  He can come back to see me in 6 months with a CBC.  ----- Message -----  From: Taryn Avila RN  Sent: 3/17/2022   1:55 PM CDT  To: Sang Avendaño MD  Subject: BM BX results                                    Hello,  Please review this patients Bone marrow biopsy results from 2/24- all results are in now and let me know your recommendations.Thanks!Melly

## 2022-03-17 NOTE — TELEPHONE ENCOUNTER
Called patient with Bone Marrow Biopsy results showing now abnormalities per Dr. Avendaño and he should follow up in 6 months with a repeat CBC. Patient verbalized understanding and agreement to plan.Taryn Avila RN on 3/17/2022 at 4:46 PM

## 2022-04-19 DIAGNOSIS — I25.10 CORONARY ARTERY DISEASE INVOLVING NATIVE CORONARY ARTERY OF NATIVE HEART WITHOUT ANGINA PECTORIS: ICD-10-CM

## 2022-04-21 RX ORDER — CARVEDILOL 12.5 MG/1
12.5 TABLET ORAL 2 TIMES DAILY WITH MEALS
Qty: 180 TABLET | Refills: 3 | OUTPATIENT
Start: 2022-04-21

## 2022-05-02 DIAGNOSIS — E11.8 TYPE 2 DIABETES MELLITUS WITH COMPLICATION, WITHOUT LONG-TERM CURRENT USE OF INSULIN (H): ICD-10-CM

## 2022-05-02 RX ORDER — PIOGLITAZONEHYDROCHLORIDE 30 MG/1
30 TABLET ORAL DAILY
Qty: 90 TABLET | Refills: 0 | Status: SHIPPED | OUTPATIENT
Start: 2022-05-02 | End: 2022-07-31

## 2022-05-23 ENCOUNTER — TELEPHONE (OUTPATIENT)
Dept: FAMILY MEDICINE | Facility: CLINIC | Age: 82
End: 2022-05-23
Payer: MEDICARE

## 2022-05-23 NOTE — TELEPHONE ENCOUNTER
I see patient was in [facility] ER yesterday.   Community-Acquired pneumonia was the diagnosis.    Plan:    Take antibiotics as prescribed  Return to the emergency department if you develop any new or worsening symptoms including increased shortness of breath, chest discomfort or other concerns  Follow-up with your primary care physician in 3 days for recheck      We have consent to communicate with Rea on file.    I called and spoke to Rea, she says Flash is on cefdinir and azithromycin, no steroid or inhaler.   She says she reports feeling better today, had his first doses of antibiotic.    I advised push fluids, deep breath, cough.    No openings with PCP in the recommended time frame.   Rea says it is hard to get him going in the AM so scheduled for afternoon visit tomorrow in approval req spot with Dr. Velasquez.    Kristin Calderon RN  [facility]

## 2022-05-23 NOTE — TELEPHONE ENCOUNTER
Reason for call:  Other   Patient called regarding (reason for call): appointment and call back  Additional comments: Patient was recently seen at the er for pneumonia and was told to follow up with his primary within 3 days. Was not able to find an appointment. Patient's sister is requesting advice from care team    Phone number to reach patient:  Home number on file [phone] (home)    Best Time:  any    Can we leave a detailed message on this number?  YES    Travel screening: Negative

## 2022-05-24 ENCOUNTER — OFFICE VISIT (OUTPATIENT)
Dept: FAMILY MEDICINE | Facility: CLINIC | Age: 82
End: 2022-05-24
Payer: MEDICARE

## 2022-05-24 VITALS
TEMPERATURE: 97.1 F | SYSTOLIC BLOOD PRESSURE: 118 MMHG | WEIGHT: 244.4 LBS | DIASTOLIC BLOOD PRESSURE: 64 MMHG | HEART RATE: 66 BPM | BODY MASS INDEX: 39.45 KG/M2 | RESPIRATION RATE: 22 BRPM | OXYGEN SATURATION: 94 %

## 2022-05-24 DIAGNOSIS — E66.01 MORBID OBESITY (H): ICD-10-CM

## 2022-05-24 DIAGNOSIS — E11.9 TYPE 2 DIABETES MELLITUS WITHOUT COMPLICATION, WITHOUT LONG-TERM CURRENT USE OF INSULIN (H): ICD-10-CM

## 2022-05-24 DIAGNOSIS — J18.9 PNEUMONIA OF RIGHT LUNG DUE TO INFECTIOUS ORGANISM, UNSPECIFIED PART OF LUNG: Primary | ICD-10-CM

## 2022-05-24 PROBLEM — K70.31 ALCOHOLIC CIRRHOSIS OF LIVER WITH ASCITES (H): Status: RESOLVED | Noted: 2019-07-17 | Resolved: 2022-05-24

## 2022-05-24 LAB
ANION GAP SERPL CALCULATED.3IONS-SCNC: 9 MMOL/L (ref 3–14)
BUN SERPL-MCNC: 38 MG/DL (ref 7–30)
CALCIUM SERPL-MCNC: 8.9 MG/DL (ref 8.5–10.1)
CHLORIDE BLD-SCNC: 103 MMOL/L (ref 94–109)
CO2 SERPL-SCNC: 25 MMOL/L (ref 20–32)
CREAT SERPL-MCNC: 1.73 MG/DL (ref 0.66–1.25)
ERYTHROCYTE [DISTWIDTH] IN BLOOD BY AUTOMATED COUNT: 16.1 % (ref 10–15)
GFR SERPL CREATININE-BSD FRML MDRD: 40 ML/MIN/1.73M2
GLUCOSE BLD-MCNC: 105 MG/DL (ref 70–99)
HBA1C MFR BLD: 5.5 % (ref 0–5.6)
HCT VFR BLD AUTO: 36.4 % (ref 40–53)
HGB BLD-MCNC: 11.9 G/DL (ref 13.3–17.7)
MCH RBC QN AUTO: 29.1 PG (ref 26.5–33)
MCHC RBC AUTO-ENTMCNC: 32.7 G/DL (ref 31.5–36.5)
MCV RBC AUTO: 89 FL (ref 78–100)
PLATELET # BLD AUTO: 93 10E3/UL (ref 150–450)
POTASSIUM BLD-SCNC: 4.1 MMOL/L (ref 3.4–5.3)
RBC # BLD AUTO: 4.09 10E6/UL (ref 4.4–5.9)
SODIUM SERPL-SCNC: 137 MMOL/L (ref 133–144)
WBC # BLD AUTO: 5 10E3/UL (ref 4–11)

## 2022-05-24 PROCEDURE — 80048 BASIC METABOLIC PNL TOTAL CA: CPT | Performed by: FAMILY MEDICINE

## 2022-05-24 PROCEDURE — 83036 HEMOGLOBIN GLYCOSYLATED A1C: CPT | Performed by: FAMILY MEDICINE

## 2022-05-24 PROCEDURE — 36415 COLL VENOUS BLD VENIPUNCTURE: CPT | Performed by: FAMILY MEDICINE

## 2022-05-24 PROCEDURE — 85027 COMPLETE CBC AUTOMATED: CPT | Performed by: FAMILY MEDICINE

## 2022-05-24 PROCEDURE — 99214 OFFICE O/P EST MOD 30 MIN: CPT | Performed by: FAMILY MEDICINE

## 2022-05-24 RX ORDER — AZITHROMYCIN 250 MG/1
TABLET, FILM COATED ORAL
COMMUNITY
Start: 2022-05-22 | End: 2022-08-20

## 2022-05-24 RX ORDER — CEFDINIR 300 MG/1
CAPSULE ORAL
COMMUNITY
Start: 2022-05-22 | End: 2022-08-20

## 2022-05-24 ASSESSMENT — PATIENT HEALTH QUESTIONNAIRE - PHQ9
SUM OF ALL RESPONSES TO PHQ QUESTIONS 1-9: 4
SUM OF ALL RESPONSES TO PHQ QUESTIONS 1-9: 4
10. IF YOU CHECKED OFF ANY PROBLEMS, HOW DIFFICULT HAVE THESE PROBLEMS MADE IT FOR YOU TO DO YOUR WORK, TAKE CARE OF THINGS AT HOME, OR GET ALONG WITH OTHER PEOPLE: NOT DIFFICULT AT ALL

## 2022-05-24 NOTE — PROGRESS NOTES
Assessment & Plan     Pneumonia of right lung due to infectious organism, unspecified part of lung  -Continue to complete antibiotic course.  Recheck labs today.  - CBC with platelets  - Basic metabolic panel  (Ca, Cl, CO2, Creat, Gluc, K, Na, BUN)  -May consider repeat chest x-ray in 4 to 6 weeks    Type 2 diabetes mellitus without complication, without long-term current use of insulin (H)  - Hemoglobin A1c    Morbid obesity (H)  - Weight management plan: Patient was referred to their PCP to discuss a diet and exercise plan.        Return in about 3 months (around 8/24/2022) for Annual Wellness Visit, Medication check.  Sooner if needed.    Kisha Velasquez MD  [facility] BRIAN Sanchez is a 81 year old who presents for the following health issues  accompanied by his sister.    History of Present Illness       Reason for visit:  Pneumonia-Sunday    He eats 2-3 servings of fruits and vegetables daily.He consumes 2 sweetened beverage(s) daily.He exercises with enough effort to increase his heart rate 9 or less minutes per day.  He exercises with enough effort to increase his heart rate 3 or less days per week. He is missing 1 dose(s) of medications per week.    Today's PHQ-9         PHQ-9 Total Score: 4    PHQ-9 Q9 Thoughts of better off dead/self-harm past 2 weeks :   Not at all    How difficult have these problems made it for you to do your work, take care of things at home, or get along with other people: Not difficult at all       ED/UC Followup:    Facility:  [facility]  Date of visit: 5/22/22  Reason for visit: Fatigue, unspecified type (Primary Dx);   Pneumonitis  Current Status: still reporting a lot of fatigue, feeling confused at times.      Cystitis present for the visit today  Reports that he woke up on Sunday with increased shortness of breath. He was seen in the ER. Work up was completed, found to have pneumonia- treated with a 5 day course of Azithromycin and 10 day  course of Omnicef 300 mg BID- tolerating well.  Labs were unremarkable except for acute renal insufficiency with a Cr-1.31 and estimated GFR of 55.    Today, he reports no shortness of breath, cough or any dyspnea on exertion no chest pain.  Still feeling fatigued.    Previous records reveal type 2 diabetes-controlled on Actos.  Last A1c more than 6 months ago was,  Component      Latest Ref Rng & Units 11/9/2021   Hemoglobin A1C      0.0 - 5.6 % 5.8 (H)       Review of Systems   Constitutional, HEENT, cardiovascular, pulmonary, gi and gu systems are negative, except as otherwise noted.      Objective    /64   Pulse 66   Temp 97.1  F (36.2  C) (Tympanic)   Resp 22   Wt 110.9 kg (244 lb 6.4 oz)   SpO2 94%   BMI 39.45 kg/m    Body mass index is 39.45 kg/m .  Physical Exam   GENERAL: healthy, alert and no distress.  .  RESP: lungs clear to auscultation - no rales, rhonchi or wheezes  CV: regular rate and rhythm, normal S1 S2, no S3 or S4, no murmur, click or rub, no peripheral edema and peripheral pulses strong  ABDOMEN: soft, nontender, no hepatosplenomegaly, no masses and bowel sounds normal  MS: no gross musculoskeletal defects noted, no edema  GAIT: Unsteady gait. Using a cane for ambulation    DATA  Recent ER labs reviewed.

## 2022-06-06 ENCOUNTER — TELEPHONE (OUTPATIENT)
Dept: FAMILY MEDICINE | Facility: CLINIC | Age: 82
End: 2022-06-06
Payer: MEDICARE

## 2022-06-06 DIAGNOSIS — D64.9 MILD ANEMIA: ICD-10-CM

## 2022-06-06 DIAGNOSIS — N28.9 ACUTE RENAL INSUFFICIENCY: Primary | ICD-10-CM

## 2022-06-06 NOTE — TELEPHONE ENCOUNTER
Left message on voice mail [phone] for patient to call clinic. [phone]  See result note below per Dr Velasquez    Don-     Your labs showed-     A1c was 5.5- this means that your diabetes is well controlled at this time.   There was a decline in kidney function compared to previous labs. Anemia remains stable.   I recommend that you schedule a lab only appointment to see where this is trending. In the interim keep well hydrated daily.      Kisha Velasquez MD

## 2022-06-07 NOTE — TELEPHONE ENCOUNTER
Spoke with sister Daylin, consent on file.  Went over results and recommendations per Dr Velasquez, see below read word for word.  Lab appointment scheduled for 6-14-22.  Daylin verbalized understanding and had no further concerns.

## 2022-06-14 ENCOUNTER — LAB (OUTPATIENT)
Dept: LAB | Facility: CLINIC | Age: 82
End: 2022-06-14
Payer: MEDICARE

## 2022-06-14 DIAGNOSIS — N28.9 ACUTE RENAL INSUFFICIENCY: ICD-10-CM

## 2022-06-14 DIAGNOSIS — D64.9 MILD ANEMIA: ICD-10-CM

## 2022-06-14 LAB
CREAT SERPL-MCNC: 1.02 MG/DL (ref 0.66–1.25)
GFR SERPL CREATININE-BSD FRML MDRD: 75 ML/MIN/1.73M2
HCT VFR BLD AUTO: 34.2 % (ref 40–53)
HGB BLD-MCNC: 11.3 G/DL (ref 13.3–17.7)

## 2022-06-14 PROCEDURE — 85018 HEMOGLOBIN: CPT

## 2022-06-14 PROCEDURE — 85014 HEMATOCRIT: CPT

## 2022-06-14 PROCEDURE — 82565 ASSAY OF CREATININE: CPT

## 2022-06-14 PROCEDURE — 36415 COLL VENOUS BLD VENIPUNCTURE: CPT

## 2022-06-17 DIAGNOSIS — D64.9 MILD ANEMIA: Primary | ICD-10-CM

## 2022-06-17 RX ORDER — FERROUS SULFATE 325(65) MG
325 TABLET, DELAYED RELEASE (ENTERIC COATED) ORAL DAILY
Qty: 90 TABLET | Refills: 0 | Status: SHIPPED | OUTPATIENT
Start: 2022-06-17

## 2022-07-29 DIAGNOSIS — I10 ESSENTIAL HYPERTENSION: ICD-10-CM

## 2022-07-29 DIAGNOSIS — E11.8 TYPE 2 DIABETES MELLITUS WITH COMPLICATION, WITHOUT LONG-TERM CURRENT USE OF INSULIN (H): ICD-10-CM

## 2022-07-31 RX ORDER — PIOGLITAZONEHYDROCHLORIDE 30 MG/1
30 TABLET ORAL DAILY
Qty: 90 TABLET | Refills: 0 | Status: SHIPPED | OUTPATIENT
Start: 2022-07-31 | End: 2022-09-02

## 2022-07-31 RX ORDER — RAMIPRIL 10 MG/1
CAPSULE ORAL
Qty: 90 CAPSULE | Refills: 0 | Status: ON HOLD | OUTPATIENT
Start: 2022-07-31 | End: 2022-09-02

## 2022-07-31 NOTE — TELEPHONE ENCOUNTER
Medication is being filled for 1 time refill only due to:  Patient needs to be seen because need appt.   Return in about 3 months (around 8/24/2022) for Annual Wellness Visit, Medication check.  Sooner if needed.

## 2022-08-02 ENCOUNTER — TELEPHONE (OUTPATIENT)
Dept: DERMATOLOGY | Facility: CLINIC | Age: 82
End: 2022-08-02

## 2022-08-02 ENCOUNTER — OFFICE VISIT (OUTPATIENT)
Dept: FAMILY MEDICINE | Facility: CLINIC | Age: 82
End: 2022-08-02
Payer: MEDICARE

## 2022-08-02 VITALS
HEIGHT: 66 IN | OXYGEN SATURATION: 98 % | BODY MASS INDEX: 41.71 KG/M2 | DIASTOLIC BLOOD PRESSURE: 49 MMHG | TEMPERATURE: 97.6 F | RESPIRATION RATE: 24 BRPM | SYSTOLIC BLOOD PRESSURE: 110 MMHG | HEART RATE: 41 BPM | WEIGHT: 259.5 LBS

## 2022-08-02 DIAGNOSIS — C82.24 GRADE 3 FOLLICULAR LYMPHOMA OF LYMPH NODES OF AXILLA (H): ICD-10-CM

## 2022-08-02 DIAGNOSIS — R60.1 GENERALIZED EDEMA: Primary | ICD-10-CM

## 2022-08-02 DIAGNOSIS — L98.9 SKIN LESION: ICD-10-CM

## 2022-08-02 DIAGNOSIS — R06.09 DYSPNEA ON EXERTION: ICD-10-CM

## 2022-08-02 DIAGNOSIS — C43.59 MALIGNANT MELANOMA OF SKIN OF TRUNK, EXCEPT SCROTUM (H): ICD-10-CM

## 2022-08-02 DIAGNOSIS — E11.8 TYPE 2 DIABETES MELLITUS WITH COMPLICATION, WITHOUT LONG-TERM CURRENT USE OF INSULIN (H): ICD-10-CM

## 2022-08-02 LAB
ALBUMIN SERPL-MCNC: 2.9 G/DL (ref 3.4–5)
ALP SERPL-CCNC: 84 U/L (ref 40–150)
ALT SERPL W P-5'-P-CCNC: 17 U/L (ref 0–70)
ANION GAP SERPL CALCULATED.3IONS-SCNC: 4 MMOL/L (ref 3–14)
AST SERPL W P-5'-P-CCNC: 18 U/L (ref 0–45)
BILIRUB SERPL-MCNC: 1 MG/DL (ref 0.2–1.3)
BUN SERPL-MCNC: 20 MG/DL (ref 7–30)
CALCIUM SERPL-MCNC: 8.4 MG/DL (ref 8.5–10.1)
CHLORIDE BLD-SCNC: 111 MMOL/L (ref 94–109)
CO2 SERPL-SCNC: 27 MMOL/L (ref 20–32)
CREAT SERPL-MCNC: 1.22 MG/DL (ref 0.66–1.25)
CREAT UR-MCNC: 567 MG/DL
ERYTHROCYTE [DISTWIDTH] IN BLOOD BY AUTOMATED COUNT: 17.4 % (ref 10–15)
GFR SERPL CREATININE-BSD FRML MDRD: 60 ML/MIN/1.73M2
GLUCOSE BLD-MCNC: 127 MG/DL (ref 70–99)
HCT VFR BLD AUTO: 31 % (ref 40–53)
HGB BLD-MCNC: 9.7 G/DL (ref 13.3–17.7)
MCH RBC QN AUTO: 28.7 PG (ref 26.5–33)
MCHC RBC AUTO-ENTMCNC: 31.3 G/DL (ref 31.5–36.5)
MCV RBC AUTO: 92 FL (ref 78–100)
MICROALBUMIN UR-MCNC: 167 MG/L
MICROALBUMIN/CREAT UR: 29.45 MG/G CR (ref 0–17)
NT-PROBNP SERPL-MCNC: 1770 PG/ML (ref 0–1800)
PLATELET # BLD AUTO: 64 10E3/UL (ref 150–450)
POTASSIUM BLD-SCNC: 4.4 MMOL/L (ref 3.4–5.3)
PROT SERPL-MCNC: 6.2 G/DL (ref 6.8–8.8)
RBC # BLD AUTO: 3.38 10E6/UL (ref 4.4–5.9)
SODIUM SERPL-SCNC: 142 MMOL/L (ref 133–144)
WBC # BLD AUTO: 2.3 10E3/UL (ref 4–11)

## 2022-08-02 PROCEDURE — 36415 COLL VENOUS BLD VENIPUNCTURE: CPT | Performed by: FAMILY MEDICINE

## 2022-08-02 PROCEDURE — 80053 COMPREHEN METABOLIC PANEL: CPT | Performed by: FAMILY MEDICINE

## 2022-08-02 PROCEDURE — 85027 COMPLETE CBC AUTOMATED: CPT | Performed by: FAMILY MEDICINE

## 2022-08-02 PROCEDURE — 82043 UR ALBUMIN QUANTITATIVE: CPT | Performed by: FAMILY MEDICINE

## 2022-08-02 PROCEDURE — 83880 ASSAY OF NATRIURETIC PEPTIDE: CPT | Performed by: FAMILY MEDICINE

## 2022-08-02 PROCEDURE — 99214 OFFICE O/P EST MOD 30 MIN: CPT | Performed by: FAMILY MEDICINE

## 2022-08-02 ASSESSMENT — ENCOUNTER SYMPTOMS
NERVOUS/ANXIOUS: 0
PALPITATIONS: 0
FEVER: 0
PARESTHESIAS: 0
SORE THROAT: 0
CHILLS: 0
JOINT SWELLING: 0
MYALGIAS: 0
HEADACHES: 0
DIZZINESS: 0
ARTHRALGIAS: 0
SHORTNESS OF BREATH: 0
WEAKNESS: 0
COUGH: 0

## 2022-08-02 ASSESSMENT — PAIN SCALES - GENERAL: PAINLEVEL: NO PAIN (0)

## 2022-08-02 NOTE — PATIENT INSTRUCTIONS
Kirt Sanchez,    Thank you for allowing [facility] to manage your care.    I ordered some blood work and urine sample, please go to the laboratory to get your laboratory studies.    I ordered an echocardiogram, please call diagnostic imaging [phone] to schedule your test.    I made a dermatology referral, they will be calling in approximately 1 week to set up your appointment.  If you do not hear from them, please call the specialty number on your after visit.     For your convenience, test results are released as soon as they are available  Please allow 1-2 business days for me to send you a comment about your results.  If not done so, I encourage you to login into [facility] ([email or web address] to review your results in real time.     If you have any questions or concerns, please feel free to call us at [phone].    Sincerely,    Dr. Jackson    Did you know?      You can schedule a video visit for follow-up appointments as well as future appointments for certain conditions.  Please see the below link.     [email or web address]    If you have not already done so,  I encourage you to sign up for [facility] ([email or web address]).  This will allow you to review your results, securely communicate with a provider, and schedule virtual visits as well.

## 2022-08-02 NOTE — TELEPHONE ENCOUNTER
[facility] Call Center    Phone Message    May a detailed message be left on voicemail: yes     Reason for Call: Appointment Intake     Referring Provider Name: Thomas Jackson NIDAgaro, DO in [facility]    Diagnosis and/or Symptoms: Skin lesion; 80 yo male with history of basal cell carcinoma and melanoma with suspicious lesion involving the chest region        [facility]  Referral Type: Derm Surgery  Dated 08/02/22    I am supposed to route over all [facility] ones sitting in Referral Order workqueue for review - Thank you!    Please review and call Pt to schedule - Thanks        Action Taken: Message routed to:  Other: [facility]     Travel Screening: Not Applicable

## 2022-08-02 NOTE — PROGRESS NOTES
1. Generalized edema  Concerning for heart failure.  Would like to evaluate to evaluate patient's kidney and liver function  - BNP-N terminal pro; Future  - Echocardiogram Complete; Future  - Comprehensive metabolic panel (BMP + Alb, Alk Phos, ALT, AST, Total. Bili, TP); Future  - BNP-N terminal pro  - Comprehensive metabolic panel (BMP + Alb, Alk Phos, ALT, AST, Total. Bili, TP)    2. Type 2 diabetes mellitus with complication, without long-term current use of insulin (H)  - Albumin Random Urine Quantitative with Creat Ratio; Future  - Albumin Random Urine Quantitative with Creat Ratio    3. 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 Malignant melanoma s/p resection in [place], [place]    6. Skin lesion  On chest region.  Given patient's history of melanoma would like to patient to be evaluated.   - Adult Dermatology Referral; Future      Subjective   Don is a 81 year old presenting for the following health issues:  Hospital F/U ([facility] 5/22/22)      HPI     ED/UC Followup:    Facility:  [facility]   Date of visit: 5/22/22  Reason for visit: SOB   Current Status: Following up for Pneumonia - Pt reports feeing fatigue, dizziness, and confusion. SOB has improved since Hospital stay but still having difficulty breathing. Pt also concerned about weight gain and bloating, asking for possible water pill if necessary.     1. Unintential weight gain: Approximately 15 lbs in the past 3 months.  Fatigue.  No changes in regards to diet.  Swelling legs, arms, and distention.  Mild shortness of breath.  Fatigue.  Denies any cough symptoms.  Denies any changes in regards to medications.     2. Diabetes f/u: Patient had his eye exam this year (around March).  Per patient, it was normal.      Review of Systems   Constitutional: Negative for chills and fever.   HENT: Negative for congestion,  "ear pain, hearing loss and sore throat.    Respiratory: Negative for cough and shortness of breath.    Cardiovascular: Negative for chest pain, palpitations and peripheral edema.   Musculoskeletal: Negative for arthralgias, joint swelling and myalgias.   Skin: Negative for rash.   Neurological: Negative for dizziness, weakness, headaches and paresthesias.   Psychiatric/Behavioral: Negative for mood changes. The patient is not nervous/anxious.           Objective    /49   Pulse (!) 41   Temp 97.6  F (36.4  C) (Tympanic)   Resp 24   Ht 1.664 m (5' 5.5\")   Wt 117.7 kg (259 lb 8 oz)   SpO2 98%   BMI 42.53 kg/m    Body mass index is 42.53 kg/m .  Physical Exam  Constitutional:       General: He is not in acute distress.     Appearance: He is well-developed.   HENT:      Head: Normocephalic and atraumatic.      Nose: Nose normal.   Eyes:      Conjunctiva/sclera: Conjunctivae normal.   Neck:      Trachea: No tracheal deviation.   Cardiovascular:      Rate and Rhythm: Normal rate and regular rhythm.      Heart sounds: Normal heart sounds.   Pulmonary:      Effort: Pulmonary effort is normal.      Breath sounds: No wheezing.   Musculoskeletal:         General: Normal range of motion.      Cervical back: Normal range of motion.      Right lower leg: Edema present.      Left lower leg: Edema present.   Skin:     Findings: No erythema or rash.      Comments: Approximately 3 cm hyperpigmented (brown and erythematous) skin lesion with well circumferential boundaries on chest    Neurological:      Mental Status: He is alert and oriented to person, place, and time.   Psychiatric:         Behavior: Behavior normal.        Please abstract the following data from this visit with this patient into the appropriate field in Epic:    Tests that can be patient reported without a hard copy:    Eye exam with ophthalmology on this date: 3/1/2022        "

## 2022-08-03 ENCOUNTER — TELEPHONE (OUTPATIENT)
Dept: FAMILY MEDICINE | Facility: CLINIC | Age: 82
End: 2022-08-03

## 2022-08-03 DIAGNOSIS — R60.1 GENERALIZED EDEMA: Primary | ICD-10-CM

## 2022-08-03 RX ORDER — FUROSEMIDE 40 MG
40 TABLET ORAL DAILY
Qty: 5 TABLET | Refills: 0 | Status: ON HOLD | OUTPATIENT
Start: 2022-08-03 | End: 2022-09-02

## 2022-08-03 NOTE — TELEPHONE ENCOUNTER
Patient cannot get into Dermatology until October. Patient asking if Dr. Jackson can put on referral to be seen urgently?

## 2022-08-08 ENCOUNTER — OFFICE VISIT (OUTPATIENT)
Dept: DERMATOLOGY | Facility: CLINIC | Age: 82
End: 2022-08-08
Payer: MEDICARE

## 2022-08-08 DIAGNOSIS — D18.01 ANGIOMA OF SKIN: ICD-10-CM

## 2022-08-08 DIAGNOSIS — C44.519 BASAL CELL CARCINOMA OF ANTERIOR CHEST: Primary | ICD-10-CM

## 2022-08-08 DIAGNOSIS — L81.4 LENTIGO: ICD-10-CM

## 2022-08-08 DIAGNOSIS — Z85.828 HISTORY OF SKIN CANCER: ICD-10-CM

## 2022-08-08 DIAGNOSIS — D23.9 DERMAL NEVUS: ICD-10-CM

## 2022-08-08 DIAGNOSIS — L82.1 SEBORRHEIC KERATOSIS: ICD-10-CM

## 2022-08-08 PROCEDURE — 99213 OFFICE O/P EST LOW 20 MIN: CPT | Mod: 25 | Performed by: DERMATOLOGY

## 2022-08-08 PROCEDURE — 88331 PATH CONSLTJ SURG 1 BLK 1SPC: CPT | Performed by: DERMATOLOGY

## 2022-08-08 PROCEDURE — 11102 TANGNTL BX SKIN SINGLE LES: CPT | Performed by: DERMATOLOGY

## 2022-08-08 ASSESSMENT — PAIN SCALES - GENERAL: PAINLEVEL: NO PAIN (0)

## 2022-08-08 NOTE — PROGRESS NOTES
Flash Brown is an extremely pleasant 81 year old year old male patient here today for non healing lesion on chest.   .   Patient states this has been present for weeks.  Patient reports the following symptoms:  Not healing.  Patient reports the following previous treatments none.  These treatments did not work.  Patient reports the following modifying factors none.  Associated symptoms: none.  Patient has no other skin complaints today.  Remainder of the HPI, Meds, PMH, Allergies, FH, and SH was reviewed in chart.      Past Medical History:   Diagnosis Date     Basal cell carcinoma      CAD (coronary artery disease)      Depression      Hyperlipidemia      Hypertension      Lymphoma (H)      Malignant melanoma (H)      Melanoma (H)      Squamous cell carcinoma        Past Surgical History:   Procedure Laterality Date     AXILLARY SURGERY      S/P resection and adjuvant radiation     BONE MARROW BIOPSY, BONE SPECIMEN, NEEDLE/TROCAR N/A 7/8/2019    Procedure: BIOPSY, BONE MARROW;  Surgeon: Husam Issa MD;  Location: [facility]     BONE MARROW BIOPSY, BONE SPECIMEN, NEEDLE/TROCAR Left 2/24/2022    Procedure: BIOPSY, BONE MARROW;  Surgeon: Cailin Anthony PA-C;  [place] [facility] [place]     CARDIAC SURGERY       CHOLECYSTECTOMY       HERNIA REPAIR, UMBILICAL       PHACOEMULSIFICATION WITH STANDARD INTRAOCULAR LENS IMPLANT Right 10/2/2019    Procedure: Cataract Removal with Implant;  Surgeon: Dinesh Ivey MD;  Location: [facility]     PHACOEMULSIFICATION WITH STANDARD INTRAOCULAR LENS IMPLANT Left 10/21/2019    Procedure: Cataract Removal with Implant;  Surgeon: Dinesh Ivey MD;  Location: [facility]     STENT      PTCA with drug-eluting stent of RCA, circumflex, and LAD     VASCULAR SURGERY          Family History   Problem Relation Age of Onset     Asthma Other      Cancer Other         melanoma     Blood Disease Other         bleeding disorder     Lung Cancer Mother         Smoker     Prostate Cancer Father       Melanoma No family hx of        Social History     Socioeconomic History     Marital status: Single     Spouse name: Not on file     Number of children: 0     Years of education: Not on file     Highest education level: Not on file   Occupational History     Occupation: Retired     Comment: [employer]    Tobacco Use     Smoking status: Never Smoker     Smokeless tobacco: Never Used   Vaping Use     Vaping Use: Never used   Substance and Sexual Activity     Alcohol use: Yes     Comment: glass of wine couple times per week     Drug use: Never     Sexual activity: Not on file   Other Topics Concern     Parent/sibling w/ CABG, MI or angioplasty before 65F 55M? Not Asked   Social History Narrative     Not on file     Social Determinants of Health     Financial Resource Strain: Low Risk      Difficulty of Paying Living Expenses: Not hard at all   Food Insecurity: No Food Insecurity     Worried About Running Out of Food in the Last Year: Never true     Ran Out of Food in the Last Year: Never true   Transportation Needs: No Transportation Needs     Lack of Transportation (Medical): No     Lack of Transportation (Non-Medical): No   Physical Activity: Inactive     Days of Exercise per Week: 0 days     Minutes of Exercise per Session: 0 min   Stress: No Stress Concern Present     Feeling of Stress : Not at all   Social Connections: Socially Isolated     Frequency of Communication with Friends and Family: Once a week     Frequency of Social Gatherings with Friends and Family: Once a week     Attends [employer] Services: More than 4 times per year     Active Member of Clubs or Organizations: No     Attends Club or Organization Meetings: Not on file     Marital Status: Never    Intimate Partner Violence: Not on file   Housing Stability: Low Risk      Unable to Pay for Housing in the Last Year: No     Number of Places Lived in the Last Year: 1     Unstable Housing in the Last Year: No       Outpatient  Encounter Medications as of 8/8/2022   Medication Sig Dispense Refill     amLODIPine (NORVASC) 5 MG tablet Take 1 tablet (5 mg) by mouth daily 90 tablet 3     atorvastatin (LIPITOR) 20 MG tablet Take 1 tablet (20 mg) by mouth daily 90 tablet 3     carvedilol (COREG) 12.5 MG tablet Take 1 tablet (12.5 mg) by mouth 2 times daily (with meals) 180 tablet 3     cefdinir (OMNICEF) 300 MG capsule        cloNIDine (CATAPRES) 0.1 MG tablet Take 1 tablet (0.1 mg) by mouth 2 times daily 180 tablet 3     clopidogrel (PLAVIX) 75 MG tablet Take 1 tablet (75 mg) by mouth daily 90 tablet 3     clotrimazole (LOTRIMIN) 1 % external cream Apply topically 2 times daily as needed For ringworm to left armpit. 30 g 1     ferrous sulfate (FE TABS) 325 (65 Fe) MG EC tablet Take 1 tablet (325 mg) by mouth daily 90 tablet 0     furosemide (LASIX) 40 MG tablet Take 1 tablet (40 mg) by mouth daily for 5 days 5 tablet 0     hydrALAZINE (APRESOLINE) 50 MG tablet Take 1 tablet (50 mg) by mouth 2 times daily 180 tablet 1     naloxone (NARCAN) 4 MG/0.1ML nasal spray Spray 1 spray (4 mg) into one nostril alternating nostrils once as needed for opioid reversal Every 2-3 minutes until patient responsive or EMS arrives 0.2 mL 0     NIACIN 500 MG OR TABS Take one orally twice daily 180 3     nitroGLYcerin (NITROSTAT) 0.4 MG sublingual tablet Place 1 tablet (0.4 mg) under the tongue See Admin Instructions for chest pain 30 tablet 0     ORDER FOR DME Light Therapy with Full Spectrum Light (42629 lux) Start with 10-15 minute exposure per day, Increase exposure to 30 to 45 minutes per day, Maximum exposure: 90 minutes per day,Look periodically at light during each session  1 0     pioglitazone (ACTOS) 30 MG tablet Take 1 tablet (30 mg) by mouth daily 90 tablet 0     ramipril (ALTACE) 10 MG capsule TAKE 1 CAPSULE BY MOUTH EVERY DAY 90 capsule 0     tamsulosin (FLOMAX) 0.4 MG capsule Take 1 capsule (0.4 mg) by mouth daily 90 capsule 3     triamcinolone  (KENALOG) 0.1 % external cream Twice daily to legs prn itching 454 g 3     azithromycin (ZITHROMAX) 250 MG tablet  (Patient not taking: No sig reported)       Facility-Administered Encounter Medications as of 8/8/2022   Medication Dose Route Frequency Provider Last Rate Last Admin     sodium hyaluronate (HEALON DUET)    PRN Dinesh Ivey MD   1 kit at 10/02/19 1149             O:   NAD, WDWN, Alert & Oriented, Mood & Affect wnl, Vitals stable   Here today alone    General appearance normal   Vitals stable   Alert, oriented and in no acute distress     Mid chest 3cm ulcerated plaque      Stuck on papules and brown macules on trunk and ext   Red papules on trunk  Flesh colored papules on trunk         Eyes: Conjunctivae/lids:Normal     ENT: Lips, buccal mucosa, tongue: normal    MSK:Normal    Cardiovascular: peripheral edema none    Pulm: Breathing Normal    Neuro/Psych: Orientation:Alert and Orientedx3 ; Mood/Affect:normal       MICRO:   Mid chest:Orthokeratosis of epidermis with a proliferation of nests of basaloid cells, with peripheral palisading and a haphazard arrangement in the center extending into the dermis, forming nodules.  The tumor cells have hyperchromatic nuclei. Poor cytoplasm and intercellular bridging.    A/P:  1. Seborrheic keratosis, lentigo, angioma, dermal nevus, hx of msc   2. Mid chest r/o basal cell carcinoma   TANGENTIAL BIOPSY IN HOUSE:  After consent, anesthesia with LEC and prep, tangential excision performed and dx above confirmed with frozen section histology.  No complications and routine wound care.  Patient is on  anticoagulants and risk of bleeding discussed with patient.       I have personally reviewed all specimens and/or slides and used them with my medical judgement to determine or confirm the final diagnosis.     Patient told result basal cell carcinoma schedule excision .    It was a pleasure speaking to Flash Brown today.  Previous clinic notes and pertinent  laboratory tests were reviewed prior to Flash Brown's visit.  Signs and Symptoms of skin cancer discussed with patient.  Patient encouraged to perform monthly skin exams.  UV precautions reviewed with patient.  Return to clinic next appt

## 2022-08-08 NOTE — PATIENT INSTRUCTIONS
Open Wound Care     for Chest    No strenuous activity for 48 hours    Take Tylenol as needed for discomfort.                                                .         Do not drink alcoholic beverages for 48 hours.    Keep the pressure bandage in place for 24 hours. If the bandage becomes blood tinged or loose, reinforce it with gauze and tape.        (Refer to the reverse side of this page for management of bleeding).    Remove bandage in 24 hours and begin wound care as follows:     Clean area with tap water using a Q tip or gauze pad, (shower / bathe normally)  Dry wound with Q tip or gauze pad  Apply Aquaphor, Vaseline, Polysporin or Bacitracin Ointment with a Q tip  Do NOT use Neosporin Ointment *  Cover the wound with a band-aid or nonstick gauze pad and paper tape.  Repeat wound care once a day until wound is completely healed.    It is an old wives tale that a wound heals better when it is exposed to air and allowed to dry out. The wound will heal faster with a better cosmetic result if it is kept moist with ointment and covered with a bandage.  Do not let the wound dry out.      Supplies Needed:                Qtips or gauze pads                Polysporin or Bacitracin Ointment                Bandaids or nonstick gauze pads and paper tape    Wound care kits and brown paper tape are available for purchase at   the pharmacy.    BLEEDING:    Use tightly rolled up gauze or cloth to apply direct pressure over the bandage for 20   minutes.  Reapply pressure for an additional 20 minutes if necessary  Call the office or go to the nearest emergency room if pressure fails to stop the bleeding.  Use additional gauze and tape to maintain pressure once the bleeding has stopped.  Begin wound care 24 hours after surgery as directed.                  WOUND HEALING    One week after surgery a pink / red halo will form around the outside of the wound.   This is new skin.  The center of the wound will appear yellowish white  and produce some drainage.  The pink halo will slowly migrate in toward the center of the wound until the wound is covered with new shiny pink skin.  There will be no more drainage when the wound is completely healed.  It will take six months to one year for the redness to fade.  The scar may be itchy, tight and sensitive to extreme temperatures for a year after the surgery.  Massaging the area several times a day for several minutes after the wound is completely healed will help the scar soften and normalize faster. Begin massage only after healing is complete.      In case of emergency call: Dr Roque: [phone]    [facility]: [phone]    [facility]:[phone]

## 2022-08-08 NOTE — LETTER
8/8/2022         RE: Flash Brown  [address]  [facility] [address]        Dear Colleague,    Thank you for referring your patient, Flash Brown, to the [facility]. Please see a copy of my visit note below.    Flash Brown is an extremely pleasant 81 year old year old male patient here today for non healing lesion on chest.   .   Patient states this has been present for weeks.  Patient reports the following symptoms:  Not healing.  Patient reports the following previous treatments none.  These treatments did not work.  Patient reports the following modifying factors none.  Associated symptoms: none.  Patient has no other skin complaints today.  Remainder of the HPI, Meds, PMH, Allergies, FH, and SH was reviewed in chart.      Past Medical History:   Diagnosis Date     Basal cell carcinoma      CAD (coronary artery disease)      Depression      Hyperlipidemia      Hypertension      Lymphoma (H)      Malignant melanoma (H)      Melanoma (H)      Squamous cell carcinoma        Past Surgical History:   Procedure Laterality Date     AXILLARY SURGERY      S/P resection and adjuvant radiation     BONE MARROW BIOPSY, BONE SPECIMEN, NEEDLE/TROCAR N/A 7/8/2019    Procedure: BIOPSY, BONE MARROW;  Surgeon: Husam Issa MD;  Location: [facility]     BONE MARROW BIOPSY, BONE SPECIMEN, NEEDLE/TROCAR Left 2/24/2022    Procedure: BIOPSY, BONE MARROW;  Surgeon: Cailin Anthony PA-C;  [place] [facility] [place]     CARDIAC SURGERY       CHOLECYSTECTOMY       HERNIA REPAIR, UMBILICAL       PHACOEMULSIFICATION WITH STANDARD INTRAOCULAR LENS IMPLANT Right 10/2/2019    Procedure: Cataract Removal with Implant;  Surgeon: Dinesh Ivey MD;  Location: [facility]     PHACOEMULSIFICATION WITH STANDARD INTRAOCULAR LENS IMPLANT Left 10/21/2019    Procedure: Cataract Removal with Implant;  Surgeon: Dinesh Ivey MD;  Location: [facility]     STENT      PTCA with drug-eluting stent of RCA, circumflex,  and LAD     VASCULAR SURGERY          Family History   Problem Relation Age of Onset     Asthma Other      Cancer Other         melanoma     Blood Disease Other         bleeding disorder     Lung Cancer Mother         Smoker     Prostate Cancer Father      Melanoma No family hx of        Social History     Socioeconomic History     Marital status: Single     Spouse name: Not on file     Number of children: 0     Years of education: Not on file     Highest education level: Not on file   Occupational History     Occupation: Retired     Comment: [employer]    Tobacco Use     Smoking status: Never Smoker     Smokeless tobacco: Never Used   Vaping Use     Vaping Use: Never used   Substance and Sexual Activity     Alcohol use: Yes     Comment: glass of wine couple times per week     Drug use: Never     Sexual activity: Not on file   Other Topics Concern     Parent/sibling w/ CABG, MI or angioplasty before 65F 55M? Not Asked   Social History Narrative     Not on file     Social Determinants of Health     Financial Resource Strain: Low Risk      Difficulty of Paying Living Expenses: Not hard at all   Food Insecurity: No Food Insecurity     Worried About Running Out of Food in the Last Year: Never true     Ran Out of Food in the Last Year: Never true   Transportation Needs: No Transportation Needs     Lack of Transportation (Medical): No     Lack of Transportation (Non-Medical): No   Physical Activity: Inactive     Days of Exercise per Week: 0 days     Minutes of Exercise per Session: 0 min   Stress: No Stress Concern Present     Feeling of Stress : Not at all   Social Connections: Socially Isolated     Frequency of Communication with Friends and Family: Once a week     Frequency of Social Gatherings with Friends and Family: Once a week     Attends [employer] Services: More than 4 times per year     Active Member of Clubs or Organizations: No     Attends Club or Organization Meetings: Not on file     Marital  Status: Never    Intimate Partner Violence: Not on file   Housing Stability: Low Risk      Unable to Pay for Housing in the Last Year: No     Number of Places Lived in the Last Year: 1     Unstable Housing in the Last Year: No       Outpatient Encounter Medications as of 8/8/2022   Medication Sig Dispense Refill     amLODIPine (NORVASC) 5 MG tablet Take 1 tablet (5 mg) by mouth daily 90 tablet 3     atorvastatin (LIPITOR) 20 MG tablet Take 1 tablet (20 mg) by mouth daily 90 tablet 3     carvedilol (COREG) 12.5 MG tablet Take 1 tablet (12.5 mg) by mouth 2 times daily (with meals) 180 tablet 3     cefdinir (OMNICEF) 300 MG capsule        cloNIDine (CATAPRES) 0.1 MG tablet Take 1 tablet (0.1 mg) by mouth 2 times daily 180 tablet 3     clopidogrel (PLAVIX) 75 MG tablet Take 1 tablet (75 mg) by mouth daily 90 tablet 3     clotrimazole (LOTRIMIN) 1 % external cream Apply topically 2 times daily as needed For ringworm to left armpit. 30 g 1     ferrous sulfate (FE TABS) 325 (65 Fe) MG EC tablet Take 1 tablet (325 mg) by mouth daily 90 tablet 0     furosemide (LASIX) 40 MG tablet Take 1 tablet (40 mg) by mouth daily for 5 days 5 tablet 0     hydrALAZINE (APRESOLINE) 50 MG tablet Take 1 tablet (50 mg) by mouth 2 times daily 180 tablet 1     naloxone (NARCAN) 4 MG/0.1ML nasal spray Spray 1 spray (4 mg) into one nostril alternating nostrils once as needed for opioid reversal Every 2-3 minutes until patient responsive or EMS arrives 0.2 mL 0     NIACIN 500 MG OR TABS Take one orally twice daily 180 3     nitroGLYcerin (NITROSTAT) 0.4 MG sublingual tablet Place 1 tablet (0.4 mg) under the tongue See Admin Instructions for chest pain 30 tablet 0     ORDER FOR DME Light Therapy with Full Spectrum Light (37036 lux) Start with 10-15 minute exposure per day, Increase exposure to 30 to 45 minutes per day, Maximum exposure: 90 minutes per day,Look periodically at light during each session  1 0     pioglitazone (ACTOS) 30 MG  tablet Take 1 tablet (30 mg) by mouth daily 90 tablet 0     ramipril (ALTACE) 10 MG capsule TAKE 1 CAPSULE BY MOUTH EVERY DAY 90 capsule 0     tamsulosin (FLOMAX) 0.4 MG capsule Take 1 capsule (0.4 mg) by mouth daily 90 capsule 3     triamcinolone (KENALOG) 0.1 % external cream Twice daily to legs prn itching 454 g 3     azithromycin (ZITHROMAX) 250 MG tablet  (Patient not taking: No sig reported)       Facility-Administered Encounter Medications as of 8/8/2022   Medication Dose Route Frequency Provider Last Rate Last Admin     sodium hyaluronate (HEALON DUET)    PRN Dinesh Ivey MD   1 kit at 10/02/19 1149             O:   NAD, WDWN, Alert & Oriented, Mood & Affect wnl, Vitals stable   Here today alone    General appearance normal   Vitals stable   Alert, oriented and in no acute distress     Mid chest 3cm ulcerated plaque      Stuck on papules and brown macules on trunk and ext   Red papules on trunk  Flesh colored papules on trunk         Eyes: Conjunctivae/lids:Normal     ENT: Lips, buccal mucosa, tongue: normal    MSK:Normal    Cardiovascular: peripheral edema none    Pulm: Breathing Normal    Neuro/Psych: Orientation:Alert and Orientedx3 ; Mood/Affect:normal       MICRO:   Mid chest:Orthokeratosis of epidermis with a proliferation of nests of basaloid cells, with peripheral palisading and a haphazard arrangement in the center extending into the dermis, forming nodules.  The tumor cells have hyperchromatic nuclei. Poor cytoplasm and intercellular bridging.    A/P:  1. Seborrheic keratosis, lentigo, angioma, dermal nevus, hx of msc   2. Mid chest r/o basal cell carcinoma   TANGENTIAL BIOPSY IN HOUSE:  After consent, anesthesia with LEC and prep, tangential excision performed and dx above confirmed with frozen section histology.  No complications and routine wound care.  Patient is on  anticoagulants and risk of bleeding discussed with patient.       I have personally reviewed all specimens and/or  slides and used them with my medical judgement to determine or confirm the final diagnosis.     Patient told result basal cell carcinoma schedule excision .    It was a pleasure speaking to Flash Brown today.  Previous clinic notes and pertinent laboratory tests were reviewed prior to Flash Brown's visit.  Signs and Symptoms of skin cancer discussed with patient.  Patient encouraged to perform monthly skin exams.  UV precautions reviewed with patient.  Return to clinic next appt        Again, thank you for allowing me to participate in the care of your patient.        Sincerely,        Dinesh Roque MD

## 2022-08-12 ENCOUNTER — ANCILLARY PROCEDURE (OUTPATIENT)
Dept: CARDIOLOGY | Facility: CLINIC | Age: 82
End: 2022-08-12
Attending: FAMILY MEDICINE
Payer: MEDICARE

## 2022-08-12 DIAGNOSIS — R60.1 GENERALIZED EDEMA: ICD-10-CM

## 2022-08-12 LAB — LVEF ECHO: NORMAL

## 2022-08-12 PROCEDURE — 93306 TTE W/DOPPLER COMPLETE: CPT | Mod: GC | Performed by: STUDENT IN AN ORGANIZED HEALTH CARE EDUCATION/TRAINING PROGRAM

## 2022-08-15 DIAGNOSIS — E78.49 OTHER HYPERLIPIDEMIA: ICD-10-CM

## 2022-08-16 RX ORDER — ATORVASTATIN CALCIUM 20 MG/1
20 TABLET, FILM COATED ORAL DAILY
Qty: 90 TABLET | Refills: 0 | Status: SHIPPED | OUTPATIENT
Start: 2022-08-16 | End: 2023-01-04

## 2022-08-16 NOTE — TELEPHONE ENCOUNTER
Prescription approved per [facility] Refill Protocol.    Carla Castaneda RN   [facility]

## 2022-08-20 ENCOUNTER — APPOINTMENT (OUTPATIENT)
Dept: GENERAL RADIOLOGY | Facility: CLINIC | Age: 82
DRG: 291 | End: 2022-08-20
Attending: FAMILY MEDICINE
Payer: MEDICARE

## 2022-08-20 ENCOUNTER — HOSPITAL ENCOUNTER (INPATIENT)
Facility: CLINIC | Age: 82
LOS: 13 days | Discharge: NURSING, REHAB OR LONG-TERM CARE | DRG: 291 | End: 2022-09-02
Attending: FAMILY MEDICINE | Admitting: FAMILY MEDICINE
Payer: MEDICARE

## 2022-08-20 ENCOUNTER — NURSE TRIAGE (OUTPATIENT)
Dept: NURSING | Facility: CLINIC | Age: 82
End: 2022-08-20

## 2022-08-20 DIAGNOSIS — I50.9 ACUTE CONGESTIVE HEART FAILURE, UNSPECIFIED HEART FAILURE TYPE (H): ICD-10-CM

## 2022-08-20 DIAGNOSIS — N13.8 BENIGN PROSTATIC HYPERPLASIA WITH URINARY OBSTRUCTION: ICD-10-CM

## 2022-08-20 DIAGNOSIS — I11.0 HYPERTENSIVE HEART DISEASE WITH CONGESTIVE HEART FAILURE, UNSPECIFIED HEART FAILURE TYPE (H): ICD-10-CM

## 2022-08-20 DIAGNOSIS — I48.91 ATRIAL FIBRILLATION, UNSPECIFIED TYPE (H): ICD-10-CM

## 2022-08-20 DIAGNOSIS — R00.1 BRADYCARDIA: ICD-10-CM

## 2022-08-20 DIAGNOSIS — R00.1 SEVERE SINUS BRADYCARDIA: ICD-10-CM

## 2022-08-20 DIAGNOSIS — I48.91 ATRIAL FIBRILLATION AND FLUTTER (H): ICD-10-CM

## 2022-08-20 DIAGNOSIS — I48.92 ATRIAL FIBRILLATION AND FLUTTER (H): ICD-10-CM

## 2022-08-20 DIAGNOSIS — Z11.52 ENCOUNTER FOR SCREENING LABORATORY TESTING FOR SEVERE ACUTE RESPIRATORY SYNDROME CORONAVIRUS 2 (SARS-COV-2): ICD-10-CM

## 2022-08-20 DIAGNOSIS — R60.1 GENERALIZED EDEMA: ICD-10-CM

## 2022-08-20 DIAGNOSIS — E83.42 HYPOMAGNESEMIA: ICD-10-CM

## 2022-08-20 DIAGNOSIS — N40.1 BENIGN PROSTATIC HYPERPLASIA WITH URINARY OBSTRUCTION: ICD-10-CM

## 2022-08-20 DIAGNOSIS — I50.9 ACUTE HEART FAILURE, UNSPECIFIED HEART FAILURE TYPE (H): ICD-10-CM

## 2022-08-20 DIAGNOSIS — I50.31 ACUTE HEART FAILURE WITH PRESERVED EJECTION FRACTION (H): Primary | ICD-10-CM

## 2022-08-20 DIAGNOSIS — I48.92 ATRIAL FLUTTER, UNSPECIFIED TYPE (H): ICD-10-CM

## 2022-08-20 DIAGNOSIS — R00.1 SINUS BRADYCARDIA: ICD-10-CM

## 2022-08-20 PROBLEM — N40.0 BPH (BENIGN PROSTATIC HYPERPLASIA): Status: ACTIVE | Noted: 2022-08-20

## 2022-08-20 PROBLEM — I50.30 (HFPEF) HEART FAILURE WITH PRESERVED EJECTION FRACTION (H): Status: ACTIVE | Noted: 2022-08-20

## 2022-08-20 LAB
ALBUMIN SERPL-MCNC: 2.9 G/DL (ref 3.4–5)
ALP SERPL-CCNC: 81 U/L (ref 40–150)
ALT SERPL W P-5'-P-CCNC: 20 U/L (ref 0–70)
ANION GAP SERPL CALCULATED.3IONS-SCNC: 4 MMOL/L (ref 3–14)
AST SERPL W P-5'-P-CCNC: 22 U/L (ref 0–45)
BASE EXCESS BLDV CALC-SCNC: 2.2 MMOL/L (ref -7.7–1.9)
BASOPHILS # BLD AUTO: 0 10E3/UL (ref 0–0.2)
BASOPHILS NFR BLD AUTO: 1 %
BILIRUB DIRECT SERPL-MCNC: 0.4 MG/DL (ref 0–0.2)
BILIRUB SERPL-MCNC: 1.1 MG/DL (ref 0.2–1.3)
BUN SERPL-MCNC: 16 MG/DL (ref 7–30)
CALCIUM SERPL-MCNC: 8.2 MG/DL (ref 8.5–10.1)
CHLORIDE BLD-SCNC: 113 MMOL/L (ref 94–109)
CO2 SERPL-SCNC: 27 MMOL/L (ref 20–32)
CREAT SERPL-MCNC: 0.86 MG/DL (ref 0.66–1.25)
EOSINOPHIL # BLD AUTO: 0.1 10E3/UL (ref 0–0.7)
EOSINOPHIL NFR BLD AUTO: 3 %
ERYTHROCYTE [DISTWIDTH] IN BLOOD BY AUTOMATED COUNT: 17.5 % (ref 10–15)
FLUAV RNA SPEC QL NAA+PROBE: NEGATIVE
FLUBV RNA RESP QL NAA+PROBE: NEGATIVE
GFR SERPL CREATININE-BSD FRML MDRD: 88 ML/MIN/1.73M2
GLUCOSE BLD-MCNC: 115 MG/DL (ref 70–99)
GLUCOSE BLDC GLUCOMTR-MCNC: 95 MG/DL (ref 70–99)
GLUCOSE BLDC GLUCOMTR-MCNC: 96 MG/DL (ref 70–99)
HCO3 BLDV-SCNC: 29 MMOL/L (ref 21–28)
HCT VFR BLD AUTO: 31 % (ref 40–53)
HGB BLD-MCNC: 9.8 G/DL (ref 13.3–17.7)
HOLD SPECIMEN: NORMAL
HOLD SPECIMEN: NORMAL
IMM GRANULOCYTES # BLD: 0 10E3/UL
IMM GRANULOCYTES NFR BLD: 1 %
LACTATE SERPL-SCNC: 1.2 MMOL/L (ref 0.7–2)
LYMPHOCYTES # BLD AUTO: 0.5 10E3/UL (ref 0.8–5.3)
LYMPHOCYTES NFR BLD AUTO: 21 %
MCH RBC QN AUTO: 29 PG (ref 26.5–33)
MCHC RBC AUTO-ENTMCNC: 31.6 G/DL (ref 31.5–36.5)
MCV RBC AUTO: 92 FL (ref 78–100)
MONOCYTES # BLD AUTO: 0.3 10E3/UL (ref 0–1.3)
MONOCYTES NFR BLD AUTO: 14 %
NEUTROPHILS # BLD AUTO: 1.3 10E3/UL (ref 1.6–8.3)
NEUTROPHILS NFR BLD AUTO: 60 %
NRBC # BLD AUTO: 0 10E3/UL
NRBC BLD AUTO-RTO: 0 /100
NT-PROBNP SERPL-MCNC: 1830 PG/ML (ref 0–1800)
O2/TOTAL GAS SETTING VFR VENT: 0 %
PCO2 BLDV: 52 MM HG (ref 40–50)
PH BLDV: 7.35 [PH] (ref 7.32–7.43)
PLATELET # BLD AUTO: 63 10E3/UL (ref 150–450)
PO2 BLDV: 26 MM HG (ref 25–47)
POTASSIUM BLD-SCNC: 4.3 MMOL/L (ref 3.4–5.3)
PROT SERPL-MCNC: 6.2 G/DL (ref 6.8–8.8)
RBC # BLD AUTO: 3.38 10E6/UL (ref 4.4–5.9)
SARS-COV-2 RNA RESP QL NAA+PROBE: NEGATIVE
SODIUM SERPL-SCNC: 144 MMOL/L (ref 133–144)
TROPONIN I SERPL HS-MCNC: 10 NG/L
WBC # BLD AUTO: 2.2 10E3/UL (ref 4–11)

## 2022-08-20 PROCEDURE — 85025 COMPLETE CBC W/AUTO DIFF WBC: CPT | Performed by: FAMILY MEDICINE

## 2022-08-20 PROCEDURE — 93005 ELECTROCARDIOGRAM TRACING: CPT | Performed by: FAMILY MEDICINE

## 2022-08-20 PROCEDURE — 82803 BLOOD GASES ANY COMBINATION: CPT | Performed by: FAMILY MEDICINE

## 2022-08-20 PROCEDURE — 83880 ASSAY OF NATRIURETIC PEPTIDE: CPT | Performed by: FAMILY MEDICINE

## 2022-08-20 PROCEDURE — 96374 THER/PROPH/DIAG INJ IV PUSH: CPT | Performed by: FAMILY MEDICINE

## 2022-08-20 PROCEDURE — 84484 ASSAY OF TROPONIN QUANT: CPT | Performed by: FAMILY MEDICINE

## 2022-08-20 PROCEDURE — 80053 COMPREHEN METABOLIC PANEL: CPT | Performed by: FAMILY MEDICINE

## 2022-08-20 PROCEDURE — 250N000011 HC RX IP 250 OP 636: Performed by: PHYSICIAN ASSISTANT

## 2022-08-20 PROCEDURE — 99223 1ST HOSP IP/OBS HIGH 75: CPT | Mod: AI | Performed by: PHYSICIAN ASSISTANT

## 2022-08-20 PROCEDURE — 71046 X-RAY EXAM CHEST 2 VIEWS: CPT

## 2022-08-20 PROCEDURE — 120N000001 HC R&B MED SURG/OB

## 2022-08-20 PROCEDURE — 99285 EMERGENCY DEPT VISIT HI MDM: CPT | Mod: 25 | Performed by: FAMILY MEDICINE

## 2022-08-20 PROCEDURE — C9803 HOPD COVID-19 SPEC COLLECT: HCPCS | Performed by: FAMILY MEDICINE

## 2022-08-20 PROCEDURE — 36415 COLL VENOUS BLD VENIPUNCTURE: CPT | Performed by: FAMILY MEDICINE

## 2022-08-20 PROCEDURE — 250N000011 HC RX IP 250 OP 636: Performed by: FAMILY MEDICINE

## 2022-08-20 PROCEDURE — 82248 BILIRUBIN DIRECT: CPT | Performed by: FAMILY MEDICINE

## 2022-08-20 PROCEDURE — 87636 SARSCOV2 & INF A&B AMP PRB: CPT | Performed by: FAMILY MEDICINE

## 2022-08-20 PROCEDURE — 93010 ELECTROCARDIOGRAM REPORT: CPT | Performed by: FAMILY MEDICINE

## 2022-08-20 PROCEDURE — 250N000013 HC RX MED GY IP 250 OP 250 PS 637: Performed by: PHYSICIAN ASSISTANT

## 2022-08-20 PROCEDURE — 83605 ASSAY OF LACTIC ACID: CPT | Performed by: FAMILY MEDICINE

## 2022-08-20 RX ORDER — AMOXICILLIN 250 MG
2 CAPSULE ORAL 2 TIMES DAILY PRN
Status: DISCONTINUED | OUTPATIENT
Start: 2022-08-20 | End: 2022-09-02 | Stop reason: HOSPADM

## 2022-08-20 RX ORDER — AMLODIPINE BESYLATE 5 MG/1
5 TABLET ORAL DAILY
Status: DISCONTINUED | OUTPATIENT
Start: 2022-08-21 | End: 2022-09-02 | Stop reason: HOSPADM

## 2022-08-20 RX ORDER — ACETAMINOPHEN 650 MG/1
650 SUPPOSITORY RECTAL EVERY 6 HOURS PRN
Status: DISCONTINUED | OUTPATIENT
Start: 2022-08-20 | End: 2022-09-02 | Stop reason: HOSPADM

## 2022-08-20 RX ORDER — FUROSEMIDE 10 MG/ML
60 INJECTION INTRAMUSCULAR; INTRAVENOUS ONCE
Status: COMPLETED | OUTPATIENT
Start: 2022-08-20 | End: 2022-08-20

## 2022-08-20 RX ORDER — ONDANSETRON 4 MG/1
4 TABLET, ORALLY DISINTEGRATING ORAL EVERY 6 HOURS PRN
Status: DISCONTINUED | OUTPATIENT
Start: 2022-08-20 | End: 2022-09-02 | Stop reason: HOSPADM

## 2022-08-20 RX ORDER — FERROUS SULFATE 325(65) MG
325 TABLET ORAL EVERY EVENING
Status: DISCONTINUED | OUTPATIENT
Start: 2022-08-20 | End: 2022-09-02 | Stop reason: HOSPADM

## 2022-08-20 RX ORDER — PROCHLORPERAZINE MALEATE 5 MG
5 TABLET ORAL EVERY 6 HOURS PRN
Status: DISCONTINUED | OUTPATIENT
Start: 2022-08-20 | End: 2022-09-02 | Stop reason: HOSPADM

## 2022-08-20 RX ORDER — HYDRALAZINE HYDROCHLORIDE 25 MG/1
50 TABLET, FILM COATED ORAL 2 TIMES DAILY
Status: DISCONTINUED | OUTPATIENT
Start: 2022-08-20 | End: 2022-09-02 | Stop reason: HOSPADM

## 2022-08-20 RX ORDER — CLOPIDOGREL BISULFATE 75 MG/1
75 TABLET ORAL DAILY
Status: DISCONTINUED | OUTPATIENT
Start: 2022-08-21 | End: 2022-08-29

## 2022-08-20 RX ORDER — ONDANSETRON 2 MG/ML
4 INJECTION INTRAMUSCULAR; INTRAVENOUS EVERY 6 HOURS PRN
Status: DISCONTINUED | OUTPATIENT
Start: 2022-08-20 | End: 2022-09-02 | Stop reason: HOSPADM

## 2022-08-20 RX ORDER — FUROSEMIDE 10 MG/ML
40 INJECTION INTRAMUSCULAR; INTRAVENOUS EVERY 12 HOURS
Status: DISCONTINUED | OUTPATIENT
Start: 2022-08-20 | End: 2022-08-21

## 2022-08-20 RX ORDER — LIDOCAINE 40 MG/G
CREAM TOPICAL
Status: DISCONTINUED | OUTPATIENT
Start: 2022-08-20 | End: 2022-09-02 | Stop reason: HOSPADM

## 2022-08-20 RX ORDER — PROCHLORPERAZINE 25 MG
12.5 SUPPOSITORY, RECTAL RECTAL EVERY 12 HOURS PRN
Status: DISCONTINUED | OUTPATIENT
Start: 2022-08-20 | End: 2022-09-02 | Stop reason: HOSPADM

## 2022-08-20 RX ORDER — ASPIRIN 81 MG/1
81 TABLET ORAL DAILY
Status: DISCONTINUED | OUTPATIENT
Start: 2022-08-21 | End: 2022-09-02 | Stop reason: HOSPADM

## 2022-08-20 RX ORDER — CARVEDILOL 12.5 MG/1
12.5 TABLET ORAL 2 TIMES DAILY WITH MEALS
Status: DISCONTINUED | OUTPATIENT
Start: 2022-08-20 | End: 2022-09-02 | Stop reason: HOSPADM

## 2022-08-20 RX ORDER — DEXTROSE MONOHYDRATE 25 G/50ML
25-50 INJECTION, SOLUTION INTRAVENOUS
Status: DISCONTINUED | OUTPATIENT
Start: 2022-08-20 | End: 2022-08-30

## 2022-08-20 RX ORDER — ACETAMINOPHEN 325 MG/1
650 TABLET ORAL EVERY 6 HOURS PRN
Status: DISCONTINUED | OUTPATIENT
Start: 2022-08-20 | End: 2022-09-02 | Stop reason: HOSPADM

## 2022-08-20 RX ORDER — NICOTINE POLACRILEX 4 MG
15-30 LOZENGE BUCCAL
Status: DISCONTINUED | OUTPATIENT
Start: 2022-08-20 | End: 2022-08-30

## 2022-08-20 RX ORDER — AMOXICILLIN 250 MG
1 CAPSULE ORAL 2 TIMES DAILY PRN
Status: DISCONTINUED | OUTPATIENT
Start: 2022-08-20 | End: 2022-09-02 | Stop reason: HOSPADM

## 2022-08-20 RX ORDER — ATORVASTATIN CALCIUM 20 MG/1
20 TABLET, FILM COATED ORAL DAILY
Status: DISCONTINUED | OUTPATIENT
Start: 2022-08-21 | End: 2022-09-02 | Stop reason: HOSPADM

## 2022-08-20 RX ORDER — LISINOPRIL 20 MG/1
20 TABLET ORAL DAILY
Refills: 0 | Status: DISCONTINUED | OUTPATIENT
Start: 2022-08-21 | End: 2022-09-02 | Stop reason: HOSPADM

## 2022-08-20 RX ADMIN — FUROSEMIDE 40 MG: 10 INJECTION, SOLUTION INTRAMUSCULAR; INTRAVENOUS at 20:30

## 2022-08-20 RX ADMIN — FERROUS SULFATE TAB 325 MG (65 MG ELEMENTAL FE) 325 MG: 325 (65 FE) TAB at 18:16

## 2022-08-20 RX ADMIN — FUROSEMIDE 60 MG: 10 INJECTION, SOLUTION INTRAMUSCULAR; INTRAVENOUS at 12:49

## 2022-08-20 RX ADMIN — HYDRALAZINE HYDROCHLORIDE 50 MG: 25 TABLET, FILM COATED ORAL at 20:27

## 2022-08-20 ASSESSMENT — ENCOUNTER SYMPTOMS
WEAKNESS: 0
DIARRHEA: 0
DIFFICULTY URINATING: 0
PALPITATIONS: 0
SHORTNESS OF BREATH: 0
BRUISES/BLEEDS EASILY: 0
VOMITING: 0
MUSCULOSKELETAL NEGATIVE: 1
FEVER: 0

## 2022-08-20 ASSESSMENT — ACTIVITIES OF DAILY LIVING (ADL)
ADLS_ACUITY_SCORE: 23
ADLS_ACUITY_SCORE: 35
ADLS_ACUITY_SCORE: 23
ADLS_ACUITY_SCORE: 35
ADLS_ACUITY_SCORE: 23
ADLS_ACUITY_SCORE: 27

## 2022-08-20 NOTE — ED PROVIDER NOTES
History     Chief Complaint   Patient presents with     Shortness of Breath     HPI  Flash Brown is a 81 year old male who presents along with his sister with a complaint of increasing shortness of breath over the last 2 weeks.    This patient has a history of coronary disease, diabetes, a variety of skin cancers, obesity, hypertension.  He tells me he has memory problems.  He lives with his sister who gives much of the history.    Patient did have a clinic visit on August 2 and was given 5 days of furosemide for edema.  He had gained 16 pounds at the time.  He has now gained more weight.    He now comes in with increasing shortness of breath.  He is not having chest pain or heart palpitations.  Not having calf pain.    He has some chronic swelling in right arm following removal of lymph glands.        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 Marie  O update changed this record. Please review for accuracy       Mixed hyperlipidemia 01/30/2008     Priority:  Medium     Last Exam: April 15, 2008  Last Lipids: CHOL      137   03/13/2008 LDL       76   03/13/2008 HDL       37   03/13/2008  Last LFTs:: N  ALT       47   03/13/2008    Meds: Vytorin 10/40, Niacin 500 bid       Obstructive sleep apnea 01/30/2008     Priority: Medium     Problem list name updated by automated process. Provider to review       ? exposure predisposing to CA 01/30/2008     Priority: Medium     Type 2 diabetes mellitus without complication, without long-term current use of insulin (H)      Priority: Medium     April 15, 2008  BP: 128/74  Body mass index is 36.01 kg/(m^2).  A1C      6.1   03/13/2008 GLC      104   03/13/2008  LDL       76   03/13/2008    Meds: Metformin 1000      Problem list name updated by automated process. Provider to review       Essential hypertension, benign 01/30/2004     Priority: Medium     Last exam: April 15, 2008  BPs: 128/74  Meds: Atenolol 50, ASA 81, Prinizide 20/25         RT axillary lymphoma s/p resection, with adjuvant radiation - [place], [place] 01/30/1983     Priority: Medium     Malignant melanoma s/p resection in [place], [place] 01/30/1978     Priority: Medium        Past Medical History:    Past Medical History:   Diagnosis Date     Basal cell carcinoma      CAD (coronary artery disease)      Depression      Hyperlipidemia      Hypertension      Lymphoma (H)      Malignant melanoma (H)      Melanoma (H)      Squamous cell carcinoma        Past Surgical History:    Past Surgical History:   Procedure Laterality Date     AXILLARY SURGERY      S/P resection and adjuvant radiation     BONE MARROW BIOPSY, BONE SPECIMEN, NEEDLE/TROCAR N/A 7/8/2019    Procedure: BIOPSY, BONE MARROW;  Surgeon: Husam Issa MD;  Location: [facility]     BONE MARROW BIOPSY, BONE SPECIMEN, NEEDLE/TROCAR Left 2/24/2022    Procedure: BIOPSY, BONE MARROW;  Surgeon: Cailin Anthony PA-C;  Location: [facility] OR     CARDIAC SURGERY       CHOLECYSTECTOMY       HERNIA REPAIR, UMBILICAL        PHACOEMULSIFICATION WITH STANDARD INTRAOCULAR LENS IMPLANT Right 10/2/2019    Procedure: Cataract Removal with Implant;  Surgeon: Dinesh Ivey MD;  Location: [facility] OR     PHACOEMULSIFICATION WITH STANDARD INTRAOCULAR LENS IMPLANT Left 10/21/2019    Procedure: Cataract Removal with Implant;  Surgeon: Dinesh Ivey MD;  Location: [facility] OR     STENT      PTCA with drug-eluting stent of RCA, circumflex, and LAD     VASCULAR SURGERY         Family History:    Family History   Problem Relation Age of Onset     Asthma Other      Cancer Other         melanoma     Blood Disease Other         bleeding disorder     Lung Cancer Mother         Smoker     Prostate Cancer Father      Melanoma No family hx of        Social History:  Marital Status:  Single [1]  Social History     Tobacco Use     Smoking status: Never Smoker     Smokeless tobacco: Never Used   Vaping Use     Vaping Use: Never used   Substance Use Topics     Alcohol use: Yes     Comment: glass of wine couple times per week     Drug use: Never        Medications:    amLODIPine (NORVASC) 5 MG tablet  atorvastatin (LIPITOR) 20 MG tablet  azithromycin (ZITHROMAX) 250 MG tablet  carvedilol (COREG) 12.5 MG tablet  cefdinir (OMNICEF) 300 MG capsule  cloNIDine (CATAPRES) 0.1 MG tablet  clopidogrel (PLAVIX) 75 MG tablet  clotrimazole (LOTRIMIN) 1 % external cream  ferrous sulfate (FE TABS) 325 (65 Fe) MG EC tablet  furosemide (LASIX) 40 MG tablet  hydrALAZINE (APRESOLINE) 50 MG tablet  naloxone (NARCAN) 4 MG/0.1ML nasal spray  NIACIN 500 MG OR TABS  nitroGLYcerin (NITROSTAT) 0.4 MG sublingual tablet  ORDER FOR DME  pioglitazone (ACTOS) 30 MG tablet  ramipril (ALTACE) 10 MG capsule  tamsulosin (FLOMAX) 0.4 MG capsule  triamcinolone (KENALOG) 0.1 % external cream          Review of Systems   Constitutional: Negative for fever.   HENT: Negative.    Respiratory: Negative for shortness of breath.    Cardiovascular: Negative for chest pain and palpitations.  "  Gastrointestinal: Negative for diarrhea and vomiting.   Genitourinary: Negative for difficulty urinating.   Musculoskeletal: Negative.    Skin:        Various skin cancers.   Neurological: Negative for weakness.   Hematological: Does not bruise/bleed easily.   Psychiatric/Behavioral:        Memory loss.       Physical Exam   BP: 135/49  Pulse: (!) 47  Temp: 97.4  F (36.3  C)  Resp: 22  Height: 167.6 cm (5' 6\")  Weight: 120.2 kg (265 lb) (weight at home)  SpO2: 96 %      Physical Exam  Constitutional:       Appearance: He is obese.   HENT:      Head: Normocephalic.   Eyes:      Pupils: Pupils are equal, round, and reactive to light.   Cardiovascular:      Rate and Rhythm: Bradycardia present.      Comments: Distant heart tones.  Pulmonary:      Comments: Inspiration is fairly clear and he has mild bilateral expiratory wheezes.  Chest:      Chest wall: No tenderness.   Abdominal:      Tenderness: There is no abdominal tenderness.   Musculoskeletal:      Right lower leg: Edema present.      Left lower leg: Edema present.   Lymphadenopathy:      Cervical: No cervical adenopathy.   Skin:     General: Skin is warm and dry.   Neurological:      General: No focal deficit present.      Mental Status: He is alert.   Psychiatric:         Mood and Affect: Mood normal.         ED Course          EKG -  1155  Rate 41  Atrial fibrillation/flutter rhythm.  Axis normal.  No acute ST elevation.  T waves WNL.  No ectopy.  Tracing is similar to previous of 8-.  Abnormal EKG with bradycardia and apparent atrial fibrillation/flutter rhythm.           Procedures              Critical Care time:  none               Results for orders placed or performed during the hospital encounter of 08/20/22 (from the past 24 hour(s))   Symptomatic; Unknown Influenza A/B & SARS-CoV2 (COVID-19) Virus PCR Multiplex Nose    Specimen: Nose; Swab   Result Value Ref Range    Influenza A PCR Negative Negative    Influenza B PCR Negative Negative    " SARS CoV2 PCR Negative Negative    Narrative    Testing was performed using the arian SARS-CoV-2 & Influenza A/B Assay on the arian [employer] System. This test should be ordered for the detection of SARS-CoV-2 and influenza viruses in individuals who meet clinical and/or epidemiological criteria. Test performance is unknown in asymptomatic patients. This test is for in vitro diagnostic use under the FDA EUA for laboratories certified under CLIA to perform moderate and/or high complexity testing. This test has not been FDA cleared or approved. A negative result does not rule out the presence of PCR inhibitors in the specimen or target RNA in concentration below the limit of detection for the assay. If only one viral target is positive but coinfection with multiple targets is suspected, the sample should be re-tested with another FDA cleared, approved or authorized test, if coinfection would change clinical management. [facility] are certified under the Clinical Laboratory Improvement Amendments of 1988 (CLIA-88) as  qualified to perform moderate and/or high complexity laboratory testing.   [place] Draw *Canceled*    Narrative    The following orders were created for panel order [place] Draw.  Procedure                               Abnormality         Status                     ---------                               -----------         ------                       Please view results for these tests on the individual orders.   [place] Draw    Narrative    The following orders were created for panel order [place] Draw.  Procedure                               Abnormality         Status                     ---------                               -----------         ------                     Extra Blue Top Tube[[id number]]                              Final result               Extra Red Top Tube[[id number]]                               Final result                 Please view results for these tests on  the individual orders.   CBC with platelets, differential    Narrative    The following orders were created for panel order CBC with platelets, differential.  Procedure                               Abnormality         Status                     ---------                               -----------         ------                     CBC with platelets and d...[[id number]]  Abnormal            Final result                 Please view results for these tests on the individual orders.   Basic metabolic panel   Result Value Ref Range    Sodium 144 133 - 144 mmol/L    Potassium 4.3 3.4 - 5.3 mmol/L    Chloride 113 (H) 94 - 109 mmol/L    Carbon Dioxide (CO2) 27 20 - 32 mmol/L    Anion Gap 4 3 - 14 mmol/L    Urea Nitrogen 16 7 - 30 mg/dL    Creatinine 0.86 0.66 - 1.25 mg/dL    Calcium 8.2 (L) 8.5 - 10.1 mg/dL    Glucose 115 (H) 70 - 99 mg/dL    GFR Estimate 88 >60 mL/min/1.73m2   Blood gas venous   Result Value Ref Range    pH Venous 7.35 7.32 - 7.43    pCO2 Venous 52 (H) 40 - 50 mm Hg    pO2 Venous 26 25 - 47 mm Hg    Bicarbonate Venous 29 (H) 21 - 28 mmol/L    Base Excess/Deficit (+/-) 2.2 (H) -7.7 - 1.9 mmol/L    FIO2 0    Extra Blue Top Tube   Result Value Ref Range    Hold Specimen JIC    Extra Red Top Tube   Result Value Ref Range    Hold Specimen JIC    CBC with platelets and differential   Result Value Ref Range    WBC Count 2.2 (L) 4.0 - 11.0 10e3/uL    RBC Count 3.38 (L) 4.40 - 5.90 10e6/uL    Hemoglobin 9.8 (L) 13.3 - 17.7 g/dL    Hematocrit 31.0 (L) 40.0 - 53.0 %    MCV 92 78 - 100 fL    MCH 29.0 26.5 - 33.0 pg    MCHC 31.6 31.5 - 36.5 g/dL    RDW 17.5 (H) 10.0 - 15.0 %    Platelet Count 63 (L) 150 - 450 10e3/uL    % Neutrophils 60 %    % Lymphocytes 21 %    % Monocytes 14 %    % Eosinophils 3 %    % Basophils 1 %    % Immature Granulocytes 1 %    NRBCs per 100 WBC 0 <1 /100    Absolute Neutrophils 1.3 (L) 1.6 - 8.3 10e3/uL    Absolute Lymphocytes 0.5 (L) 0.8 - 5.3 10e3/uL    Absolute Monocytes 0.3 0.0 - 1.3  10e3/uL    Absolute Eosinophils 0.1 0.0 - 0.7 10e3/uL    Absolute Basophils 0.0 0.0 - 0.2 10e3/uL    Absolute Immature Granulocytes 0.0 <=0.4 10e3/uL    Absolute NRBCs 0.0 10e3/uL   Lactic acid whole blood   Result Value Ref Range    Lactic Acid 1.2 0.7 - 2.0 mmol/L   Troponin I   Result Value Ref Range    Troponin I High Sensitivity 10 <79 ng/L   Nt probnp inpatient (BNP)   Result Value Ref Range    N terminal Pro BNP Inpatient 1,830 (H) 0 - 1,800 pg/mL   XR Chest 2 Views    Narrative    EXAM: XR CHEST 2 VIEWS  LOCATION: [facility]  DATE/TIME: 8/20/2022 1:13 PM    INDICATION: short of breath  COMPARISON: Chest radiograph 05/22/2022      Impression    IMPRESSION: Enlarged cardiac silhouette, similar to prior examination. Moderate right pleural effusion, increased since prior radiograph. Likely superimposed pulmonary vascular congestion and interstitial edema. No pneumothorax. No acute bony   abnormality. Healed right clavicular fracture again noted.       Medications   furosemide (LASIX) injection 60 mg (60 mg Intravenous Given 8/20/22 1249)       Assessments & Plan (with Medical Decision Making)     This patient presented with shortness of breath and edema.  See above for details.    Work-up showed an abnormal chest x-ray with pleural effusions and pulmonary congestion.  BNP was elevated.  He was not acidotic.  He was not hypoxic.  He did run in atrial fibrillation/flutter rhythm and was bradycardic.  This may be due to his beta-blockers.  Patient was given IV Lasix in the ER.    Patient will require admission for diuresis and adjustment of medications.  I have spoken with Dr. Paola Flores regarding his case and she accepts as an admission.  Patient is agreeable to come in.              I have reviewed the nursing notes.    I have reviewed the findings, diagnosis, plan and need for follow up with the patient.       New Prescriptions    No medications on file       Final diagnoses:    Acute congestive heart failure, unspecified heart failure type (H)   Atrial fibrillation and flutter (H)   Bradycardia       8/20/2022   [facility] EMERGENCY DEPT     Harrison Oscar MD  08/20/22 2223

## 2022-08-20 NOTE — TELEPHONE ENCOUNTER
Triage note    Caller name: Daylin  Relation to patient: sibling - Consent to Communicate on file    Daylin (sister), states that the member has been having increased swelling. They saw his PCP approximately 2.5 weeks ago for increased edema - he's had a 12lb weight gain in 3 months. Pt was given  Furosemide - 40mg x5 days started 8/3/2022 - but has gained 5lbs since Rx was started; no fluid came off.    Daylin calling today because she states the pt's Right arm is swollen (twice the size it should be) and his legs are more swollen as well.  She has noticed his wheezing has worsened with swelling also.    Disposition: per protocol, patient to go to ED now. Daylin verbalized her understanding and agrees with plan.      China Sahu RN  [facility] Nurse Advisor         Reason for Disposition    SEVERE arm swelling (e.g., all of arm looks swollen)    Additional Information    Negative: SEVERE difficulty breathing (e.g., struggling for each breath, speaks in single words)    Negative: Sounds like a life-threatening emergency to the triager    Protocols used: ARM SWELLING AND EDEMA-A-

## 2022-08-20 NOTE — ED NOTES
[facility]   ED Nurse to Floor Handoff     Flash Brown, a 81 year old, male from with family members,  in a home  They arrived in the ED by car from home    ED Chief Complaint: increase in weight and swelling.    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 Marie  [facility] [place]. Please review for accuracy       Mixed hyperlipidemia 01/30/2008     Priority: Medium     Last Exam: April 15, 2008  Last Lipids: CHOL      137   03/13/2008 LDL       76   03/13/2008 HDL       37   03/13/2008  Last LFTs:: N  ALT       47   03/13/2008    Meds: Vytorin 10/40, Niacin 500 bid       Obstructive sleep apnea 01/30/2008     Priority: Medium     Problem list name updated by automated process. Provider to review       ? exposure predisposing to CA 01/30/2008     Priority: Medium     Type 2 diabetes mellitus without complication, without long-term current use of insulin (H)      Priority: Medium     April 15, 2008  BP: 128/74  Body mass index is 36.01 kg/(m^2).  A1C      6.1   03/13/2008 GLC      104    "03/13/2008  LDL       76   03/13/2008    Meds: Metformin 1000      Problem list name updated by automated process. Provider to review       Essential hypertension, benign 01/30/2004     Priority: Medium     Last exam: April 15, 2008  BPs: 128/74  Meds: Atenolol 50, ASA 81, Prinizide 20/25         RT axillary lymphoma s/p resection, with adjuvant radiation - [place], [place] 01/30/1983     Priority: Medium     Malignant melanoma s/p resection in [place], [place] 01/30/1978     Priority: Medium    (I50.9) Acute congestive heart failure, unspecified heart failure type (H)  Comment: Lives with sister who has assisted him to appointments and cared for at home. 12-16 lb weight gain reported.  Plan: admit for treatment of CHF        Needed?: No    Allergies:    Allergies   Allergen Reactions     Iodine Swelling       Past Medical Hx:   Past Medical History:   Diagnosis Date     Basal cell carcinoma      CAD (coronary artery disease)      Depression      Hyperlipidemia      Hypertension      Lymphoma (H)      Malignant melanoma (H)      Melanoma (H)      Squamous cell carcinoma        Vital Signs:  BP (!) 153/62   Pulse (!) 33   Temp 97.4  F (36.3  C) (Tympanic)   Resp 9   Ht 1.676 m (5' 6\")   Wt 120.2 kg (265 lb)   SpO2 96%   BMI 42.77 kg/m       Elimination Status: Continent: No and denies incontinence, but clothing wet with urine. External catheter placed with lasix administration.     Activity Level: 2 assist    Baseline Mental status: other forgetful and keeps saying he feels confused and scared.  Current Mental Status changes: at basesline    Infection present or suspected this encounter: no  Sepsis suspected: No  Isolation type: None  Patient tested for COVID 19 prior to admission: YES     Activity level - Baseline/Home:  Stand with assist x2  Activity Level - Current:   Stand with assist x2    Bariatric equipment needed?: Yes    In the ED these meds were given:   Medications   furosemide (LASIX) injection " 60 mg (60 mg Intravenous Given 8/20/22 1249)       Drips running?  No    Home pump  No    Current LDAs: (Line status:[id number])    Labs results:   Labs Ordered and Resulted from Time of ED Arrival Up to the Time of Departure from the ED  Results for orders placed or performed during the hospital encounter of 08/20/22 (from the past 24 hour(s))   Symptomatic; Unknown Influenza A/B & SARS-CoV2 (COVID-19) Virus PCR Multiplex Nose    Specimen: Nose; Swab   Result Value Ref Range    Influenza A PCR Negative Negative    Influenza B PCR Negative Negative    SARS CoV2 PCR Negative Negative    Narrative    Testing was performed using the arian SARS-CoV-2 & Influenza A/B Assay on the arian [employer] System. This test should be ordered for the detection of SARS-CoV-2 and influenza viruses in individuals who meet clinical and/or epidemiological criteria. Test performance is unknown in asymptomatic patients. This test is for in vitro diagnostic use under the FDA EUA for laboratories certified under CLIA to perform moderate and/or high complexity testing. This test has not been FDA cleared or approved. A negative result does not rule out the presence of PCR inhibitors in the specimen or target RNA in concentration below the limit of detection for the assay. If only one viral target is positive but coinfection with multiple targets is suspected, the sample should be re-tested with another FDA cleared, approved or authorized test, if coinfection would change clinical management. [facility] are certified under the Clinical Laboratory Improvement Amendments of 1988 (CLIA-88) as  qualified to perform moderate and/or high complexity laboratory testing.   [place] Draw *Canceled*    Narrative    The following orders were created for panel order [place] Draw.  Procedure                               Abnormality         Status                     ---------                               -----------         ------                        Please view results for these tests on the individual orders.   [place] Draw    Narrative    The following orders were created for panel order [place] Draw.  Procedure                               Abnormality         Status                     ---------                               -----------         ------                     Extra Blue Top Tube[[id number]]                              Final result               Extra Red Top Tube[[id number]]                               Final result                 Please view results for these tests on the individual orders.   CBC with platelets, differential    Narrative    The following orders were created for panel order CBC with platelets, differential.  Procedure                               Abnormality         Status                     ---------                               -----------         ------                     CBC with platelets and d...[[id number]]  Abnormal            Final result                 Please view results for these tests on the individual orders.   Basic metabolic panel   Result Value Ref Range    Sodium 144 133 - 144 mmol/L    Potassium 4.3 3.4 - 5.3 mmol/L    Chloride 113 (H) 94 - 109 mmol/L    Carbon Dioxide (CO2) 27 20 - 32 mmol/L    Anion Gap 4 3 - 14 mmol/L    Urea Nitrogen 16 7 - 30 mg/dL    Creatinine 0.86 0.66 - 1.25 mg/dL    Calcium 8.2 (L) 8.5 - 10.1 mg/dL    Glucose 115 (H) 70 - 99 mg/dL    GFR Estimate 88 >60 mL/min/1.73m2   Blood gas venous   Result Value Ref Range    pH Venous 7.35 7.32 - 7.43    pCO2 Venous 52 (H) 40 - 50 mm Hg    pO2 Venous 26 25 - 47 mm Hg    Bicarbonate Venous 29 (H) 21 - 28 mmol/L    Base Excess/Deficit (+/-) 2.2 (H) -7.7 - 1.9 mmol/L    FIO2 0    Extra Blue Top Tube   Result Value Ref Range    Hold Specimen JIC    Extra Red Top Tube   Result Value Ref Range    Hold Specimen JIC    CBC with platelets and differential   Result Value Ref Range    WBC Count 2.2 (L) 4.0 - 11.0 10e3/uL    RBC Count 3.38 (L)  4.40 - 5.90 10e6/uL    Hemoglobin 9.8 (L) 13.3 - 17.7 g/dL    Hematocrit 31.0 (L) 40.0 - 53.0 %    MCV 92 78 - 100 fL    MCH 29.0 26.5 - 33.0 pg    MCHC 31.6 31.5 - 36.5 g/dL    RDW 17.5 (H) 10.0 - 15.0 %    Platelet Count 63 (L) 150 - 450 10e3/uL    % Neutrophils 60 %    % Lymphocytes 21 %    % Monocytes 14 %    % Eosinophils 3 %    % Basophils 1 %    % Immature Granulocytes 1 %    NRBCs per 100 WBC 0 <1 /100    Absolute Neutrophils 1.3 (L) 1.6 - 8.3 10e3/uL    Absolute Lymphocytes 0.5 (L) 0.8 - 5.3 10e3/uL    Absolute Monocytes 0.3 0.0 - 1.3 10e3/uL    Absolute Eosinophils 0.1 0.0 - 0.7 10e3/uL    Absolute Basophils 0.0 0.0 - 0.2 10e3/uL    Absolute Immature Granulocytes 0.0 <=0.4 10e3/uL    Absolute NRBCs 0.0 10e3/uL   Lactic acid whole blood   Result Value Ref Range    Lactic Acid 1.2 0.7 - 2.0 mmol/L   Troponin I   Result Value Ref Range    Troponin I High Sensitivity 10 <79 ng/L   Nt probnp inpatient (BNP)   Result Value Ref Range    N terminal Pro BNP Inpatient 1,830 (H) 0 - 1,800 pg/mL   XR Chest 2 Views    Narrative    EXAM: XR CHEST 2 VIEWS  LOCATION: [facility]  DATE/TIME: 8/20/2022 1:13 PM    INDICATION: short of breath  COMPARISON: Chest radiograph 05/22/2022      Impression    IMPRESSION: Enlarged cardiac silhouette, similar to prior examination. Moderate right pleural effusion, increased since prior radiograph. Likely superimposed pulmonary vascular congestion and interstitial edema. No pneumothorax. No acute bony   abnormality. Healed right clavicular fracture again noted.       Imaging Studies:   Recent Results (from the past 24 hour(s))  @[id number]@    Recent vital signs: BP: [unfilled], T: 97.4, HR: 33, R: 9     Harsha Coma Scale Score:    GCS:   Motor 6=Obeys commands   Verbal 5=Oriented   Eye Opening 4=Spontaneous   Total: 15        Risk for violent behavior: No     Cardiac Rhythm: A fib  Pt needs tele? Yes  Skin/wound Issues: left groin excoriation; scattered scabs  to arms/ legs    Code Status: Full Code    Pain control: good    Nausea control: good    Abnormal labs/tests/findings requiring intervention: Elevated BNP    Family present during ED course? Yes   Family Comments/Social Situation comments: sister with patient and is good historian and caretaker.    Tasks needing completion: telemetry monitoring for bradycardia    Poornima Black RN

## 2022-08-20 NOTE — H&P
"[facility]    History and Physical - Hospitalist Service       Date of Admission:  8/20/2022    Assessment & Plan      Flash Brown is a 81 year old male admitted on 8/20/2022. He presented to the emergency department for evaluation of edema, 20 pound weight gain, and dyspnea on exertion and was found to have acute heart failure for which he is being admitted for further evaluation and treatment.    Acute congestive heart failure / (HFpEF) heart failure with preserved ejection fraction - new diagnosis  Anasarca  Presented with 2-3 month history of worsening edema, 20 pound weight gain, and dyspnea on exertion. No improvement with a trial of furosemide 40 mg daily x 5 days from PCP.   Had outpatient echo on 8/12/22 demonstrating \"global and regional left ventricular function is normal with an EF of 60-65%. Global right ventricular function is normal. IVC diameter and respiratory changes fall into an intermediate range suggesting an RA pressure of 8 mmHg. No pericardial effusion is present.\"   Admit chest x-ray demonstrates \"enlarged cardiac silhouette, similar to prior examination. Moderate right pleural effusion, increased since prior radiograph. Likely superimposed pulmonary vascular congestion and interstitial edema. No pneumothorax...\"   EKG shows bradycardia which is possibly sinus with baseline artifact vs flutter/fibrillation (bradycardia not new). Admit BNP 1830. Troponin normal (10). Appears edematous with anasarca, has expiratory wheezing on exam but no crackles. No hypoxia at time of admission. Overall clinical picture consistent with acute CHF, which is a new diagnosis for patient. Unclear cause, may be driven by persisting bradycardia or other undiagnosed arrhythmia, worsening coronary artery disease (but no evidence of acute coronary syndrome), or attributed to his use of pioglitazone (which is known to cause CHF, but is not a new medication for patient).   Was given IV " "furosemide 60 mg with good urine output.   - Furosemide 40 mg q 12 hours  - No repeat echo indicated, was just completed on 8/12/22  - Daily weights  - Strict I&Os  - Stop pioglitazone  - Continue prior to admission ACE and hydralazine; see below regarding beta blocker use    Sinus bradycardia vs flutter/fibrillation  Admit EKG shows bradycardia, rate as low as 35 bpm. Rhythm possibly new fib/flutter, but could be sinus with baseline artifact. Apparently his heart rate has been low \"for a long time.\" Blood pressure is stable, patient is asymptomatic. He is on carvedilol 12.5 mg bid.   - Monitor on telemetry; if confirmed fib/flutter, discuss anticoagulation   - Holding carvedilol; if resumed, would likely need lower dose    Pancytopenia, chronic  Admit WBC 2.2 (transiently low at baseline between 2.7 - 5.0), hemoglobin 9.8 (baseline between 11 - 13), platelets 63 (baseline 64 - 93). Occurs in the setting of prior history of lymphoma (see below). Pancytopenia is followed by oncology.  - CBC in am  - See below regarding lymphoma management    Type 2 diabetes mellitus without complication, without long-term current use of insulin  Recent HgbA1c 5.5. Managed prior to admission with pioglitazone 30 mg daily.   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 Follows with oncology Dr. Avendaño. Had a bone marrow biopsy in Feb 2022, which was normal.  - Continue with outpatient oncology surveillance    Essential hypertension, benign  Blood pressure normal / slightly elevated on admission. Managed prior to admission with amlodipine 5 mg daily, carvedilol 12.5 mg bid, clonidine 0.1 " mg bid, hydralazine 50 mg bid, and ramipril 10 mg daily.   - See above regarding carvedilol / bradycardia  - Hold clonidine (may have some rebound hypertension as a result)  - Continue amlodipine, hydralazine and ramipril with holding parameters    CAD (coronary artery disease)  Mixed hyperlipidemia  Hyperlipidemia  Follows with [facility] Dr. Tate. History of PCI with LUDIVINA in 2010 (prox/mid RCA, distal circumflex, prox/mid LAD). Stress test 2015 with 70% stenosis of proximal circumflex, did not have subsequent angiogram for unclear reasons. Negative stress test during hospitalization at [facility] in August 2020. Managed prior to admission with aspirin 81 mg daily, clopidogrel 75 mg daily, atorvastatin 20 mg daily, and beta blocker / ACEi / antihypertensive medications noted above. No evidence of acute coronary syndrome at time of admission, although worsening coronary artery disease could be contributing to development of CHF.  - Continue aspirin, clopidogrel, statin  - See above regarding antihypertensive regimen    BPH (benign prostatic hyperplasia)  Nocturia  Managed prior to admission with tamsulosin 0.4 mg daily, continue.     Obstructive sleep apnea  Apparently does not use CPAP, but has been referred for sleep study.    Severe Obesity  Admit BMI 40.62, contributes to morbidity and mortality.      Diet: Combination Diet Moderate Consistent Carb (60 g CHO per Meal) Diet; Low Saturated Fat Na <2400mg Diet  DVT Prophylaxis: Pneumatic Compression Devices  Caruso Catheter: Not present  Central Lines: None  Cardiac Monitoring: ACTIVE order. Indication: Acute decompensated heart failure (48 hours)  Code Status: Full Code  - discussed at time of admission       Disposition Plan      Expected Discharge Date: 08/22/2022                The patient's care was discussed with the Attending Physician, Dr. Elvis Bennett and Patient.    Catia Davis PA-C  Hospitalist Service  [facility]  "Center  Securely message with the Vocera Web Console (learn more here)  Text page via [facility] Paging/Directory         ______________________________________________________________________    Chief Complaint   \"I've gained 20 pounds and my legs and arms are puffy.\"    History is obtained from the patient and his sister, Daylin, review of EMR, and emergency department documentation.     History of Present Illness   Flash Brown is a 81 year old male who presented to the emergency department for evaluation of weight gain and edema.     Patient has had progressively worsening edema for the past 2 to 3 months, most recently noted to have progressed into his abdomen and arms (right worse than left).  He has noticed a 21 pound weight gain over the past 3 months.  He has had dyspnea on exertion for a few months, with new wheezing present in the past few weeks.  He has been more fatigued and generally weak recently, sleeps about 18 hours a day because he has no energy.    He saw his primary physician in clinic on 8/2 and was evaluated for edema and weight gain.  At that time there was concern for new heart failure, so outpatient echocardiogram and further labs were ordered.  He was prescribed a 5-day trial of furosemide 40 mg daily, and despite this he gained an additional 5 pounds and did not have any improvement in his other symptoms.    Because his symptoms have been worsening without improvement, he presented to the emergency department at the advice of a triage nurse, where he was found to have acute CHF.    Patient denies orthopnea, but notes significant dyspnea even with simple tasks such as getting up out of a chair.  He denies any chest pain or palpitations.  He was noted to be bradycardic, which is not new per patient report.  He denies any associated dizziness, lightheadedness, falls, fainting.  No recent medication changes or dose changes other than the addition of furosemide x5 days.      Review of " systems:  Patient has nocturia which is not new.  Denies fevers or chills, but states that he is always cold.  He denies any night sweats.    Review of Systems    The 10 point Review of Systems is negative other than noted in the HPI or here.     Past Medical History    I have reviewed this patient's medical history and updated it with pertinent information if needed.   Past Medical History:   Diagnosis Date     Basal cell carcinoma      CAD (coronary artery disease)      Depression      Hyperlipidemia      Hypertension      Lymphoma (H)      Malignant melanoma (H)      Melanoma (H)      Squamous cell carcinoma        Past Surgical History   I have reviewed this patient's surgical history and updated it with pertinent information if needed.  Past Surgical History:   Procedure Laterality Date     AXILLARY SURGERY      S/P resection and adjuvant radiation     BONE MARROW BIOPSY, BONE SPECIMEN, NEEDLE/TROCAR N/A 7/8/2019    Procedure: BIOPSY, BONE MARROW;  Surgeon: Husam Issa MD;  Location: [facility]     BONE MARROW BIOPSY, BONE SPECIMEN, NEEDLE/TROCAR Left 2/24/2022    Procedure: BIOPSY, BONE MARROW;  Surgeon: Cailin Anthony PA-C;  Location: [facility]     CARDIAC SURGERY       CHOLECYSTECTOMY       HERNIA REPAIR, UMBILICAL       PHACOEMULSIFICATION WITH STANDARD INTRAOCULAR LENS IMPLANT Right 10/2/2019    Procedure: Cataract Removal with Implant;  Surgeon: Dinesh Ivey MD;  Location: [facility] OR     PHACOEMULSIFICATION WITH STANDARD INTRAOCULAR LENS IMPLANT Left 10/21/2019    Procedure: Cataract Removal with Implant;  Surgeon: Dinesh Ivey MD;  Location: [facility] OR     STENT      PTCA with drug-eluting stent of RCA, circumflex, and LAD     VASCULAR SURGERY         Social History   I have reviewed this patient's social history and updated it with pertinent information if needed.  Social History     Tobacco Use     Smoking status: Never Smoker     Smokeless tobacco: Never Used   Vaping Use     Vaping Use:  Never used   Substance Use Topics     Alcohol use: Yes     Comment: glass of wine couple times per week     Drug use: Never       Family History   I have reviewed this patient's family history and updated it with pertinent information if needed.  Family History   Problem Relation Age of Onset     Asthma Other      Cancer Other         melanoma     Blood Disease Other         bleeding disorder     Lung Cancer Mother         Smoker     Prostate Cancer Father      Melanoma No family hx of        Prior to Admission Medications   Prior to Admission Medications   Prescriptions Last Dose Informant Patient Reported? Taking?   ORDER FOR DME More than a month at Unknown time Care Giver No Yes   Sig: Light Therapy with Full Spectrum Light (29716 lux) Start with 10-15 minute exposure per day, Increase exposure to 30 to 45 minutes per day, Maximum exposure: 90 minutes per day,Look periodically at light during each session    amLODIPine (NORVASC) 5 MG tablet 8/20/2022 at am Care Giver No Yes   Sig: Take 1 tablet (5 mg) by mouth daily   aspirin (ASA) 81 MG EC tablet 8/20/2022 at am Care Giver Yes Yes   Sig: Take 81 mg by mouth daily   atorvastatin (LIPITOR) 20 MG tablet 8/20/2022 at am Care Giver No Yes   Sig: Take 1 tablet (20 mg) by mouth daily   carvedilol (COREG) 12.5 MG tablet 8/20/2022 at am Care Giver No Yes   Sig: Take 1 tablet (12.5 mg) by mouth 2 times daily (with meals)   cloNIDine (CATAPRES) 0.1 MG tablet 8/20/2022 at am Care Giver No Yes   Sig: Take 1 tablet (0.1 mg) by mouth 2 times daily   clopidogrel (PLAVIX) 75 MG tablet 8/20/2022 at am Care Giver No Yes   Sig: Take 1 tablet (75 mg) by mouth daily   ferrous sulfate (FE TABS) 325 (65 Fe) MG EC tablet 8/19/2022 at pm Care Giver No Yes   Sig: Take 1 tablet (325 mg) by mouth daily   Patient taking differently: Take 325 mg by mouth every evening   furosemide (LASIX) 40 MG tablet   No No   Sig: Take 1 tablet (40 mg) by mouth daily for 5 days   hydrALAZINE (APRESOLINE)  50 MG tablet 8/20/2022 at am Care Giver No Yes   Sig: Take 1 tablet (50 mg) by mouth 2 times daily   naloxone (NARCAN) 4 MG/0.1ML nasal spray never used Care Giver No No   Sig: Spray 1 spray (4 mg) into one nostril alternating nostrils once as needed for opioid reversal Every 2-3 minutes until patient responsive or EMS arrives   nitroGLYcerin (NITROSTAT) 0.4 MG sublingual tablet never used Care Giver No No   Sig: Place 1 tablet (0.4 mg) under the tongue See Admin Instructions for chest pain   pioglitazone (ACTOS) 30 MG tablet 8/20/2022 at am Care Giver No Yes   Sig: Take 1 tablet (30 mg) by mouth daily   ramipril (ALTACE) 10 MG capsule 8/20/2022 at am Care Giver No Yes   Sig: TAKE 1 CAPSULE BY MOUTH EVERY DAY   triamcinolone (KENALOG) 0.1 % external cream Past Week at Unknown time Care Giver No Yes   Sig: Twice daily to legs prn itching      Facility-Administered Medications: None     Allergies   Allergies   Allergen Reactions     Iodine Swelling       Physical Exam   Vital Signs: Temp: 97.7  F (36.5  C) Temp src: Oral BP: 125/59 Pulse: 57   Resp: 16 SpO2: 95 % O2 Device: None (Room air)    Weight: 251 lbs 8.72 oz    Constitutional: Alert, oriented, cooperative. No apparent distress, appears nontoxic. Speaking in full coherent sentences.     Eyes: Eyes are clear, pupils are reactive. No scleral icterus.     HEENT: Oropharynx is clear and moist, no lesions. Normocephalic, no evidence of cranial trauma.     Lymph/Hematologic: No axillary or supraclavicular lymphadenopathy is appreciated.    Cardiovascular: Regular rhythm, slow rate, normal S1 and S2. No murmur, rubs, or gallops. Peripheral pulses intact bilaterally. Significant +3-4 pitting lower extremity edema up to thighs and pre-sacral area, as well as his bilateral arms (right worse than left).    Respiratory: Non-labored breathing on room air, but does become dyspneic when repositioning self in bed. Expiratory wheezing present, but no crackles or rhonchi /  rales.     GI: Rotund but soft, non-distended. Non-tender, no rebound or guarding. No hepatosplenomegaly or masses appreciated. Normal bowel sounds. Negative fluid wave test.    Musculoskeletal: Without obvious deformity, normal range of motion. Distal CMS intact.      Skin: Warm and dry, no rashes or ecchymoses. No mottling of skin.      Neurologic: Patient moves all extremities. Gross strength and sensation are equal bilaterally.    Genitourinary: Condom catheter present draining very light / clear urine.       Data   Data reviewed today: I reviewed all medications, new labs and imaging results over the last 24 hours. I personally reviewed the EKG tracing showing sinus bradycardia and the chest x-ray image(s) showing no focal opacities, but significantly increased interstitial edema and pulmonary vascularity, bilateral pleural effusions (right > left).     Recent Labs   Lab 08/20/22  1728 08/20/22  1237   WBC  --  2.2*   HGB  --  9.8*   MCV  --  92   PLT  --  63*   NA  --  144   POTASSIUM  --  4.3   CHLORIDE  --  113*   CO2  --  27   BUN  --  16   CR  --  0.86   ANIONGAP  --  4   NATALIIA  --  8.2*   GLC 96 115*   ALBUMIN  --  2.9*   PROTTOTAL  --  6.2*   BILITOTAL  --  1.1   ALKPHOS  --  81   ALT  --  20   AST  --  22     Recent Results (from the past 24 hour(s))   XR Chest 2 Views    Narrative    EXAM: XR CHEST 2 VIEWS  LOCATION: [facility]  DATE/TIME: 8/20/2022 1:13 PM    INDICATION: short of breath  COMPARISON: Chest radiograph 05/22/2022      Impression    IMPRESSION: Enlarged cardiac silhouette, similar to prior examination. Moderate right pleural effusion, increased since prior radiograph. Likely superimposed pulmonary vascular congestion and interstitial edema. No pneumothorax. No acute bony   abnormality. Healed right clavicular fracture again noted.

## 2022-08-20 NOTE — PROGRESS NOTES
Skin affirmation note    Admitting nurse completed full skin assessment, Haider score and Haider interventions. This writer agrees with the initial skin assessment findings.

## 2022-08-20 NOTE — ED TRIAGE NOTES
Increased SOB with audible wheezing and increase edema all over body in the last couple.  Pt has gained 5# in the last 2 weeks     Triage Assessment     Row Name 08/20/22 1127       Respiratory WDL    Respiratory WDL X       Cardiac WDL    Cardiac WDL X       Cognitive/Neuro/Behavioral WDL    Cognitive/Neuro/Behavioral WDL X    Level of Consciousness confused

## 2022-08-20 NOTE — PROGRESS NOTES
"[facility] [facility] [place]    Patient admitted to room 2304 at approximately 1630 via cart from emergency room. Patient was accompanied by sister.     Verbal SBAR report received from Poornima prior to patient arrival.     Patient trasferred to bed via air divina. Patient alert and oriented X 3. The patient is not having any pain.  . Admission vital signs: Blood pressure 139/52, pulse (!) 47, temperature 97.5  F (36.4  C), temperature source Oral, resp. rate 16, height 1.676 m (5' 5.98\"), weight 114.1 kg (251 lb 8.7 oz), SpO2 96 %. Patient was oriented to plan of care, call light, bed controls, tv, telephone, bathroom and visiting hours.     Risk Assessment    The following safety risks were identified during admission: fall and skin. Yellow risk band applied: YES.     Skin Initial Assessment    This writer admitted this patient and completed a full skin assessment and Haider score in the Adult PCS flowsheet. Appropriate interventions initiated as needed.     Secondary skin check completed by Rdaha Maloney Risk Assessment  Sensory Perception: 2-->very limited  Moisture: 3-->occasionally moist  Activity: 1-->bedfast  Mobility: 2-->very limited  Nutrition: 3-->adequate  Friction and Shear: 2-->potential problem  Haider Score: 13  Sensory/Activity/Mobility Interventions: Encourage activity/OOB, Turn: left/right positioning, Pillows between bony prominence    Education    Patient has a [place] to Observation order: No  Observation education completed and documented: N/A      Chaz Velazquez RN    "

## 2022-08-21 ENCOUNTER — APPOINTMENT (OUTPATIENT)
Dept: OCCUPATIONAL THERAPY | Facility: CLINIC | Age: 82
DRG: 291 | End: 2022-08-21
Payer: MEDICARE

## 2022-08-21 LAB
ALBUMIN SERPL-MCNC: 2.8 G/DL (ref 3.4–5)
ALP SERPL-CCNC: 81 U/L (ref 40–150)
ALT SERPL W P-5'-P-CCNC: 19 U/L (ref 0–70)
ANION GAP SERPL CALCULATED.3IONS-SCNC: 5 MMOL/L (ref 3–14)
AST SERPL W P-5'-P-CCNC: 19 U/L (ref 0–45)
BASOPHILS # BLD AUTO: 0 10E3/UL (ref 0–0.2)
BASOPHILS NFR BLD AUTO: 1 %
BILIRUB SERPL-MCNC: 1.1 MG/DL (ref 0.2–1.3)
BUN SERPL-MCNC: 17 MG/DL (ref 7–30)
CALCIUM SERPL-MCNC: 8.3 MG/DL (ref 8.5–10.1)
CHLORIDE BLD-SCNC: 109 MMOL/L (ref 94–109)
CO2 SERPL-SCNC: 30 MMOL/L (ref 20–32)
CREAT SERPL-MCNC: 0.92 MG/DL (ref 0.66–1.25)
EOSINOPHIL # BLD AUTO: 0.1 10E3/UL (ref 0–0.7)
EOSINOPHIL NFR BLD AUTO: 3 %
ERYTHROCYTE [DISTWIDTH] IN BLOOD BY AUTOMATED COUNT: 17.4 % (ref 10–15)
GFR SERPL CREATININE-BSD FRML MDRD: 85 ML/MIN/1.73M2
GLUCOSE BLD-MCNC: 112 MG/DL (ref 70–99)
GLUCOSE BLDC GLUCOMTR-MCNC: 103 MG/DL (ref 70–99)
GLUCOSE BLDC GLUCOMTR-MCNC: 109 MG/DL (ref 70–99)
GLUCOSE BLDC GLUCOMTR-MCNC: 113 MG/DL (ref 70–99)
GLUCOSE BLDC GLUCOMTR-MCNC: 85 MG/DL (ref 70–99)
GLUCOSE BLDC GLUCOMTR-MCNC: 95 MG/DL (ref 70–99)
HCT VFR BLD AUTO: 31 % (ref 40–53)
HGB BLD-MCNC: 9.9 G/DL (ref 13.3–17.7)
IMM GRANULOCYTES # BLD: 0 10E3/UL
IMM GRANULOCYTES NFR BLD: 0 %
LYMPHOCYTES # BLD AUTO: 0.6 10E3/UL (ref 0.8–5.3)
LYMPHOCYTES NFR BLD AUTO: 27 %
MCH RBC QN AUTO: 28.8 PG (ref 26.5–33)
MCHC RBC AUTO-ENTMCNC: 31.9 G/DL (ref 31.5–36.5)
MCV RBC AUTO: 90 FL (ref 78–100)
MONOCYTES # BLD AUTO: 0.3 10E3/UL (ref 0–1.3)
MONOCYTES NFR BLD AUTO: 12 %
NEUTROPHILS # BLD AUTO: 1.4 10E3/UL (ref 1.6–8.3)
NEUTROPHILS NFR BLD AUTO: 57 %
NRBC # BLD AUTO: 0 10E3/UL
NRBC BLD AUTO-RTO: 0 /100
PLATELET # BLD AUTO: 69 10E3/UL (ref 150–450)
POTASSIUM BLD-SCNC: 3.4 MMOL/L (ref 3.4–5.3)
POTASSIUM BLD-SCNC: 3.7 MMOL/L (ref 3.4–5.3)
PROT SERPL-MCNC: 6.3 G/DL (ref 6.8–8.8)
RBC # BLD AUTO: 3.44 10E6/UL (ref 4.4–5.9)
SODIUM SERPL-SCNC: 144 MMOL/L (ref 133–144)
WBC # BLD AUTO: 2.4 10E3/UL (ref 4–11)

## 2022-08-21 PROCEDURE — 120N000001 HC R&B MED SURG/OB

## 2022-08-21 PROCEDURE — 36415 COLL VENOUS BLD VENIPUNCTURE: CPT | Performed by: PHYSICIAN ASSISTANT

## 2022-08-21 PROCEDURE — 97166 OT EVAL MOD COMPLEX 45 MIN: CPT | Mod: GO

## 2022-08-21 PROCEDURE — 85004 AUTOMATED DIFF WBC COUNT: CPT | Performed by: PHYSICIAN ASSISTANT

## 2022-08-21 PROCEDURE — 82040 ASSAY OF SERUM ALBUMIN: CPT | Performed by: PHYSICIAN ASSISTANT

## 2022-08-21 PROCEDURE — 84132 ASSAY OF SERUM POTASSIUM: CPT | Performed by: PHYSICIAN ASSISTANT

## 2022-08-21 PROCEDURE — 250N000013 HC RX MED GY IP 250 OP 250 PS 637: Performed by: FAMILY MEDICINE

## 2022-08-21 PROCEDURE — 250N000011 HC RX IP 250 OP 636: Performed by: PHYSICIAN ASSISTANT

## 2022-08-21 PROCEDURE — 99233 SBSQ HOSP IP/OBS HIGH 50: CPT | Performed by: FAMILY MEDICINE

## 2022-08-21 PROCEDURE — 250N000013 HC RX MED GY IP 250 OP 250 PS 637: Performed by: PHYSICIAN ASSISTANT

## 2022-08-21 PROCEDURE — 97535 SELF CARE MNGMENT TRAINING: CPT | Mod: GO

## 2022-08-21 PROCEDURE — 250N000011 HC RX IP 250 OP 636: Performed by: FAMILY MEDICINE

## 2022-08-21 PROCEDURE — 80053 COMPREHEN METABOLIC PANEL: CPT | Performed by: PHYSICIAN ASSISTANT

## 2022-08-21 RX ORDER — NYSTATIN 100000 U/G
CREAM TOPICAL 2 TIMES DAILY
Status: DISCONTINUED | OUTPATIENT
Start: 2022-08-21 | End: 2022-09-02 | Stop reason: HOSPADM

## 2022-08-21 RX ORDER — FUROSEMIDE 10 MG/ML
40 INJECTION INTRAMUSCULAR; INTRAVENOUS
Status: DISCONTINUED | OUTPATIENT
Start: 2022-08-21 | End: 2022-08-22

## 2022-08-21 RX ORDER — POTASSIUM CHLORIDE 1500 MG/1
40 TABLET, EXTENDED RELEASE ORAL ONCE
Status: COMPLETED | OUTPATIENT
Start: 2022-08-21 | End: 2022-08-21

## 2022-08-21 RX ADMIN — HYDRALAZINE HYDROCHLORIDE 50 MG: 25 TABLET, FILM COATED ORAL at 08:40

## 2022-08-21 RX ADMIN — FUROSEMIDE 40 MG: 10 INJECTION, SOLUTION INTRAMUSCULAR; INTRAVENOUS at 08:30

## 2022-08-21 RX ADMIN — POTASSIUM CHLORIDE 40 MEQ: 20 TABLET, EXTENDED RELEASE ORAL at 22:42

## 2022-08-21 RX ADMIN — CLOPIDOGREL BISULFATE 75 MG: 75 TABLET ORAL at 08:36

## 2022-08-21 RX ADMIN — ASPIRIN 81 MG: 81 TABLET ORAL at 08:36

## 2022-08-21 RX ADMIN — HYDRALAZINE HYDROCHLORIDE 50 MG: 25 TABLET, FILM COATED ORAL at 19:50

## 2022-08-21 RX ADMIN — AMLODIPINE BESYLATE 5 MG: 5 TABLET ORAL at 08:40

## 2022-08-21 RX ADMIN — FUROSEMIDE 40 MG: 10 INJECTION, SOLUTION INTRAMUSCULAR; INTRAVENOUS at 16:13

## 2022-08-21 RX ADMIN — FERROUS SULFATE TAB 325 MG (65 MG ELEMENTAL FE) 325 MG: 325 (65 FE) TAB at 17:05

## 2022-08-21 RX ADMIN — LISINOPRIL 20 MG: 20 TABLET ORAL at 08:37

## 2022-08-21 RX ADMIN — ATORVASTATIN CALCIUM 20 MG: 20 TABLET, FILM COATED ORAL at 08:37

## 2022-08-21 RX ADMIN — NYSTATIN: 100000 CREAM TOPICAL at 19:50

## 2022-08-21 RX ADMIN — NYSTATIN: 100000 CREAM TOPICAL at 12:46

## 2022-08-21 ASSESSMENT — ACTIVITIES OF DAILY LIVING (ADL)
IADL_COMMENTS: RELIES ON FAMILY
ADLS_ACUITY_SCORE: 29
ADLS_ACUITY_SCORE: 33
DEPENDENT_IADLS:: TRANSPORTATION;CLEANING;COOKING;SHOPPING
ADLS_ACUITY_SCORE: 33
ADLS_ACUITY_SCORE: 33
ADLS_ACUITY_SCORE: 29
ADLS_ACUITY_SCORE: 33
ADLS_ACUITY_SCORE: 27
ADLS_ACUITY_SCORE: 33

## 2022-08-21 NOTE — CONSULTS
Nutrition Therapy Update:    RD consulted via provider order for nutrition education - Heart Failure - Dietitian to instruct patient on 2 gram sodium diet.     RD currently covering remotely - will defer nutrition education to on-site RD to complete as able. Included low sodium diet handout in discharge instructions in case pt discharges prior to RD completing education on-site.    China Valentin RD  Clinical Dietitian  Office: [phone]  Weekend pager: [phone]

## 2022-08-21 NOTE — CONSULTS
Care Management Initial Consult    General Information  Assessment completed with: Patient, Family,    Type of CM/SW Visit: Initial Assessment    Primary Care Provider verified and updated as needed: Yes   Readmission within the last 30 days: no previous admission in last 30 days      Reason for Consult: discharge planning  Advance Care Planning: Advance Care Planning Reviewed: present on chart        Communication Assessment  Patient's communication style: spoken language (English or Bilingual)    Hearing Difficulty or Deaf: no   Wear Glasses or Blind: no    Cognitive  Cognitive/Neuro/Behavioral: .WDL except (forgetful)  Level of Consciousness: alert, confused  Arousal Level: opens eyes spontaneously  Orientation: disoriented to, situation  Mood/Behavior: calm, cooperative  Best Language: 0 - No aphasia  Speech: spontaneous, clear    Living Environment:   People in home: sibling(s)  Daylin  Current living Arrangements: house      Able to return to prior arrangements: yes     Family/Social Support:  Care provided by: self, other (see comments)  Provides care for: no one  Marital Status: Single  Sibling(s)          Description of Support System: Supportive, Involved    Support Assessment: Adequate family and caregiver support, Adequate social supports    Current Resources:   Patient receiving home care services: No     Community Resources: None  Equipment currently used at home: walker, rolling  Supplies currently used at home:      Employment/Financial:  Employment Status: retired        Financial Concerns: No concerns identified     Lifestyle & Psychosocial Needs:  Social Determinants of Health     Tobacco Use: Low Risk      Smoking Tobacco Use: Never Smoker     Smokeless Tobacco Use: Never Used   Alcohol Use: Not At Risk     Frequency of Alcohol Consumption: 2-3 times a week     Average Number of Drinks: 1 or 2     Frequency of Binge Drinking: Never   Financial Resource Strain: Low Risk      Difficulty of Paying  Living Expenses: Not hard at all   Food Insecurity: No Food Insecurity     Worried About Running Out of Food in the Last Year: Never true     Ran Out of Food in the Last Year: Never true   Transportation Needs: No Transportation Needs     Lack of Transportation (Medical): No     Lack of Transportation (Non-Medical): No   Physical Activity: Inactive     Days of Exercise per Week: 0 days     Minutes of Exercise per Session: 0 min   Stress: No Stress Concern Present     Feeling of Stress : Not at all   Social Connections: Socially Isolated     Frequency of Communication with Friends and Family: Once a week     Frequency of Social Gatherings with Friends and Family: Once a week     Attends Baptism Services: More than 4 times per year     Active Member of Clubs or Organizations: No     Attends Club or Organization Meetings: Not on file     Marital Status: Never    Intimate Partner Violence: Not on file   Depression: Not at risk     PHQ-2 Score: 2   Housing Stability: Low Risk      Unable to Pay for Housing in the Last Year: No     Number of Places Lived in the Last Year: 1     Unstable Housing in the Last Year: No     Functional Status:  Prior to admission patient needed assistance:   Dependent ADLs:: Independent, Ambulation-walker  Dependent IADLs:: Transportation, Cleaning, Cooking, Shopping     Mental Health Status:  Mental Health Status: No Current Concerns       Chemical Dependency Status:  Chemical Dependency Status: No Current Concerns           Values/Beliefs:  Spiritual, Cultural Beliefs, Baptism Practices, Values that affect care: no             Additional Information:    CM received a referral to assist with discharge planning. Spoke with patient and nephew at bedside and introduced self and role. Patient lives with his sister, Daylin and brother-in-law in a multi level home in [place].     Patient reports at baseline he is independent with ADLS, however per therapy notes it sounds like patient has  "poor hygiene. Patient uses a walker to assist with ambulation. Family assists with IADLs.     Discussed therapy recommendations for TCU. Patients reports \"I will be honest, I am confused and don't understand much of this.\"  Writer explained TCU to patient and nephew, both verbalized understanding. Patient in agreement to TCU at discharge. Patient does not have a facility preference.     Patient request that writer call sister Daylin and provide her with update. Left VM for Daylin requesting a return call.     TCU referrals pending:     [facility] (Main Phone: [phone] Admissions phone: [phone] Fax: [phone])     [facility] Phone (Main Phone:[phone] Admissions Phone:[phone] Fax: [phone])    [facility] TCU Phone: (Admissions: [phone] RN Report: [phone] Fax: [phone])     [facility] (Phone: [phone]/Fax: [phone])    [facility] (Admissions Phone: [phone] Main Phone: [phone] Fax: [phone])    [facility] (Admissions Phone: [phone] Main Phone: [phone] Fax: [phone])    [facility] (Phone: [phone] Fax: [phone])    [facility] (Main: [phone] Admissions: [phone] Fax: [phone])    PLAN: TCU    SHEA BEAN RN      "

## 2022-08-21 NOTE — PLAN OF CARE
Goal Outcome Evaluation:    Family here, including sister.  Sister updated on plan of care and plan for TCU on discharge.  Sister and patient agreeable to TCU.  Patient reports often he feels confused on what is happening, although he does a pretty good job of recalling facts.  His sister states he will report that often, about being confused on things.  He is alert to person, place and time and a little unclear about complete medical plan.  Receiving IV lasix.  Good diuresis.  Urinal has been in place to capture output, otherwise he would be incontinent.  He sat in chair via ceiling lift.  Had a small stool today.  Remains edematous and short of breath with activity.  Education and teach back done on incentive spirometry, he will need continued reinforcement.  Nystatin ordered for groin rash.  No insulin needed for diabetes.  He is able to feed self after tray set up, but is otherwise dependent on staff for all needs.

## 2022-08-21 NOTE — PROGRESS NOTES
"[facility]    Medicine Progress Note - Hospitalist Service    Date of Admission:  8/20/2022    Assessment & Plan          Flash Brown is a 81 year old male admitted on 8/20/2022. He presented to the emergency department for evaluation of edema, 20 pound weight gain, and dyspnea on exertion and was found to have acute heart failure for which he is being admitted for further evaluation and treatment.    Acute congestive heart failure / (HFpEF) heart failure with preserved ejection fraction - new diagnosis  Anasarca  Presented with 2-3 month history of worsening edema, 20 pound weight gain, and dyspnea on exertion. No improvement with a trial of furosemide 40 mg daily x 5 days from PCP.   Had outpatient echo on 8/12/22 demonstrating \"global and regional left ventricular function is normal with an EF of 60-65%. Global right ventricular function is normal. IVC diameter and respiratory changes fall into an intermediate range suggesting an RA pressure of 8 mmHg. No pericardial effusion is present.\"   Admit chest x-ray demonstrates \"enlarged cardiac silhouette, similar to prior examination. Moderate right pleural effusion, increased since prior radiograph. Likely superimposed pulmonary vascular congestion and interstitial edema. No pneumothorax...\"   EKG shows bradycardia which is possibly sinus with baseline artifact vs flutter/fibrillation (bradycardia not new). Admit BNP 1830. Troponin normal (10). Appears edematous with anasarca, has expiratory wheezing on exam but no crackles. No hypoxia at time of admission. Overall clinical picture consistent with acute CHF, which is a new diagnosis for patient. Unclear cause, may be driven by persisting bradycardia or other undiagnosed arrhythmia, worsening coronary artery disease (but no evidence of acute coronary syndrome), or attributed to his use of pioglitazone (which is known to cause CHF, but is not a new medication for patient).   Was given IV " "furosemide 60 mg with good urine output.   - Furosemide 40 mg q 12 hours - continue for now  - daily BMPs and K repletion protocol while diuresing  - No repeat echo indicated, was just completed on 8/12/22  - Daily weights  - Strict I&Os  - Stop pioglitazone  - Continue prior to admission ACE and hydralazine; see below regarding beta blocker use    Sinus bradycardia vs flutter/fibrillation  Admit EKG shows bradycardia, rate as low as 35 bpm. Rhythm possibly new fib/flutter, but could be sinus with baseline artifact. Apparently his heart rate has been low \"for a long time.\" Blood pressure is stable, patient is asymptomatic. PTA has carvedilol 12.5 mg bid.   - Monitor on telemetry - NSR as of 8/21, can discontinue 8/22 evening if remains wnl  - Holding carvedilol; if resumed, would likely need lower dose    Pancytopenia, chronic  Admit WBC 2.2 (transiently low at baseline between 2.7 - 5.0), hemoglobin 9.8 (baseline between 11 - 13), platelets 63 (baseline 64 - 93). Occurs in the setting of prior history of lymphoma (see below). Pancytopenia is followed by oncology.  - CBC stable on recheck, discontinue trend unless clinically indicated  - See below regarding lymphoma management    Type 2 diabetes mellitus without complication, without long-term current use of insulin  Recent HgbA1c 5.5. Managed prior to admission with pioglitazone 30 mg daily.   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 Follows with oncology Dr. Avendaño. Had a bone marrow biopsy in Feb 2022, which was normal.  - Continue with outpatient oncology surveillance    Essential hypertension, benign  Blood " pressure normal / slightly elevated on admission. Managed prior to admission with amlodipine 5 mg daily, carvedilol 12.5 mg bid, clonidine 0.1 mg bid, hydralazine 50 mg bid, and ramipril 10 mg daily.   - See above regarding carvedilol / bradycardia  - Hold clonidine (may have some rebound hypertension as a result)  - Continue amlodipine, hydralazine and ramipril with holding parameters    CAD (coronary artery disease)  Mixed hyperlipidemia  Hyperlipidemia  [facility] Dr. Tate. History of PCI with LUDIVINA in 2010 (prox/mid RCA, distal circumflex, prox/mid LAD). Stress test 2015 with 70% stenosis of proximal circumflex, did not have subsequent angiogram for unclear reasons. Negative stress test during hospitalization at [facility] in August 2020. Managed prior to admission with aspirin 81 mg daily, clopidogrel 75 mg daily, atorvastatin 20 mg daily, and beta blocker / ACEi / antihypertensive medications noted above. No evidence of acute coronary syndrome at time of admission, although worsening coronary artery disease could be contributing to development of CHF.  - Continue aspirin, clopidogrel, statin  - See above regarding antihypertensive regimen    BPH (benign prostatic hyperplasia)  Nocturia  Managed prior to admission with tamsulosin 0.4 mg daily, continue.     Obstructive sleep apnea  Apparently does not use CPAP, but has been referred for sleep study.    Severe Obesity  Admit BMI 40.62, contributes to morbidity and mortality.     Concerns for decreased mobility, possible cognitive impairment  Lives with sister in a private home and reports that she cares for him because he gets confused and doesn't understand the health issues sometimes  - PT/OT consult for discharge planning       Diet: Combination Diet Moderate Consistent Carb (60 g CHO per Meal) Diet; Low Saturated Fat Na <2400mg Diet    DVT Prophylaxis: Pneumatic Compression Devices. Cautiously considered lovenox in s/o high risk for DVT with  "acute illness, low mobility, but he has DAPT and platelets in the 60s (chronic). Technically not contraindicated but clinical judgment.  Caruso Catheter: Not present  Central Lines: None  Cardiac Monitoring: ACTIVE order. Indication: Acute decompensated heart failure (48 hours)  Code Status: Full Code      Disposition Plan      Expected Discharge Date: 08/22/2022                The patient's care was discussed with the Bedside Nurse, Care Coordinator/ and Patient.    Paola Flores MD  Hospitalist Service  [facility]  Securely message with the Vocera Web Console (learn more here)  Text page via aWhere Paging/Directory         Clinically Significant Risk Factors Present on Admission             # Hypoalbuminemia: Albumin = 2.8 g/dL (Ref range: 3.4 - 5.0 g/dL) on admission, will monitor as appropriate    # Platelet Defect: home medication list includes an antiplatelet medication  # Hypertension: home medication list includes antihypertensive(s)    # Severe Obesity: Estimated body mass index is 40.62 kg/m  as calculated from the following:    Height as of this encounter: 1.676 m (5' 5.98\").    Weight as of this encounter: 114.1 kg (251 lb 8.7 oz).        ______________________________________________________________    Interval History   He reports feeling well overall but admits to not understanding what's going on, however at the same time he says this he is able to recount what he's told and I hear him telling an RN later what I already told him. Understandably though, his sister takes care of him and he says she understands all of this better. Denies pain, chest pain, difficulty breathing, fevers, chills, nausea, vomiting, diarrhea, constipation.    Data reviewed today: I reviewed all medications, new labs and imaging results over the last 24 hours. I personally reviewed no images or EKG's today.    Physical Exam   Vital Signs: Temp: 97.5  F (36.4  C) Temp src: Oral BP: 129/52 " Pulse: 63   Resp: 18 SpO2: 99 % O2 Device: None (Room air)    Weight: 251 lbs 8.72 oz  Constitutional: awake, alert, cooperative, no apparent distress, and appears stated age  Eyes: Lids and lashes normal, pupils equal, round and reactive to light, extra ocular muscles intact, sclera clear, conjunctiva normal  ENT: Normocephalic, without obvious abnormality, atraumatic  Respiratory: Sounds grossly wheesy and does pause to breathe between sentences but no accessory muscle use, poor air exchange, clear to auscultation bilaterally, no crackles or wheezing but exam may be limited by poor inspiration and expiration  Cardiovascular: Normal apical impulse, regular rate and rhythm, normal S1 and S2, no murmurs  GI: Normal bowel sounds, soft, non-distended, non-tender  Skin: chronic venous stasis changes on both legs with 2+ pitting edema but skin wrinkling suggestive of recent decrease in swelling   Musculoskeletal: There is no redness, warmth, or swelling of the joints. Tone is normal.  Neuro/psych: Awake, alert, oriented, calm and normal eye contact      Intake/Output Summary (Last 24 hours) at 8/21/2022 1139  Last data filed at 8/21/2022 1117  Gross per 24 hour   Intake 360 ml   Output 3200 ml   Net -2840 ml     Vitals:    08/20/22 1128 08/20/22 1604   Weight: 120.2 kg (265 lb) 114.1 kg (251 lb 8.7 oz)       Data   Recent Labs   Lab 08/21/22  0811 08/21/22  0503 08/21/22  0234 08/20/22  1728 08/20/22  1237   WBC  --  2.4*  --   --  2.2*   HGB  --  9.9*  --   --  9.8*   MCV  --  90  --   --  92   PLT  --  69*  --   --  63*   NA  --  144  --   --  144   POTASSIUM  --  3.7  --   --  4.3   CHLORIDE  --  109  --   --  113*   CO2  --  30  --   --  27   BUN  --  17  --   --  16   CR  --  0.92  --   --  0.86   ANIONGAP  --  5  --   --  4   NATALIIA  --  8.3*  --   --  8.2*   GLC 95 112* 85   < > 115*   ALBUMIN  --  2.8*  --   --  2.9*   PROTTOTAL  --  6.3*  --   --  6.2*   BILITOTAL  --  1.1  --   --  1.1   ALKPHOS  --  81  --   --   81   ALT  --  19  --   --  20   AST  --  19  --   --  22    < > = values in this interval not displayed.     Recent Results (from the past 24 hour(s))   XR Chest 2 Views    Narrative    EXAM: XR CHEST 2 VIEWS  LOCATION: [facility]  DATE/TIME: 8/20/2022 1:13 PM    INDICATION: short of breath  COMPARISON: Chest radiograph 05/22/2022      Impression    IMPRESSION: Enlarged cardiac silhouette, similar to prior examination. Moderate right pleural effusion, increased since prior radiograph. Likely superimposed pulmonary vascular congestion and interstitial edema. No pneumothorax. No acute bony   abnormality. Healed right clavicular fracture again noted.     Medications     Continuing ACE inhibitor/ARB/ARNI from home medication list OR ACE inhibitor/ARB order already placed during this visit       BETA BLOCKER NOT PRESCRIBED         amLODIPine  5 mg Oral Daily     aspirin  81 mg Oral Daily     atorvastatin  20 mg Oral Daily     [Held by provider] carvedilol  12.5 mg Oral BID w/meals     clopidogrel  75 mg Oral Daily     ferrous sulfate  325 mg Oral QPM     furosemide  40 mg Intravenous Q12H     hydrALAZINE  50 mg Oral BID     insulin aspart  1-7 Units Subcutaneous TID AC     insulin aspart  1-5 Units Subcutaneous At Bedtime     lisinopril  20 mg Oral Daily     nystatin   Topical BID     sodium chloride (PF)  3 mL Intracatheter Q8H

## 2022-08-21 NOTE — PLAN OF CARE
"Pt alert to person, place, and time. External catheter removed at 2145, assisted with personal hygiene at this time. Pt educated to use call light for assistance with urinal. Pt moved to lift room at 1900 due to possibility of needing lift for assistance. MD aware of pulse rate and held Carvedilol, pt is on tele.     /49   Pulse (!) 49   Temp 97.7  F (36.5  C) (Oral)   Resp 16   Ht 1.676 m (5' 5.98\")   Wt 114.1 kg (251 lb 8.7 oz)   SpO2 95%   BMI 40.62 kg/m      "

## 2022-08-21 NOTE — PROGRESS NOTES
"   08/21/22 1000   Quick Adds   Type of Visit Initial Occupational Therapy Evaluation       Present no   Living Environment   People in Home sibling(s)   Current Living Arrangements house   Home Accessibility stairs within home   Transportation Anticipated family or friend will provide   Living Environment Comments Lives with sister in multi level home.   Self-Care   Usual Activity Tolerance fair   Current Activity Tolerance poor   Equipment Currently Used at Home walker, rolling   Fall history within last six months no   Activity/Exercise/Self-Care Comment Pt is difficult historian. Reports ind-SBA with ADLs, but states his sister gets upset bc he has poor hygiene   Instrumental Activities of Daily Living (IADL)   IADL Comments relies on family   General Information   Onset of Illness/Injury or Date of Surgery 08/20/22   Referring Physician Catia Davis PA-C   Patient/Family Therapy Goal Statement (OT) \"I have to admit I am confused\"   Additional Occupational Profile Info/Pertinent History of Current Problem Flash Brown is a 81 year old male admitted on 8/20/2022. He presented to the emergency department for evaluation of edema, 20 pound weight gain, and dyspnea on exertion and was found to have acute heart failure for which he is being admitted for further evaluation and treatment.   Existing Precautions/Restrictions fall   Limitations/Impairments safety/cognitive   Heart Disease Risk Factors Overweight;Medical history;Gender;Age   General Observations and Info confused   Cognitive Status Examination   Orientation Status person;place   Cognitive Status Comments Appropriate for cognitive testing. Reports \"I am confused about a lot of things\"   Pain Assessment   Patient Currently in Pain No   Integumentary/Edema   Integumentary/Edema Comments 3+ pitting edema BLE, 2+ pitting edema BUE   Posture   Posture forward head position;protracted shoulders   Range of Motion Comprehensive "   General Range of Motion no range of motion deficits identified   Strength Comprehensive (MMT)   Comment, General Manual Muscle Testing (MMT) Assessment generalized weakness noted   Transfers   Transfer Comments currently requires mechanical lift   Activities of Daily Living   BADL Assessment/Intervention grooming;toileting   Grooming Assessment/Training   Position (Grooming) supported sitting   [place] Level (Grooming) set up;supervision;verbal cues   Toileting   Position (Toileting) supported sitting   Assistive Devices (Toileting) urinal   [place] Level (Toileting) dependent (less than 25% patient effort)   Clinical Impression   Criteria for Skilled Therapeutic Interventions Met (OT) Yes, treatment indicated   OT Diagnosis decreased functional ind   Influenced by the following impairments CHF exac, BLE/BUE edema, SOA   OT Problem List-Impairments impacting ADL problems related to;activity tolerance impaired;cognition;strength   Assessment of Occupational Performance 3-5 Performance Deficits   Identified Performance Deficits dressing, toileting, bathing, transfers   Planned Therapy Interventions (OT) ADL retraining;cognition;strengthening;progressive activity/exercise   Clinical Decision Making Complexity (OT) moderate complexity   Anticipated Equipment Needs Upon Discharge (OT)   (TBD)   Risk & Benefits of therapy have been explained evaluation/treatment results reviewed;care plan/treatment goals reviewed;risks/benefits reviewed;current/potential barriers reviewed;participants voiced agreement with care plan;participants included;patient   Clinical Impression Comments Pt will benefit from ongoing OT to maximize safety and ind with ADLs/transfers and for dc planning   OT Discharge Planning   OT Discharge Recommendation (DC Rec) Transitional Care Facility   OT Rationale for DC Rec level of assistance needed, high fall risk, needs 24 hr assistance   OT Brief overview of current status Currently mary jane carson  for transfers, very weak, needs max A for LE dressing, total A for toileting, SBA for g/h in chair.   Total Evaluation Time (Minutes)   Total Evaluation Time (Minutes) 10   OT Goals   Therapy Frequency (OT) Daily   OT Predicted Duration/Target Date for Goal Attainment 08/28/22   OT Goals Hygiene/Grooming;Lower Body Dressing;Toilet Transfer/Toileting;Cognition   OT: Hygiene/Grooming supervision/stand-by assist;while standing   OT: Lower Body Dressing Supervision/stand-by assist   OT: Toilet Transfer/Toileting Supervision/stand-by assist;toilet transfer;cleaning and garment management;using adaptive equipment   OT: Cognitive Patient/caregiver will verbalize understanding of cognitive assessment results/recommendations as needed for safe discharge planning   Psychosocial Support   Trust Relationship/Rapport care explained;questions answered

## 2022-08-21 NOTE — PLAN OF CARE
"  Pt pleasant A&Ox3 but forgetful. Pt slept well- up just a few times. VSS. K+ 3.7, HGB 9.9. Labs WNL besides a few like WBC at 2.4. Pt was encouraged to use urinal throughout night. No new skin issues noted. BLE edema still present.   Blood pressure 104/58, pulse 65, temperature 97.7  F (36.5  C), temperature source Oral, resp. rate 18, height 1.676 m (5' 5.98\"), weight 114.1 kg (251 lb 8.7 oz), SpO2 95 %.    "

## 2022-08-22 ENCOUNTER — TELEPHONE (OUTPATIENT)
Dept: FAMILY MEDICINE | Facility: CLINIC | Age: 82
End: 2022-08-22

## 2022-08-22 ENCOUNTER — APPOINTMENT (OUTPATIENT)
Dept: OCCUPATIONAL THERAPY | Facility: CLINIC | Age: 82
DRG: 291 | End: 2022-08-22
Payer: MEDICARE

## 2022-08-22 ENCOUNTER — APPOINTMENT (OUTPATIENT)
Dept: PHYSICAL THERAPY | Facility: CLINIC | Age: 82
DRG: 291 | End: 2022-08-22
Payer: MEDICARE

## 2022-08-22 LAB
ANION GAP SERPL CALCULATED.3IONS-SCNC: 5 MMOL/L (ref 3–14)
BUN SERPL-MCNC: 20 MG/DL (ref 7–30)
CALCIUM SERPL-MCNC: 8.5 MG/DL (ref 8.5–10.1)
CHLORIDE BLD-SCNC: 107 MMOL/L (ref 94–109)
CO2 SERPL-SCNC: 30 MMOL/L (ref 20–32)
CREAT SERPL-MCNC: 0.97 MG/DL (ref 0.66–1.25)
GFR SERPL CREATININE-BSD FRML MDRD: 79 ML/MIN/1.73M2
GLUCOSE BLD-MCNC: 139 MG/DL (ref 70–99)
GLUCOSE BLDC GLUCOMTR-MCNC: 105 MG/DL (ref 70–99)
GLUCOSE BLDC GLUCOMTR-MCNC: 106 MG/DL (ref 70–99)
GLUCOSE BLDC GLUCOMTR-MCNC: 122 MG/DL (ref 70–99)
GLUCOSE BLDC GLUCOMTR-MCNC: 127 MG/DL (ref 70–99)
GLUCOSE BLDC GLUCOMTR-MCNC: 85 MG/DL (ref 70–99)
HOLD SPECIMEN: NORMAL
LACTATE SERPL-SCNC: 0.8 MMOL/L (ref 0.7–2)
POTASSIUM BLD-SCNC: 3.8 MMOL/L (ref 3.4–5.3)
POTASSIUM BLD-SCNC: 3.8 MMOL/L (ref 3.4–5.3)
SODIUM SERPL-SCNC: 142 MMOL/L (ref 133–144)

## 2022-08-22 PROCEDURE — 83605 ASSAY OF LACTIC ACID: CPT | Performed by: INTERNAL MEDICINE

## 2022-08-22 PROCEDURE — 36415 COLL VENOUS BLD VENIPUNCTURE: CPT | Performed by: INTERNAL MEDICINE

## 2022-08-22 PROCEDURE — 97110 THERAPEUTIC EXERCISES: CPT | Mod: GO

## 2022-08-22 PROCEDURE — 36415 COLL VENOUS BLD VENIPUNCTURE: CPT | Performed by: FAMILY MEDICINE

## 2022-08-22 PROCEDURE — 80048 BASIC METABOLIC PNL TOTAL CA: CPT | Performed by: FAMILY MEDICINE

## 2022-08-22 PROCEDURE — 84132 ASSAY OF SERUM POTASSIUM: CPT | Performed by: FAMILY MEDICINE

## 2022-08-22 PROCEDURE — 99233 SBSQ HOSP IP/OBS HIGH 50: CPT | Performed by: INTERNAL MEDICINE

## 2022-08-22 PROCEDURE — 97161 PT EVAL LOW COMPLEX 20 MIN: CPT | Mod: GP

## 2022-08-22 PROCEDURE — 250N000013 HC RX MED GY IP 250 OP 250 PS 637: Performed by: PHYSICIAN ASSISTANT

## 2022-08-22 PROCEDURE — 120N000001 HC R&B MED SURG/OB

## 2022-08-22 PROCEDURE — 97530 THERAPEUTIC ACTIVITIES: CPT | Mod: GP

## 2022-08-22 PROCEDURE — 250N000013 HC RX MED GY IP 250 OP 250 PS 637: Performed by: INTERNAL MEDICINE

## 2022-08-22 PROCEDURE — 250N000011 HC RX IP 250 OP 636: Performed by: FAMILY MEDICINE

## 2022-08-22 PROCEDURE — 250N000011 HC RX IP 250 OP 636: Performed by: INTERNAL MEDICINE

## 2022-08-22 RX ORDER — HYDROXYZINE HYDROCHLORIDE 25 MG/1
25 TABLET, FILM COATED ORAL EVERY 6 HOURS PRN
Status: DISCONTINUED | OUTPATIENT
Start: 2022-08-22 | End: 2022-08-23

## 2022-08-22 RX ORDER — FUROSEMIDE 10 MG/ML
40 INJECTION INTRAMUSCULAR; INTRAVENOUS EVERY 8 HOURS
Status: DISCONTINUED | OUTPATIENT
Start: 2022-08-22 | End: 2022-08-30

## 2022-08-22 RX ORDER — HYDROXYZINE HYDROCHLORIDE 50 MG/1
50 TABLET, FILM COATED ORAL EVERY 6 HOURS PRN
Status: DISCONTINUED | OUTPATIENT
Start: 2022-08-22 | End: 2022-08-23

## 2022-08-22 RX ADMIN — HYDRALAZINE HYDROCHLORIDE 50 MG: 25 TABLET, FILM COATED ORAL at 07:58

## 2022-08-22 RX ADMIN — FUROSEMIDE 40 MG: 10 INJECTION, SOLUTION INTRAMUSCULAR; INTRAVENOUS at 07:57

## 2022-08-22 RX ADMIN — FUROSEMIDE 40 MG: 10 INJECTION, SOLUTION INTRAMUSCULAR; INTRAVENOUS at 16:11

## 2022-08-22 RX ADMIN — ATORVASTATIN CALCIUM 20 MG: 20 TABLET, FILM COATED ORAL at 07:57

## 2022-08-22 RX ADMIN — HYDROXYZINE HYDROCHLORIDE 25 MG: 25 TABLET, FILM COATED ORAL at 04:00

## 2022-08-22 RX ADMIN — ASPIRIN 81 MG: 81 TABLET ORAL at 07:57

## 2022-08-22 RX ADMIN — NYSTATIN: 100000 CREAM TOPICAL at 20:07

## 2022-08-22 RX ADMIN — FERROUS SULFATE TAB 325 MG (65 MG ELEMENTAL FE) 325 MG: 325 (65 FE) TAB at 17:39

## 2022-08-22 RX ADMIN — HYDRALAZINE HYDROCHLORIDE 50 MG: 25 TABLET, FILM COATED ORAL at 20:03

## 2022-08-22 RX ADMIN — NYSTATIN: 100000 CREAM TOPICAL at 07:58

## 2022-08-22 RX ADMIN — CLOPIDOGREL BISULFATE 75 MG: 75 TABLET ORAL at 07:57

## 2022-08-22 RX ADMIN — AMLODIPINE BESYLATE 5 MG: 5 TABLET ORAL at 07:58

## 2022-08-22 RX ADMIN — LISINOPRIL 20 MG: 20 TABLET ORAL at 07:58

## 2022-08-22 ASSESSMENT — ACTIVITIES OF DAILY LIVING (ADL)
ADLS_ACUITY_SCORE: 33
ADLS_ACUITY_SCORE: 33
ADLS_ACUITY_SCORE: 34
ADLS_ACUITY_SCORE: 33
ADLS_ACUITY_SCORE: 34
ADLS_ACUITY_SCORE: 33
ADLS_ACUITY_SCORE: 34
ADLS_ACUITY_SCORE: 33
ADLS_ACUITY_SCORE: 33
ADLS_ACUITY_SCORE: 34
ADLS_ACUITY_SCORE: 34
ADLS_ACUITY_SCORE: 33

## 2022-08-22 NOTE — TELEPHONE ENCOUNTER
Sister, Rea helton, says Flash was taken to ER on Saturday.  Is now admitted at [place].    Needs to cancel the appt on Tuesday.   She wonders if Dr. Jackson needs to reach out to the hospital providers regarding patient's care.    I advised I'd route this Dr. Jackson as FYI.   Advised the hospital provider can certainly reach out to Dr. Jackson if they have any concerns, also advised she schedule Flash for follow up once he is discharged.    Kristin Calderon RN  [facility]

## 2022-08-22 NOTE — PROGRESS NOTES
08/22/22 0830   Quick Adds   Type of Visit Initial PT Evaluation   Living Environment   People in Home sibling(s)   Current Living Arrangements house   Home Accessibility stairs to enter home;stairs within home   Number of Stairs, Main Entrance 3   Stair Railings, Main Entrance railings safe and in good condition   Number of Stairs, Within Home, Primary eight   Stair Railings, Within Home, Primary railings safe and in good condition   Living Environment Comments Lives with sister and brother in law   Self-Care   Usual Activity Tolerance moderate   Current Activity Tolerance fair   Equipment Currently Used at Home walker, rolling   Fall history within last six months no   Activity/Exercise/Self-Care Comment Indep with ADL's, shares IADL's with sister. Sister sets up medications and pt takes.   General Information   Onset of Illness/Injury or Date of Surgery 08/20/22   Referring Physician Paola Flores MD   Patient/Family Therapy Goals Statement (PT) Pt reports goal is to return home   Pertinent History of Current Problem (include personal factors and/or comorbidities that impact the POC) Flash Brown is a 81 year old male admitted on 8/20/2022. He presented to the emergency department for evaluation of edema, 20 pound weight gain, and dyspnea on exertion and was found to have acute heart failure for which he is being admitted for further evaluation and treatment.   Cognition   Affect/Mental Status (Cognition) WFL   Orientation Status (Cognition) oriented x 3   Follows Commands (Cognition) WFL   Pain Assessment   Patient Currently in Pain No   Range of Motion (ROM)   Range of Motion ROM is WFL   Strength (Manual Muscle Testing)   Strength (Manual Muscle Testing) Deficits observed during functional mobility   Strength Comments LE weakness noted as pt required assistance to stand   Bed Mobility   Comment, (Bed Mobility) sitting up in chair for evaluation   Transfers   Comment, (Transfers) sit>stand from  recliner with modA and 4WW   Balance   Balance Comments Balance deficits due to weakness   Clinical Impression   Criteria for Skilled Therapeutic Intervention Yes, treatment indicated   PT Diagnosis (PT) impaired functional mobility   Influenced by the following impairments LE weakness, reduced activity tolerance   Functional limitations due to impairments impaired transfers, ambulation   Clinical Presentation (PT Evaluation Complexity) Stable/Uncomplicated   Clinical Presentation Rationale clinical reasoning and chart review   Clinical Decision Making (Complexity) low complexity   Planned Therapy Interventions (PT) balance training;bed mobility training;gait training;home exercise program;patient/family education;stair training;strengthening;transfer training   Anticipated Equipment Needs at Discharge (PT)   (TBD at TCU)   Risk & Benefits of therapy have been explained evaluation/treatment results reviewed;care plan/treatment goals reviewed;risks/benefits reviewed;current/potential barriers reviewed;participants voiced agreement with care plan;participants included;patient   PT Discharge Planning   PT Discharge Recommendation (DC Rec) Transitional Care Facility   PT Rationale for DC Rec Below baseline mobility level as required modA to stand, unable to ambulate   PT Brief overview of current status modA for sit>stand, standing tolerance x30 seconds   Total Evaluation Time   Total Evaluation Time (Minutes) 10   Physical Therapy Goals   PT Frequency 5x/week   PT Predicted Duration/Target Date for Goal Attainment 08/27/22   PT Goals Bed Mobility;Transfers;Gait   PT: Bed Mobility Moderate assist;Supine to/from sit   PT: Transfers Minimal assist;Bed to/from chair   PT: Gait Minimal assist;Rolling walker;10 feet

## 2022-08-22 NOTE — PLAN OF CARE
"  Pt A&O x3- just confused as why he is here. Confusion fluctuates.  Tried explaining multiple times. Pt slept well until around 0330 when he wanted to get out of bed/stand and was having anxiety. PRN Atarax obtained and given at 0400.  BLE elevated when sleeping, pitting edema 2-3. External cath on with good output- does leak- new one put one. Tele on. Pt is a lift- wanting to walk and work with pt to get to baseline. Pt wanting to discharge soon/today and agreeable to TCU. AM Labs all WNL, K+ 3.8    Blood pressure 104/52, pulse 72, temperature 97.8  F (36.6  C), temperature source Oral, resp. rate 18, height 1.676 m (5' 5.98\"), weight 115 kg (253 lb 8.5 oz), SpO2 92 %.      "

## 2022-08-22 NOTE — PROGRESS NOTES
"[facility]    Hospitalist Progress Note    Date of Service (when I saw the patient): 08/22/2022    Assessment & Plan   Flash Brown is a 81 year old male admitted on 8/20/2022. He presented to the emergency department for evaluation of edema, 20 pound weight gain, and dyspnea on exertion and was found to have acute heart failure for which he is being admitted for further evaluation and treatment.     Acute congestive heart failure / (HFpEF) heart failure with preserved ejection fraction - new diagnosis  Anasarca  Presented with 2-3 month history of worsening edema, 20 pound weight gain, and dyspnea on exertion. No improvement with a trial of furosemide 40 mg daily x 5 days from PCP.   Had outpatient echo on 8/12/22 demonstrating \"global and regional left ventricular function is normal with an EF of 60-65%. Global right ventricular function is normal. IVC diameter and respiratory changes fall into an intermediate range suggesting an RA pressure of 8 mmHg. No pericardial effusion is present.\"   Admit chest x-ray demonstrates \"enlarged cardiac silhouette, similar to prior examination. Moderate right pleural effusion, increased since prior radiograph. Likely superimposed pulmonary vascular congestion and interstitial edema. No pneumothorax...\"   EKG shows bradycardia which is possibly sinus with baseline artifact vs flutter/fibrillation (bradycardia not new). Admit BNP 1830. Troponin normal (10). Appears edematous with anasarca, has expiratory wheezing on exam but no crackles. No hypoxia at time of admission. Overall clinical picture consistent with acute CHF, which is a new diagnosis for patient. Unclear cause, may be driven by persisting bradycardia or other undiagnosed arrhythmia, worsening coronary artery disease (but no evidence of acute coronary syndrome), or attributed to his use of pioglitazone (which is known to cause CHF, but is not a new medication for patient).   Was given IV " "furosemide 60 mg with good urine output.   - Furosemide 40 mg q 12 hours on admission  - Due to severe volume overload, increase to q 8 hr on 8/22/22  - daily BMPs and K repletion protocol while diuresing  - No repeat echo indicated, was just completed on 8/12/22  - Daily weights  - Strict I&Os  - Stop pioglitazone  - Continue prior to admission ACE and hydralazine; see below regarding beta blocker use  - 1.5 L/day fluid restriction     Sinus bradycardia vs flutter/fibrillation  Admit EKG shows bradycardia, rate as low as 35 bpm. Rhythm possibly new fib/flutter, but could be sinus with baseline artifact. Apparently his heart rate has been low \"for a long time.\" Blood pressure is stable, patient is asymptomatic. PTA has carvedilol 12.5 mg bid.   - Monitor on telemetry - NSR as of 8/21, can discontinue 8/22 evening if remains wnl  - Holding carvedilol; if resumed, would likely need lower dose  - No bradycardia overnight into 8/22/22     Pancytopenia, chronic  Admit WBC 2.2 (transiently low at baseline between 2.7 - 5.0), hemoglobin 9.8 (baseline between 11 - 13), platelets 63 (baseline 64 - 93). Occurs in the setting of prior history of lymphoma (see below). Pancytopenia is followed by oncology.  - CBC stable on recheck, discontinue trend unless clinically indicated  - See below regarding lymphoma management     Type 2 diabetes mellitus without complication, without long-term current use of insulin  Recent HgbA1c 5.5. Managed prior to admission with pioglitazone 30 mg daily.   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 Follows with oncology Dr. Avendaño. Had a bone " marrow biopsy in Feb 2022, which was normal.  - Continue with outpatient oncology surveillance     Essential hypertension, benign  Blood pressure normal / slightly elevated on admission. Managed prior to admission with amlodipine 5 mg daily, carvedilol 12.5 mg bid, clonidine 0.1 mg bid, hydralazine 50 mg bid, and ramipril 10 mg daily.   - See above regarding carvedilol / bradycardia  - Hold clonidine (may have some rebound hypertension as a result)  - Continue amlodipine, hydralazine and ramipril with holding parameters     CAD (coronary artery disease)  Mixed hyperlipidemia  Hyperlipidemia  Follows with [facility] Dr. Tate. History of PCI with LUDIVINA in 2010 (prox/mid RCA, distal circumflex, prox/mid LAD). Stress test 2015 with 70% stenosis of proximal circumflex, did not have subsequent angiogram for unclear reasons. Negative stress test during hospitalization at [facility] in August 2020. Managed prior to admission with aspirin 81 mg daily, clopidogrel 75 mg daily, atorvastatin 20 mg daily, and beta blocker / ACEi / antihypertensive medications noted above. No evidence of acute coronary syndrome at time of admission, although worsening coronary artery disease could be contributing to development of CHF.  - Continue aspirin, clopidogrel, statin  - See above regarding antihypertensive regimen     BPH (benign prostatic hyperplasia)  Nocturia  Managed prior to admission with tamsulosin 0.4 mg daily, continue.      Obstructive sleep apnea  Apparently does not use CPAP, but has been referred for sleep study.     Severe Obesity  Admit BMI 40.62, contributes to morbidity and mortality.      Concerns for decreased mobility, possible cognitive impairment  Lives with sister in a private home and reports that she cares for him because he gets confused and doesn't understand the health issues sometimes  - PT/OT consult for discharge planning    Diet: Combination Diet Moderate Consistent Carb (60 g CHO per Meal) Diet; Low  Saturated Fat Na <2400mg Diet    DVT Prophylaxis: Pneumatic Compression Devices. Cautiously considered lovenox in s/o high risk for DVT with acute illness, low mobility, but he has DAPT and platelets in the 60s (chronic). Technically not contraindicated but clinical judgment.  Caruso Catheter: Not present  Central Lines: None  Cardiac Monitoring: ACTIVE order. Indication: Acute decompensated heart failure (48 hours)  Code Status: Full Code      Disposition: Expected discharge in 2-3 days following diuresis.    Minor Santacruz MD    Interval History   The patient has no acute complaints, but remains short of breath on exertion.  He wants to be discharged today, but understands he needs further diuresis.    -Data reviewed today: I reviewed all new labs and imaging results over the last 24 hours. I personally reviewed no images or EKG's today.    Physical Exam   Temp: 98.5  F (36.9  C) Temp src: Oral BP: 103/49 Pulse: 70   Resp: 19 SpO2: 96 % O2 Device: None (Room air)    Vitals:    08/20/22 1604 08/21/22 1200 08/22/22 0645   Weight: 114.1 kg (251 lb 8.7 oz) 115 kg (253 lb 8.5 oz) 112.5 kg (248 lb 0.3 oz)     Vital Signs with Ranges  Temp:  [97  F (36.1  C)-98.5  F (36.9  C)] 98.5  F (36.9  C)  Pulse:  [62-85] 70  Resp:  [16-20] 19  BP: ()/(43-65) 103/49  SpO2:  [92 %-98 %] 96 %  I/O last 3 completed shifts:  In: 1000 [P.O.:1000]  Out: 3600 [Urine:3600]    Gen: Morbidly obese male, alert and oriented x 3, no acute distressed, somnolent  HEENT: Atraumatic, normocephalic; sclera non-injected, anicterric; oral mucosa moist, no lesion, no exudate  Lungs: Clear to ausculation, no wheezes, no rhonchi, no rales  Heart: Regular rate, regular rhythm, no gallops, no rubs, no murmurs  GI: Bowel sound normal, no hepatosplenomegaly, no masses, non-tender, non-distended, no guarding, no rebound tenderness  Lymph: No lymphadenopathy, 3 + edema to hips  Skin: No rashes, no chronic venous stasis     Medications     Continuing  ACE inhibitor/ARB/ARNI from home medication list OR ACE inhibitor/ARB order already placed during this visit       BETA BLOCKER NOT PRESCRIBED         amLODIPine  5 mg Oral Daily     aspirin  81 mg Oral Daily     atorvastatin  20 mg Oral Daily     [Held by provider] carvedilol  12.5 mg Oral BID w/meals     clopidogrel  75 mg Oral Daily     ferrous sulfate  325 mg Oral QPM     furosemide  40 mg Intravenous Q8H     hydrALAZINE  50 mg Oral BID     insulin aspart  1-7 Units Subcutaneous TID AC     insulin aspart  1-5 Units Subcutaneous At Bedtime     lisinopril  20 mg Oral Daily     nystatin   Topical BID     sodium chloride (PF)  3 mL Intracatheter Q8H       Data   Recent Labs   Lab 08/22/22  1140 08/22/22  0749 08/22/22  0400 08/22/22  0219 08/21/22  2216 08/21/22  0811 08/21/22  0503 08/20/22  1728 08/20/22  1237   WBC  --   --   --   --   --   --  2.4*  --  2.2*   HGB  --   --   --   --   --   --  9.9*  --  9.8*   MCV  --   --   --   --   --   --  90  --  92   PLT  --   --   --   --   --   --  69*  --  63*   NA  --   --  142  --   --   --  144  --  144   POTASSIUM  --   --  3.8  3.8  --  3.4  --  3.7  --  4.3   CHLORIDE  --   --  107  --   --   --  109  --  113*   CO2  --   --  30  --   --   --  30  --  27   BUN  --   --  20  --   --   --  17  --  16   CR  --   --  0.97  --   --   --  0.92  --  0.86   ANIONGAP  --   --  5  --   --   --  5  --  4   NATALIIA  --   --  8.5  --   --   --  8.3*  --  8.2*   * 105* 139*   < >  --    < > 112*   < > 115*   ALBUMIN  --   --   --   --   --   --  2.8*  --  2.9*   PROTTOTAL  --   --   --   --   --   --  6.3*  --  6.2*   BILITOTAL  --   --   --   --   --   --  1.1  --  1.1   ALKPHOS  --   --   --   --   --   --  81  --  81   ALT  --   --   --   --   --   --  19  --  20   AST  --   --   --   --   --   --  19  --  22    < > = values in this interval not displayed.       No results found for this or any previous visit (from the past 24 hour(s)).

## 2022-08-22 NOTE — PROGRESS NOTES
Provider Sher Jackson notified that Pt was having anxiety and becoming agitated, stating he was a prisoner in this bed etc. Unable to redirect. Reassurance given but not successful. New order for Atarax obtained and given, see Mar

## 2022-08-22 NOTE — PROGRESS NOTES
"Patient alert and oriented x 4, however stated multiple times this shift that he feels confused. Ceiling lift for transfers, incontinent of bladder.     External catheter placed for strict I/O. Patient still had some unmeasured output. Output greater than 1,000 mL this shift, Potassium labs ordered. Potassium 3.4, replacement and labs ordered.     Oxygen saturation mid 90's on RA. Wheezes noted throughout all lung fields. SOB with exertion.    Blood glucose 103 at supper and 113 at HS. No Novolog coverage needed.     VSS.    /49 (BP Location: Left arm)   Pulse 62   Temp 97.9  F (36.6  C) (Oral)   Resp 16   Ht 1.676 m (5' 5.98\")   Wt 115 kg (253 lb 8.5 oz)   SpO2 94%   BMI 40.94 kg/m      Isatu Varela RN on 8/21/2022 at 10:35 PM    "

## 2022-08-22 NOTE — PLAN OF CARE
Patient sat up in chair most of the day. Used ceiling lift to move him due to weakness and shortness of breath. Anxiety causes him to become short of breath at times. External cath in place. 1500 ml fluid restriction ordered today. Sister and patient asking questions about health. Do not seem to have a good grasp of medical management of heart failure. Given handouts to educate them. Pleasant and cooperative. Denies discomfort. Vitals are stable and he is on room air.

## 2022-08-23 ENCOUNTER — APPOINTMENT (OUTPATIENT)
Dept: CT IMAGING | Facility: CLINIC | Age: 82
DRG: 291 | End: 2022-08-23
Attending: INTERNAL MEDICINE
Payer: MEDICARE

## 2022-08-23 ENCOUNTER — APPOINTMENT (OUTPATIENT)
Dept: GENERAL RADIOLOGY | Facility: CLINIC | Age: 82
DRG: 291 | End: 2022-08-23
Attending: INTERNAL MEDICINE
Payer: MEDICARE

## 2022-08-23 LAB
ALBUMIN UR-MCNC: NEGATIVE MG/DL
ANION GAP SERPL CALCULATED.3IONS-SCNC: 6 MMOL/L (ref 3–14)
APPEARANCE UR: CLEAR
BASE EXCESS BLDV CALC-SCNC: 4.9 MMOL/L (ref -7.7–1.9)
BILIRUB UR QL STRIP: NEGATIVE
BUN SERPL-MCNC: 20 MG/DL (ref 7–30)
CALCIUM SERPL-MCNC: 8.7 MG/DL (ref 8.5–10.1)
CHLORIDE BLD-SCNC: 106 MMOL/L (ref 94–109)
CO2 SERPL-SCNC: 29 MMOL/L (ref 20–32)
COLOR UR AUTO: YELLOW
CREAT SERPL-MCNC: 0.88 MG/DL (ref 0.66–1.25)
ERYTHROCYTE [DISTWIDTH] IN BLOOD BY AUTOMATED COUNT: 17.5 % (ref 10–15)
GFR SERPL CREATININE-BSD FRML MDRD: 87 ML/MIN/1.73M2
GLUCOSE BLD-MCNC: 112 MG/DL (ref 70–99)
GLUCOSE BLDC GLUCOMTR-MCNC: 107 MG/DL (ref 70–99)
GLUCOSE BLDC GLUCOMTR-MCNC: 108 MG/DL (ref 70–99)
GLUCOSE BLDC GLUCOMTR-MCNC: 140 MG/DL (ref 70–99)
GLUCOSE BLDC GLUCOMTR-MCNC: 148 MG/DL (ref 70–99)
GLUCOSE BLDC GLUCOMTR-MCNC: 161 MG/DL (ref 70–99)
GLUCOSE UR STRIP-MCNC: NEGATIVE MG/DL
HCO3 BLDV-SCNC: 29 MMOL/L (ref 21–28)
HCT VFR BLD AUTO: 35.6 % (ref 40–53)
HGB BLD-MCNC: 11.5 G/DL (ref 13.3–17.7)
HGB UR QL STRIP: ABNORMAL
HOLD SPECIMEN: NORMAL
HOLD SPECIMEN: NORMAL
KETONES UR STRIP-MCNC: 40 MG/DL
LEUKOCYTE ESTERASE UR QL STRIP: NEGATIVE
MCH RBC QN AUTO: 28.7 PG (ref 26.5–33)
MCHC RBC AUTO-ENTMCNC: 32.3 G/DL (ref 31.5–36.5)
MCV RBC AUTO: 89 FL (ref 78–100)
MUCOUS THREADS #/AREA URNS LPF: PRESENT /LPF
NITRATE UR QL: NEGATIVE
O2/TOTAL GAS SETTING VFR VENT: 0 %
PCO2 BLDV: 39 MM HG (ref 40–50)
PH BLDV: 7.48 [PH] (ref 7.32–7.43)
PH UR STRIP: 6 [PH] (ref 5–7)
PLATELET # BLD AUTO: 81 10E3/UL (ref 150–450)
PO2 BLDV: 48 MM HG (ref 25–47)
POTASSIUM BLD-SCNC: 3.5 MMOL/L (ref 3.4–5.3)
RBC # BLD AUTO: 4.01 10E6/UL (ref 4.4–5.9)
RBC URINE: 2 /HPF
SODIUM SERPL-SCNC: 141 MMOL/L (ref 133–144)
SP GR UR STRIP: 1.01 (ref 1–1.03)
SQUAMOUS EPITHELIAL: <1 /HPF
UROBILINOGEN UR STRIP-MCNC: NORMAL MG/DL
WBC # BLD AUTO: 5 10E3/UL (ref 4–11)
WBC URINE: <1 /HPF

## 2022-08-23 PROCEDURE — G1010 CDSM STANSON: HCPCS

## 2022-08-23 PROCEDURE — 250N000011 HC RX IP 250 OP 636: Performed by: INTERNAL MEDICINE

## 2022-08-23 PROCEDURE — 85027 COMPLETE CBC AUTOMATED: CPT | Performed by: INTERNAL MEDICINE

## 2022-08-23 PROCEDURE — 81001 URINALYSIS AUTO W/SCOPE: CPT | Performed by: INTERNAL MEDICINE

## 2022-08-23 PROCEDURE — 250N000013 HC RX MED GY IP 250 OP 250 PS 637: Performed by: PHYSICIAN ASSISTANT

## 2022-08-23 PROCEDURE — 36415 COLL VENOUS BLD VENIPUNCTURE: CPT | Performed by: INTERNAL MEDICINE

## 2022-08-23 PROCEDURE — 80048 BASIC METABOLIC PNL TOTAL CA: CPT | Performed by: FAMILY MEDICINE

## 2022-08-23 PROCEDURE — 36415 COLL VENOUS BLD VENIPUNCTURE: CPT | Performed by: FAMILY MEDICINE

## 2022-08-23 PROCEDURE — 82803 BLOOD GASES ANY COMBINATION: CPT | Performed by: INTERNAL MEDICINE

## 2022-08-23 PROCEDURE — 99233 SBSQ HOSP IP/OBS HIGH 50: CPT | Performed by: INTERNAL MEDICINE

## 2022-08-23 PROCEDURE — 120N000001 HC R&B MED SURG/OB

## 2022-08-23 PROCEDURE — 71045 X-RAY EXAM CHEST 1 VIEW: CPT

## 2022-08-23 RX ADMIN — FUROSEMIDE 40 MG: 10 INJECTION, SOLUTION INTRAMUSCULAR; INTRAVENOUS at 23:59

## 2022-08-23 RX ADMIN — HYDRALAZINE HYDROCHLORIDE 50 MG: 25 TABLET, FILM COATED ORAL at 07:33

## 2022-08-23 RX ADMIN — FUROSEMIDE 40 MG: 10 INJECTION, SOLUTION INTRAMUSCULAR; INTRAVENOUS at 00:03

## 2022-08-23 RX ADMIN — FUROSEMIDE 40 MG: 10 INJECTION, SOLUTION INTRAMUSCULAR; INTRAVENOUS at 07:33

## 2022-08-23 RX ADMIN — AMLODIPINE BESYLATE 5 MG: 5 TABLET ORAL at 07:33

## 2022-08-23 RX ADMIN — ATORVASTATIN CALCIUM 20 MG: 20 TABLET, FILM COATED ORAL at 07:33

## 2022-08-23 RX ADMIN — FERROUS SULFATE TAB 325 MG (65 MG ELEMENTAL FE) 325 MG: 325 (65 FE) TAB at 18:21

## 2022-08-23 RX ADMIN — NYSTATIN: 100000 CREAM TOPICAL at 21:56

## 2022-08-23 RX ADMIN — LISINOPRIL 20 MG: 20 TABLET ORAL at 07:33

## 2022-08-23 RX ADMIN — CLOPIDOGREL BISULFATE 75 MG: 75 TABLET ORAL at 07:33

## 2022-08-23 RX ADMIN — ASPIRIN 81 MG: 81 TABLET ORAL at 07:33

## 2022-08-23 RX ADMIN — NYSTATIN: 100000 CREAM TOPICAL at 07:35

## 2022-08-23 RX ADMIN — HYDRALAZINE HYDROCHLORIDE 50 MG: 25 TABLET, FILM COATED ORAL at 19:53

## 2022-08-23 RX ADMIN — FUROSEMIDE 40 MG: 10 INJECTION, SOLUTION INTRAMUSCULAR; INTRAVENOUS at 18:04

## 2022-08-23 ASSESSMENT — ACTIVITIES OF DAILY LIVING (ADL)
ADLS_ACUITY_SCORE: 34
ADLS_ACUITY_SCORE: 34
ADLS_ACUITY_SCORE: 36
ADLS_ACUITY_SCORE: 34
ADLS_ACUITY_SCORE: 40
ADLS_ACUITY_SCORE: 36
ADLS_ACUITY_SCORE: 40
ADLS_ACUITY_SCORE: 36

## 2022-08-23 NOTE — PROGRESS NOTES
"CLINICAL NUTRITION SERVICES    Reason for Assessment:  Low-sodium (2 g/day) nutrition education    Diet History:  Pt reports no history of receiving low-sodium nutrition education in the past.    Nutrition Diagnosis:  Food- and nutrition-related knowledge deficit r/t no previous knowledge of low-sodium diet AEB pt report of no previous formal low-sodium nutrition education.    Nutrition Prescription/Recs:  Continue low-sodium diet and 1,500 mL fluid restriction    Interventions:  Nutrition Education  1. Provided verbal instruction on low-sodium diet and fluid restriction:      2. Provided the following handout via discharge instructions (RD covering remotely and called pt's room): \"Low Salt Diet\" and \"Discharge Instructions: Limiting Fluids\"    Goals:    Pt will verbalize understanding by offering no further questions or concerns after discussion.     Follow-up:   Pt may direct any questions or concerns to on-site RD per contact information request. Pt to ask any further nutrition-related questions before discharge. In addition, pt may request outpatient RD appointment.    China Valentin RD  Clinical Dietitian  Office: [phone]  Weekend pager: [phone]  "

## 2022-08-23 NOTE — PLAN OF CARE
"/68 (BP Location: Right arm)   Pulse 79   Temp 97.9  F (36.6  C) (Oral)   Resp 20   Ht 1.676 m (5' 5.98\")   Wt 112 kg (246 lb 14.6 oz)   SpO2 94%   BMI 39.87 kg/m     Pt denies SOA at rest, does have some GORMAN. Exp wheezes bilat. BLE edema much improved, just mild edema noted currently. UO past 12 hrs = 1200ml.                      "

## 2022-08-23 NOTE — PLAN OF CARE
Patient has been alert. Needs reminders at times. External catheter draining clear yellow urine for accurate intake. Good appetite. Denies discomfort. Legs have a wrinkly appearance as edema decreasing. Remains weak and using ceiling lift to transfer him. Vitals are stable. Does become audibly wheezy during any exertion of during episodes of anxiety.

## 2022-08-23 NOTE — PROGRESS NOTES
"[facility]    Hospitalist Progress Note    Date of Service (when I saw the patient): 08/23/2022    Assessment & Plan   Flash Brown is a 81 year old male admitted on 8/20/2022. He presented to the emergency department for evaluation of edema, 20 pound weight gain, and dyspnea on exertion and was found to have acute heart failure for which he is being admitted for further evaluation and treatment.     Acute congestive heart failure / (HFpEF) heart failure with preserved ejection fraction - new diagnosis  Anasarca  Presented with 2-3 month history of worsening edema, 20 pound weight gain, and dyspnea on exertion. No improvement with a trial of furosemide 40 mg daily x 5 days from PCP.   Had outpatient echo on 8/12/22 demonstrating \"global and regional left ventricular function is normal with an EF of 60-65%. Global right ventricular function is normal. IVC diameter and respiratory changes fall into an intermediate range suggesting an RA pressure of 8 mmHg. No pericardial effusion is present.\"   Admit chest x-ray demonstrates \"enlarged cardiac silhouette, similar to prior examination. Moderate right pleural effusion, increased since prior radiograph. Likely superimposed pulmonary vascular congestion and interstitial edema. No pneumothorax...\"   EKG shows bradycardia which is possibly sinus with baseline artifact vs flutter/fibrillation (bradycardia not new). Admit BNP 1830. Troponin normal (10). Appears edematous with anasarca, has expiratory wheezing on exam but no crackles. No hypoxia at time of admission. Overall clinical picture consistent with acute CHF, which is a new diagnosis for patient. Unclear cause, may be driven by persisting bradycardia or other undiagnosed arrhythmia, worsening coronary artery disease (but no evidence of acute coronary syndrome), or attributed to his use of pioglitazone (which is known to cause CHF, but is not a new medication for patient).   Was given IV " "furosemide 60 mg with good urine output.   - Furosemide 40 mg q 12 hours on admission  - Due to severe volume overload, increase to q 8 hr on 8/22/22  - daily BMPs and K repletion protocol while diuresing  - No repeat echo indicated, was just completed on 8/12/22  - Daily weights  - Strict I&Os  - Stop pioglitazone  - Continue prior to admission ACE and hydralazine; see below regarding beta blocker use  - 1.5 L/day fluid restriction  - Patient continues excellent diuresis, negative 3.6 L 8/22, but wt down only 0.5 kg    Altered mental status  Patient noted to be more somnolent this morning, and continues to be somnolent this afternoon.  BMP normal this AM.  Will check CBC, VBG, and CXR.  Consider head CT if does not clear and evaluation otherwise unremarkable.     Sinus bradycardia vs flutter/fibrillation  Admit EKG shows bradycardia, rate as low as 35 bpm. Rhythm possibly new fib/flutter, but could be sinus with baseline artifact. Apparently his heart rate has been low \"for a long time.\" Blood pressure is stable, patient is asymptomatic. PTA has carvedilol 12.5 mg bid.   - Monitor on telemetry - NSR as of 8/21, can discontinue 8/22 evening if remains wnl  - Holding carvedilol; if resumed, would likely need lower dose  - No bradycardia overnight into 8/22/22     Pancytopenia, chronic  Admit WBC 2.2 (transiently low at baseline between 2.7 - 5.0), hemoglobin 9.8 (baseline between 11 - 13), platelets 63 (baseline 64 - 93). Occurs in the setting of prior history of lymphoma (see below). Pancytopenia is followed by oncology.  - CBC stable on recheck, discontinue trend unless clinically indicated  - See below regarding lymphoma management     Type 2 diabetes mellitus without complication, without long-term current use of insulin  Recent HgbA1c 5.5. Managed prior to admission with pioglitazone 30 mg daily.   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 Follows with oncology Dr. Avendaño. Had a bone marrow biopsy in Feb 2022, which was normal.  - Continue with outpatient oncology surveillance     Essential hypertension, benign  Blood pressure normal / slightly elevated on admission. Managed prior to admission with amlodipine 5 mg daily, carvedilol 12.5 mg bid, clonidine 0.1 mg bid, hydralazine 50 mg bid, and ramipril 10 mg daily.   - See above regarding carvedilol / bradycardia  - Hold clonidine (may have some rebound hypertension as a result)  - Continue amlodipine, hydralazine and ramipril with holding parameters     CAD (coronary artery disease)  Mixed hyperlipidemia  Hyperlipidemia  Follows with [facility]iology Dr. Tate. History of PCI with LUDIVINA in 2010 (prox/mid RCA, distal circumflex, prox/mid LAD). Stress test 2015 with 70% stenosis of proximal circumflex, did not have subsequent angiogram for unclear reasons. Negative stress test during hospitalization at [facility] in August 2020. Managed prior to admission with aspirin 81 mg daily, clopidogrel 75 mg daily, atorvastatin 20 mg daily, and beta blocker / ACEi / antihypertensive medications noted above. No evidence of acute coronary syndrome at time of admission, although worsening coronary artery disease could be contributing to development of CHF.  - Continue aspirin, clopidogrel, statin  - See above regarding antihypertensive regimen     BPH (benign prostatic hyperplasia)  Nocturia  Managed prior to admission with tamsulosin 0.4 mg daily, continue.      Obstructive sleep apnea  Apparently does not use CPAP, but has been referred for sleep study.     Severe Obesity  Admit BMI 40.62, contributes to morbidity and mortality.       Concerns for decreased mobility, possible cognitive impairment  Lives with sister in a private home and reports that she cares for him because he gets confused and doesn't understand the health issues sometimes  - PT/OT consult for discharge planning    Diet: Combination Diet Moderate Consistent Carb (60 g CHO per Meal) Diet; Low Saturated Fat Na <2400mg Diet    DVT Prophylaxis: Pneumatic Compression Devices. Cautiously considered lovenox in s/o high risk for DVT with acute illness, low mobility, but he has DAPT and platelets in the 60s (chronic). Technically not contraindicated but clinical judgment.  Caruso Catheter: Not present  Central Lines: None  Cardiac Monitoring: ACTIVE order. Indication: Acute decompensated heart failure (48 hours)  Code Status: Full Code      Disposition: Expected discharge in 2-3 days following diuresis.    Minor Santacruz MD    Interval History   The patient complaints of severe fatigue.  He is sleeping in bed and has little appetite.    -Data reviewed today: I reviewed all new labs and imaging results over the last 24 hours. I personally reviewed no images or EKG's today.    Physical Exam   Temp: 97.8  F (36.6  C) Temp src: Oral BP: 122/66 Pulse: 76   Resp: 18 SpO2: 92 % O2 Device: None (Room air)    Vitals:    08/22/22 0645 08/22/22 1343 08/23/22 0612   Weight: 112.5 kg (248 lb 0.3 oz) 112.8 kg (248 lb 10.9 oz) 112 kg (246 lb 14.6 oz)     Vital Signs with Ranges  Temp:  [97.5  F (36.4  C)-98.5  F (36.9  C)] 97.8  F (36.6  C)  Pulse:  [60-83] 76  Resp:  [18-24] 18  BP: (107-146)/(46-71) 122/66  SpO2:  [92 %-97 %] 92 %  I/O last 3 completed shifts:  In: 720 [P.O.:720]  Out: 1700 [Urine:1700]    Gen: Morbidly obese male, alert and oriented x 3, no acute distressed, somnolent  HEENT: Atraumatic, normocephalic; sclera non-injected, anicterric; oral mucosa moist, no lesion, no exudate  Lungs: Clear to ausculation, no wheezes, no rhonchi, no rales  Heart: Regular rate, regular  rhythm, no gallops, no rubs, no murmurs  GI: Bowel sound normal, no hepatosplenomegaly, no masses, non-tender, non-distended, no guarding, no rebound tenderness  Lymph: No lymphadenopathy, 3 + edema to hips  Skin: No rashes, no chronic venous stasis     Medications     Continuing ACE inhibitor/ARB/ARNI from home medication list OR ACE inhibitor/ARB order already placed during this visit       BETA BLOCKER NOT PRESCRIBED         amLODIPine  5 mg Oral Daily     aspirin  81 mg Oral Daily     atorvastatin  20 mg Oral Daily     [Held by provider] carvedilol  12.5 mg Oral BID w/meals     clopidogrel  75 mg Oral Daily     ferrous sulfate  325 mg Oral QPM     furosemide  40 mg Intravenous Q8H     hydrALAZINE  50 mg Oral BID     insulin aspart  1-7 Units Subcutaneous TID AC     insulin aspart  1-5 Units Subcutaneous At Bedtime     lisinopril  20 mg Oral Daily     nystatin   Topical BID     sodium chloride (PF)  3 mL Intracatheter Q8H       Data   Recent Labs   Lab 08/23/22  1128 08/23/22  0721 08/23/22  0459 08/22/22  0749 08/22/22  0400 08/22/22  0219 08/21/22  2216 08/21/22  0811 08/21/22  0503 08/20/22  1728 08/20/22  1237   WBC  --   --   --   --   --   --   --   --  2.4*  --  2.2*   HGB  --   --   --   --   --   --   --   --  9.9*  --  9.8*   MCV  --   --   --   --   --   --   --   --  90  --  92   PLT  --   --   --   --   --   --   --   --  69*  --  63*   NA  --   --  141  --  142  --   --   --  144  --  144   POTASSIUM  --   --  3.5  --  3.8  3.8  --  3.4  --  3.7  --  4.3   CHLORIDE  --   --  106  --  107  --   --   --  109  --  113*   CO2  --   --  29  --  30  --   --   --  30  --  27   BUN  --   --  20  --  20  --   --   --  17  --  16   CR  --   --  0.88  --  0.97  --   --   --  0.92  --  0.86   ANIONGAP  --   --  6  --  5  --   --   --  5  --  4   NATALIIA  --   --  8.7  --  8.5  --   --   --  8.3*  --  8.2*   * 108* 112*   < > 139*   < >  --    < > 112*   < > 115*   ALBUMIN  --   --   --   --   --   --   --    --  2.8*  --  2.9*   PROTTOTAL  --   --   --   --   --   --   --   --  6.3*  --  6.2*   BILITOTAL  --   --   --   --   --   --   --   --  1.1  --  1.1   ALKPHOS  --   --   --   --   --   --   --   --  81  --  81   ALT  --   --   --   --   --   --   --   --  19  --  20   AST  --   --   --   --   --   --   --   --  19  --  22    < > = values in this interval not displayed.       No results found for this or any previous visit (from the past 24 hour(s)).

## 2022-08-23 NOTE — PLAN OF CARE
Patient lethargic this afternoon. Refused to eat any lunch. Sister, Daylin said he sleeps 16 hours a day at home. Yesterday he was much more alert. He removed his IV. Falls asleep mid sentence. Oxygen saturation 95% on room air. Spoke with Dr. Santacruz and he ordered labwork and chest xray. Placed a new IV.

## 2022-08-23 NOTE — PROGRESS NOTES
Pt found by staff to be pulling off tele monitoring pads and had removed PIV. Pt confused but cooperative with cares and reapplication of tele monitoring.  Turned independently in bed for linen change. Reoriented to plan of care.

## 2022-08-23 NOTE — PROGRESS NOTES
Care Management Follow Up    Length of Stay (days): 3    Expected Discharge Date: 08/25/2022     Concerns to be Addressed: all concerns addressed in this encounter     Patient plan of care discussed at interdisciplinary rounds: Yes    Anticipated Discharge Disposition: Transitional Care     Anticipated Discharge Services: None  Anticipated Discharge DME: None    Patient/family educated on Medicare website which has current facility and service quality ratings: yes  Education Provided on the Discharge Plan:  Yes  Patient/Family in Agreement with the Plan: yes    Referrals Placed by CM/SW: Post Acute Facilities  Private pay costs discussed: Not applicable    Additional Information:    Spoke with the Pt's sisterDaylin regarding discharge planning.  The Pt will likely be medically stable to discharge in 2-3 days.  Discussed TCU.  TCU referrals are pending.    TCU referrals:    [facility] (Main Phone: [phone] Admissions phone: [phone] Fax: [phone])     [facility] Phone (Main Phone:[phone] Admissions Phone:[phone] Fax: [phone])     [facility] Phone: (Admissions: [phone] RN Report: [phone] Fax: [phone])      [facility] (Phone: [phone]/Fax: [phone])     [facility] (Admissions Phone: [phone] Main Phone: [phone] Fax: [phone]) - Declined     [facility] (Admissions Phone: [phone] Main Phone: [phone] Fax: [phone]) - Declined due to no appropriate bed.     [facility] (Phone: [phone] Fax: [phone])     [facility] (Main: [phone] Admissions: [phone] Fax: [phone])    Plan:  TCU      Devorah Knight RN

## 2022-08-23 NOTE — PLAN OF CARE
"Pt alert to person and place. Pt triggered sepsis and lactic resulted at 0.8. Reoriented to external catheter and pt placed on zone 2. Pt on 1500 ml fluid restriction. Pt prefers to sleep with HOB slightly elevated.    /58 (BP Location: Left arm)   Pulse 82   Temp 97.9  F (36.6  C) (Oral)   Resp 22   Ht 1.676 m (5' 5.98\")   Wt 112.8 kg (248 lb 10.9 oz)   SpO2 96%   BMI 40.16 kg/m      "

## 2022-08-23 NOTE — PROGRESS NOTES
Nutrition Therapy Update:    RD previously consulted via provider order for nutrition education - Heart Failure - Dietitian to instruct patient on 2 gram sodium diet. RD deferred on 8/21 d/t covering remotely.    Writer covering remotely today, attempted to call pt room to speak with pt regarding appetite/intake and provide education on low sodium diet and fluid restriction. Pt did not answer call. Writer reviewed chart later in afternoon - pt appears to be very fatigued and sleeping per physician note. Will defer education again and contact pt as able/appropriate.    Low sodium diet handout remains in discharge instructions in case pt discharges prior to RD completing education. Handout regarding limiting fluids was also added to discharge instructions by writer.    China Valentin RD  Clinical Dietitian  Office: [phone]  Weekend pager: [phone]

## 2022-08-24 ENCOUNTER — APPOINTMENT (OUTPATIENT)
Dept: OCCUPATIONAL THERAPY | Facility: CLINIC | Age: 82
DRG: 291 | End: 2022-08-24
Payer: MEDICARE

## 2022-08-24 ENCOUNTER — APPOINTMENT (OUTPATIENT)
Dept: PHYSICAL THERAPY | Facility: CLINIC | Age: 82
DRG: 291 | End: 2022-08-24
Payer: MEDICARE

## 2022-08-24 LAB
ANION GAP SERPL CALCULATED.3IONS-SCNC: 8 MMOL/L (ref 3–14)
BUN SERPL-MCNC: 27 MG/DL (ref 7–30)
CALCIUM SERPL-MCNC: 8.8 MG/DL (ref 8.5–10.1)
CHLORIDE BLD-SCNC: 105 MMOL/L (ref 94–109)
CO2 SERPL-SCNC: 28 MMOL/L (ref 20–32)
CREAT SERPL-MCNC: 1.19 MG/DL (ref 0.66–1.25)
GFR SERPL CREATININE-BSD FRML MDRD: 62 ML/MIN/1.73M2
GLUCOSE BLD-MCNC: 126 MG/DL (ref 70–99)
GLUCOSE BLDC GLUCOMTR-MCNC: 116 MG/DL (ref 70–99)
GLUCOSE BLDC GLUCOMTR-MCNC: 117 MG/DL (ref 70–99)
GLUCOSE BLDC GLUCOMTR-MCNC: 129 MG/DL (ref 70–99)
GLUCOSE BLDC GLUCOMTR-MCNC: 132 MG/DL (ref 70–99)
GLUCOSE BLDC GLUCOMTR-MCNC: 138 MG/DL (ref 70–99)
HOLD SPECIMEN: NORMAL
POTASSIUM BLD-SCNC: 3.3 MMOL/L (ref 3.4–5.3)
POTASSIUM BLD-SCNC: 3.6 MMOL/L (ref 3.4–5.3)
SODIUM SERPL-SCNC: 141 MMOL/L (ref 133–144)

## 2022-08-24 PROCEDURE — 999N000157 HC STATISTIC RCP TIME EA 10 MIN

## 2022-08-24 PROCEDURE — 80048 BASIC METABOLIC PNL TOTAL CA: CPT | Performed by: FAMILY MEDICINE

## 2022-08-24 PROCEDURE — 120N000001 HC R&B MED SURG/OB

## 2022-08-24 PROCEDURE — 250N000011 HC RX IP 250 OP 636: Performed by: INTERNAL MEDICINE

## 2022-08-24 PROCEDURE — 99232 SBSQ HOSP IP/OBS MODERATE 35: CPT | Performed by: INTERNAL MEDICINE

## 2022-08-24 PROCEDURE — 250N000013 HC RX MED GY IP 250 OP 250 PS 637: Performed by: PHYSICIAN ASSISTANT

## 2022-08-24 PROCEDURE — 36415 COLL VENOUS BLD VENIPUNCTURE: CPT | Performed by: FAMILY MEDICINE

## 2022-08-24 PROCEDURE — 36415 COLL VENOUS BLD VENIPUNCTURE: CPT | Performed by: INTERNAL MEDICINE

## 2022-08-24 PROCEDURE — 250N000013 HC RX MED GY IP 250 OP 250 PS 637: Performed by: INTERNAL MEDICINE

## 2022-08-24 PROCEDURE — 97530 THERAPEUTIC ACTIVITIES: CPT | Mod: GP

## 2022-08-24 PROCEDURE — 84132 ASSAY OF SERUM POTASSIUM: CPT | Performed by: INTERNAL MEDICINE

## 2022-08-24 PROCEDURE — 97535 SELF CARE MNGMENT TRAINING: CPT | Mod: GO

## 2022-08-24 PROCEDURE — 93005 ELECTROCARDIOGRAM TRACING: CPT

## 2022-08-24 RX ORDER — POTASSIUM CHLORIDE 1500 MG/1
40 TABLET, EXTENDED RELEASE ORAL ONCE
Status: COMPLETED | OUTPATIENT
Start: 2022-08-24 | End: 2022-08-24

## 2022-08-24 RX ADMIN — CLOPIDOGREL BISULFATE 75 MG: 75 TABLET ORAL at 07:59

## 2022-08-24 RX ADMIN — HYDRALAZINE HYDROCHLORIDE 50 MG: 25 TABLET, FILM COATED ORAL at 07:59

## 2022-08-24 RX ADMIN — ASPIRIN 81 MG: 81 TABLET ORAL at 07:59

## 2022-08-24 RX ADMIN — POTASSIUM CHLORIDE 40 MEQ: 20 TABLET, EXTENDED RELEASE ORAL at 07:59

## 2022-08-24 RX ADMIN — FERROUS SULFATE TAB 325 MG (65 MG ELEMENTAL FE) 325 MG: 325 (65 FE) TAB at 16:33

## 2022-08-24 RX ADMIN — FUROSEMIDE 40 MG: 10 INJECTION, SOLUTION INTRAMUSCULAR; INTRAVENOUS at 07:59

## 2022-08-24 RX ADMIN — NYSTATIN: 100000 CREAM TOPICAL at 21:26

## 2022-08-24 RX ADMIN — ATORVASTATIN CALCIUM 20 MG: 20 TABLET, FILM COATED ORAL at 07:59

## 2022-08-24 RX ADMIN — FUROSEMIDE 40 MG: 10 INJECTION, SOLUTION INTRAMUSCULAR; INTRAVENOUS at 16:33

## 2022-08-24 RX ADMIN — AMLODIPINE BESYLATE 5 MG: 5 TABLET ORAL at 07:59

## 2022-08-24 RX ADMIN — LISINOPRIL 20 MG: 20 TABLET ORAL at 07:59

## 2022-08-24 RX ADMIN — NYSTATIN: 100000 CREAM TOPICAL at 08:00

## 2022-08-24 RX ADMIN — HYDRALAZINE HYDROCHLORIDE 50 MG: 25 TABLET, FILM COATED ORAL at 19:37

## 2022-08-24 ASSESSMENT — ACTIVITIES OF DAILY LIVING (ADL)
ADLS_ACUITY_SCORE: 32
ADLS_ACUITY_SCORE: 36
ADLS_ACUITY_SCORE: 32
ADLS_ACUITY_SCORE: 32
ADLS_ACUITY_SCORE: 36
ADLS_ACUITY_SCORE: 32
ADLS_ACUITY_SCORE: 40

## 2022-08-24 NOTE — PLAN OF CARE
Goal Outcome Evaluation:    Assisted into chair via lift.  Alert to self and aware it is August and he is in the hospital.  Pulled IV out this  morning and a new one was placed.  Purewick in place for accurate intake and output.  He reports he feels confused and is unsure of what is happening.  Potassium replaced and within normal limits.  Weight is down to 109.8 kg, has 2 + generalized edema.  On room air, but reports dyspnea with rest and exertion.

## 2022-08-24 NOTE — PROGRESS NOTES
Patient alert but can be forgetful. He will toss in bed and easily becomes dyspneic after exertion. O2 sats remain stable with room air. Patient received IV lasix as scheduled. Brenda roland on for I&O.

## 2022-08-24 NOTE — PROGRESS NOTES
Care Management Follow Up    Length of Stay (days): 4    Expected Discharge Date: 08/25/2022     Concerns to be Addressed: all concerns addressed in this encounter     Patient plan of care discussed at interdisciplinary rounds: Yes    Anticipated Discharge Disposition: Transitional Care     Anticipated Discharge Services: None  Anticipated Discharge DME: None    Patient/family educated on Medicare website which has current facility and service quality ratings: yes  Education Provided on the Discharge Plan:  yes  Patient/Family in Agreement with the Plan: yes    Referrals Placed by CM/SW: Internal Clinic Care Coordination, Post Acute Facilities  Private pay costs discussed: Not applicable    Additional Information:    Per IDT rounds today, MD team states that patient is not medically stable for discharge today.  Discharge plans remain unchanged.     TCU referrals pending:     [facility] (Main Phone: [phone] Admissions phone: [phone] Fax: [phone])     [facility] Phone (Main Phone:[phone] Admissions Phone:[phone] Fax: [phone])     [facility] Phone: (Admissions: [phone] RN Report: [phone] Fax: [phone])      [facility] [place] [facility] (Phone: [phone]/Fax: [phone])     [facility] (Admissions Phone: [phone] Main Phone: [phone] Fax: [phone]) - Declined     [facility] (Admissions Phone: [phone] Main Phone: [phone] Fax: [phone]) - Declined due to no appropriate bed.     [facility] (Phone: [phone] Fax: [phone])     [facility] (Main: [phone] Admissions: [phone] Fax: [phone])     Plan:  TCU      SHEA BEAN RN

## 2022-08-24 NOTE — PROGRESS NOTES
"[facility]    Hospitalist Progress Note    Date of Service (when I saw the patient): 08/24/2022    Assessment & Plan   Flash Brown is a 81 year old male admitted on 8/20/2022. He presented to the emergency department for evaluation of edema, 20 pound weight gain, and dyspnea on exertion and was found to have acute heart failure for which he is being admitted for further evaluation and treatment.     Acute congestive heart failure / (HFpEF) heart failure with preserved ejection fraction - new diagnosis  Anasarca  Presented with 2-3 month history of worsening edema, 20 pound weight gain, and dyspnea on exertion. No improvement with a trial of furosemide 40 mg daily x 5 days from PCP.   Had outpatient echo on 8/12/22 demonstrating \"global and regional left ventricular function is normal with an EF of 60-65%. Global right ventricular function is normal. IVC diameter and respiratory changes fall into an intermediate range suggesting an RA pressure of 8 mmHg. No pericardial effusion is present.\"   Admit chest x-ray demonstrates \"enlarged cardiac silhouette, similar to prior examination. Moderate right pleural effusion, increased since prior radiograph. Likely superimposed pulmonary vascular congestion and interstitial edema. No pneumothorax...\"   EKG shows bradycardia which is possibly sinus with baseline artifact vs flutter/fibrillation (bradycardia not new). Admit BNP 1830. Troponin normal (10). Appears edematous with anasarca, has expiratory wheezing on exam but no crackles. No hypoxia at time of admission. Overall clinical picture consistent with acute CHF, which is a new diagnosis for patient. Unclear cause, may be driven by persisting bradycardia or other undiagnosed arrhythmia, worsening coronary artery disease (but no evidence of acute coronary syndrome), or attributed to his use of pioglitazone (which is known to cause CHF, but is not a new medication for patient).   Was given IV " "furosemide 60 mg with good urine output.   - Furosemide 40 mg q 12 hours on admission  - Due to severe volume overload, increase to q 8 hr on 8/22/22  - daily BMPs and K repletion protocol while diuresing  - No repeat echo indicated, was just completed on 8/12/22  - Daily weights  - Strict I&Os  - Stop pioglitazone  - Continue prior to admission ACE and hydralazine; see below regarding beta blocker use  - 1.5 L/day fluid restriction  - Patient continues excellent diuresis, negative 3.6 L 8/22, but wt down only 0.5 kg  - Continue diuresis; edema improved; weight is down to 109.8 kg on 8/24    Altered mental status  Patient noted to be more somnolent this morning, and continues to be somnolent this afternoon.  BMP normal this AM.  Will check CBC, VBG, and CXR.  Consider head CT if does not clear and evaluation otherwise unremarkable.  - Mentation improved on 8/24     Sinus bradycardia vs flutter/fibrillation  Admit EKG shows bradycardia, rate as low as 35 bpm. Rhythm possibly new fib/flutter, but could be sinus with baseline artifact. Apparently his heart rate has been low \"for a long time.\" Blood pressure is stable, patient is asymptomatic. PTA has carvedilol 12.5 mg bid.   - Monitor on telemetry - NSR as of 8/21, can discontinue 8/22 evening if remains wnl  - Holding carvedilol; if resumed, would likely need lower dose  - No bradycardia overnight into 8/22/22     Pancytopenia, chronic  Admit WBC 2.2 (transiently low at baseline between 2.7 - 5.0), hemoglobin 9.8 (baseline between 11 - 13), platelets 63 (baseline 64 - 93). Occurs in the setting of prior history of lymphoma (see below). Pancytopenia is followed by oncology.  - CBC stable on recheck, discontinue trend unless clinically indicated  - See below regarding lymphoma management     Type 2 diabetes mellitus without complication, without long-term current use of insulin  Recent HgbA1c 5.5. Managed prior to admission with pioglitazone 30 mg daily.   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 Follows with oncology Dr. Avendaño. Had a bone marrow biopsy in Feb 2022, which was normal.  - Continue with outpatient oncology surveillance     Essential hypertension, benign  Blood pressure normal / slightly elevated on admission. Managed prior to admission with amlodipine 5 mg daily, carvedilol 12.5 mg bid, clonidine 0.1 mg bid, hydralazine 50 mg bid, and ramipril 10 mg daily.   - See above regarding carvedilol / bradycardia  - Hold clonidine (may have some rebound hypertension as a result)  - Continue amlodipine, hydralazine and ramipril with holding parameters     CAD (coronary artery disease)  Mixed hyperlipidemia  Hyperlipidemia  Follows with [facility] Dr. Tate. History of PCI with LUDIVINA in 2010 (prox/mid RCA, distal circumflex, prox/mid LAD). Stress test 2015 with 70% stenosis of proximal circumflex, did not have subsequent angiogram for unclear reasons. Negative stress test during hospitalization at [facility] in August 2020. Managed prior to admission with aspirin 81 mg daily, clopidogrel 75 mg daily, atorvastatin 20 mg daily, and beta blocker / ACEi / antihypertensive medications noted above. No evidence of acute coronary syndrome at time of admission, although worsening coronary artery disease could be contributing to development of CHF.  - Continue aspirin, clopidogrel, statin  - See above regarding antihypertensive regimen     BPH (benign prostatic hyperplasia)  Nocturia  Managed prior to admission with tamsulosin 0.4 mg daily, continue.      Obstructive sleep apnea  Apparently does not use CPAP, but has been referred  for sleep study.     Severe Obesity  Admit BMI 40.62, contributes to morbidity and mortality.      Concerns for decreased mobility, possible cognitive impairment  Lives with sister in a private home and reports that she cares for him because he gets confused and doesn't understand the health issues sometimes  - PT/OT consult for discharge planning    Diet: Combination Diet Moderate Consistent Carb (60 g CHO per Meal) Diet; Low Saturated Fat Na <2400mg Diet    DVT Prophylaxis: Pneumatic Compression Devices. Cautiously considered lovenox in s/o high risk for DVT with acute illness, low mobility, but he has DAPT and platelets in the 60s (chronic). Technically not contraindicated but clinical judgment.  Caruso Catheter: Not present  Central Lines: None  Cardiac Monitoring: ACTIVE order. Indication: Acute decompensated heart failure (48 hours)  Code Status: Full Code      Disposition: Expected discharge in 2-3 days following diuresis.    Minor Santacruz MD    Interval History   The patient is more awake today.  He denies pain.    -Data reviewed today: I reviewed all new labs and imaging results over the last 24 hours. I personally reviewed no images or EKG's today.    Physical Exam   Temp: 97.1  F (36.2  C) Temp src: Oral BP: (!) 154/72 Pulse: 91   Resp: 18 SpO2: 95 % O2 Device: None (Room air)    Vitals:    08/22/22 1343 08/23/22 0612 08/24/22 1003   Weight: 112.8 kg (248 lb 10.9 oz) 112 kg (246 lb 14.6 oz) 109.8 kg (242 lb 1 oz)     Vital Signs with Ranges  Temp:  [97.1  F (36.2  C)-98.5  F (36.9  C)] 97.1  F (36.2  C)  Pulse:  [76-91] 91  Resp:  [18-20] 18  BP: (126-168)/(54-84) 154/72  SpO2:  [90 %-96 %] 95 %  I/O last 3 completed shifts:  In: -   Out: 1050 [Urine:1050]    Gen: Morbidly obese male, alert and oriented x 3, no acute distressed, somnolent  HEENT: Atraumatic, normocephalic; sclera non-injected, anicterric; oral mucosa moist, no lesion, no exudate  Lungs: Clear to ausculation, no wheezes, no rhonchi,  no rales  Heart: Regular rate, regular rhythm, no gallops, no rubs, no murmurs  GI: Bowel sound normal, no hepatosplenomegaly, no masses, non-tender, non-distended, no guarding, no rebound tenderness  Lymph: No lymphadenopathy, 1 + edema to hips  Skin: No rashes, no chronic venous stasis     Medications     Continuing ACE inhibitor/ARB/ARNI from home medication list OR ACE inhibitor/ARB order already placed during this visit       BETA BLOCKER NOT PRESCRIBED         amLODIPine  5 mg Oral Daily     aspirin  81 mg Oral Daily     atorvastatin  20 mg Oral Daily     [Held by provider] carvedilol  12.5 mg Oral BID w/meals     clopidogrel  75 mg Oral Daily     ferrous sulfate  325 mg Oral QPM     furosemide  40 mg Intravenous Q8H     hydrALAZINE  50 mg Oral BID     insulin aspart  1-7 Units Subcutaneous TID AC     insulin aspart  1-5 Units Subcutaneous At Bedtime     lisinopril  20 mg Oral Daily     nystatin   Topical BID     sodium chloride (PF)  3 mL Intracatheter Q8H       Data   Recent Labs   Lab 08/24/22  1158 08/24/22  1139 08/24/22  0733 08/24/22  0450 08/23/22  1649 08/23/22  1411 08/23/22  0721 08/23/22  0459 08/22/22  0749 08/22/22  0400 08/21/22  0811 08/21/22  0503 08/20/22  1728 08/20/22  1237   WBC  --   --   --   --   --  5.0  --   --   --   --   --  2.4*  --  2.2*   HGB  --   --   --   --   --  11.5*  --   --   --   --   --  9.9*  --  9.8*   MCV  --   --   --   --   --  89  --   --   --   --   --  90  --  92   PLT  --   --   --   --   --  81*  --   --   --   --   --  69*  --  63*   NA  --   --   --  141  --   --   --  141  --  142  --  144  --  144   POTASSIUM 3.6  --   --  3.3*  --   --   --  3.5  --  3.8  3.8   < > 3.7  --  4.3   CHLORIDE  --   --   --  105  --   --   --  106  --  107  --  109  --  113*   CO2  --   --   --  28  --   --   --  29  --  30  --  30  --  27   BUN  --   --   --  27  --   --   --  20  --  20  --  17  --  16   CR  --   --   --  1.19  --   --   --  0.88  --  0.97  --  0.92  --   0.86   ANIONGAP  --   --   --  8  --   --   --  6  --  5  --  5  --  4   NATALIIA  --   --   --  8.8  --   --   --  8.7  --  8.5  --  8.3*  --  8.2*   GLC  --  138* 117* 126*   < >  --    < > 112*   < > 139*   < > 112*   < > 115*   ALBUMIN  --   --   --   --   --   --   --   --   --   --   --  2.8*  --  2.9*   PROTTOTAL  --   --   --   --   --   --   --   --   --   --   --  6.3*  --  6.2*   BILITOTAL  --   --   --   --   --   --   --   --   --   --   --  1.1  --  1.1   ALKPHOS  --   --   --   --   --   --   --   --   --   --   --  81  --  81   ALT  --   --   --   --   --   --   --   --   --   --   --  19  --  20   AST  --   --   --   --   --   --   --   --   --   --   --  19  --  22    < > = values in this interval not displayed.       Recent Results (from the past 24 hour(s))   CT Head w/o Contrast    Narrative    EXAM: CT HEAD W/O CONTRAST  LOCATION: [facility]  DATE/TIME: 8/23/2022 5:54 PM    INDICATION: altered mental status  COMPARISON: None.  TECHNIQUE: Routine CT Head without IV contrast. Multiplanar reformats. Dose reduction techniques were used.    FINDINGS:  INTRACRANIAL CONTENTS: No intracranial hemorrhage, extraaxial collection, or mass effect.  No CT evidence of acute infarct. There is scattered diffuse low attenuation within the periventricular and subcortical white matter consistent with diffuse small   vessel ischemic disease. The ventricular system, basal cisterns and the cortical sulci are consistent with diffuse volume loss.     VISUALIZED ORBITS/SINUSES/MASTOIDS: No intraorbital abnormality. No paranasal sinus mucosal disease. No middle ear or mastoid effusion.    BONES/SOFT TISSUES: No acute abnormality.      Impression    IMPRESSION:  1.  No CT finding of a mass, hemorrhage or focal area suggestive of infarct.  2.  Diffuse age related changes.

## 2022-08-25 LAB
ANION GAP SERPL CALCULATED.3IONS-SCNC: 9 MMOL/L (ref 3–14)
BUN SERPL-MCNC: 42 MG/DL (ref 7–30)
CALCIUM SERPL-MCNC: 8.4 MG/DL (ref 8.5–10.1)
CHLORIDE BLD-SCNC: 104 MMOL/L (ref 94–109)
CO2 SERPL-SCNC: 27 MMOL/L (ref 20–32)
CREAT SERPL-MCNC: 2.03 MG/DL (ref 0.66–1.25)
GFR SERPL CREATININE-BSD FRML MDRD: 33 ML/MIN/1.73M2
GLUCOSE BLD-MCNC: 123 MG/DL (ref 70–99)
GLUCOSE BLDC GLUCOMTR-MCNC: 118 MG/DL (ref 70–99)
GLUCOSE BLDC GLUCOMTR-MCNC: 119 MG/DL (ref 70–99)
GLUCOSE BLDC GLUCOMTR-MCNC: 134 MG/DL (ref 70–99)
GLUCOSE BLDC GLUCOMTR-MCNC: 135 MG/DL (ref 70–99)
GLUCOSE BLDC GLUCOMTR-MCNC: 137 MG/DL (ref 70–99)
POTASSIUM BLD-SCNC: 3.7 MMOL/L (ref 3.4–5.3)
SODIUM SERPL-SCNC: 140 MMOL/L (ref 133–144)

## 2022-08-25 PROCEDURE — 250N000013 HC RX MED GY IP 250 OP 250 PS 637: Performed by: PHYSICIAN ASSISTANT

## 2022-08-25 PROCEDURE — 258N000003 HC RX IP 258 OP 636: Performed by: INTERNAL MEDICINE

## 2022-08-25 PROCEDURE — 36415 COLL VENOUS BLD VENIPUNCTURE: CPT | Performed by: FAMILY MEDICINE

## 2022-08-25 PROCEDURE — 99232 SBSQ HOSP IP/OBS MODERATE 35: CPT | Performed by: INTERNAL MEDICINE

## 2022-08-25 PROCEDURE — 120N000001 HC R&B MED SURG/OB

## 2022-08-25 PROCEDURE — 82310 ASSAY OF CALCIUM: CPT | Performed by: FAMILY MEDICINE

## 2022-08-25 PROCEDURE — 250N000011 HC RX IP 250 OP 636: Performed by: INTERNAL MEDICINE

## 2022-08-25 RX ORDER — SODIUM CHLORIDE 9 MG/ML
INJECTION, SOLUTION INTRAVENOUS CONTINUOUS
Status: ACTIVE | OUTPATIENT
Start: 2022-08-25 | End: 2022-08-25

## 2022-08-25 RX ADMIN — SODIUM CHLORIDE: 9 INJECTION, SOLUTION INTRAVENOUS at 08:29

## 2022-08-25 RX ADMIN — CLOPIDOGREL BISULFATE 75 MG: 75 TABLET ORAL at 08:24

## 2022-08-25 RX ADMIN — FERROUS SULFATE TAB 325 MG (65 MG ELEMENTAL FE) 325 MG: 325 (65 FE) TAB at 16:33

## 2022-08-25 RX ADMIN — HYDRALAZINE HYDROCHLORIDE 50 MG: 25 TABLET, FILM COATED ORAL at 20:41

## 2022-08-25 RX ADMIN — HYDRALAZINE HYDROCHLORIDE 50 MG: 25 TABLET, FILM COATED ORAL at 08:24

## 2022-08-25 RX ADMIN — NYSTATIN: 100000 CREAM TOPICAL at 20:43

## 2022-08-25 RX ADMIN — ATORVASTATIN CALCIUM 20 MG: 20 TABLET, FILM COATED ORAL at 09:10

## 2022-08-25 RX ADMIN — NYSTATIN: 100000 CREAM TOPICAL at 08:07

## 2022-08-25 RX ADMIN — FUROSEMIDE 40 MG: 10 INJECTION, SOLUTION INTRAMUSCULAR; INTRAVENOUS at 00:04

## 2022-08-25 RX ADMIN — AMLODIPINE BESYLATE 5 MG: 5 TABLET ORAL at 08:25

## 2022-08-25 RX ADMIN — ASPIRIN 81 MG: 81 TABLET ORAL at 09:10

## 2022-08-25 ASSESSMENT — ACTIVITIES OF DAILY LIVING (ADL)
ADLS_ACUITY_SCORE: 34
ADLS_ACUITY_SCORE: 34
ADLS_ACUITY_SCORE: 32
ADLS_ACUITY_SCORE: 32
ADLS_ACUITY_SCORE: 34
ADLS_ACUITY_SCORE: 32
ADLS_ACUITY_SCORE: 32
ADLS_ACUITY_SCORE: 34
ADLS_ACUITY_SCORE: 32

## 2022-08-25 NOTE — PLAN OF CARE
"Pt alert to to self, month, and location. Pt resting in chair most shift. Lift used for transfers. Minimal urine output and bladder scanned 70 mL. Male purewick not placed properly, new male purewick in place. IV wrapped to prevent pt from removing. All lung fields clear.     BP (!) 142/61 (BP Location: Left arm)   Pulse 90   Temp 98.2  F (36.8  C) (Oral)   Resp 18   Ht 1.676 m (5' 5.98\")   Wt 109.8 kg (242 lb 1 oz)   SpO2 94%   BMI 39.09 kg/m        "

## 2022-08-25 NOTE — PROGRESS NOTES
Pt alert, to self and place. Lift for transfers Purewidk in place. Bladder scanned this am for 38 ml. 1500 ml fluid restriction. IV saline locked. Denies pain, nausea. LS clear.

## 2022-08-25 NOTE — PROGRESS NOTES
Care Management Follow Up    Length of Stay (days): 5    Expected Discharge Date: 08/25/2022     Concerns to be Addressed: all concerns addressed in this encounter     Patient plan of care discussed at interdisciplinary rounds: Yes    Anticipated Discharge Disposition: Transitional Care     Anticipated Discharge Services: None  Anticipated Discharge DME: None    Patient/family educated on Medicare website which has current facility and service quality ratings: yes  Education Provided on the Discharge Plan:    Patient/Family in Agreement with the Plan: yes    Referrals Placed by CM/SW: Internal Clinic Care Coordination, Post Acute Facilities  Private pay costs discussed: transportation costs    Additional Information:    Per IDT rounds today, MD team states that patient is not medically stable for discharge today, likely discharge tomorrow.  Discharge plans remain unchanged.      TCU referrals pending:      [facility] (Main Phone: [phone] Admissions phone: [phone] Fax: [phone])     [facility] Phone (Main Phone:[phone] Admissions Phone:[phone] Fax: [phone])-      [facility] [place] [facility] Phone: (Admissions: [phone] RN Report: [phone] Fax: [phone])      [facility] [place] [facility] (Phone: [phone]/Fax: [phone])- Declined- sent email to Tanja for reconsideration. DON reviewing.      [facility] (Admissions Phone: [phone] Main Phone: [phone] Fax: [phone]) - Declined     [facility] (Admissions Phone: [phone] Main Phone: [phone] Fax: [phone]) - Declined due to no appropriate bed.     [facility] (Phone: [phone] Fax: [phone])     [facility] (Main: [phone] Admissions: [phone] Fax: [phone])- Sent email to admissions, awaiting return call    PLAN: TCU    SHEA BEAN RN

## 2022-08-25 NOTE — PROGRESS NOTES
"[facility]    Hospitalist Progress Note    Date of Service (when I saw the patient): 08/25/2022    Assessment & Plan   Flash Brown is a 81 year old male admitted on 8/20/2022. He presented to the emergency department for evaluation of edema, 20 pound weight gain, and dyspnea on exertion and was found to have acute heart failure for which he is being admitted for further evaluation and treatment.     Acute diastolic heart failure / (HFpEF) heart failure with preserved ejection fraction - new diagnosis  Anasarca  Hypokalemia  Presented with 2-3 month history of worsening edema, 20 pound weight gain, and dyspnea on exertion. No improvement with a trial of furosemide 40 mg daily x 5 days from PCP.   Had outpatient echo on 8/12/22 demonstrating \"global and regional left ventricular function is normal with an EF of 60-65%. Global right ventricular function is normal. IVC diameter and respiratory changes fall into an intermediate range suggesting an RA pressure of 8 mmHg. No pericardial effusion is present.\"   Admit chest x-ray demonstrates \"enlarged cardiac silhouette, similar to prior examination. Moderate right pleural effusion, increased since prior radiograph. Likely superimposed pulmonary vascular congestion and interstitial edema. No pneumothorax...\"   EKG shows bradycardia which is possibly sinus with baseline artifact vs flutter/fibrillation (bradycardia not new). Admit BNP 1830. Troponin normal (10). Appears edematous with anasarca, has expiratory wheezing on exam but no crackles. No hypoxia at time of admission. Overall clinical picture consistent with acute CHF, which is a new diagnosis for patient. Unclear cause, may be driven by persisting bradycardia or other undiagnosed arrhythmia, worsening coronary artery disease (but no evidence of acute coronary syndrome), or attributed to his use of pioglitazone (which is known to cause CHF, but is not a new medication for patient). " "  Was given IV furosemide 60 mg with good urine output.   - Furosemide 40 mg q 12 hours on admission  - Due to severe volume overload, increase to q 8 hr on 8/22/22  - daily BMPs and K repletion protocol while diuresing  - No repeat echo indicated, was just completed on 8/12/22  - Daily weights  - Strict I&Os  - Stop pioglitazone  - Continue prior to admission ACE and hydralazine; see below regarding beta blocker use  - 1.5 L/day fluid restriction  - Patient continues excellent diuresis, negative 3.6 L 8/22, but wt down only 0.5 kg  - Continue diuresis; edema improved; weight is down to 109.8 kg on 8/24  - Hold lisinopril and furosemide due to MALIK  - Hypokalemia with K 3.3 on 8/24 treated with oral potassium chloride; 3.6 at discharge  - Recheck BMP in AM    Acute kidney injury  Cr increased to 2.03 on 8/25 from 1.19 on 8/24, likely due to aggressive diuresis.  - Hold furosemide and lisinopril  -  ml over 5 hours  - Repeat BMP in AM    Altered mental status of unclear etiology  Encephalopathy rule out  Patient noted to be more somnolent this morning, and continues to be somnolent this afternoon.  BMP normal this AM.  Will check CBC, VBG, and CXR.  Consider head CT if does not clear and evaluation otherwise unremarkable.  - Mentation improved on 8/24 and 8/25  - He complains of lethargy and confusion, but is oriented x 3     Sinus bradycardia vs flutter/fibrillation  Admit EKG shows bradycardia, rate as low as 35 bpm. Rhythm possibly new fib/flutter, but could be sinus with baseline artifact. Apparently his heart rate has been low \"for a long time.\" Blood pressure is stable, patient is asymptomatic. PTA has carvedilol 12.5 mg bid.   - Monitor on telemetry - NSR as of 8/21, can discontinue 8/22 evening if remains wnl  - Holding carvedilol; if resumed, would likely need lower dose  - No bradycardia overnight into 8/22/22     Pancytopenia, chronic  Admit WBC 2.2 (transiently low at baseline between 2.7 - 5.0), " hemoglobin 9.8 (baseline between 11 - 13), platelets 63 (baseline 64 - 93). Occurs in the setting of prior history of lymphoma (see below). Pancytopenia is followed by oncology.  - CBC stable on recheck, discontinue trend unless clinically indicated  - See below regarding lymphoma management     Type 2 diabetes mellitus without complication, without long-term current use of insulin  Recent HgbA1c 5.5. Managed prior to admission with pioglitazone 30 mg daily.   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 Follows with oncology Dr. Avendaño. Had a bone marrow biopsy in Feb 2022, which was normal.  - Continue with outpatient oncology surveillance     Essential hypertension, benign  Blood pressure normal / slightly elevated on admission. Managed prior to admission with amlodipine 5 mg daily, carvedilol 12.5 mg bid, clonidine 0.1 mg bid, hydralazine 50 mg bid, and ramipril 10 mg daily.   - See above regarding carvedilol / bradycardia  - Hold clonidine (may have some rebound hypertension as a result)  - Continue amlodipine, hydralazine and ramipril with holding parameters     CAD (coronary artery disease)  Mixed hyperlipidemia  Hyperlipidemia  Follows with [facility] Dr. Tate. History of PCI with LUDIVINA in 2010 (prox/mid RCA, distal circumflex, prox/mid LAD). Stress test 2015 with 70% stenosis of proximal circumflex, did not have subsequent angiogram for unclear reasons. Negative stress test during hospitalization at [facility] in August 2020. Managed prior to admission with aspirin 81 mg daily, clopidogrel 75 mg daily, atorvastatin 20 mg daily, and beta blocker / ACEi /  antihypertensive medications noted above. No evidence of acute coronary syndrome at time of admission, although worsening coronary artery disease could be contributing to development of CHF.  - Continue aspirin, clopidogrel, statin  - See above regarding antihypertensive regimen     BPH (benign prostatic hyperplasia)  Nocturia  Managed prior to admission with tamsulosin 0.4 mg daily, continue.      Obstructive sleep apnea  Apparently does not use CPAP, but has been referred for sleep study.     Severe Obesity  Admit BMI 40.62, contributes to morbidity and mortality.      Concerns for decreased mobility, possible cognitive impairment  Lives with sister in a private home and reports that she cares for him because he gets confused and doesn't understand the health issues sometimes  - PT/OT consult for discharge planning    Diet: Combination Diet Moderate Consistent Carb (60 g CHO per Meal) Diet; Low Saturated Fat Na <2400mg Diet    DVT Prophylaxis: Pneumatic Compression Devices. Cautiously considered lovenox in s/o high risk for DVT with acute illness, low mobility, but he has DAPT and platelets in the 60s (chronic). Technically not contraindicated but clinical judgment.  Caruso Catheter: Not present  Central Lines: None  Cardiac Monitoring: ACTIVE order. Indication: Acute decompensated heart failure (48 hours)  Code Status: Full Code      Disposition: Expected discharge in 2-3 days following diuresis.    Minor Santacruz MD    Interval History   The patient is lying in bed.  He complains of confusion and lethary, but is awake and conversant.  He denies pain.    -Data reviewed today: I reviewed all new labs and imaging results over the last 24 hours. I personally reviewed no images or EKG's today.    Physical Exam   Temp: 97.3  F (36.3  C) Temp src: Oral BP: (!) 165/52 Pulse: 95   Resp: 16 SpO2: 96 % O2 Device: None (Room air)    Vitals:    08/24/22 1003 08/25/22 0212 08/25/22 0449   Weight: 109.8 kg (242 lb 1 oz)  112 kg (246 lb 14.6 oz) 112 kg (246 lb 14.6 oz)     Vital Signs with Ranges  Temp:  [96.5  F (35.8  C)-98.2  F (36.8  C)] 97.3  F (36.3  C)  Pulse:  [90-95] 95  Resp:  [16-19] 16  BP: (133-165)/(52-72) 165/52  SpO2:  [93 %-96 %] 96 %  I/O last 3 completed shifts:  In: 400 [P.O.:400]  Out: 500 [Urine:500]    Gen: Morbidly obese male, alert and oriented x 3, no acute distressed, somnolent  HEENT: Atraumatic, normocephalic; sclera non-injected, anicterric; oral mucosa moist, no lesion, no exudate  Lungs: Clear to ausculation, no wheezes, no rhonchi, no rales  Heart: Regular rate, regular rhythm, no gallops, no rubs, no murmurs  GI: Bowel sound normal, no hepatosplenomegaly, no masses, non-tender, non-distended, no guarding, no rebound tenderness  Lymph: No lymphadenopathy, 1 + edema to hips  Skin: No rashes, no chronic venous stasis     Medications     Continuing ACE inhibitor/ARB/ARNI from home medication list OR ACE inhibitor/ARB order already placed during this visit       BETA BLOCKER NOT PRESCRIBED         amLODIPine  5 mg Oral Daily     aspirin  81 mg Oral Daily     atorvastatin  20 mg Oral Daily     [Held by provider] carvedilol  12.5 mg Oral BID w/meals     clopidogrel  75 mg Oral Daily     ferrous sulfate  325 mg Oral QPM     [Held by provider] furosemide  40 mg Intravenous Q8H     hydrALAZINE  50 mg Oral BID     insulin aspart  1-7 Units Subcutaneous TID AC     insulin aspart  1-5 Units Subcutaneous At Bedtime     [Held by provider] lisinopril  20 mg Oral Daily     nystatin   Topical BID     sodium chloride (PF)  3 mL Intracatheter Q8H       Data   Recent Labs   Lab 08/25/22  1127 08/25/22  0754 08/25/22  0507 08/24/22  1719 08/24/22  1158 08/24/22  0733 08/24/22  0450 08/23/22  1649 08/23/22  1411 08/23/22  0721 08/23/22  0459 08/21/22  0811 08/21/22  0503 08/20/22  1728 08/20/22  1237   WBC  --   --   --   --   --   --   --   --  5.0  --   --   --  2.4*  --  2.2*   HGB  --   --   --   --   --   --   --   --   11.5*  --   --   --  9.9*  --  9.8*   MCV  --   --   --   --   --   --   --   --  89  --   --   --  90  --  92   PLT  --   --   --   --   --   --   --   --  81*  --   --   --  69*  --  63*   NA  --   --  140  --   --   --  141  --   --   --  141   < > 144  --  144   POTASSIUM  --   --  3.7  --  3.6  --  3.3*  --   --   --  3.5   < > 3.7  --  4.3   CHLORIDE  --   --  104  --   --   --  105  --   --   --  106   < > 109  --  113*   CO2  --   --  27  --   --   --  28  --   --   --  29   < > 30  --  27   BUN  --   --  42*  --   --   --  27  --   --   --  20   < > 17  --  16   CR  --   --  2.03*  --   --   --  1.19  --   --   --  0.88   < > 0.92  --  0.86   ANIONGAP  --   --  9  --   --   --  8  --   --   --  6   < > 5  --  4   NATALIIA  --   --  8.4*  --   --   --  8.8  --   --   --  8.7   < > 8.3*  --  8.2*   * 118* 123*   < >  --    < > 126*   < >  --    < > 112*   < > 112*   < > 115*   ALBUMIN  --   --   --   --   --   --   --   --   --   --   --   --  2.8*  --  2.9*   PROTTOTAL  --   --   --   --   --   --   --   --   --   --   --   --  6.3*  --  6.2*   BILITOTAL  --   --   --   --   --   --   --   --   --   --   --   --  1.1  --  1.1   ALKPHOS  --   --   --   --   --   --   --   --   --   --   --   --  81  --  81   ALT  --   --   --   --   --   --   --   --   --   --   --   --  19  --  20   AST  --   --   --   --   --   --   --   --   --   --   --   --  19  --  22    < > = values in this interval not displayed.       No results found for this or any previous visit (from the past 24 hour(s)).

## 2022-08-25 NOTE — PLAN OF CARE
Goal Outcome Evaluation:    Patient was up in recliner for awhile today. He did not eat/drink much today. He is very sleepy, somnolent, confused (alert to person and place but states that he is very confused and don't know what's going on.) Received the 500 bolus today, currently saline locked.  Urine output post bolus is 100, oral intake is 0. Patient has male external catheter. He is a ceiling lift for transfers. Sister came to visit today.

## 2022-08-26 ENCOUNTER — APPOINTMENT (OUTPATIENT)
Dept: OCCUPATIONAL THERAPY | Facility: CLINIC | Age: 82
DRG: 291 | End: 2022-08-26
Payer: MEDICARE

## 2022-08-26 ENCOUNTER — APPOINTMENT (OUTPATIENT)
Dept: ULTRASOUND IMAGING | Facility: CLINIC | Age: 82
DRG: 291 | End: 2022-08-26
Attending: INTERNAL MEDICINE
Payer: MEDICARE

## 2022-08-26 LAB
ANION GAP SERPL CALCULATED.3IONS-SCNC: 8 MMOL/L (ref 3–14)
BUN SERPL-MCNC: 55 MG/DL (ref 7–30)
CALCIUM SERPL-MCNC: 8.4 MG/DL (ref 8.5–10.1)
CHLORIDE BLD-SCNC: 102 MMOL/L (ref 94–109)
CO2 SERPL-SCNC: 28 MMOL/L (ref 20–32)
CREAT SERPL-MCNC: 2.28 MG/DL (ref 0.66–1.25)
GFR SERPL CREATININE-BSD FRML MDRD: 28 ML/MIN/1.73M2
GLUCOSE BLD-MCNC: 125 MG/DL (ref 70–99)
GLUCOSE BLDC GLUCOMTR-MCNC: 110 MG/DL (ref 70–99)
GLUCOSE BLDC GLUCOMTR-MCNC: 124 MG/DL (ref 70–99)
GLUCOSE BLDC GLUCOMTR-MCNC: 130 MG/DL (ref 70–99)
GLUCOSE BLDC GLUCOMTR-MCNC: 133 MG/DL (ref 70–99)
GLUCOSE BLDC GLUCOMTR-MCNC: 141 MG/DL (ref 70–99)
HOLD SPECIMEN: NORMAL
POTASSIUM BLD-SCNC: 3.6 MMOL/L (ref 3.4–5.3)
SODIUM SERPL-SCNC: 138 MMOL/L (ref 133–144)

## 2022-08-26 PROCEDURE — 120N000001 HC R&B MED SURG/OB

## 2022-08-26 PROCEDURE — 80048 BASIC METABOLIC PNL TOTAL CA: CPT | Performed by: FAMILY MEDICINE

## 2022-08-26 PROCEDURE — 99232 SBSQ HOSP IP/OBS MODERATE 35: CPT | Performed by: INTERNAL MEDICINE

## 2022-08-26 PROCEDURE — 36415 COLL VENOUS BLD VENIPUNCTURE: CPT | Performed by: FAMILY MEDICINE

## 2022-08-26 PROCEDURE — 250N000012 HC RX MED GY IP 250 OP 636 PS 637: Performed by: PHYSICIAN ASSISTANT

## 2022-08-26 PROCEDURE — 93970 EXTREMITY STUDY: CPT

## 2022-08-26 PROCEDURE — 250N000013 HC RX MED GY IP 250 OP 250 PS 637: Performed by: PHYSICIAN ASSISTANT

## 2022-08-26 PROCEDURE — 97535 SELF CARE MNGMENT TRAINING: CPT | Mod: GO

## 2022-08-26 RX ADMIN — NYSTATIN: 100000 CREAM TOPICAL at 19:59

## 2022-08-26 RX ADMIN — CLOPIDOGREL BISULFATE 75 MG: 75 TABLET ORAL at 08:23

## 2022-08-26 RX ADMIN — ATORVASTATIN CALCIUM 20 MG: 20 TABLET, FILM COATED ORAL at 08:23

## 2022-08-26 RX ADMIN — ASPIRIN 81 MG: 81 TABLET ORAL at 08:23

## 2022-08-26 RX ADMIN — HYDRALAZINE HYDROCHLORIDE 50 MG: 25 TABLET, FILM COATED ORAL at 08:23

## 2022-08-26 RX ADMIN — AMLODIPINE BESYLATE 5 MG: 5 TABLET ORAL at 08:23

## 2022-08-26 RX ADMIN — INSULIN ASPART 1 UNITS: 100 INJECTION, SOLUTION INTRAVENOUS; SUBCUTANEOUS at 12:08

## 2022-08-26 RX ADMIN — NYSTATIN: 100000 CREAM TOPICAL at 08:23

## 2022-08-26 RX ADMIN — FERROUS SULFATE TAB 325 MG (65 MG ELEMENTAL FE) 325 MG: 325 (65 FE) TAB at 17:02

## 2022-08-26 RX ADMIN — HYDRALAZINE HYDROCHLORIDE 50 MG: 25 TABLET, FILM COATED ORAL at 19:55

## 2022-08-26 ASSESSMENT — ACTIVITIES OF DAILY LIVING (ADL)
ADLS_ACUITY_SCORE: 40
ADLS_ACUITY_SCORE: 36
ADLS_ACUITY_SCORE: 40
ADLS_ACUITY_SCORE: 32
ADLS_ACUITY_SCORE: 36
ADLS_ACUITY_SCORE: 40
ADLS_ACUITY_SCORE: 36
ADLS_ACUITY_SCORE: 40

## 2022-08-26 NOTE — PLAN OF CARE
"Pt A&O x3. Minimal eating and drinking done by pt, encouraged intake. Male purewick in place. IV saline locked. Lung sounds clear. Pt denies any pain.     BP (!) 144/73   Pulse 90   Temp 97.3  F (36.3  C) (Oral)   Resp 18   Ht 1.676 m (5' 5.98\")   Wt 112 kg (246 lb 14.6 oz)   SpO2 94%   BMI 39.87 kg/m      "

## 2022-08-26 NOTE — PROGRESS NOTES
"DATE & TIME: 8/26 DAYS    Ax0x 2/3- pt is more alert in the afternoon then in the AM.    Mobility: CL- pt can help to shift weight while in chair or in bed.   diabetic diet  -low sodium, fluid restriction of 1500  Voiding: incont.   Last BM: incont.    PIV: pt removed IV. No IV in place.   Pain:  denies  Skin: mepilex on sacrum- preventative.   VS: /62   Pulse 96   Temp 98.2  F (36.8  C) (Oral)   Resp 18   Ht 1.676 m (5' 5.98\")   Wt 112 kg (246 lb 14.6 oz)   SpO2 96%   BMI 39.87 kg/m      Tele: no  O2 status: RA  Labs: potassium protocol, Q4hr I&O's    Lazara Mi RN on 8/26/2022 at 5:49 PM      "

## 2022-08-26 NOTE — PROGRESS NOTES
Pt slept most of shift, Requested x2 water . External catheter was removed due to being ineffective. IV had occluded and was removed. Has no IV medications and may discharge today. No replacement at this time.

## 2022-08-26 NOTE — PROGRESS NOTES
"[facility]    Hospitalist Progress Note    Date of Service (when I saw the patient): 08/26/2022    Assessment & Plan   Flash Brown is a 81 year old male admitted on 8/20/2022. He presented to the emergency department for evaluation of edema, 20 pound weight gain, and dyspnea on exertion and was found to have acute heart failure for which he is being admitted for further evaluation and treatment.     Acute diastolic heart failure / (HFpEF) heart failure with preserved ejection fraction - new diagnosis  Anasarca    Presented with 2-3 month history of worsening edema, 20 pound weight gain, and dyspnea on exertion. No improvement with a trial of furosemide 40 mg daily x 5 days from PCP.   Had outpatient echo on 8/12/22 demonstrating \"global and regional left ventricular function is normal with an EF of 60-65%. Global right ventricular function is normal. IVC diameter and respiratory changes fall into an intermediate range suggesting an RA pressure of 8 mmHg. No pericardial effusion is present.\"   Admit chest x-ray demonstrates \"enlarged cardiac silhouette, similar to prior examination. Moderate right pleural effusion, increased since prior radiograph. Likely superimposed pulmonary vascular congestion and interstitial edema. No pneumothorax...\"     EKG shows bradycardia which is possibly sinus with baseline artifact vs flutter/fibrillation (bradycardia not new). Admit BNP 1830. Troponin normal (10). Appears edematous with anasarca, has expiratory wheezing on exam but no crackles. No hypoxia at time of admission. Overall clinical picture consistent with acute CHF, which is a new diagnosis for patient. Unclear cause, may be driven by persisting bradycardia or other undiagnosed arrhythmia, worsening coronary artery disease (but no evidence of acute coronary syndrome), or attributed to his use of pioglitazone (which is known to cause CHF, but is not a new medication for patient).   Was given " "IV furosemide 60 mg with good urine output.   - Furosemide 40 mg q 12 hours on admission  - Due to severe volume overload, increase to q 8 hr on 8/22/22  - daily BMPs and K repletion protocol while diuresing  - No repeat echo indicated, was just completed on 8/12/22  - Daily weights  - Strict I&Os  - Stop pioglitazone  - Hold ACEI due to acute kidney injury    - continue hydralazine; see below regarding beta blocker use  - After initial excellent diuresis, developed acute kidney injury   - Hold lisinopril and furosemide due to MALIK  - Recheck BMP in AM   - check doppler US    Acute kidney injury  Cr increased to 2.03 on 8/25 from 1.19 on 8/24, likely due to aggressive diuresis. 500 mL NS IV 8/25.     Cr 2.28. Expect will improve with time. No fluid bolus at this time.   - Holding furosemide and lisinopril  - Repeat BMP in AM    Altered mental status  Patient noted to be more somnolent this morning, and continues to be somnolent this afternoon.  BMP normal this AM.  Will check CBC, VBG, and CXR.  Consider head CT if does not clear and evaluation otherwise unremarkable.  - Mentation improved on 8/24 and 8/25  - He complains of lethargy and confusion, but is oriented x 3     Sinus bradycardia vs flutter/fibrillation  Admit EKG shows bradycardia, rate as low as 35 bpm. Rhythm possibly new fib/flutter, but could be sinus with baseline artifact. Apparently his heart rate has been low \"for a long time.\" Blood pressure is stable, patient is asymptomatic. PTA has carvedilol 12.5 mg bid.   - Monitor on telemetry - NSR as of 8/21, can discontinue 8/22 evening if remains wnl  - Holding carvedilol; if resumed, would likely need lower dose  - No bradycardia overnight into 8/22/22     Pancytopenia, chronic  Admit WBC 2.2 (transiently low at baseline between 2.7 - 5.0), hemoglobin 9.8 (baseline between 11 - 13), platelets 63 (baseline 64 - 93). Occurs in the setting of prior history of lymphoma (see below). Pancytopenia is followed " by oncology.  - CBC stable on recheck, discontinue trend unless clinically indicated  - See below regarding lymphoma management     Type 2 diabetes mellitus without complication, without long-term current use of insulin  Recent HgbA1c 5.5. Managed prior to admission with pioglitazone 30 mg daily.   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 Follows with oncology Dr. Avendaño. Had a bone marrow biopsy in Feb 2022, which was normal.  - Continue with outpatient oncology surveillance     Essential hypertension, benign  Blood pressure normal / slightly elevated on admission. Managed prior to admission with amlodipine 5 mg daily, carvedilol 12.5 mg bid, clonidine 0.1 mg bid, hydralazine 50 mg bid, and ramipril 10 mg daily.   - See above regarding carvedilol / bradycardia  - Hold clonidine (may have some rebound hypertension as a result)  - Continue amlodipine, hydralazine and ramipril with holding parameters     CAD (coronary artery disease)  Mixed hyperlipidemia  Hyperlipidemia  Follows with [facility] Dr. Tate. History of PCI with LUDIVINA in 2010 (prox/mid RCA, distal circumflex, prox/mid LAD). Stress test 2015 with 70% stenosis of proximal circumflex, did not have subsequent angiogram for unclear reasons. Negative stress test during hospitalization at [facility] in August 2020. Managed prior to admission with aspirin 81 mg daily, clopidogrel 75 mg daily, atorvastatin 20 mg daily, and beta blocker / ACEi / antihypertensive medications noted above. No evidence of acute coronary syndrome at time of admission, although worsening coronary artery disease could be contributing  to development of CHF.  - Continue aspirin, clopidogrel, statin  - See above regarding antihypertensive regimen     BPH (benign prostatic hyperplasia)  Nocturia  Managed prior to admission with tamsulosin 0.4 mg daily, continue.      Obstructive sleep apnea  Apparently does not use CPAP, but has been referred for sleep study.     Severe Obesity  Admit BMI 40.62, contributes to morbidity and mortality.      Concerns for decreased mobility, possible cognitive impairment  Lives with sister in a private home and reports that she cares for him because he gets confused and doesn't understand the health issues sometimes  - PT/OT consult for discharge planning    Diet: Combination Diet Moderate Consistent Carb (60 g CHO per Meal) Diet; Low Saturated Fat Na <2400mg Diet    DVT Prophylaxis: Pneumatic Compression Devices. Cautiously considered lovenox in s/o high risk for DVT with acute illness, low mobility, but he has DAPT and platelets in the 60s (chronic). Technically not contraindicated but clinical judgment.  Caruso Catheter: Not present  Central Lines: None  Cardiac Monitoring: ACTIVE order. Indication: Acute decompensated heart failure (48 hours)  Code Status: Full Code      Discussion: Still has significant edema now with MALIK.  The edema may improve just being off the pioglitazone.  Further diuresis on hold due to MALIK.  Discussed with patient and sister at bedside.    Disposition Plan   Anticipate few more days of medication adjustments.    Attestation:  Total time: 30 minutes    Michael Lenz MD  [facility]      Interval History   Complains of feeling tired which is common per her sister.  Denies shortness of breath at rest.  Denies chest pain or abdominal pain.    Physical Exam   Temp:  [97.3  F (36.3  C)-98.2  F (36.8  C)] 98.2  F (36.8  C)  Pulse:  [90-96] 96  Resp:  [18] 18  BP: (103-144)/(52-73) 103/62  SpO2:  [91 %-96 %] 96 %    Weights:   Vitals:    08/24/22 1003 08/25/22 0212 08/25/22 0449   Weight:  109.8 kg (242 lb 1 oz) 112 kg (246 lb 14.6 oz) 112 kg (246 lb 14.6 oz)    Body mass index is 39.87 kg/m .    Constitutional: Patient is alert and oriented to being in the hospital but not able to say much about why he is here and frequently says that he does not comprehend.  Appears nontoxic.  CV: Regular, 4+ edema throughout the lower extremities to the waist  Respiratory: CTA bilaterally  GI: Soft, non-tender, bowel sounds normal  Skin: Warm and dry    Data   Recent Labs   Lab 08/26/22  1651 08/26/22  1118 08/26/22  0811 08/26/22  0411 08/25/22  0754 08/25/22  0507 08/24/22  1719 08/24/22  1158 08/24/22  0733 08/24/22  0450 08/23/22  1649 08/23/22  1411 08/21/22  0811 08/21/22  0503 08/20/22  1728 08/20/22  1237   WBC  --   --   --   --   --   --   --   --   --   --   --  5.0  --  2.4*  --  2.2*   HGB  --   --   --   --   --   --   --   --   --   --   --  11.5*  --  9.9*  --  9.8*   MCV  --   --   --   --   --   --   --   --   --   --   --  89  --  90  --  92   PLT  --   --   --   --   --   --   --   --   --   --   --  81*  --  69*  --  63*   NA  --   --   --  138  --  140  --   --   --  141  --   --    < > 144  --  144   POTASSIUM  --   --   --  3.6  --  3.7  --  3.6  --  3.3*  --   --    < > 3.7  --  4.3   CHLORIDE  --   --   --  102  --  104  --   --   --  105  --   --    < > 109  --  113*   CO2  --   --   --  28  --  27  --   --   --  28  --   --    < > 30  --  27   BUN  --   --   --  55*  --  42*  --   --   --  27  --   --    < > 17  --  16   CR  --   --   --  2.28*  --  2.03*  --   --   --  1.19  --   --    < > 0.92  --  0.86   ANIONGAP  --   --   --  8  --  9  --   --   --  8  --   --    < > 5  --  4   NATALIIA  --   --   --  8.4*  --  8.4*  --   --   --  8.8  --   --    < > 8.3*  --  8.2*   * 141* 110* 125*   < > 123*   < >  --    < > 126*   < >  --    < > 112*   < > 115*   ALBUMIN  --   --   --   --   --   --   --   --   --   --   --   --   --  2.8*  --  2.9*   PROTTOTAL  --   --   --   --   --   --    --   --   --   --   --   --   --  6.3*  --  6.2*   BILITOTAL  --   --   --   --   --   --   --   --   --   --   --   --   --  1.1  --  1.1   ALKPHOS  --   --   --   --   --   --   --   --   --   --   --   --   --  81  --  81   ALT  --   --   --   --   --   --   --   --   --   --   --   --   --  19  --  20   AST  --   --   --   --   --   --   --   --   --   --   --   --   --  19  --  22    < > = values in this interval not displayed.       Recent Labs   Lab 08/26/22  1651 08/26/22  1118 08/26/22  0811 08/26/22  0411 08/26/22  0222 08/25/22  2142   * 141* 110* 125* 124* 135*        Unresulted Labs Ordered in the Past 30 Days of this Admission     No orders found from 7/21/2022 to 8/21/2022.           Imaging: No results found for this or any previous visit (from the past 24 hour(s)).     I reviewed all new labs and imaging results over the last 24 hours. I personally reviewed no images or EKG's today.    Medications     Continuing ACE inhibitor/ARB/ARNI from home medication list OR ACE inhibitor/ARB order already placed during this visit       BETA BLOCKER NOT PRESCRIBED         amLODIPine  5 mg Oral Daily     aspirin  81 mg Oral Daily     atorvastatin  20 mg Oral Daily     [Held by provider] carvedilol  12.5 mg Oral BID w/meals     clopidogrel  75 mg Oral Daily     ferrous sulfate  325 mg Oral QPM     [Held by provider] furosemide  40 mg Intravenous Q8H     hydrALAZINE  50 mg Oral BID     insulin aspart  1-7 Units Subcutaneous TID AC     insulin aspart  1-5 Units Subcutaneous At Bedtime     [Held by provider] lisinopril  20 mg Oral Daily     nystatin   Topical BID     sodium chloride (PF)  3 mL Intracatheter Q8H   Reviewed    Michael Lenz MD  [facility]

## 2022-08-27 ENCOUNTER — APPOINTMENT (OUTPATIENT)
Dept: PHYSICAL THERAPY | Facility: CLINIC | Age: 82
DRG: 291 | End: 2022-08-27
Payer: MEDICARE

## 2022-08-27 ENCOUNTER — APPOINTMENT (OUTPATIENT)
Dept: OCCUPATIONAL THERAPY | Facility: CLINIC | Age: 82
DRG: 291 | End: 2022-08-27
Payer: MEDICARE

## 2022-08-27 PROBLEM — N17.9 ACUTE KIDNEY FAILURE, UNSPECIFIED (H): Status: ACTIVE | Noted: 2022-08-27

## 2022-08-27 LAB
ALBUMIN UR-MCNC: NEGATIVE MG/DL
ANION GAP SERPL CALCULATED.3IONS-SCNC: 6 MMOL/L (ref 3–14)
APPEARANCE UR: CLEAR
BACTERIA #/AREA URNS HPF: ABNORMAL /HPF
BILIRUB UR QL STRIP: NEGATIVE
BUN SERPL-MCNC: 72 MG/DL (ref 7–30)
CALCIUM SERPL-MCNC: 8.2 MG/DL (ref 8.5–10.1)
CHLORIDE BLD-SCNC: 101 MMOL/L (ref 94–109)
CO2 SERPL-SCNC: 28 MMOL/L (ref 20–32)
COLOR UR AUTO: YELLOW
CREAT SERPL-MCNC: 2.9 MG/DL (ref 0.66–1.25)
CREAT SERPL-MCNC: 3.27 MG/DL (ref 0.66–1.25)
CREAT UR-MCNC: 118 MG/DL
CREAT UR-MCNC: 118 MG/DL
FRACT EXCRET NA UR+SERPL-RTO: 0.4 %
GFR SERPL CREATININE-BSD FRML MDRD: 21 ML/MIN/1.73M2
GLUCOSE BLD-MCNC: 123 MG/DL (ref 70–99)
GLUCOSE BLDC GLUCOMTR-MCNC: 108 MG/DL (ref 70–99)
GLUCOSE BLDC GLUCOMTR-MCNC: 113 MG/DL (ref 70–99)
GLUCOSE BLDC GLUCOMTR-MCNC: 114 MG/DL (ref 70–99)
GLUCOSE BLDC GLUCOMTR-MCNC: 119 MG/DL (ref 70–99)
GLUCOSE BLDC GLUCOMTR-MCNC: 140 MG/DL (ref 70–99)
GLUCOSE UR STRIP-MCNC: NEGATIVE MG/DL
HGB UR QL STRIP: ABNORMAL
HOLD SPECIMEN: NORMAL
KETONES UR STRIP-MCNC: NEGATIVE MG/DL
LEUKOCYTE ESTERASE UR QL STRIP: ABNORMAL
MAGNESIUM SERPL-MCNC: 2 MG/DL (ref 1.6–2.3)
MUCOUS THREADS #/AREA URNS LPF: PRESENT /LPF
NITRATE UR QL: NEGATIVE
PH UR STRIP: 5 [PH] (ref 5–7)
POTASSIUM BLD-SCNC: 3.7 MMOL/L (ref 3.4–5.3)
PROT UR-MCNC: 0.09 G/L
PROT/CREAT 24H UR: 0.08 G/G CR (ref 0–0.2)
RBC URINE: 8 /HPF
SARS-COV-2 RNA RESP QL NAA+PROBE: NEGATIVE
SODIUM SERPL-SCNC: 135 MMOL/L (ref 133–144)
SODIUM SERPL-SCNC: 136 MMOL/L (ref 133–144)
SODIUM UR-SCNC: 20 MMOL/L
SP GR UR STRIP: 1.01 (ref 1–1.03)
SQUAMOUS EPITHELIAL: 1 /HPF
UROBILINOGEN UR STRIP-MCNC: NORMAL MG/DL
WBC CLUMPS #/AREA URNS HPF: PRESENT /HPF
WBC URINE: 35 /HPF

## 2022-08-27 PROCEDURE — 84300 ASSAY OF URINE SODIUM: CPT | Performed by: INTERNAL MEDICINE

## 2022-08-27 PROCEDURE — 82565 ASSAY OF CREATININE: CPT | Performed by: INTERNAL MEDICINE

## 2022-08-27 PROCEDURE — 99233 SBSQ HOSP IP/OBS HIGH 50: CPT | Performed by: INTERNAL MEDICINE

## 2022-08-27 PROCEDURE — 250N000013 HC RX MED GY IP 250 OP 250 PS 637: Performed by: PHYSICIAN ASSISTANT

## 2022-08-27 PROCEDURE — 120N000001 HC R&B MED SURG/OB

## 2022-08-27 PROCEDURE — 87635 SARS-COV-2 COVID-19 AMP PRB: CPT | Performed by: INTERNAL MEDICINE

## 2022-08-27 PROCEDURE — 84156 ASSAY OF PROTEIN URINE: CPT | Performed by: INTERNAL MEDICINE

## 2022-08-27 PROCEDURE — 81003 URINALYSIS AUTO W/O SCOPE: CPT | Performed by: INTERNAL MEDICINE

## 2022-08-27 PROCEDURE — 36415 COLL VENOUS BLD VENIPUNCTURE: CPT | Performed by: INTERNAL MEDICINE

## 2022-08-27 PROCEDURE — 36415 COLL VENOUS BLD VENIPUNCTURE: CPT | Performed by: FAMILY MEDICINE

## 2022-08-27 PROCEDURE — 82310 ASSAY OF CALCIUM: CPT | Performed by: FAMILY MEDICINE

## 2022-08-27 PROCEDURE — 97110 THERAPEUTIC EXERCISES: CPT | Mod: GP

## 2022-08-27 PROCEDURE — 83735 ASSAY OF MAGNESIUM: CPT | Performed by: INTERNAL MEDICINE

## 2022-08-27 PROCEDURE — 84295 ASSAY OF SERUM SODIUM: CPT | Performed by: INTERNAL MEDICINE

## 2022-08-27 PROCEDURE — 97116 GAIT TRAINING THERAPY: CPT | Mod: GP

## 2022-08-27 PROCEDURE — 258N000003 HC RX IP 258 OP 636: Performed by: INTERNAL MEDICINE

## 2022-08-27 PROCEDURE — 87086 URINE CULTURE/COLONY COUNT: CPT | Performed by: INTERNAL MEDICINE

## 2022-08-27 PROCEDURE — 97535 SELF CARE MNGMENT TRAINING: CPT | Mod: GO

## 2022-08-27 RX ORDER — POLYETHYLENE GLYCOL 3350 17 G/17G
17 POWDER, FOR SOLUTION ORAL DAILY
Status: DISCONTINUED | OUTPATIENT
Start: 2022-08-27 | End: 2022-09-02 | Stop reason: HOSPADM

## 2022-08-27 RX ORDER — BISACODYL 10 MG
10 SUPPOSITORY, RECTAL RECTAL DAILY PRN
Status: DISCONTINUED | OUTPATIENT
Start: 2022-08-27 | End: 2022-09-02 | Stop reason: HOSPADM

## 2022-08-27 RX ORDER — SENNOSIDES 8.6 MG
1-2 TABLET ORAL 2 TIMES DAILY PRN
Status: DISCONTINUED | OUTPATIENT
Start: 2022-08-27 | End: 2022-09-02 | Stop reason: HOSPADM

## 2022-08-27 RX ADMIN — INSULIN ASPART 1 UNITS: 100 INJECTION, SOLUTION INTRAVENOUS; SUBCUTANEOUS at 17:38

## 2022-08-27 RX ADMIN — HYDRALAZINE HYDROCHLORIDE 50 MG: 25 TABLET, FILM COATED ORAL at 19:44

## 2022-08-27 RX ADMIN — NYSTATIN: 100000 CREAM TOPICAL at 19:44

## 2022-08-27 RX ADMIN — SODIUM CHLORIDE 1500 ML: 9 INJECTION, SOLUTION INTRAVENOUS at 12:30

## 2022-08-27 RX ADMIN — CLOPIDOGREL BISULFATE 75 MG: 75 TABLET ORAL at 08:40

## 2022-08-27 RX ADMIN — ATORVASTATIN CALCIUM 20 MG: 20 TABLET, FILM COATED ORAL at 08:40

## 2022-08-27 RX ADMIN — ASPIRIN 81 MG: 81 TABLET ORAL at 08:40

## 2022-08-27 RX ADMIN — HYDRALAZINE HYDROCHLORIDE 50 MG: 25 TABLET, FILM COATED ORAL at 08:40

## 2022-08-27 RX ADMIN — FERROUS SULFATE TAB 325 MG (65 MG ELEMENTAL FE) 325 MG: 325 (65 FE) TAB at 17:38

## 2022-08-27 RX ADMIN — AMLODIPINE BESYLATE 5 MG: 5 TABLET ORAL at 08:40

## 2022-08-27 RX ADMIN — NYSTATIN: 100000 CREAM TOPICAL at 08:41

## 2022-08-27 ASSESSMENT — ACTIVITIES OF DAILY LIVING (ADL)
ADLS_ACUITY_SCORE: 32
ADLS_ACUITY_SCORE: 36
ADLS_ACUITY_SCORE: 34
ADLS_ACUITY_SCORE: 32
ADLS_ACUITY_SCORE: 36
ADLS_ACUITY_SCORE: 34
ADLS_ACUITY_SCORE: 36
ADLS_ACUITY_SCORE: 36

## 2022-08-27 NOTE — PLAN OF CARE
"Cardiac: Patient reports that he \"just get so tired\". Up in chair for breakfast, but very tired and assisted to bed just before lunch. BLE two plus edema.  : Bladder scan for 33 ml, incontinent of urine.  Activity: Up with assist of one, walker and gait belt.   Denies pain.   Covid swab negative.                     "

## 2022-08-27 NOTE — PLAN OF CARE
Patient is alert, disoriented to time and occasionally situation. Able to make needs known, converses appropriately, is pleasant. Up w/2A, gait belt, and walker. Some exertional SOA, 2-3+ edema in BLE. Denies pain. Fluid restriction discontinued. External catheter placed at HS d/t incontinence and need for I/O. Decreased urine output, urine is branden in color.  Mepilex is CDI over sacrum. Ultrasound of BLE done. VSS on room air.

## 2022-08-27 NOTE — PROGRESS NOTES
"[facility]    Hospitalist Progress Note    Date of Service (when I saw the patient): 08/27/2022    Assessment & Plan   Flash Brown is a 81 year old male admitted on 8/20/2022. He presented to the emergency department for evaluation of edema, 20 pound weight gain, and dyspnea on exertion and was found to have acute heart failure for which he is being admitted for further evaluation and treatment.     Acute diastolic heart failure / (HFpEF) heart failure with preserved ejection fraction - new diagnosis  Anasarca    Presented with 2-3 month history of worsening edema, 20 pound weight gain, and dyspnea on exertion. No improvement with a trial of furosemide 40 mg daily x 5 days from PCP.   Had outpatient echo on 8/12/22 demonstrating \"global and regional left ventricular function is normal with an EF of 60-65%. Global right ventricular function is normal. IVC diameter and respiratory changes fall into an intermediate range suggesting an RA pressure of 8 mmHg. No pericardial effusion is present.\"   Admit chest x-ray demonstrates \"enlarged cardiac silhouette, similar to prior examination. Moderate right pleural effusion, increased since prior radiograph. Likely superimposed pulmonary vascular congestion and interstitial edema. No pneumothorax...\"     EKG on admission showed bradycardia which is possibly sinus with baseline artifact vs flutter/fibrillation (bradycardia not new). Admit BNP 1830. Troponin normal (10). Appears edematous with anasarca, has expiratory wheezing on exam but no crackles. No hypoxia at time of admission. Overall clinical picture consistent with acute CHF, which is a new diagnosis for patient. Unclear cause, may be driven by persisting bradycardia or other undiagnosed arrhythmia, worsening coronary artery disease (but no evidence of acute coronary syndrome), or attributed to his use of pioglitazone (which is known to cause CHF, but is not a new medication for patient). " "Pioglitazone stopped, last dose was prior to admission.     Was given IV furosemide 60 mg in the ED with good urine output. Treated with 40 mg IV twice daily 8/20 through 8/21, then 40 mg IV q 8h x 3 days. Furosemide stopped 8/25 due to acute kidney injury (below).    Bilateral lower extremity venous doppler US negative for DVT 8/26.   - No repeat echo indicated, was just completed on 8/12/22  - Daily weights: weight appears down about 3.7 kg since admission  - Strict I&Os   - continue hydralazine; Carvedilol initially held due to bradycardia (see below)  - After initial excellent diuresis, developed acute kidney injury   - Holding furosemide due to MALIK   - check doppler US    Acute kidney injury    Cr increased to 2.03 on 8/25 from 1.19 on 8/24, suspect due to aggressive diuresis. 500 mL NS IV 8/25.     Cr 2.28 8/26 and followed. Now Cr 2.90.    - Holding furosemide and lisinopril   - check bladder scan for retention   - check FENa, UA, urine protein    - 1500 mL bolus over 12 hours today   - Repeat BMP in AM     Sinus bradycardia vs flutter/fibrillation    Admit EKG shows bradycardia, rate as low as 35 bpm. Rhythm possibly new fib/flutter, but could be sinus with baseline artifact. Apparently his heart rate has been low \"for a long time.\" Blood pressure is stable, patient is asymptomatic. PTA carvedilol 12.5 mg bid.     Carvedilol held on admission. HR up 78-91 today 8/27, but BP soft.   - Monitor on telemetry - NSR as of 8/21, can discontinue 8/22 evening if remains wnl  - Holding carvedilol; if resumed, would likely need lower dose  - No bradycardia overnight into 8/22/22, now resolved.      Pancytopenia, chronic    Admit WBC 2.2 (transiently low at baseline between 2.7 - 5.0), hemoglobin 9.8 (baseline between 11 - 13), platelets 63 (baseline 64 - 93). Occurs in the setting of prior history of lymphoma (see below). Pancytopenia is followed by oncology.  - CBC stable on recheck, discontinue trend unless " clinically indicated  - See below regarding lymphoma management     Type 2 diabetes mellitus without complication, without long-term current use of insulin    Recent HgbA1c 5.5. Managed prior to admission with pioglitazone 30 mg daily.    Stopped pioglitazone, as it is known to potentially worsen CHF.     BG decent off treatment, 108 this AM.  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 Follows with oncology Dr. Avendaño. Had a bone marrow biopsy in Feb 2022, which was normal.  - Continue with outpatient oncology surveillance     Essential hypertension, benign  Blood pressure normal / slightly elevated on admission. Managed prior to admission with amlodipine 5 mg daily, carvedilol 12.5 mg bid, clonidine 0.1 mg bid, hydralazine 50 mg bid, and ramipril 10 mg daily.     BP soft 8/27 on amlodipine and hydralazine.   - See above regarding carvedilol / bradycardia, on hold  - Holding clonidine    - ramipril substituted to lisinopril, lisinopril on hold due to acute kidney injury   - Will hold amlodipine tomorrow   - continue hydralazine with holding parameters for now     CAD (coronary artery disease)  Mixed hyperlipidemia  Hyperlipidemia    Follows with [facility] Cardiology Dr. Tate. History of PCI with LUDIVINA in 2010 (prox/mid RCA, distal circumflex, prox/mid LAD). Stress test 2015 with 70% stenosis of proximal circumflex, did not have subsequent angiogram for unclear reasons. Negative stress test during hospitalization at [facility] in August 2020. Managed prior to admission with aspirin 81 mg daily, clopidogrel 75 mg daily, atorvastatin 20 mg daily, and beta blocker / ACEi / antihypertensive medications noted above. No evidence of acute  coronary syndrome at time of admission, although worsening coronary artery disease could be contributing to development of CHF.  - Continue aspirin, clopidogrel, statin  - See above regarding antihypertensive regimen     BPH (benign prostatic hyperplasia)  Nocturia  Managed prior to admission with tamsulosin 0.4 mg daily, continue.      Obstructive sleep apnea    Patient has been issued CPAP machines at least 3 times per sister, but he refuses to even try at home.     Severe Obesity  Admit BMI 40.62, contributes to morbidity and mortality.      Concerns for decreased mobility, possible cognitive impairment    Lives with sister in a private home and reports that she cares for him because he gets confused and doesn't understand the health issues  - PT/OT consult for discharge planning    Diet: Combination Diet Moderate Consistent Carb (60 g CHO per Meal) Diet; Low Saturated Fat Na <2400mg Diet    DVT Prophylaxis: Pneumatic Compression Devices. Cautiously considered lovenox in s/o high risk for DVT with acute illness, low mobility, but he has DAPT and platelets in the 60s (chronic). Technically not contraindicated but clinical judgment.  Caruso Catheter: Not present  Central Lines: None  Cardiac Monitoring: ACTIVE order. Indication: Acute decompensated heart failure (48 hours)  Code Status: Full Code        Discussion: Multiple issues, including worsened acute kidney injury, low BP, ongoing anasarca    Disposition Plan   Likely minimum 2-3 more days management in hospital    Attestation:  Total time: 35 minutes    Michael Lenz MD  Hospital Medicine        Interval History   No complaints. Denies shortness of breath, chest pain, nausea.     Physical Exam   Temp:  [97.1  F (36.2  C)-98.2  F (36.8  C)] 97.1  F (36.2  C)  Pulse:  [78-96] 78  Resp:  [18-20] 20  BP: ()/(51-65) 107/65  SpO2:  [93 %-96 %] 96 %    Weights:   Vitals:    08/25/22 0212 08/25/22 0449 08/27/22 0623   Weight: 112 kg (246 lb 14.6 oz) 112 kg (246  lb 14.6 oz) 110.5 kg (243 lb 9.7 oz)    Body mass index is 39.34 kg/m .    Constitutional: alert, oriented to place, month and almost date, NAD, nontoxic  CV: Regular with ectopy, 4+ bilateral lower extremity edema   Respiratory: a few bibasilar crackles otherwise CTA bilaterally  GI: Soft, obese, non-tender, bowel sounds normal  Skin: Warm and dry    Data   Recent Labs   Lab 08/27/22  0758 08/27/22  0423 08/27/22  0300 08/26/22  0811 08/26/22  0411 08/25/22  0754 08/25/22  0507 08/23/22  1649 08/23/22  1411 08/21/22  0811 08/21/22  0503 08/20/22  1728 08/20/22  1237   WBC  --   --   --   --   --   --   --   --  5.0  --  2.4*  --  2.2*   HGB  --   --   --   --   --   --   --   --  11.5*  --  9.9*  --  9.8*   MCV  --   --   --   --   --   --   --   --  89  --  90  --  92   PLT  --   --   --   --   --   --   --   --  81*  --  69*  --  63*   NA  --  135  --   --  138  --  140   < >  --    < > 144  --  144   POTASSIUM  --  3.7  --   --  3.6  --  3.7   < >  --    < > 3.7  --  4.3   CHLORIDE  --  101  --   --  102  --  104   < >  --    < > 109  --  113*   CO2  --  28  --   --  28  --  27   < >  --    < > 30  --  27   BUN  --  72*  --   --  55*  --  42*   < >  --    < > 17  --  16   CR  --  2.90*  --   --  2.28*  --  2.03*   < >  --    < > 0.92  --  0.86   ANIONGAP  --  6  --   --  8  --  9   < >  --    < > 5  --  4   NATALIIA  --  8.2*  --   --  8.4*  --  8.4*   < >  --    < > 8.3*  --  8.2*   * 123* 114*   < > 125*   < > 123*   < >  --    < > 112*   < > 115*   ALBUMIN  --   --   --   --   --   --   --   --   --   --  2.8*  --  2.9*   PROTTOTAL  --   --   --   --   --   --   --   --   --   --  6.3*  --  6.2*   BILITOTAL  --   --   --   --   --   --   --   --   --   --  1.1  --  1.1   ALKPHOS  --   --   --   --   --   --   --   --   --   --  81  --  81   ALT  --   --   --   --   --   --   --   --   --   --  19  --  20   AST  --   --   --   --   --   --   --   --   --   --  19  --  22    < > = values in this interval not  displayed.       Recent Labs   Lab 08/27/22  0758 08/27/22  0423 08/27/22  0300 08/26/22  2131 08/26/22  1651 08/26/22  1118   * 123* 114* 130* 133* 141*        Unresulted Labs Ordered in the Past 30 Days of this Admission     No orders found from 7/21/2022 to 8/21/2022.           Imaging:   Recent Results (from the past 24 hour(s))   US Lower Extremity Venous Duplex Bilateral    Narrative    EXAM: US LOWER EXTREMITY VENOUS DUPLEX BILATERAL  LOCATION: [facility]  DATE/TIME: 8/26/2022 10:02 PM    INDICATION: bilateral LE edema  COMPARISON: None.  TECHNIQUE: Venous Duplex ultrasound of bilateral lower extremities with and without compression, augmentation and duplex. Color flow and spectral Doppler with waveform analysis performed.    FINDINGS: Exam includes the common femoral, femoral, popliteal veins as well as segmentally visualized deep calf veins and greater saphenous vein.     RIGHT: No deep vein thrombosis. No superficial thrombophlebitis. No popliteal cyst.    LEFT: No deep vein thrombosis. No superficial thrombophlebitis. No popliteal cyst.      Impression    IMPRESSION:  1.  No deep venous thrombosis in the bilateral lower extremities.        I reviewed all new labs and imaging results over the last 24 hours. I personally reviewed no images or EKG's today.    Medications     Continuing ACE inhibitor/ARB/ARNI from home medication list OR ACE inhibitor/ARB order already placed during this visit       BETA BLOCKER NOT PRESCRIBED         amLODIPine  5 mg Oral Daily     aspirin  81 mg Oral Daily     atorvastatin  20 mg Oral Daily     [Held by provider] carvedilol  12.5 mg Oral BID w/meals     clopidogrel  75 mg Oral Daily     ferrous sulfate  325 mg Oral QPM     [Held by provider] furosemide  40 mg Intravenous Q8H     hydrALAZINE  50 mg Oral BID     insulin aspart  1-7 Units Subcutaneous TID AC     insulin aspart  1-5 Units Subcutaneous At Bedtime     [Held by provider]  lisinopril  20 mg Oral Daily     nystatin   Topical BID     sodium chloride (PF)  3 mL Intracatheter Q8H   Reviewed meds    Michael Lenz MD  [facility]

## 2022-08-28 ENCOUNTER — APPOINTMENT (OUTPATIENT)
Dept: ULTRASOUND IMAGING | Facility: CLINIC | Age: 82
DRG: 291 | End: 2022-08-28
Attending: INTERNAL MEDICINE
Payer: MEDICARE

## 2022-08-28 ENCOUNTER — APPOINTMENT (OUTPATIENT)
Dept: OCCUPATIONAL THERAPY | Facility: CLINIC | Age: 82
DRG: 291 | End: 2022-08-28
Payer: MEDICARE

## 2022-08-28 LAB
ANION GAP SERPL CALCULATED.3IONS-SCNC: 8 MMOL/L (ref 3–14)
BUN SERPL-MCNC: 82 MG/DL (ref 7–30)
CALCIUM SERPL-MCNC: 7.9 MG/DL (ref 8.5–10.1)
CHLORIDE BLD-SCNC: 100 MMOL/L (ref 94–109)
CO2 SERPL-SCNC: 25 MMOL/L (ref 20–32)
CREAT SERPL-MCNC: 4.1 MG/DL (ref 0.66–1.25)
ERYTHROCYTE [DISTWIDTH] IN BLOOD BY AUTOMATED COUNT: 16.6 % (ref 10–15)
GFR SERPL CREATININE-BSD FRML MDRD: 14 ML/MIN/1.73M2
GLUCOSE BLD-MCNC: 113 MG/DL (ref 70–99)
GLUCOSE BLDC GLUCOMTR-MCNC: 107 MG/DL (ref 70–99)
GLUCOSE BLDC GLUCOMTR-MCNC: 109 MG/DL (ref 70–99)
GLUCOSE BLDC GLUCOMTR-MCNC: 109 MG/DL (ref 70–99)
GLUCOSE BLDC GLUCOMTR-MCNC: 124 MG/DL (ref 70–99)
GLUCOSE BLDC GLUCOMTR-MCNC: 126 MG/DL (ref 70–99)
HCT VFR BLD AUTO: 28.9 % (ref 40–53)
HGB BLD-MCNC: 9.3 G/DL (ref 13.3–17.7)
LACTATE SERPL-SCNC: 0.7 MMOL/L (ref 0.7–2)
MCH RBC QN AUTO: 28.2 PG (ref 26.5–33)
MCHC RBC AUTO-ENTMCNC: 32.2 G/DL (ref 31.5–36.5)
MCV RBC AUTO: 88 FL (ref 78–100)
PLATELET # BLD AUTO: 74 10E3/UL (ref 150–450)
POTASSIUM BLD-SCNC: 3.7 MMOL/L (ref 3.4–5.3)
RBC # BLD AUTO: 3.3 10E6/UL (ref 4.4–5.9)
SODIUM SERPL-SCNC: 133 MMOL/L (ref 133–144)
WBC # BLD AUTO: 3.8 10E3/UL (ref 4–11)

## 2022-08-28 PROCEDURE — 85027 COMPLETE CBC AUTOMATED: CPT | Performed by: INTERNAL MEDICINE

## 2022-08-28 PROCEDURE — 258N000003 HC RX IP 258 OP 636: Performed by: INTERNAL MEDICINE

## 2022-08-28 PROCEDURE — 120N000001 HC R&B MED SURG/OB

## 2022-08-28 PROCEDURE — 99232 SBSQ HOSP IP/OBS MODERATE 35: CPT | Performed by: INTERNAL MEDICINE

## 2022-08-28 PROCEDURE — 250N000013 HC RX MED GY IP 250 OP 250 PS 637: Performed by: PHYSICIAN ASSISTANT

## 2022-08-28 PROCEDURE — 93975 VASCULAR STUDY: CPT

## 2022-08-28 PROCEDURE — 76770 US EXAM ABDO BACK WALL COMP: CPT

## 2022-08-28 PROCEDURE — 83605 ASSAY OF LACTIC ACID: CPT | Performed by: INTERNAL MEDICINE

## 2022-08-28 PROCEDURE — 80048 BASIC METABOLIC PNL TOTAL CA: CPT | Performed by: INTERNAL MEDICINE

## 2022-08-28 PROCEDURE — 97110 THERAPEUTIC EXERCISES: CPT | Mod: GO

## 2022-08-28 PROCEDURE — 36415 COLL VENOUS BLD VENIPUNCTURE: CPT | Performed by: INTERNAL MEDICINE

## 2022-08-28 PROCEDURE — 250N000009 HC RX 250: Performed by: INTERNAL MEDICINE

## 2022-08-28 PROCEDURE — 250N000013 HC RX MED GY IP 250 OP 250 PS 637: Performed by: INTERNAL MEDICINE

## 2022-08-28 RX ORDER — LIDOCAINE HYDROCHLORIDE 20 MG/ML
JELLY TOPICAL ONCE
Status: COMPLETED | OUTPATIENT
Start: 2022-08-28 | End: 2022-08-28

## 2022-08-28 RX ADMIN — LIDOCAINE HYDROCHLORIDE: 20 JELLY TOPICAL at 17:45

## 2022-08-28 RX ADMIN — ASPIRIN 81 MG: 81 TABLET ORAL at 08:43

## 2022-08-28 RX ADMIN — NYSTATIN: 100000 CREAM TOPICAL at 08:43

## 2022-08-28 RX ADMIN — ATORVASTATIN CALCIUM 20 MG: 20 TABLET, FILM COATED ORAL at 08:43

## 2022-08-28 RX ADMIN — FERROUS SULFATE TAB 325 MG (65 MG ELEMENTAL FE) 325 MG: 325 (65 FE) TAB at 16:52

## 2022-08-28 RX ADMIN — CLOPIDOGREL BISULFATE 75 MG: 75 TABLET ORAL at 08:43

## 2022-08-28 RX ADMIN — NYSTATIN: 100000 CREAM TOPICAL at 19:38

## 2022-08-28 RX ADMIN — SODIUM CHLORIDE 1000 ML: 9 INJECTION, SOLUTION INTRAVENOUS at 10:04

## 2022-08-28 RX ADMIN — POLYETHYLENE GLYCOL 3350 17 G: 17 POWDER, FOR SOLUTION ORAL at 08:43

## 2022-08-28 ASSESSMENT — ACTIVITIES OF DAILY LIVING (ADL)
ADLS_ACUITY_SCORE: 34
ADLS_ACUITY_SCORE: 30
ADLS_ACUITY_SCORE: 34
ADLS_ACUITY_SCORE: 30
ADLS_ACUITY_SCORE: 34

## 2022-08-28 NOTE — PROGRESS NOTES
"[facility]    Hospitalist Progress Note    Date of Service (when I saw the patient): 08/28/2022    Assessment & Plan   Flash Brown is a 81 year old male admitted on 8/20/2022. He presented to the emergency department for evaluation of edema, 20 pound weight gain, and dyspnea on exertion and was found to have acute heart failure for which he is being admitted for further evaluation and treatment.     Acute diastolic heart failure / (HFpEF) heart failure with preserved ejection fraction - new diagnosis  Anasarca    Presented with 2-3 month history of worsening edema, 20 pound weight gain, and dyspnea on exertion. No improvement with a trial of furosemide 40 mg daily x 5 days from PCP.   Had outpatient echo on 8/12/22 demonstrating \"global and regional left ventricular function is normal with an EF of 60-65%. Global right ventricular function is normal. IVC diameter and respiratory changes fall into an intermediate range suggesting an RA pressure of 8 mmHg. No pericardial effusion is present.\"   Admit chest x-ray demonstrates \"enlarged cardiac silhouette, similar to prior examination. Moderate right pleural effusion, increased since prior radiograph. Likely superimposed pulmonary vascular congestion and interstitial edema. No pneumothorax...\"     EKG on admission showed bradycardia which is possibly sinus with baseline artifact vs flutter/fibrillation (bradycardia not new). Admit BNP 1830. Troponin normal (10). Appears edematous with anasarca, has expiratory wheezing on exam but no crackles. No hypoxia at time of admission. Overall clinical picture consistent with acute CHF, which is a new diagnosis for patient. Unclear cause, may be driven by persisting bradycardia or other undiagnosed arrhythmia, worsening coronary artery disease (but no evidence of acute coronary syndrome), or attributed to his use of pioglitazone (which is known to cause CHF, but is not a new medication for patient). " "Pioglitazone stopped, last dose was prior to admission.     Was given IV furosemide 60 mg in the ED with good urine output. Treated with 40 mg IV twice daily 8/20 through 8/21, then 40 mg IV q 8h x 3 days. Furosemide stopped 8/25 due to acute kidney injury (below).    Bilateral lower extremity venous doppler US negative for DVT 8/26.   - No repeat echo indicated, was just completed on 8/12/22  - Daily weights: weight appears down about 3.7 kg since admission  - Strict I&Os   - Carvedilol initially held due to bradycardia (see below), hold hydralazine due to soft BP  - After initial excellent diuresis, developed acute kidney injury (below)  - Holding furosemide due to MALIK    Acute kidney injury    Cr increased to 2.03 on 8/25 from 1.19 on 8/24, suspect due to aggressive diuresis. 500 mL NS IV 8/25.     Cr 2.28 8/26 and followed.     Increased to Cr 2.90 on 8.27. FENa 0.4 suggestive of prerenal etiology. Oliguric. Bladder scan shows low PVR. Given 1500 mL IV fluids.     Cr continues to worsen 4.10. K and bicarb normal. Now not feeling well.    - check renal US with arterial dopplers today  Addendum: US shows new mild bilateral hydronephrosis and distended bladder consistent with obstruction.    - urinary catheter today, discussed with RN. Placed and 950 mL output immediately.   - anticipate improvement in renal function with catheter     Sinus bradycardia vs flutter/fibrillation    Admit EKG shows bradycardia, rate as low as 35 bpm. Rhythm possibly new fib/flutter, but could be sinus with baseline artifact. Apparently his heart rate has been low \"for a long time.\" Blood pressure is stable, patient is asymptomatic. PTA carvedilol 12.5 mg bid.     Carvedilol held on admission. HR up 78-91 8/27, but BP soft.   - Holding carvedilol; if resumed, would likely need lower dose  - No bradycardia overnight into 8/22/22, now resolved.      Pancytopenia, chronic    Admit WBC 2.2 (transiently low at baseline between 2.7 - 5.0), " hemoglobin 9.8 (baseline between 11 - 13), platelets 63 (baseline 64 - 93). Occurs in the setting of prior history of lymphoma (see below). Pancytopenia is followed by oncology.  - CBC stable on recheck, discontinue trend unless clinically indicated  - See below regarding lymphoma management     Type 2 diabetes mellitus without complication, without long-term current use of insulin    Recent HgbA1c 5.5. Managed prior to admission with pioglitazone 30 mg daily.    Stopped pioglitazone, as it is known to potentially worsen CHF.     BG decent off treatment, 109 this AM.  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 Follows with oncology Dr. Avendaño. Had a bone marrow biopsy in Feb 2022, which was normal.  - Continue with outpatient oncology surveillance     Essential hypertension, benign  Blood pressure normal / slightly elevated on admission. Managed prior to admission with amlodipine 5 mg daily, carvedilol 12.5 mg bid, clonidine 0.1 mg bid, hydralazine 50 mg bid, and ramipril 10 mg daily.     BP soft 8/27 on amlodipine and hydralazine.   - See above regarding carvedilol / bradycardia, on hold  - Holding clonidine    - ramipril substituted to lisinopril, lisinopril on hold due to acute kidney injury   - Holding amlodipine   - Hold hydralazine      CAD (coronary artery disease)  Mixed hyperlipidemia  Hyperlipidemia    Follows with [facility]iology Dr. Tate. History of PCI with LUDIVINA in 2010 (prox/mid RCA, distal circumflex, prox/mid LAD). Stress test 2015 with 70% stenosis of proximal circumflex, did not have subsequent angiogram for unclear reasons. Negative stress test during hospitalization at [facility] in August 2020. Managed prior to  admission with aspirin 81 mg daily, clopidogrel 75 mg daily, atorvastatin 20 mg daily, and beta blocker / ACEi / antihypertensive medications noted above. No evidence of acute coronary syndrome at time of admission, although worsening coronary artery disease could be contributing to development of CHF.  - Continue aspirin, clopidogrel, statin  - See above regarding antihypertensive regimen     BPH (benign prostatic hyperplasia)  Nocturia  Managed prior to admission with tamsulosin 0.4 mg daily, continue.      Obstructive sleep apnea    Patient has been issued CPAP machines at least 3 times per sister, but he refuses to even try at home.     Severe Obesity  Admit BMI 40.62, contributes to morbidity and mortality.      Concerns for decreased mobility, possible cognitive impairment    Lives with sister in a private home and reports that she cares for him because he gets confused and doesn't understand the health issues  - PT/OT consult for discharge planning    Diet: Combination Diet Moderate Consistent Carb (60 g CHO per Meal) Diet; Low Saturated Fat Na <2400mg Diet    DVT Prophylaxis: Pneumatic Compression Devices. Cautiously considered lovenox in s/o high risk for DVT with acute illness, low mobility, but he has DAPT and platelets in the 60s (chronic). Technically not contraindicated but clinical judgment.  Caruso Catheter: Not present  Central Lines: None  Cardiac Monitoring: ACTIVE order. Indication: Acute decompensated heart failure (48 hours)  Code Status: Full Code        Discussion: Multiple issues, including worsened acute kidney injury, low BP, ongoing anasarca    Disposition Plan   Likely minimum 2-3 more days management in hospital    Attestation:  Total time: 35 minutes    Michael Lenz MD  [facility] Medicine        Interval History   C/o not feeling well today.  No appetite but denies nausea.  Denies abdominal pain.  Denies chest pain.  Does feel some shortness of breath.  No cough.  No fever or  chills.    Physical Exam   Temp:  [97.6  F (36.4  C)-97.8  F (36.6  C)] 97.7  F (36.5  C)  Pulse:  [63-89] 80  Resp:  [20-24] 20  BP: ()/(46-72) 92/65  SpO2:  [92 %-98 %] 95 %    Weights:   Vitals:    08/25/22 0449 08/27/22 0623 08/28/22 0620   Weight: 112 kg (246 lb 14.6 oz) 110.5 kg (243 lb 9.7 oz) 112.9 kg (248 lb 14.4 oz)    Body mass index is 40.19 kg/m .    Constitutional: alert, oriented to place, appears tired, nontoxicin  CV: Regular with ectopy, 4+ bilateral lower extremity edema   Respiratory: a few bibasilar crackles otherwise CTA bilaterally  GI: Soft, obese, non-tender, bowel sounds normal  Skin: Warm and dry    Data   Recent Labs   Lab 08/28/22  0755 08/28/22  0419 08/28/22  0211 08/27/22  1611 08/27/22  1253 08/27/22  0758 08/27/22  0423 08/26/22  0811 08/26/22  0411 08/23/22  1649 08/23/22  1411   WBC  --  3.8*  --   --   --   --   --   --   --   --  5.0   HGB  --  9.3*  --   --   --   --   --   --   --   --  11.5*   MCV  --  88  --   --   --   --   --   --   --   --  89   PLT  --  74*  --   --   --   --   --   --   --   --  81*   NA  --  133  --   --  136  --  135  --  138   < >  --    POTASSIUM  --  3.7  --   --   --   --  3.7  --  3.6   < >  --    CHLORIDE  --  100  --   --   --   --  101  --  102   < >  --    CO2  --  25  --   --   --   --  28  --  28   < >  --    BUN  --  82*  --   --   --   --  72*  --  55*   < >  --    CR  --  4.10*  --   --  3.27*  --  2.90*  --  2.28*   < >  --    ANIONGAP  --  8  --   --   --   --  6  --  8   < >  --    NATALIIA  --  7.9*  --   --   --   --  8.2*  --  8.4*   < >  --    * 113* 124*   < >  --    < > 123*   < > 125*   < >  --     < > = values in this interval not displayed.       Recent Labs   Lab 08/28/22  0755 08/28/22  0419 08/28/22  0211 08/27/22  2058 08/27/22  1611 08/27/22  1149   * 113* 124* 119* 140* 113*        Unresulted Labs Ordered in the Past 30 Days of this Admission     Date and Time Order Name Status Description    8/27/2022   6:09 PM Urine Culture In process            Imaging:   No results found for this or any previous visit (from the past 24 hour(s)).     I reviewed all new labs and imaging results over the last 24 hours. I personally reviewed no images or EKG's today.    Medications     Continuing ACE inhibitor/ARB/ARNI from home medication list OR ACE inhibitor/ARB order already placed during this visit       BETA BLOCKER NOT PRESCRIBED         sodium chloride 0.9%  1,000 mL Intravenous Once     [Held by provider] amLODIPine  5 mg Oral Daily     aspirin  81 mg Oral Daily     atorvastatin  20 mg Oral Daily     [Held by provider] carvedilol  12.5 mg Oral BID w/meals     clopidogrel  75 mg Oral Daily     ferrous sulfate  325 mg Oral QPM     [Held by provider] furosemide  40 mg Intravenous Q8H     [Held by provider] hydrALAZINE  50 mg Oral BID     insulin aspart  1-7 Units Subcutaneous TID AC     insulin aspart  1-5 Units Subcutaneous At Bedtime     [Held by provider] lisinopril  20 mg Oral Daily     nystatin   Topical BID     polyethylene glycol  17 g Oral Daily     sodium chloride (PF)  3 mL Intracatheter Q8H   Reviewed meds    Michael Lenz MD  [facility] Medicine

## 2022-08-28 NOTE — PROGRESS NOTES
"DATE & TIME: 8/28 DAYS    Ax0x 2- pt is alert/confused. Pt keeps stating \"I don't know what is going on\"  - reorient patient   Mobility: Ax1 w/ walker   Carb diet/ Low Na diet    Voiding: pt was voiding very minimal and was bladder scanned for under 150.   Caruso was placed at 1800- 950mL output.    Last BM: 8/28- dark brown loose/soft stool.    PIV: SL   Pain: denies.   Skin: scattered bruises and scabs, skin tear on coccyx- mepilex in place and changed today. Mepilex on chest and arm for scabs that were picked.   VS: /55 (BP Location: Left arm)   Pulse 86   Temp 97.8  F (36.6  C) (Oral)   Resp 20   Ht 1.676 m (5' 5.98\")   Wt 112.9 kg (248 lb 14.4 oz)   SpO2 96%   BMI 40.19 kg/m      Tele: no  O2 status: RA  - pt is SOB with movement.   Labs: creat, potasium protocol.     Lazara Mi RN on 8/28/2022 at 6:33 PM      "

## 2022-08-28 NOTE — PLAN OF CARE
Patient was up in the chair for half of shift for dinner. Patient is an assist x1 with gait belt and walker. He had a large stool during shift and all stool softener were held. Patient got a bath and was put back in bed. IV is infusing NS currently. Patient is still stating that he is confused to staff. Patients sister came to visit during dinner. He only had a couple bites of dinner, did not eat much but drank fluids. He had prune juice today.

## 2022-08-28 NOTE — PLAN OF CARE
Patient is alert, disoriented to time and occasionally situation. Able to make needs known, converses appropriately, is pleasant. Up w/2A, gait belt, and walker. 2-3+ edema in BLE. Denies pain. Incontinent of urine. Mepilex is CDI over sacrum. 500 mL IVF infused per MAR then patient saline locked. Triggered sepsis, LA: 0.7. Soft BPs, have been consistently soft. Rest of VSS on room air.

## 2022-08-29 ENCOUNTER — APPOINTMENT (OUTPATIENT)
Dept: PHYSICAL THERAPY | Facility: CLINIC | Age: 82
DRG: 291 | End: 2022-08-29
Payer: MEDICARE

## 2022-08-29 ENCOUNTER — APPOINTMENT (OUTPATIENT)
Dept: OCCUPATIONAL THERAPY | Facility: CLINIC | Age: 82
DRG: 291 | End: 2022-08-29
Payer: MEDICARE

## 2022-08-29 LAB
ALBUMIN UR-MCNC: 100 MG/DL
ANION GAP SERPL CALCULATED.3IONS-SCNC: 8 MMOL/L (ref 3–14)
APPEARANCE UR: ABNORMAL
BACTERIA #/AREA URNS HPF: ABNORMAL /HPF
BACTERIA UR CULT: NORMAL
BILIRUB UR QL STRIP: NEGATIVE
BUN SERPL-MCNC: 59 MG/DL (ref 7–30)
CALCIUM SERPL-MCNC: 8.1 MG/DL (ref 8.5–10.1)
CHLORIDE BLD-SCNC: 106 MMOL/L (ref 94–109)
CO2 SERPL-SCNC: 25 MMOL/L (ref 20–32)
COLOR UR AUTO: ABNORMAL
CREAT SERPL-MCNC: 2.35 MG/DL (ref 0.66–1.25)
ERYTHROCYTE [DISTWIDTH] IN BLOOD BY AUTOMATED COUNT: 16.5 % (ref 10–15)
GFR SERPL CREATININE-BSD FRML MDRD: 27 ML/MIN/1.73M2
GLUCOSE BLD-MCNC: 83 MG/DL (ref 70–99)
GLUCOSE BLDC GLUCOMTR-MCNC: 73 MG/DL (ref 70–99)
GLUCOSE BLDC GLUCOMTR-MCNC: 92 MG/DL (ref 70–99)
GLUCOSE BLDC GLUCOMTR-MCNC: 93 MG/DL (ref 70–99)
GLUCOSE BLDC GLUCOMTR-MCNC: 96 MG/DL (ref 70–99)
GLUCOSE BLDC GLUCOMTR-MCNC: 98 MG/DL (ref 70–99)
GLUCOSE UR STRIP-MCNC: NEGATIVE MG/DL
HCT VFR BLD AUTO: 28.1 % (ref 40–53)
HGB BLD-MCNC: 9.2 G/DL (ref 13.3–17.7)
HGB UR QL STRIP: ABNORMAL
KETONES UR STRIP-MCNC: NEGATIVE MG/DL
LACTATE SERPL-SCNC: 1.4 MMOL/L (ref 0.7–2)
LEUKOCYTE ESTERASE UR QL STRIP: ABNORMAL
MCH RBC QN AUTO: 28.7 PG (ref 26.5–33)
MCHC RBC AUTO-ENTMCNC: 32.7 G/DL (ref 31.5–36.5)
MCV RBC AUTO: 88 FL (ref 78–100)
NITRATE UR QL: NEGATIVE
PH UR STRIP: 7.5 [PH] (ref 5–7)
PLATELET # BLD AUTO: 73 10E3/UL (ref 150–450)
POTASSIUM BLD-SCNC: 3.8 MMOL/L (ref 3.4–5.3)
RBC # BLD AUTO: 3.21 10E6/UL (ref 4.4–5.9)
RBC URINE: >100 /HPF
SODIUM SERPL-SCNC: 139 MMOL/L (ref 133–144)
SP GR UR STRIP: 1.01 (ref 1–1.03)
UROBILINOGEN UR STRIP-MCNC: 4 MG/DL
WBC # BLD AUTO: 2.7 10E3/UL (ref 4–11)
WBC URINE: >100 /HPF

## 2022-08-29 PROCEDURE — 250N000013 HC RX MED GY IP 250 OP 250 PS 637: Performed by: PHYSICIAN ASSISTANT

## 2022-08-29 PROCEDURE — 99233 SBSQ HOSP IP/OBS HIGH 50: CPT | Performed by: INTERNAL MEDICINE

## 2022-08-29 PROCEDURE — 97116 GAIT TRAINING THERAPY: CPT | Mod: GP

## 2022-08-29 PROCEDURE — 81001 URINALYSIS AUTO W/SCOPE: CPT | Performed by: INTERNAL MEDICINE

## 2022-08-29 PROCEDURE — 80048 BASIC METABOLIC PNL TOTAL CA: CPT | Performed by: INTERNAL MEDICINE

## 2022-08-29 PROCEDURE — 120N000001 HC R&B MED SURG/OB

## 2022-08-29 PROCEDURE — 97110 THERAPEUTIC EXERCISES: CPT | Mod: GP

## 2022-08-29 PROCEDURE — 97110 THERAPEUTIC EXERCISES: CPT | Mod: GO

## 2022-08-29 PROCEDURE — 36415 COLL VENOUS BLD VENIPUNCTURE: CPT | Performed by: INTERNAL MEDICINE

## 2022-08-29 PROCEDURE — 97535 SELF CARE MNGMENT TRAINING: CPT | Mod: GO

## 2022-08-29 PROCEDURE — 87086 URINE CULTURE/COLONY COUNT: CPT | Performed by: INTERNAL MEDICINE

## 2022-08-29 PROCEDURE — 83605 ASSAY OF LACTIC ACID: CPT | Performed by: INTERNAL MEDICINE

## 2022-08-29 PROCEDURE — 85027 COMPLETE CBC AUTOMATED: CPT | Performed by: INTERNAL MEDICINE

## 2022-08-29 PROCEDURE — 250N000011 HC RX IP 250 OP 636: Performed by: INTERNAL MEDICINE

## 2022-08-29 PROCEDURE — 250N000013 HC RX MED GY IP 250 OP 250 PS 637: Performed by: INTERNAL MEDICINE

## 2022-08-29 RX ORDER — CEFTRIAXONE 1 G/1
1 INJECTION, POWDER, FOR SOLUTION INTRAMUSCULAR; INTRAVENOUS EVERY 24 HOURS
Status: DISCONTINUED | OUTPATIENT
Start: 2022-08-30 | End: 2022-08-30

## 2022-08-29 RX ADMIN — NYSTATIN: 100000 CREAM TOPICAL at 19:43

## 2022-08-29 RX ADMIN — ASPIRIN 81 MG: 81 TABLET ORAL at 08:46

## 2022-08-29 RX ADMIN — FUROSEMIDE 40 MG: 10 INJECTION, SOLUTION INTRAMUSCULAR; INTRAVENOUS at 23:43

## 2022-08-29 RX ADMIN — FERROUS SULFATE TAB 325 MG (65 MG ELEMENTAL FE) 325 MG: 325 (65 FE) TAB at 16:33

## 2022-08-29 RX ADMIN — POLYETHYLENE GLYCOL 3350 17 G: 17 POWDER, FOR SOLUTION ORAL at 08:46

## 2022-08-29 RX ADMIN — ATORVASTATIN CALCIUM 20 MG: 20 TABLET, FILM COATED ORAL at 08:46

## 2022-08-29 RX ADMIN — NYSTATIN: 100000 CREAM TOPICAL at 08:49

## 2022-08-29 RX ADMIN — CLOPIDOGREL BISULFATE 75 MG: 75 TABLET ORAL at 08:46

## 2022-08-29 ASSESSMENT — ACTIVITIES OF DAILY LIVING (ADL)
ADLS_ACUITY_SCORE: 30

## 2022-08-29 NOTE — PROGRESS NOTES
Neuro: Alert, disoriented to situation.  Cardiac: WNL  Respiratory: WNL. SOBE  GI/: Last BM 8/28. Caruso output red tinged, improved throughout night  Mobility: A1-2 GB walker  Diet: CHO   Pain: Denies.   Skin: Mepilex sacrum CDI. Scattered bruises/scabs  LDA: PIV SL.     VS: VSS on RA. Afebrile.

## 2022-08-29 NOTE — PLAN OF CARE
Goal Outcome Evaluation:    Temp: 98.3  F (36.8  C) Temp src: Oral BP: 108/56 Pulse: 75   Resp: 20 SpO2: 96 % O2 Device: None (Room air)       Patient remains on strict I&O's as he is being diuresed. HR is irregular. Pitting edema noted in BLE +2. Caruso remains intact and patient has had an output of 2750 mL of urine. Lung sounds are clear and bowel sounds are active.  Vitals remain stable. Scattered bruising noted. Patient reports generalized weakness. Blood sugars lower throughout shift (73-98) and sliding scale insulin was held. Glucerna to be given with each meal per MD orders. Patient is A1 with a walker- and remains confused to situation but is easily redirectable.

## 2022-08-29 NOTE — PROGRESS NOTES
"[facility]    Hospitalist Progress Note    Date of Service (when I saw the patient): 08/29/2022    Assessment & Plan   Flash Brown is a 81 year old male admitted on 8/20/2022. He presented to the emergency department for evaluation of edema, 20 pound weight gain, and dyspnea on exertion and was found to have acute heart failure for which he is being admitted for further evaluation and treatment.     Acute diastolic heart failure / (HFpEF) heart failure with preserved ejection fraction - new diagnosis  Anasarca    Presented with 2-3 month history of worsening edema, 20 pound weight gain, and dyspnea on exertion. No improvement with a trial of furosemide 40 mg daily x 5 days from PCP.     Had outpatient echo on 8/12/22 demonstrating \"global and regional left ventricular function is normal with an EF of 60-65%. Global right ventricular function is normal. IVC diameter and respiratory changes fall into an intermediate range suggesting an RA pressure of 8 mmHg. No pericardial effusion is present.\"   Admit chest x-ray demonstrates \"enlarged cardiac silhouette, similar to prior examination. Moderate right pleural effusion, increased since prior radiograph. Likely superimposed pulmonary vascular congestion and interstitial edema. No pneumothorax...\"     EKG on admission showed bradycardia which is possibly sinus with baseline artifact vs flutter/fibrillation (bradycardia not new). Admit BNP 1830. Troponin normal (10). Appears edematous with anasarca, has expiratory wheezing on exam but no crackles. No hypoxia at time of admission. Overall clinical picture consistent with acute CHF, which is a new diagnosis for patient. Unclear cause, may be driven by persisting bradycardia or other undiagnosed arrhythmia, worsening coronary artery disease (but no evidence of acute coronary syndrome), or attributed to his use of pioglitazone (which is known to cause CHF, but is not a new medication for " patient). Pioglitazone stopped, last dose was prior to admission.     Was given IV furosemide 60 mg in the ED with good urine output. Treated with 40 mg IV twice daily 8/20 through 8/21, then 40 mg IV q 8h x 3 days with initial excellent diuresis. Furosemide stopped 8/25 due to acute kidney injury (below).    Bilateral lower extremity venous doppler US negative for DVT 8/26.     Acute kidney injury resolving and can resume diuresis 8/29/2022 .   - resume furosemide 40 mg IV q8 hours   - No repeat echo indicated, was just completed on 8/12/22  - Daily weights: weight appears down about 3.7 kg since admission  - Strict I&Os   - Carvedilol initially held due to bradycardia (see below), hold hydralazine due to soft BP    Acute kidney injury due to bladder outlet obstruction    Cr increased to 2.03 on 8/25 from 1.19 on 8/24, suspect due to aggressive diuresis. 500 mL NS IV 8/25.     Cr 2.28 8/26 and followed.     Increased to Cr 2.90 on 8.27. FENa 0.4 suggestive of prerenal etiology. Oliguric. Bladder scan shows low PVR. Given 1500 mL IV fluids.     Cr continues to worsen 4.10 on 8/28. Renal US with arterial dopplers showed findings consistent with obstruction and no evidence for renal artery stenosis. Urinary catheter placed.  Initial large urine output though appears to be slowing as I see late afternoon.    Cr improved to 2.35 just 12 hours after catheter placement. Expect will continue to improve.    - continue Caruso catheter   - follow renal function daily    Bladder outlet obstruction - new    No known history. Saw urologist in March 2022 due to nocturia which was felt due to sleep apnea. Unclear if there was chronic obstruction present on admission but patient developed apparent acute obstruction about 8/24 with some overflow urinary output. Bladder scan did not  on distended bladder. Formal renal US 8/28 showed bilateral mild hydronephrosis and distended bladder, new compared to renal US 7/11/2019. Caruso  "catheter placed by RN without difficulty and 950 mL of initial output noted.     UA on admission 8/23 showed small blood and 2 RBC and was otherwise unremarkable. UA after developing acute kidney injury but before placing catheter showed trace blood with 8 RBC, large leukocyte esterase with 35 and WBC and WBC clumps with few bacteria and no casts noted, and UC showed 10-50k mixed urogenital chelsy. Not impressive for UTI but does not rule out. No urinary symptoms.    - continue Caruso catheter at least a couple of weeks, then voiding trial   - recheck UA/UC     Sinus bradycardia vs flutter/fibrillation    Admit EKG shows bradycardia, rate as low as 35 bpm. Rhythm possibly new fib/flutter, but could be sinus with baseline artifact. Apparently his heart rate has been low \"for a long time.\" Blood pressure is stable, patient is asymptomatic. PTA carvedilol 12.5 mg bid.     Carvedilol held on admission. HR up 70's - 80's.   - Holding carvedilol; if resumed, would likely need lower dose  - No bradycardia overnight into 8/22/22, now resolved.      Pancytopenia, chronic    Admit WBC 2.2 (transiently low at baseline between 2.7 - 5.0), hemoglobin 9.8 (baseline between 11 - 13), platelets 63 (baseline 64 - 93). Occurs in the setting of prior history of lymphoma (see below). Pancytopenia is followed by oncology.  - CBC stable on recheck, discontinue trend unless clinically indicated  - See below regarding lymphoma management     Type 2 diabetes mellitus without complication, without long-term current use of insulin    Recent HgbA1c 5.5. Managed prior to admission with pioglitazone 30 mg daily.    Stopped pioglitazone, as it is known to potentially worsen CHF.     BG not elevated off treatment.  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 Follows with oncology Dr. Avendaño. Had a bone marrow biopsy in Feb 2022, which was normal.  - Continue with outpatient oncology surveillance     Essential hypertension, benign    Blood pressure normal / slightly elevated on admission. Managed prior to admission with amlodipine 5 mg daily, carvedilol 12.5 mg bid, clonidine 0.1 mg bid, hydralazine 50 mg bid, and ramipril 10 mg daily.     BP soft 8/27 on amlodipine and hydralazine.     's/50's-60's off antihypertensive medications. Now resuming diuresis. Continue to hold meds today.  - See above regarding carvedilol / bradycardia, on hold  - Holding clonidine    - ramipril substituted to lisinopril, lisinopril on hold due to acute kidney injury   - Holding amlodipine   - Hold hydralazine      CAD (coronary artery disease)  Mixed hyperlipidemia  Hyperlipidemia    Follows with [facility] Dr. Tate. History of PCI with LUDIVINA in 2010 (prox/mid RCA, distal circumflex, prox/mid LAD). Stress test 2015 with 70% stenosis of proximal circumflex, did not have subsequent angiogram for unclear reasons. Negative stress test during hospitalization at [facility] in August 2020. Managed prior to admission with aspirin 81 mg daily, clopidogrel 75 mg daily, atorvastatin 20 mg daily, and beta blocker / ACEi / antihypertensive medications noted above. No evidence of acute coronary syndrome at time of admission, although worsening coronary artery disease could be contributing to development of CHF.     Patient is on DAPT but has had no recent stent/PCI. Cardiology note 7/18/2019 recommended to stop clopidogrel and continue aspirin 81 mg, and follow up note 2/19/2020 confirmed intention to stop clopidogrel. Does not appear that this was ever stopped. Can find no indication for continuing DAPT in the EMR.    - Continue aspirin, statin   - stop clopidogrel  - See above  regarding antihypertensive regimen     BPH (benign prostatic hyperplasia)  Nocturia  Managed prior to admission with tamsulosin 0.4 mg daily, continue.      Obstructive sleep apnea    Patient has been issued CPAP machines at least 3 times per sister, but he refuses to even try at home.     Severe Obesity  Admit BMI 40.62, contributes to morbidity and mortality.      Concerns for decreased mobility, possible cognitive impairment    Lives with sister in a private home and reports that she cares for him because he gets confused and doesn't understand the health issues  - PT/OT consult for discharge planning    Diet: Combination Diet Moderate Consistent Carb (60 g CHO per Meal) Diet; Low Saturated Fat Na <2400mg Diet    DVT Prophylaxis: Pneumatic Compression Devices. Cautiously considered lovenox in s/o high risk for DVT with acute illness, low mobility, but he has DAPT and platelets in the 60s (chronic). Technically not contraindicated but clinical judgment.  Caruso Catheter: Not present  Central Lines: None  Cardiac Monitoring: ACTIVE order. Indication: Acute decompensated heart failure (48 hours)  Code Status: Full Code        Discussion: Overall, patient is significantly proved since placing the Caruso catheter.  The degree can get back to the original plan for aggressive diuresis.  Discussed with sister and brother-in-law at bedside    Disposition Plan   3-4 days    Attestation:  Total time: 30 minutes    Michael Lenz MD  Hospital Medicine        Interval History   No nausea, appetite better. No chest pain, shortness of breath.     Physical Exam   Temp:  [97.4  F (36.3  C)-98.3  F (36.8  C)] 98.3  F (36.8  C)  Pulse:  [74-89] 75  Resp:  [18-24] 20  BP: (108-135)/(56-72) 108/56  SpO2:  [94 %-96 %] 96 %    Weights:   Vitals:    08/27/22 0623 08/28/22 0620 08/29/22 0615   Weight: 110.5 kg (243 lb 9.7 oz) 112.9 kg (248 lb 14.4 oz) 112.4 kg (247 lb 12.8 oz)    Body mass index is 40.01 kg/m .    Constitutional: alert,  brighter, smiles, oriented to day's events  CV: Regular, extensive edema to mid abdomen  Respiratory: mild bibasilar crackles otherwise CTA bilaterally  GI: Soft, non-tender, bowel sounds normal  Skin: Warm and dry    Data   Recent Labs   Lab 08/29/22 1648 08/29/22 1134 08/29/22 0754 08/29/22  0421 08/28/22  0755 08/28/22  0419 08/27/22  1611 08/27/22  1253 08/27/22  0758 08/27/22  0423 08/23/22  1649 08/23/22  1411   WBC  --   --   --  2.7*  --  3.8*  --   --   --   --   --  5.0   HGB  --   --   --  9.2*  --  9.3*  --   --   --   --   --  11.5*   MCV  --   --   --  88  --  88  --   --   --   --   --  89   PLT  --   --   --  73*  --  74*  --   --   --   --   --  81*   NA  --   --   --  139  --  133  --  136  --  135   < >  --    POTASSIUM  --   --   --  3.8  --  3.7  --   --   --  3.7   < >  --    CHLORIDE  --   --   --  106  --  100  --   --   --  101   < >  --    CO2  --   --   --  25  --  25  --   --   --  28   < >  --    BUN  --   --   --  59*  --  82*  --   --   --  72*   < >  --    CR  --   --   --  2.35*  --  4.10*  --  3.27*  --  2.90*   < >  --    ANIONGAP  --   --   --  8  --  8  --   --   --  6   < >  --    NATALIIA  --   --   --  8.1*  --  7.9*  --   --   --  8.2*   < >  --    GLC 98 92 73 83   < > 113*   < >  --    < > 123*   < >  --     < > = values in this interval not displayed.       Recent Labs   Lab 08/29/22 1648 08/29/22 1134 08/29/22 0754 08/29/22  0421 08/29/22  0206 08/28/22  2207   [facility] 98 92 73 83 93 109*        Unresulted Labs Ordered in the Past 30 Days of this Admission     No orders found from 7/21/2022 to 8/21/2022.           Imaging: No results found for this or any previous visit (from the past 24 hour(s)).     I reviewed all new labs and imaging results over the last 24 hours. I personally reviewed no images or EKG's today.    Medications     Continuing ACE inhibitor/ARB/ARNI from home medication list OR ACE inhibitor/ARB order already placed during this visit       BETA BLOCKER NOT  PRESCRIBED         [Held by provider] amLODIPine  5 mg Oral Daily     aspirin  81 mg Oral Daily     atorvastatin  20 mg Oral Daily     [Held by provider] carvedilol  12.5 mg Oral BID w/meals     clopidogrel  75 mg Oral Daily     ferrous sulfate  325 mg Oral QPM     furosemide  40 mg Intravenous Q8H     [Held by provider] hydrALAZINE  50 mg Oral BID     insulin aspart  1-7 Units Subcutaneous TID AC     insulin aspart  1-5 Units Subcutaneous At Bedtime     [Held by provider] lisinopril  20 mg Oral Daily     nystatin   Topical BID     polyethylene glycol  17 g Oral Daily     sodium chloride (PF)  3 mL Intracatheter Q8H   Reviewed meds    Michael Lenz MD  [facility]

## 2022-08-29 NOTE — PROGRESS NOTES
"CLINICAL NUTRITION SERVICES - ASSESSMENT NOTE     Nutrition Prescription    RECOMMENDATIONS FOR MDs/PROVIDERS TO ORDER:  None at this time    Malnutrition Status:    Unable to determine    Recommendations already ordered by Registered Dietitian (RD):  Please send Glucerna with dinner meal    Future/Additional Recommendations:  Monitor patient weight, intakes, labs and GI/BM     REASON FOR ASSESSMENT  Flash Brown is a/an 81 year old male assessed by the dietitian for LOS    NUTRITION HISTORY  Per chart review  -Patient remains alert but confused; disorientated to situation   -Patient presents with acute diastolic HF with preserved ejection fraction new dx-RD provided patient education in discharge instructions d/t patient current continued confusion, MALIK, sinus bradycardia vs flutter/fibrillation, pancytopenia, type 2 DM, hx of grade 3 lymphoma-now in remission, HTN, CAD, HLD, BPH, ALLY, severe obesity; possible cognitive impairment  -Patient last BM 8/28    Per patient interview  -patient not appropriate for nutrition interview at this time d/t confusion. RD spoke to patient nurse who reported that patient appetite tends to wax and wane. Per RN patient intakes today was very good. However last week patient was eating very poorly. Patient would mostly likely benefit from supplementation.     CURRENT NUTRITION ORDERS  Diet: Orders Placed This Encounter      Combination Diet Moderate Consistent Carb (60 g CHO per Meal) Diet; Low Saturated Fat Na <2400mg Diet      Intake/Tolerance: 25-75% of recorded intakes    LABS  Labs reviewed    MEDICATIONS  Medications reviewed  Scheduled: Lipitor, Plavix, Ferosul, Lasix, Novolog, Zestril, Miralax  Continuous: none  PRN: None    ANTHROPOMETRICS  Height: 167.6 cm (5' 5.984\")  Most Recent Weight: 112.4 kg (247 lb 12.8 oz)    IBW: 64.5 kg  BMI: Obesity Grade III BMI >40  Weight History:   Wt Readings from Last 15 Encounters:   08/29/22 112.4 kg (247 lb 12.8 oz)   08/02/22 117.7 kg " (259 lb 8 oz)   05/24/22 110.9 kg (244 lb 6.4 oz)   02/24/22 114.3 kg (252 lb)   02/24/22 114.6 kg (252 lb 9.6 oz)   02/02/22 112.9 kg (249 lb)   11/09/21 114 kg (251 lb 6.4 oz)   09/08/21 115 kg (253 lb 8 oz)   08/03/21 113.2 kg (249 lb 9.6 oz)   07/07/21 114.3 kg (252 lb)   05/27/21 114.3 kg (252 lb)   04/26/21 114.8 kg (253 lb)   04/23/21 116 kg (255 lb 12.8 oz)   01/08/21 108.9 kg (240 lb)   10/20/20 110.2 kg (243 lb)   unable to assess d/t potential fluid fluctuation     Dosing Weight: 76.5 kg-adjusted BW used    ASSESSED NUTRITION NEEDS  Estimated Energy Needs: 1,912-2,295 kcals/day (25-30 kcals/kg)  Justification: maintain   Estimated Protein Needs: 77-92 grams protein/day (1 - 1.2 grams of pro/kg)  Justification: Increased needs  Estimated Fluid Needs: 1,912-2,295 mL/day (1 mL/kcal)   Justification: Per provider pending fluid status    PHYSICAL FINDINGS  See malnutrition section below.  Mild edema noted    MALNUTRITION  % Intake: Decreased intake does not meet criteria  % Weight Loss: Unable to assess  Subcutaneous Fat Loss: Unable to assess  Muscle Loss: Unable to assess  Fluid Accumulation/Edema: Mild  Malnutrition Diagnosis: Unable to determine due to patient confusion and mental status    NUTRITION DIAGNOSIS  Predicted Inadequate protein intake related to increased needs as evidenced by patient variable/ inconsistent intakes      INTERVENTIONS  Implementation  Collaboration with other providers-IDT   Medical food supplement therapy-Glucerna once daily    Goals  Patient to consume % of nutritionally adequate meal trays TID, or the equivalent with supplements/snacks.     Monitoring/Evaluation  Progress toward goals will be monitored and evaluated per protocol.    Martha Farmer RDN, STACY  Clinical Dietitian  Office: [phone]  Weekend pager: [phone]

## 2022-08-29 NOTE — PROGRESS NOTES
Care Management Follow Up    Length of Stay (days): 9    Expected Discharge Date: 09/01/2022     Concerns to be Addressed: all concerns addressed in this encounter     Patient plan of care discussed at interdisciplinary rounds: No    Anticipated Discharge Disposition: Skilled Nursing Facility, Transitional Care     Anticipated Discharge Services: None  Anticipated Discharge DME: None    Patient/family educated on Medicare website which has current facility and service quality ratings: yes  Education Provided on the Discharge Plan:  yes  Patient/Family in Agreement with the Plan: yes    Referrals Placed by CM/SW: Post Acute Facilities  Private pay costs discussed: transportation costs    Additional Information:  Met with patient at bedside and spoke with patient's sister, Daylin via phone.  Discussed discharge goal of TCU.  Both in agreement.  [facility]Phone: [phone]/Fax: [phone]) as soon as 8/31.  Both [facility].      Discussed discharge transporation.  Daylin requests CM arrange wheelchair transportation related to limited mobility.  Discussed private pay costs.  Daylin is understanding and agreeable.  CM to arrange wheelchair transport at discharge.      PLAN:  [facility] 8/31    Betty Bradford MSN, RN  Inpatient Care Coordinator  [facility] [phone]  [facility] [phone]

## 2022-08-30 ENCOUNTER — APPOINTMENT (OUTPATIENT)
Dept: PHYSICAL THERAPY | Facility: CLINIC | Age: 82
DRG: 291 | End: 2022-08-30
Payer: MEDICARE

## 2022-08-30 LAB
ALBUMIN SERPL BCG-MCNC: 3.2 G/DL (ref 3.5–5.2)
ALP SERPL-CCNC: 116 U/L (ref 40–129)
ALT SERPL W P-5'-P-CCNC: 30 U/L (ref 10–50)
ANION GAP SERPL CALCULATED.3IONS-SCNC: 11 MMOL/L (ref 7–15)
AST SERPL W P-5'-P-CCNC: 37 U/L (ref 10–50)
BILIRUB SERPL-MCNC: 0.9 MG/DL
BUN SERPL-MCNC: 25 MG/DL (ref 8–23)
CALCIUM SERPL-MCNC: 8.7 MG/DL (ref 8.8–10.2)
CHLORIDE SERPL-SCNC: 103 MMOL/L (ref 98–107)
CREAT SERPL-MCNC: 1.06 MG/DL (ref 0.67–1.17)
DEPRECATED HCO3 PLAS-SCNC: 26 MMOL/L (ref 22–29)
GFR SERPL CREATININE-BSD FRML MDRD: 71 ML/MIN/1.73M2
GLUCOSE BLDC GLUCOMTR-MCNC: 102 MG/DL (ref 70–99)
GLUCOSE BLDC GLUCOMTR-MCNC: 112 MG/DL (ref 70–99)
GLUCOSE BLDC GLUCOMTR-MCNC: 118 MG/DL (ref 70–99)
GLUCOSE BLDC GLUCOMTR-MCNC: 164 MG/DL (ref 70–99)
GLUCOSE BLDC GLUCOMTR-MCNC: 97 MG/DL (ref 70–99)
GLUCOSE SERPL-MCNC: 149 MG/DL (ref 70–99)
HOLD SPECIMEN: NORMAL
POTASSIUM SERPL-SCNC: 3.9 MMOL/L (ref 3.4–5.3)
PROT SERPL-MCNC: 6.1 G/DL (ref 6.4–8.3)
SODIUM SERPL-SCNC: 140 MMOL/L (ref 136–145)

## 2022-08-30 PROCEDURE — 250N000013 HC RX MED GY IP 250 OP 250 PS 637: Performed by: INTERNAL MEDICINE

## 2022-08-30 PROCEDURE — 97110 THERAPEUTIC EXERCISES: CPT | Mod: GP

## 2022-08-30 PROCEDURE — 80053 COMPREHEN METABOLIC PANEL: CPT | Performed by: INTERNAL MEDICINE

## 2022-08-30 PROCEDURE — 250N000011 HC RX IP 250 OP 636: Performed by: INTERNAL MEDICINE

## 2022-08-30 PROCEDURE — 120N000001 HC R&B MED SURG/OB

## 2022-08-30 PROCEDURE — 36415 COLL VENOUS BLD VENIPUNCTURE: CPT | Performed by: INTERNAL MEDICINE

## 2022-08-30 PROCEDURE — 97530 THERAPEUTIC ACTIVITIES: CPT | Mod: GP

## 2022-08-30 PROCEDURE — 250N000013 HC RX MED GY IP 250 OP 250 PS 637: Performed by: PHYSICIAN ASSISTANT

## 2022-08-30 PROCEDURE — 99232 SBSQ HOSP IP/OBS MODERATE 35: CPT | Performed by: INTERNAL MEDICINE

## 2022-08-30 PROCEDURE — 82040 ASSAY OF SERUM ALBUMIN: CPT | Performed by: INTERNAL MEDICINE

## 2022-08-30 RX ORDER — FUROSEMIDE 10 MG/ML
60 INJECTION INTRAMUSCULAR; INTRAVENOUS EVERY 8 HOURS
Status: DISCONTINUED | OUTPATIENT
Start: 2022-08-31 | End: 2022-09-01

## 2022-08-30 RX ORDER — TAMSULOSIN HYDROCHLORIDE 0.4 MG/1
0.4 CAPSULE ORAL EVERY EVENING
Status: DISCONTINUED | OUTPATIENT
Start: 2022-08-30 | End: 2022-09-02 | Stop reason: HOSPADM

## 2022-08-30 RX ADMIN — NYSTATIN: 100000 CREAM TOPICAL at 07:51

## 2022-08-30 RX ADMIN — ATORVASTATIN CALCIUM 20 MG: 20 TABLET, FILM COATED ORAL at 07:48

## 2022-08-30 RX ADMIN — FUROSEMIDE 40 MG: 10 INJECTION, SOLUTION INTRAMUSCULAR; INTRAVENOUS at 15:06

## 2022-08-30 RX ADMIN — FUROSEMIDE 40 MG: 10 INJECTION, SOLUTION INTRAMUSCULAR; INTRAVENOUS at 07:48

## 2022-08-30 RX ADMIN — TAMSULOSIN HYDROCHLORIDE 0.4 MG: 0.4 CAPSULE ORAL at 22:10

## 2022-08-30 RX ADMIN — FERROUS SULFATE TAB 325 MG (65 MG ELEMENTAL FE) 325 MG: 325 (65 FE) TAB at 16:57

## 2022-08-30 RX ADMIN — CEFTRIAXONE 1 G: 1 INJECTION, POWDER, FOR SOLUTION INTRAMUSCULAR; INTRAVENOUS at 00:03

## 2022-08-30 RX ADMIN — ASPIRIN 81 MG: 81 TABLET ORAL at 07:48

## 2022-08-30 RX ADMIN — FUROSEMIDE 60 MG: 10 INJECTION, SOLUTION INTRAMUSCULAR; INTRAVENOUS at 23:03

## 2022-08-30 RX ADMIN — POLYETHYLENE GLYCOL 3350 17 G: 17 POWDER, FOR SOLUTION ORAL at 07:48

## 2022-08-30 RX ADMIN — NYSTATIN: 100000 CREAM TOPICAL at 19:53

## 2022-08-30 ASSESSMENT — ACTIVITIES OF DAILY LIVING (ADL)
ADLS_ACUITY_SCORE: 30
ADLS_ACUITY_SCORE: 30
ADLS_ACUITY_SCORE: 27
ADLS_ACUITY_SCORE: 30
ADLS_ACUITY_SCORE: 27
ADLS_ACUITY_SCORE: 30
ADLS_ACUITY_SCORE: 27
ADLS_ACUITY_SCORE: 30
ADLS_ACUITY_SCORE: 33
ADLS_ACUITY_SCORE: 30
ADLS_ACUITY_SCORE: 27
ADLS_ACUITY_SCORE: 30

## 2022-08-30 NOTE — PLAN OF CARE
Goal Outcome Evaluation:    Catheter clamped and urine sample sent at this time.  Urine remains with blood flecks and is red/branden.  Patient awake and alert, and conversing appropriately.  Sat in chair most of the evening and good appetite.  No need for sliding scale insulin.  Sister here to visit and updated on plan of care.  He transferred with assist of one, tbelt and walker.  Uses call light appropriately. Triggered sepsis BPA, and lactic within normal range.

## 2022-08-30 NOTE — PROGRESS NOTES
"[facility]    Hospitalist Progress Note    Date of Service (when I saw the patient): 08/30/2022    Assessment & Plan   Flash Brown is a 81 year old male admitted on 8/20/2022. He presented to the emergency department for evaluation of edema, 20 pound weight gain, and dyspnea on exertion and was found to have acute heart failure for which he is being admitted for further evaluation and treatment.     Acute diastolic heart failure / (HFpEF) heart failure with preserved ejection fraction - new diagnosis  Anasarca    Presented with 2-3 month history of worsening edema, 20 pound weight gain, and dyspnea on exertion. No improvement with a trial of furosemide 40 mg daily x 5 days from PCP.     Had outpatient echo on 8/12/22 demonstrating \"global and regional left ventricular function is normal with an EF of 60-65%. Global right ventricular function is normal. IVC diameter and respiratory changes fall into an intermediate range suggesting an RA pressure of 8 mmHg. No pericardial effusion is present.\"   Admit chest x-ray demonstrates \"enlarged cardiac silhouette, similar to prior examination. Moderate right pleural effusion, increased since prior radiograph. Likely superimposed pulmonary vascular congestion and interstitial edema. No pneumothorax...\"     EKG on admission showed bradycardia which is possibly sinus with baseline artifact vs flutter/fibrillation (bradycardia not new). Admit BNP 1830. Troponin normal (10). Appears edematous with anasarca. No hypoxia at time of admission. Overall clinical picture consistent with acute heart failure, which is a new diagnosis for patient. Unclear cause, may be driven by persisting bradycardia or other undiagnosed arrhythmia, worsening coronary artery disease (but no evidence of acute coronary syndrome), or attributed to his use of pioglitazone (which is known to cause fluid retention and worsen heart failure), but is not a new medication for patient. " Pioglitazone stopped, last dose was prior to admission.     Was given IV furosemide 60 mg in the ED with good urine output. Treated with 40 mg IV twice daily 8/20 through 8/21, then 40 mg IV q 8h x 3 days with initial excellent diuresis by I/O's. Furosemide stopped 8/25 due to acute kidney injury (below).    Bilateral lower extremity venous doppler US negative for DVT 8/26.     Acute kidney injury resolved and resumed diuresis 8/29/2022. Good urine output but weight unchanged.    - increase furosemide 40 mg ?  60 mg IV q8 hours    - Daily weights: first measured weight this admission was 114.1 kg but this was after diuresis in the ED. Diuresed to 109.8 kg prior to MALIK/MORRIS. Back up to 112.9 kg prior to resuming diuretics 8/29.   - Strict I&Os   - Carvedilol initially held due to bradycardia (see below), holding carvedilol and hydralazine due to soft BP    Acute kidney injury due to bladder outlet obstruction    Cr increased to 2.03 on 8/25 from 1.19 on 8/24, suspect due to aggressive diuresis. 500 mL NS IV 8/25.     Cr 2.28 8/26 and followed.     Increased to Cr 2.90 on 8.27. FENa 0.4 suggestive of prerenal etiology. Oliguric. Bedside bladder scan showed low PVR. Given 1500 mL IV fluids.     Cr continues to worsen 4.10 on 8/28. Renal US with arterial dopplers showed findings consistent with obstruction and no evidence for renal artery stenosis. Urinary catheter placed.  Initial large urine output though appears to be slowing as I see late afternoon.    Cr improved to 2.35 just 12 hours after catheter placement. Expect will continue to improve.    - continue Caruso catheter   - start flomax    BPH (benign prostatic hyperplasia)  Bladder outlet obstruction - new    Nocturia history. Had been on tamsulosin at one time but stopped possibly earlier this year.     No known history of obstruction. Saw urologist in March 2022 due to nocturia which was felt due to sleep apnea. Unclear if there was chronic obstruction present  "on admission but patient developed apparent acute obstruction about 8/24 with some overflow urinary output. Bladder scan did not  on distended bladder. Formal renal US 8/28 showed bilateral mild hydronephrosis and distended bladder, new compared to renal US 7/11/2019. Caruso catheter placed by RN without difficulty and 950 mL of initial output noted.     UA on admission 8/23 showed small blood and 2 RBC and was otherwise unremarkable. UA after developing acute kidney injury but before placing catheter showed trace blood with 8 RBC, large leukocyte esterase with 35 and WBC and WBC clumps with few bacteria and no casts noted, and UC showed 10-50k mixed urogenital chelsy. Not impressive for UTI but does not rule out. No urinary symptoms. Repeat UA with Caruso catheter showed >100 WBC, > 100 RBC, few bacteria. Cross-cover started ceftriaxone but suspect UA abnormal due to obstruction with acute bladder distention. UC NGTD.     Likely, patient has chronic urinary retention with overflow urine output and with aggressive diuresis, acute bladder distention led to worsening bladder function and eventual MORRIS.   - continue Caruso catheter a couple of weeks, then urology consult for voiding trial   - stop ceftriaxone and follow culture   - start tamsulosin 0.4 mg q PM     Sinus bradycardia vs flutter/fibrillation    Admit EKG shows bradycardia, rate as low as 35 bpm. Rhythm possibly new fib/flutter, but could be sinus with baseline artifact. Apparently his heart rate has been low \"for a long time.\" Blood pressure is stable, patient is asymptomatic. PTA carvedilol 12.5 mg bid.     Carvedilol held on admission. HR up 70's - 80's.   - Holding carvedilol now due to soft BP; if resumed, would likely need lower dose     Pancytopenia, chronic    Admit WBC 2.2 (transiently low at baseline between 2.7 - 5.0), hemoglobin 9.8 (baseline between 11 - 13), platelets 63 (baseline 64 - 93). Occurs in the setting of prior history of lymphoma " (see below). Pancytopenia is followed by oncology.  - CBC stable on recheck, discontinue trend unless clinically indicated  - See below regarding lymphoma management     Type 2 diabetes mellitus without complication, without long-term current use of insulin    Recent HgbA1c 5.5. Managed prior to admission with pioglitazone 30 mg daily.    Stopped pioglitazone, as it can cause fluid retention and potentially worsen heart failure.     BG not elevated off treatment.   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 Follows with oncology Dr. Avendaño. Had a bone marrow biopsy in Feb 2022, which was normal.  - Continue with outpatient oncology surveillance     Essential hypertension, benign    Managed prior to admission with amlodipine 5 mg daily, carvedilol 12.5 mg bid, clonidine 0.1 mg bid, hydralazine 50 mg bid, and ramipril 10 mg daily. BP normal on admission. With diuresis, BP has been soft and progressively held antihypertensive medications.  - Continue holding carvedilol, clonidine, amlodipine, hydralazine   - Ramipril substituted to lisinopril, lisinopril held due to acute kidney injury but now holding for soft BP  - Starting tamsulosin 8/30 for retention      CAD (coronary artery disease)  Mixed hyperlipidemia  Hyperlipidemia    Follows with [facility] [place] [facility] Dr. Tate. History of PCI with LUDIVINA in 2010 (prox/mid RCA, distal circumflex, prox/mid LAD). Stress test 2015 with 70% stenosis of proximal circumflex, did not have subsequent angiogram for unclear reasons. Negative stress test during hospitalization at [facility] in August 2020. Managed prior to admission with aspirin 81 mg daily, clopidogrel 75 mg daily, atorvastatin 20 mg daily, and beta blocker / ACEi / antihypertensive medications noted above. No evidence of acute coronary syndrome  at time of admission, although worsening coronary artery disease could be contributing to development of CHF.     Patient is on DAPT but has had no recent stent/PCI. Cardiology note 7/18/2019 recommended to stop clopidogrel and continue aspirin 81 mg, and follow up note 2/19/2020 confirmed intention to stop clopidogrel. Does not appear that this was ever stopped. Can find no indication for continuing DAPT in the EMR.    - Continue aspirin, statin   - stopped clopidogrel 8/29  - See above regarding antihypertensive regimen     Obstructive sleep apnea    Patient has been issued CPAP machines at least 3 times per sister, but he refuses to even try at home.     Severe Obesity  Admit BMI 40.62, contributes to morbidity and mortality.      Concerns for decreased mobility, cognitive impairment    Lives with sister in a private home and reports that she cares for him because he gets confused and doesn't understand health issues. Patient shows little insight into his medical issues despite explanations, but he is cooperative with cares.   - PT/OT consulted for discharge planning   - TCU on discharge     Diet: Combination Diet Moderate Consistent Carb (60 g CHO per Meal) Diet; Low Saturated Fat Na <2400mg Diet    DVT Prophylaxis: Pneumatic Compression Devices. Cautiously considered lovenox in s/o high risk for DVT with acute illness, low mobility, but he has DAPT and platelets in the 60s (chronic). Technically not contraindicated but clinical judgment.  Caruso Catheter: Not present  Central Lines: None  Cardiac Monitoring: ACTIVE order. Indication: Acute decompensated heart failure (48 hours)  Code Status: Full Code        Discussion: Tolerating diuresis. Increasing furosemide. Acute kidney injury resolved. Would like to see significant progress with diuresis before discharge to TCU. Discussed with patient. Discussed with sister at bedside. Discussed with RN.    Disposition Plan   At least a couple of more  days    Attestation:  Total time: 40 minutes    Michael Lenz MD  [facility]        Interval History   Feels good. No pain. No chest pain, shortness of breath. Appetite better.     Physical Exam   Temp:  [97.4  F (36.3  C)-98.3  F (36.8  C)] 97.4  F (36.3  C)  Pulse:  [52-87] 52  Resp:  [18-24] 18  BP: (108-155)/(51-68) 108/52  SpO2:  [95 %-99 %] 95 %    Weights:   Vitals:    08/27/22 0623 08/28/22 0620 08/29/22 0615   Weight: 110.5 kg (243 lb 9.7 oz) 112.9 kg (248 lb 14.4 oz) 112.4 kg (247 lb 12.8 oz)    Body mass index is 40.01 kg/m .    Constitutional: alert, oriented to hospital but not situation, bright, talkative, NAD  CV: Irregular, 4+ bilateral lower extremity edema to waist  Respiratory: CTA bilaterally  GI: Soft, obese, non-tender, bowel sounds normal  Skin: Warm and dry    Data   Recent Labs   Lab 08/30/22  0751 08/30/22  0202 08/29/22  2142 08/29/22  0754 08/29/22  0421 08/28/22  0755 08/28/22  0419 08/27/22  1611 08/27/22  1253 08/27/22  0758 08/27/22  0423 08/23/22  1649 08/23/22  1411   WBC  --   --   --   --  2.7*  --  3.8*  --   --   --   --   --  5.0   HGB  --   --   --   --  9.2*  --  9.3*  --   --   --   --   --  11.5*   MCV  --   --   --   --  88  --  88  --   --   --   --   --  89   PLT  --   --   --   --  73*  --  74*  --   --   --   --   --  81*   NA  --   --   --   --  139  --  133  --  136  --  135   < >  --    POTASSIUM  --   --   --   --  3.8  --  3.7  --   --   --  3.7   < >  --    CHLORIDE  --   --   --   --  106  --  100  --   --   --  101   < >  --    CO2  --   --   --   --  25  --  25  --   --   --  28   < >  --    BUN  --   --   --   --  59*  --  82*  --   --   --  72*   < >  --    CR  --   --   --   --  2.35*  --  4.10*  --  3.27*  --  2.90*   < >  --    ANIONGAP  --   --   --   --  8  --  8  --   --   --  6   < >  --    NATALIIA  --   --   --   --  8.1*  --  7.9*  --   --   --  8.2*   < >  --    GLC 97 118* 96   < > 83   < > 113*   < >  --    < > 123*   < >  --     < > = values  in this interval not displayed.       Recent Labs   Lab 08/30/22  0751 08/30/22  0202 08/29/22  2142 08/29/22  1648 08/29/22  1134 08/29/22  0754   GLC 97 118* 96 98 92 73        Unresulted Labs Ordered in the Past 30 Days of this Admission     Date and Time Order Name Status Description    8/30/2022  9:22 AM Comprehensive metabolic panel In process     8/29/2022 10:37 PM Urine Culture In process            Imaging: No results found for this or any previous visit (from the past 24 hour(s)).     I reviewed all new labs and imaging results over the last 24 hours. I personally reviewed no images or EKG's today.    Medications     Continuing ACE inhibitor/ARB/ARNI from home medication list OR ACE inhibitor/ARB order already placed during this visit       BETA BLOCKER NOT PRESCRIBED         [Held by provider] amLODIPine  5 mg Oral Daily     aspirin  81 mg Oral Daily     atorvastatin  20 mg Oral Daily     [Held by provider] carvedilol  12.5 mg Oral BID w/meals     cefTRIAXone  1 g Intravenous Q24H     ferrous sulfate  325 mg Oral QPM     furosemide  40 mg Intravenous Q8H     [Held by provider] hydrALAZINE  50 mg Oral BID     insulin aspart  1-7 Units Subcutaneous TID AC     insulin aspart  1-5 Units Subcutaneous At Bedtime     [Held by provider] lisinopril  20 mg Oral Daily     nystatin   Topical BID     polyethylene glycol  17 g Oral Daily     sodium chloride (PF)  3 mL Intracatheter Q8H   Reviewed meds    Michael Lenz MD  [facility]

## 2022-08-30 NOTE — PROGRESS NOTES
"  Notified tele Provider, Hernández in regards to UA results. Per Hernández \"Ceftriaxone ordered, day team to follow up culture and discontinue if needed\".   Rocephin infusion started as ordered.   "

## 2022-08-30 NOTE — PLAN OF CARE
"Pt alert and pleasantly confused. Pt slept well throughout night, only up for snacks. Caruso patent, and voiding well. Urine has become less red and more tea colored throughout shift. UA came back. Tele provider ordered Rocephin and to follow up once cultures are back with primary. PIV SL and CDI. Denies pain, VSS.  Edema in BLE at 2+.     Blood pressure 110/51, pulse 76, temperature 98.1  F (36.7  C), temperature source Oral, resp. rate 18, height 1.676 m (5' 5.98\"), weight 112.4 kg (247 lb 12.8 oz), SpO2 95 %.      "

## 2022-08-30 NOTE — PLAN OF CARE
Goal Outcome Evaluation:  Progressing towards goals.  Urine per hernandez catheter now branden and clear- is on Abx for positive U/A.  Diuresing well today with IV Lasix.  Has been up in chair much of the day, pleasantly confused, expressing much gratitude to staff for caring for him.  BGs have been under goal of 140 so no sliding scale insulin indicated thus far.

## 2022-08-30 NOTE — PROGRESS NOTES
Care Management Follow Up    Length of Stay (days): 10    Expected Discharge Date: 09/01/2022     Concerns to be Addressed: all concerns addressed in this encounter     Patient plan of care discussed at interdisciplinary rounds: Yes    Anticipated Discharge Disposition: Skilled Nursing Facility, Transitional Care     Anticipated Discharge Services: None  Anticipated Discharge DME: None    Patient/family educated on Medicare website which has current facility and service quality ratings: yes  Education Provided on the Discharge Plan:  yes  Patient/Family in Agreement with the Plan: yes    Referrals Placed by CM/SW: Post Acute Facilities  Private pay costs discussed: Not applicable    Additional Information:  Patient not ready for discharge today, likely 9/1 per MD.      Notified Cammie @ [facility] (Phone: [phone]/Fax: [phone]) team of delayed discharge.      Spoke with patient's sister, Daylin via phone.  She and her  plan to arrange transportation upon discharge.      PLAN:  [facility]    Betty Bradford MSN, RN  Inpatient Care Coordinator  [facility] [phone]  [facility] [phone]

## 2022-08-31 ENCOUNTER — APPOINTMENT (OUTPATIENT)
Dept: OCCUPATIONAL THERAPY | Facility: CLINIC | Age: 82
DRG: 291 | End: 2022-08-31
Payer: MEDICARE

## 2022-08-31 ENCOUNTER — APPOINTMENT (OUTPATIENT)
Dept: PHYSICAL THERAPY | Facility: CLINIC | Age: 82
DRG: 291 | End: 2022-08-31
Payer: MEDICARE

## 2022-08-31 PROBLEM — E83.42 HYPOMAGNESEMIA: Status: ACTIVE | Noted: 2022-08-31

## 2022-08-31 LAB
ANION GAP SERPL CALCULATED.3IONS-SCNC: 9 MMOL/L (ref 7–15)
BACTERIA UR CULT: NO GROWTH
BUN SERPL-MCNC: 25.6 MG/DL (ref 8–23)
CALCIUM SERPL-MCNC: 8.6 MG/DL (ref 8.8–10.2)
CHLORIDE SERPL-SCNC: 100 MMOL/L (ref 98–107)
CREAT SERPL-MCNC: 1.14 MG/DL (ref 0.67–1.17)
DEPRECATED HCO3 PLAS-SCNC: 29 MMOL/L (ref 22–29)
GFR SERPL CREATININE-BSD FRML MDRD: 65 ML/MIN/1.73M2
GLUCOSE BLDC GLUCOMTR-MCNC: 116 MG/DL (ref 70–99)
GLUCOSE SERPL-MCNC: 103 MG/DL (ref 70–99)
HOLD SPECIMEN: NORMAL
MAGNESIUM SERPL-MCNC: 1.6 MG/DL (ref 1.7–2.3)
POTASSIUM SERPL-SCNC: 3.6 MMOL/L (ref 3.4–5.3)
SODIUM SERPL-SCNC: 138 MMOL/L (ref 136–145)

## 2022-08-31 PROCEDURE — 250N000013 HC RX MED GY IP 250 OP 250 PS 637: Performed by: PHYSICIAN ASSISTANT

## 2022-08-31 PROCEDURE — 82310 ASSAY OF CALCIUM: CPT | Performed by: INTERNAL MEDICINE

## 2022-08-31 PROCEDURE — 250N000013 HC RX MED GY IP 250 OP 250 PS 637: Performed by: INTERNAL MEDICINE

## 2022-08-31 PROCEDURE — 250N000011 HC RX IP 250 OP 636: Performed by: INTERNAL MEDICINE

## 2022-08-31 PROCEDURE — 99233 SBSQ HOSP IP/OBS HIGH 50: CPT | Performed by: INTERNAL MEDICINE

## 2022-08-31 PROCEDURE — 120N000001 HC R&B MED SURG/OB

## 2022-08-31 PROCEDURE — 97116 GAIT TRAINING THERAPY: CPT | Mod: GP

## 2022-08-31 PROCEDURE — 83735 ASSAY OF MAGNESIUM: CPT | Performed by: INTERNAL MEDICINE

## 2022-08-31 PROCEDURE — 36415 COLL VENOUS BLD VENIPUNCTURE: CPT | Performed by: INTERNAL MEDICINE

## 2022-08-31 PROCEDURE — 97110 THERAPEUTIC EXERCISES: CPT | Mod: GO

## 2022-08-31 PROCEDURE — 97110 THERAPEUTIC EXERCISES: CPT | Mod: GP

## 2022-08-31 RX ORDER — MAGNESIUM SULFATE 1 G/100ML
1 INJECTION INTRAVENOUS ONCE
Status: COMPLETED | OUTPATIENT
Start: 2022-08-31 | End: 2022-08-31

## 2022-08-31 RX ORDER — POTASSIUM CHLORIDE 1500 MG/1
20 TABLET, EXTENDED RELEASE ORAL 2 TIMES DAILY
Status: DISCONTINUED | OUTPATIENT
Start: 2022-08-31 | End: 2022-09-02 | Stop reason: HOSPADM

## 2022-08-31 RX ORDER — MAGNESIUM OXIDE 400 MG/1
400 TABLET ORAL 2 TIMES DAILY
Status: DISCONTINUED | OUTPATIENT
Start: 2022-08-31 | End: 2022-09-02 | Stop reason: HOSPADM

## 2022-08-31 RX ADMIN — ATORVASTATIN CALCIUM 20 MG: 20 TABLET, FILM COATED ORAL at 07:52

## 2022-08-31 RX ADMIN — FERROUS SULFATE TAB 325 MG (65 MG ELEMENTAL FE) 325 MG: 325 (65 FE) TAB at 17:08

## 2022-08-31 RX ADMIN — FUROSEMIDE 60 MG: 10 INJECTION, SOLUTION INTRAMUSCULAR; INTRAVENOUS at 16:07

## 2022-08-31 RX ADMIN — FUROSEMIDE 60 MG: 10 INJECTION, SOLUTION INTRAMUSCULAR; INTRAVENOUS at 07:52

## 2022-08-31 RX ADMIN — ASPIRIN 81 MG: 81 TABLET ORAL at 07:52

## 2022-08-31 RX ADMIN — MAGNESIUM SULFATE 1 G: 1 INJECTION INTRAVENOUS at 11:38

## 2022-08-31 RX ADMIN — TAMSULOSIN HYDROCHLORIDE 0.4 MG: 0.4 CAPSULE ORAL at 17:08

## 2022-08-31 RX ADMIN — POTASSIUM CHLORIDE 20 MEQ: 20 TABLET, EXTENDED RELEASE ORAL at 20:08

## 2022-08-31 RX ADMIN — Medication 400 MG: at 20:08

## 2022-08-31 RX ADMIN — NYSTATIN: 100000 CREAM TOPICAL at 20:09

## 2022-08-31 RX ADMIN — NYSTATIN: 100000 CREAM TOPICAL at 07:53

## 2022-08-31 RX ADMIN — Medication 400 MG: at 11:30

## 2022-08-31 RX ADMIN — POLYETHYLENE GLYCOL 3350 17 G: 17 POWDER, FOR SOLUTION ORAL at 07:52

## 2022-08-31 RX ADMIN — POTASSIUM CHLORIDE 20 MEQ: 20 TABLET, EXTENDED RELEASE ORAL at 11:30

## 2022-08-31 ASSESSMENT — ACTIVITIES OF DAILY LIVING (ADL)
ADLS_ACUITY_SCORE: 32
ADLS_ACUITY_SCORE: 33
ADLS_ACUITY_SCORE: 32
ADLS_ACUITY_SCORE: 32
ADLS_ACUITY_SCORE: 33

## 2022-08-31 NOTE — PLAN OF CARE
"Pt alert and pleasantly confused. Pt slept well. 2+ edema in BLE. Pt steady with walker, belt and SBA. Up to bathroom x3. Pt had medium BM. New dressing applied to sacrum. Chest and arm dressing CDI. Insulin/sliding scale discontinued. Weight down. PIV SL in right arm, continues IV lasix. Good urine out put from hernandez. Hernandez patent. Pt denied pain. VSS with soft B/P. AM lab show no significant changes    Blood pressure 102/56, pulse 60, temperature 98.3  F (36.8  C), temperature source Oral, resp. rate 18, height 1.676 m (5' 5.98\"), weight 106.3 kg (234 lb 5.6 oz), SpO2 95 %.          "

## 2022-08-31 NOTE — PROGRESS NOTES
"[facility]    Medicine Progress Note - Hospitalist Service    Date of Admission:  8/20/2022    Assessment & Plan            Principal Problem:    Acute congestive heart failure, unspecified heart failure type (H)    (HFpEF) heart failure with preserved ejection fraction (H)    Assessment: Presented with 2-3 month history of worsening edema, 20 pound weight gain, and dyspnea on exertion. No improvement with a trial of furosemide 40 mg daily x 5 days from PCP.     Had outpatient echo on 8/12/22 demonstrating \"global and regional left ventricular function is normal with an EF of 60-65%. Global right ventricular function is normal. IVC diameter and respiratory changes fall into an intermediate range suggesting an RA pressure of 8 mmHg. No pericardial effusion is present.\"   Admit chest x-ray demonstrates \"enlarged cardiac silhouette, similar to prior examination. Moderate right pleural effusion, increased since prior radiograph. Likely superimposed pulmonary vascular congestion and interstitial edema. No pneumothorax...\"     EKG on admission showed bradycardia which is possibly sinus with baseline artifact vs flutter/fibrillation (bradycardia not new). Admit BNP 1830. Troponin normal (10). Appears edematous with anasarca. No hypoxia at time of admission. Overall clinical picture consistent with acute heart failure, which is a new diagnosis for patient. Unclear cause, may be driven by persisting bradycardia or other undiagnosed arrhythmia, worsening coronary artery disease (but no evidence of acute coronary syndrome), or attributed to his use of pioglitazone (which is known to cause fluid retention and worsen heart failure), but is not a new medication for patient. Pioglitazone stopped, last dose was prior to admission.     Was given IV furosemide 60 mg in the ED with good urine output. Treated with 40 mg IV twice daily 8/20 through 8/21, then 40 mg IV q 8h x 3 days with initial excellent " diuresis by I/O's. Furosemide stopped 8/25 due to acute kidney injury (below).    Bilateral lower extremity venous doppler US negative for DVT 8/26.     Acute kidney injury resolved and resumed diuresis 8/29/2022. Good urine output but weight unchanged.    - increase furosemide 40 mg ?  60 mg IV q8 hours    - Daily weights: first measured weight this admission was 114.1 kg but this was after diuresis in the ED. Diuresed to 109.8 kg prior to MALIK/MORRIS. Back up to 112.9 kg prior to resuming diuretics 8/29. Carvedilol initially held due to bradycardia , holding carvedilol, lisinopril, amlodipine and hydralazine due to soft BP    Plan:  1. Continue IV furosemide 60 mg q8h.   2. Continue holding amlodipine, carvedilol, hydralazine, lisinopril    Active Problems:    Essential hypertension, benign    Assessment: Managed prior to admission with amlodipine 5 mg daily, carvedilol 12.5 mg bid, clonidine 0.1 mg bid, hydralazine 50 mg bid, and ramipril 10 mg daily. BP normal on admission. With diuresis, BP has been soft and progressively held antihypertensive medications.    Plan: 1. Continue holding carvedilol, clonidine, amlodipine, hydralazine    2. IV diuresis as above.      Type 2 diabetes mellitus without complication, without long-term current use of insulin (H)    Assessment: Recent HgbA1c 5.5. Managed prior to admission with pioglitazone 30 mg daily.    Stopped pioglitazone, as it can cause fluid retention and potentially worsen heart failure.     BG not elevated off treatment.     Plan: 1. Change to once daily BG check    2. Does not appear to need treatment.      CAD (coronary artery disease)    Mixed hyperlipidemia    Assessment: Follows with [facility] Dr. Tate. History of PCI with LUDIVINA in 2010 (prox/mid RCA, distal circumflex, prox/mid LAD). Stress test 2015 with 70% stenosis of proximal circumflex, did not have subsequent angiogram for unclear reasons. Negative stress test during hospitalization at [facility] in  August 2020. Managed prior to admission with aspirin 81 mg daily, clopidogrel 75 mg daily, atorvastatin 20 mg daily, and beta blocker / ACEi / antihypertensive medications noted above. No evidence of acute coronary syndrome at time of admission, although worsening coronary artery disease could be contributing to development of CHF.     Patient is on DAPT but has had no recent stent/PCI. Cardiology note 7/18/2019 recommended to stop clopidogrel and continue aspirin 81 mg, and follow up note 2/19/2020 confirmed intention to stop clopidogrel. Does not appear that this was ever stopped. Can find no indication for continuing DAPT in the EMR.  Stopped clopidogrel 8/29.    Plan: 1. Continue aspirin, statin.   2. See above regarding antihypertensive regimen.       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 Follows with oncology Dr. Avendaño. Had a bone marrow biopsy in Feb 2022, which was normal.    Plan: Continue with outpatient oncology surveillance       Other pancytopenia (H)    Assessment: Admit WBC 2.2 (transiently low at baseline between 2.7 - 5.0), hemoglobin 9.8 (baseline between 11 - 13), platelets 63 (baseline 64 - 93). Occurs in the setting of prior history of lymphoma (see below). Pancytopenia is followed by oncology.    Plan: 1. CBC stable on recheck, discontinue trend unless clinically indicated   2. See above regarding lymphoma management       Sinus bradycardia    Assessment: Resolved.    Plan: Carvedilol on hold due to soft BP.      Acute kidney failure, unspecified (H)    Assessment: Cr increased to 2.03 on 8/25 from 1.19 on 8/24, suspect due to aggressive diuresis. 500 mL NS IV 8/25.     Cr 2.28 8/26 and followed. Increased to Cr 2.90 on 8/27. FENa 0.4 suggestive of prerenal etiology. Oliguric. Bedside bladder scan showed low PVR. Given 1500 mL IV fluids. Cr continues to worsen 4.10 on 8/28. Renal US with  arterial dopplers showed findings consistent with obstruction and no evidence for renal artery stenosis. Urinary catheter placed.  Initial large urine output. Cr improved to 2.35 just 12 hours after catheter placement. Cr improved -->1.06 -->1.14.    Plan: Continue Caruso catheter as above.       Hypomagnesemia    Assessment: Due to high dose diuresis.    Plan:  1. Magnesium sulfate 1 gm IV x 1.   2. Mag oxide 400 mg PO BID.   3. Magnesium replacement protocol.   4. Add KCL 20 meq PO BID (low normal K, high dose diuretic).      Obstructive sleep apnea    Assessment: Patient has been issued CPAP machines at least 3 times per sister, but he refuses to even try at home.    Plan: No CPAP.      BPH (benign prostatic hyperplasia)    Assessment: Nocturia history. Had been on tamsulosin at one time but stopped possibly earlier this year.     No known history of obstruction. Saw urologist in March 2022 due to nocturia which was felt due to sleep apnea. Unclear if there was chronic obstruction present on admission but patient developed apparent acute obstruction about 8/24 with some overflow urinary output. Bladder scan did not  on distended bladder. Formal renal US 8/28 showed bilateral mild hydronephrosis and distended bladder, new compared to renal US 7/11/2019. Caruso catheter placed by RN without difficulty and 950 mL of initial output noted.     UA on admission 8/23 showed small blood and 2 RBC and was otherwise unremarkable. UA after developing acute kidney injury but before placing catheter showed trace blood with 8 RBC, large leukocyte esterase with 35 and WBC and WBC clumps with few bacteria and no casts noted, and UC showed 10-50k mixed urogenital chelsy. Not impressive for UTI but does not rule out. No urinary symptoms. Repeat UA with Caruso catheter showed >100 WBC, > 100 RBC, few bacteria. Cross-cover started ceftriaxone but suspect UA abnormal due to obstruction with acute bladder distention. UC NGTD.      Likely, patient has chronic urinary retention with overflow urine output and with aggressive diuresis, acute bladder distention led to worsening bladder function and eventual MORRIS.     Plan: 1. Continue Caruso catheter a couple of weeks, then urology consult for voiding trial.    2. Tamsulosin 0.4 mg q PM       Diet: Combination Diet Moderate Consistent Carb (60 g CHO per Meal) Diet; Low Saturated Fat Na <2400mg Diet  Snacks/Supplements Adult: Glucerna; With Meals    DVT Prophylaxis: Pneumatic Compression Devices  Caruso Catheter: PRESENT, indication: Retention  Central Lines: None  Cardiac Monitoring: None  Code Status: Full Code      Disposition Plan     Expected Discharge Date: 09/01/2022      Destination: inpatient rehabilitation facility  Discharge Comments: Maybe thursday or friday        The patient's care was discussed with the Bedside Nurse, Care Coordinator/ and Patient.    Duane Blackmon MD  Hospitalist Service  [facility]  Securely message with the Vocera Web Console (learn more here)  Text page via Hangtime Paging/Directory         Clinically Significant Risk Factors Present on Admission                      ______________________________________________________________________    Interval History   Patient still reports occasional shortness of breath. No fever, chills, cough or chest pain. No abdominal pain, nausea or vomiting.    Data reviewed today: I reviewed all medications, new labs and imaging results over the last 24 hours. I personally reviewed no images or EKG's today.    Physical Exam   Vital Signs: Temp: 98.1  F (36.7  C) Temp src: Oral BP: 116/62 Pulse: 93   Resp: 16 SpO2: 95 % O2 Device: None (Room air)    Weight: 229 lbs 15.04 oz  Constitutional: awake, alert, cooperative, no apparent distress, and appears stated age and moderately obese  Eyes: Lids and lashes normal, pupils equal, round and reactive to light, extra ocular muscles intact, sclera  clear, conjunctiva normal  ENT: Normocephalic, without obvious abnormality, atraumatic, external ears without lesions  Respiratory: Bilaterally crackles half way up posteriorly, no wheezing  Cardiovascular: Normal apical impulse, regular rate and rhythm, normal S1 and S2, no S3 or S4, and no murmur noted, no leg edema  GI: Normal bowel sounds, soft, non-distended, non-tender, no masses palpated, no hepatosplenomegally  Skin: no rashes, lots of freckles, healed post excision scars (scalp, nose, chest), some bruising in the forearms  Musculoskeletal: There is no redness, warmth, or swelling of the joints.  Motor strength is 5 out of 5 all extremities bilaterally.  Tone is normal.  Neurologic: Awake, alert, oriented to name, place and time.  Cranial nerves II-XII are grossly intact.  Motor is 5 out of 5 bilaterally.  Sensory is grossly intact.    Data   Recent Labs   Lab 08/31/22  0418 08/31/22  0211 08/30/22  2119 08/30/22  1130 08/30/22  0934 08/29/22  0754 08/29/22  0421 08/28/22  0755 08/28/22  0419   WBC  --   --   --   --   --   --  2.7*  --  3.8*   HGB  --   --   --   --   --   --  9.2*  --  9.3*   MCV  --   --   --   --   --   --  88  --  88   PLT  --   --   --   --   --   --  73*  --  74*     --   --   --  140  --  139  --  133   POTASSIUM 3.6  --   --   --  3.9  --  3.8  --  3.7   CHLORIDE 100  --   --   --  103  --  106  --  100   CO2 29  --   --   --  26  --  25  --  25   BUN 25.6*  --   --   --  25.0*  --  59*  --  82*   CR 1.14  --   --   --  1.06  --  2.35*  --  4.10*   ANIONGAP 9  --   --   --  11  --  8  --  8   NATALIIA 8.6*  --   --   --  8.7*  --  8.1*  --  7.9*   * 116* 164*   < > 149*   < > 83   < > 113*   ALBUMIN  --   --   --   --  3.2*  --   --   --   --    PROTTOTAL  --   --   --   --  6.1*  --   --   --   --    BILITOTAL  --   --   --   --  0.9  --   --   --   --    ALKPHOS  --   --   --   --  116  --   --   --   --    ALT  --   --   --   --  30  --   --   --   --    AST  --   --   --    --  37  --   --   --   --     < > = values in this interval not displayed.     Component Ref Range & Units 08/31/22 0418   Magnesium 1.7 - 2.3 mg/dL 1.6 Low          No results found for this or any previous visit (from the past 24 hour(s)).  Medications     Continuing ACE inhibitor/ARB/ARNI from home medication list OR ACE inhibitor/ARB order already placed during this visit       BETA BLOCKER NOT PRESCRIBED         [Held by provider] amLODIPine  5 mg Oral Daily     aspirin  81 mg Oral Daily     atorvastatin  20 mg Oral Daily     [Held by provider] carvedilol  12.5 mg Oral BID w/meals     ferrous sulfate  325 mg Oral QPM     furosemide  60 mg Intravenous Q8H     [Held by provider] hydrALAZINE  50 mg Oral BID     [Held by provider] lisinopril  20 mg Oral Daily     magnesium oxide  400 mg Oral BID     nystatin   Topical BID     polyethylene glycol  17 g Oral Daily     potassium chloride  20 mEq Oral BID     sodium chloride (PF)  3 mL Intracatheter Q8H     tamsulosin  0.4 mg Oral QPM

## 2022-08-31 NOTE — PROGRESS NOTES
No significant changes noted. Pt denies pain, has been up in chair and cooperative with cares. Macario notes pt seems to be a bit more confused today than yesterday.  Seems to be at his baseline overall based on the last several days. Mag and K replacements given. Urine appears to have some small amount of blood present. Pt has no other new concerns at this time.

## 2022-09-01 ENCOUNTER — APPOINTMENT (OUTPATIENT)
Dept: PHYSICAL THERAPY | Facility: CLINIC | Age: 82
DRG: 291 | End: 2022-09-01
Payer: MEDICARE

## 2022-09-01 ENCOUNTER — APPOINTMENT (OUTPATIENT)
Dept: OCCUPATIONAL THERAPY | Facility: CLINIC | Age: 82
DRG: 291 | End: 2022-09-01
Payer: MEDICARE

## 2022-09-01 LAB
ANION GAP SERPL CALCULATED.3IONS-SCNC: 11 MMOL/L (ref 7–15)
BUN SERPL-MCNC: 20.3 MG/DL (ref 8–23)
CALCIUM SERPL-MCNC: 8.5 MG/DL (ref 8.8–10.2)
CHLORIDE SERPL-SCNC: 98 MMOL/L (ref 98–107)
CREAT SERPL-MCNC: 0.97 MG/DL (ref 0.67–1.17)
DEPRECATED HCO3 PLAS-SCNC: 28 MMOL/L (ref 22–29)
GFR SERPL CREATININE-BSD FRML MDRD: 79 ML/MIN/1.73M2
GLUCOSE SERPL-MCNC: 132 MG/DL (ref 70–99)
HOLD SPECIMEN: NORMAL
MAGNESIUM SERPL-MCNC: 1.7 MG/DL (ref 1.7–2.3)
POTASSIUM SERPL-SCNC: 3.5 MMOL/L (ref 3.4–5.3)
SODIUM SERPL-SCNC: 137 MMOL/L (ref 136–145)

## 2022-09-01 PROCEDURE — 250N000011 HC RX IP 250 OP 636: Performed by: INTERNAL MEDICINE

## 2022-09-01 PROCEDURE — 83735 ASSAY OF MAGNESIUM: CPT | Performed by: INTERNAL MEDICINE

## 2022-09-01 PROCEDURE — 250N000013 HC RX MED GY IP 250 OP 250 PS 637: Performed by: INTERNAL MEDICINE

## 2022-09-01 PROCEDURE — 97530 THERAPEUTIC ACTIVITIES: CPT | Mod: GP

## 2022-09-01 PROCEDURE — 250N000013 HC RX MED GY IP 250 OP 250 PS 637

## 2022-09-01 PROCEDURE — 36415 COLL VENOUS BLD VENIPUNCTURE: CPT | Performed by: INTERNAL MEDICINE

## 2022-09-01 PROCEDURE — 120N000001 HC R&B MED SURG/OB

## 2022-09-01 PROCEDURE — 250N000013 HC RX MED GY IP 250 OP 250 PS 637: Performed by: PHYSICIAN ASSISTANT

## 2022-09-01 PROCEDURE — 97116 GAIT TRAINING THERAPY: CPT | Mod: GP

## 2022-09-01 PROCEDURE — 99233 SBSQ HOSP IP/OBS HIGH 50: CPT

## 2022-09-01 PROCEDURE — 97110 THERAPEUTIC EXERCISES: CPT | Mod: GO

## 2022-09-01 PROCEDURE — 80048 BASIC METABOLIC PNL TOTAL CA: CPT | Performed by: INTERNAL MEDICINE

## 2022-09-01 RX ORDER — FUROSEMIDE 40 MG
40 TABLET ORAL ONCE
Status: COMPLETED | OUTPATIENT
Start: 2022-09-01 | End: 2022-09-01

## 2022-09-01 RX ORDER — FUROSEMIDE 40 MG
40 TABLET ORAL DAILY
Status: DISCONTINUED | OUTPATIENT
Start: 2022-09-02 | End: 2022-09-02 | Stop reason: HOSPADM

## 2022-09-01 RX ADMIN — POLYETHYLENE GLYCOL 3350 17 G: 17 POWDER, FOR SOLUTION ORAL at 08:34

## 2022-09-01 RX ADMIN — POTASSIUM CHLORIDE 20 MEQ: 20 TABLET, EXTENDED RELEASE ORAL at 08:35

## 2022-09-01 RX ADMIN — Medication 400 MG: at 20:25

## 2022-09-01 RX ADMIN — ASPIRIN 81 MG: 81 TABLET ORAL at 08:35

## 2022-09-01 RX ADMIN — FERROUS SULFATE TAB 325 MG (65 MG ELEMENTAL FE) 325 MG: 325 (65 FE) TAB at 16:21

## 2022-09-01 RX ADMIN — TAMSULOSIN HYDROCHLORIDE 0.4 MG: 0.4 CAPSULE ORAL at 16:21

## 2022-09-01 RX ADMIN — ATORVASTATIN CALCIUM 20 MG: 20 TABLET, FILM COATED ORAL at 08:35

## 2022-09-01 RX ADMIN — FUROSEMIDE 40 MG: 40 TABLET ORAL at 16:20

## 2022-09-01 RX ADMIN — POTASSIUM CHLORIDE 20 MEQ: 20 TABLET, EXTENDED RELEASE ORAL at 20:25

## 2022-09-01 RX ADMIN — NYSTATIN: 100000 CREAM TOPICAL at 20:25

## 2022-09-01 RX ADMIN — NYSTATIN: 100000 CREAM TOPICAL at 08:35

## 2022-09-01 RX ADMIN — FUROSEMIDE 60 MG: 10 INJECTION, SOLUTION INTRAMUSCULAR; INTRAVENOUS at 00:32

## 2022-09-01 RX ADMIN — FUROSEMIDE 60 MG: 10 INJECTION, SOLUTION INTRAMUSCULAR; INTRAVENOUS at 08:34

## 2022-09-01 RX ADMIN — Medication 400 MG: at 08:35

## 2022-09-01 ASSESSMENT — ACTIVITIES OF DAILY LIVING (ADL)
ADLS_ACUITY_SCORE: 32
ADLS_ACUITY_SCORE: 30
ADLS_ACUITY_SCORE: 32
ADLS_ACUITY_SCORE: 30
ADLS_ACUITY_SCORE: 32
ADLS_ACUITY_SCORE: 32
ADLS_ACUITY_SCORE: 30

## 2022-09-01 NOTE — PROGRESS NOTES
"DATE & TIME: 9/1 DAYS    Ax0x 2- self and place.    Mobility: Ax1 w/ walker   combo diet    Voiding: removed hernandez today at 1410- pt voided a small amount. Going to bladder scan when gets back to bed.    Last BM: 9/1- pt had a medium BM today around 1800.   PIV: R- saline locked.    Pain: denies.    Skin: scatter scraps and bruises. Sacrum mepilex changed today.   VS: /56 (BP Location: Right arm)   Pulse 92   Temp 98.5  F (36.9  C) (Oral)   Resp 18   Ht 1.676 m (5' 5.98\")   Wt 103.3 kg (227 lb 11.8 oz)   SpO2 96%   BMI 36.78 kg/m      Tele: no  O2 status: RA  Labs: magnesium and potassium protocol.     - pt is on carina Mi RN on 9/1/2022 at 6:18 PM      "

## 2022-09-01 NOTE — PROGRESS NOTES
Care Management Follow Up    Length of Stay (days): 12    Expected Discharge Date: 09/02/2022     Concerns to be Addressed: all concerns addressed in this encounter     Patient plan of care discussed at interdisciplinary rounds: Yes    Anticipated Discharge Disposition: Skilled Nursing Facility, Transitional Care     Anticipated Discharge Services: None  Anticipated Discharge DME: None    Patient/family educated on Medicare website which has current facility and service quality ratings: yes  Education Provided on the Discharge Plan:  yes  Patient/Family in Agreement with the Plan: yes    Referrals Placed by CM/SW: Post Acute Facilities  Private pay costs discussed: Not applicable    Additional Information:  Patient not yet ready for discharge, hopefully 9/2 per MD.      Spoke with Tanja @ [facility].  Bed no longer available at [facility] due to continued delay in discharge plans.      Notified patient and sister, Rea of loss of TCU bed.      TCU referrals pending as below:     Service Provider Request Status Selected Services Address Phone Fax Patient Preferred   [facility] ([facility])  Accepted N/A [address] [facility] [address] [phone] --   Internal Comment last updated by Betty Bradford, RN 9/1/2022 1451    9/1-Spoke with Jenny.  Accepted for cares 9/2, patient must arrive by 1500 on 9/2.  Cannot accept on 9/3, possibly could take 9/4.              [facility] ([facility])  Accepted N/A [address] [facility] [address] [phone] --   [facility] (TCU)  Pending - Request Sent N/A [address] [facility] [address] [phone] --   Internal Comment last updated by Betty Bradford, RN 9/1/2022 1212    9/1-emailed Jossie for review, resent referral            [facility] ()  Pending - Request Sent N/A [address] [address] [phone] --   [facility]  (SNF)  Pending - Request Sent N/A [address] [place] [address] [phone] --   [facility] (SNF)  Pending - Request Sent N/A [address] [place] [address] [phone] --   [facility] ([facility])  Pending - Request Sent N/A [address] [facility] [address] [phone] --   [facility]  Pending - Request Sent N/A [address] [facility] [address] [phone] --   [facility] (SNF)  Pending - Request Sent N/A [address] [facility] [address] [phone] --   [facility] (TCU)  Pending - Request Sent N/A [address] [facility] [address] [phone] --   [facility] - REFERRAL ONLY (SNF)  Declined   Bed not available N/A [address] [facility] [id number] [phone] --   [facility])  Declined   Bed not available, Complex Medical Needs N/A [address] [facility] [id number] [phone]          Discussed above acceptance at both [facility] and [facility].  Patient and sister, Daylin encouraged to utilize medicare.gov for star rating system.  Will await patient's readiness to make determination of SNF.      Daylin plans to transport upon discharge.      CM to connect back with patient and family 9/2 to further discuss discharge plans.      PLAN:  TCU (ref pend)    Betty Bradford MSN, RN  Inpatient Care Coordinator  [facility] [phone]  [facility] [phone]

## 2022-09-01 NOTE — PLAN OF CARE
Patient is turning independently in bed throughout shift but is an assist of /stand-by with transfers. I changed his dressing on his IV today. Patient needed minimal help with getting out of bed at dinner. Sister came to visit during dinner tonight. Patient seemed to be eating better at supper. Caruso bag was drained with output shift total of 1100 mL, urine is blood tinged. Patient ate half his dinner. Patient still is confused but answers questions appropriately.

## 2022-09-01 NOTE — PROGRESS NOTES
"Pt is alert confused. SBA with walker. IV saline locked. Denies pain, nausea. IV lasix given. Caruso in place and patent. On RA. Mg+ and K+ recheck in am.BP 95/56 (BP Location: Right arm)   Pulse 81   Temp 98.7  F (37.1  C) (Oral)   Resp 18   Ht 1.676 m (5' 5.98\")   Wt 103.3 kg (227 lb 11.8 oz)   SpO2 95%   BMI 36.78 kg/m      "

## 2022-09-01 NOTE — PROGRESS NOTES
"[facility]    Medicine Progress Note - Hospitalist Service    Date of Admission:  8/20/2022    Assessment & Plan            Principal Problem:    Acute (HFpEF) heart failure with preserved ejection fraction (H)    Assessment: Presented with 2-3 month history of worsening edema, 20 pound weight gain, and dyspnea on exertion. No improvement with a trial of furosemide 40 mg daily x 5 days from PCP.     Had outpatient echo on 8/12/22 demonstrating \"global and regional left ventricular function is normal with an EF of 60-65%. Global right ventricular function is normal. IVC diameter and respiratory changes fall into an intermediate range suggesting an RA pressure of 8 mmHg. No pericardial effusion is present.\"   Admit chest x-ray demonstrates \"enlarged cardiac silhouette, similar to prior examination. Moderate right pleural effusion, increased since prior radiograph. Likely superimposed pulmonary vascular congestion and interstitial edema. No pneumothorax...\"     EKG on admission showed bradycardia which is possibly sinus with baseline artifact vs flutter/fibrillation (bradycardia not new). Admit BNP 1830. Troponin normal (10). Appears edematous with anasarca. No hypoxia at time of admission. Overall clinical picture consistent with acute heart failure, which is a new diagnosis for patient. Unclear cause, may be driven by persisting bradycardia or other undiagnosed arrhythmia, worsening coronary artery disease (but no evidence of acute coronary syndrome), or attributed to his use of pioglitazone (which is known to cause fluid retention and worsen heart failure), but is not a new medication for patient. Pioglitazone stopped, last dose was prior to admission.     Was given IV furosemide 60 mg in the ED with good urine output. Treated with 40 mg IV twice daily 8/20 through 8/21, then 40 mg IV q 8h x 3 days with initial excellent diuresis by I/O's. Furosemide stopped 8/25 due to acute kidney " injury (below).  Acute kidney injury subsequently resolved, diuresis resumed 8/29/2022, currently furosemide 60 mg IV q8 hours.  Weight is down roughly 17 kg (120.2 --> 103.3) since admission.  Dyspnea is much improved.  Lower extremity edema is no more than trace.  -At this point, we can stop parenteral diuresis, and try to find a furosemide dose that we can use at discharge.  Note that he was not on daily diuretics prior to this admission.  I am going to give him furosemide 40 mg p.o. x1 this afternoon, then starting tomorrow morning begin furosemide 40 mg daily.  We will probably use that as our initial discharge dose.  -Carvedilol held due to bradycardia.  Heart rate over the last few days has been adequate, so carvedilol could be resumed if needed.  However, blood pressures have been borderline, 95/56 this morning, so carvedilol, lisinopril, amlodipine and hydralazine are all on hold.    Active Problems:    Essential hypertension, benign    Assessment: Managed prior to admission with amlodipine 5 mg daily, carvedilol 12.5 mg bid, clonidine 0.1 mg bid, hydralazine 50 mg bid, and ramipril 10 mg daily. BP normal on admission. With diuresis, BP has declined, necessitating sequential discontinuation of antihypertensives.  At present, preadmission carvedilol, clonidine, amlodipine, and hydralazine are all on hold.  Blood pressure over the last 24 hours has been 114 systolic maximum.  Current blood pressure is 95/56.    Plan: 1. Continue holding carvedilol, clonidine, amlodipine, hydralazine.   2. Furosedmide diuresis as above.      Type 2 diabetes mellitus without complication, without long-term current use of insulin (H)    Assessment: Recent HgbA1c 5.5. Managed prior to admission with pioglitazone 30 mg daily.  Pioglitazone was stopped on admission out of concern that it could potentially worsen heart failure.  He is on no glucose lowering therapy here.  Glucose control has been generally good,  over the last  5 checks.     Plan: 1. Continue to observe blood glucose off of glucose lowering therapy.   2.  We will not plan to resume pioglitazone at discharge.      CAD (coronary artery disease)    Mixed hyperlipidemia    Assessment: Follows with [facility] Dr. Tate. History of PCI with LUDIVINA in 2010 (prox/mid RCA, distal circumflex, prox/mid LAD). Stress test 2015 with 70% stenosis of proximal circumflex, did not have subsequent angiogram for unclear reasons. Negative stress test during hospitalization at [facility] in August 2020. Managed prior to admission with aspirin 81 mg daily, clopidogrel 75 mg daily, atorvastatin 20 mg daily, and beta blocker / ACEi / antihypertensive medications noted above. No evidence of acute coronary syndrome at time of admission.    Patient is on DAPT but has had no recent stent/PCI. Cardiology note 7/18/2019 recommended to stop clopidogrel and continue aspirin 81 mg, and follow up note 2/19/2020 confirmed intention to stop clopidogrel. Does not appear that this was ever stopped. Can find no indication for continuing DAPT in the EMR, so clopidogrel was discontinued 8/29/2022.    Plan: 1. Continue aspirin, statin.   2. See above regarding antihypertensive regimen.       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 Follows with oncology Dr. Avendaño. Had a bone marrow biopsy in Feb 2022, which was normal.    Plan: Continue with outpatient oncology surveillance       Pancytopenia (H)    Assessment: Admit WBC 2.2 (transiently low at baseline between 2.7 - 5.0), hemoglobin 9.8 (baseline between 11 - 13), platelets 63 (baseline 64 - 93). Occurs in the setting of prior history of lymphoma (see below). Pancytopenia is followed by oncology.    Plan: 1. Repeat counts prior to discharge to ensure stability.   2. See above regarding lymphoma management.       Sinus bradycardia    Assessment: Resolved with  discontinuation of carvedilol.    Plan: Carvedilol could be resumed, as heart rate has improved.  However, blood pressure remains borderline, so carvedilol, along with other antihypertensives, remain on hold, details above.      Acute kidney injury (H)    Assessment: Cr increased to 2.03 on 8/25 from 1.19 on 8/24, suspect due to aggressive diuresis. 500 mL NS IV 8/25.     Cr 2.28 8/26 and followed. Increased to Cr 2.90 on 8/27. FENa 0.4 suggestive of prerenal etiology. Oliguric. Bedside bladder scan showed low PVR. Given 1500 mL IV fluids. Cr continues to worsen 4.10 on 8/28. Renal US with arterial dopplers showed findings consistent with obstruction and no evidence for renal artery stenosis. Urinary catheter placed.  Initial large urine output. Cr improved to 2.35 just 12 hours after catheter placement. Cr improved -->1.06 -->1.14 --> 0.97 today.    Plan: Resolved.  Continue to monitor renal function on diuretics.       Hypomagnesemia    Assessment: Due to high dose diuresis.    Plan:  1. Magnesium sulfate 1 gm IV x 1 given.   2. Continue Mag oxide 400 mg PO BID.      Obstructive sleep apnea    Assessment: Patient has been issued CPAP machines at least 3 times per sister, but he refuses to even try at home.    Plan: No CPAP.      BPH (benign prostatic hyperplasia)    Assessment: Nocturia history. Had been on tamsulosin at one time but stopped possibly earlier this year.     No known history of obstruction. Saw urologist in March 2022 due to nocturia which was felt due to sleep apnea. Unclear if there was chronic obstruction present on admission but patient developed apparent acute obstruction about 8/24 with some overflow urinary output. Bladder scan did not  on distended bladder. Formal renal US 8/28 showed bilateral mild hydronephrosis and distended bladder, new compared to renal US 7/11/2019. Caruso catheter placed by RN without difficulty and 950 mL of initial output noted.     UA on admission 8/23 showed  "small blood and 2 RBC and was otherwise unremarkable. UA after developing acute kidney injury but before placing catheter showed trace blood with 8 RBC, large leukocyte esterase with 35 and WBC and WBC clumps with few bacteria and no casts noted, and UC showed 10-50k mixed urogenital chelsy. Not impressive for UTI but does not rule out. No urinary symptoms. Repeat UA with Caruso catheter showed >100 WBC, > 100 RBC, few bacteria. Cross-cover started ceftriaxone but suspect UA abnormal due to obstruction with acute bladder distention. UC NGTD.     Likely, patient has chronic urinary retention with overflow urine output and with aggressive diuresis, acute bladder distention led to worsening bladder function and eventual MORRIS.     Plan: 1. Will trial Caruso catheter removal today in anticipation of possible discharge home tomorrow.    2. Tamsulosin 0.4 mg q PM continues.       Diet: Combination Diet Moderate Consistent Carb (60 g CHO per Meal) Diet; Low Saturated Fat Na <2400mg Diet  Snacks/Supplements Adult: Glucerna; With Meals    DVT Prophylaxis: Pneumatic Compression Devices  Caruso Catheter: PRESENT, indication: Retention.  Will trial removal today.  Central Lines: None  Cardiac Monitoring: None  Code Status: Full Code      Disposition Plan     Expected Discharge Date: 09/02/2022      Destination: TCU     The patient's care was discussed with the day RN.    Andrae Olson MD  Hospitalist Service  [facility]  Securely message with the Vocera Web Console (learn more here)  Text page via [facility] Paging/Directory     ______________________________________________________________________    Interval History   Reports that he feels \"great\".  Denies rest dyspnea.  He denies orthopnea, chest pain, or palpitations.  He reports that his appetite is good, though the RN reports that he eats only small amounts.  He is not having problems with dysphagia, nausea, vomiting, or abdominal pain.    Data " reviewed today: I reviewed all medications, new labs and imaging results over the last 24 hours. I personally reviewed no images or EKG's today.    Physical Exam   Vital Signs: Temp: 98.7  F (37.1  C) Temp src: Oral BP: 95/56 Pulse: 81   Resp: 18 SpO2: 95 % O2 Device: None (Room air)    Weight: 227 lbs 11.76 oz    GENERAL: Very pleasant man, who was sleeping quietly nearly supine when I entered the room.  He awoke to voice, and appears comfortable.  EYES: Eyes grossly normal to inspection, extraocular movements intact  HENT: Nares patent bilaterally, no discharge.    NECK: Trachea midline, no stridor.    RESP: No accessory muscle use.  Lungs notable for focal inspiratory crackles at the right posterior lung base, and clear sounds elsewhere on inspiration.  Expiration not prolonged, no wheeze.  CV: Regular rate and rhythm, non-tachycardic.  Normal S1 S2, no murmur or extra sound.  No more than trace lower extremity edema.  ABDOMEN: Protuberant, soft, non-tender, no guarding.  Liver and spleen not palpable, no masses palpable.  Bowel sounds positive.  MS: No bony deformities noted.  No red or inflamed joints.  SKIN: Warm and dry, no rashes.  NEURO: Alert, oriented, conversant.  Cranial nerves III - XII grossly intact.  No gross motor or sensory deficits.    : Caruso catheter in place.  PSYCH: Calm, alert, conversant.  Able to articulate logical thoughts, no tangential thoughts, no hallucinations or delusions.  Affect normal.        Data   Recent Labs   Lab 09/01/22  0622 08/31/22  0418 08/31/22  0211 08/30/22  1130 08/30/22  0934 08/29/22  0754 08/29/22  0421 08/28/22  0755 08/28/22  0419   WBC  --   --   --   --   --   --  2.7*  --  3.8*   HGB  --   --   --   --   --   --  9.2*  --  9.3*   MCV  --   --   --   --   --   --  88  --  88   PLT  --   --   --   --   --   --  73*  --  74*    138  --   --  140  --  139  --  133   POTASSIUM 3.5 3.6  --   --  3.9  --  3.8  --  3.7   CHLORIDE 98 100  --   --  103  --   106  --  100   CO2 28 29  --   --  26  --  25  --  25   BUN 20.3 25.6*  --   --  25.0*  --  59*  --  82*   CR 0.97 1.14  --   --  1.06  --  2.35*  --  4.10*   ANIONGAP 11 9  --   --  11  --  8  --  8   NATALIIA 8.5* 8.6*  --   --  8.7*  --  8.1*  --  7.9*   * 103* 116*   < > 149*   < > 83   < > 113*   ALBUMIN  --   --   --   --  3.2*  --   --   --   --    PROTTOTAL  --   --   --   --  6.1*  --   --   --   --    BILITOTAL  --   --   --   --  0.9  --   --   --   --    ALKPHOS  --   --   --   --  116  --   --   --   --    ALT  --   --   --   --  30  --   --   --   --    AST  --   --   --   --  37  --   --   --   --     < > = values in this interval not displayed.     Component Ref Range & Units 08/31/22 0418   Magnesium 1.7 - 2.3 mg/dL 1.6 Low          No results found for this or any previous visit (from the past 24 hour(s)).  Medications     Continuing ACE inhibitor/ARB/ARNI from home medication list OR ACE inhibitor/ARB order already placed during this visit       BETA BLOCKER NOT PRESCRIBED         [Held by provider] amLODIPine  5 mg Oral Daily     aspirin  81 mg Oral Daily     atorvastatin  20 mg Oral Daily     [Held by provider] carvedilol  12.5 mg Oral BID w/meals     ferrous sulfate  325 mg Oral QPM     furosemide  60 mg Intravenous Q8H     [Held by provider] hydrALAZINE  50 mg Oral BID     [Held by provider] lisinopril  20 mg Oral Daily     magnesium oxide  400 mg Oral BID     nystatin   Topical BID     polyethylene glycol  17 g Oral Daily     potassium chloride  20 mEq Oral BID     sodium chloride (PF)  3 mL Intracatheter Q8H     tamsulosin  0.4 mg Oral QPM     Andrae Olson MD  [facility]

## 2022-09-02 VITALS
HEIGHT: 66 IN | BODY MASS INDEX: 36.67 KG/M2 | TEMPERATURE: 98.3 F | RESPIRATION RATE: 18 BRPM | HEART RATE: 86 BPM | DIASTOLIC BLOOD PRESSURE: 64 MMHG | OXYGEN SATURATION: 96 % | SYSTOLIC BLOOD PRESSURE: 102 MMHG | WEIGHT: 228.18 LBS

## 2022-09-02 LAB
ANION GAP SERPL CALCULATED.3IONS-SCNC: 9 MMOL/L (ref 7–15)
BUN SERPL-MCNC: 24.6 MG/DL (ref 8–23)
CALCIUM SERPL-MCNC: 8.4 MG/DL (ref 8.8–10.2)
CHLORIDE SERPL-SCNC: 99 MMOL/L (ref 98–107)
CREAT SERPL-MCNC: 1 MG/DL (ref 0.67–1.17)
DEPRECATED HCO3 PLAS-SCNC: 29 MMOL/L (ref 22–29)
ERYTHROCYTE [DISTWIDTH] IN BLOOD BY AUTOMATED COUNT: 15.9 % (ref 10–15)
GFR SERPL CREATININE-BSD FRML MDRD: 77 ML/MIN/1.73M2
GLUCOSE SERPL-MCNC: 111 MG/DL (ref 70–99)
HCT VFR BLD AUTO: 31.6 % (ref 40–53)
HGB BLD-MCNC: 10.2 G/DL (ref 13.3–17.7)
MAGNESIUM SERPL-MCNC: 1.8 MG/DL (ref 1.7–2.3)
MCH RBC QN AUTO: 28.4 PG (ref 26.5–33)
MCHC RBC AUTO-ENTMCNC: 32.3 G/DL (ref 31.5–36.5)
MCV RBC AUTO: 88 FL (ref 78–100)
PLATELET # BLD AUTO: 102 10E3/UL (ref 150–450)
POTASSIUM SERPL-SCNC: 4 MMOL/L (ref 3.4–5.3)
RBC # BLD AUTO: 3.59 10E6/UL (ref 4.4–5.9)
SODIUM SERPL-SCNC: 137 MMOL/L (ref 136–145)
WBC # BLD AUTO: 3.5 10E3/UL (ref 4–11)

## 2022-09-02 PROCEDURE — 250N000013 HC RX MED GY IP 250 OP 250 PS 637: Performed by: INTERNAL MEDICINE

## 2022-09-02 PROCEDURE — 36415 COLL VENOUS BLD VENIPUNCTURE: CPT

## 2022-09-02 PROCEDURE — 80048 BASIC METABOLIC PNL TOTAL CA: CPT

## 2022-09-02 PROCEDURE — 250N000013 HC RX MED GY IP 250 OP 250 PS 637

## 2022-09-02 PROCEDURE — 250N000013 HC RX MED GY IP 250 OP 250 PS 637: Performed by: PHYSICIAN ASSISTANT

## 2022-09-02 PROCEDURE — 99239 HOSP IP/OBS DSCHRG MGMT >30: CPT

## 2022-09-02 PROCEDURE — 85027 COMPLETE CBC AUTOMATED: CPT

## 2022-09-02 PROCEDURE — 83735 ASSAY OF MAGNESIUM: CPT

## 2022-09-02 RX ORDER — TAMSULOSIN HYDROCHLORIDE 0.4 MG/1
0.4 CAPSULE ORAL EVERY EVENING
Qty: 30 CAPSULE | Refills: 1 | Status: SHIPPED | OUTPATIENT
Start: 2022-09-02 | End: 2022-10-20

## 2022-09-02 RX ORDER — MAGNESIUM OXIDE 400 MG/1
400 TABLET ORAL 2 TIMES DAILY
Qty: 60 TABLET | Refills: 1 | Status: SHIPPED | OUTPATIENT
Start: 2022-09-02

## 2022-09-02 RX ORDER — POTASSIUM CHLORIDE 1500 MG/1
20 TABLET, EXTENDED RELEASE ORAL 2 TIMES DAILY
Qty: 60 TABLET | Refills: 0 | Status: SHIPPED | OUTPATIENT
Start: 2022-09-02 | End: 2023-08-16

## 2022-09-02 RX ORDER — FUROSEMIDE 40 MG
40 TABLET ORAL DAILY
Qty: 30 TABLET | Refills: 1 | Status: SHIPPED | OUTPATIENT
Start: 2022-09-02 | End: 2022-10-06

## 2022-09-02 RX ADMIN — NYSTATIN: 100000 CREAM TOPICAL at 08:49

## 2022-09-02 RX ADMIN — POLYETHYLENE GLYCOL 3350 17 G: 17 POWDER, FOR SOLUTION ORAL at 08:49

## 2022-09-02 RX ADMIN — ASPIRIN 81 MG: 81 TABLET ORAL at 08:48

## 2022-09-02 RX ADMIN — POTASSIUM CHLORIDE 20 MEQ: 20 TABLET, EXTENDED RELEASE ORAL at 08:48

## 2022-09-02 RX ADMIN — FUROSEMIDE 40 MG: 40 TABLET ORAL at 08:48

## 2022-09-02 RX ADMIN — ATORVASTATIN CALCIUM 20 MG: 20 TABLET, FILM COATED ORAL at 08:48

## 2022-09-02 RX ADMIN — Medication 400 MG: at 08:48

## 2022-09-02 ASSESSMENT — ACTIVITIES OF DAILY LIVING (ADL)
ADLS_ACUITY_SCORE: 31
ADLS_ACUITY_SCORE: 30
ADLS_ACUITY_SCORE: 31
ADLS_ACUITY_SCORE: 31
ADLS_ACUITY_SCORE: 30
ADLS_ACUITY_SCORE: 31

## 2022-09-02 NOTE — PROGRESS NOTES
Care Management Discharge Note    Discharge Date: 09/02/2022       Discharge Disposition: Skilled Nursing Facility, Transitional Care    Discharge Services: None    Discharge DME: None    Discharge Transportation: agency, family or friend will provide    Private pay costs discussed: Not applicable    PAS Confirmation Code:  [id number])-updated SLL of changed TCU location  Patient/family educated on Medicare website which has current facility and service quality ratings: yes    Education Provided on the Discharge Plan:  yes  Persons Notified of Discharge Plans: patient and sister, Rea  Patient/Family in Agreement with the Plan: yes    Handoff Referral Completed: Yes    Additional Information:  Sharon medically stable for discharge today.  Patient has been accepted at both [facility] (Admissions Phone: [phone] Main Phone: [phone] Fax: [phone]) and [facility].  Patient and family chose [facility].  Confirmed with RICCI Barker @ [facility] they are able to accept today. Informed admissions team patient will have hernandez catheter at discharge.      Discussed transportation options for discharge.  Daylin and her  plan to transport patient to TCU.  Discussed limited mobility with nursing team.  Relayed to Daylin patient requires assistance for mobility and has stiffness at times.  Daylin feels she and her hsuband can manage in and out of car (discussed with [facility] hospital staff unable to assist into car).  Daylin and her  will plan to transport today at 1530, arriving at TCU around 1600 (timing requested by TCU).       Betty Bradford MSN, RN  Inpatient Care Coordinator  [facility] [phone]  [facility] [phone]

## 2022-09-02 NOTE — DISCHARGE SUMMARY
[facility]    Discharge Summary  Hospital Medicine    Date of Admission:  8/20/2022  Date of Discharge:  9/2/2022   Discharging Provider: Andrae Olson MD  Date of Service: 9/2/2022      Primary Care     JacksonThomas Sravanthi  [address]  [place] [address]      Identification and Chief Compaint:  Flash Brown is a 81 year old male admitted on 8/20/2022. He presented to the emergency department for evaluation of edema, 20 pound weight gain, and dyspnea on exertion and was found to have acute heart failure for which he is being admitted for further evaluation and treatment.     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, in remission  Hypertension  Coronary artery disease  Mixed hyperlipidemia  Benign prostatic hyperplasia with urinary obstructive symptoms  Obstructive sleep apnea  Obesity    Discharge Disposition   Discharged to short-term care facility    Discharge Orders        Discharge Medications   Current Discharge Medication List      START taking these medications    Details   magnesium oxide (MAG-OX) 400 MG tablet Take 1 tablet (400 mg) by mouth 2 times daily  Qty: 60 tablet, Refills: 1    Associated Diagnoses: Hypomagnesemia      potassium chloride ER (KLOR-CON M) 20 MEQ CR tablet Take 1 tablet (20 mEq) by mouth 2 times daily  Qty: 60 tablet, Refills: 0    Associated Diagnoses: Acute heart failure with preserved ejection fraction (H)      tamsulosin (FLOMAX) 0.4 MG capsule Take 1 capsule (0.4 mg) by mouth every evening  Qty: 30 capsule, Refills: 1    Associated Diagnoses: Benign prostatic hyperplasia with urinary obstruction         CONTINUE these medications which have CHANGED    Details   furosemide (LASIX) 40 MG tablet Take 1 tablet (40 mg) by mouth  daily  Qty: 30 tablet, Refills: 1    Associated Diagnoses: Generalized edema         CONTINUE these medications which have NOT CHANGED    Details   aspirin (ASA) 81 MG EC tablet Take 81 mg by mouth daily      atorvastatin (LIPITOR) 20 MG tablet Take 1 tablet (20 mg) by mouth daily  Qty: 90 tablet, Refills: 0    Associated Diagnoses: Other hyperlipidemia      ferrous sulfate (FE TABS) 325 (65 Fe) MG EC tablet Take 1 tablet (325 mg) by mouth daily  Qty: 90 tablet, Refills: 0    Associated Diagnoses: Mild anemia      ORDER FOR DME Light Therapy with Full Spectrum Light (89085 lux) Start with 10-15 minute exposure per day, Increase exposure to 30 to 45 minutes per day, Maximum exposure: 90 minutes per day,Look periodically at light during each session   Qty: 1, Refills: 0    Associated Diagnoses: Depressive disorder, not elsewhere classified      triamcinolone (KENALOG) 0.1 % external cream Twice daily to legs prn itching  Qty: 454 g, Refills: 3    Associated Diagnoses: History of skin cancer; Basal cell carcinoma (BCC) of right shoulder; Basal cell carcinoma of anterior chest; Dermatitis      nitroGLYcerin (NITROSTAT) 0.4 MG sublingual tablet Place 1 tablet (0.4 mg) under the tongue See Admin Instructions for chest pain  Qty: 30 tablet, Refills: 0    Associated Diagnoses: Coronary artery disease involving native coronary artery of native heart without angina pectoris         STOP taking these medications       amLODIPine (NORVASC) 5 MG tablet Comments:   Reason for Stopping:         carvedilol (COREG) 12.5 MG tablet Comments:   Reason for Stopping:         cloNIDine (CATAPRES) 0.1 MG tablet Comments:   Reason for Stopping:         clopidogrel (PLAVIX) 75 MG tablet Comments:   Reason for Stopping:         hydrALAZINE (APRESOLINE) 50 MG tablet Comments:   Reason for Stopping:         naloxone (NARCAN) 4 MG/0.1ML nasal spray Comments:   Reason for Stopping:         pioglitazone (ACTOS) 30 MG tablet Comments:   Reason for  Stopping:         ramipril (ALTACE) 10 MG capsule Comments:   Reason for Stopping:             Allergies   Allergies   Allergen Reactions     Iodine Swelling       Consultations This Hospital Stay    OCCUPATIONAL THERAPY ADULT IP CONSULT  NUTRITION SERVICES ADULT IP CONSULT  CARE MANAGEMENT / SOCIAL WORK IP CONSULT  PHYSICAL THERAPY ADULT IP CONSULT  PHYSICAL THERAPY ADULT IP CONSULT  OCCUPATIONAL THERAPY ADULT IP CONSULT    Significant Results and Procedures   Procedures    None.    Data   Results for orders placed or performed during the hospital encounter of 08/20/22   XR Chest 2 Views    Narrative    EXAM: XR CHEST 2 VIEWS  LOCATION: [facility]  DATE/TIME: 8/20/2022 1:13 PM    INDICATION: short of breath  COMPARISON: Chest radiograph 05/22/2022      Impression    IMPRESSION: Enlarged cardiac silhouette, similar to prior examination. Moderate right pleural effusion, increased since prior radiograph. Likely superimposed pulmonary vascular congestion and interstitial edema. No pneumothorax. No acute bony   abnormality. Healed right clavicular fracture again noted.   XR Chest Port 1 View    Narrative    CHEST PORTABLE ONE VIEW August 23, 2022 2:38 PM    HISTORY: Confusion.    COMPARISON: 8/20/2022.    FINDINGS/    Impression    IMPRESSION: No significant interval change to comparison. Heart size  is enlarged. There is central vascular prominence increased  interstitial markings along with blunting of the costophrenic angles,  right greater than left, consistent with pulmonary edema and small  volume pleural effusions. No sign of pneumothorax. Multiple surgical  clips project over the right axilla. A chronic appearing completely  displaced right midshaft clavicular fracture is similar to comparison.  No acute osseous abnormality.    BLANCA CHINCHILLA MD         SYSTEM ID:  [id number]   CT Head w/o Contrast    Narrative    EXAM: CT HEAD W/O CONTRAST  LOCATION: [facility]  DATE/TIME: 8/23/2022 5:54 PM    INDICATION: altered mental status  COMPARISON: None.  TECHNIQUE: Routine CT Head without IV contrast. Multiplanar reformats. Dose reduction techniques were used.    FINDINGS:  INTRACRANIAL CONTENTS: No intracranial hemorrhage, extraaxial collection, or mass effect.  No CT evidence of acute infarct. There is scattered diffuse low attenuation within the periventricular and subcortical white matter consistent with diffuse small   vessel ischemic disease. The ventricular system, basal cisterns and the cortical sulci are consistent with diffuse volume loss.     VISUALIZED ORBITS/SINUSES/MASTOIDS: No intraorbital abnormality. No paranasal sinus mucosal disease. No middle ear or mastoid effusion.    BONES/SOFT TISSUES: No acute abnormality.      Impression    IMPRESSION:  1.  No CT finding of a mass, hemorrhage or focal area suggestive of infarct.  2.  Diffuse age related changes.   US Lower Extremity Venous Duplex Bilateral    Narrative    EXAM: US LOWER EXTREMITY VENOUS DUPLEX BILATERAL  LOCATION: [facility]  DATE/TIME: 8/26/2022 10:02 PM    INDICATION: bilateral LE edema  COMPARISON: None.  TECHNIQUE: Venous Duplex ultrasound of bilateral lower extremities with and without compression, augmentation and duplex. Color flow and spectral Doppler with waveform analysis performed.    FINDINGS: Exam includes the common femoral, femoral, popliteal veins as well as segmentally visualized deep calf veins and greater saphenous vein.     RIGHT: No deep vein thrombosis. No superficial thrombophlebitis. No popliteal cyst.    LEFT: No deep vein thrombosis. No superficial thrombophlebitis. No popliteal cyst.      Impression    IMPRESSION:  1.  No deep venous thrombosis in the bilateral lower extremities.   US Renal Complete w Duplex Complete    Narrative    EXAM:  1. RENAL ULTRASOUND   2. RENAL DUPLEX  LOCATION: [facility]  DATE:  8/28/2022    INDICATION: Acute renal failure not responsive to IV fluids  COMPARISON: 07/11/2019  TECHNIQUE: Duplex imaging is performed utilizing gray-scale, two-dimensional images, and color-flow imaging. Doppler waveform analysis and spectral Doppler imaging is also performed.    FINDINGS:     RIGHT KIDNEY: 11.9 x 6.3 x 6.0 cm. Mild new right hydronephrosis. Probable small cysts, not optimally assessed.    LEFT KIDNEY 11.0 x 5.9 x 5.5 cm. Mild new left hydronephrosis.    BLADDER: Moderately distended but normal in contour.    RENAL DUPLEX:  Aortic PSV: 161 cm/s, multiphasic  Right Renal Artery PSV:  cm/s (Normal considered less than 200 cm/s.)  Right Intrarenal Resistive Index: 0.74-0.81  RAR: 0.6-0.8    Left Renal Artery PSV  cm/s (Normal considered less than 200 cm/s.)  Left Intrarenal Resistive Index: 0.65-0.84  RAR: 0.5-0.7      Impression    IMPRESSION:   1.  Bilateral hydronephrosis. Given significant bladder distention, follow-up imaging post voiding suggested.  2.  No sonographic evidence of renal artery stenosis.       History of Present Illness   (From H&P) Flash Brown is a 81 year old male who presented to the emergency department for evaluation of weight gain and edema.      Patient has had progressively worsening edema for the past 2 to 3 months, most recently noted to have progressed into his abdomen and arms (right worse than left).  He has noticed a 21 pound weight gain over the past 3 months.  He has had dyspnea on exertion for a few months, with new wheezing present in the past few weeks.  He has been more fatigued and generally weak recently, sleeps about 18 hours a day because he has no energy.     He saw his primary physician in clinic on 8/2 and was evaluated for edema and weight gain.  At that time there was concern for new heart failure, so outpatient echocardiogram and further labs were ordered.  He was prescribed a 5-day trial of furosemide 40 mg daily, and despite this  "he gained an additional 5 pounds and did not have any improvement in his other symptoms.     Because his symptoms have been worsening without improvement, he presented to the emergency department at the advice of a triage nurse, where he was found to have acute CHF.     Patient denies orthopnea, but notes significant dyspnea even with simple tasks such as getting up out of a chair.  He denies any chest pain or palpitations.  He was noted to be bradycardic, which is not new per patient report.  He denies any associated dizziness, lightheadedness, falls, fainting.  No recent medication changes or dose changes other than the addition of furosemide x5 days.        Review of systems:  Patient has nocturia which is not new.  Denies fevers or chills, but states that he is always cold.  He denies any night sweats.     Hospital Course     Principal Problem:    Acute (HFpEF) heart failure with preserved ejection fraction (H)    Assessment: Presented with 2-3 month history of worsening edema, 20 pound weight gain, and dyspnea on exertion. No improvement with a trial of furosemide 40 mg daily x 5 days from PCP.     Had outpatient echo on 8/12/22 demonstrating \"global and regional left ventricular function is normal with an EF of 60-65%. Global right ventricular function is normal. IVC diameter and respiratory changes fall into an intermediate range suggesting an RA pressure of 8 mmHg. No pericardial effusion is present.\"   Admit chest x-ray demonstrates \"enlarged cardiac silhouette, similar to prior examination. Moderate right pleural effusion, increased since prior radiograph. Likely superimposed pulmonary vascular congestion and interstitial edema. No pneumothorax...\"     EKG on admission showed bradycardia which is possibly sinus with baseline artifact vs flutter/fibrillation (bradycardia not new). Admit BNP 1830. Troponin normal (10). Appeared edematous with anasarca. No hypoxia at time of admission. Overall clinical " picture consistent with acute heart failure, which is a new diagnosis for patient. Unclear cause, may be driven by persisting bradycardia or other undiagnosed arrhythmia, worsening coronary artery disease (but no evidence of acute coronary syndrome), or attributed to his use of pioglitazone (which is known to cause fluid retention and worsen heart failure), but is not a new medication for patient. Pioglitazone was stopped, last dose was prior to admission.     Was given IV furosemide 60 mg in the ED with good urine output. Treated with 40 mg IV twice daily 8/20 through 8/21, then 40 mg IV q 8h x 3 days with initial excellent diuresis by I/O's. Furosemide stopped 8/25 due to acute kidney injury (below).  Acute kidney injury subsequently resolved, diuresis resumed 8/29/2022, currently furosemide 60 mg IV q8 hours.  Weight was down roughly 17 kg (120.2 --> 103.3) since admission as of 9/1/2022.  Dyspnea is much improved.  Lower extremity edema is no more than trace.    I stopped furosemide IV on 9/1/2022, gave him furosemide 40 mg p.o. x1 that afternoon, and as of this morning have started him on furosemide 40 mg daily.  Intake roughly equals output since yesterday, and weight is essentially unchanged.    Today, the patient has been accepted for transfer to [facility] for ongoing care.  He is medically stable for TCU transfer.    -Discharged to TCU today.  -Will use furosemide 40 mg daily as our discharge furosemide dose.  However, if there is evidence of recurrence of dyspnea, increasing edema, or increasing weight, this dose will need to be titrated up.  His discharge weight here is 103.5 kg.  -Carvedilol held due to bradycardia.  Heart rate over the last few days has been adequate, so carvedilol could be resumed if needed.  However, blood pressures have been borderline, so carvedilol, lisinopril, amlodipine and hydralazine are all on hold.     Active Problems:    Essential hypertension, benign     Assessment: Managed prior to admission with amlodipine 5 mg daily, carvedilol 12.5 mg bid, clonidine 0.1 mg bid, hydralazine 50 mg bid, and ramipril 10 mg daily. BP normal on admission. With diuresis, BP has declined, necessitating sequential discontinuation of antihypertensives.  At present, preadmission carvedilol, clonidine, amlodipine, and hydralazine are all on hold.  Blood pressure over the last 24 hours has been normal.    Plan: 1. Continue holding carvedilol, clonidine, amlodipine, hydralazine.              2. Furosedmide dosing discussed above.       Type 2 diabetes mellitus without complication, without long-term current use of insulin (H)    Assessment: Recent HgbA1c 5.5. Managed prior to admission with pioglitazone 30 mg daily.  Pioglitazone was stopped on admission out of concern that it could potentially worsen heart failure.  He is on no glucose lowering therapy here.  Glucose control has been generally good,  over the last 5 checks.     Plan: 1. Continue to observe blood glucose off of glucose lowering therapy.              2.  We will not plan to resume pioglitazone at discharge.       CAD (coronary artery disease)    Mixed hyperlipidemia    Assessment: Follows with [facility] Dr. Tate. History of PCI with LUDIVINA in 2010 (prox/mid RCA, distal circumflex, prox/mid LAD). Stress test 2015 with 70% stenosis of proximal circumflex, did not have subsequent angiogram for unclear reasons. Negative stress test during hospitalization at [facility] in August 2020. Managed prior to admission with aspirin 81 mg daily, clopidogrel 75 mg daily, atorvastatin 20 mg daily, and beta blocker / ACEi / antihypertensive medications noted above. No evidence of acute coronary syndrome at time of admission.    Patient is on DAPT but has had no recent stent/PCI. Cardiology note 7/18/2019 recommended to stop clopidogrel and continue aspirin 81 mg, and follow up note 2/19/2020 confirmed intention to stop clopidogrel. Does  not appear that this was ever stopped. Can find no indication for continuing DAPT in the EMR, so clopidogrel was discontinued 8/29/2022.    Plan: 1. Continue aspirin, statin.              2. See above regarding antihypertensive regimen.   3. Clopidogrel has been removed from the patient's medication list.       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 Follows with oncology Dr. Avendaño. Had a bone marrow biopsy in Feb 2022, which was normal.    Plan: Continue with outpatient oncology surveillance       Pancytopenia (H)    Assessment: Admit WBC 2.2 (transiently low at baseline between 2.7 - 5.0), hemoglobin 9.8 (baseline between 11 - 13), platelets 63 (baseline 64 - 93).  As of today, 9/2/2022, all counts are within these recent baseline ranges.  Occurs in the setting of prior history of lymphoma (see below). Pancytopenia is followed by oncology.    Plan: 1. Continue outpatient oncology surveillance.       Sinus bradycardia    Assessment: Resolved with discontinuation of carvedilol.    Plan: Carvedilol could be resumed, as heart rate has improved.  However, blood pressure remains borderline, so carvedilol, along with other antihypertensives, remain on hold, details above.       Acute kidney injury (H)    Assessment: Cr increased to 2.03 on 8/25 from 1.19 on 8/24, suspect due to aggressive diuresis. 500 mL NS IV 8/25.     Cr 2.28 8/26 and followed. Increased to Cr 2.90 on 8/27. FENa 0.4 suggestive of prerenal etiology. Oliguric. Bedside bladder scan showed low PVR. Given 1500 mL IV fluids. Cr continues to worsen 4.10 on 8/28. Renal US with arterial dopplers showed findings consistent with obstruction and no evidence for renal artery stenosis. Urinary catheter placed.  Initial large urine output. Cr improved to 2.35 just 12 hours after catheter placement. Cr improved -->1.06 -->1.14 --> 0.97 --> 1.00 today.    Plan:  Resolved.  Continue to monitor renal function on diuretics as an outpatient.       Hypomagnesemia    Assessment: Due to high dose diuresis.    Plan:  1. Magnesium sulfate 1 gm IV x 1 given.              2. Continue Mag oxide 400 mg PO BID after discharge.       Obstructive sleep apnea    Assessment: Patient has been issued CPAP machines at least 3 times per sister, but he refuses to even try at home.    Plan: No CPAP.       BPH (benign prostatic hyperplasia) with obstruction    Assessment: Nocturia history. Had been on tamsulosin at one time but stopped possibly earlier this year.     No prior history of obstruction. Saw urologist in March 2022 due to nocturia which was felt due to sleep apnea. Unclear if there was chronic obstruction present on admission but patient developed apparent acute obstruction about 8/24/2022 with some overflow urinary output. Bladder scan did not  on distended bladder. Formal renal US 8/28 showed bilateral mild hydronephrosis and distended bladder, new compared to renal US 7/11/2019. Caruso catheter placed by RN without difficulty and 950 mL of initial output noted.     UA on admission 8/23 showed small blood and 2 RBC and was otherwise unremarkable. UA after developing acute kidney injury but before placing catheter showed trace blood with 8 RBC, large leukocyte esterase with 35 and WBC and WBC clumps with few bacteria and no casts noted, and UC showed 10-50k mixed urogenital chelsy. Not impressive for UTI but does not rule out. No urinary symptoms. Repeat UA with Caruso catheter showed >100 WBC, > 100 RBC, few bacteria. Cross-cover started ceftriaxone but suspect UA abnormal due to obstruction with acute bladder distention.  Subsequent urine culture was no growth, so ceftriaxone was discontinued.    We attempted Caruso catheter removal on 9/1/2022.  However, since removal, the patient has had difficulties initiating urination, with bladder scan showing volumes as high as 400 mL.  As  result, the Caruso catheter was replaced 9/2/2022 morning, and will remain in place at discharge.  He is probably going to need to see a urologist prior to Caruso removal.     Plan: 1.  Will be discharged with the Caruso catheter in place; current catheter was placed 9/2/2022.               2. Tamsulosin 0.4 mg q PM continues.     3.  Advise outpatient urology evaluation of urinary obstructive symptoms.    Pending Results   Unresulted Labs Ordered in the Past 30 Days of this Admission     No orders found from 7/21/2022 to 8/21/2022.          Physical Exam   Temp:  [98.3  F (36.8  C)-98.9  F (37.2  C)] 98.3  F (36.8  C)  Pulse:  [76-92] 86  Resp:  [16-18] 18  BP: (102-139)/(56-65) 102/64  SpO2:  [94 %-100 %] 96 %  Vitals:    08/31/22 0605 09/01/22 0645 09/02/22 0606   Weight: 104.3 kg (229 lb 15 oz) 103.3 kg (227 lb 11.8 oz) 103.5 kg (228 lb 2.8 oz)     GENERAL: Very pleasant man, lying completely supine in bed, who looks very comfortable.   RESP: No accessory muscle use.  There are a few inspiratory crackles at the right posterior lung base, and clear sounds elsewhere on inspiration.  Expiration not prolonged, no wheeze.  CV: Regular rate and rhythm, non-tachycardic.  Normal S1 S2, no murmur or extra sound.  No more than trace lower extremity edema.  ABDOMEN: Protuberant, soft, non-tender, no guarding.  Bowel sounds positive.  NEURO: Alert, oriented, conversant.  Cranial nerves III - XII grossly intact.  No gross motor or sensory deficits.    : Caruso catheter in place.  PSYCH: Calm, alert, conversant.  Able to articulate logical thoughts, no tangential thoughts, no hallucinations or delusions.  Affect normal.    The discharge plan was discussed with the patient, who expressed agreement to the plan for discharge.    Total time on this discharge was 45 minutes.    Andrae Olson MD   [facility]

## 2022-09-02 NOTE — PROGRESS NOTES
"DATE & TIME: 7P-7A     9/1-9/2               Cognitive Concerns/ Orientation : alert and oriented to self and place, disoriented to time and situation but easily redirected.    ABNL VS/O2: vitals stable, on RA    MOBILITY: assist x1 with walker and gait belt    PAIN MANAGEMENT: denies pain     DIET: Low saturated fat diet    BOWEL/BLADDER: not voiding much, states he does not have to go.  Bladder   scan last was 317    ABNL LAB/BG: wbc: 3.5, Hgb 10.2, Hematocirt 31.6, platelet 102    DRAIN/DEVICES: PIV saline locked, patent     D/C DATE: goal is to discharge today     OTHER IMPORTANT INFO: magnesium is stable at 1.8, potassium stable at 4.0    /59 (BP Location: Left arm)   Pulse 91   Temp 98.5  F (36.9  C) (Oral)   Resp 16   Ht 1.676 m (5' 5.98\")   Wt 103.3 kg (227 lb 11.8 oz)   SpO2 94%   BMI 36.78 kg/m      [employer]ry [employer] RN on 9/2/2022 at 5:52 AM    "

## 2022-09-02 NOTE — PLAN OF CARE
Physical Therapy Discharge Summary    Reason for therapy discharge:    Discharged to transitional care facility.    Progress towards therapy goal(s). See goals on Care Plan in [facility] electronic health record for goal details.  Goals partially met.  Barriers to achieving goals:   discharge from facility.    Therapy recommendation(s):    Continued therapy is recommended.  Rationale/Recommendations:  Recommend continued PT at TCU to maximize strength and indep with mobility prior to return home.

## 2022-09-02 NOTE — PLAN OF CARE
Occupational Therapy Discharge Summary    Reason for therapy discharge:    Discharged to transitional care facility.    Progress towards therapy goal(s). See goals on Care Plan in [facility] electronic health record for goal details.  Goals partially met.  Barriers to achieving goals:   discharge from facility.    Therapy recommendation(s):    Continued therapy is recommended.  Rationale/Recommendations:  TCU to increase independence with ADLs/IADLs and functional mobility.

## 2022-09-02 NOTE — PLAN OF CARE
[facility] DISCHARGE NOTE    Patient discharged to transitional care unit at 3:53 PM via wheel chair. Accompanied by sister and staff. Discharge instructions reviewed with  sister, packet provided to her for nursing home , opportunity offered to ask questions. Prescriptions sent with patient to fill . All belongings sent with patient.    Page Calhoun RN

## 2022-09-02 NOTE — PLAN OF CARE
Patient alert, but confused. At times seems oriented and other times is not. Had been unable to urinate all night. Bladder scanned this am for 401 ml. MD updated and a 16 FR coude indwelling catheter was placed. Patient is ready for discharge. Family to transport at 1530 and report has been called.                      Prescription(s) signed. Please inform patient may .

## 2022-09-15 ENCOUNTER — TELEPHONE (OUTPATIENT)
Dept: UROLOGY | Facility: CLINIC | Age: 82
End: 2022-09-15

## 2022-09-15 NOTE — TELEPHONE ENCOUNTER
[facility] Call Center    Phone Message    May a detailed message be left on voicemail: yes     Reason for Call: Other: Lucero from [facility] called and stated that the pt has failed his TOV x2 and requesting an appointment to see Dr. Weems - writer found next available 11/4 and scheduled that but Lucero is requesting a sooner appointment - please call Lucero to further discuss, thanks!    Action Taken: Other: Uro    Travel Screening: Not Applicable

## 2022-09-15 NOTE — TELEPHONE ENCOUNTER
Per chart review:      Called and left a message for [facility] to allow 9/30/22 at 1015am.    Awaiting call back.  Yany INIGUEZ RN Urology 9/15/2022 2:42 PM

## 2022-09-16 NOTE — TELEPHONE ENCOUNTER
Lucero from [facility] left office VM on 9/15/22 confirming OK for appointment on 9/30/22 at 10:15. At her request, I left her a voicemail confirming the appointment is scheduled.  Paola Wells, CMA

## 2022-09-19 ENCOUNTER — LAB REQUISITION (OUTPATIENT)
Dept: LAB | Facility: CLINIC | Age: 82
End: 2022-09-19
Payer: MEDICARE

## 2022-09-19 DIAGNOSIS — I50.9 HEART FAILURE, UNSPECIFIED (H): ICD-10-CM

## 2022-09-20 LAB
ANION GAP SERPL CALCULATED.3IONS-SCNC: 6 MMOL/L (ref 7–15)
BUN SERPL-MCNC: 13.9 MG/DL (ref 8–23)
CALCIUM SERPL-MCNC: 8.5 MG/DL (ref 8.8–10.2)
CHLORIDE SERPL-SCNC: 104 MMOL/L (ref 98–107)
CREAT SERPL-MCNC: 0.79 MG/DL (ref 0.67–1.17)
DEPRECATED HCO3 PLAS-SCNC: 28 MMOL/L (ref 22–29)
ERYTHROCYTE [DISTWIDTH] IN BLOOD BY AUTOMATED COUNT: 16 % (ref 10–15)
GFR SERPL CREATININE-BSD FRML MDRD: 90 ML/MIN/1.73M2
GLUCOSE SERPL-MCNC: 104 MG/DL (ref 70–99)
HCT VFR BLD AUTO: 33.1 % (ref 40–53)
HGB BLD-MCNC: 10.2 G/DL (ref 13.3–17.7)
MCH RBC QN AUTO: 27.6 PG (ref 26.5–33)
MCHC RBC AUTO-ENTMCNC: 30.8 G/DL (ref 31.5–36.5)
MCV RBC AUTO: 90 FL (ref 78–100)
PLATELET # BLD AUTO: 97 10E3/UL (ref 150–450)
POTASSIUM SERPL-SCNC: 3.7 MMOL/L (ref 3.4–5.3)
RBC # BLD AUTO: 3.7 10E6/UL (ref 4.4–5.9)
SODIUM SERPL-SCNC: 138 MMOL/L (ref 136–145)
WBC # BLD AUTO: 2.8 10E3/UL (ref 4–11)

## 2022-09-20 PROCEDURE — P9604 ONE-WAY ALLOW PRORATED TRIP: HCPCS | Performed by: NURSE PRACTITIONER

## 2022-09-20 PROCEDURE — 85027 COMPLETE CBC AUTOMATED: CPT | Performed by: NURSE PRACTITIONER

## 2022-09-20 PROCEDURE — 36415 COLL VENOUS BLD VENIPUNCTURE: CPT | Performed by: NURSE PRACTITIONER

## 2022-09-20 PROCEDURE — 80048 BASIC METABOLIC PNL TOTAL CA: CPT | Performed by: NURSE PRACTITIONER

## 2022-09-26 ENCOUNTER — LAB REQUISITION (OUTPATIENT)
Dept: LAB | Facility: CLINIC | Age: 82
End: 2022-09-26
Payer: MEDICARE

## 2022-09-26 DIAGNOSIS — I50.9 HEART FAILURE, UNSPECIFIED (H): ICD-10-CM

## 2022-09-27 LAB
ANION GAP SERPL CALCULATED.3IONS-SCNC: 8 MMOL/L (ref 7–15)
BUN SERPL-MCNC: 13.7 MG/DL (ref 8–23)
CALCIUM SERPL-MCNC: 8.9 MG/DL (ref 8.8–10.2)
CHLORIDE SERPL-SCNC: 99 MMOL/L (ref 98–107)
CREAT SERPL-MCNC: 0.86 MG/DL (ref 0.67–1.17)
DEPRECATED HCO3 PLAS-SCNC: 29 MMOL/L (ref 22–29)
GFR SERPL CREATININE-BSD FRML MDRD: 88 ML/MIN/1.73M2
GLUCOSE SERPL-MCNC: 165 MG/DL (ref 70–99)
POTASSIUM SERPL-SCNC: 3.5 MMOL/L (ref 3.4–5.3)
SODIUM SERPL-SCNC: 136 MMOL/L (ref 136–145)

## 2022-09-27 PROCEDURE — 80048 BASIC METABOLIC PNL TOTAL CA: CPT | Performed by: NURSE PRACTITIONER

## 2022-09-27 PROCEDURE — 36415 COLL VENOUS BLD VENIPUNCTURE: CPT | Performed by: NURSE PRACTITIONER

## 2022-09-27 PROCEDURE — P9604 ONE-WAY ALLOW PRORATED TRIP: HCPCS | Performed by: NURSE PRACTITIONER

## 2022-09-30 ENCOUNTER — OFFICE VISIT (OUTPATIENT)
Dept: UROLOGY | Facility: CLINIC | Age: 82
End: 2022-09-30
Payer: MEDICARE

## 2022-09-30 VITALS — HEART RATE: 99 BPM | SYSTOLIC BLOOD PRESSURE: 138 MMHG | OXYGEN SATURATION: 92 % | DIASTOLIC BLOOD PRESSURE: 75 MMHG

## 2022-09-30 DIAGNOSIS — R33.9 URINARY RETENTION: Primary | ICD-10-CM

## 2022-09-30 PROCEDURE — 51702 INSERT TEMP BLADDER CATH: CPT | Performed by: UROLOGY

## 2022-09-30 PROCEDURE — 99213 OFFICE O/P EST LOW 20 MIN: CPT | Mod: 25 | Performed by: UROLOGY

## 2022-09-30 NOTE — PROGRESS NOTES
Chief Complaint   Patient presents with     OSMANI       Flash Brown is a 81 year old male who presents today for follow up of   Chief Complaint   Patient presents with     RECHECK   81-year-old gentleman with history of recent urinary retention after hospitalization for congestive heart failure.  He has failed a trial void x2.  He is currently on Flomax.  He has history of nocturia in the past.  His strength has improved.  He is walking with a walker.    Current Outpatient Medications   Medication Sig Dispense Refill     aspirin (ASA) 81 MG EC tablet Take 81 mg by mouth daily       atorvastatin (LIPITOR) 20 MG tablet Take 1 tablet (20 mg) by mouth daily 90 tablet 0     furosemide (LASIX) 40 MG tablet Take 1 tablet (40 mg) by mouth daily 30 tablet 1     magnesium oxide (MAG-OX) 400 MG tablet Take 1 tablet (400 mg) by mouth 2 times daily 60 tablet 1     nitroGLYcerin (NITROSTAT) 0.4 MG sublingual tablet Place 1 tablet (0.4 mg) under the tongue See Admin Instructions for chest pain 30 tablet 0     ORDER FOR DME Light Therapy with Full Spectrum Light (44517 lux) Start with 10-15 minute exposure per day, Increase exposure to 30 to 45 minutes per day, Maximum exposure: 90 minutes per day,Look periodically at light during each session  1 0     potassium chloride ER (KLOR-CON M) 20 MEQ CR tablet Take 1 tablet (20 mEq) by mouth 2 times daily 60 tablet 0     tamsulosin (FLOMAX) 0.4 MG capsule Take 1 capsule (0.4 mg) by mouth every evening 30 capsule 1     triamcinolone (KENALOG) 0.1 % external cream Twice daily to legs prn itching 454 g 3     ferrous sulfate (FE TABS) 325 (65 Fe) MG EC tablet Take 1 tablet (325 mg) by mouth daily (Patient not taking: Reported on 9/30/2022) 90 tablet 0     Allergies   Allergen Reactions     Iodine Swelling      Past Medical History:   Diagnosis Date     Basal cell carcinoma      CAD (coronary artery disease)      Depression      Hyperlipidemia      Hypertension      Lymphoma (H)       Malignant melanoma (H)      Melanoma (H)      Squamous cell carcinoma      Past Surgical History:   Procedure Laterality Date     AXILLARY SURGERY      S/P resection and adjuvant radiation     BONE MARROW BIOPSY, BONE SPECIMEN, NEEDLE/TROCAR N/A 7/8/2019    Procedure: BIOPSY, BONE MARROW;  Surgeon: Husam Issa MD;  Location: [facility]     BONE MARROW BIOPSY, BONE SPECIMEN, NEEDLE/TROCAR Left 2/24/2022    Procedure: BIOPSY, BONE MARROW;  Surgeon: Cailin Anthony PA-C;  Location: [facility] OR     CARDIAC SURGERY       CHOLECYSTECTOMY       HERNIA REPAIR, UMBILICAL       PHACOEMULSIFICATION WITH STANDARD INTRAOCULAR LENS IMPLANT Right 10/2/2019    Procedure: Cataract Removal with Implant;  Surgeon: Dinesh Ivey MD;  Location: [facility]     PHACOEMULSIFICATION WITH STANDARD INTRAOCULAR LENS IMPLANT Left 10/21/2019    Procedure: Cataract Removal with Implant;  Surgeon: Dinesh Ivey MD;  Location: [facility]     STENT      PTCA with drug-eluting stent of RCA, circumflex, and LAD     VASCULAR SURGERY       Family History   Problem Relation Age of Onset     Asthma Other      Cancer Other         melanoma     Blood Disease Other         bleeding disorder     Lung Cancer Mother         Smoker     Prostate Cancer Father      Melanoma No family hx of      Social History     Socioeconomic History     Marital status: Single     Number of children: 0   Occupational History     Occupation: Retired     Comment: [employer]    Tobacco Use     Smoking status: Never Smoker     Smokeless tobacco: Never Used   Vaping Use     Vaping Use: Never used   Substance and Sexual Activity     Alcohol use: Yes     Comment: glass of wine couple times per week     Drug use: Never     Social Determinants of Health     Financial Resource Strain: Low Risk      Difficulty of Paying Living Expenses: Not hard at all   Food Insecurity: No Food Insecurity     Worried About Running Out of Food in the Last Year: Never true     Ran Out of  Food in the Last Year: Never true   Transportation Needs: No Transportation Needs     Lack of Transportation (Medical): No     Lack of Transportation (Non-Medical): No   Physical Activity: Inactive     Days of Exercise per Week: 0 days     Minutes of Exercise per Session: 0 min   Stress: No Stress Concern Present     Feeling of Stress : Not at all   Social Connections: Socially Isolated     Frequency of Communication with Friends and Family: Once a week     Frequency of Social Gatherings with Friends and Family: Once a week     Attends Anabaptism Services: More than 4 times per year     Active Member of Clubs or Organizations: No     Marital Status: Never    Housing Stability: Low Risk      Unable to Pay for Housing in the Last Year: No     Number of Places Lived in the Last Year: 1     Unstable Housing in the Last Year: No       REVIEW OF SYSTEMS  =================  C: NEGATIVE for fever, chills, change in weight  I: NEGATIVE for worrisome rashes, moles or lesions  E/M: NEGATIVE for ear, mouth and throat problems  R: NEGATIVE for significant cough or SHORTNESS OF BREATH  CV:  NEGATIVE for chest pain, palpitations or peripheral edema  GI: NEGATIVE for nausea, abdominal pain, heartburn, or change in bowel habits  NEURO: NEGATIVE numbness/weakness  : see HPI  PSYCH: NEGATIVE depression/anxiety  LYmph: no new enlarged lymph nodes  Ortho: no new trauma/movements    Physical Exam:  Blood pressure 138/75, pulse 99, SpO2 92 %.    GENERAL: healthy, alert and no distress  EYES: Eyes grossly normal to inspection, conjunctivae and sclerae normal  RESP: no audible wheeze, cough, or visible cyanosis.  No visible retractions or increased work of breathing.  Able to speak fully in complete sentences.  NEURO: Cranial nerves grossly intact, mentation intact and speech normal  PSYCH: mentation appears normal, affect normal/bright, judgement and insight intact, normal speech and appearance well-groomed    Assessment/Plan:    (R33.9) Urinary retention  (primary encounter diagnosis)  Comment: 300 male of water placed into the bladder.  Patient was unable to void.  Plan: IRRIGATION BLADDER SIMPLE LAVAGE/INSTILLATION         (84005)        Return to clinic in several hours for bladder scan.

## 2022-09-30 NOTE — PROGRESS NOTES
Pt returned to clinic at 1300. PVR= 400. Pt declined indwelling hernandez. Pt states he cannot ISC. Spoke with sister Daylin who agreed. Pt is tearful and after a lengthy discussion, an indwelling hernandez was placed.    Catheter insertion documentation on 9/30/2022:    Reason for insertion: urinary retention  Catheter successfully inserted into the urethral meatus in the usual sterile fashion without immediate complication.  Type of catheter placed: 16 Ukrainian Coude catheter  Urine is clear in color.  600 cc's of urine output returned.  Balloon was filled with 10cc's of normal saline.  Securement device placed for the catheter.  The patient tolerated the procedure and was instructed to RTC in 1 month for cysto.    Yany INIGUEZ RN Urology 9/30/2022 2:06 PM

## 2022-10-04 ENCOUNTER — OFFICE VISIT (OUTPATIENT)
Dept: DERMATOLOGY | Facility: CLINIC | Age: 82
End: 2022-10-04
Payer: MEDICARE

## 2022-10-04 VITALS — SYSTOLIC BLOOD PRESSURE: 118 MMHG | HEART RATE: 103 BPM | OXYGEN SATURATION: 97 % | DIASTOLIC BLOOD PRESSURE: 71 MMHG

## 2022-10-04 DIAGNOSIS — C44.519 BASAL CELL CARCINOMA OF ANTERIOR CHEST: Primary | ICD-10-CM

## 2022-10-04 DIAGNOSIS — C44.519 BASAL CELL CARCINOMA (BCC) OF CHEST: ICD-10-CM

## 2022-10-04 PROCEDURE — 17313 MOHS 1 STAGE T/A/L: CPT | Performed by: DERMATOLOGY

## 2022-10-04 PROCEDURE — 13101 CMPLX RPR TRUNK 2.6-7.5 CM: CPT | Performed by: DERMATOLOGY

## 2022-10-04 PROCEDURE — 17313 MOHS 1 STAGE T/A/L: CPT | Mod: 59 | Performed by: DERMATOLOGY

## 2022-10-04 PROCEDURE — 11102 TANGNTL BX SKIN SINGLE LES: CPT | Mod: 59 | Performed by: DERMATOLOGY

## 2022-10-04 PROCEDURE — 88331 PATH CONSLTJ SURG 1 BLK 1SPC: CPT | Mod: 59 | Performed by: DERMATOLOGY

## 2022-10-04 ASSESSMENT — PAIN SCALES - GENERAL: PAINLEVEL: NO PAIN (0)

## 2022-10-04 NOTE — NURSING NOTE
"Initial /71   Pulse 103   SpO2 97%  Estimated body mass index is 36.85 kg/m  as calculated from the following:    Height as of 8/20/22: 1.676 m (5' 5.98\").    Weight as of 9/2/22: 103.5 kg (228 lb 2.8 oz). .      "

## 2022-10-04 NOTE — PATIENT INSTRUCTIONS
Sutured Wound Care     [facility]: [phone]    [facility]: [phone]    Chest  No strenuous activity for 48 hours. Resume moderate activity in 48 hours. No heavy exercising until you are seen for follow up in one week.     Take Tylenol as needed for discomfort.                         Do not drink alcoholic beverages for 48 hours.     Keep the pressure bandage in place for 24 hours. If the bandage becomes blood tinged or loose, reinforce it with gauze and tape.        (Refer to the reverse side of this page for management of bleeding).    Remove pressure bandage in 24 hours (white)    Leave the flat bandage in place until your follow up appointment. (Light blue)    Keep the bandage dry. Wash around it carefully.    If the tape becomes soiled or starts to come off, reinforce it with additional paper tape.    Do not smoke for 3 weeks; smoking is detrimental to wound healing.    It is normal to have swelling and bruising around the surgical site. The bruising will fade in approximately 10-14 days. Elevate the area to reduce swelling.    Numbness, itchiness and sensitivity to temperature changes can occur after surgery and may take up to 18 months to normalize.    POSSIBLE COMPLICATIONS    BLEEDING:    Leave the bandage in place.  Use tightly rolled up gauze or a cloth to apply direct pressure over the bandage for 20   minutes.  Reapply pressure for an additional 20 minutes if necessary  Call the office or go to the nearest emergency room if pressure fails to stop the bleeding.  Use additional gauze and tape to maintain pressure once the bleeding has stopped.    PAIN:    Post operative pain should slowly get better, never worse.  A severe increase in pain may indicate a problem. Call the office if this occurs.    In case of emergency phone:Dr Roque [phone]

## 2022-10-04 NOTE — PROGRESS NOTES
Flash Brown is an extremely pleasant 81 year old year old male patient here today for evaluation and managment of basal cell carcinoma on mid chest.  Patient has no other skin complaints today.  Remainder of the HPI, Meds, PMH, Allergies, FH, and SH was reviewed in chart.      Past Medical History:   Diagnosis Date     Basal cell carcinoma      CAD (coronary artery disease)      Depression      Hyperlipidemia      Hypertension      Lymphoma (H)      Malignant melanoma (H)      Melanoma (H)      Squamous cell carcinoma        Past Surgical History:   Procedure Laterality Date     AXILLARY SURGERY      S/P resection and adjuvant radiation     BONE MARROW BIOPSY, BONE SPECIMEN, NEEDLE/TROCAR N/A 7/8/2019    Procedure: BIOPSY, BONE MARROW;  Surgeon: Husam Issa MD;  Location: [facility]     BONE MARROW BIOPSY, BONE SPECIMEN, NEEDLE/TROCAR Left 2/24/2022    Procedure: BIOPSY, BONE MARROW;  Surgeon: Cailin Anthony PA-C;  Location: [facility] OR     CARDIAC SURGERY       CHOLECYSTECTOMY       HERNIA REPAIR, UMBILICAL       PHACOEMULSIFICATION WITH STANDARD INTRAOCULAR LENS IMPLANT Right 10/2/2019    Procedure: Cataract Removal with Implant;  Surgeon: Dinesh Ivey MD;  Location: [facility]     PHACOEMULSIFICATION WITH STANDARD INTRAOCULAR LENS IMPLANT Left 10/21/2019    Procedure: Cataract Removal with Implant;  Surgeon: Dinesh Ivey MD;  Location: [facility]     STENT      PTCA with drug-eluting stent of RCA, circumflex, and LAD     VASCULAR SURGERY          Family History   Problem Relation Age of Onset     Asthma Other      Cancer Other         melanoma     Blood Disease Other         bleeding disorder     Lung Cancer Mother         Smoker     Prostate Cancer Father      Melanoma No family hx of        Social History     Socioeconomic History     Marital status: Single     Spouse name: Not on file     Number of children: 0     Years of education: Not on file     Highest education level: Not on file    Occupational History     Occupation: Retired     Comment: [employer]    Tobacco Use     Smoking status: Never Smoker     Smokeless tobacco: Never Used   Vaping Use     Vaping Use: Never used   Substance and Sexual Activity     Alcohol use: Yes     Comment: glass of wine couple times per week     Drug use: Never     Sexual activity: Not on file   Other Topics Concern     Parent/sibling w/ CABG, MI or angioplasty before 65F 55M? Not Asked   Social History Narrative     Not on file     Social Determinants of Health     Financial Resource Strain: Low Risk      Difficulty of Paying Living Expenses: Not hard at all   Food Insecurity: No Food Insecurity     Worried About Running Out of Food in the Last Year: Never true     Ran Out of Food in the Last Year: Never true   Transportation Needs: No Transportation Needs     Lack of Transportation (Medical): No     Lack of Transportation (Non-Medical): No   Physical Activity: Inactive     Days of Exercise per Week: 0 days     Minutes of Exercise per Session: 0 min   Stress: No Stress Concern Present     Feeling of Stress : Not at all   Social Connections: Socially Isolated     Frequency of Communication with Friends and Family: Once a week     Frequency of Social Gatherings with Friends and Family: Once a week     Attends Anabaptist Services: More than 4 times per year     Active Member of Clubs or Organizations: No     Attends Club or Organization Meetings: Not on file     Marital Status: Never    Intimate Partner Violence: Not on file   Housing Stability: Low Risk      Unable to Pay for Housing in the Last Year: No     Number of Places Lived in the Last Year: 1     Unstable Housing in the Last Year: No       Outpatient Encounter Medications as of 10/4/2022   Medication Sig Dispense Refill     aspirin (ASA) 81 MG EC tablet Take 81 mg by mouth daily       atorvastatin (LIPITOR) 20 MG tablet Take 1 tablet (20 mg) by mouth daily 90 tablet 0     ferrous sulfate  (FE TABS) 325 (65 Fe) MG EC tablet Take 1 tablet (325 mg) by mouth daily 90 tablet 0     furosemide (LASIX) 40 MG tablet Take 1 tablet (40 mg) by mouth daily 30 tablet 1     magnesium oxide (MAG-OX) 400 MG tablet Take 1 tablet (400 mg) by mouth 2 times daily 60 tablet 1     potassium chloride ER (KLOR-CON M) 20 MEQ CR tablet Take 1 tablet (20 mEq) by mouth 2 times daily 60 tablet 0     tamsulosin (FLOMAX) 0.4 MG capsule Take 1 capsule (0.4 mg) by mouth every evening 30 capsule 1     triamcinolone (KENALOG) 0.1 % external cream Twice daily to legs prn itching 454 g 3     nitroGLYcerin (NITROSTAT) 0.4 MG sublingual tablet Place 1 tablet (0.4 mg) under the tongue See Admin Instructions for chest pain (Patient not taking: Reported on 10/4/2022) 30 tablet 0     ORDER FOR DME Light Therapy with Full Spectrum Light (34714 lux) Start with 10-15 minute exposure per day, Increase exposure to 30 to 45 minutes per day, Maximum exposure: 90 minutes per day,Look periodically at light during each session  1 0     [DISCONTINUED] amLODIPine (NORVASC) 5 MG tablet Take 1 tablet (5 mg) by mouth daily 90 tablet 3     [DISCONTINUED] carvedilol (COREG) 12.5 MG tablet Take 1 tablet (12.5 mg) by mouth 2 times daily (with meals) 180 tablet 3     [DISCONTINUED] cloNIDine (CATAPRES) 0.1 MG tablet Take 1 tablet (0.1 mg) by mouth 2 times daily 180 tablet 3     [DISCONTINUED] clopidogrel (PLAVIX) 75 MG tablet Take 1 tablet (75 mg) by mouth daily 90 tablet 3     [DISCONTINUED] hydrALAZINE (APRESOLINE) 50 MG tablet Take 1 tablet (50 mg) by mouth 2 times daily 180 tablet 1     [DISCONTINUED] naloxone (NARCAN) 4 MG/0.1ML nasal spray Spray 1 spray (4 mg) into one nostril alternating nostrils once as needed for opioid reversal Every 2-3 minutes until patient responsive or EMS arrives 0.2 mL 0     [DISCONTINUED] pioglitazone (ACTOS) 30 MG tablet Take 1 tablet (30 mg) by mouth daily 90 tablet 0     [DISCONTINUED] ramipril (ALTACE) 10 MG capsule TAKE 1  CAPSULE BY MOUTH EVERY DAY 90 capsule 0     Facility-Administered Encounter Medications as of 10/4/2022   Medication Dose Route Frequency Provider Last Rate Last Admin     sodium hyaluronate (HEALON DUET)    Dinesh Matos MD   1 kit at 10/02/19 1149             O:   NAD, WDWN, Alert & Oriented, Mood & Affect wnl, Vitals stable   Here today alone   /71   Pulse 103   SpO2 97%    General appearance normal   Vitals stable   Alert, oriented and in no acute distress     Mid chest 3cm ulcerated red plaque  R lateral chest 1.3cm red scaly plaque      Eyes: Conjunctivae/lids:Normal     ENT: Lips, buccal mucosa, tongue: normal    MSK:Normal    Cardiovascular: peripheral edema none    Pulm: Breathing Normal    Neuro/Psych: Orientation:Alert and Orientedx3 ; Mood/Affect:normal       MICRO:   R lateral chest (red):Orthokeratosis of epidermis with a proliferation of nests of basaloid cells, with peripheral palisading and a haphazard arrangement in the center extending into the dermis, forming infiltrative strands .  The tumor cells have hyperchromatic nuclei. Poor cytoplasm and intercellular bridging.    A/P:  1. R lateral chest r/o basal cell carcinoma may be in closure for mid chest  TANGENTIAL BIOPSY IN HOUSE:  After consent, anesthesia with LEC and prep, tangential excision performed and dx above confirmed with frozen section histology.  No complications and routine wound care.  Patient is on  anticoagulants and risk of bleeding discussed with patient.       I have personally reviewed all specimens and/or slides and used them with my medical judgement to determine or confirm the final diagnosis.     Patient told result basal cell carcinoma   2. Mid chest basal cell carcinoma .    It was a pleasure speaking to Flash Brown today.  Previous clinic notes and pertinent laboratory tests were reviewed prior to Flash Brown's visit.  Patient encouraged to perform monthly skin exams.  UV precautions  reviewed with patient.  Risks of non-melanoma skin cancer discussed with patient   Return to clinic 6 months  PROCEDURE NOTE  Mid chest basal cell carcinoma   MOHS:   Size    The rationale for Mohs surgery was discussed with the patient and consent was obtained.  The risks and benefits as well as alternatives to therapy were discussed, in detail.  Specifically, the risks of infection, scarring, bleeding, prolonged wound healing, incomplete removal, allergy to anesthesia, nerve injury and recurrence were addressed.  Indication for Mohs was Size. Prior to the procedure, the treatment site was clearly identified and, if available, confirmed with previous photos and confirmed by the patient   All components of the Universal Protocol/PAUSE rule were completed.  The Mohs surgeon operated in two distinct and integrated capacities as the surgeon and pathologist.      The area was prepped with Betasept.  A rim of normal appearing skin was marked circumferentially around the lesion.  The area was infiltrated with local anesthesia.  The tumor was first debulked to remove all clinically apparent tumor.  An incision following the standard Mohs approach was done and the specimen was oriented,mapped and placed in 4 block(s).  Each specimen was then chromacoded and processed in the Mohs laboratory using standard Mohs technique and submitted for frozen section histology.  Frozen section analysis showed no residual tumor but CLEAR MARGINS.      The tumor was excised using standard Mohs technique in 1 stages(s).  CLEAR MARGINS OBTAINED and Final defect size was 4.4 x 4 cm.     We discussed the options for wound management in full with the patient including risks/benefits/ possible outcomes.          R lateral chest basal cell carcinoma   MOHS:   Size    The rationale for Mohs surgery was discussed with the patient and consent was obtained.  The risks and benefits as well as alternatives to therapy were discussed, in detail.   Specifically, the risks of infection, scarring, bleeding, prolonged wound healing, incomplete removal, allergy to anesthesia, nerve injury and recurrence were addressed.  Indication for Mohs was Size. Prior to the procedure, the treatment site was clearly identified and, if available, confirmed with previous photos and confirmed by the patient   All components of the Universal Protocol/PAUSE rule were completed.  The Mohs surgeon operated in two distinct and integrated capacities as the surgeon and pathologist.      The area was prepped with Betasept.  A rim of normal appearing skin was marked circumferentially around the lesion.  The area was infiltrated with local anesthesia.  The tumor was first debulked to remove all clinically apparent tumor.  An incision following the standard Mohs approach was done and the specimen was oriented,mapped and placed in 1 block(s).  Each specimen was then chromacoded and processed in the Mohs laboratory using standard Mohs technique and submitted for frozen section histology.  Frozen section analysis showed no residual tumor but CLEAR MARGINS.      The tumor was excised using standard Mohs technique in 1 stages(s).  CLEAR MARGINS OBTAINED and Final defect size was 1.9 cm.     We discussed the options for wound management in full with the patient including risks/benefits/ possible outcomes.            Both sites closed in one defect  REPAIR COMPLEX: Because of the tightness of the surrounding skin and Because of the size and full thickness nature of the defect, Because of the tightness of the surrounding skin and To maintain form and function, a complex closure was planned. After LE anesthesia and prep, Burow's triangles were excised in the relaxed skin tension lines. The wound edges were widely undermined greater than width of the defect on both sides by dissection in the subcutaneous plane until adequate tissue mobility was obtained. Hemostasis was obtained. Tension reduction  sutures were placed with 0 vicryl.  The wound edges were closed in a layered fashion using 0 Vicryl and 5 Fast Absorbing Plain Gut sutures. Postoperative length was 7.5 cm.   EBL minimal; complications none; wound care routine.  The patient was discharged in good condition and will return in one week for wound evaluation.

## 2022-10-04 NOTE — LETTER
10/4/2022         RE: Flash Brown  [address]  [facility] [address]        Dear Colleague,    Thank you for referring your patient, Flash Brown, to the [facility]. Please see a copy of my visit note below.    Surgical Office Location :   [facility]  [address] [place] [address]      Flash Brown is an extremely pleasant 81 year old year old male patient here today for evaluation and managment of basal cell carcinoma on mid chest.  Patient has no other skin complaints today.  Remainder of the HPI, Meds, PMH, Allergies, FH, and SH was reviewed in chart.      Past Medical History:   Diagnosis Date     Basal cell carcinoma      CAD (coronary artery disease)      Depression      Hyperlipidemia      Hypertension      Lymphoma (H)      Malignant melanoma (H)      Melanoma (H)      Squamous cell carcinoma        Past Surgical History:   Procedure Laterality Date     AXILLARY SURGERY      S/P resection and adjuvant radiation     BONE MARROW BIOPSY, BONE SPECIMEN, NEEDLE/TROCAR N/A 7/8/2019    Procedure: BIOPSY, BONE MARROW;  Surgeon: Husam Issa MD;  Location: [facility]     BONE MARROW BIOPSY, BONE SPECIMEN, NEEDLE/TROCAR Left 2/24/2022    Procedure: BIOPSY, BONE MARROW;  Surgeon: Cailin Anthony PA-C;  Location: [facility] OR     CARDIAC SURGERY       CHOLECYSTECTOMY       HERNIA REPAIR, UMBILICAL       PHACOEMULSIFICATION WITH STANDARD INTRAOCULAR LENS IMPLANT Right 10/2/2019    Procedure: Cataract Removal with Implant;  Surgeon: Dinesh vIey MD;  Location: [facility] OR     PHACOEMULSIFICATION WITH STANDARD INTRAOCULAR LENS IMPLANT Left 10/21/2019    Procedure: Cataract Removal with Implant;  Surgeon: Dinesh Ivey MD;  Location: [facility] OR     STENT      PTCA with drug-eluting stent of RCA, circumflex, and LAD     VASCULAR SURGERY          Family History   Problem Relation Age of Onset     Asthma Other      Cancer Other         melanoma      Blood Disease Other         bleeding disorder     Lung Cancer Mother         Smoker     Prostate Cancer Father      Melanoma No family hx of        Social History     Socioeconomic History     Marital status: Single     Spouse name: Not on file     Number of children: 0     Years of education: Not on file     Highest education level: Not on file   Occupational History     Occupation: Retired     Comment: [employer]    Tobacco Use     Smoking status: Never Smoker     Smokeless tobacco: Never Used   Vaping Use     Vaping Use: Never used   Substance and Sexual Activity     Alcohol use: Yes     Comment: glass of wine couple times per week     Drug use: Never     Sexual activity: Not on file   Other Topics Concern     Parent/sibling w/ CABG, MI or angioplasty before 65F 55M? Not Asked   Social History Narrative     Not on file     Social Determinants of Health     Financial Resource Strain: Low Risk      Difficulty of Paying Living Expenses: Not hard at all   Food Insecurity: No Food Insecurity     Worried About Running Out of Food in the Last Year: Never true     Ran Out of Food in the Last Year: Never true   Transportation Needs: No Transportation Needs     Lack of Transportation (Medical): No     Lack of Transportation (Non-Medical): No   Physical Activity: Inactive     Days of Exercise per Week: 0 days     Minutes of Exercise per Session: 0 min   Stress: No Stress Concern Present     Feeling of Stress : Not at all   Social Connections: Socially Isolated     Frequency of Communication with Friends and Family: Once a week     Frequency of Social Gatherings with Friends and Family: Once a week     Attends Hindu Services: More than 4 times per year     Active Member of Clubs or Organizations: No     Attends Club or Organization Meetings: Not on file     Marital Status: Never    Intimate Partner Violence: Not on file   Housing Stability: Low Risk      Unable to Pay for Housing in the Last Year: No      Number of Places Lived in the Last Year: 1     Unstable Housing in the Last Year: No       Outpatient Encounter Medications as of 10/4/2022   Medication Sig Dispense Refill     aspirin (ASA) 81 MG EC tablet Take 81 mg by mouth daily       atorvastatin (LIPITOR) 20 MG tablet Take 1 tablet (20 mg) by mouth daily 90 tablet 0     ferrous sulfate (FE TABS) 325 (65 Fe) MG EC tablet Take 1 tablet (325 mg) by mouth daily 90 tablet 0     furosemide (LASIX) 40 MG tablet Take 1 tablet (40 mg) by mouth daily 30 tablet 1     magnesium oxide (MAG-OX) 400 MG tablet Take 1 tablet (400 mg) by mouth 2 times daily 60 tablet 1     potassium chloride ER (KLOR-CON M) 20 MEQ CR tablet Take 1 tablet (20 mEq) by mouth 2 times daily 60 tablet 0     tamsulosin (FLOMAX) 0.4 MG capsule Take 1 capsule (0.4 mg) by mouth every evening 30 capsule 1     triamcinolone (KENALOG) 0.1 % external cream Twice daily to legs prn itching 454 g 3     nitroGLYcerin (NITROSTAT) 0.4 MG sublingual tablet Place 1 tablet (0.4 mg) under the tongue See Admin Instructions for chest pain (Patient not taking: Reported on 10/4/2022) 30 tablet 0     ORDER FOR DME Light Therapy with Full Spectrum Light (65791 lux) Start with 10-15 minute exposure per day, Increase exposure to 30 to 45 minutes per day, Maximum exposure: 90 minutes per day,Look periodically at light during each session  1 0     [DISCONTINUED] amLODIPine (NORVASC) 5 MG tablet Take 1 tablet (5 mg) by mouth daily 90 tablet 3     [DISCONTINUED] carvedilol (COREG) 12.5 MG tablet Take 1 tablet (12.5 mg) by mouth 2 times daily (with meals) 180 tablet 3     [DISCONTINUED] cloNIDine (CATAPRES) 0.1 MG tablet Take 1 tablet (0.1 mg) by mouth 2 times daily 180 tablet 3     [DISCONTINUED] clopidogrel (PLAVIX) 75 MG tablet Take 1 tablet (75 mg) by mouth daily 90 tablet 3     [DISCONTINUED] hydrALAZINE (APRESOLINE) 50 MG tablet Take 1 tablet (50 mg) by mouth 2 times daily 180 tablet 1     [DISCONTINUED] naloxone  (NARCAN) 4 MG/0.1ML nasal spray Spray 1 spray (4 mg) into one nostril alternating nostrils once as needed for opioid reversal Every 2-3 minutes until patient responsive or EMS arrives 0.2 mL 0     [DISCONTINUED] pioglitazone (ACTOS) 30 MG tablet Take 1 tablet (30 mg) by mouth daily 90 tablet 0     [DISCONTINUED] ramipril (ALTACE) 10 MG capsule TAKE 1 CAPSULE BY MOUTH EVERY DAY 90 capsule 0     Facility-Administered Encounter Medications as of 10/4/2022   Medication Dose Route Frequency Provider Last Rate Last Admin     sodium hyaluronate (HEALON DUET)    Dinesh Matos MD   1 kit at 10/02/19 1149             O:   NAD, WDWN, Alert & Oriented, Mood & Affect wnl, Vitals stable   Here today alone   /71   Pulse 103   SpO2 97%    General appearance normal   Vitals stable   Alert, oriented and in no acute distress     Mid chest 3cm ulcerated red plaque  R lateral chest 1.3cm red scaly plaque      Eyes: Conjunctivae/lids:Normal     ENT: Lips, buccal mucosa, tongue: normal    MSK:Normal    Cardiovascular: peripheral edema none    Pulm: Breathing Normal    Neuro/Psych: Orientation:Alert and Orientedx3 ; Mood/Affect:normal       MICRO:   R lateral chest (red):Orthokeratosis of epidermis with a proliferation of nests of basaloid cells, with peripheral palisading and a haphazard arrangement in the center extending into the dermis, forming infiltrative strands .  The tumor cells have hyperchromatic nuclei. Poor cytoplasm and intercellular bridging.    A/P:  1. R lateral chest r/o basal cell carcinoma may be in closure for mid chest  TANGENTIAL BIOPSY IN HOUSE:  After consent, anesthesia with LEC and prep, tangential excision performed and dx above confirmed with frozen section histology.  No complications and routine wound care.  Patient is on  anticoagulants and risk of bleeding discussed with patient.       I have personally reviewed all specimens and/or slides and used them with my medical judgement to  determine or confirm the final diagnosis.     Patient told result basal cell carcinoma   2. Mid chest basal cell carcinoma .    It was a pleasure speaking to Flash Brown today.  Previous clinic notes and pertinent laboratory tests were reviewed prior to Flash Brown's visit.  Patient encouraged to perform monthly skin exams.  UV precautions reviewed with patient.  Risks of non-melanoma skin cancer discussed with patient   Return to clinic 6 months  PROCEDURE NOTE  Mid chest basal cell carcinoma   MOHS:   Size    The rationale for Mohs surgery was discussed with the patient and consent was obtained.  The risks and benefits as well as alternatives to therapy were discussed, in detail.  Specifically, the risks of infection, scarring, bleeding, prolonged wound healing, incomplete removal, allergy to anesthesia, nerve injury and recurrence were addressed.  Indication for Mohs was Size. Prior to the procedure, the treatment site was clearly identified and, if available, confirmed with previous photos and confirmed by the patient   All components of the Universal Protocol/PAUSE rule were completed.  The Mohs surgeon operated in two distinct and integrated capacities as the surgeon and pathologist.      The area was prepped with Betasept.  A rim of normal appearing skin was marked circumferentially around the lesion.  The area was infiltrated with local anesthesia.  The tumor was first debulked to remove all clinically apparent tumor.  An incision following the standard Mohs approach was done and the specimen was oriented,mapped and placed in 4 block(s).  Each specimen was then chromacoded and processed in the Mohs laboratory using standard Mohs technique and submitted for frozen section histology.  Frozen section analysis showed no residual tumor but CLEAR MARGINS.      The tumor was excised using standard Mohs technique in 1 stages(s).  CLEAR MARGINS OBTAINED and Final defect size was 4.4 x 4 cm.     We discussed  the options for wound management in full with the patient including risks/benefits/ possible outcomes.          R lateral chest basal cell carcinoma   MOHS:   Size    The rationale for Mohs surgery was discussed with the patient and consent was obtained.  The risks and benefits as well as alternatives to therapy were discussed, in detail.  Specifically, the risks of infection, scarring, bleeding, prolonged wound healing, incomplete removal, allergy to anesthesia, nerve injury and recurrence were addressed.  Indication for Mohs was Size. Prior to the procedure, the treatment site was clearly identified and, if available, confirmed with previous photos and confirmed by the patient   All components of the Universal Protocol/PAUSE rule were completed.  The Mohs surgeon operated in two distinct and integrated capacities as the surgeon and pathologist.      The area was prepped with Betasept.  A rim of normal appearing skin was marked circumferentially around the lesion.  The area was infiltrated with local anesthesia.  The tumor was first debulked to remove all clinically apparent tumor.  An incision following the standard Mohs approach was done and the specimen was oriented,mapped and placed in 1 block(s).  Each specimen was then chromacoded and processed in the Mohs laboratory using standard Mohs technique and submitted for frozen section histology.  Frozen section analysis showed no residual tumor but CLEAR MARGINS.      The tumor was excised using standard Mohs technique in 1 stages(s).  CLEAR MARGINS OBTAINED and Final defect size was 1.9 cm.     We discussed the options for wound management in full with the patient including risks/benefits/ possible outcomes.            Both sites closed in one defect  REPAIR COMPLEX: Because of the tightness of the surrounding skin and Because of the size and full thickness nature of the defect, Because of the tightness of the surrounding skin and To maintain form and function, a  complex closure was planned. After LE anesthesia and prep, Burow's triangles were excised in the relaxed skin tension lines. The wound edges were widely undermined greater than width of the defect on both sides by dissection in the subcutaneous plane until adequate tissue mobility was obtained. Hemostasis was obtained. Tension reduction sutures were placed with 0 vicryl.  The wound edges were closed in a layered fashion using 0 Vicryl and 5 Fast Absorbing Plain Gut sutures. Postoperative length was 7.5 cm.   EBL minimal; complications none; wound care routine.  The patient was discharged in good condition and will return in one week for wound evaluation.        Again, thank you for allowing me to participate in the care of your patient.        Sincerely,        Dinesh Roque MD

## 2022-10-06 ENCOUNTER — LAB (OUTPATIENT)
Dept: LAB | Facility: CLINIC | Age: 82
End: 2022-10-06
Payer: MEDICARE

## 2022-10-06 ENCOUNTER — OFFICE VISIT (OUTPATIENT)
Dept: CARDIOLOGY | Facility: CLINIC | Age: 82
End: 2022-10-06
Payer: MEDICARE

## 2022-10-06 VITALS
WEIGHT: 227 LBS | DIASTOLIC BLOOD PRESSURE: 58 MMHG | BODY MASS INDEX: 36.66 KG/M2 | OXYGEN SATURATION: 97 % | HEART RATE: 99 BPM | SYSTOLIC BLOOD PRESSURE: 98 MMHG

## 2022-10-06 DIAGNOSIS — R60.1 GENERALIZED EDEMA: ICD-10-CM

## 2022-10-06 DIAGNOSIS — I50.31 ACUTE DIASTOLIC HEART FAILURE (H): ICD-10-CM

## 2022-10-06 DIAGNOSIS — I10 BENIGN ESSENTIAL HYPERTENSION: ICD-10-CM

## 2022-10-06 DIAGNOSIS — D61.818 PANCYTOPENIA (H): ICD-10-CM

## 2022-10-06 DIAGNOSIS — Z11.59 NEED FOR HEPATITIS C SCREENING TEST: ICD-10-CM

## 2022-10-06 DIAGNOSIS — E78.5 HYPERLIPIDEMIA LDL GOAL <70: ICD-10-CM

## 2022-10-06 DIAGNOSIS — D64.9 MILD ANEMIA: ICD-10-CM

## 2022-10-06 DIAGNOSIS — I25.10 CORONARY ARTERY DISEASE INVOLVING NATIVE CORONARY ARTERY OF NATIVE HEART WITHOUT ANGINA PECTORIS: ICD-10-CM

## 2022-10-06 DIAGNOSIS — I50.31 ACUTE DIASTOLIC HEART FAILURE (H): Primary | ICD-10-CM

## 2022-10-06 LAB
ANION GAP SERPL CALCULATED.3IONS-SCNC: 10 MMOL/L (ref 7–15)
BASOPHILS # BLD AUTO: 0 10E3/UL (ref 0–0.2)
BASOPHILS NFR BLD AUTO: 0 %
BUN SERPL-MCNC: 14.5 MG/DL (ref 8–23)
CALCIUM SERPL-MCNC: 8.9 MG/DL (ref 8.8–10.2)
CHLORIDE SERPL-SCNC: 101 MMOL/L (ref 98–107)
CREAT SERPL-MCNC: 1.05 MG/DL (ref 0.67–1.17)
DEPRECATED HCO3 PLAS-SCNC: 27 MMOL/L (ref 22–29)
EOSINOPHIL # BLD AUTO: 0.1 10E3/UL (ref 0–0.7)
EOSINOPHIL NFR BLD AUTO: 3 %
ERYTHROCYTE [DISTWIDTH] IN BLOOD BY AUTOMATED COUNT: 15.6 % (ref 10–15)
GFR SERPL CREATININE-BSD FRML MDRD: 72 ML/MIN/1.73M2
GLUCOSE SERPL-MCNC: 121 MG/DL (ref 70–99)
HCT VFR BLD AUTO: 36.4 % (ref 40–53)
HCV AB SERPL QL IA: NONREACTIVE
HGB BLD-MCNC: 11.4 G/DL (ref 13.3–17.7)
LYMPHOCYTES # BLD AUTO: 0.8 10E3/UL (ref 0.8–5.3)
LYMPHOCYTES NFR BLD AUTO: 15 %
MCH RBC QN AUTO: 27.3 PG (ref 26.5–33)
MCHC RBC AUTO-ENTMCNC: 31.3 G/DL (ref 31.5–36.5)
MCV RBC AUTO: 87 FL (ref 78–100)
MONOCYTES # BLD AUTO: 0.7 10E3/UL (ref 0–1.3)
MONOCYTES NFR BLD AUTO: 13 %
NEUTROPHILS # BLD AUTO: 3.5 10E3/UL (ref 1.6–8.3)
NEUTROPHILS NFR BLD AUTO: 69 %
PLATELET # BLD AUTO: 129 10E3/UL (ref 150–450)
POTASSIUM SERPL-SCNC: 4.3 MMOL/L (ref 3.4–5.3)
RBC # BLD AUTO: 4.17 10E6/UL (ref 4.4–5.9)
SODIUM SERPL-SCNC: 138 MMOL/L (ref 136–145)
WBC # BLD AUTO: 5.1 10E3/UL (ref 4–11)

## 2022-10-06 PROCEDURE — 85025 COMPLETE CBC W/AUTO DIFF WBC: CPT

## 2022-10-06 PROCEDURE — 80048 BASIC METABOLIC PNL TOTAL CA: CPT | Performed by: NURSE PRACTITIONER

## 2022-10-06 PROCEDURE — 86803 HEPATITIS C AB TEST: CPT

## 2022-10-06 PROCEDURE — 99215 OFFICE O/P EST HI 40 MIN: CPT | Performed by: NURSE PRACTITIONER

## 2022-10-06 PROCEDURE — 36415 COLL VENOUS BLD VENIPUNCTURE: CPT

## 2022-10-06 RX ORDER — FUROSEMIDE 20 MG
TABLET ORAL
Qty: 270 TABLET | Refills: 3 | Status: SHIPPED | OUTPATIENT
Start: 2022-10-06 | End: 2023-03-07

## 2022-10-06 RX ORDER — FUROSEMIDE 20 MG
TABLET ORAL
COMMUNITY
Start: 2022-09-26 | End: 2022-10-06

## 2022-10-06 NOTE — PROGRESS NOTES
Cardiology Clinic Progress Note  Flash Brown MRN# [id number]   YOB: 1940 Age: 81 year old      Primary Cardiologist:   Dr. Tate          History of Presenting Illness:      Flash Brown is a pleasant 81 year old patient with a past cardiac history significant for   1. CAD    2010 with LUDIVINA to proximal to mid RCA, distal circumflex, proximal and mid LAD    Angiogram 2011 (recurrent chest pain) 70% pLCx with negative FFR so no intervention    2015 stress test with moderate ischemia-no angio or intervention  2. Acute diastolic heart failure  3. Hypertension  4. Hyperlipidemia  Past medical history significant for Hodgkin's lymphoma 1983 in remission , CKD, depression, ALLY not CPAP compliant.    Echocardiogram 2016 showed normal LVEF, no WMA, no valvular disease.    Patient was seen by Dr. Tate in February 2020 for routine follow-up. He had not been as active and had gained weight over the prior year.  He was walking slowly with a walker and denied any anginal symptoms.    Patient was admitted on 8/20/2022 at [facility] for new onset of diastolic heart failure with 20 pound weight gain. Outpatient echocardiogram 8/12/2022 showed normal EF 60 to 65%, normal RV.  He was diuresed and Lasix increased to 40 mg daily at discharge, along with adding potassium supplementation. Weight at discharge was 228 pounds. It appears the patient had never actually discontinued Plavix as previously recommended when he was on DAPT post PCI.  He was again told to discontinue Plavix. He was noted to be hypotensive with diuresis and at discharge antihypertensives were held and was continued on Lasix.    Pt presents today, with his sister Rea, for hospital follow-up.  Weight today in the clinic is stable from hospital discharge around 227 pounds.  However, his sister tells me that when he left transitional care unit his weight was up to about 239.  At discharge, his Lasix was increased from 40 mg daily up to 40  mg in the a.m. and 20 mg in the p.m.  Weight came down over the prior week.  He is unsure what his dry weight is.  His dyspnea on exertion is back to baseline and lower extremity edema significantly improved.  He and his sister have noted less fatigue.  He was given heart failure education at discharge but has not reviewed that.  I had my nurse go in and review heart failure education with him today.  He denies any angina.  Antihypertensives are still on hold and BP today is soft at 98/58.  He denies any lightheadedness with this.  They would like to keep follow-up with Dr. Tate for next week as they were having trouble with weights increasing previously.  He has not had BMP checked on higher dose of Lasix yet and we will get this today. Patient reports no chest pain, PND, orthopnea, presyncope, syncope, heart racing, or palpitations.    Addendum: BMP on higher dose of Lasix showing normal renal function and electrolytes today.  Results reviewed today.    Labs:  LIPID RESULTS:  Lab Results   Component Value Date    CHOL 106 11/09/2021    CHOL 105 09/08/2020    HDL 42 11/09/2021    HDL 39 (L) 09/08/2020    LDL 42 11/09/2021    LDL 49 09/08/2020    TRIG 110 11/09/2021    TRIG 87 09/08/2020    CHOLHDLRATIO 4.0 03/13/2008       LIVER ENZYME RESULTS:  Lab Results   Component Value Date    AST 37 08/30/2022    AST 18 04/23/2021    ALT 30 08/30/2022    ALT 25 04/23/2021       CBC RESULTS:  Lab Results   Component Value Date    WBC 5.1 10/06/2022    WBC 3.2 (L) 01/04/2021    RBC 4.17 (L) 10/06/2022    RBC 4.19 (L) 01/04/2021    HGB 11.4 (L) 10/06/2022    HGB 12.0 (L) 01/04/2021    HCT 36.4 (L) 10/06/2022    HCT 37.5 (L) 01/04/2021    MCV 87 10/06/2022    MCV 90 01/04/2021    MCH 27.3 10/06/2022    MCH 28.6 01/04/2021    MCHC 31.3 (L) 10/06/2022    MCHC 32.0 01/04/2021    RDW 15.6 (H) 10/06/2022    RDW 15.4 (H) 01/04/2021     (L) 10/06/2022    PLT 92 (L) 01/04/2021       BMP RESULTS:  Lab Results   Component Value  Date     10/06/2022     04/23/2021    POTASSIUM 4.3 10/06/2022    POTASSIUM 3.8 08/29/2022    POTASSIUM 4.7 04/23/2021    CHLORIDE 101 10/06/2022    CHLORIDE 106 08/29/2022    CHLORIDE 107 04/23/2021    CO2 27 10/06/2022    CO2 25 08/29/2022    CO2 29 04/23/2021    ANIONGAP 10 10/06/2022    ANIONGAP 8 08/29/2022    ANIONGAP 4 04/23/2021     (H) 10/06/2022     (H) 08/31/2022    GLC 83 08/29/2022     (H) 04/23/2021    BUN 14.5 10/06/2022    BUN 59 (H) 08/29/2022    BUN 17 04/23/2021    CR 1.05 10/06/2022    CR 1.04 04/23/2021    GFRESTIMATED 72 10/06/2022    GFRESTIMATED 68 04/23/2021    GFRESTBLACK 79 04/23/2021    NATALIIA 8.9 10/06/2022    NATALIIA 8.7 04/23/2021        A1C RESULTS:  Lab Results   Component Value Date    A1C 5.5 05/24/2022    A1C 5.8 (H) 04/23/2021       INR RESULTS:  No results found for: INR    Results reviewed today.       Current Cardiac Medications     Aspirin 81 mg daily  Atorvastatin 20 mg daily  Lasix 40 mg AM and 20 mg PM  Nitroglycerin PRN  Potassium 20 mEq twice daily                     Assessment and Plan:       Plan    Patient Instructions   Medication Changes:  5. None     Recommendations:  1. Check daily weights and call the clinic if your weight has increased more than 2 lbs in one day or 5 lbs in one week; if you feel more short of breath or have worsening swelling in your legs or abdomen.  2. 2000 mg sodium diet   3. 4-8 8 ounce glasses of fluids per day   4. Check blood pressure at least 1 hour after medications. Call the clinic if your blood pressure is consistently greater than 130/80.    5. Call if blood pressure is less than 90 on top or less than 100 with lightheadedness.      Follow-up:  1.  Nonfasting lab today (BMP) on higher dose of Lasix  2. See Dr. Tate for cardiology follow up at [facility]: 10/12/22.   Call 6 months prior, to schedule.     Cardiology Scheduling~[phone]  Cardiology Clinic RN~[phone] (Brittny RN, Barby WYNNE, Alysia  RN)          1. CAD    No angina    Continue statin, aspirin    If angina in the future, consider sending straight for cath given known LCx disease      2. Heart failure    Mild edema and GORMAN     NYHA class II    Continue Lasix, potassium      3. Hypertension    Controlled    Continue Lasix    Consider restarting amlodipine, carvedilol, clonidine, hydralazine, or ramipril if needed      4. Hyperlipidemia    Last LDL 42 on 11/2021    Continue atorvastatin 20 mg daily             Thank you for allowing me to participate in this delightful patient's care.      This note was completed in part using Dragon voice recognition software. Although reviewed after completion, some word and grammatical errors may occur.    Ute Appel, APRN CNP, APRN, CNP           Data:   All laboratory data reviewed      Total time spent today was 48 mins, reviewing labs, testing, notes, documenting notes, and seeing patient.          Constitutional:  cooperative, alert and oriented, well developed, well nourished, in no acute distress  overweight      Skin:  warm and dry to the touch         Head:  normocephalic       Eyes:  pupils equal and round       ENT:  no pallor or cyanosis       Neck:  no stiffness       Respiratory:  clear to auscultation; normal symmetry        Cardiac: regular rate and rhythm; normal S1 and S2                 pulses full and equal       GI:  abdomen soft, nondistended     Extremities and Muscular Skeletal:   Mild bilateral lower extremity edema       Neurological:  affect appropriate; no gross motor deficits       Psych:  Alert and Oriented x 3 , appropriate affact

## 2022-10-06 NOTE — LETTER
10/6/2022    Thomas Jackson DO  [address]  [place] [address]    RE: Flash Brown       Dear Colleague,     I had the pleasure of seeing Flash Brown in the [facility].  Cardiology Clinic Progress Note  Flash Brown MRN# [id number]   YOB: 1940 Age: 81 year old      Primary Cardiologist:   Dr. Tate          History of Presenting Illness:      Flash Brown is a pleasant 81 year old patient with a past cardiac history significant for   1. CAD    2010 with LUDIVINA to proximal to mid RCA, distal circumflex, proximal and mid LAD    Angiogram 2011 (recurrent chest pain) 70% pLCx with negative FFR so no intervention    2015 stress test with moderate ischemia-no angio or intervention  2. Acute diastolic heart failure  3. Hypertension  4. Hyperlipidemia  Past medical history significant for Hodgkin's lymphoma 1983 in remission , CKD, depression, ALLY not CPAP compliant.    Echocardiogram 2016 showed normal LVEF, no WMA, no valvular disease.    Patient was seen by Dr. Tate in February 2020 for routine follow-up. He had not been as active and had gained weight over the prior year.  He was walking slowly with a walker and denied any anginal symptoms.    Patient was admitted on 8/20/2022 at [facility] for new onset of diastolic heart failure with 20 pound weight gain. Outpatient echocardiogram 8/12/2022 showed normal EF 60 to 65%, normal RV.  He was diuresed and Lasix increased to 40 mg daily at discharge, along with adding potassium supplementation. Weight at discharge was 228 pounds. It appears the patient had never actually discontinued Plavix as previously recommended when he was on DAPT post PCI.  He was again told to discontinue Plavix. He was noted to be hypotensive with diuresis and at discharge antihypertensives were held and was continued on Lasix.    Pt presents today, with his sister Rea, for hospital follow-up.  Weight today in the clinic is stable  from hospital discharge around 227 pounds.  However, his sister tells me that when he left transitional care unit his weight was up to about 239.  At discharge, his Lasix was increased from 40 mg daily up to 40 mg in the a.m. and 20 mg in the p.m.  Weight came down over the prior week.  He is unsure what his dry weight is.  His dyspnea on exertion is back to baseline and lower extremity edema significantly improved.  He and his sister have noted less fatigue.  He was given heart failure education at discharge but has not reviewed that.  I had my nurse go in and review heart failure education with him today.  He denies any angina.  Antihypertensives are still on hold and BP today is soft at 98/58.  He denies any lightheadedness with this.  They would like to keep follow-up with Dr. Tate for next week as they were having trouble with weights increasing previously.  He has not had BMP checked on higher dose of Lasix yet and we will get this today. Patient reports no chest pain, PND, orthopnea, presyncope, syncope, heart racing, or palpitations.    Addendum: BMP on higher dose of Lasix showing normal renal function and electrolytes today.  Results reviewed today.    Labs:  LIPID RESULTS:  Lab Results   Component Value Date    CHOL 106 11/09/2021    CHOL 105 09/08/2020    HDL 42 11/09/2021    HDL 39 (L) 09/08/2020    LDL 42 11/09/2021    LDL 49 09/08/2020    TRIG 110 11/09/2021    TRIG 87 09/08/2020    CHOLHDLRATIO 4.0 03/13/2008       LIVER ENZYME RESULTS:  Lab Results   Component Value Date    AST 37 08/30/2022    AST 18 04/23/2021    ALT 30 08/30/2022    ALT 25 04/23/2021       CBC RESULTS:  Lab Results   Component Value Date    WBC 5.1 10/06/2022    WBC 3.2 (L) 01/04/2021    RBC 4.17 (L) 10/06/2022    RBC 4.19 (L) 01/04/2021    HGB 11.4 (L) 10/06/2022    HGB 12.0 (L) 01/04/2021    HCT 36.4 (L) 10/06/2022    HCT 37.5 (L) 01/04/2021    MCV 87 10/06/2022    MCV 90 01/04/2021    MCH 27.3 10/06/2022    MCH 28.6  01/04/2021    MCHC 31.3 (L) 10/06/2022    MCHC 32.0 01/04/2021    RDW 15.6 (H) 10/06/2022    RDW 15.4 (H) 01/04/2021     (L) 10/06/2022    PLT 92 (L) 01/04/2021       BMP RESULTS:  Lab Results   Component Value Date     10/06/2022     04/23/2021    POTASSIUM 4.3 10/06/2022    POTASSIUM 3.8 08/29/2022    POTASSIUM 4.7 04/23/2021    CHLORIDE 101 10/06/2022    CHLORIDE 106 08/29/2022    CHLORIDE 107 04/23/2021    CO2 27 10/06/2022    CO2 25 08/29/2022    CO2 29 04/23/2021    ANIONGAP 10 10/06/2022    ANIONGAP 8 08/29/2022    ANIONGAP 4 04/23/2021     (H) 10/06/2022     (H) 08/31/2022    GLC 83 08/29/2022     (H) 04/23/2021    BUN 14.5 10/06/2022    BUN 59 (H) 08/29/2022    BUN 17 04/23/2021    CR 1.05 10/06/2022    CR 1.04 04/23/2021    GFRESTIMATED 72 10/06/2022    GFRESTIMATED 68 04/23/2021    GFRESTBLACK 79 04/23/2021    NATALIIA 8.9 10/06/2022    NATALIIA 8.7 04/23/2021        A1C RESULTS:  Lab Results   Component Value Date    A1C 5.5 05/24/2022    A1C 5.8 (H) 04/23/2021       INR RESULTS:  No results found for: INR    Results reviewed today.       Current Cardiac Medications     Aspirin 81 mg daily  Atorvastatin 20 mg daily  Lasix 40 mg AM and 20 mg PM  Nitroglycerin PRN  Potassium 20 mEq twice daily                     Assessment and Plan:       Plan    Patient Instructions   Medication Changes:  5. None     Recommendations:  1. Check daily weights and call the clinic if your weight has increased more than 2 lbs in one day or 5 lbs in one week; if you feel more short of breath or have worsening swelling in your legs or abdomen.  2. 2000 mg sodium diet   3. 4-8 8 ounce glasses of fluids per day   4. Check blood pressure at least 1 hour after medications. Call the clinic if your blood pressure is consistently greater than 130/80.    5. Call if blood pressure is less than 90 on top or less than 100 with lightheadedness.      Follow-up:  1.  Nonfasting lab today (BMP) on higher dose of  Lasix  2. See Dr. Tate for cardiology follow up at [place]: 10/12/22.   Call 6 months prior, to schedule.     Cardiology Scheduling~[phone]  Cardiology Clinic RN~[phone] (Brittny RN, Barby RN, Alysia RN)          1. CAD    No angina    Continue statin, aspirin    If angina in the future, consider sending straight for cath given known LCx disease      2. Heart failure    Mild edema and GORMAN     NYHA class II    Continue Lasix, potassium      3. Hypertension    Controlled    Continue Lasix    Consider restarting amlodipine, carvedilol, clonidine, hydralazine, or ramipril if needed      4. Hyperlipidemia    Last LDL 42 on 11/2021    Continue atorvastatin 20 mg daily             Thank you for allowing me to participate in this delightful patient's care.      This note was completed in part using Dragon voice recognition software. Although reviewed after completion, some word and grammatical errors may occur.    Ute Apple, FIONA CNP, APRN, CNP           Data:   All laboratory data reviewed      Total time spent today was 48 mins, reviewing labs, testing, notes, documenting notes, and seeing patient.          Constitutional:  cooperative, alert and oriented, well developed, well nourished, in no acute distress  overweight      Skin:  warm and dry to the touch         Head:  normocephalic       Eyes:  pupils equal and round       ENT:  no pallor or cyanosis       Neck:  no stiffness       Respiratory:  clear to auscultation; normal symmetry        Cardiac: regular rate and rhythm; normal S1 and S2                 pulses full and equal       GI:  abdomen soft, nondistended     Extremities and Muscular Skeletal:   Mild bilateral lower extremity edema       Neurological:  affect appropriate; no gross motor deficits       Psych:  Alert and Oriented x 3 , appropriate affact  Thank you for allowing me to participate in the care of your patient.      Sincerely,     Ute ADAM  FIONA Apple [facility]  cc:   No referring provider defined for this encounter.

## 2022-10-06 NOTE — PATIENT INSTRUCTIONS
Medication Changes:  None     Recommendations:  Check daily weights and call the clinic if your weight has increased more than 2 lbs in one day or 5 lbs in one week; if you feel more short of breath or have worsening swelling in your legs or abdomen.  2000 mg sodium diet   4-8 8 ounce glasses of fluids per day   Check blood pressure at least 1 hour after medications. Call the clinic if your blood pressure is consistently greater than 130/80.    Call if blood pressure is less than 90 on top or less than 100 with lightheadedness.      Follow-up:   Nonfasting lab today (BMP)   See Dr. Tate for cardiology follow up at [place]: 10/12/22.   Call 6 months prior, to schedule.     Cardiology Scheduling~[phone]  Cardiology Clinic RN~[phone] (Brittny RN, Barby RN, Alysia RN)

## 2022-10-12 ENCOUNTER — ALLIED HEALTH/NURSE VISIT (OUTPATIENT)
Dept: DERMATOLOGY | Facility: CLINIC | Age: 82
End: 2022-10-12
Payer: MEDICARE

## 2022-10-12 ENCOUNTER — OFFICE VISIT (OUTPATIENT)
Dept: CARDIOLOGY | Facility: CLINIC | Age: 82
End: 2022-10-12
Payer: MEDICARE

## 2022-10-12 VITALS
DIASTOLIC BLOOD PRESSURE: 71 MMHG | BODY MASS INDEX: 36.91 KG/M2 | RESPIRATION RATE: 10 BRPM | WEIGHT: 228.6 LBS | SYSTOLIC BLOOD PRESSURE: 117 MMHG | OXYGEN SATURATION: 98 % | HEART RATE: 68 BPM

## 2022-10-12 DIAGNOSIS — I50.9 ACUTE CONGESTIVE HEART FAILURE, UNSPECIFIED HEART FAILURE TYPE (H): Primary | ICD-10-CM

## 2022-10-12 DIAGNOSIS — Z48.01 ENCOUNTER FOR CHANGE OR REMOVAL OF SURGICAL WOUND DRESSING: Primary | ICD-10-CM

## 2022-10-12 PROCEDURE — 99214 OFFICE O/P EST MOD 30 MIN: CPT | Performed by: INTERNAL MEDICINE

## 2022-10-12 PROCEDURE — 99207 PR NO CHARGE NURSE ONLY: CPT

## 2022-10-12 RX ORDER — METOPROLOL SUCCINATE 25 MG/1
25 TABLET, EXTENDED RELEASE ORAL DAILY
Qty: 90 TABLET | Refills: 3 | Status: SHIPPED | OUTPATIENT
Start: 2022-10-12 | End: 2023-08-16

## 2022-10-12 NOTE — LETTER
10/12/2022    Thomas Jackson,   [address]  [place] [address]    RE: Flash Brown       Dear Colleague,     I had the pleasure of seeing Flash Brown in the [facility].  HPI and Plan:   Today I had the pleasure of seeing Flash Brown at [facility]. He is a pleasant 81 year old patient with a past medical history of coronary disease, diabetes, diastolic heart failure, hypertension and hyperlipidemia presents to the clinic for follow-up visit.    The patient had PCI done to his RCA, circumflex and proximal and mid LAD in 2010.  He had another angiogram in 2011 which showed 70% lesion in the proximal circumflex which was negative by FFR and hence no intervention was performed.  Last stress test was in 2015 which showed moderate ischemia in the lateral wall but no angio or intervention was performed.  He then establish care with me in 2020.  I decided to manage him medically at that time since he was asymptomatic.    Unfortunately, he recently got admitted to the hospital on 8/20/2022 for over 20 pound weight gain in the setting of HFpEF.  Echocardiogram showed normal EF and no significant valvular disease.  He was diuresed aggressively with Lasix and lost over 20 pounds.  Discharge weight was 228 exactly as today.  We briefly increased during his stay in the transitional care but responded very well to uptitrate the dose of Lasix.  He feels back to normal.  His blood pressure is also improved and is not occasions in the past  significantly low as he noted on several. Patient reports no chest pain, PND, orthopnea, presyncope, syncope, heart racing, or palpitations.    I reviewed in the EKG, echocardiogram, blood work, notes from his recent admission to     Assessment and plan  1.  Coronary artery disease status post multiple PCI's.  Was on DAPT for a long and Plavix was discontinued recently.  She will continue aspirin uninterrupted  2.  Heart failure  with preserved ejection fraction-dry weight 227 pounds, NYHA class II, maintained on 40 mg of Lasix.  We will continue that.  Discussed titration of Lasix based on weight and symptoms.  He verbalized understanding.  He has mild dyspnea on exertion which is gradually improving with rehabilitation  3.  Hyperlipidemia-he is on maximum dose of Lipitor which we will continue.  LDL is at goal.  4.  Type 2 diabetes-not addressed today    Thank you for allowing me to participate in the care of Flash Brown    This note was completed in part using Dragon voice recognition software. Although reviewed after completion, some word and grammatical errors may occur.    Micah Tate MD  Cardiology    Orders Placed This Encounter   Procedures     Follow-Up with Cardiology RABIA       Orders Placed This Encounter   Medications     metoprolol succinate ER (TOPROL XL) 25 MG 24 hr tablet     Sig: Take 1 tablet (25 mg) by mouth daily     Dispense:  90 tablet     Refill:  3       There are no discontinued medications.    Encounter Diagnosis   Name Primary?     Acute congestive heart failure, unspecified heart failure type (H) Yes       CURRENT MEDICATIONS:  Current Outpatient Medications   Medication Sig Dispense Refill     aspirin (ASA) 81 MG EC tablet Take 81 mg by mouth daily       atorvastatin (LIPITOR) 20 MG tablet Take 1 tablet (20 mg) by mouth daily 90 tablet 0     ferrous sulfate (FE TABS) 325 (65 Fe) MG EC tablet Take 1 tablet (325 mg) by mouth daily 90 tablet 0     furosemide (LASIX) 20 MG tablet Take 2 tablets (40 mg) by mouth every morning AND 1 tablet (20 mg) daily at 2 pm. 270 tablet 3     magnesium oxide (MAG-OX) 400 MG tablet Take 1 tablet (400 mg) by mouth 2 times daily 60 tablet 1     metoprolol succinate ER (TOPROL XL) 25 MG 24 hr tablet Take 1 tablet (25 mg) by mouth daily 90 tablet 3     nitroGLYcerin (NITROSTAT) 0.4 MG sublingual tablet Place 1 tablet (0.4 mg) under the tongue See Admin Instructions for chest pain  30 tablet 0     ORDER FOR DME Light Therapy with Full Spectrum Light (66949 lux) Start with 10-15 minute exposure per day, Increase exposure to 30 to 45 minutes per day, Maximum exposure: 90 minutes per day,Look periodically at light during each session  1 0     potassium chloride ER (KLOR-CON M) 20 MEQ CR tablet Take 1 tablet (20 mEq) by mouth 2 times daily 60 tablet 0     tamsulosin (FLOMAX) 0.4 MG capsule Take 1 capsule (0.4 mg) by mouth every evening 30 capsule 1     triamcinolone (KENALOG) 0.1 % external cream Twice daily to legs prn itching 454 g 3       ALLERGIES     Allergies   Allergen Reactions     Iodine Swelling       PAST MEDICAL HISTORY:  Past Medical History:   Diagnosis Date     Basal cell carcinoma      CAD (coronary artery disease)      Depression      Hyperlipidemia      Hypertension      Lymphoma (H)      Malignant melanoma (H)      Melanoma (H)      Squamous cell carcinoma        PAST SURGICAL HISTORY:  Past Surgical History:   Procedure Laterality Date     AXILLARY SURGERY      S/P resection and adjuvant radiation     BONE MARROW BIOPSY, BONE SPECIMEN, NEEDLE/TROCAR N/A 7/8/2019    Procedure: BIOPSY, BONE MARROW;  Surgeon: Husam Issa MD;  Location: [facility]     BONE MARROW BIOPSY, BONE SPECIMEN, NEEDLE/TROCAR Left 2/24/2022    Procedure: BIOPSY, BONE MARROW;  Surgeon: Cailin Anthony PA-C;  Location: [facility] OR     CARDIAC SURGERY       CHOLECYSTECTOMY       HERNIA REPAIR, UMBILICAL       PHACOEMULSIFICATION WITH STANDARD INTRAOCULAR LENS IMPLANT Right 10/2/2019    Procedure: Cataract Removal with Implant;  Surgeon: Dinesh Ivey MD;  Location: [facility]     PHACOEMULSIFICATION WITH STANDARD INTRAOCULAR LENS IMPLANT Left 10/21/2019    Procedure: Cataract Removal with Implant;  Surgeon: Dinesh Ivey MD;  Location: [facility]     STENT      PTCA with drug-eluting stent of RCA, circumflex, and LAD     VASCULAR SURGERY         FAMILY HISTORY:  Family History   Problem Relation Age of  Onset     Asthma Other      Cancer Other         melanoma     Blood Disease Other         bleeding disorder     Lung Cancer Mother         Smoker     Prostate Cancer Father      Melanoma No family hx of        SOCIAL HISTORY:  Social History     Socioeconomic History     Marital status: Single     Spouse name: None     Number of children: 0     Years of education: None     Highest education level: None   Occupational History     Occupation: Retired     Comment: [employer]    Tobacco Use     Smoking status: Never     Smokeless tobacco: Never   Vaping Use     Vaping Use: Never used   Substance and Sexual Activity     Alcohol use: Yes     Comment: glass of wine couple times per week     Drug use: Never     Social Determinants of Health     Financial Resource Strain: Low Risk      Difficulty of Paying Living Expenses: Not hard at all   Food Insecurity: No Food Insecurity     Worried About Running Out of Food in the Last Year: Never true     Ran Out of Food in the Last Year: Never true   Transportation Needs: No Transportation Needs     Lack of Transportation (Medical): No     Lack of Transportation (Non-Medical): No   Physical Activity: Inactive     Days of Exercise per Week: 0 days     Minutes of Exercise per Session: 0 min   Stress: No Stress Concern Present     Feeling of Stress : Not at all   Social Connections: Socially Isolated     Frequency of Communication with Friends and Family: Once a week     Frequency of Social Gatherings with Friends and Family: Once a week     Attends Islam Services: More than 4 times per year     Active Member of Clubs or Organizations: No     Marital Status: Never    Housing Stability: Low Risk      Unable to Pay for Housing in the Last Year: No     Number of Places Lived in the Last Year: 1     Unstable Housing in the Last Year: No       Review of Systems:  Skin:          Eyes:         ENT:         Respiratory:  Positive for dyspnea on exertion     Cardiovascular:   Negative      Gastroenterology:        Genitourinary:         Musculoskeletal:         Neurologic:         Psychiatric:         Heme/Lymph/Imm:         Endocrine:           Physical Exam:  Vitals: /71 (BP Location: Left arm, Patient Position: Sitting, Cuff Size: Adult Regular)   Pulse 68   Resp 10   Wt 103.7 kg (228 lb 9.6 oz)   SpO2 98%   BMI 36.91 kg/m    Eyes: No icterus.  Pulmonary: Chest symmetric, lungs clear bilaterally and no crackles, wheezes or rales.  Cardiovascular: RRR with normal S1 and S2, no murmur, JVP normal.  Musculoskeletal: Edema of the lower extremities: None.  Neurologic: Oriented and appropriate without obvious focal deficits.   Psychiatric: Normal affect.     Recent Lab Results:  LIPID RESULTS:  Lab Results   Component Value Date    CHOL 106 11/09/2021    CHOL 105 09/08/2020    HDL 42 11/09/2021    HDL 39 (L) 09/08/2020    LDL 42 11/09/2021    LDL 49 09/08/2020    TRIG 110 11/09/2021    TRIG 87 09/08/2020    CHOLHDLRATIO 4.0 03/13/2008       LIVER ENZYME RESULTS:  Lab Results   Component Value Date    AST 37 08/30/2022    AST 18 04/23/2021    ALT 30 08/30/2022    ALT 25 04/23/2021       CBC RESULTS:  Lab Results   Component Value Date    WBC 5.1 10/06/2022    WBC 3.2 (L) 01/04/2021    RBC 4.17 (L) 10/06/2022    RBC 4.19 (L) 01/04/2021    HGB 11.4 (L) 10/06/2022    HGB 12.0 (L) 01/04/2021    HCT 36.4 (L) 10/06/2022    HCT 37.5 (L) 01/04/2021    MCV 87 10/06/2022    MCV 90 01/04/2021    MCH 27.3 10/06/2022    MCH 28.6 01/04/2021    MCHC 31.3 (L) 10/06/2022    MCHC 32.0 01/04/2021    RDW 15.6 (H) 10/06/2022    RDW 15.4 (H) 01/04/2021     (L) 10/06/2022    PLT 92 (L) 01/04/2021       BMP RESULTS:  Lab Results   Component Value Date     10/06/2022     04/23/2021    POTASSIUM 4.3 10/06/2022    POTASSIUM 3.8 08/29/2022    POTASSIUM 4.7 04/23/2021    CHLORIDE 101 10/06/2022    CHLORIDE 106 08/29/2022    CHLORIDE 107 04/23/2021    CO2 27 10/06/2022    CO2 25 08/29/2022     CO2 29 04/23/2021    ANIONGAP 10 10/06/2022    ANIONGAP 8 08/29/2022    ANIONGAP 4 04/23/2021     (H) 10/06/2022     (H) 08/31/2022    GLC 83 08/29/2022     (H) 04/23/2021    BUN 14.5 10/06/2022    BUN 59 (H) 08/29/2022    BUN 17 04/23/2021    CR 1.05 10/06/2022    CR 1.04 04/23/2021    GFRESTIMATED 72 10/06/2022    GFRESTIMATED 68 04/23/2021    GFRESTBLACK 79 04/23/2021    NATALIIA 8.9 10/06/2022    NATALIIA 8.7 04/23/2021        A1C RESULTS:  Lab Results   Component Value Date    A1C 5.5 05/24/2022    A1C 5.8 (H) 04/23/2021       INR RESULTS:  No results found for: INR    CC  No referring provider defined for this encounter.    All medical records were reviewed in detail and discussed with the patient. Greater than 30 mins were spent with the patient, 50% of this time was spent on counseling and coordination of care.  After visit summary was printed and given to the patient.    Thank you for allowing me to participate in the care of your patient.      Sincerely,     Micah Tate MD     [facility]

## 2022-10-12 NOTE — PROGRESS NOTES
Pt returned to clinic for post surgery 1 week follow up bandage change. Pt has no complaints, denies pain. Bandage was not present at surgical site, Pt and Pt sister stated that bandage had recently fallen off.  Area cleansed with normal saline. Picture taken of the surgical site. Redness surrounding surgical site noted, but no other infection symptoms noted. Reapplied new steri strips and paper tape.    Advised to watch for signs/sx of infection; spreading redness, drainage, odor, fever. Call or report promptly to clinic. Pt given written instructions and informed to rtc as needed. Patient verbalized understanding.       Lillian FABIAN,  CMA

## 2022-10-12 NOTE — PATIENT INSTRUCTIONS
WOUND CARE INSTRUCTIONS  for  ONE WEEK AFTER SURGERY          Leave flat bandage on your skin for one week after today s bandage change.  In one week when you remove the bandage, you may resume your regular skin care routine, including washing with mild soap and water, applying moisturizer, make-up and sunscreen.    If there are any open or bleeding areas at the incision/graft site you should begin to cover the area with a bandage daily as follows:    Clean and dry the area with plain tap water using a Q-tip or sterile gauze pad.  Apply Polysporin or Bacitracin ointment to the open area.  Cover the wound with a band-aid or a sterile non-stick gauze pad and micropore paper tape.         SIGNS OF INFECTION  - If you notice any of these signs of infection, call your doctor right away: expanding redness around the wound.  - Yellow or greenish-colored pus or cloudy wound drainage.    - Red streaking spreading from the wound.  - Increased swelling, tenderness, or pain around the wound.   - Fever.    Please remember that yellow and clear drainage from a wound can be normal and related to normal wound healing.  Isolated drainage from a wound without a combination of the above features does not indicate infection.       *Once the bandages are removed, the scar will be red and firm (especially in the lip/chin area). This is normal and will fade in time. It might take 6-12 months for this to happen.     *Massaging the area will help the scar soften and fade quicker. Begin to massage the area one month after the bandages have been removed. To massage apply pressure directly and firmly over the scar with the fingertips and move in a circular motion. Massage the area for a few minutes several times a day. Continue to massage the site for several months.    *Approximately 6-8 weeks after surgery it is not uncommon to see the formation of  tender pimple-like  bump along the scar. This is normal. As the scar continues to mature and  the stitches underneath the skin begin to dissolve, this might occur. Do not pick or squeeze, this will resolve on it s own. Should one break open producing a small amount of drainage, apply Polysporin or Bacitracin ointment a few times a day until the wound is completely healed.    *Numbness in the surgical area is expected. It might take 12-18 months for the feeling to return to normal. During this time sensations of itchiness, tingling and occasional sharp pains might be noted. These feelings are normal and will subside once the nerves have completely healed.         IN CASE OF EMERGENCY: Dr Roque [phone]     If you were seen in [place] call: [phone]    If you were seen in [place] call: [phone]

## 2022-10-13 ENCOUNTER — TELEPHONE (OUTPATIENT)
Dept: FAMILY MEDICINE | Facility: CLINIC | Age: 82
End: 2022-10-13

## 2022-10-13 NOTE — PROGRESS NOTES
HPI and Plan:   Today I had the pleasure of seeing Flash Brown at [facility]. He is a pleasant 81 year old patient with a past medical history of coronary disease, diabetes, diastolic heart failure, hypertension and hyperlipidemia presents to the clinic for follow-up visit.    The patient had PCI done to his RCA, circumflex and proximal and mid LAD in 2010.  He had another angiogram in 2011 which showed 70% lesion in the proximal circumflex which was negative by FFR and hence no intervention was performed.  Last stress test was in 2015 which showed moderate ischemia in the lateral wall but no angio or intervention was performed.  He then establish care with me in 2020.  I decided to manage him medically at that time since he was asymptomatic.    Unfortunately, he recently got admitted to the hospital on 8/20/2022 for over 20 pound weight gain in the setting of HFpEF.  Echocardiogram showed normal EF and no significant valvular disease.  He was diuresed aggressively with Lasix and lost over 20 pounds.  Discharge weight was 228 exactly as today.  We briefly increased during his stay in the transitional care but responded very well to uptitrate the dose of Lasix.  He feels back to normal.  His blood pressure is also improved and is not occasions in the past  significantly low as he noted on several. Patient reports no chest pain, PND, orthopnea, presyncope, syncope, heart racing, or palpitations.    I reviewed in the EKG, echocardiogram, blood work, notes from his recent admission to     Assessment and plan  1.  Coronary artery disease status post multiple PCI's.  Was on DAPT for a long and Plavix was discontinued recently.  She will continue aspirin uninterrupted  2.  Heart failure with preserved ejection fraction-dry weight 227 pounds, NYHA class II, maintained on 40 mg of Lasix.  We will continue that.  Discussed titration of Lasix based on weight and symptoms.  He verbalized  understanding.  He has mild dyspnea on exertion which is gradually improving with rehabilitation  3.  Hyperlipidemia-he is on maximum dose of Lipitor which we will continue.  LDL is at goal.  4.  Type 2 diabetes-not addressed today    Thank you for allowing me to participate in the care of Flash Brown    This note was completed in part using Dragon voice recognition software. Although reviewed after completion, some word and grammatical errors may occur.    Micah Tate MD  Cardiology    Orders Placed This Encounter   Procedures     Follow-Up with Cardiology RABIA       Orders Placed This Encounter   Medications     metoprolol succinate ER (TOPROL XL) 25 MG 24 hr tablet     Sig: Take 1 tablet (25 mg) by mouth daily     Dispense:  90 tablet     Refill:  3       There are no discontinued medications.    Encounter Diagnosis   Name Primary?     Acute congestive heart failure, unspecified heart failure type (H) Yes       CURRENT MEDICATIONS:  Current Outpatient Medications   Medication Sig Dispense Refill     aspirin (ASA) 81 MG EC tablet Take 81 mg by mouth daily       atorvastatin (LIPITOR) 20 MG tablet Take 1 tablet (20 mg) by mouth daily 90 tablet 0     ferrous sulfate (FE TABS) 325 (65 Fe) MG EC tablet Take 1 tablet (325 mg) by mouth daily 90 tablet 0     furosemide (LASIX) 20 MG tablet Take 2 tablets (40 mg) by mouth every morning AND 1 tablet (20 mg) daily at 2 pm. 270 tablet 3     magnesium oxide (MAG-OX) 400 MG tablet Take 1 tablet (400 mg) by mouth 2 times daily 60 tablet 1     metoprolol succinate ER (TOPROL XL) 25 MG 24 hr tablet Take 1 tablet (25 mg) by mouth daily 90 tablet 3     nitroGLYcerin (NITROSTAT) 0.4 MG sublingual tablet Place 1 tablet (0.4 mg) under the tongue See Admin Instructions for chest pain 30 tablet 0     ORDER FOR DME Light Therapy with Full Spectrum Light (88041 lux) Start with 10-15 minute exposure per day, Increase exposure to 30 to 45 minutes per day, Maximum exposure: 90 minutes  per day,Look periodically at light during each session  1 0     potassium chloride ER (KLOR-CON M) 20 MEQ CR tablet Take 1 tablet (20 mEq) by mouth 2 times daily 60 tablet 0     tamsulosin (FLOMAX) 0.4 MG capsule Take 1 capsule (0.4 mg) by mouth every evening 30 capsule 1     triamcinolone (KENALOG) 0.1 % external cream Twice daily to legs prn itching 454 g 3       ALLERGIES     Allergies   Allergen Reactions     Iodine Swelling       PAST MEDICAL HISTORY:  Past Medical History:   Diagnosis Date     Basal cell carcinoma      CAD (coronary artery disease)      Depression      Hyperlipidemia      Hypertension      Lymphoma (H)      Malignant melanoma (H)      Melanoma (H)      Squamous cell carcinoma        PAST SURGICAL HISTORY:  Past Surgical History:   Procedure Laterality Date     AXILLARY SURGERY      S/P resection and adjuvant radiation     BONE MARROW BIOPSY, BONE SPECIMEN, NEEDLE/TROCAR N/A 7/8/2019    Procedure: BIOPSY, BONE MARROW;  Surgeon: Husam Issa MD;  Location: [facility]     BONE MARROW BIOPSY, BONE SPECIMEN, NEEDLE/TROCAR Left 2/24/2022    Procedure: BIOPSY, BONE MARROW;  Surgeon: Cailin Anthony PA-C;  Location: [facility] OR     CARDIAC SURGERY       CHOLECYSTECTOMY       HERNIA REPAIR, UMBILICAL       PHACOEMULSIFICATION WITH STANDARD INTRAOCULAR LENS IMPLANT Right 10/2/2019    Procedure: Cataract Removal with Implant;  Surgeon: Dinesh Ivey MD;  Location: [facility]     PHACOEMULSIFICATION WITH STANDARD INTRAOCULAR LENS IMPLANT Left 10/21/2019    Procedure: Cataract Removal with Implant;  Surgeon: Dinesh Ivey MD;  Location: [facility]     STENT      PTCA with drug-eluting stent of RCA, circumflex, and LAD     VASCULAR SURGERY         FAMILY HISTORY:  Family History   Problem Relation Age of Onset     Asthma Other      Cancer Other         melanoma     Blood Disease Other         bleeding disorder     Lung Cancer Mother         Smoker     Prostate Cancer Father      Melanoma No family hx  of        SOCIAL HISTORY:  Social History     Socioeconomic History     Marital status: Single     Spouse name: None     Number of children: 0     Years of education: None     Highest education level: None   Occupational History     Occupation: Retired     Comment: [employer]    Tobacco Use     Smoking status: Never     Smokeless tobacco: Never   Vaping Use     Vaping Use: Never used   Substance and Sexual Activity     Alcohol use: Yes     Comment: glass of wine couple times per week     Drug use: Never     Social Determinants of Health     Financial Resource Strain: Low Risk      Difficulty of Paying Living Expenses: Not hard at all   Food Insecurity: No Food Insecurity     Worried About Running Out of Food in the Last Year: Never true     Ran Out of Food in the Last Year: Never true   Transportation Needs: No Transportation Needs     Lack of Transportation (Medical): No     Lack of Transportation (Non-Medical): No   Physical Activity: Inactive     Days of Exercise per Week: 0 days     Minutes of Exercise per Session: 0 min   Stress: No Stress Concern Present     Feeling of Stress : Not at all   Social Connections: Socially Isolated     Frequency of Communication with Friends and Family: Once a week     Frequency of Social Gatherings with Friends and Family: Once a week     Attends Sikh Services: More than 4 times per year     Active Member of Clubs or Organizations: No     Marital Status: Never    Housing Stability: Low Risk      Unable to Pay for Housing in the Last Year: No     Number of Places Lived in the Last Year: 1     Unstable Housing in the Last Year: No       Review of Systems:  Skin:          Eyes:         ENT:         Respiratory:  Positive for dyspnea on exertion     Cardiovascular:  Negative      Gastroenterology:        Genitourinary:         Musculoskeletal:         Neurologic:         Psychiatric:         Heme/Lymph/Imm:         Endocrine:           Physical Exam:  Vitals:  /71 (BP Location: Left arm, Patient Position: Sitting, Cuff Size: Adult Regular)   Pulse 68   Resp 10   Wt 103.7 kg (228 lb 9.6 oz)   SpO2 98%   BMI 36.91 kg/m    Eyes: No icterus.  Pulmonary: Chest symmetric, lungs clear bilaterally and no crackles, wheezes or rales.  Cardiovascular: RRR with normal S1 and S2, no murmur, JVP normal.  Musculoskeletal: Edema of the lower extremities: None.  Neurologic: Oriented and appropriate without obvious focal deficits.   Psychiatric: Normal affect.     Recent Lab Results:  LIPID RESULTS:  Lab Results   Component Value Date    CHOL 106 11/09/2021    CHOL 105 09/08/2020    HDL 42 11/09/2021    HDL 39 (L) 09/08/2020    LDL 42 11/09/2021    LDL 49 09/08/2020    TRIG 110 11/09/2021    TRIG 87 09/08/2020    CHOLHDLRATIO 4.0 03/13/2008       LIVER ENZYME RESULTS:  Lab Results   Component Value Date    AST 37 08/30/2022    AST 18 04/23/2021    ALT 30 08/30/2022    ALT 25 04/23/2021       CBC RESULTS:  Lab Results   Component Value Date    WBC 5.1 10/06/2022    WBC 3.2 (L) 01/04/2021    RBC 4.17 (L) 10/06/2022    RBC 4.19 (L) 01/04/2021    HGB 11.4 (L) 10/06/2022    HGB 12.0 (L) 01/04/2021    HCT 36.4 (L) 10/06/2022    HCT 37.5 (L) 01/04/2021    MCV 87 10/06/2022    MCV 90 01/04/2021    MCH 27.3 10/06/2022    MCH 28.6 01/04/2021    MCHC 31.3 (L) 10/06/2022    MCHC 32.0 01/04/2021    RDW 15.6 (H) 10/06/2022    RDW 15.4 (H) 01/04/2021     (L) 10/06/2022    PLT 92 (L) 01/04/2021       BMP RESULTS:  Lab Results   Component Value Date     10/06/2022     04/23/2021    POTASSIUM 4.3 10/06/2022    POTASSIUM 3.8 08/29/2022    POTASSIUM 4.7 04/23/2021    CHLORIDE 101 10/06/2022    CHLORIDE 106 08/29/2022    CHLORIDE 107 04/23/2021    CO2 27 10/06/2022    CO2 25 08/29/2022    CO2 29 04/23/2021    ANIONGAP 10 10/06/2022    ANIONGAP 8 08/29/2022    ANIONGAP 4 04/23/2021     (H) 10/06/2022     (H) 08/31/2022    GLC 83 08/29/2022     (H) 04/23/2021     BUN 14.5 10/06/2022    BUN 59 (H) 08/29/2022    BUN 17 04/23/2021    CR 1.05 10/06/2022    CR 1.04 04/23/2021    GFRESTIMATED 72 10/06/2022    GFRESTIMATED 68 04/23/2021    GFRESTBLACK 79 04/23/2021    NATALIIA 8.9 10/06/2022    NATALIIA 8.7 04/23/2021        A1C RESULTS:  Lab Results   Component Value Date    A1C 5.5 05/24/2022    A1C 5.8 (H) 04/23/2021       INR RESULTS:  No results found for: INR    CC  No referring provider defined for this encounter.    All medical records were reviewed in detail and discussed with the patient. Greater than 30 mins were spent with the patient, 50% of this time was spent on counseling and coordination of care.  After visit summary was printed and given to the patient.

## 2022-10-13 NOTE — TELEPHONE ENCOUNTER
Forms received from: [facility]   Phone number listed: [phone]  Fax listed: [phone]  Date received: 10/7/22  Form description: Revision to plan or care OT  Once forms are completed, please return to [facility]  via fax [phone].  Is patient requesting to be contacted when forms are completed: na  Phone: na  Form placed:  To Dr. Jackson's basket  Najma Michelle

## 2022-10-14 ENCOUNTER — TELEPHONE (OUTPATIENT)
Dept: FAMILY MEDICINE | Facility: CLINIC | Age: 82
End: 2022-10-14

## 2022-10-14 ENCOUNTER — MEDICAL CORRESPONDENCE (OUTPATIENT)
Dept: FAMILY MEDICINE | Facility: CLINIC | Age: 82
End: 2022-10-14

## 2022-10-14 PROCEDURE — G0180 MD CERTIFICATION HHA PATIENT: HCPCS | Performed by: FAMILY MEDICINE

## 2022-10-14 NOTE — TELEPHONE ENCOUNTER
Forms received from: [facility]   Phone number listed: [phone]   Fax listed: [phone]  Date received: 10/13/22  Form description: revision to plan of care  Once forms are completed, please return to Home Health [facility] via fax [phone].  Is patient requesting to be contacted when forms are completed: na  Phone: na  Form placed:  To Dr. Renetta Michelle

## 2022-10-18 ENCOUNTER — PATIENT OUTREACH (OUTPATIENT)
Dept: ONCOLOGY | Facility: CLINIC | Age: 82
End: 2022-10-18

## 2022-10-18 DIAGNOSIS — R41.841 COGNITIVE COMMUNICATION DEFICIT: ICD-10-CM

## 2022-10-18 DIAGNOSIS — G47.33 OBSTRUCTIVE SLEEP APNEA: ICD-10-CM

## 2022-10-18 DIAGNOSIS — I25.10 CORONARY ARTERY DISEASE INVOLVING NATIVE CORONARY ARTERY OF NATIVE HEART WITHOUT ANGINA PECTORIS: ICD-10-CM

## 2022-10-18 DIAGNOSIS — N40.1 BENIGN PROSTATIC HYPERPLASIA WITH URINARY OBSTRUCTION: ICD-10-CM

## 2022-10-18 DIAGNOSIS — G31.84 MILD COGNITIVE IMPAIRMENT: ICD-10-CM

## 2022-10-18 DIAGNOSIS — E78.49 OTHER HYPERLIPIDEMIA: ICD-10-CM

## 2022-10-18 DIAGNOSIS — R06.09 DYSPNEA ON EXERTION: ICD-10-CM

## 2022-10-18 DIAGNOSIS — E66.01 MORBID OBESITY (H): ICD-10-CM

## 2022-10-18 DIAGNOSIS — I50.31 ACUTE DIASTOLIC HEART FAILURE (H): ICD-10-CM

## 2022-10-18 DIAGNOSIS — D61.818 PANCYTOPENIA (H): Primary | ICD-10-CM

## 2022-10-18 DIAGNOSIS — Z85.72 HISTORY OF LYMPHOMA: ICD-10-CM

## 2022-10-18 DIAGNOSIS — E11.9 TYPE 2 DIABETES MELLITUS WITHOUT COMPLICATION, WITHOUT LONG-TERM CURRENT USE OF INSULIN (H): ICD-10-CM

## 2022-10-18 DIAGNOSIS — I11.0 HYPERTENSIVE HEART DISEASE WITH HEART FAILURE (H): Primary | ICD-10-CM

## 2022-10-18 DIAGNOSIS — M62.81 MUSCLE WEAKNESS (GENERALIZED): ICD-10-CM

## 2022-10-18 DIAGNOSIS — R26.89 DECREASED MOBILITY: ICD-10-CM

## 2022-10-18 DIAGNOSIS — N13.8 BENIGN PROSTATIC HYPERPLASIA WITH URINARY OBSTRUCTION: ICD-10-CM

## 2022-10-18 DIAGNOSIS — R00.1 SINUS BRADYCARDIA: ICD-10-CM

## 2022-10-18 NOTE — RESULT ENCOUNTER NOTE
Notified patient and spoke with his sister Daylin that Dr. Avendaño would like him to repeat a CBC in 1 month. Patient scheduled now to repeat on 11/10.Taryn Avila RN on 10/18/2022 at 11:06 AM

## 2022-10-20 ENCOUNTER — TELEPHONE (OUTPATIENT)
Dept: FAMILY MEDICINE | Facility: CLINIC | Age: 82
End: 2022-10-20

## 2022-10-20 ENCOUNTER — OFFICE VISIT (OUTPATIENT)
Dept: FAMILY MEDICINE | Facility: CLINIC | Age: 82
End: 2022-10-20
Payer: MEDICARE

## 2022-10-20 VITALS
TEMPERATURE: 98.1 F | OXYGEN SATURATION: 96 % | SYSTOLIC BLOOD PRESSURE: 130 MMHG | WEIGHT: 227 LBS | DIASTOLIC BLOOD PRESSURE: 60 MMHG | HEART RATE: 57 BPM | BODY MASS INDEX: 36.66 KG/M2

## 2022-10-20 DIAGNOSIS — I50.31 ACUTE DIASTOLIC HEART FAILURE (H): Primary | ICD-10-CM

## 2022-10-20 DIAGNOSIS — R33.9 URINARY RETENTION: ICD-10-CM

## 2022-10-20 DIAGNOSIS — Z23 NEED FOR INFLUENZA VACCINATION: ICD-10-CM

## 2022-10-20 DIAGNOSIS — N40.1 BENIGN PROSTATIC HYPERPLASIA WITH URINARY OBSTRUCTION: ICD-10-CM

## 2022-10-20 DIAGNOSIS — Z23 NEED FOR COVID-19 VACCINE: ICD-10-CM

## 2022-10-20 DIAGNOSIS — N13.8 BENIGN PROSTATIC HYPERPLASIA WITH URINARY OBSTRUCTION: ICD-10-CM

## 2022-10-20 DIAGNOSIS — I25.10 CORONARY ARTERY DISEASE INVOLVING NATIVE CORONARY ARTERY OF NATIVE HEART WITHOUT ANGINA PECTORIS: ICD-10-CM

## 2022-10-20 PROCEDURE — G0008 ADMIN INFLUENZA VIRUS VAC: HCPCS | Mod: 59 | Performed by: FAMILY MEDICINE

## 2022-10-20 PROCEDURE — 0124A COVID-19,PF,PFIZER BOOSTER BIVALENT: CPT | Performed by: FAMILY MEDICINE

## 2022-10-20 PROCEDURE — 99214 OFFICE O/P EST MOD 30 MIN: CPT | Mod: 25 | Performed by: FAMILY MEDICINE

## 2022-10-20 PROCEDURE — 91312 COVID-19,PF,PFIZER BOOSTER BIVALENT: CPT | Performed by: FAMILY MEDICINE

## 2022-10-20 PROCEDURE — 90662 IIV NO PRSV INCREASED AG IM: CPT | Performed by: FAMILY MEDICINE

## 2022-10-20 RX ORDER — TAMSULOSIN HYDROCHLORIDE 0.4 MG/1
0.4 CAPSULE ORAL EVERY EVENING
Qty: 90 CAPSULE | Refills: 3 | Status: SHIPPED | OUTPATIENT
Start: 2022-10-20 | End: 2023-07-06

## 2022-10-20 RX ORDER — OXYCODONE AND ACETAMINOPHEN 5; 325 MG/1; MG/1
TABLET ORAL
Qty: 5 TABLET | Refills: 0 | Status: SHIPPED | OUTPATIENT
Start: 2022-10-20 | End: 2023-03-07

## 2022-10-20 ASSESSMENT — PAIN SCALES - GENERAL: PAINLEVEL: NO PAIN (0)

## 2022-10-20 NOTE — PATIENT INSTRUCTIONS
Kirt Sanchez,    Thank you for allowing [facility] to manage your care.    I sent your prescriptions to your pharmacy.    I ordered a walker with seat, please proceed to the medical supply store.     If you have any questions or concerns, please feel free to call us at [phone].    Sincerely,    Dr. Jackson    Did you know?      You can schedule a video visit for follow-up appointments as well as future appointments for certain conditions.  Please see the below link.     [email or web address]    If you have not already done so,  I encourage you to sign up for [facility] ([email or web address]).  This will allow you to review your results, securely communicate with a provider, and schedule virtual visits as well.

## 2022-10-20 NOTE — TELEPHONE ENCOUNTER
Routing below message to PCP.    Please specify how many tablets of Percocet patient should take prior to procedure.    Chris Hill RN

## 2022-10-20 NOTE — PROGRESS NOTES
1. Acute diastolic heart failure (H)  Continue with lasix. Most recent echo reviewed.  Continue with daily weights, fluid restriction,   - Walker Order for DME - ONLY FOR DME    2. Urinary retention  Rx for hernandez catheter removal.   - oxyCODONE-acetaminophen (PERCOCET) 5-325 MG tablet; Take 30 minutes prior to the procedure  Dispense: 5 tablet; Refill: 0    3. Coronary artery disease involving native coronary artery of native heart without angina pectoris  Continue with aspirin, lipitor, and metoprolol    4. Benign prostatic hyperplasia with urinary obstruction  - tamsulosin (FLOMAX) 0.4 MG capsule; Take 1 capsule (0.4 mg) by mouth every evening  Dispense: 90 capsule; Refill: 3    5. Need for influenza vaccination  - INFLUENZA, QUAD, HIGH DOSE, PF, 65YR + (FLUZONE HD)    6. Need for COVID-19 vaccine  - COVID-19,PF,PFIZER BOOSTER BIVALENT      Bairon Sanchez is a 81 year old, presenting for the following health issues:  Heart Failure (Follow up)      HPI     Heart failure follow up. Patient would like to discuss medication and get an order for a seated walker.  Requesting pain medication for cath removal.       Hospital Follow-up Visit:    Hospital/Nursing Home/IP Rehab Facility: [facility]  Date of Admission: 8/20/22  Date of Discharge: 9/2/22  Date of Discharge from TCU: 9/25/22  Reason(s) for Admission: Acute hear failure    Was your hospitalization related to COVID-19? No   Problems taking medications regularly:  None  Medication changes since discharge: None  Problems adhering to non-medication therapy:  None    Summary of hospitalization:  [facility] discharge summary reviewed  Diagnostic Tests/Treatments reviewed.  Follow up needed: none  Other Healthcare Providers Involved in Patient s Care:         Homecare  Update since discharge: stable.         Plan of care communicated with patient           Hospital follow-up: Patient was admitted for acute congestive  heart failure.  Patient was treated with IV furosemide and transferred to TCU.  Carvedilol was held due to bradycardia as well as amlodipine and hydralazine.  Transferred to TCU for the next 3 weeks.  Currently going through PT, OT, and SNF.  Patient is requesting rollator.  Patient is scheduled to have the hernandez catheter removed.    Review of Systems   Constitutional: Negative for chills and fever.   HENT: Negative for congestion, ear pain, hearing loss and sore throat.    Respiratory: Negative for cough and shortness of breath.    Cardiovascular: Negative for chest pain, palpitations and peripheral edema.   Musculoskeletal: Negative for arthralgias, joint swelling and myalgias.   Skin: Negative for rash.   Neurological: Negative for dizziness, weakness, headaches and paresthesias.   Psychiatric/Behavioral: Negative for mood changes. The patient is not nervous/anxious.             Objective    /60   Pulse 57   Temp 98.1  F (36.7  C) (Tympanic)   Wt 103 kg (227 lb)   SpO2 96%   BMI 36.66 kg/m    Body mass index is 36.66 kg/m .  Physical Exam  Constitutional:       General: He is not in acute distress.     Appearance: He is well-developed and well-nourished.   HENT:      Head: Normocephalic and atraumatic.      Nose: Nose normal.   Eyes:      Extraocular Movements: EOM normal.      Conjunctiva/sclera: Conjunctivae normal.   Neck:      Trachea: No tracheal deviation.   Cardiovascular:      Rate and Rhythm: Normal rate and regular rhythm.      Heart sounds: Normal heart sounds.   Pulmonary:      Effort: Pulmonary effort is normal.      Breath sounds: No wheezing.   Musculoskeletal:         General: Normal range of motion.      Cervical back: Normal range of motion.   Skin:     Findings: No erythema or rash.   Neurological:      Mental Status: He is alert and oriented to person, place, and time.   Psychiatric:         Mood and Affect: Mood and affect normal.         Behavior: Behavior normal.           8/12/22  Interpretation Summary   Global and regional left ventricular function is normal with an EF of 60-65%.  Global right ventricular function is normal.  IVC diameter and respiratory changes fall into an intermediate range  suggesting an RA pressure of 8 mmHg.  No pericardial effusion is present.

## 2022-10-21 NOTE — TELEPHONE ENCOUNTER
Called patient; sister Rea answered (consent to communicate); relayed providers message and Rea verbalized understanding and would let patient know.    Confirmed Percocet available at [facility] [place] at [address].  Brittany Bello RN

## 2022-10-24 ASSESSMENT — ENCOUNTER SYMPTOMS
FEVER: 0
CHILLS: 0
COUGH: 0
JOINT SWELLING: 0
PALPITATIONS: 0
WEAKNESS: 0
PARESTHESIAS: 0
SORE THROAT: 0
MYALGIAS: 0
ARTHRALGIAS: 0
SHORTNESS OF BREATH: 0
DIZZINESS: 0
NERVOUS/ANXIOUS: 0
HEADACHES: 0

## 2022-10-24 NOTE — TELEPHONE ENCOUNTER
"I also called home/mobile number to speak to patient/wife to be sure they are clear on the directions for the percocet.   No answer, unable to leave message as voicemail is full.   Tried the \"other number\" with prefix [phone], busy signal.    Re-flagged to call again later to clarify plan for the percocet and advise pharmacy will fill.    Kristin Calderon RN  [facility]      "

## 2022-10-24 NOTE — TELEPHONE ENCOUNTER
I called pharmacy, spoke to pharmacist China who took the new directions as verbal order and states will fill Rx for patient.    Kristin Calderon RN  [facility]

## 2022-10-24 NOTE — TELEPHONE ENCOUNTER
Clarification: The percocet prescription is 1 tablet 30 minutes prior to the procedure #5 (this is updated on patient's medication list).  The remaining 4 tablets are used if patient is not able to self-urinate and a hernandez catheter has to re-inserted, patient may need percocet for future hernandez removals or replacement.

## 2022-10-24 NOTE — TELEPHONE ENCOUNTER
Rea calling, we have consent on file.    Says we need to call pharmacy to clarify plan for percocet.   She says provider advised dispense of 5 tablets to use in case had pain after having Caruso catheter removed.    I see urology appointment on Friday.    I see provider clarified the pre-procedure dose is one tablet.    I assume pharmacy will need clarification of what the remaining 4 tablets are for?    I called pharmacy, indeed they do need further directions regarding the remaining 4 tabs.    Routed back to Dr. Jackson, perhaps updated directions could be:  Take one tablet 30 minutes prior to procedure, then may take 1 tablet every 6 hours as needed for post-procedure pain.      Re-routed to provider to address directions for percocet.  RN will need to call pharmacy with updated directions.    Kristin Calderon RN  [facility]

## 2022-10-24 NOTE — TELEPHONE ENCOUNTER
I tried home/mobile number again, Daylin answered, she verbalized understanding of plan for percocet (prior to any Caruso placing or removing).    Kristin Calderon RN  [facility]

## 2022-10-25 ENCOUNTER — OFFICE VISIT (OUTPATIENT)
Dept: DERMATOLOGY | Facility: CLINIC | Age: 82
End: 2022-10-25
Payer: MEDICARE

## 2022-10-25 DIAGNOSIS — Z85.828 HISTORY OF SKIN CANCER: Primary | ICD-10-CM

## 2022-10-25 PROCEDURE — 87077 CULTURE AEROBIC IDENTIFY: CPT | Mod: 59 | Performed by: DERMATOLOGY

## 2022-10-25 PROCEDURE — 87070 CULTURE OTHR SPECIMN AEROBIC: CPT | Performed by: DERMATOLOGY

## 2022-10-25 PROCEDURE — 99024 POSTOP FOLLOW-UP VISIT: CPT | Performed by: DERMATOLOGY

## 2022-10-25 PROCEDURE — 87186 SC STD MICRODIL/AGAR DIL: CPT | Performed by: DERMATOLOGY

## 2022-10-25 RX ORDER — DOXYCYCLINE 100 MG/1
100 CAPSULE ORAL 2 TIMES DAILY
Qty: 14 CAPSULE | Refills: 0 | Status: SHIPPED | OUTPATIENT
Start: 2022-10-25 | End: 2023-03-07

## 2022-10-25 ASSESSMENT — PAIN SCALES - GENERAL: PAINLEVEL: NO PAIN (0)

## 2022-10-25 NOTE — PROGRESS NOTES
Flash Brown is an extremely pleasant 81 year old year old male patient here today for tenderness on chest surgical site.  Patient has no other skin complaints today.  Remainder of the HPI, Meds, PMH, Allergies, FH, and SH was reviewed in chart.      Past Medical History:   Diagnosis Date     Basal cell carcinoma      CAD (coronary artery disease)      Depression      Hyperlipidemia      Hypertension      Lymphoma (H)      Malignant melanoma (H)      Melanoma (H)      Squamous cell carcinoma        Past Surgical History:   Procedure Laterality Date     AXILLARY SURGERY      S/P resection and adjuvant radiation     BONE MARROW BIOPSY, BONE SPECIMEN, NEEDLE/TROCAR N/A 7/8/2019    Procedure: BIOPSY, BONE MARROW;  Surgeon: Husam Issa MD;  Location: [facility]     BONE MARROW BIOPSY, BONE SPECIMEN, NEEDLE/TROCAR Left 2/24/2022    Procedure: BIOPSY, BONE MARROW;  Surgeon: Cailin Anthony PA-C;  Location: [facility] OR     CARDIAC SURGERY       CHOLECYSTECTOMY       HERNIA REPAIR, UMBILICAL       PHACOEMULSIFICATION WITH STANDARD INTRAOCULAR LENS IMPLANT Right 10/2/2019    Procedure: Cataract Removal with Implant;  Surgeon: Dinesh Ivey MD;  Location: [facility]     PHACOEMULSIFICATION WITH STANDARD INTRAOCULAR LENS IMPLANT Left 10/21/2019    Procedure: Cataract Removal with Implant;  Surgeon: Dinesh Ivey MD;  Location: [facility]     STENT      PTCA with drug-eluting stent of RCA, circumflex, and LAD     VASCULAR SURGERY          Family History   Problem Relation Age of Onset     Asthma Other      Cancer Other         melanoma     Blood Disease Other         bleeding disorder     Lung Cancer Mother         Smoker     Prostate Cancer Father      Melanoma No family hx of        Social History     Socioeconomic History     Marital status: Single     Spouse name: Not on file     Number of children: 0     Years of education: Not on file     Highest education level: Not on file   Occupational History      Occupation: Retired     Comment: [employer]    Tobacco Use     Smoking status: Never     Smokeless tobacco: Never   Vaping Use     Vaping Use: Never used   Substance and Sexual Activity     Alcohol use: Yes     Comment: glass of wine couple times per week     Drug use: Never     Sexual activity: Not on file   Other Topics Concern     Parent/sibling w/ CABG, MI or angioplasty before 65F 55M? Not Asked   Social History Narrative     Not on file     Social Determinants of Health     Financial Resource Strain: Low Risk      Difficulty of Paying Living Expenses: Not hard at all   Food Insecurity: No Food Insecurity     Worried About Running Out of Food in the Last Year: Never true     Ran Out of Food in the Last Year: Never true   Transportation Needs: No Transportation Needs     Lack of Transportation (Medical): No     Lack of Transportation (Non-Medical): No   Physical Activity: Inactive     Days of Exercise per Week: 0 days     Minutes of Exercise per Session: 0 min   Stress: No Stress Concern Present     Feeling of Stress : Not at all   Social Connections: Socially Isolated     Frequency of Communication with Friends and Family: Once a week     Frequency of Social Gatherings with Friends and Family: Once a week     Attends [employer] Services: More than 4 times per year     Active Member of Clubs or Organizations: No     Attends Club or Organization Meetings: Not on file     Marital Status: Never    Intimate Partner Violence: Not on file   Housing Stability: Low Risk      Unable to Pay for Housing in the Last Year: No     Number of Places Lived in the Last Year: 1     Unstable Housing in the Last Year: No       Outpatient Encounter Medications as of 10/25/2022   Medication Sig Dispense Refill     aspirin (ASA) 81 MG EC tablet Take 81 mg by mouth daily       atorvastatin (LIPITOR) 20 MG tablet Take 1 tablet (20 mg) by mouth daily 90 tablet 0     ferrous sulfate (FE TABS) 325 (65 Fe) MG EC tablet Take  1 tablet (325 mg) by mouth daily 90 tablet 0     furosemide (LASIX) 20 MG tablet Take 2 tablets (40 mg) by mouth every morning AND 1 tablet (20 mg) daily at 2 pm. 270 tablet 3     magnesium oxide (MAG-OX) 400 MG tablet Take 1 tablet (400 mg) by mouth 2 times daily 60 tablet 1     metoprolol succinate ER (TOPROL XL) 25 MG 24 hr tablet Take 1 tablet (25 mg) by mouth daily 90 tablet 3     nitroGLYcerin (NITROSTAT) 0.4 MG sublingual tablet Place 1 tablet (0.4 mg) under the tongue See Admin Instructions for chest pain 30 tablet 0     ORDER FOR DME Light Therapy with Full Spectrum Light (15697 lux) Start with 10-15 minute exposure per day, Increase exposure to 30 to 45 minutes per day, Maximum exposure: 90 minutes per day,Look periodically at light during each session  (Patient not taking: Reported on 10/20/2022) 1 0     oxyCODONE-acetaminophen (PERCOCET) 5-325 MG tablet Take 30 minutes prior to the procedure 5 tablet 0     potassium chloride ER (KLOR-CON M) 20 MEQ CR tablet Take 1 tablet (20 mEq) by mouth 2 times daily 60 tablet 0     tamsulosin (FLOMAX) 0.4 MG capsule Take 1 capsule (0.4 mg) by mouth every evening 90 capsule 3     triamcinolone (KENALOG) 0.1 % external cream Twice daily to legs prn itching 454 g 3     [DISCONTINUED] amLODIPine (NORVASC) 5 MG tablet Take 1 tablet (5 mg) by mouth daily 90 tablet 3     [DISCONTINUED] carvedilol (COREG) 12.5 MG tablet Take 1 tablet (12.5 mg) by mouth 2 times daily (with meals) 180 tablet 3     [DISCONTINUED] cloNIDine (CATAPRES) 0.1 MG tablet Take 1 tablet (0.1 mg) by mouth 2 times daily 180 tablet 3     [DISCONTINUED] clopidogrel (PLAVIX) 75 MG tablet Take 1 tablet (75 mg) by mouth daily 90 tablet 3     [DISCONTINUED] hydrALAZINE (APRESOLINE) 50 MG tablet Take 1 tablet (50 mg) by mouth 2 times daily 180 tablet 1     [DISCONTINUED] naloxone (NARCAN) 4 MG/0.1ML nasal spray Spray 1 spray (4 mg) into one nostril alternating nostrils once as needed for opioid reversal Every 2-3  minutes until patient responsive or EMS arrives 0.2 mL 0     [DISCONTINUED] pioglitazone (ACTOS) 30 MG tablet Take 1 tablet (30 mg) by mouth daily 90 tablet 0     [DISCONTINUED] ramipril (ALTACE) 10 MG capsule TAKE 1 CAPSULE BY MOUTH EVERY DAY 90 capsule 0     Facility-Administered Encounter Medications as of 10/25/2022   Medication Dose Route Frequency Provider Last Rate Last Admin     sodium hyaluronate (HEALON DUET)    Dinesh Matos MD   1 kit at 10/02/19 1149             O:   NAD, WDWN, Alert & Oriented, Mood & Affect wnl, Vitals stable   Here today alone    General appearance normal   Vitals stable   Alert, oriented and in no acute distress     Chest red patch on suture line with single pustule         Eyes: Conjunctivae/lids:Normal     ENT: Lips, buccal mucosa, tongue: normal    MSK:Normal    Cardiovascular: peripheral edema none    Pulm: Breathing Normal    Neuro/Psych: Orientation:Alert and Orientedx3 ; Mood/Affect:normal       A/P:  1. Hx of non-melanoma skin cancer  Culture today   Start doxy  Return to clinic 2 weeks wound check   It was a pleasure speaking to Flash Brown today.

## 2022-10-25 NOTE — PATIENT INSTRUCTIONS
Culture done today- we will notify you of your results.    Wound Care Instructions     [facility] [phone]    [facility] [phone]    [place] of chest                   AFTER 24 HOURS YOU SHOULD REMOVE THE BANDAGE AND BEGIN DAILY DRESSING CHANGES AS FOLLOWS:     1) Remove Dressing.     2) Clean and dry the area with tap water using a Q-tip or sterile gauze pad.     3) Apply Vaseline, Aquaphor, Polysporin ointment or Bacitracin ointment over entire wound.  Do NOT use Neosporin ointment.     4) Cover the wound with a band-aid, or a sterile non-stick gauze pad and micropore paper tape      REPEAT THESE INSTRUCTIONS AT LEAST ONCE A DAY UNTIL THE WOUND HAS COMPLETELY HEALED.    It is an old wives tale that a wound heals better when it is exposed to air and allowed to dry out. The wound will heal faster with a better cosmetic result if it is kept moist with ointment and covered with a bandage.    **Do not let the wound dry out.**      Supplies Needed:      *Cotton tipped applicators (Q-tips)    *Polysporin Ointment or Bacitracin Ointment (NOT NEOSPORIN)    *Band-aids or non-stick gauze pads and micropore paper tape.      PATIENT INFORMATION:    During the healing process you will notice a number of changes. All wounds develop a small halo of redness surrounding the wound.  This means healing is occurring. Severe itching with extensive redness usually indicates sensitivity to the ointment or bandage tape used to dress the wound.  You should call our office if this develops.      Swelling  and/or discoloration around your surgical site is common, particularly when performed around the eye.    All wounds normally drain.  The larger the wound the more drainage there will be.  After 7-10 days, you will notice the wound beginning to shrink and new skin will begin to grow.  The wound is healed when you can see skin has formed over the entire area.  A healed wound has a healthy, shiny look to  the surface and is red to dark pink in color to normalize.  Wounds may take approximately 4-6 weeks to heal.  Larger wounds may take 6-8 weeks.  After the wound is healed you may discontinue dressing changes.    You may experience a sensation of tightness as your wound heals. This is normal and will gradually subside.    Your healed wound may be sensitive to temperature changes. This sensitivity improves with time, but if you re having a lot of discomfort, try to avoid temperature extremes.    Patients frequently experience itching after their wound appears to have healed because of the continue healing under the skin.  Plain Vaseline will help relieve the itching.        POSSIBLE COMPLICATIONS    BLEEDING:    Leave the bandage in place.  Use tightly rolled up gauze or a cloth to apply direct pressure over the bandage for 30  minutes.  Reapply pressure for an additional 30 minutes if necessary  Use additional gauze and tape to maintain pressure once the bleeding has stopped.

## 2022-10-25 NOTE — LETTER
10/25/2022         RE: Flash Brown  [address]  [place] [address]        Dear Colleague,    Thank you for referring your patient, Flash Brown, to the [facility]. Please see a copy of my visit note below.    Flash Brown is an extremely pleasant 81 year old year old male patient here today for tenderness on chest surgical site.  Patient has no other skin complaints today.  Remainder of the HPI, Meds, PMH, Allergies, FH, and SH was reviewed in chart.      Past Medical History:   Diagnosis Date     Basal cell carcinoma      CAD (coronary artery disease)      Depression      Hyperlipidemia      Hypertension      Lymphoma (H)      Malignant melanoma (H)      Melanoma (H)      Squamous cell carcinoma        Past Surgical History:   Procedure Laterality Date     AXILLARY SURGERY      S/P resection and adjuvant radiation     BONE MARROW BIOPSY, BONE SPECIMEN, NEEDLE/TROCAR N/A 7/8/2019    Procedure: BIOPSY, BONE MARROW;  Surgeon: Husam Issa MD;  Location: [facility]     BONE MARROW BIOPSY, BONE SPECIMEN, NEEDLE/TROCAR Left 2/24/2022    Procedure: BIOPSY, BONE MARROW;  Surgeon: Cailin Anthony PA-C;  Location: [facility] OR     CARDIAC SURGERY       CHOLECYSTECTOMY       HERNIA REPAIR, UMBILICAL       PHACOEMULSIFICATION WITH STANDARD INTRAOCULAR LENS IMPLANT Right 10/2/2019    Procedure: Cataract Removal with Implant;  Surgeon: Dinesh Ivey MD;  Location: [facility]     PHACOEMULSIFICATION WITH STANDARD INTRAOCULAR LENS IMPLANT Left 10/21/2019    Procedure: Cataract Removal with Implant;  Surgeon: Dinesh Ivey MD;  Location: [facility]     STENT      PTCA with drug-eluting stent of RCA, circumflex, and LAD     VASCULAR SURGERY          Family History   Problem Relation Age of Onset     Asthma Other      Cancer Other         melanoma     Blood Disease Other         bleeding disorder     Lung Cancer Mother         Smoker     Prostate Cancer Father      Melanoma No  family hx of        Social History     Socioeconomic History     Marital status: Single     Spouse name: Not on file     Number of children: 0     Years of education: Not on file     Highest education level: Not on file   Occupational History     Occupation: Retired     Comment: [employer]    Tobacco Use     Smoking status: Never     Smokeless tobacco: Never   Vaping Use     Vaping Use: Never used   Substance and Sexual Activity     Alcohol use: Yes     Comment: glass of wine couple times per week     Drug use: Never     Sexual activity: Not on file   Other Topics Concern     Parent/sibling w/ CABG, MI or angioplasty before 65F 55M? Not Asked   Social History Narrative     Not on file     Social Determinants of Health     Financial Resource Strain: Low Risk      Difficulty of Paying Living Expenses: Not hard at all   Food Insecurity: No Food Insecurity     Worried About Running Out of Food in the Last Year: Never true     Ran Out of Food in the Last Year: Never true   Transportation Needs: No Transportation Needs     Lack of Transportation (Medical): No     Lack of Transportation (Non-Medical): No   Physical Activity: Inactive     Days of Exercise per Week: 0 days     Minutes of Exercise per Session: 0 min   Stress: No Stress Concern Present     Feeling of Stress : Not at all   Social Connections: Socially Isolated     Frequency of Communication with Friends and Family: Once a week     Frequency of Social Gatherings with Friends and Family: Once a week     Attends Yazdanism Services: More than 4 times per year     Active Member of Clubs or Organizations: No     Attends Club or Organization Meetings: Not on file     Marital Status: Never    Intimate Partner Violence: Not on file   Housing Stability: Low Risk      Unable to Pay for Housing in the Last Year: No     Number of Places Lived in the Last Year: 1     Unstable Housing in the Last Year: No       Outpatient Encounter Medications as of  10/25/2022   Medication Sig Dispense Refill     aspirin (ASA) 81 MG EC tablet Take 81 mg by mouth daily       atorvastatin (LIPITOR) 20 MG tablet Take 1 tablet (20 mg) by mouth daily 90 tablet 0     ferrous sulfate (FE TABS) 325 (65 Fe) MG EC tablet Take 1 tablet (325 mg) by mouth daily 90 tablet 0     furosemide (LASIX) 20 MG tablet Take 2 tablets (40 mg) by mouth every morning AND 1 tablet (20 mg) daily at 2 pm. 270 tablet 3     magnesium oxide (MAG-OX) 400 MG tablet Take 1 tablet (400 mg) by mouth 2 times daily 60 tablet 1     metoprolol succinate ER (TOPROL XL) 25 MG 24 hr tablet Take 1 tablet (25 mg) by mouth daily 90 tablet 3     nitroGLYcerin (NITROSTAT) 0.4 MG sublingual tablet Place 1 tablet (0.4 mg) under the tongue See Admin Instructions for chest pain 30 tablet 0     ORDER FOR DME Light Therapy with Full Spectrum Light (02873 lux) Start with 10-15 minute exposure per day, Increase exposure to 30 to 45 minutes per day, Maximum exposure: 90 minutes per day,Look periodically at light during each session  (Patient not taking: Reported on 10/20/2022) 1 0     oxyCODONE-acetaminophen (PERCOCET) 5-325 MG tablet Take 30 minutes prior to the procedure 5 tablet 0     potassium chloride ER (KLOR-CON M) 20 MEQ CR tablet Take 1 tablet (20 mEq) by mouth 2 times daily 60 tablet 0     tamsulosin (FLOMAX) 0.4 MG capsule Take 1 capsule (0.4 mg) by mouth every evening 90 capsule 3     triamcinolone (KENALOG) 0.1 % external cream Twice daily to legs prn itching 454 g 3     [DISCONTINUED] amLODIPine (NORVASC) 5 MG tablet Take 1 tablet (5 mg) by mouth daily 90 tablet 3     [DISCONTINUED] carvedilol (COREG) 12.5 MG tablet Take 1 tablet (12.5 mg) by mouth 2 times daily (with meals) 180 tablet 3     [DISCONTINUED] cloNIDine (CATAPRES) 0.1 MG tablet Take 1 tablet (0.1 mg) by mouth 2 times daily 180 tablet 3     [DISCONTINUED] clopidogrel (PLAVIX) 75 MG tablet Take 1 tablet (75 mg) by mouth daily 90 tablet 3     [DISCONTINUED]  hydrALAZINE (APRESOLINE) 50 MG tablet Take 1 tablet (50 mg) by mouth 2 times daily 180 tablet 1     [DISCONTINUED] naloxone (NARCAN) 4 MG/0.1ML nasal spray Spray 1 spray (4 mg) into one nostril alternating nostrils once as needed for opioid reversal Every 2-3 minutes until patient responsive or EMS arrives 0.2 mL 0     [DISCONTINUED] pioglitazone (ACTOS) 30 MG tablet Take 1 tablet (30 mg) by mouth daily 90 tablet 0     [DISCONTINUED] ramipril (ALTACE) 10 MG capsule TAKE 1 CAPSULE BY MOUTH EVERY DAY 90 capsule 0     Facility-Administered Encounter Medications as of 10/25/2022   Medication Dose Route Frequency Provider Last Rate Last Admin     sodium hyaluronate (HEALON DUET)    PRDinesh Ornelas MD   1 kit at 10/02/19 1149             O:   NAD, WDWN, Alert & Oriented, Mood & Affect wnl, Vitals stable   Here today alone    General appearance normal   Vitals stable   Alert, oriented and in no acute distress     Chest red patch on suture line with single pustule         Eyes: Conjunctivae/lids:Normal     ENT: Lips, buccal mucosa, tongue: normal    MSK:Normal    Cardiovascular: peripheral edema none    Pulm: Breathing Normal    Neuro/Psych: Orientation:Alert and Orientedx3 ; Mood/Affect:normal       A/P:  1. Hx of non-melanoma skin cancer  Culture today   Start doxy  Return to clinic 2 weeks wound check   It was a pleasure speaking to Flash Brown today.        Again, thank you for allowing me to participate in the care of your patient.        Sincerely,        Dinesh Roque MD

## 2022-10-26 ENCOUNTER — TELEPHONE (OUTPATIENT)
Dept: FAMILY MEDICINE | Facility: CLINIC | Age: 82
End: 2022-10-26

## 2022-10-26 NOTE — TELEPHONE ENCOUNTER
Forms received from: [facility]   Phone number listed: [phone]   Fax listed: [phone]  Date received: 10/20/22  Form description: Face to face physician  Once forms are completed, please return to [facility]  via fax [phone].  Is patient requesting to be contacted when forms are completed: na  Phone: na  Form placed:  To Dr. Renetta Michelle

## 2022-10-28 ENCOUNTER — TELEPHONE (OUTPATIENT)
Dept: FAMILY MEDICINE | Facility: CLINIC | Age: 82
End: 2022-10-28

## 2022-10-28 ENCOUNTER — OFFICE VISIT (OUTPATIENT)
Dept: UROLOGY | Facility: CLINIC | Age: 82
End: 2022-10-28
Payer: MEDICARE

## 2022-10-28 DIAGNOSIS — R33.8 BENIGN PROSTATIC HYPERPLASIA WITH URINARY RETENTION: Primary | ICD-10-CM

## 2022-10-28 DIAGNOSIS — N40.1 BENIGN PROSTATIC HYPERPLASIA WITH URINARY RETENTION: Primary | ICD-10-CM

## 2022-10-28 PROCEDURE — 51798 US URINE CAPACITY MEASURE: CPT | Performed by: UROLOGY

## 2022-10-28 PROCEDURE — 52000 CYSTOURETHROSCOPY: CPT | Performed by: UROLOGY

## 2022-10-28 NOTE — PROGRESS NOTES
S: Flash Brown is a 81 year old male returns for incomplete voiding.    Patient is draped and prepped.  Flexible cystoscopy placed under direct vision.      The anterior urethra is normal   The prostatic urethra showed bilateral lobe enlargement.     The length is 2cm,  the coaptation is 2 cm.     In the bladder there is trabeculation grade 2-3.  Unable to fill bladder due to high bladder pressure.    Assessment/Plan:  (N40.1,  R33.8) Benign prostatic hyperplasia with urinary retention  (primary encounter diagnosis)  Comment:    Plan: CYSTOURETHROSCOPY (18619)          Patient to rtc for bladder scan today.

## 2022-10-28 NOTE — TELEPHONE ENCOUNTER
Forms received from: [facility]   Phone number listed: [phone]   Fax listed: [phone]  Date received: 10/25/22  Form descriptio/n:Revision to plan of care/ OT visit cert period 10/03/22-12/01/22  Once forms are completed, please return to [facility] via fax [phone].  Is patient requesting to be contacted when forms are completed: na  Phone: na  Form placed:  To Dr. Renetta Michelle  --

## 2022-10-28 NOTE — PROGRESS NOTES
Pt returned to clinic for PVR. Pt voided at 1350, PVR= 42ml.    Yany INIGUEZ RN Urology 10/28/2022 2:11 PM

## 2022-10-29 LAB
BACTERIA SKIN AEROBE CULT: ABNORMAL
BACTERIA SKIN AEROBE CULT: ABNORMAL

## 2022-11-02 ENCOUNTER — TRANSFERRED RECORDS (OUTPATIENT)
Dept: FAMILY MEDICINE | Facility: CLINIC | Age: 82
End: 2022-11-02

## 2022-11-03 NOTE — TELEPHONE ENCOUNTER
Spoke with Elle from [facility].  Looking for these forms to be completed.  Taylor Elam RN  [facility]

## 2022-11-07 ENCOUNTER — MEDICAL CORRESPONDENCE (OUTPATIENT)
Dept: HEALTH INFORMATION MANAGEMENT | Facility: CLINIC | Age: 82
End: 2022-11-07

## 2022-11-07 NOTE — TELEPHONE ENCOUNTER
Elle from [employer] calling to check on this.    I see there appears to be a few forms in provider's basket to be signed and faxed.    Re-routed to Dr. Jackson to address, he is due back in clinic tomorrow.    Kristin Calderon RN  [facility]

## 2022-11-08 ENCOUNTER — MEDICAL CORRESPONDENCE (OUTPATIENT)
Dept: HEALTH INFORMATION MANAGEMENT | Facility: CLINIC | Age: 82
End: 2022-11-08

## 2022-11-08 DIAGNOSIS — I50.31 ACUTE DIASTOLIC HEART FAILURE (H): Primary | ICD-10-CM

## 2022-11-08 DIAGNOSIS — N13.8 BENIGN PROSTATIC HYPERPLASIA WITH URINARY OBSTRUCTION: ICD-10-CM

## 2022-11-08 DIAGNOSIS — N40.1 BENIGN PROSTATIC HYPERPLASIA WITH URINARY OBSTRUCTION: ICD-10-CM

## 2022-11-08 DIAGNOSIS — R00.1 SINUS BRADYCARDIA: ICD-10-CM

## 2022-11-08 DIAGNOSIS — E66.01 MORBID OBESITY (H): ICD-10-CM

## 2022-11-08 DIAGNOSIS — E78.49 OTHER HYPERLIPIDEMIA: ICD-10-CM

## 2022-11-08 DIAGNOSIS — G47.33 OBSTRUCTIVE SLEEP APNEA: ICD-10-CM

## 2022-11-08 DIAGNOSIS — M62.81 MUSCLE WEAKNESS (GENERALIZED): ICD-10-CM

## 2022-11-08 DIAGNOSIS — I11.0 HYPERTENSIVE HEART DISEASE WITH HEART FAILURE (H): ICD-10-CM

## 2022-11-08 DIAGNOSIS — E11.9 TYPE 2 DIABETES MELLITUS WITHOUT COMPLICATION, WITHOUT LONG-TERM CURRENT USE OF INSULIN (H): ICD-10-CM

## 2022-11-08 DIAGNOSIS — Z85.72 HISTORY OF LYMPHOMA: ICD-10-CM

## 2022-11-08 DIAGNOSIS — R06.09 DYSPNEA ON EXERTION: ICD-10-CM

## 2022-11-08 DIAGNOSIS — R41.841 COGNITIVE COMMUNICATION DEFICIT: ICD-10-CM

## 2022-11-08 DIAGNOSIS — I25.10 CORONARY ARTERY DISEASE INVOLVING NATIVE CORONARY ARTERY OF NATIVE HEART WITHOUT ANGINA PECTORIS: ICD-10-CM

## 2022-11-08 DIAGNOSIS — R26.89 DECREASED MOBILITY: ICD-10-CM

## 2022-11-08 DIAGNOSIS — G31.84 MILD COGNITIVE IMPAIRMENT: ICD-10-CM

## 2022-11-08 PROCEDURE — G0180 MD CERTIFICATION HHA PATIENT: HCPCS | Performed by: FAMILY MEDICINE

## 2022-11-10 ENCOUNTER — LAB (OUTPATIENT)
Dept: LAB | Facility: CLINIC | Age: 82
End: 2022-11-10
Payer: MEDICARE

## 2022-11-10 ENCOUNTER — OFFICE VISIT (OUTPATIENT)
Dept: CARDIOLOGY | Facility: CLINIC | Age: 82
End: 2022-11-10
Attending: INTERNAL MEDICINE
Payer: MEDICARE

## 2022-11-10 VITALS
DIASTOLIC BLOOD PRESSURE: 81 MMHG | HEART RATE: 73 BPM | WEIGHT: 228 LBS | BODY MASS INDEX: 36.82 KG/M2 | OXYGEN SATURATION: 94 % | SYSTOLIC BLOOD PRESSURE: 153 MMHG

## 2022-11-10 DIAGNOSIS — E78.5 HYPERLIPIDEMIA LDL GOAL <70: ICD-10-CM

## 2022-11-10 DIAGNOSIS — I10 BENIGN ESSENTIAL HYPERTENSION: ICD-10-CM

## 2022-11-10 DIAGNOSIS — I25.10 CORONARY ARTERY DISEASE INVOLVING NATIVE CORONARY ARTERY OF NATIVE HEART WITHOUT ANGINA PECTORIS: ICD-10-CM

## 2022-11-10 DIAGNOSIS — I50.32 CHRONIC DIASTOLIC HEART FAILURE (H): Primary | ICD-10-CM

## 2022-11-10 DIAGNOSIS — D61.818 PANCYTOPENIA (H): ICD-10-CM

## 2022-11-10 LAB
ALT SERPL W P-5'-P-CCNC: 19 U/L (ref 10–50)
BASOPHILS # BLD AUTO: 0 10E3/UL (ref 0–0.2)
BASOPHILS NFR BLD AUTO: 1 %
CHOLEST SERPL-MCNC: 105 MG/DL
EOSINOPHIL # BLD AUTO: 0.1 10E3/UL (ref 0–0.7)
EOSINOPHIL NFR BLD AUTO: 4 %
ERYTHROCYTE [DISTWIDTH] IN BLOOD BY AUTOMATED COUNT: 15.4 % (ref 10–15)
HCT VFR BLD AUTO: 36.8 % (ref 40–53)
HDLC SERPL-MCNC: 46 MG/DL
HGB BLD-MCNC: 11.7 G/DL (ref 13.3–17.7)
IMM GRANULOCYTES # BLD: 0 10E3/UL
IMM GRANULOCYTES NFR BLD: 0 %
LDLC SERPL CALC-MCNC: 44 MG/DL
LYMPHOCYTES # BLD AUTO: 0.8 10E3/UL (ref 0.8–5.3)
LYMPHOCYTES NFR BLD AUTO: 23 %
MCH RBC QN AUTO: 26.4 PG (ref 26.5–33)
MCHC RBC AUTO-ENTMCNC: 31.8 G/DL (ref 31.5–36.5)
MCV RBC AUTO: 83 FL (ref 78–100)
MONOCYTES # BLD AUTO: 0.3 10E3/UL (ref 0–1.3)
MONOCYTES NFR BLD AUTO: 9 %
NEUTROPHILS # BLD AUTO: 2.2 10E3/UL (ref 1.6–8.3)
NEUTROPHILS NFR BLD AUTO: 63 %
NONHDLC SERPL-MCNC: 59 MG/DL
NRBC # BLD AUTO: 0 10E3/UL
NRBC BLD AUTO-RTO: 0 /100
PLATELET # BLD AUTO: 117 10E3/UL (ref 150–450)
RBC # BLD AUTO: 4.44 10E6/UL (ref 4.4–5.9)
TRIGL SERPL-MCNC: 76 MG/DL
WBC # BLD AUTO: 3.4 10E3/UL (ref 4–11)

## 2022-11-10 PROCEDURE — 80061 LIPID PANEL: CPT

## 2022-11-10 PROCEDURE — 36415 COLL VENOUS BLD VENIPUNCTURE: CPT

## 2022-11-10 PROCEDURE — 85004 AUTOMATED DIFF WBC COUNT: CPT

## 2022-11-10 PROCEDURE — 84460 ALANINE AMINO (ALT) (SGPT): CPT

## 2022-11-10 PROCEDURE — 99214 OFFICE O/P EST MOD 30 MIN: CPT | Performed by: NURSE PRACTITIONER

## 2022-11-10 NOTE — PROGRESS NOTES
Cardiology Clinic Progress Note  Flash Brown MRN# [id number]   YOB: 1940 Age: 82 year old      Primary Cardiologist:   Dr. Tate          History of Presenting Illness:      Flash Brown is a pleasant 82 year old patient with a past cardiac history significant for   1. CAD    2010 with LUDIVINA to proximal to mid RCA, distal circumflex, proximal and mid LAD    Angiogram 2011 (recurrent chest pain) 70% pLCx with negative FFR so no intervention    2015 stress test with moderate ischemia-no angio med mangaged   2. Acute diastolic heart failure    H/o untreated sleep apnea  3. Hypertension  4. Hyperlipidemia  Past medical history significant for diabetes, Hodgkin's lymphoma 1983 in remission , CKD, depression, ALLY not CPAP compliant.      In August 2022 he was hospitalized with new onset diastolic heart failure.  He was diuresed 20 pounds and discharged on Lasix.  Weight at discharge was 228 pounds.  Outpatient echocardiogram 8/12/2022 showed normal EF 60 to 65% with normal RV, no significant valvular disease.    Patient was seen by Dr. Tate in October 2022.  He denied any heart failure symptoms and continued with mild dyspnea on exertion which was improving with rehab.  He was started on beta-blocker.    Pt presents today, with his sister Rea, for 1 month follow-up.  Blood pressure today is elevated but he took his medications less than an hour ago.  He has not been checking blood pressures at home but will start doing this.  Weight today is stable at 228 pounds.  He denies any significant heart failure symptoms.  He continues with bilateral lower extremity edema 1+ pitting.  We discussed compression stockings and leg elevation.  He denies any anginal symptoms.  He was in urgent care last night for muscle pain and will be starting a muscle relaxer today.  He is agreeable to getting his annual fasting lab work today with his oncology labs. Patient reports no chest pain, SOB, PND, orthopnea,  presyncope, syncope, heart racing, or palpitations.      Labs:  LIPID RESULTS:  Lab Results   Component Value Date    CHOL 106 11/09/2021    CHOL 105 09/08/2020    HDL 42 11/09/2021    HDL 39 (L) 09/08/2020    LDL 42 11/09/2021    LDL 49 09/08/2020    TRIG 110 11/09/2021    TRIG 87 09/08/2020    CHOLHDLRATIO 4.0 03/13/2008       LIVER ENZYME RESULTS:  Lab Results   Component Value Date    AST 37 08/30/2022    AST 18 04/23/2021    ALT 30 08/30/2022    ALT 25 04/23/2021       CBC RESULTS:  Lab Results   Component Value Date    WBC 5.1 10/06/2022    WBC 3.2 (L) 01/04/2021    RBC 4.17 (L) 10/06/2022    RBC 4.19 (L) 01/04/2021    HGB 11.4 (L) 10/06/2022    HGB 12.0 (L) 01/04/2021    HCT 36.4 (L) 10/06/2022    HCT 37.5 (L) 01/04/2021    MCV 87 10/06/2022    MCV 90 01/04/2021    MCH 27.3 10/06/2022    MCH 28.6 01/04/2021    MCHC 31.3 (L) 10/06/2022    MCHC 32.0 01/04/2021    RDW 15.6 (H) 10/06/2022    RDW 15.4 (H) 01/04/2021     (L) 10/06/2022    PLT 92 (L) 01/04/2021       BMP RESULTS:  Lab Results   Component Value Date     10/06/2022     04/23/2021    POTASSIUM 4.3 10/06/2022    POTASSIUM 3.8 08/29/2022    POTASSIUM 4.7 04/23/2021    CHLORIDE 101 10/06/2022    CHLORIDE 106 08/29/2022    CHLORIDE 107 04/23/2021    CO2 27 10/06/2022    CO2 25 08/29/2022    CO2 29 04/23/2021    ANIONGAP 10 10/06/2022    ANIONGAP 8 08/29/2022    ANIONGAP 4 04/23/2021     (H) 10/06/2022     (H) 08/31/2022    GLC 83 08/29/2022     (H) 04/23/2021    BUN 14.5 10/06/2022    BUN 59 (H) 08/29/2022    BUN 17 04/23/2021    CR 1.05 10/06/2022    CR 1.04 04/23/2021    GFRESTIMATED 72 10/06/2022    GFRESTIMATED 68 04/23/2021    GFRESTBLACK 79 04/23/2021    NATALIIA 8.9 10/06/2022    NATALIIA 8.7 04/23/2021        A1C RESULTS:  Lab Results   Component Value Date    A1C 5.5 05/24/2022    A1C 5.8 (H) 04/23/2021       INR RESULTS:  No results found for: INR    Results reviewed today.       Current Cardiac Medications      Aspirin 81 mg daily  Atorvastatin 20 mg daily  Lasix 40 mg AM and 20 mg PM  Metoprolol XL 25 mg daily  Nitroglycerin PRN  Potassium 20 mEq twice daily                     Assessment and Plan:       Plan    Patient Instructions   Medication Changes:  5. None     Recommendations:  1. Check daily weights and call the clinic if your weight has increased more than 2 lbs in one day or 5 lbs in one week; if you feel more short of breath or have worsening swelling in your legs or abdomen.  2. 2000 mg sodium diet   3. 4-8 8 ounce glasses of fluids per day   4. Check blood pressure at least 1 hour after medications. Call the clinic if your blood pressure is consistently greater than 130/80.    5. Call my nurse with 1-2 weeks of BP readings   6. Compression stockings, leg elevation    Follow-up:  1. Annual Fasting lab today (lipid/ALT)   2. See Dr. Tate  for cardiology follow up at [facility]: March 2023.   Call 6 months prior, to schedule.     Cardiology Scheduling~[phone]  Cardiology Clinic RN~[phone] (Brittny RN, Barby RN, Alysia RN)          1. CAD    No angina     Continue statin, aspirin    If angina in the future, consider sending straight for cath given known LCx disease      2. Heart failure    Mild edema    NYHA class II    Continue Lasix, potassium      3. Hypertension    Not Controlled    Continue Lasix    Consider restarting amlodipine, carvedilol, clonidine, hydralazine, or ramipril if needed      4. Hyperlipidemia    Last LDL 42 on 11/2021    Continue atorvastatin 20 mg daily             Thank you for allowing me to participate in this delightful patient's care.      This note was completed in part using Dragon voice recognition software. Although reviewed after completion, some word and grammatical errors may occur.    Ute Apple, APRN CNP, APRN, CNP           Data:   All laboratory data reviewed           Constitutional:  cooperative, alert and oriented, well developed,  well nourished, in no acute distress  overweight      Skin:  warm and dry to the touch         Head:  normocephalic       Eyes:  pupils equal and round       ENT:  no pallor or cyanosis       Neck:  no stiffness       Respiratory:  clear to auscultation; normal symmetry        Cardiac: regular rate and rhythm; normal S1 and S2                 pulses full and equal       GI:  abdomen soft, nondistended     Extremities and Muscular Skeletal:   bilateral lower extremity edema   1+ pitting   Neurological:  affect appropriate; no gross motor deficits       Psych:  Alert and Oriented x 3 , appropriate affact

## 2022-11-10 NOTE — NURSING NOTE
Chief Complaint   Patient presents with     Follow Up     1 month Follow up  on HFPEf       There were no vitals filed for this visit.  Wt Readings from Last 1 Encounters:   10/20/22 103 kg (227 lb)     Isatu Mariano MA

## 2022-11-10 NOTE — LETTER
11/10/2022    Thomas URRUTIA JacksonDO  [address]  [place] [address]    RE: Flash Brown       Dear Colleague,     I had the pleasure of seeing Flash Brown in the [facility].  Cardiology Clinic Progress Note  Flash Brown MRN# [id number]   YOB: 1940 Age: 82 year old      Primary Cardiologist:   Dr. Tate          History of Presenting Illness:      Flash Brown is a pleasant 82 year old patient with a past cardiac history significant for   1. CAD    2010 with LUDIVINA to proximal to mid RCA, distal circumflex, proximal and mid LAD    Angiogram 2011 (recurrent chest pain) 70% pLCx with negative FFR so no intervention    2015 stress test with moderate ischemia-no angio med mangaged   2. Acute diastolic heart failure    H/o untreated sleep apnea  3. Hypertension  4. Hyperlipidemia  Past medical history significant for diabetes, Hodgkin's lymphoma 1983 in remission , CKD, depression, ALLY not CPAP compliant.      In August 2022 he was hospitalized with new onset diastolic heart failure.  He was diuresed 20 pounds and discharged on Lasix.  Weight at discharge was 228 pounds.  Outpatient echocardiogram 8/12/2022 showed normal EF 60 to 65% with normal RV, no significant valvular disease.    Patient was seen by Dr. Tate in October 2022.  He denied any heart failure symptoms and continued with mild dyspnea on exertion which was improving with rehab.  He was started on beta-blocker.    Pt presents today, with his sister Rea, for 1 month follow-up.  Blood pressure today is elevated but he took his medications less than an hour ago.  He has not been checking blood pressures at home but will start doing this.  Weight today is stable at 228 pounds.  He denies any significant heart failure symptoms.  He continues with bilateral lower extremity edema 1+ pitting.  We discussed compression stockings and leg elevation.  He denies any anginal symptoms.  He was in urgent care last  night for muscle pain and will be starting a muscle relaxer today.  He is agreeable to getting his annual fasting lab work today with his oncology labs. Patient reports no chest pain, SOB, PND, orthopnea, presyncope, syncope, heart racing, or palpitations.      Labs:  LIPID RESULTS:  Lab Results   Component Value Date    CHOL 106 11/09/2021    CHOL 105 09/08/2020    HDL 42 11/09/2021    HDL 39 (L) 09/08/2020    LDL 42 11/09/2021    LDL 49 09/08/2020    TRIG 110 11/09/2021    TRIG 87 09/08/2020    CHOLHDLRATIO 4.0 03/13/2008       LIVER ENZYME RESULTS:  Lab Results   Component Value Date    AST 37 08/30/2022    AST 18 04/23/2021    ALT 30 08/30/2022    ALT 25 04/23/2021       CBC RESULTS:  Lab Results   Component Value Date    WBC 5.1 10/06/2022    WBC 3.2 (L) 01/04/2021    RBC 4.17 (L) 10/06/2022    RBC 4.19 (L) 01/04/2021    HGB 11.4 (L) 10/06/2022    HGB 12.0 (L) 01/04/2021    HCT 36.4 (L) 10/06/2022    HCT 37.5 (L) 01/04/2021    MCV 87 10/06/2022    MCV 90 01/04/2021    MCH 27.3 10/06/2022    MCH 28.6 01/04/2021    MCHC 31.3 (L) 10/06/2022    MCHC 32.0 01/04/2021    RDW 15.6 (H) 10/06/2022    RDW 15.4 (H) 01/04/2021     (L) 10/06/2022    PLT 92 (L) 01/04/2021       BMP RESULTS:  Lab Results   Component Value Date     10/06/2022     04/23/2021    POTASSIUM 4.3 10/06/2022    POTASSIUM 3.8 08/29/2022    POTASSIUM 4.7 04/23/2021    CHLORIDE 101 10/06/2022    CHLORIDE 106 08/29/2022    CHLORIDE 107 04/23/2021    CO2 27 10/06/2022    CO2 25 08/29/2022    CO2 29 04/23/2021    ANIONGAP 10 10/06/2022    ANIONGAP 8 08/29/2022    ANIONGAP 4 04/23/2021     (H) 10/06/2022     (H) 08/31/2022    GLC 83 08/29/2022     (H) 04/23/2021    BUN 14.5 10/06/2022    BUN 59 (H) 08/29/2022    BUN 17 04/23/2021    CR 1.05 10/06/2022    CR 1.04 04/23/2021    GFRESTIMATED 72 10/06/2022    GFRESTIMATED 68 04/23/2021    GFRESTBLACK 79 04/23/2021    NATALIIA 8.9 10/06/2022    NATALIIA 8.7 04/23/2021        A1C  RESULTS:  Lab Results   Component Value Date    A1C 5.5 05/24/2022    A1C 5.8 (H) 04/23/2021       INR RESULTS:  No results found for: INR    Results reviewed today.       Current Cardiac Medications     Aspirin 81 mg daily  Atorvastatin 20 mg daily  Lasix 40 mg AM and 20 mg PM  Metoprolol XL 25 mg daily  Nitroglycerin PRN  Potassium 20 mEq twice daily                     Assessment and Plan:       Plan    Patient Instructions   Medication Changes:  5. None     Recommendations:  1. Check daily weights and call the clinic if your weight has increased more than 2 lbs in one day or 5 lbs in one week; if you feel more short of breath or have worsening swelling in your legs or abdomen.  2. 2000 mg sodium diet   3. 4-8 8 ounce glasses of fluids per day   4. Check blood pressure at least 1 hour after medications. Call the clinic if your blood pressure is consistently greater than 130/80.    5. Call my nurse with 1-2 weeks of BP readings   6. Compression stockings, leg elevation    Follow-up:  1. Annual Fasting lab today (lipid/ALT)   2. See Dr. Tate  for cardiology follow up at [facility]: March 2023.   Call 6 months prior, to schedule.     Cardiology Scheduling~[phone]  Cardiology Clinic RN~[phone] (Brittny RN, Barby RN, Alysia RN)          1. CAD    No angina     Continue statin, aspirin    If angina in the future, consider sending straight for cath given known LCx disease      2. Heart failure    Mild edema    NYHA class II    Continue Lasix, potassium      3. Hypertension    Not Controlled    Continue Lasix    Consider restarting amlodipine, carvedilol, clonidine, hydralazine, or ramipril if needed      4. Hyperlipidemia    Last LDL 42 on 11/2021    Continue atorvastatin 20 mg daily             Thank you for allowing me to participate in this delightful patient's care.      This note was completed in part using Dragon voice recognition software. Although reviewed after completion, some word and grammatical  errors may occur.    FIONA Che CNP, FIONA, CNP           Data:   All laboratory data reviewed           Constitutional:  cooperative, alert and oriented, well developed, well nourished, in no acute distress  overweight      Skin:  warm and dry to the touch         Head:  normocephalic       Eyes:  pupils equal and round       ENT:  no pallor or cyanosis       Neck:  no stiffness       Respiratory:  clear to auscultation; normal symmetry        Cardiac: regular rate and rhythm; normal S1 and S2                 pulses full and equal       GI:  abdomen soft, nondistended     Extremities and Muscular Skeletal:   bilateral lower extremity edema   1+ pitting   Neurological:  affect appropriate; no gross motor deficits       Psych:  Alert and Oriented x 3 , appropriate affact        Thank you for allowing me to participate in the care of your patient.      Sincerely,     FIONA Che CNP     [facility]  cc:   Micah Tate MD  [address]  [place] [id number]

## 2022-11-10 NOTE — PATIENT INSTRUCTIONS
Medication Changes:  None     Recommendations:  Check daily weights and call the clinic if your weight has increased more than 2 lbs in one day or 5 lbs in one week; if you feel more short of breath or have worsening swelling in your legs or abdomen.  2000 mg sodium diet   4-8 8 ounce glasses of fluids per day   Check blood pressure at least 1 hour after medications. Call the clinic if your blood pressure is consistently greater than 130/80.    Call my nurse with 1-2 weeks of BP readings     Follow-up:  Annual Fasting lab today (lipid/ALT)   See Dr. Tate  for cardiology follow up at [facility]: March 2023.   Call 6 months prior, to schedule.     Cardiology Scheduling~[phone]  Cardiology Clinic RN~[phone] (Brittny RN, Barby RN, Alysia RN)

## 2022-11-11 ENCOUNTER — TELEPHONE (OUTPATIENT)
Dept: ONCOLOGY | Facility: CLINIC | Age: 82
End: 2022-11-11

## 2022-11-11 DIAGNOSIS — D61.818 PANCYTOPENIA (H): Primary | ICD-10-CM

## 2022-11-11 NOTE — TELEPHONE ENCOUNTER
Sent patient [employer] message regarding stable lab results and to continue follow up as scheduled in February. Taryn Avila RN on 11/11/2022 at 11:00 AM

## 2022-11-11 NOTE — TELEPHONE ENCOUNTER
----- Message from Sang Avendaño MD sent at 11/11/2022 10:49 AM CST -----  Regarding: RE: lab results  CBC  is stable, yes please keep lab and OV appointments as scheduled.  ----- Message -----  From: Taryn Avila RN  Sent: 11/10/2022   5:13 PM CST  To: Sang Avendaño MD  Subject: lab results                                      Hello,  Please review patients CBC from yesterday. Looks stable to me. Let me know if you want to continue to repeat as planned 2/1 with labs and a visit or if you want to recheck him sooner.  Thanks!  Melly

## 2022-11-14 ENCOUNTER — TELEPHONE (OUTPATIENT)
Dept: FAMILY MEDICINE | Facility: CLINIC | Age: 82
End: 2022-11-14

## 2022-11-14 NOTE — TELEPHONE ENCOUNTER
Forms received from: Home Health Care  Phone number listed: [phone]   Fax listed: [phone]  Date received: 11/11/22  Form description:  Discharge summary  Once forms are completed, please return to Home Health [facility] via fax [phone].  Is patient requesting to be contacted when forms are completed: na  Phone: na  Form placed:  To Dr. Renetta Michelle

## 2022-11-18 ENCOUNTER — TELEPHONE (OUTPATIENT)
Dept: FAMILY MEDICINE | Facility: CLINIC | Age: 82
End: 2022-11-18

## 2022-11-18 NOTE — TELEPHONE ENCOUNTER
Forms received from: [facility]   Phone number listed: [phone]   Fax listed: [phone]  Date received: 11/16/22  Form description: Revision to plan of care cert period 10/3/2022-12/01/2022  Once forms are completed, please return to Home Health Care via fax [phone].  Is patient requesting to be contacted when forms are completed: na  Phone: na  Form placed:  To Dr. Renetta Michelle

## 2022-12-05 ENCOUNTER — TELEPHONE (OUTPATIENT)
Dept: CARDIOLOGY | Facility: CLINIC | Age: 82
End: 2022-12-05

## 2022-12-05 NOTE — TELEPHONE ENCOUNTER
Flash and his sister (Daylin) calling in with a few BP readings. Pt has not been taking his medications regularly. He forgets doses and BP's are taken whenever he goes over to his sisters house. Sometimes shortly after taking his medications and sometimes hours after taking them. Unsure of the accuracy of the blood pressure machine as well.  162/78  77  144/69  63  57/77    76  142/82  72  183/92  84 - forgot to take pills over the weekend.    Pt denies any symptoms. Mostly sits in a chair and watches TV all day or sleeps.    Daylin (sister) is going to make an appt for a nurse at his PCP clinic to check BP and accuracy of BP cuff.   Notified her will route this message to Ute Suazo to review and advise. We will call her back with her recommendations.     Barby Ball RN

## 2022-12-08 ENCOUNTER — MEDICAL CORRESPONDENCE (OUTPATIENT)
Dept: HEALTH INFORMATION MANAGEMENT | Facility: CLINIC | Age: 82
End: 2022-12-08

## 2022-12-08 NOTE — TELEPHONE ENCOUNTER
Agree with checking BP cuff. Would recommend he set an alarm or reminder to help remember to take pills. If they feel this is r/t cognitive impairment, may need to find someone to help administer meds. Recommend checking BP at least 1 hour after medications, consistently. If needed, he can set up PCP or pharmacist BP appts.     Check blood pressure at least 1 hour after medications. Call the clinic if your blood pressure is consistently greater than 130/80.      FIONA Che CNP

## 2022-12-08 NOTE — TELEPHONE ENCOUNTER
Called and reviewed with pt and sister Daylin the importance of taking BP meds everyday, regularly as well as importance of getting good accurate BP readings. Reviewed that most accurate BP is at least 1 hour after morning medications, rested for at least 10 min with feet flat on floor, back straight and arm rested about heart level on table.   Daylin verbalized an understanding and will have pt takes meds everyday regularly and will check BP for the next week with parameters above and will call with the readings. If BP greater then 130/80 will adjust medications.     Barby Ball RN

## 2022-12-20 DIAGNOSIS — I50.31 ACUTE DIASTOLIC HEART FAILURE (H): ICD-10-CM

## 2022-12-20 DIAGNOSIS — I50.9 ACUTE CONGESTIVE HEART FAILURE, UNSPECIFIED HEART FAILURE TYPE (H): Primary | ICD-10-CM

## 2022-12-20 DIAGNOSIS — I10 BENIGN ESSENTIAL HYPERTENSION: ICD-10-CM

## 2022-12-20 NOTE — CONFIDENTIAL NOTE
Routing to Ute Suazo,     Patients sister called in with BP readings as instructed at the last OV on 11/10/22.     137/67   79 bpm  156/77   74 bpm  131/65   72 bpm  151/75   67 bpm    No associated symptoms. She was wondering if they needed to up titrate his BP medications since she hasn't been able to get a reading below systolic 130. To see Dr. Tate in March.    Roxana Giordano, RN, RN

## 2022-12-22 RX ORDER — RAMIPRIL 2.5 MG/1
2.5 CAPSULE ORAL DAILY
Qty: 30 CAPSULE | Refills: 11 | Status: SHIPPED | OUTPATIENT
Start: 2022-12-22 | End: 2023-03-07

## 2022-12-22 NOTE — TELEPHONE ENCOUNTER
Patient sister called and prescription sent to preferred pharmacy. Instructed to take BP at least 1 hour after taking medications. Will call in BP is over 130/80 or symptomatic. No new questions at this time.     Roxana Giordano RN

## 2022-12-22 NOTE — TELEPHONE ENCOUNTER
Let's restart ramipril at 2.5 mg daily. He was previously on 10.   New prescription pended.   Check blood pressure at least 1 hour after medications. Call the clinic if your blood pressure is consistently greater than 130/80.    We can uptitrate it as needed.     FIONA Che CNP

## 2022-12-31 NOTE — ANESTHESIA CARE TRANSFER NOTE
Patient: Flash Brown    Procedure(s):  Cataract Removal with Implant    Diagnosis: Cataract [H26.9]  Diagnosis Additional Information: No value filed.    Anesthesia Type:   No value filed.     Note:  Airway :Room Air  Patient transferred to:Phase II  Handoff Report: Identifed the Patient, Identified the Reponsible Provider, Reviewed the pertinent medical history, Discussed the surgical course, Reviewed Intra-OP anesthesia mangement and issues during anesthesia, Set expectations for post-procedure period and Allowed opportunity for questions and acknowledgement of understanding      Vitals: (Last set prior to Anesthesia Care Transfer)    CRNA VITALS  10/2/2019 1135 - 10/2/2019 1205      10/2/2019             Pulse:  55    SpO2:  99 %                Electronically Signed By: FIONA Solomon CRNA  October 2, 2019  12:05 PM  
same name as above

## 2023-01-02 DIAGNOSIS — E78.49 OTHER HYPERLIPIDEMIA: ICD-10-CM

## 2023-01-04 RX ORDER — ATORVASTATIN CALCIUM 20 MG/1
TABLET, FILM COATED ORAL
Qty: 90 TABLET | Refills: 0 | Status: SHIPPED | OUTPATIENT
Start: 2023-01-04 | End: 2023-03-30

## 2023-01-18 NOTE — PROGRESS NOTES
Patient was contacted by phone due to a positive screen on the Oncology Distress Screening tool. Registered Dietitian contact information and diabetes educator information provided to patient if further questions arise.    Liset Covarrubias RD,LD  Clinical Dietitian       home w/ parent

## 2023-01-19 DIAGNOSIS — I10 BENIGN ESSENTIAL HYPERTENSION: ICD-10-CM

## 2023-01-19 DIAGNOSIS — I50.9 ACUTE CONGESTIVE HEART FAILURE, UNSPECIFIED HEART FAILURE TYPE (H): ICD-10-CM

## 2023-01-19 DIAGNOSIS — I50.31 ACUTE DIASTOLIC HEART FAILURE (H): ICD-10-CM

## 2023-01-19 RX ORDER — RAMIPRIL 2.5 MG/1
2.5 CAPSULE ORAL DAILY
Qty: 30 CAPSULE | Refills: 11 | OUTPATIENT
Start: 2023-01-19

## 2023-01-19 NOTE — TELEPHONE ENCOUNTER
30 tabs with 11 refills sent 12/22/22, refill too soon. Message sent to pharmacy notifying them pt should already have refills on file.     Barby Ball RN

## 2023-01-19 NOTE — TELEPHONE ENCOUNTER
Last Office Visit: 11/10/22 NORAH Suazo  Next Office Visit: 03/07/23 Dr. Tate  Last Fill Date: 12/22/22  Heidi Zavala MA Cardiology   1/19/2023 1:30 PM

## 2023-01-23 ENCOUNTER — TELEPHONE (OUTPATIENT)
Dept: CARDIOLOGY | Facility: CLINIC | Age: 83
End: 2023-01-23
Payer: MEDICARE

## 2023-01-23 DIAGNOSIS — I50.9 ACUTE CONGESTIVE HEART FAILURE, UNSPECIFIED HEART FAILURE TYPE (H): ICD-10-CM

## 2023-01-23 DIAGNOSIS — I10 BENIGN ESSENTIAL HYPERTENSION: ICD-10-CM

## 2023-01-23 DIAGNOSIS — I50.31 ACUTE DIASTOLIC HEART FAILURE (H): ICD-10-CM

## 2023-01-23 NOTE — TELEPHONE ENCOUNTER
Routing to Ute Suazo CNP-    See BP reading below. Any change to medication based on readings?    Barby Ball RN

## 2023-01-23 NOTE — TELEPHONE ENCOUNTER
[facility] Call Center    Phone Message    May a detailed message be left on voicemail: no     Reason for Call: Other: Daylin called to provide B/P readings for Don which is required prior to re-filling Rx Ramipril.      1/2/23 B/P 157/73 Pulse 65   1/6/23 B/P 135/68 Pulse 61   1/8/23 B/P 131/76 Pulse 59  1/14/23 B/P 152/74 Pulse 62  1/16/23 B/P 140/76 Pulse 72   1/22/23 B/P 157/80 Pulse 69   All of these are taken 2 hours following a.m medications       Action Taken: Other: [place] [facility]    Travel Screening: Not Applicable

## 2023-01-25 RX ORDER — RAMIPRIL 5 MG/1
5 CAPSULE ORAL DAILY
Qty: 30 CAPSULE | Refills: 11 | Status: SHIPPED | OUTPATIENT
Start: 2023-01-25 | End: 2023-02-17

## 2023-01-25 NOTE — TELEPHONE ENCOUNTER
1.  Increase ramipril to 5 mg daily for hypertension  2. Check blood pressure at least 1 hour after medications. Call the clinic if your blood pressure is consistently greater than 130/80.    3.  Nonfasting lab in 1-2 weeks (BMP)    Orders are pended    FIONA Che CNP

## 2023-01-25 NOTE — TELEPHONE ENCOUNTER
Called sister to discuss. She was able to read new dose back to me. Appt made for 2/9/23 for labs (they are going out of town and will be back on 2/8/23). Script sent and BMP order placed. Brittny Landa RN Cardiology January 25, 2023, 11:54 AM

## 2023-02-09 ENCOUNTER — LAB (OUTPATIENT)
Dept: LAB | Facility: CLINIC | Age: 83
End: 2023-02-09
Payer: MEDICARE

## 2023-02-09 DIAGNOSIS — I10 BENIGN ESSENTIAL HYPERTENSION: ICD-10-CM

## 2023-02-09 DIAGNOSIS — D61.818 PANCYTOPENIA (H): ICD-10-CM

## 2023-02-09 LAB
ANION GAP SERPL CALCULATED.3IONS-SCNC: 4 MMOL/L (ref 3–14)
BASOPHILS # BLD AUTO: 0 10E3/UL (ref 0–0.2)
BASOPHILS NFR BLD AUTO: 1 %
BUN SERPL-MCNC: 18 MG/DL (ref 7–30)
CALCIUM SERPL-MCNC: 9 MG/DL (ref 8.5–10.1)
CHLORIDE BLD-SCNC: 104 MMOL/L (ref 94–109)
CO2 SERPL-SCNC: 32 MMOL/L (ref 20–32)
CREAT SERPL-MCNC: 0.97 MG/DL (ref 0.66–1.25)
EOSINOPHIL # BLD AUTO: 0.1 10E3/UL (ref 0–0.7)
EOSINOPHIL NFR BLD AUTO: 2 %
ERYTHROCYTE [DISTWIDTH] IN BLOOD BY AUTOMATED COUNT: 15.7 % (ref 10–15)
GFR SERPL CREATININE-BSD FRML MDRD: 79 ML/MIN/1.73M2
GLUCOSE BLD-MCNC: 276 MG/DL (ref 70–99)
HCT VFR BLD AUTO: 37.7 % (ref 40–53)
HGB BLD-MCNC: 12.2 G/DL (ref 13.3–17.7)
IMM GRANULOCYTES # BLD: 0 10E3/UL
IMM GRANULOCYTES NFR BLD: 0 %
LYMPHOCYTES # BLD AUTO: 0.6 10E3/UL (ref 0.8–5.3)
LYMPHOCYTES NFR BLD AUTO: 18 %
MCH RBC QN AUTO: 27 PG (ref 26.5–33)
MCHC RBC AUTO-ENTMCNC: 32.4 G/DL (ref 31.5–36.5)
MCV RBC AUTO: 83 FL (ref 78–100)
MONOCYTES # BLD AUTO: 0.3 10E3/UL (ref 0–1.3)
MONOCYTES NFR BLD AUTO: 10 %
NEUTROPHILS # BLD AUTO: 2.2 10E3/UL (ref 1.6–8.3)
NEUTROPHILS NFR BLD AUTO: 69 %
NRBC # BLD AUTO: 0 10E3/UL
NRBC BLD AUTO-RTO: 0 /100
PLATELET # BLD AUTO: 86 10E3/UL (ref 150–450)
POTASSIUM BLD-SCNC: 3.5 MMOL/L (ref 3.4–5.3)
RBC # BLD AUTO: 4.52 10E6/UL (ref 4.4–5.9)
SODIUM SERPL-SCNC: 140 MMOL/L (ref 133–144)
WBC # BLD AUTO: 3.2 10E3/UL (ref 4–11)

## 2023-02-09 PROCEDURE — 36415 COLL VENOUS BLD VENIPUNCTURE: CPT

## 2023-02-09 PROCEDURE — 80048 BASIC METABOLIC PNL TOTAL CA: CPT

## 2023-02-09 PROCEDURE — 85025 COMPLETE CBC W/AUTO DIFF WBC: CPT

## 2023-02-16 ENCOUNTER — PATIENT OUTREACH (OUTPATIENT)
Dept: ONCOLOGY | Facility: CLINIC | Age: 83
End: 2023-02-16
Payer: MEDICARE

## 2023-02-17 DIAGNOSIS — I10 BENIGN ESSENTIAL HYPERTENSION: ICD-10-CM

## 2023-02-17 RX ORDER — RAMIPRIL 5 MG/1
5 CAPSULE ORAL DAILY
Qty: 90 CAPSULE | Refills: 3 | Status: SHIPPED | OUTPATIENT
Start: 2023-02-17 | End: 2024-01-08

## 2023-02-17 NOTE — TELEPHONE ENCOUNTER
[facility] Cardiology Refill Guideline reviewed.  Medication meets criteria for refill. Refills sent. Brittny Landa RN Cardiology February 17, 2023, 2:07 PM

## 2023-03-01 ENCOUNTER — ONCOLOGY VISIT (OUTPATIENT)
Dept: ONCOLOGY | Facility: CLINIC | Age: 83
End: 2023-03-01
Attending: INTERNAL MEDICINE
Payer: MEDICARE

## 2023-03-01 VITALS
DIASTOLIC BLOOD PRESSURE: 55 MMHG | SYSTOLIC BLOOD PRESSURE: 112 MMHG | WEIGHT: 230 LBS | BODY MASS INDEX: 37.14 KG/M2 | RESPIRATION RATE: 12 BRPM | HEART RATE: 64 BPM | TEMPERATURE: 98.3 F | OXYGEN SATURATION: 97 %

## 2023-03-01 DIAGNOSIS — D73.2 CONGESTIVE SPLENOMEGALY: ICD-10-CM

## 2023-03-01 DIAGNOSIS — D50.8 OTHER IRON DEFICIENCY ANEMIA: ICD-10-CM

## 2023-03-01 DIAGNOSIS — K74.69 OTHER CIRRHOSIS OF LIVER (H): ICD-10-CM

## 2023-03-01 DIAGNOSIS — D69.6 THROMBOCYTOPENIA (H): ICD-10-CM

## 2023-03-01 DIAGNOSIS — C43.59 MALIGNANT MELANOMA OF SKIN OF TRUNK, EXCEPT SCROTUM (H): Primary | ICD-10-CM

## 2023-03-01 DIAGNOSIS — C44.300 MALIGNANT NEOPLASM OF SKIN OF FACE: ICD-10-CM

## 2023-03-01 DIAGNOSIS — Z85.72 HISTORY OF NON-HODGKIN'S LYMPHOMA: ICD-10-CM

## 2023-03-01 DIAGNOSIS — D72.818 OTHER DECREASED WHITE BLOOD CELL (WBC) COUNT: ICD-10-CM

## 2023-03-01 PROCEDURE — G0463 HOSPITAL OUTPT CLINIC VISIT: HCPCS | Performed by: INTERNAL MEDICINE

## 2023-03-01 PROCEDURE — 99214 OFFICE O/P EST MOD 30 MIN: CPT | Performed by: INTERNAL MEDICINE

## 2023-03-01 ASSESSMENT — PAIN SCALES - GENERAL: PAINLEVEL: NO PAIN (0)

## 2023-03-01 NOTE — PATIENT INSTRUCTIONS
ASSESSMENT/PLAN:  Encounter Diagnoses   Name Primary?    Malignant melanoma s/p resection in [place], [place] Yes    Basal cell skin cancer over nose s/p resection - [place], [place]     History of non-Hodgkin's lymphoma     Other cirrhosis of liver (H)     Congestive splenomegaly     Other decreased white blood cell count (due to liver cirrhosis)     Thrombocytopenia (due to congestive splenomegaly)     Other iron deficiency anemia      Recent CBC has revealed overall stable pancytopenia with a white blood cell count of 3200, hemoglobin of 12.2, and platelet count of 86,000.  White blood cell subsets are not significantly decreased. In the last 6 months platelet counts have been as low as 73,000 and as high as 129,000 without a clear trend for a decrease or increase.    Given normal bone marrow biopsy/aspiration findings in 2019 and 2022 at primary bone marrow disorder, leukemia or lymphoma cannot be effectively ruled out as the underlying cause of your low blood counts.    I suspect a multiple reasons for your low blood counts. Your anemia may be due to partial or treated iron deficiency as you had a hemoglobin which was as low as 9.3 about 6 months ago and has now improved to 12.2 as of 2 weeks ago.  RDW was increased at 16.6% and has decreased to 15.7% which would be supportive of a diagnosis of iron deficiency anemia.  Please continue to follow-up with your primary care provider for continued management of iron deficiency anemia and any additional work-up that may be needed.    Your platelet count and white blood cell count are likely low due to cirrhosis of the liver with mild splenomegaly as seen on previous CT in 2019 and I recommend referral to hepatology.      You may continue for annual surveillance visits with us in the oncology clinic.  Your next visit  will be scheduled with Ely Flores NP in approximately 1 year.

## 2023-03-01 NOTE — PROGRESS NOTES
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    Weight as of this encounter: 104.3 kg (230 lb). Body surface area is 2.2 meters squared.  No Pain (0) Comment: Data Unavailable   No LMP for male patient.  Allergies reviewed: Yes  Medications reviewed: Yes    Medications: Medication refills not needed today.  Pharmacy name entered into [employer] [facility]: [id number] [place] [address]    Clinical concerns:  None      Marcelle Arias CMA            "

## 2023-03-01 NOTE — LETTER
"    3/1/2023         RE: Flash Brown  [address]  [facility] [address]        Dear Colleague,    Thank you for referring your patient, Flash Brown, to the [facility]. Please see a copy of my visit note below.    The patient is being seen for the following issue/s:  Personal history of non-Hodgkin lymphoma and  persistent pancytopenia.    Most recent oncology note by Dr. Camacho from January 2021 reviewed:    \"The patient is a gentleman with multiple comorbidities including type 2 diabetes, hypertension, depression and sleep apnea.  He is known to have history of non-Hodgkin lymphoma - treated in 1983 in [place], [place]. At that time he underwent right axillary lymph node dissection followed by radiation. He did not receive any chemotherapy at that time. Patient has been followed by his primary care physician in [place]. CT scan done 2010 was negative for any recurrence.\"    During his initial visit with me in February 2022 he reported that he had a melanoma of the skin of his back removed when he was 37 years old.  He told me he had a right axillary lymph node dissection in [place] in 1983.    He has also had numerous other skin cancers removed since then and is now followed by dermatology here at [place] in [place].    His labs have also shown persistent pancytopenia.    He had a bone marrow biopsy/aspiration for work-up of pancytopenia in July 2019 which reported polytypic B cells on flow cytometry.  I repeated a bone marrow biopsy/aspiration in February 2022 which again showed polytypic B cells on flow cytometry.  Karyotype was normal.  The bone marrow was normocellular at 10 to 20% cellularity, appropriate for age, without overt dysplasia and no increase in blasts. There was adequate bone marrow storage iron.    He denies palpable cervical, supraclavicular, infraclavicular, or axillary adenopathy.  He is status post right axillary lymph node dissection.      PHYSICAL " EXAMINATION:    General: Todays' vital signs reviewed in the EMR.   /55 (BP Location: Right arm, Patient Position: Sitting, Cuff Size: Adult Large)   Pulse 64   Temp 98.3  F (36.8  C) (Tympanic)   Resp 12   Wt 104.3 kg (230 lb)   SpO2 97%   BMI 37.14 kg/m      ASSESSMENT/PLAN:  Encounter Diagnoses   Name Primary?     Malignant melanoma s/p resection in [place], [place] Yes     Basal cell skin cancer over nose s/p resection - [place], [place]      History of non-Hodgkin's lymphoma      Other cirrhosis of liver (H)      Congestive splenomegaly      Other decreased white blood cell count (due to liver cirrhosis)      Thrombocytopenia (due to congestive splenomegaly)      Other iron deficiency anemia      Recent CBC has revealed overall stable pancytopenia with a white blood cell count of 3200, hemoglobin of 12.2, and platelet count of 86,000.  White blood cell subsets are not significantly decreased. In the last 6 months platelet counts have been as low as 73,000 and as high as 129,000 without a clear trend for a decrease or increase.    Given normal bone marrow biopsy/aspiration findings in 2019 and 2022 at primary bone marrow disorder, leukemia or lymphoma cannot be effectively ruled out as the underlying cause of your low blood counts.    I suspect a multiple reasons for your low blood counts. Your anemia may be due to partial or treated iron deficiency as you had a hemoglobin which was as low as 9.3 about 6 months ago and has now improved to 12.2 as of 2 weeks ago.  RDW was increased at 16.6% and has decreased to 15.7% which would be supportive of a diagnosis of iron deficiency anemia.  Please continue to follow-up with your primary care provider for continued management of iron deficiency anemia and any additional work-up that may be needed.    Your platelet count and white blood cell count are likely low due to cirrhosis of the liver with mild splenomegaly as seen on previous CT in 2019 and I recommend  "referral to hepatology.      You may continue for annual surveillance visits with us in the oncology clinic.  Your next visit  will be scheduled with Ely Flores NP in approximately 1 year.    I spent a total of 30 minutes on the care of this patient on the day of service including face to face time and the remainder in chart review, care coordination, and documentation on the day of service.    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    Weight as of this encounter: 104.3 kg (230 lb). Body surface area is 2.2 meters squared.  No Pain (0) Comment: Data Unavailable   No LMP for male patient.  Allergies reviewed: Yes  Medications reviewed: Yes    Medications: Medication refills not needed today.  Pharmacy name entered into [id number] [place] [address]    Clinical concerns:  None      Marcelle Arias CMA                Again, thank you for allowing me to participate in the care of your patient.        Sincerely,        Sang Avendaño MD    "

## 2023-03-01 NOTE — PROGRESS NOTES
"The patient is being seen for the following issue/s:  Personal history of non-Hodgkin lymphoma and  persistent pancytopenia.    Most recent oncology note by Dr. Camacho from January 2021 reviewed:    \"The patient is a gentleman with multiple comorbidities including type 2 diabetes, hypertension, depression and sleep apnea.  He is known to have history of non-Hodgkin lymphoma - treated in 1983 in [place], [place]. At that time he underwent right axillary lymph node dissection followed by radiation. He did not receive any chemotherapy at that time. Patient has been followed by his primary care physician in [place]. CT scan done 2010 was negative for any recurrence.\"    During his initial visit with me in February 2022 he reported that he had a melanoma of the skin of his back removed when he was 37 years old.  He told me he had a right axillary lymph node dissection in [place] in 1983.    He has also had numerous other skin cancers removed since then and is now followed by dermatology here at [place] in [place].    His labs have also shown persistent pancytopenia.    He had a bone marrow biopsy/aspiration for work-up of pancytopenia in July 2019 which reported polytypic B cells on flow cytometry.  I repeated a bone marrow biopsy/aspiration in February 2022 which again showed polytypic B cells on flow cytometry.  Karyotype was normal.  The bone marrow was normocellular at 10 to 20% cellularity, appropriate for age, without overt dysplasia and no increase in blasts. There was adequate bone marrow storage iron.    He denies palpable cervical, supraclavicular, infraclavicular, or axillary adenopathy.  He is status post right axillary lymph node dissection.      PHYSICAL EXAMINATION:    General: Todays' vital signs reviewed in the EMR.   /55 (BP Location: Right arm, Patient Position: Sitting, Cuff Size: Adult Large)   Pulse 64   Temp 98.3  F (36.8  C) (Tympanic)   Resp 12   Wt 104.3 kg (230 lb)   SpO2 97%   BMI " 37.14 kg/m      ASSESSMENT/PLAN:  Encounter Diagnoses   Name Primary?     Malignant melanoma s/p resection in [place], [place] Yes     Basal cell skin cancer over nose s/p resection - [place], [place]      History of non-Hodgkin's lymphoma      Other cirrhosis of liver (H)      Congestive splenomegaly      Other decreased white blood cell count (due to liver cirrhosis)      Thrombocytopenia (due to congestive splenomegaly)      Other iron deficiency anemia      Recent CBC has revealed overall stable pancytopenia with a white blood cell count of 3200, hemoglobin of 12.2, and platelet count of 86,000.  White blood cell subsets are not significantly decreased. In the last 6 months platelet counts have been as low as 73,000 and as high as 129,000 without a clear trend for a decrease or increase.    Given normal bone marrow biopsy/aspiration findings in 2019 and 2022 at primary bone marrow disorder, leukemia or lymphoma cannot be effectively ruled out as the underlying cause of your low blood counts.    I suspect a multiple reasons for your low blood counts. Your anemia may be due to partial or treated iron deficiency as you had a hemoglobin which was as low as 9.3 about 6 months ago and has now improved to 12.2 as of 2 weeks ago.  RDW was increased at 16.6% and has decreased to 15.7% which would be supportive of a diagnosis of iron deficiency anemia.  Please continue to follow-up with your primary care provider for continued management of iron deficiency anemia and any additional work-up that may be needed.    Your platelet count and white blood cell count are likely low due to cirrhosis of the liver with mild splenomegaly as seen on previous CT in 2019 and I recommend referral to hepatology.      You may continue for annual surveillance visits with us in the oncology clinic.  Your next visit  will be scheduled with Ely Flores NP in approximately 1 year.    I spent a total of 30 minutes on the care of this patient on the day  of service including face to face time and the remainder in chart review, care coordination, and documentation on the day of service.

## 2023-03-01 NOTE — TELEPHONE ENCOUNTER
DIAGNOSIS: Other cirrhosis of liver.   Congestive splenomegaly   Other decreased white blood cell (WBC) count   Thrombocytopenia       Appt Date: 04.25.2023    NOTES STATUS DETAILS   OFFICE NOTE from referring provider Internal 03.01.2023 Sang Avendaño MD   OFFICE NOTES from other specialists     DISCHARGE SUMMARY from hospital     MEDICATION LIST Internal    LIVER BIOSPY (IF APPLICABLE)      PATHOLOGY REPORTS      IMAGING     ENDOSCOPY (IF AVAILABLE)     COLONOSCOPY (IF AVAILABLE)     ULTRASOUND LIVER     CT OF ABDOMEN     MRI OF LIVER     FIBROSCAN, US ELASTOGRAPHY, FIBROSIS SCAN, MR ELASTOGRAPHY     LABS     HEPATIC PANEL (LIVER PANEL) Internal 08.20.2022   BASIC METABOLIC PANEL Internal 02.09.2023   COMPLETE METABOLIC PANEL Internal 02.09.2023   COMPLETE BLOOD COUNT (CBC) Internal 02.09.2023   INTERNATIONAL NORMALIZED RATIO (INR)     HEPATITIS C ANTIBODY Internal 10.06.2022   HEPATITIS C VIRAL LOAD/PCR     HEPATITIS C GENOTYPE     HEPATITIS B SURFACE ANTIGEN     HEPATITIS B SURFACE ANTIBODY     HEPATITIS B DNA QUANT LEVEL     HEPATITIS B CORE ANTIBODY

## 2023-03-07 ENCOUNTER — OFFICE VISIT (OUTPATIENT)
Dept: CARDIOLOGY | Facility: CLINIC | Age: 83
End: 2023-03-07
Attending: NURSE PRACTITIONER
Payer: MEDICARE

## 2023-03-07 VITALS
SYSTOLIC BLOOD PRESSURE: 132 MMHG | WEIGHT: 229.2 LBS | HEART RATE: 65 BPM | DIASTOLIC BLOOD PRESSURE: 70 MMHG | RESPIRATION RATE: 12 BRPM | BODY MASS INDEX: 37.01 KG/M2 | OXYGEN SATURATION: 96 %

## 2023-03-07 DIAGNOSIS — R60.1 GENERALIZED EDEMA: ICD-10-CM

## 2023-03-07 DIAGNOSIS — I10 BENIGN ESSENTIAL HYPERTENSION: ICD-10-CM

## 2023-03-07 DIAGNOSIS — I25.10 CORONARY ARTERY DISEASE INVOLVING NATIVE CORONARY ARTERY OF NATIVE HEART WITHOUT ANGINA PECTORIS: ICD-10-CM

## 2023-03-07 DIAGNOSIS — I50.32 CHRONIC DIASTOLIC HEART FAILURE (H): ICD-10-CM

## 2023-03-07 DIAGNOSIS — E78.5 HYPERLIPIDEMIA LDL GOAL <70: ICD-10-CM

## 2023-03-07 PROCEDURE — 99214 OFFICE O/P EST MOD 30 MIN: CPT | Performed by: INTERNAL MEDICINE

## 2023-03-07 RX ORDER — FUROSEMIDE 20 MG
TABLET ORAL
Qty: 270 TABLET | Refills: 3 | Status: SHIPPED | OUTPATIENT
Start: 2023-03-07 | End: 2023-07-06

## 2023-03-07 NOTE — PROGRESS NOTES
HPI and Plan:   Today I had the pleasure of seeing Flash Brown at [facility]. He is a pleasant 82 year old patient with a past medical history of coronary disease, diabetes, diastolic heart failure, hypertension and hyperlipidemia presents to the clinic for follow-up visit.  He is accompanied by his sister.    The patient had PCI done to his RCA, circumflex and proximal and mid LAD in 2010.  He had another angiogram in 2011 which showed 70% lesion in the proximal circumflex which was negative by FFR and hence no intervention was performed.  Last stress test was in 2015 which showed moderate ischemia in the lateral wall but no angio or intervention was performed.  He then establish care with me in 2020.  I decided to manage him medically at that time since he was asymptomatic.  Unfortunately in 2022 he was admitted to the hospital for exacerbation of HFpEF.  He responded very well to diuretics.  His dry weight is around 228-230 pounds and he is able to maintain that weight very effectively with titrating the dose of his diuretics.  His weight was also slightly elevated at last visit for which we increased some of his blood pressure medications and the blood pressure has since improved.  He tells me that he is doing well and does not have any significant complaints.  Last echocardiogram which I interpreted independently today shows normal ejection fraction and no significant valvular disease.  Patient reports no chest pain, PND, orthopnea, presyncope, syncope, heart racing, or palpitations.    I interpreted this last EKG, echocardiogram, blood work.     Assessment and plan:    1.  Coronary artery disease status post multiple PCI's.  Was on DAPT for a long and Plavix was discontinued recently.  He will continue aspirin uninterrupted.    2.  Heart failure with preserved ejection fraction-dry weight 227 pounds, NYHA class II, maintained on 40 mg of Lasix.  We will continue that.  Discussed  titration of Lasix based on weight and symptoms.  He verbalized understanding.  He has mild dyspnea on exertion which is gradually improving with rehabilitation.    3.  Hyperlipidemia-he is on maximum dose of Lipitor which we will continue.  LDL is at goal.    4.  Type 2 diabetes-not addressed today    Thank you for allowing me to participate in the care of Flash Brown    This note was completed in part using Dragon voice recognition software. Although reviewed after completion, some word and grammatical errors may occur.    Micah Tate MD  Cardiology    Orders Placed This Encounter   Procedures     Follow-Up with Cardiology [facility]       Orders Placed This Encounter   Medications     furosemide (LASIX) 20 MG tablet     Sig: Take 2 tablets (40 mg) by mouth every morning AND 1 tablet (20 mg) daily at 2 pm.     Dispense:  270 tablet     Refill:  3       Medications Discontinued During This Encounter   Medication Reason     ramipril (ALTACE) 2.5 MG capsule Duplicate Therapy (No eCancel)     doxycycline monohydrate (MONODOX) 100 MG capsule Therapy completed (No AVS)     oxyCODONE-acetaminophen (PERCOCET) 5-325 MG tablet Therapy completed (No AVS)     furosemide (LASIX) 20 MG tablet Reorder (No AVS / No eCancel)       Encounter Diagnoses   Name Primary?     Coronary artery disease involving native coronary artery of native heart without angina pectoris      Benign essential hypertension      Hyperlipidemia LDL goal <70      Chronic diastolic heart failure (H)      Generalized edema        CURRENT MEDICATIONS:  Current Outpatient Medications   Medication Sig Dispense Refill     aspirin (ASA) 81 MG EC tablet Take 81 mg by mouth daily       atorvastatin (LIPITOR) 20 MG tablet TAKE 1 TABLET BY MOUTH EVERY DAY 90 tablet 0     ferrous sulfate (FE TABS) 325 (65 Fe) MG EC tablet Take 1 tablet (325 mg) by mouth daily 90 tablet 0     furosemide (LASIX) 20 MG tablet Take 2 tablets (40 mg) by mouth every morning AND 1 tablet (20  mg) daily at 2 pm. 270 tablet 3     magnesium oxide (MAG-OX) 400 MG tablet Take 1 tablet (400 mg) by mouth 2 times daily 60 tablet 1     metoprolol succinate ER (TOPROL XL) 25 MG 24 hr tablet Take 1 tablet (25 mg) by mouth daily 90 tablet 3     nitroGLYcerin (NITROSTAT) 0.4 MG sublingual tablet Place 1 tablet (0.4 mg) under the tongue See Admin Instructions for chest pain 30 tablet 0     potassium chloride ER (KLOR-CON M) 20 MEQ CR tablet Take 1 tablet (20 mEq) by mouth 2 times daily 60 tablet 0     ramipril (ALTACE) 5 MG capsule Take 1 capsule (5 mg) by mouth daily 90 capsule 3     tamsulosin (FLOMAX) 0.4 MG capsule Take 1 capsule (0.4 mg) by mouth every evening 90 capsule 3     triamcinolone (KENALOG) 0.1 % external cream Twice daily to legs prn itching 454 g 3     ORDER FOR DME Light Therapy with Full Spectrum Light (07858 lux) Start with 10-15 minute exposure per day, Increase exposure to 30 to 45 minutes per day, Maximum exposure: 90 minutes per day,Look periodically at light during each session  (Patient not taking: Reported on 10/20/2022) 1 0       ALLERGIES     Allergies   Allergen Reactions     Iodine Swelling       PAST MEDICAL HISTORY:  Past Medical History:   Diagnosis Date     Basal cell carcinoma      CAD (coronary artery disease)      Depression      Hyperlipidemia      Hypertension      Lymphoma (H)      Malignant melanoma (H)      Melanoma (H)      Squamous cell carcinoma        PAST SURGICAL HISTORY:  Past Surgical History:   Procedure Laterality Date     AXILLARY SURGERY      S/P resection and adjuvant radiation     BONE MARROW BIOPSY, BONE SPECIMEN, NEEDLE/TROCAR N/A 7/8/2019    Procedure: BIOPSY, BONE MARROW;  Surgeon: Husam Issa MD;  [facility]     BONE MARROW BIOPSY, BONE SPECIMEN, NEEDLE/TROCAR Left 2/24/2022    Procedure: BIOPSY, BONE MARROW;  Surgeon: Cailin Anthony PA-C;  Location: [facility] OR     CARDIAC SURGERY       CHOLECYSTECTOMY       HERNIA REPAIR, UMBILICAL        PHACOEMULSIFICATION WITH STANDARD INTRAOCULAR LENS IMPLANT Right 10/2/2019    Procedure: Cataract Removal with Implant;  Surgeon: Dinesh Ivey MD;  Location: [facility] OR     PHACOEMULSIFICATION WITH STANDARD INTRAOCULAR LENS IMPLANT Left 10/21/2019    Procedure: Cataract Removal with Implant;  Surgeon: Dinesh Ivey MD;  Location: [facility] OR     STENT      PTCA with drug-eluting stent of RCA, circumflex, and LAD     VASCULAR SURGERY         FAMILY HISTORY:  Family History   Problem Relation Age of Onset     Asthma Other      Cancer Other         melanoma     Blood Disease Other         bleeding disorder     Lung Cancer Mother         Smoker     Prostate Cancer Father      Melanoma No family hx of        SOCIAL HISTORY:  Social History     Socioeconomic History     Marital status: Single     Spouse name: None     Number of children: 0     Years of education: None     Highest education level: None   Occupational History     Occupation: Retired     Comment: [employer]    Tobacco Use     Smoking status: Never     Smokeless tobacco: Never   Vaping Use     Vaping Use: Never used   Substance and Sexual Activity     Alcohol use: Yes     Comment: glass of wine couple times per week     Drug use: Never     Social Determinants of Health     Financial Resource Strain: Low Risk      Difficulty of Paying Living Expenses: Not hard at all   Food Insecurity: No Food Insecurity     Worried About Running Out of Food in the Last Year: Never true     Ran Out of Food in the Last Year: Never true   Transportation Needs: No Transportation Needs     Lack of Transportation (Medical): No     Lack of Transportation (Non-Medical): No       Review of Systems:  Skin:          Eyes:         ENT:         Respiratory:  Negative       Cardiovascular:  Negative      Gastroenterology:        Genitourinary:         Musculoskeletal:         Neurologic:         Psychiatric:         Heme/Lymph/Imm:         Endocrine:            Physical Exam:  Vitals: /70 (BP Location: Left arm, Patient Position: Sitting, Cuff Size: Adult Large)   Pulse 65   Resp 12   Wt 104 kg (229 lb 3.2 oz)   SpO2 96%   BMI 37.01 kg/m    Eyes: No icterus.  Pulmonary: Chest symmetric, lungs clear bilaterally and no crackles, wheezes or rales.  Cardiovascular: RRR with normal S1 and S2, no murmur, JVP normal.  Musculoskeletal: Edema of the lower extremities: None.  Neurologic: Oriented and appropriate without obvious focal deficits.   Psychiatric: Normal affect.     Recent Lab Results:  LIPID RESULTS:  Lab Results   Component Value Date    CHOL 105 11/10/2022    CHOL 105 09/08/2020    HDL 46 11/10/2022    HDL 39 (L) 09/08/2020    LDL 44 11/10/2022    LDL 49 09/08/2020    TRIG 76 11/10/2022    TRIG 87 09/08/2020    CHOLHDLRATIO 4.0 03/13/2008       LIVER ENZYME RESULTS:  Lab Results   Component Value Date    AST 37 08/30/2022    AST 18 04/23/2021    ALT 19 11/10/2022    ALT 25 04/23/2021       CBC RESULTS:  Lab Results   Component Value Date    WBC 3.2 (L) 02/09/2023    WBC 3.2 (L) 01/04/2021    RBC 4.52 02/09/2023    RBC 4.19 (L) 01/04/2021    HGB 12.2 (L) 02/09/2023    HGB 12.0 (L) 01/04/2021    HCT 37.7 (L) 02/09/2023    HCT 37.5 (L) 01/04/2021    MCV 83 02/09/2023    MCV 90 01/04/2021    MCH 27.0 02/09/2023    MCH 28.6 01/04/2021    MCHC 32.4 02/09/2023    MCHC 32.0 01/04/2021    RDW 15.7 (H) 02/09/2023    RDW 15.4 (H) 01/04/2021    PLT 86 (L) 02/09/2023    PLT 92 (L) 01/04/2021       BMP RESULTS:  Lab Results   Component Value Date     02/09/2023     04/23/2021    POTASSIUM 3.5 02/09/2023    POTASSIUM 4.7 04/23/2021    CHLORIDE 104 02/09/2023    CHLORIDE 107 04/23/2021    CO2 32 02/09/2023    CO2 29 04/23/2021    ANIONGAP 4 02/09/2023    ANIONGAP 4 04/23/2021     (H) 02/09/2023     (H) 04/23/2021    BUN 18 02/09/2023    BUN 17 04/23/2021    CR 0.97 02/09/2023    CR 1.04 04/23/2021    GFRESTIMATED 79 02/09/2023    GFRESTIMATED  68 04/23/2021    GFRESTBLACK 79 04/23/2021    NATALIIA 9.0 02/09/2023    NATALIIA 8.7 04/23/2021        A1C RESULTS:  Lab Results   Component Value Date    A1C 5.5 05/24/2022    A1C 5.8 (H) 04/23/2021       INR RESULTS:  No results found for: INR    CC  No referring provider defined for this encounter.    All medical records were reviewed in detail and discussed with the patient. Greater than 30 mins were spent with the patient, 50% of this time was spent on counseling and coordination of care.  After visit summary was printed and given to the patient.

## 2023-03-07 NOTE — LETTER
3/7/2023    Thomas Jackson,   [address]  [place] [address]    RE: Flash Brown       Dear Colleague,     I had the pleasure of seeing Flash Brown in the [facility].  HPI and Plan:   Today I had the pleasure of seeing Flash Brown at [facility]. He is a pleasant 82 year old patient with a past medical history of coronary disease, diabetes, diastolic heart failure, hypertension and hyperlipidemia presents to the clinic for follow-up visit.  He is accompanied by his sister.    The patient had PCI done to his RCA, circumflex and proximal and mid LAD in 2010.  He had another angiogram in 2011 which showed 70% lesion in the proximal circumflex which was negative by FFR and hence no intervention was performed.  Last stress test was in 2015 which showed moderate ischemia in the lateral wall but no angio or intervention was performed.  He then establish care with me in 2020.  I decided to manage him medically at that time since he was asymptomatic.  Unfortunately in 2022 he was admitted to the hospital for exacerbation of HFpEF.  He responded very well to diuretics.  His dry weight is around 228-230 pounds and he is able to maintain that weight very effectively with titrating the dose of his diuretics.  His weight was also slightly elevated at last visit for which we increased some of his blood pressure medications and the blood pressure has since improved.  He tells me that he is doing well and does not have any significant complaints.  Last echocardiogram which I interpreted independently today shows normal ejection fraction and no significant valvular disease.  Patient reports no chest pain, PND, orthopnea, presyncope, syncope, heart racing, or palpitations.    I interpreted this last EKG, echocardiogram, blood work.     Assessment and plan:    1.  Coronary artery disease status post multiple PCI's.  Was on DAPT for a long and Plavix was discontinued  recently.  He will continue aspirin uninterrupted.    2.  Heart failure with preserved ejection fraction-dry weight 227 pounds, NYHA class II, maintained on 40 mg of Lasix.  We will continue that.  Discussed titration of Lasix based on weight and symptoms.  He verbalized understanding.  He has mild dyspnea on exertion which is gradually improving with rehabilitation.    3.  Hyperlipidemia-he is on maximum dose of Lipitor which we will continue.  LDL is at goal.    4.  Type 2 diabetes-not addressed today    Thank you for allowing me to participate in the care of Flash Brown    This note was completed in part using Dragon voice recognition software. Although reviewed after completion, some word and grammatical errors may occur.    Micah Tate MD  Cardiology    Orders Placed This Encounter   Procedures     Follow-Up with Cardiology RABIA       Orders Placed This Encounter   Medications     furosemide (LASIX) 20 MG tablet     Sig: Take 2 tablets (40 mg) by mouth every morning AND 1 tablet (20 mg) daily at 2 pm.     Dispense:  270 tablet     Refill:  3       Medications Discontinued During This Encounter   Medication Reason     ramipril (ALTACE) 2.5 MG capsule Duplicate Therapy (No eCancel)     doxycycline monohydrate (MONODOX) 100 MG capsule Therapy completed (No AVS)     oxyCODONE-acetaminophen (PERCOCET) 5-325 MG tablet Therapy completed (No AVS)     furosemide (LASIX) 20 MG tablet Reorder (No AVS / No eCancel)       Encounter Diagnoses   Name Primary?     Coronary artery disease involving native coronary artery of native heart without angina pectoris      Benign essential hypertension      Hyperlipidemia LDL goal <70      Chronic diastolic heart failure (H)      Generalized edema        CURRENT MEDICATIONS:  Current Outpatient Medications   Medication Sig Dispense Refill     aspirin (ASA) 81 MG EC tablet Take 81 mg by mouth daily       atorvastatin (LIPITOR) 20 MG tablet TAKE 1 TABLET BY MOUTH EVERY DAY 90  tablet 0     ferrous sulfate (FE TABS) 325 (65 Fe) MG EC tablet Take 1 tablet (325 mg) by mouth daily 90 tablet 0     furosemide (LASIX) 20 MG tablet Take 2 tablets (40 mg) by mouth every morning AND 1 tablet (20 mg) daily at 2 pm. 270 tablet 3     magnesium oxide (MAG-OX) 400 MG tablet Take 1 tablet (400 mg) by mouth 2 times daily 60 tablet 1     metoprolol succinate ER (TOPROL XL) 25 MG 24 hr tablet Take 1 tablet (25 mg) by mouth daily 90 tablet 3     nitroGLYcerin (NITROSTAT) 0.4 MG sublingual tablet Place 1 tablet (0.4 mg) under the tongue See Admin Instructions for chest pain 30 tablet 0     potassium chloride ER (KLOR-CON M) 20 MEQ CR tablet Take 1 tablet (20 mEq) by mouth 2 times daily 60 tablet 0     ramipril (ALTACE) 5 MG capsule Take 1 capsule (5 mg) by mouth daily 90 capsule 3     tamsulosin (FLOMAX) 0.4 MG capsule Take 1 capsule (0.4 mg) by mouth every evening 90 capsule 3     triamcinolone (KENALOG) 0.1 % external cream Twice daily to legs prn itching 454 g 3     ORDER FOR DME Light Therapy with Full Spectrum Light (58952 lux) Start with 10-15 minute exposure per day, Increase exposure to 30 to 45 minutes per day, Maximum exposure: 90 minutes per day,Look periodically at light during each session  (Patient not taking: Reported on 10/20/2022) 1 0       ALLERGIES     Allergies   Allergen Reactions     Iodine Swelling       PAST MEDICAL HISTORY:  Past Medical History:   Diagnosis Date     Basal cell carcinoma      CAD (coronary artery disease)      Depression      Hyperlipidemia      Hypertension      Lymphoma (H)      Malignant melanoma (H)      Melanoma (H)      Squamous cell carcinoma        PAST SURGICAL HISTORY:  Past Surgical History:   Procedure Laterality Date     AXILLARY SURGERY      S/P resection and adjuvant radiation     BONE MARROW BIOPSY, BONE SPECIMEN, NEEDLE/TROCAR N/A 7/8/2019    Procedure: BIOPSY, BONE MARROW;  Surgeon: Husam Issa MD;  Location: [facility]     BONE MARROW BIOPSY, BONE  SPECIMEN, NEEDLE/TROCAR Left 2/24/2022    Procedure: BIOPSY, BONE MARROW;  Surgeon: Cailin Anthony PA-C;  Location: [facility]     CARDIAC SURGERY       CHOLECYSTECTOMY       HERNIA REPAIR, UMBILICAL       PHACOEMULSIFICATION WITH STANDARD INTRAOCULAR LENS IMPLANT Right 10/2/2019    Procedure: Cataract Removal with Implant;  Surgeon: Dinesh Ivey MD;  Location: [facility]     PHACOEMULSIFICATION WITH STANDARD INTRAOCULAR LENS IMPLANT Left 10/21/2019    Procedure: Cataract Removal with Implant;  Surgeon: Dinesh Ivey MD;  Location: [facility]     STENT      PTCA with drug-eluting stent of RCA, circumflex, and LAD     VASCULAR SURGERY         FAMILY HISTORY:  Family History   Problem Relation Age of Onset     Asthma Other      Cancer Other         melanoma     Blood Disease Other         bleeding disorder     Lung Cancer Mother         Smoker     Prostate Cancer Father      Melanoma No family hx of        SOCIAL HISTORY:  Social History     Socioeconomic History     Marital status: Single     Spouse name: None     Number of children: 0     Years of education: None     Highest education level: None   Occupational History     Occupation: Retired     Comment: [employer]    Tobacco Use     Smoking status: Never     Smokeless tobacco: Never   Vaping Use     Vaping Use: Never used   Substance and Sexual Activity     Alcohol use: Yes     Comment: glass of wine couple times per week     Drug use: Never     Social Determinants of Health     Financial Resource Strain: Low Risk      Difficulty of Paying Living Expenses: Not hard at all   Food Insecurity: No Food Insecurity     Worried About Running Out of Food in the Last Year: Never true     Ran Out of Food in the Last Year: Never true   Transportation Needs: No Transportation Needs     Lack of Transportation (Medical): No     Lack of Transportation (Non-Medical): No       Review of Systems:  Skin:          Eyes:         ENT:         Respiratory:   Negative       Cardiovascular:  Negative      Gastroenterology:        Genitourinary:         Musculoskeletal:         Neurologic:         Psychiatric:         Heme/Lymph/Imm:         Endocrine:           Physical Exam:  Vitals: /70 (BP Location: Left arm, Patient Position: Sitting, Cuff Size: Adult Large)   Pulse 65   Resp 12   Wt 104 kg (229 lb 3.2 oz)   SpO2 96%   BMI 37.01 kg/m    Eyes: No icterus.  Pulmonary: Chest symmetric, lungs clear bilaterally and no crackles, wheezes or rales.  Cardiovascular: RRR with normal S1 and S2, no murmur, JVP normal.  Musculoskeletal: Edema of the lower extremities: None.  Neurologic: Oriented and appropriate without obvious focal deficits.   Psychiatric: Normal affect.     Recent Lab Results:  LIPID RESULTS:  Lab Results   Component Value Date    CHOL 105 11/10/2022    CHOL 105 09/08/2020    HDL 46 11/10/2022    HDL 39 (L) 09/08/2020    LDL 44 11/10/2022    LDL 49 09/08/2020    TRIG 76 11/10/2022    TRIG 87 09/08/2020    CHOLHDLRATIO 4.0 03/13/2008       LIVER ENZYME RESULTS:  Lab Results   Component Value Date    AST 37 08/30/2022    AST 18 04/23/2021    ALT 19 11/10/2022    ALT 25 04/23/2021       CBC RESULTS:  Lab Results   Component Value Date    WBC 3.2 (L) 02/09/2023    WBC 3.2 (L) 01/04/2021    RBC 4.52 02/09/2023    RBC 4.19 (L) 01/04/2021    HGB 12.2 (L) 02/09/2023    HGB 12.0 (L) 01/04/2021    HCT 37.7 (L) 02/09/2023    HCT 37.5 (L) 01/04/2021    MCV 83 02/09/2023    MCV 90 01/04/2021    MCH 27.0 02/09/2023    MCH 28.6 01/04/2021    MCHC 32.4 02/09/2023    MCHC 32.0 01/04/2021    RDW 15.7 (H) 02/09/2023    RDW 15.4 (H) 01/04/2021    PLT 86 (L) 02/09/2023    PLT 92 (L) 01/04/2021       BMP RESULTS:  Lab Results   Component Value Date     02/09/2023     04/23/2021    POTASSIUM 3.5 02/09/2023    POTASSIUM 4.7 04/23/2021    CHLORIDE 104 02/09/2023    CHLORIDE 107 04/23/2021    CO2 32 02/09/2023    CO2 29 04/23/2021    ANIONGAP 4 02/09/2023     ANIONGAP 4 04/23/2021     (H) 02/09/2023     (H) 04/23/2021    BUN 18 02/09/2023    BUN 17 04/23/2021    CR 0.97 02/09/2023    CR 1.04 04/23/2021    GFRESTIMATED 79 02/09/2023    GFRESTIMATED 68 04/23/2021    GFRESTBLACK 79 04/23/2021    NATALIIA 9.0 02/09/2023    NATALIIA 8.7 04/23/2021        A1C RESULTS:  Lab Results   Component Value Date    A1C 5.5 05/24/2022    A1C 5.8 (H) 04/23/2021       INR RESULTS:  No results found for: INR    CC  No referring provider defined for this encounter.    All medical records were reviewed in detail and discussed with the patient. Greater than 30 mins were spent with the patient, 50% of this time was spent on counseling and coordination of care.  After visit summary was printed and given to the patient.    Thank you for allowing me to participate in the care of your patient.      Sincerely,     Micah Tate MD     [facility]  cc:   Ute Suazo, FIONA CNP  [address]  [place]  [place] [address]

## 2023-03-14 ENCOUNTER — TELEPHONE (OUTPATIENT)
Dept: FAMILY MEDICINE | Facility: CLINIC | Age: 83
End: 2023-03-14
Payer: MEDICARE

## 2023-03-14 NOTE — TELEPHONE ENCOUNTER
Forms received from: [facility]   Phone number listed: [phone]   Fax listed: [phone]  Date received: 3/9/23  Form description: Revision to plan of care cert period 10/3/22-12/01/22  Once forms are completed, please return to [facility] via fax [phone].  Is patient requesting to be contacted when forms are completed: na  Phone: na  Form placed:  To Dr. Renetta Michelle

## 2023-03-21 ENCOUNTER — MEDICAL CORRESPONDENCE (OUTPATIENT)
Dept: HEALTH INFORMATION MANAGEMENT | Facility: CLINIC | Age: 83
End: 2023-03-21
Payer: MEDICARE

## 2023-04-12 ENCOUNTER — OFFICE VISIT (OUTPATIENT)
Dept: FAMILY MEDICINE | Facility: CLINIC | Age: 83
End: 2023-04-12
Payer: MEDICARE

## 2023-04-12 VITALS
RESPIRATION RATE: 20 BRPM | WEIGHT: 225 LBS | TEMPERATURE: 97.7 F | OXYGEN SATURATION: 94 % | BODY MASS INDEX: 36.16 KG/M2 | DIASTOLIC BLOOD PRESSURE: 68 MMHG | SYSTOLIC BLOOD PRESSURE: 128 MMHG | HEART RATE: 73 BPM | HEIGHT: 66 IN

## 2023-04-12 DIAGNOSIS — H61.23 BILATERAL IMPACTED CERUMEN: Primary | ICD-10-CM

## 2023-04-12 PROCEDURE — 99213 OFFICE O/P EST LOW 20 MIN: CPT | Performed by: NURSE PRACTITIONER

## 2023-04-12 ASSESSMENT — ENCOUNTER SYMPTOMS
RHINORRHEA: 0
VOMITING: 0
COUGH: 0
NAUSEA: 0
SORE THROAT: 0
DIAPHORESIS: 0
EYE DISCHARGE: 0
DIZZINESS: 0
SINUS PRESSURE: 0
DIARRHEA: 0
FATIGUE: 0
WHEEZING: 0
HEADACHES: 0
LIGHT-HEADEDNESS: 0
FEVER: 0
SHORTNESS OF BREATH: 0

## 2023-04-12 ASSESSMENT — PATIENT HEALTH QUESTIONNAIRE - PHQ9
10. IF YOU CHECKED OFF ANY PROBLEMS, HOW DIFFICULT HAVE THESE PROBLEMS MADE IT FOR YOU TO DO YOUR WORK, TAKE CARE OF THINGS AT HOME, OR GET ALONG WITH OTHER PEOPLE: NOT DIFFICULT AT ALL
SUM OF ALL RESPONSES TO PHQ QUESTIONS 1-9: 4
SUM OF ALL RESPONSES TO PHQ QUESTIONS 1-9: 4

## 2023-04-12 ASSESSMENT — PAIN SCALES - GENERAL: PAINLEVEL: NO PAIN (0)

## 2023-04-12 NOTE — PROGRESS NOTES
"  Assessment & Plan     Bilateral impacted cerumen  Treatment plan reviewed and understood by patient.  Reviewed importance of not using Q-tips in ear canal.  - [place] REMOVAL IMPACTED CERUMEN IRRIGATION/LVG JONI (RN/MA); Standing    20 minutes spent by me on the date of the encounter doing chart review, history and exam, documentation and further activities per the note       BMI:   Estimated body mass index is 36.87 kg/m  as calculated from the following:    Height as of this encounter: 1.664 m (5' 5.5\").    Weight as of this encounter: 102.1 kg (225 lb).         FIONA Mesa CNP  [facility] BRIAN Sanchez is a 82 year old, presenting for the following health issues:  Hearing Problem         View : No data to display.              Hearing Problem  Pertinent negatives include no chest pain, congestion, coughing, diaphoresis, fatigue, fever, headaches, nausea, sore throat or vomiting.   History of Present Illness       Reason for visit:  Hearing  Symptom onset:  3-7 days ago  Symptoms include:  Hearing loss  Symptom intensity:  Moderate  Symptom progression:  Staying the same  Had these symptoms before:  Yes  Has tried/received treatment for these symptoms:  Yes  Previous treatment was successful:  Yes  Prior treatment description:  Flushed ears    He eats 2-3 servings of fruits and vegetables daily.He consumes 1 sweetened beverage(s) daily.He exercises with enough effort to increase his heart rate 9 or less minutes per day.  He exercises with enough effort to increase his heart rate 3 or less days per week.   He is taking medications regularly.    Today's PHQ-9         PHQ-9 Total Score: 4    PHQ-9 Q9 Thoughts of better off dead/self-harm past 2 weeks :   Not at all    How difficult have these problems made it for you to do your work, take care of things at home, or get along with other people: Not difficult at all     *Tried ear gtts and hydrogen peroxide w/o relief.    Ally " "DeShong CMA      Both ears is hard to hear. Both ears are about the same. No ringing or pain to ears. No dizziness or balance issues. No congestion. No recent travel.  Has been putting drops in ears and hydrogen peroxide at home without any improvement.      Review of Systems   Constitutional: Negative for diaphoresis, fatigue and fever.   HENT: Positive for hearing loss (bilat ears). Negative for congestion, ear pain, rhinorrhea, sinus pressure, sore throat and tinnitus.    Eyes: Negative for discharge.   Respiratory: Negative for cough, shortness of breath and wheezing.    Cardiovascular: Negative for chest pain.   Gastrointestinal: Negative for diarrhea, nausea and vomiting.   Neurological: Negative for dizziness, light-headedness and headaches.          Objective    /68   Pulse 73   Temp 97.7  F (36.5  C) (Tympanic)   Resp 20   Ht 1.664 m (5' 5.5\")   Wt 102.1 kg (225 lb)   SpO2 94%   BMI 36.87 kg/m    Body mass index is 36.87 kg/m .  Physical Exam  Constitutional:       Appearance: He is well-developed.   HENT:      Right Ear: There is impacted cerumen.      Left Ear: There is impacted cerumen.      Ears:      Comments: Unable to visualize tympanic membrane due to obscurity by cerumen.  Impacted cerumen removal completed to bilat ear(s) per irrigation.  Procedure completed without difficulty by CMA.        Cardiovascular:      Rate and Rhythm: Normal rate and regular rhythm.      Heart sounds: Normal heart sounds.   Pulmonary:      Effort: Pulmonary effort is normal.      Breath sounds: Normal breath sounds.   Skin:     General: Skin is warm and dry.   Neurological:      Mental Status: He is alert.                 "

## 2023-04-12 NOTE — NURSING NOTE
Patient identified using two patient identifiers.  Ear exam showing wax occlusion completed by provider.  Solution: warm water was placed in the bilateral ear(s) via irrigation tool: augustine jackson.    Marichuy Helm CMA

## 2023-04-25 ENCOUNTER — OFFICE VISIT (OUTPATIENT)
Dept: GASTROENTEROLOGY | Facility: CLINIC | Age: 83
End: 2023-04-25
Attending: INTERNAL MEDICINE
Payer: MEDICARE

## 2023-04-25 ENCOUNTER — LAB (OUTPATIENT)
Dept: LAB | Facility: CLINIC | Age: 83
End: 2023-04-25
Payer: MEDICARE

## 2023-04-25 ENCOUNTER — PRE VISIT (OUTPATIENT)
Dept: GASTROENTEROLOGY | Facility: CLINIC | Age: 83
End: 2023-04-25
Payer: MEDICARE

## 2023-04-25 VITALS
BODY MASS INDEX: 36.87 KG/M2 | HEART RATE: 63 BPM | TEMPERATURE: 97.4 F | OXYGEN SATURATION: 96 % | DIASTOLIC BLOOD PRESSURE: 78 MMHG | SYSTOLIC BLOOD PRESSURE: 152 MMHG | WEIGHT: 225 LBS

## 2023-04-25 DIAGNOSIS — E11.9 TYPE 2 DIABETES MELLITUS WITHOUT COMPLICATION, WITHOUT LONG-TERM CURRENT USE OF INSULIN (H): ICD-10-CM

## 2023-04-25 DIAGNOSIS — Z11.59 ENCOUNTER FOR SCREENING FOR OTHER VIRAL DISEASES: ICD-10-CM

## 2023-04-25 DIAGNOSIS — D69.6 THROMBOCYTOPENIA (H): ICD-10-CM

## 2023-04-25 DIAGNOSIS — D73.2 CONGESTIVE SPLENOMEGALY: ICD-10-CM

## 2023-04-25 DIAGNOSIS — K74.69 OTHER CIRRHOSIS OF LIVER (H): ICD-10-CM

## 2023-04-25 DIAGNOSIS — E66.01 MORBID OBESITY (H): ICD-10-CM

## 2023-04-25 DIAGNOSIS — D72.818 OTHER DECREASED WHITE BLOOD CELL (WBC) COUNT: ICD-10-CM

## 2023-04-25 LAB
AFP SERPL-MCNC: 6.2 NG/ML
ERYTHROCYTE [DISTWIDTH] IN BLOOD BY AUTOMATED COUNT: 15.1 % (ref 10–15)
HCT VFR BLD AUTO: 38 % (ref 40–53)
HGB BLD-MCNC: 12.2 G/DL (ref 13.3–17.7)
INR PPP: 1.25 (ref 0.85–1.15)
MCH RBC QN AUTO: 27.3 PG (ref 26.5–33)
MCHC RBC AUTO-ENTMCNC: 32.1 G/DL (ref 31.5–36.5)
MCV RBC AUTO: 85 FL (ref 78–100)
PLATELET # BLD AUTO: 79 10E3/UL (ref 150–450)
RBC # BLD AUTO: 4.47 10E6/UL (ref 4.4–5.9)
WBC # BLD AUTO: 3 10E3/UL (ref 4–11)

## 2023-04-25 PROCEDURE — 86039 ANTINUCLEAR ANTIBODIES (ANA): CPT

## 2023-04-25 PROCEDURE — 86706 HEP B SURFACE ANTIBODY: CPT

## 2023-04-25 PROCEDURE — 87340 HEPATITIS B SURFACE AG IA: CPT

## 2023-04-25 PROCEDURE — 86038 ANTINUCLEAR ANTIBODIES: CPT

## 2023-04-25 PROCEDURE — 82784 ASSAY IGA/IGD/IGG/IGM EACH: CPT

## 2023-04-25 PROCEDURE — G0463 HOSPITAL OUTPT CLINIC VISIT: HCPCS | Performed by: INTERNAL MEDICINE

## 2023-04-25 PROCEDURE — 86704 HEP B CORE ANTIBODY TOTAL: CPT

## 2023-04-25 PROCEDURE — 36415 COLL VENOUS BLD VENIPUNCTURE: CPT

## 2023-04-25 PROCEDURE — 83516 IMMUNOASSAY NONANTIBODY: CPT | Mod: 90

## 2023-04-25 PROCEDURE — 82105 ALPHA-FETOPROTEIN SERUM: CPT

## 2023-04-25 PROCEDURE — 82248 BILIRUBIN DIRECT: CPT

## 2023-04-25 PROCEDURE — 85610 PROTHROMBIN TIME: CPT

## 2023-04-25 PROCEDURE — 85027 COMPLETE CBC AUTOMATED: CPT

## 2023-04-25 PROCEDURE — 80053 COMPREHEN METABOLIC PANEL: CPT

## 2023-04-25 PROCEDURE — 99000 SPECIMEN HANDLING OFFICE-LAB: CPT

## 2023-04-25 PROCEDURE — 99204 OFFICE O/P NEW MOD 45 MIN: CPT | Performed by: INTERNAL MEDICINE

## 2023-04-25 ASSESSMENT — PAIN SCALES - GENERAL: PAINLEVEL: NO PAIN (0)

## 2023-04-25 NOTE — PROGRESS NOTES
Date of Service: 4/25/2023     Referring Provider: Dr. ASHLEY Avendaño MD    Subjective:            Flash Brown is a 82 year old male presenting for evaluation of abnormal liver tests    History of Present Illness   Flash Brown is a 82 year old male with past medical history of obesity, CAD, diastolic heart failure, depression, hypertension, multiple skin cancers, history of lymphoma, longstanding thrombocytopenia status post thorough evaluation being referred for further questions of cirrhosis.    He denies ever being told he had any issues related to his liver throughout life.    Reports that he was in the [employer] and was stationed and [place] for 1 year and then [place] for 1 year.  Proceeded to live throughout [place], typically an administrative type jobs.  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  He performed at work for several decades ultimately retiring and then moving to the [place] approximately 5 years ago to be closer to family.  Reports that for the majority of his adult life he weighed around 200 pounds and since assisted has gained more weight.  His peak weight was 265 pounds but this was at a time of an acute heart failure exacerbation where he underwent aggressive diuresis and lost approximately 35 pounds.    Notes that he is had a diagnosis of diabetes for less than 10 years.  There is no overt family history of liver disease that the patient nor his sister are aware of.  He denies a significant history of alcohol use and denies a history of IV or inhaled drug use.    He underwent a lab evaluation as the possible etiology of his thrombocytopenia, also had several bone marrow biopsies all of which were unrevealing.  In review of the medical record he has had low platelets since at least 2008, and consistently so since 2019.  Review of the chart demonstrates normal serum ALT and AST over the past 4 years    Past Medical  History:  Past Medical History:   Diagnosis Date     Basal cell carcinoma      CAD (coronary artery disease)      Depression      Hyperlipidemia      Hypertension      Lymphoma (H)      Malignant melanoma (H)      Melanoma (H)      Squamous cell carcinoma    Diastolic heart failure  Diabetes mellitus, not on long-term insulin therapy    Surgical History:  Past Surgical History:   Procedure Laterality Date     AXILLARY SURGERY      S/P resection and adjuvant radiation     BONE MARROW BIOPSY, BONE SPECIMEN, NEEDLE/TROCAR N/A 7/8/2019    Procedure: BIOPSY, BONE MARROW;  Surgeon: Husam Issa MD;  Location: [facility]     BONE MARROW BIOPSY, BONE SPECIMEN, NEEDLE/TROCAR Left 2/24/2022    Procedure: BIOPSY, BONE MARROW;  Surgeon: Cailin Anthony PA-C;  [place] [facility] [place]     CARDIAC SURGERY       CHOLECYSTECTOMY       HERNIA REPAIR, UMBILICAL       PHACOEMULSIFICATION WITH STANDARD INTRAOCULAR LENS IMPLANT Right 10/2/2019    Procedure: Cataract Removal with Implant;  Surgeon: Dinesh Ivey MD;  Location: [facility]     PHACOEMULSIFICATION WITH STANDARD INTRAOCULAR LENS IMPLANT Left 10/21/2019    Procedure: Cataract Removal with Implant;  Surgeon: Dinesh Ivey MD;  Location: [facility]     STENT      PTCA with drug-eluting stent of RCA, circumflex, and LAD     VASCULAR SURGERY         Social History:  Social History     Tobacco Use     Smoking status: Never     Smokeless tobacco: Never   Vaping Use     Vaping status: Never Used     Passive vaping exposure: Yes   Substance Use Topics     Alcohol use: Yes     Comment: glass of wine couple times per week     Drug use: Never        Family History:  Family History   Problem Relation Age of Onset     Asthma Other      Cancer Other         melanoma     Blood Disease Other         bleeding disorder     Lung Cancer Mother         Smoker     Prostate Cancer Father      Melanoma No family hx of        Medications:  Current Outpatient Medications   Medication     aspirin  (ASA) 81 MG EC tablet     atorvastatin (LIPITOR) 20 MG tablet     ferrous sulfate (FE TABS) 325 (65 Fe) MG EC tablet     furosemide (LASIX) 20 MG tablet     magnesium oxide (MAG-OX) 400 MG tablet     metoprolol succinate ER (TOPROL XL) 25 MG 24 hr tablet     nitroGLYcerin (NITROSTAT) 0.4 MG sublingual tablet     potassium chloride ER (KLOR-CON M) 20 MEQ CR tablet     ramipril (ALTACE) 5 MG capsule     tamsulosin (FLOMAX) 0.4 MG capsule     triamcinolone (KENALOG) 0.1 % external cream     ORDER FOR DME     No current facility-administered medications for this visit.     Facility-Administered Medications Ordered in Other Visits   Medication     sodium hyaluronate (HEALON DUET)       Review of Systems  A complete 10 point review of systems was asked and answered in the negative unless specifically commented upon in the HPI    Objective:         Vitals:    04/25/23 1027   BP: (!) 152/78   Pulse: 63   Temp: 97.4  F (36.3  C)   SpO2: 96%   Weight: 102.1 kg (225 lb)     Body mass index is 36.87 kg/m .     Physical Exam    Vitals reviewed.   Constitutional: Well-developed, well-nourished, in no apparent distress.    HEENT: Normocephalic. no scleral icterus.  Neck/Lymph: Normal ROM  Cardiac:  Regular rate  Respiratory: Normal respiratory excursion   GI:  Abdomen soft,obese, non-tender. BS present.  Skin:  Skin is warm and dry. No rash noted.  no jaundice. no spider nevi noted.  + palmar erythema, scattered lesions and scars from previous resections on his scalp, temples, forearms  Peripheral Vascular: No lower extremity edema. 2+ pulses in all extremities  Musculoskeletal:  ROM intact, normal muscle bulk    Psychiatric: Normal mood and affect. Behavior is normal.  Neuro: No asterixis, no tremor    Labs and Diagnostic tests:  Lab Results   Component Value Date    BILITOTAL 0.9 08/30/2022    BILITOTAL 0.9 04/23/2021    ALT 19 11/10/2022    ALT 25 04/23/2021    AST 37 08/30/2022    AST 18 04/23/2021    ALKPHOS 116 08/30/2022     ALKPHOS 114 04/23/2021     Lab Results   Component Value Date    ALBUMIN 3.2 08/30/2022    ALBUMIN 2.8 08/21/2022    ALBUMIN 3.1 04/23/2021    PROTTOTAL 6.1 08/30/2022    PROTTOTAL 6.7 04/23/2021          Imaging:  CTAbd Pelvis 7/2019  FINDINGS:    Chest: There are a few small calcified granulomas in both lungs. There  is a 1.7 cm mixed solid and groundglass nodule along the right minor  fissure laterally (series 6 image 94). Mild scattered scarring and/or  atelectasis at both lung bases posteriorly. Atherosclerotic  calcification of the thoracic aorta and coronary arteries. No pleural  or pericardial effusions. No enlarged lymph nodes are identified in  the chest.     Abdomen and Pelvis: Prior cholecystectomy. Mild nodularity of the  liver contour suggests underlying cirrhosis. There is mild  splenomegaly. The spleen measures up to 15.5 cm in length. The adrenal  glands, pancreas, and kidneys are unremarkable. No hydronephrosis.  Mild atherosclerotic aortoiliac calcification. No bowel obstruction.  Colonic diverticulosis, without convincing evidence for  diverticulitis. No free fluid in the pelvis. Mild prostatic  enlargement. No enlarged lymph nodes are identified in the abdomen or  pelvis. Degenerative changes are noted throughout the thoracolumbar  spine.     Assessment and Plan:    Thrombocytopenia/Question of cirrhosis:    -He has had longstanding thrombocytopenia, and abdominal imaging in 2019 raising concerns for cirrhosis with portal hypertension  -Denies ever being told he had abnormal liver tests in the past  -Risk factors would be highest for nonalcohol related fatty liver disease given obesity and diabetes with hypertension  -No overt significant alcohol use nor IV or inhaled drug use, and no overt family history of liver disease  -No overt clinical signs of portal hypertension at this time  -Felt prudent to further evaluate for possible cirrhosis with portal hypertension    -We will complete a lab  evaluation as to potential causes of chronic liver disease    -We will obtain a complete abdominal ultrasound to evaluate for cirrhosis and evidence of portal hypertension    Screening for esophageal varices:  -We did discuss this on today's clinic visit, and there was some hesitancy with the patient and his sister for him to undergo invasive evaluation.  We will plan to defer at this time    Hepatocellular carcinoma screening:  -We will plan to obtain an abdominal ultrasound now    Non-Alcoholic Fatty Liver Disease  - I had a long discussion with the patient about nonalcoholic fatty liver disease (NAFLD).  We discussed how fat deposits in the liver, how this leads to inflammation, and how chronic inflammation (DEWITT) can ultimately lead to scarring and cirrhosis.  The long-term complications of fatty liver disease were discussed with the patient, including the increased risk of cardiovascular disease complications,the risk of developing diabetes (if not already), and variable progression to cirrhosis and end stage liver disease.  - If the patient only has benign steatosis, they will have low risk of progression and no treatment will be needed at that time except for lifestyle modifications.  - It is important to note that liver tests alone as a screening test for DEWITT are not sensitive, as up to 20-30% of patients can have DEWITT with fibrosis despite having normal liver chemistries.     Management of NAFLD/DEWITT  - We spent time discussing an appropriate diet, exercise and weight loss plan.      - Recommend exercise regularly: 4+ times per week, with an average of about 40-45 minutes per day.      - It has shown that patients who exercise regularly can have improvement of insulin resistance and resolution of fatty liver disease, even if they are not able to lose weight.     - Recommend a low-carbohydrate, low-calorie diet (7089-4526 calories per day).    - An ideal weight loss plan would be to lose 7-10% of body  "weight over the next six months  - Recommend aggressive diabetes management: ideal goal Hgb A1c goal of =/< 7%. If possible addition of insulin sensitizing agents like metformin or liraglutide will be helpful.    - Management of cholesterol is also very important.    - The use of \"statins\" (HMG-CoA reductase medications) are an effective means of therapy and are not contra-indicated in those with abnormal liver tests OR those with cirrhosis.  The value of these medications in this population far outweigh the minor risks of abnormal liver tests.   - Goal LDL in those with DEWITT are < 100 mg/dL  - Consider the utility of liberalizing coffee consumption as some data that this may slow progression and reverse effects of DEWITT-related fibrosis.      Routine Health Care in Patient with Chronic Liver Disease:  - We recommend screening for hepatitis A and B, please vaccinate if not immune  - All patients with liver disease, particularly those with cholestatic liver disease, are at an increased risk for osteoporosis.  We strongly recommend screening for Vitamin D deficiency at least twice yearly with aggressive supplementation/replacement as indicated.    - We also recommend a screening DEXA scan to evaluate for osteoporosis.  If present, should treat with calcium, Vitamin D supplementation, and recommend consideration of bisphosphonate therapy.  Also recommend follow up DEXA scans to evaluate for improvement of bone density on therapy.  - All patients with liver disease should avoid the use of Non-steroidal Anti-Inflammatory (NSAID) medications as they can cause significant injury to the kidneys in this population    Follow Up:  6 months     Thank you very much for the opportunity to participate in the care of this patient.  If you have any further questions, please don't hesitate to contact our office.    Thomas M. Leventhal, M.D.   of Medicine  Advanced & Transplant Hepatology  [facility] " [facility]

## 2023-04-25 NOTE — NURSING NOTE
Chief Complaint   Patient presents with     Consult     New pt consult.     Blood pressure (!) 152/78, pulse 63, temperature 97.4  F (36.3  C), weight 102.1 kg (225 lb), SpO2 96 %.    ERMA HINTON

## 2023-04-25 NOTE — LETTER
4/25/2023         RE: Flash Brown  [address]  [facility] [id number]        Dear Colleague,    Thank you for referring your patient, Flash Brown, to the [facility] [place]. Please see a copy of my visit note below.    Date of Service: 4/25/2023     Referring Provider: Dr. ASHLEY Avendaño MD    Subjective:            Flash Brown is a 82 year old male presenting for evaluation of abnormal liver tests    History of Present Illness   Flash Brown is a 82 year old male with past medical history of obesity, CAD, diastolic heart failure, depression, hypertension, multiple skin cancers, history of lymphoma, longstanding thrombocytopenia status post thorough evaluation being referred for further questions of cirrhosis.    He denies ever being told he had any issues related to his liver throughout life.    Reports that he was in the [employer] and was stationed and [place] for 1 year and then [place] for 1 year.  Proceeded to live throughout [place], typically an administrative type jobs.  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  He performed at work for several decades ultimately retiring and then moving to the [place] approximately 5 years ago to be closer to family.  Reports that for the majority of his adult life he weighed around 200 pounds and since jail has gained more weight.  His peak weight was 265 pounds but this was at a time of an acute heart failure exacerbation where he underwent aggressive diuresis and lost approximately 35 pounds.    Notes that he is had a diagnosis of diabetes for less than 10 years.  There is no overt family history of liver disease that the patient nor his sister are aware of.  He denies a significant history of alcohol use and denies a history of IV or inhaled drug use.    He underwent a lab evaluation as the possible etiology of his thrombocytopenia, also had  several bone marrow biopsies all of which were unrevealing.  In review of the medical record he has had low platelets since at least 2008, and consistently so since 2019.  Review of the chart demonstrates normal serum ALT and AST over the past 4 years    Past Medical History:  Past Medical History:   Diagnosis Date    Basal cell carcinoma     CAD (coronary artery disease)     Depression     Hyperlipidemia     Hypertension     Lymphoma (H)     Malignant melanoma (H)     Melanoma (H)     Squamous cell carcinoma    Diastolic heart failure  Diabetes mellitus, not on long-term insulin therapy    Surgical History:  Past Surgical History:   Procedure Laterality Date    AXILLARY SURGERY      S/P resection and adjuvant radiation    BONE MARROW BIOPSY, BONE SPECIMEN, NEEDLE/TROCAR N/A 7/8/2019    Procedure: BIOPSY, BONE MARROW;  Surgeon: Husam Issa MD;  Location: [facility]    BONE MARROW BIOPSY, BONE SPECIMEN, NEEDLE/TROCAR Left 2/24/2022    Procedure: BIOPSY, BONE MARROW;  Surgeon: Cailin Anthony PA-C;  [place] [facility] [place]    CARDIAC SURGERY      CHOLECYSTECTOMY      HERNIA REPAIR, UMBILICAL      PHACOEMULSIFICATION WITH STANDARD INTRAOCULAR LENS IMPLANT Right 10/2/2019    Procedure: Cataract Removal with Implant;  Surgeon: Dinesh Ivey MD;  Location: [facility]    PHACOEMULSIFICATION WITH STANDARD INTRAOCULAR LENS IMPLANT Left 10/21/2019    Procedure: Cataract Removal with Implant;  Surgeon: Dinesh Ievy MD;  Location: [facility]    STENT      PTCA with drug-eluting stent of RCA, circumflex, and LAD    VASCULAR SURGERY         Social History:  Social History     Tobacco Use    Smoking status: Never    Smokeless tobacco: Never   Vaping Use    Vaping status: Never Used     Passive vaping exposure: Yes   Substance Use Topics    Alcohol use: Yes     Comment: glass of wine couple times per week    Drug use: Never        Family History:  Family History   Problem Relation Age of Onset    Asthma Other     Cancer  Other         melanoma    Blood Disease Other         bleeding disorder    Lung Cancer Mother         Smoker    Prostate Cancer Father     Melanoma No family hx of        Medications:  Current Outpatient Medications   Medication    aspirin (ASA) 81 MG EC tablet    atorvastatin (LIPITOR) 20 MG tablet    ferrous sulfate (FE TABS) 325 (65 Fe) MG EC tablet    furosemide (LASIX) 20 MG tablet    magnesium oxide (MAG-OX) 400 MG tablet    metoprolol succinate ER (TOPROL XL) 25 MG 24 hr tablet    nitroGLYcerin (NITROSTAT) 0.4 MG sublingual tablet    potassium chloride ER (KLOR-CON M) 20 MEQ CR tablet    ramipril (ALTACE) 5 MG capsule    tamsulosin (FLOMAX) 0.4 MG capsule    triamcinolone (KENALOG) 0.1 % external cream    ORDER FOR DME     No current facility-administered medications for this visit.     Facility-Administered Medications Ordered in Other Visits   Medication    sodium hyaluronate (HEALON DUET)       Review of Systems  A complete 10 point review of systems was asked and answered in the negative unless specifically commented upon in the HPI    Objective:         Vitals:    04/25/23 1027   BP: (!) 152/78   Pulse: 63   Temp: 97.4  F (36.3  C)   SpO2: 96%   Weight: 102.1 kg (225 lb)     Body mass index is 36.87 kg/m .     Physical Exam    Vitals reviewed.   Constitutional: Well-developed, well-nourished, in no apparent distress.    HEENT: Normocephalic. no scleral icterus.  Neck/Lymph: Normal ROM  Cardiac:  Regular rate  Respiratory: Normal respiratory excursion   GI:  Abdomen soft,obese, non-tender. BS present.  Skin:  Skin is warm and dry. No rash noted.  no jaundice. no spider nevi noted.  + palmar erythema, scattered lesions and scars from previous resections on his scalp, temples, forearms  Peripheral Vascular: No lower extremity edema. 2+ pulses in all extremities  Musculoskeletal:  ROM intact, normal muscle bulk    Psychiatric: Normal mood and affect. Behavior is normal.  Neuro: No asterixis, no  tremor    Labs and Diagnostic tests:  Lab Results   Component Value Date    BILITOTAL 0.9 08/30/2022    BILITOTAL 0.9 04/23/2021    ALT 19 11/10/2022    ALT 25 04/23/2021    AST 37 08/30/2022    AST 18 04/23/2021    ALKPHOS 116 08/30/2022    ALKPHOS 114 04/23/2021     Lab Results   Component Value Date    ALBUMIN 3.2 08/30/2022    ALBUMIN 2.8 08/21/2022    ALBUMIN 3.1 04/23/2021    PROTTOTAL 6.1 08/30/2022    PROTTOTAL 6.7 04/23/2021          Imaging:  CTAbd Pelvis 7/2019  FINDINGS:    Chest: There are a few small calcified granulomas in both lungs. There  is a 1.7 cm mixed solid and groundglass nodule along the right minor  fissure laterally (series 6 image 94). Mild scattered scarring and/or  atelectasis at both lung bases posteriorly. Atherosclerotic  calcification of the thoracic aorta and coronary arteries. No pleural  or pericardial effusions. No enlarged lymph nodes are identified in  the chest.     Abdomen and Pelvis: Prior cholecystectomy. Mild nodularity of the  liver contour suggests underlying cirrhosis. There is mild  splenomegaly. The spleen measures up to 15.5 cm in length. The adrenal  glands, pancreas, and kidneys are unremarkable. No hydronephrosis.  Mild atherosclerotic aortoiliac calcification. No bowel obstruction.  Colonic diverticulosis, without convincing evidence for  diverticulitis. No free fluid in the pelvis. Mild prostatic  enlargement. No enlarged lymph nodes are identified in the abdomen or  pelvis. Degenerative changes are noted throughout the thoracolumbar  spine.     Assessment and Plan:    Thrombocytopenia/Question of cirrhosis:    -He has had longstanding thrombocytopenia, and abdominal imaging in 2019 raising concerns for cirrhosis with portal hypertension  -Denies ever being told he had abnormal liver tests in the past  -Risk factors would be highest for nonalcohol related fatty liver disease given obesity and diabetes with hypertension  -No overt significant alcohol use nor IV  or inhaled drug use, and no overt family history of liver disease  -No overt clinical signs of portal hypertension at this time  -Felt prudent to further evaluate for possible cirrhosis with portal hypertension    -We will complete a lab evaluation as to potential causes of chronic liver disease    -We will obtain a complete abdominal ultrasound to evaluate for cirrhosis and evidence of portal hypertension    Screening for esophageal varices:  -We did discuss this on today's clinic visit, and there was some hesitancy with the patient and his sister for him to undergo invasive evaluation.  We will plan to defer at this time    Hepatocellular carcinoma screening:  -We will plan to obtain an abdominal ultrasound now    Non-Alcoholic Fatty Liver Disease  - I had a long discussion with the patient about nonalcoholic fatty liver disease (NAFLD).  We discussed how fat deposits in the liver, how this leads to inflammation, and how chronic inflammation (DEWITT) can ultimately lead to scarring and cirrhosis.  The long-term complications of fatty liver disease were discussed with the patient, including the increased risk of cardiovascular disease complications,the risk of developing diabetes (if not already), and variable progression to cirrhosis and end stage liver disease.  - If the patient only has benign steatosis, they will have low risk of progression and no treatment will be needed at that time except for lifestyle modifications.  - It is important to note that liver tests alone as a screening test for DEWITT are not sensitive, as up to 20-30% of patients can have DEWITT with fibrosis despite having normal liver chemistries.     Management of NAFLD/DEWITT  - We spent time discussing an appropriate diet, exercise and weight loss plan.      - Recommend exercise regularly: 4+ times per week, with an average of about 40-45 minutes per day.      - It has shown that patients who exercise regularly can have improvement of insulin  "resistance and resolution of fatty liver disease, even if they are not able to lose weight.     - Recommend a low-carbohydrate, low-calorie diet (9179-1355 calories per day).    - An ideal weight loss plan would be to lose 7-10% of body weight over the next six months  - Recommend aggressive diabetes management: ideal goal Hgb A1c goal of =/< 7%. If possible addition of insulin sensitizing agents like metformin or liraglutide will be helpful.    - Management of cholesterol is also very important.    - The use of \"statins\" (HMG-CoA reductase medications) are an effective means of therapy and are not contra-indicated in those with abnormal liver tests OR those with cirrhosis.  The value of these medications in this population far outweigh the minor risks of abnormal liver tests.   - Goal LDL in those with DEWITT are < 100 mg/dL  - Consider the utility of liberalizing coffee consumption as some data that this may slow progression and reverse effects of DEWITT-related fibrosis.      Routine Health Care in Patient with Chronic Liver Disease:  - We recommend screening for hepatitis A and B, please vaccinate if not immune  - All patients with liver disease, particularly those with cholestatic liver disease, are at an increased risk for osteoporosis.  We strongly recommend screening for Vitamin D deficiency at least twice yearly with aggressive supplementation/replacement as indicated.    - We also recommend a screening DEXA scan to evaluate for osteoporosis.  If present, should treat with calcium, Vitamin D supplementation, and recommend consideration of bisphosphonate therapy.  Also recommend follow up DEXA scans to evaluate for improvement of bone density on therapy.  - All patients with liver disease should avoid the use of Non-steroidal Anti-Inflammatory (NSAID) medications as they can cause significant injury to the kidneys in this population    Follow Up:  6 months     Thank you very much for the opportunity to " participate in the care of this patient.  If you have any further questions, please don't hesitate to contact our office.    Thomas M. Leventhal, M.D.   of Medicine  Advanced & Transplant Hepatology  [facility]

## 2023-04-26 ENCOUNTER — HOSPITAL ENCOUNTER (OUTPATIENT)
Dept: ULTRASOUND IMAGING | Facility: CLINIC | Age: 83
Discharge: HOME | End: 2023-04-26
Attending: INTERNAL MEDICINE | Admitting: INTERNAL MEDICINE
Payer: MEDICARE

## 2023-04-26 ENCOUNTER — OFFICE VISIT (OUTPATIENT)
Dept: DERMATOLOGY | Facility: CLINIC | Age: 83
End: 2023-04-26
Payer: MEDICARE

## 2023-04-26 DIAGNOSIS — C44.329 SQUAMOUS CELL CANCER OF SKIN OF RIGHT CHEEK: ICD-10-CM

## 2023-04-26 DIAGNOSIS — K74.69 OTHER CIRRHOSIS OF LIVER (H): ICD-10-CM

## 2023-04-26 DIAGNOSIS — L57.0 AK (ACTINIC KERATOSIS): ICD-10-CM

## 2023-04-26 DIAGNOSIS — L82.1 SEBORRHEIC KERATOSIS: ICD-10-CM

## 2023-04-26 DIAGNOSIS — L81.4 LENTIGO: ICD-10-CM

## 2023-04-26 DIAGNOSIS — D04.39 SQUAMOUS CELL CARCINOMA IN SITU (SCCIS) OF SKIN OF LEFT CHEEK: ICD-10-CM

## 2023-04-26 DIAGNOSIS — C44.329 SQUAMOUS CELL CARCINOMA OF SKIN OF RIGHT TEMPLE: ICD-10-CM

## 2023-04-26 DIAGNOSIS — D18.01 ANGIOMA OF SKIN: ICD-10-CM

## 2023-04-26 DIAGNOSIS — Z85.828 HISTORY OF SKIN CANCER: Primary | ICD-10-CM

## 2023-04-26 DIAGNOSIS — D23.9 DERMAL NEVUS: ICD-10-CM

## 2023-04-26 LAB
ALBUMIN SERPL-MCNC: 3.3 G/DL (ref 3.4–5)
ALP SERPL-CCNC: 135 U/L (ref 40–150)
ALT SERPL W P-5'-P-CCNC: 33 U/L (ref 0–70)
ANION GAP SERPL CALCULATED.3IONS-SCNC: 5 MMOL/L (ref 3–14)
AST SERPL W P-5'-P-CCNC: 39 U/L (ref 0–45)
BILIRUB DIRECT SERPL-MCNC: 0.4 MG/DL (ref 0–0.2)
BILIRUB SERPL-MCNC: 1.3 MG/DL (ref 0.2–1.3)
BUN SERPL-MCNC: 12 MG/DL (ref 7–30)
CALCIUM SERPL-MCNC: 8.4 MG/DL (ref 8.5–10.1)
CHLORIDE BLD-SCNC: 103 MMOL/L (ref 94–109)
CO2 SERPL-SCNC: 31 MMOL/L (ref 20–32)
CREAT SERPL-MCNC: 0.86 MG/DL (ref 0.66–1.25)
GFR SERPL CREATININE-BSD FRML MDRD: 88 ML/MIN/1.73M2
GLUCOSE BLD-MCNC: 326 MG/DL (ref 70–99)
HBV CORE AB SERPL QL IA: NONREACTIVE
HBV SURFACE AB SERPL IA-ACNC: 0 M[IU]/ML
HBV SURFACE AB SERPL IA-ACNC: NONREACTIVE M[IU]/ML
HBV SURFACE AG SERPL QL IA: NONREACTIVE
POTASSIUM BLD-SCNC: 4 MMOL/L (ref 3.4–5.3)
PROT SERPL-MCNC: 7.2 G/DL (ref 6.8–8.8)
SODIUM SERPL-SCNC: 139 MMOL/L (ref 133–144)

## 2023-04-26 PROCEDURE — 99213 OFFICE O/P EST LOW 20 MIN: CPT | Mod: 25 | Performed by: DERMATOLOGY

## 2023-04-26 PROCEDURE — 11102 TANGNTL BX SKIN SINGLE LES: CPT | Performed by: DERMATOLOGY

## 2023-04-26 PROCEDURE — 17003 DESTRUCT PREMALG LES 2-14: CPT | Performed by: DERMATOLOGY

## 2023-04-26 PROCEDURE — 11103 TANGNTL BX SKIN EA SEP/ADDL: CPT | Performed by: DERMATOLOGY

## 2023-04-26 PROCEDURE — 88331 PATH CONSLTJ SURG 1 BLK 1SPC: CPT | Mod: 26 | Performed by: DERMATOLOGY

## 2023-04-26 PROCEDURE — 93975 VASCULAR STUDY: CPT

## 2023-04-26 PROCEDURE — 17000 DESTRUCT PREMALG LESION: CPT | Mod: 59 | Performed by: DERMATOLOGY

## 2023-04-26 ASSESSMENT — PAIN SCALES - GENERAL: PAINLEVEL: NO PAIN (0)

## 2023-04-26 NOTE — NURSING NOTE
Chief Complaint   Patient presents with     Skin Check     No Concerns        There were no vitals filed for this visit.  Wt Readings from Last 1 Encounters:   04/25/23 102.1 kg (225 lb)       Mary Peace LPN .................4/26/2023

## 2023-04-26 NOTE — PROGRESS NOTES
Flash Brown is an extremely pleasant 82 year old year old male patient here today for hx of non-melanoma skin cancer.  HE notes some rough spots on face.   .   Patient states this has been present for a while.  Patient reports the following symptoms:  rough.  Patient reports the following previous treatments none.  These treatments did not work.  Patient reports the following modifying factors none.  Associated symptoms: none.  Patient has no other skin complaints today.  Remainder of the HPI, Meds, PMH, Allergies, FH, and SH was reviewed in chart.      Past Medical History:   Diagnosis Date     Basal cell carcinoma      CAD (coronary artery disease)      Depression      Hyperlipidemia      Hypertension      Lymphoma (H)      Malignant melanoma (H)      Melanoma (H)      Squamous cell carcinoma        Past Surgical History:   Procedure Laterality Date     AXILLARY SURGERY      S/P resection and adjuvant radiation     BONE MARROW BIOPSY, BONE SPECIMEN, NEEDLE/TROCAR N/A 7/8/2019    Procedure: BIOPSY, BONE MARROW;  Surgeon: Husam Issa MD;  Location: [facility]     BONE MARROW BIOPSY, BONE SPECIMEN, NEEDLE/TROCAR Left 2/24/2022    Procedure: BIOPSY, BONE MARROW;  Surgeon: Cailin Anthony PA-C;  Location: [facility] OR     CARDIAC SURGERY       CHOLECYSTECTOMY       HERNIA REPAIR, UMBILICAL       PHACOEMULSIFICATION WITH STANDARD INTRAOCULAR LENS IMPLANT Right 10/2/2019    Procedure: Cataract Removal with Implant;  Surgeon: Dinesh Ivey MD;  Location: [facility]     PHACOEMULSIFICATION WITH STANDARD INTRAOCULAR LENS IMPLANT Left 10/21/2019    Procedure: Cataract Removal with Implant;  Surgeon: Dinesh Ivey MD;  Location: [facility]     STENT      PTCA with drug-eluting stent of RCA, circumflex, and LAD     VASCULAR SURGERY          Family History   Problem Relation Age of Onset     Asthma Other      Cancer Other         melanoma     Blood Disease Other         bleeding disorder     Lung Cancer Mother          Smoker     Prostate Cancer Father      Melanoma No family hx of        Social History     Socioeconomic History     Marital status: Single     Spouse name: Not on file     Number of children: 0     Years of education: Not on file     Highest education level: Not on file   Occupational History     Occupation: Retired     Comment: [employer]    Tobacco Use     Smoking status: Never     Smokeless tobacco: Never   Vaping Use     Vaping status: Never Used     Passive vaping exposure: Yes   Substance and Sexual Activity     Alcohol use: Yes     Comment: glass of wine couple times per week     Drug use: Never     Sexual activity: Not on file   Other Topics Concern     Parent/sibling w/ CABG, MI or angioplasty before 65F 55M? Not Asked   Social History Narrative     Not on file     Social Determinants of Health     Financial Resource Strain: Low Risk  (11/9/2021)    Overall Financial Resource Strain (CARDIA)      Difficulty of Paying Living Expenses: Not hard at all   Food Insecurity: No Food Insecurity (11/9/2021)    Hunger Vital Sign      Worried About Running Out of Food in the Last Year: Never true      Ran Out of Food in the Last Year: Never true   Transportation Needs: No Transportation Needs (11/9/2021)    PRAPARE - Transportation      Lack of Transportation (Medical): No      Lack of Transportation (Non-Medical): No   Physical Activity: Inactive (11/9/2021)    Exercise Vital Sign      Days of Exercise per Week: 0 days      Minutes of Exercise per Session: 0 min   Stress: No Stress Concern Present (11/9/2021)    [place] [employer] - Occupational Stress Questionnaire      Feeling of Stress : Not at all   Social Connections: Socially Isolated (11/9/2021)    Social Connection and Isolation Panel [NHANES]      Frequency of Communication with Friends and Family: Once a week      Frequency of Social Gatherings with Friends and Family: Once a week      Attends Yarsanism Services: More than  4 times per year      Active Member of Clubs or Organizations: No      Attends Club or Organization Meetings: Not on file      Marital Status: Never    Intimate Partner Violence: Not on file   Housing Stability: Low Risk  (11/9/2021)    Housing Stability Vital Sign      Unable to Pay for Housing in the Last Year: No      Number of Places Lived in the Last Year: 1      Unstable Housing in the Last Year: No       Outpatient Encounter Medications as of 4/26/2023   Medication Sig Dispense Refill     aspirin (ASA) 81 MG EC tablet Take 81 mg by mouth daily       atorvastatin (LIPITOR) 20 MG tablet TAKE 1 TABLET BY MOUTH EVERY DAY 90 tablet 0     ferrous sulfate (FE TABS) 325 (65 Fe) MG EC tablet Take 1 tablet (325 mg) by mouth daily 90 tablet 0     furosemide (LASIX) 20 MG tablet Take 2 tablets (40 mg) by mouth every morning AND 1 tablet (20 mg) daily at 2 pm. 270 tablet 3     magnesium oxide (MAG-OX) 400 MG tablet Take 1 tablet (400 mg) by mouth 2 times daily 60 tablet 1     metoprolol succinate ER (TOPROL XL) 25 MG 24 hr tablet Take 1 tablet (25 mg) by mouth daily 90 tablet 3     nitroGLYcerin (NITROSTAT) 0.4 MG sublingual tablet Place 1 tablet (0.4 mg) under the tongue See Admin Instructions for chest pain 30 tablet 0     ORDER FOR DME Light Therapy with Full Spectrum Light (78951 lux) Start with 10-15 minute exposure per day, Increase exposure to 30 to 45 minutes per day, Maximum exposure: 90 minutes per day,Look periodically at light during each session  (Patient not taking: Reported on 4/25/2023) 1 0     potassium chloride ER (KLOR-CON M) 20 MEQ CR tablet Take 1 tablet (20 mEq) by mouth 2 times daily 60 tablet 0     ramipril (ALTACE) 5 MG capsule Take 1 capsule (5 mg) by mouth daily 90 capsule 3     tamsulosin (FLOMAX) 0.4 MG capsule Take 1 capsule (0.4 mg) by mouth every evening 90 capsule 3     triamcinolone (KENALOG) 0.1 % external cream Twice daily to legs prn itching 454 g 3     [DISCONTINUED] amLODIPine  (NORVASC) 5 MG tablet Take 1 tablet (5 mg) by mouth daily 90 tablet 3     [DISCONTINUED] carvedilol (COREG) 12.5 MG tablet Take 1 tablet (12.5 mg) by mouth 2 times daily (with meals) 180 tablet 3     [DISCONTINUED] cloNIDine (CATAPRES) 0.1 MG tablet Take 1 tablet (0.1 mg) by mouth 2 times daily 180 tablet 3     [DISCONTINUED] clopidogrel (PLAVIX) 75 MG tablet Take 1 tablet (75 mg) by mouth daily 90 tablet 3     [DISCONTINUED] hydrALAZINE (APRESOLINE) 50 MG tablet Take 1 tablet (50 mg) by mouth 2 times daily 180 tablet 1     [DISCONTINUED] naloxone (NARCAN) 4 MG/0.1ML nasal spray Spray 1 spray (4 mg) into one nostril alternating nostrils once as needed for opioid reversal Every 2-3 minutes until patient responsive or EMS arrives 0.2 mL 0     [DISCONTINUED] pioglitazone (ACTOS) 30 MG tablet Take 1 tablet (30 mg) by mouth daily 90 tablet 0     Facility-Administered Encounter Medications as of 4/26/2023   Medication Dose Route Frequency Provider Last Rate Last Admin     sodium hyaluronate (HEALON DUET)    Dinesh Matos MD   1 kit at 10/02/19 1149             O:   NAD, WDWN, Alert & Oriented, Mood & Affect wnl, Vitals stable   Here today alone   General appearance normal   Vitals stable   Alert, oriented and in no acute distress      Following lymph nodes palpated: Occipital, Cervical, Supraclavicular no lad  Gritty scaly papule on face and r hand  R temple ulcerated 1.8cm red plaque  L cheek shiny 1cm red plaque  R medial cheek 6mm crusted scaly papule      Stuck on papules and brown macules on trunk and ext   Red papules on trunk  Flesh colored papules on trunk     The remainder of the full exam was normal; the following areas were examined:  conjunctiva/lids, neck, peripheral vascular system, abdomen, lymph nodes, digits/nails, eccrine and apocrine glands, scalp/hair, face, neck, chest, abdomen, buttocks, back, RUE, LUE, RLE, LLE       Eyes: Conjunctivae/lids:Normal     ENT: Lips, buccal mucosa, tongue:  normal    MSK:Normal    Cardiovascular: peripheral edema none    Pulm: Breathing Normal    Lymph Nodes: No Head and Neck Lymphadenopathy     Neuro/Psych: Orientation:Alert and Orientedx3 ; Mood/Affect:normal       MICRO:     R temple (red): There is a proliferation of irregular nests of abnormal squamous cells arising from the epidermis and invading the dermis. These are well differentiated. The dermis shows a variable superficial perivascular inflammatory infiltrate.   L medial cheek (blue): There is hyperkeratosis & parakeratosis of the epidermis, with full thickness epidermal involvement by atypical keratinocytes with rare pale vacuolated cells invading dermis.    R medial cheek (green): There is hyperkeratosis & parakeratosis of the epidermis, with full thickness epidermal involvement by atypical keratinocytes with rare pale vacuolated cells.  Unremarkable dermis.    A/P:  1. Seborrheic keratosis, lentigo, angioma, dermal nevus, hx of non-melanoma skin cancer   2. Actinic keratosis   L brow x1, L cheek x2, R cheek x3, R hand x2  LN2:  Treated with LN2 for 5s for 1-2 cycles. Warned risks of blistering, pain, pigment change, scarring, and incomplete resolution.  Advised patient to return if lesions do not completely resolve.  Wound care sheet given.  3. R/o basal cell carcinoma   TANGENTIAL BIOPSY IN HOUSE:  After consent, anesthesia with LEC and prep, tangential excision performed and dx above confirmed with frozen section histology.  No complications and routine wound care.  Patient is asa  anticoagulants and risk of bleeding discussed with patient.       I have personally reviewed all specimens and/or slides and used them with my medical judgement to determine or confirm the final diagnosis.     Patient told result   R temple squamous cell carcinoma   L medial cheek squamous cell carcinoma   R medial cheek   Squamous cell carcinoma in situ  Schedule excision   It was a pleasure speaking to Flash Brown  today.  Previous clinic notes and pertinent laboratory tests were reviewed prior to Flash Brown's visit.  Nature of benign skin lesions dicussed with patient.  Signs and Symptoms of skin cancer discussed with patient.  Patient encouraged to perform monthly skin exams.  UV precautions reviewed with patient.  Risks of non-melanoma skin cancer discussed with patient   Return to clinic next appt

## 2023-04-26 NOTE — LETTER
4/26/2023         RE: Flash Brown  [address]  [facility] [address]        Dear Colleague,    Thank you for referring your patient, Flash Brown, to the [facility]. Please see a copy of my visit note below.    Flash Brown is an extremely pleasant 82 year old year old male patient here today for hx of non-melanoma skin cancer.  HE notes some rough spots on face.   .   Patient states this has been present for a while.  Patient reports the following symptoms:  rough.  Patient reports the following previous treatments none.  These treatments did not work.  Patient reports the following modifying factors none.  Associated symptoms: none.  Patient has no other skin complaints today.  Remainder of the HPI, Meds, PMH, Allergies, FH, and SH was reviewed in chart.      Past Medical History:   Diagnosis Date     Basal cell carcinoma      CAD (coronary artery disease)      Depression      Hyperlipidemia      Hypertension      Lymphoma (H)      Malignant melanoma (H)      Melanoma (H)      Squamous cell carcinoma        Past Surgical History:   Procedure Laterality Date     AXILLARY SURGERY      S/P resection and adjuvant radiation     BONE MARROW BIOPSY, BONE SPECIMEN, NEEDLE/TROCAR N/A 7/8/2019    Procedure: BIOPSY, BONE MARROW;  Surgeon: Husam Issa MD;  Location: [facility]     BONE MARROW BIOPSY, BONE SPECIMEN, NEEDLE/TROCAR Left 2/24/2022    Procedure: BIOPSY, BONE MARROW;  Surgeon: Cailin Anthony PA-C;  [facility]     CARDIAC SURGERY       CHOLECYSTECTOMY       HERNIA REPAIR, UMBILICAL       PHACOEMULSIFICATION WITH STANDARD INTRAOCULAR LENS IMPLANT Right 10/2/2019    Procedure: Cataract Removal with Implant;  Surgeon: Dinesh Ivey MD;  Location: [facility]     PHACOEMULSIFICATION WITH STANDARD INTRAOCULAR LENS IMPLANT Left 10/21/2019    Procedure: Cataract Removal with Implant;  Surgeon: Dinesh Ivey MD;  Location: [facility]     STENT      PTCA with  drug-eluting stent of RCA, circumflex, and LAD     VASCULAR SURGERY          Family History   Problem Relation Age of Onset     Asthma Other      Cancer Other         melanoma     Blood Disease Other         bleeding disorder     Lung Cancer Mother         Smoker     Prostate Cancer Father      Melanoma No family hx of        Social History     Socioeconomic History     Marital status: Single     Spouse name: Not on file     Number of children: 0     Years of education: Not on file     Highest education level: Not on file   Occupational History     Occupation: Retired     Comment: [employer]    Tobacco Use     Smoking status: Never     Smokeless tobacco: Never   Vaping Use     Vaping status: Never Used     Passive vaping exposure: Yes   Substance and Sexual Activity     Alcohol use: Yes     Comment: glass of wine couple times per week     Drug use: Never     Sexual activity: Not on file   Other Topics Concern     Parent/sibling w/ CABG, MI or angioplasty before 65F 55M? Not Asked   Social History Narrative     Not on file     Social Determinants of Health     Financial Resource Strain: Low Risk  (11/9/2021)    Overall Financial Resource Strain (CARDIA)      Difficulty of Paying Living Expenses: Not hard at all   Food Insecurity: No Food Insecurity (11/9/2021)    Hunger Vital Sign      Worried About Running Out of Food in the Last Year: Never true      Ran Out of Food in the Last Year: Never true   Transportation Needs: No Transportation Needs (11/9/2021)    PRAPARE - Transportation      Lack of Transportation (Medical): No      Lack of Transportation (Non-Medical): No   Physical Activity: Inactive (11/9/2021)    Exercise Vital Sign      Days of Exercise per Week: 0 days      Minutes of Exercise per Session: 0 min   Stress: No Stress Concern Present (11/9/2021)    [place] [employer] - Occupational Stress Questionnaire      Feeling of Stress : Not at all   Social Connections: Socially  Isolated (11/9/2021)    Social Connection and Isolation Panel [NHANES]      Frequency of Communication with Friends and Family: Once a week      Frequency of Social Gatherings with Friends and Family: Once a week      Attends Christian Services: More than 4 times per year      Active Member of Clubs or Organizations: No      Attends Club or Organization Meetings: Not on file      Marital Status: Never    Intimate Partner Violence: Not on file   Housing Stability: Low Risk  (11/9/2021)    Housing Stability Vital Sign      Unable to Pay for Housing in the Last Year: No      Number of Places Lived in the Last Year: 1      Unstable Housing in the Last Year: No       Outpatient Encounter Medications as of 4/26/2023   Medication Sig Dispense Refill     aspirin (ASA) 81 MG EC tablet Take 81 mg by mouth daily       atorvastatin (LIPITOR) 20 MG tablet TAKE 1 TABLET BY MOUTH EVERY DAY 90 tablet 0     ferrous sulfate (FE TABS) 325 (65 Fe) MG EC tablet Take 1 tablet (325 mg) by mouth daily 90 tablet 0     furosemide (LASIX) 20 MG tablet Take 2 tablets (40 mg) by mouth every morning AND 1 tablet (20 mg) daily at 2 pm. 270 tablet 3     magnesium oxide (MAG-OX) 400 MG tablet Take 1 tablet (400 mg) by mouth 2 times daily 60 tablet 1     metoprolol succinate ER (TOPROL XL) 25 MG 24 hr tablet Take 1 tablet (25 mg) by mouth daily 90 tablet 3     nitroGLYcerin (NITROSTAT) 0.4 MG sublingual tablet Place 1 tablet (0.4 mg) under the tongue See Admin Instructions for chest pain 30 tablet 0     ORDER FOR DME Light Therapy with Full Spectrum Light (30084 lux) Start with 10-15 minute exposure per day, Increase exposure to 30 to 45 minutes per day, Maximum exposure: 90 minutes per day,Look periodically at light during each session  (Patient not taking: Reported on 4/25/2023) 1 0     potassium chloride ER (KLOR-CON M) 20 MEQ CR tablet Take 1 tablet (20 mEq) by mouth 2 times daily 60 tablet 0     ramipril (ALTACE) 5 MG capsule Take 1  capsule (5 mg) by mouth daily 90 capsule 3     tamsulosin (FLOMAX) 0.4 MG capsule Take 1 capsule (0.4 mg) by mouth every evening 90 capsule 3     triamcinolone (KENALOG) 0.1 % external cream Twice daily to legs prn itching 454 g 3     [DISCONTINUED] amLODIPine (NORVASC) 5 MG tablet Take 1 tablet (5 mg) by mouth daily 90 tablet 3     [DISCONTINUED] carvedilol (COREG) 12.5 MG tablet Take 1 tablet (12.5 mg) by mouth 2 times daily (with meals) 180 tablet 3     [DISCONTINUED] cloNIDine (CATAPRES) 0.1 MG tablet Take 1 tablet (0.1 mg) by mouth 2 times daily 180 tablet 3     [DISCONTINUED] clopidogrel (PLAVIX) 75 MG tablet Take 1 tablet (75 mg) by mouth daily 90 tablet 3     [DISCONTINUED] hydrALAZINE (APRESOLINE) 50 MG tablet Take 1 tablet (50 mg) by mouth 2 times daily 180 tablet 1     [DISCONTINUED] naloxone (NARCAN) 4 MG/0.1ML nasal spray Spray 1 spray (4 mg) into one nostril alternating nostrils once as needed for opioid reversal Every 2-3 minutes until patient responsive or EMS arrives 0.2 mL 0     [DISCONTINUED] pioglitazone (ACTOS) 30 MG tablet Take 1 tablet (30 mg) by mouth daily 90 tablet 0     Facility-Administered Encounter Medications as of 4/26/2023   Medication Dose Route Frequency Provider Last Rate Last Admin     sodium hyaluronate (HEALON DUET)    PRN Dinesh Ivey MD   1 kit at 10/02/19 1149             O:   NAD, WDWN, Alert & Oriented, Mood & Affect wnl, Vitals stable   Here today alone   General appearance normal   Vitals stable   Alert, oriented and in no acute distress      Following lymph nodes palpated: Occipital, Cervical, Supraclavicular no lad  Gritty scaly papule on face and r hand  R temple ulcerated 1.8cm red plaque  L cheek shiny 1cm red plaque  R medial cheek 6mm crusted scaly papule      Stuck on papules and brown macules on trunk and ext   Red papules on trunk  Flesh colored papules on trunk     The remainder of the full exam was normal; the following areas were examined:   conjunctiva/lids, neck, peripheral vascular system, abdomen, lymph nodes, digits/nails, eccrine and apocrine glands, scalp/hair, face, neck, chest, abdomen, buttocks, back, RUE, LUE, RLE, LLE       Eyes: Conjunctivae/lids:Normal     ENT: Lips, buccal mucosa, tongue: normal    MSK:Normal    Cardiovascular: peripheral edema none    Pulm: Breathing Normal    Lymph Nodes: No Head and Neck Lymphadenopathy     Neuro/Psych: Orientation:Alert and Orientedx3 ; Mood/Affect:normal       MICRO:     R temple (red): There is a proliferation of irregular nests of abnormal squamous cells arising from the epidermis and invading the dermis. These are well differentiated. The dermis shows a variable superficial perivascular inflammatory infiltrate.   L medial cheek (blue): There is hyperkeratosis & parakeratosis of the epidermis, with full thickness epidermal involvement by atypical keratinocytes with rare pale vacuolated cells invading dermis.    R medial cheek (green): There is hyperkeratosis & parakeratosis of the epidermis, with full thickness epidermal involvement by atypical keratinocytes with rare pale vacuolated cells.  Unremarkable dermis.    A/P:  1. Seborrheic keratosis, lentigo, angioma, dermal nevus, hx of non-melanoma skin cancer   2. Actinic keratosis   L brow x1, L cheek x2, R cheek x3, R hand x2  LN2:  Treated with LN2 for 5s for 1-2 cycles. Warned risks of blistering, pain, pigment change, scarring, and incomplete resolution.  Advised patient to return if lesions do not completely resolve.  Wound care sheet given.  3. R/o basal cell carcinoma   TANGENTIAL BIOPSY IN HOUSE:  After consent, anesthesia with LEC and prep, tangential excision performed and dx above confirmed with frozen section histology.  No complications and routine wound care.  Patient is asa  anticoagulants and risk of bleeding discussed with patient.       I have personally reviewed all specimens and/or slides and used them with my medical judgement  to determine or confirm the final diagnosis.     Patient told result   R temple squamous cell carcinoma   L medial cheek squamous cell carcinoma   R medial cheek   Squamous cell carcinoma in situ  Schedule excision   It was a pleasure speaking to Flash Brown today.  Previous clinic notes and pertinent laboratory tests were reviewed prior to Flash Brown's visit.  Nature of benign skin lesions dicussed with patient.  Signs and Symptoms of skin cancer discussed with patient.  Patient encouraged to perform monthly skin exams.  UV precautions reviewed with patient.  Risks of non-melanoma skin cancer discussed with patient   Return to clinic next appt        Again, thank you for allowing me to participate in the care of your patient.        Sincerely,        Dinesh Roque MD

## 2023-04-26 NOTE — PATIENT INSTRUCTIONS
Wound Care Instructions     FOR SUPERFICIAL WOUNDS     [facility] [phone]    [facility] [phone]                       AFTER 24 HOURS YOU SHOULD REMOVE THE BANDAGE AND BEGIN DAILY DRESSING CHANGES AS FOLLOWS:     1) Remove Dressing.     2) Clean and dry the area with tap water using a Q-tip or sterile gauze pad.     3) Apply Vaseline, Aquaphor, Polysporin ointment or Bacitracin ointment over entire wound.  Do NOT use Neosporin ointment.     4) Cover the wound with a band-aid, or a sterile non-stick gauze pad and micropore paper tape      REPEAT THESE INSTRUCTIONS AT LEAST ONCE A DAY UNTIL THE WOUND HAS COMPLETELY HEALED.    It is an old wives tale that a wound heals better when it is exposed to air and allowed to dry out. The wound will heal faster with a better cosmetic result if it is kept moist with ointment and covered with a bandage.    **Do not let the wound dry out.**      Supplies Needed:      *Cotton tipped applicators (Q-tips)    *Polysporin Ointment or Bacitracin Ointment (NOT NEOSPORIN)    *Band-aids or non-stick gauze pads and micropore paper tape.      PATIENT INFORMATION:    During the healing process you will notice a number of changes. All wounds develop a small halo of redness surrounding the wound.  This means healing is occurring. Severe itching with extensive redness usually indicates sensitivity to the ointment or bandage tape used to dress the wound.  You should call our office if this develops.      Swelling  and/or discoloration around your surgical site is common, particularly when performed around the eye.    All wounds normally drain.  The larger the wound the more drainage there will be.  After 7-10 days, you will notice the wound beginning to shrink and new skin will begin to grow.  The wound is healed when you can see skin has formed over the entire area.  A healed wound has a healthy, shiny look to the surface and is red to dark pink in color  to normalize.  Wounds may take approximately 4-6 weeks to heal.  Larger wounds may take 6-8 weeks.  After the wound is healed you may discontinue dressing changes.    You may experience a sensation of tightness as your wound heals. This is normal and will gradually subside.    Your healed wound may be sensitive to temperature changes. This sensitivity improves with time, but if you re having a lot of discomfort, try to avoid temperature extremes.    Patients frequently experience itching after their wound appears to have healed because of the continue healing under the skin.  Plain Vaseline will help relieve the itching.        POSSIBLE COMPLICATIONS    BLEEDING:    Leave the bandage in place.  Use tightly rolled up gauze or a cloth to apply direct pressure over the bandage for 30  minutes.  Reapply pressure for an additional 30 minutes if necessary  Use additional gauze and tape to maintain pressure once the bleeding has stopped.

## 2023-04-27 ENCOUNTER — TELEPHONE (OUTPATIENT)
Dept: GASTROENTEROLOGY | Facility: CLINIC | Age: 83
End: 2023-04-27
Payer: MEDICARE

## 2023-04-27 DIAGNOSIS — K74.69 OTHER CIRRHOSIS OF LIVER (H): Primary | ICD-10-CM

## 2023-04-27 DIAGNOSIS — R16.0 LIVER MASS: ICD-10-CM

## 2023-04-27 LAB
ANA PAT SER IF-IMP: ABNORMAL
ANA PAT SER IF-IMP: ABNORMAL
ANA SER QL IF: POSITIVE
ANA TITR SER IF: ABNORMAL {TITER}
ANA TITR SER IF: ABNORMAL {TITER}
IGG SERPL-MCNC: 1404 MG/DL (ref 610–1616)

## 2023-04-27 NOTE — TELEPHONE ENCOUNTER
Called Daylin, patient's sister, with medical update per Dr. Leventhal. CTC with Daylin on file.     Informed patient:   - ultrasound suprisingly demonstrated a lesion/mass within the left liver that we would recommend further evaluation for   - we would like to order an MRI which can be done at [facility]   - this exam did not push us one way or the other as to the diagnosis of cirrhosis    Daylin understanding of the plan moving forward and did not have any questions.     MRI order placed per Dr. Leventhal.     DARWIN BlairN, RN, PHN  [facility]ology [facility]  [address] [place] [address]

## 2023-04-28 ENCOUNTER — TELEPHONE (OUTPATIENT)
Dept: GASTROENTEROLOGY | Facility: CLINIC | Age: 83
End: 2023-04-28
Payer: MEDICARE

## 2023-04-28 LAB — SMA IGG SER-ACNC: 9 UNITS

## 2023-05-08 ENCOUNTER — HOSPITAL ENCOUNTER (OUTPATIENT)
Dept: MRI IMAGING | Facility: CLINIC | Age: 83
Discharge: HOME | End: 2023-05-08
Attending: INTERNAL MEDICINE | Admitting: INTERNAL MEDICINE
Payer: MEDICARE

## 2023-05-08 DIAGNOSIS — R16.0 LIVER MASS: ICD-10-CM

## 2023-05-08 DIAGNOSIS — K74.69 OTHER CIRRHOSIS OF LIVER (H): ICD-10-CM

## 2023-05-08 PROCEDURE — G1010 CDSM STANSON: HCPCS

## 2023-05-08 PROCEDURE — 258N000003 HC RX IP 258 OP 636: Performed by: INTERNAL MEDICINE

## 2023-05-08 PROCEDURE — A9585 GADOBUTROL INJECTION: HCPCS | Performed by: INTERNAL MEDICINE

## 2023-05-08 PROCEDURE — 255N000002 HC RX 255 OP 636: Performed by: INTERNAL MEDICINE

## 2023-05-08 RX ORDER — GADOBUTROL 604.72 MG/ML
10 INJECTION INTRAVENOUS ONCE
Status: COMPLETED | OUTPATIENT
Start: 2023-05-08 | End: 2023-05-08

## 2023-05-08 RX ADMIN — SODIUM CHLORIDE 50 ML: 9 INJECTION, SOLUTION INTRAVENOUS at 15:46

## 2023-05-08 RX ADMIN — GADOBUTROL 10 ML: 604.72 INJECTION INTRAVENOUS at 15:36

## 2023-05-19 ENCOUNTER — TELEPHONE (OUTPATIENT)
Dept: GASTROENTEROLOGY | Facility: CLINIC | Age: 83
End: 2023-05-19
Payer: MEDICARE

## 2023-05-19 DIAGNOSIS — R16.0 LIVER MASS, LEFT LOBE: ICD-10-CM

## 2023-05-19 DIAGNOSIS — R16.0 LIVER MASS, LEFT LOBE: Primary | ICD-10-CM

## 2023-05-19 NOTE — TELEPHONE ENCOUNTER
Called patient, spoke with sister and health care agent Daylin flanagan labs and imaging    Concern that Don has cirrhosis and that he has a ~ 5cm lesion in the left liver concerning for cancer.    Based on information and review in tumor board, recommended percutaneous lesion biopsy.    Will place order and will arrange follow up accordingly.    Thomas M. Leventhal, M.D.   of Medicine  Advanced/Transplant Hepatology & Critical Care Medicine  [facility]

## 2023-05-19 NOTE — CONSULTS
Outpatient IR Biopsy Referral    Patient is a 83 y/o male with a PMH of ALLY, obesity, HDL, CAD, CHF, BPH, malignant melanoma, BCC, history of lymphoma, longstanding thrombocytopenia, DEWITT, concerns for cirrhosis with portal HTN. IR has been asked to biopsy a liver lesion for concerns for malignancy. Reviewed at tumor board per referral.           Liver US 4/26/23 The liver size is 14.7 cm. A complex heterogeneous collection noted in  the left hepatic lobe measuring 4.8 x 5.2 x 5.7 cm. Do not clearly  identify internal blood flow, though this is a mass until proven  Otherwise.        MRI 5/8/23 FINDINGS: 4.9 cm round lesion in segment II/III demonstrates areas of  enhancement and nonenhancement, this may be areas of necrosis and/or  cystic change amongst viable enhancing tissue. If the patient's AFP is  elevated, this is essentially diagnostic of hepatocellular carcinoma  in the setting of cirrhosis. Question some minimal hepatic steatosis.  There is no definite intra or extrahepatic biliary dilatation.  Cirrhotic liver morphology. Adrenal glands, kidneys, spleen, and  pancreas demonstrate no worrisome focal lesion. Osseous structures  demonstrate no worrisome signal abnormality. No definite adenopathy or  bowel obstruction in the visualized abdomen. No ascites.    IMPRESSION: Complex lesion with enhancing and nonenhancing segments,  this may be related to tumor necrosis or cystic change. Findings are  concerning for malignancy. If needed this is amenable to percutaneous  image guided biopsy.    US 4/26/23 The liver size is 14.7 cm. A complex heterogeneous collection noted in  the left hepatic lobe measuring 4.8 x 5.2 x 5.7 cm. Do not clearly  identify internal blood flow, though this is a mass until proven  Otherwise.    IMPRESSION:  1. Complex mass/collection in the liver measuring 4.8 x 5.2 x 5.7 cm.  This requires follow-up imaging with an MRI examination. Malignancy is  strong consideration given clinical  circumstances.  2. Previous cholecystectomy, no biliary dilatation.  3. Patent hepatic vasculature with normal directional blood flow.    Case and imaging MRI 5/8/23, US 4/26/23 was reviewed with Dr. CHI Rojas from IR and US Guided biopsy of the large liver lesion is approved.    Procedure order, surgical pathology orders placed.    ASA should be held for 5 days prior to scheduled procedure.     If requesting team would like samples sent for anything else please enter them prior to scheduled procedure.    Primary team Dr. Leventhal GI made aware of IR recommendations via epic messaging.    Sophia JAIMES  Interventional Radiology   IR on-call pager: [phone]

## 2023-05-22 NOTE — PROGRESS NOTES
Surgical Office Location :   [facility]  [address] [place] [address]

## 2023-05-23 ENCOUNTER — OFFICE VISIT (OUTPATIENT)
Dept: DERMATOLOGY | Facility: CLINIC | Age: 83
End: 2023-05-23
Payer: MEDICARE

## 2023-05-23 DIAGNOSIS — C44.329 SQUAMOUS CELL CARCINOMA OF SKIN OF RIGHT TEMPLE: Primary | ICD-10-CM

## 2023-05-23 PROCEDURE — 12052 INTMD RPR FACE/MM 2.6-5.0 CM: CPT | Performed by: DERMATOLOGY

## 2023-05-23 PROCEDURE — 17311 MOHS 1 STAGE H/N/HF/G: CPT | Performed by: DERMATOLOGY

## 2023-05-23 PROCEDURE — 17312 MOHS ADDL STAGE: CPT | Performed by: DERMATOLOGY

## 2023-05-23 ASSESSMENT — PAIN SCALES - GENERAL: PAINLEVEL: NO PAIN (0)

## 2023-05-23 NOTE — LETTER
5/23/2023         RE: Flash Brown  [address]  [facility] [address]        Dear Colleague,    Thank you for referring your patient, Flash Brown, to the [facility]. Please see a copy of my visit note below.    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  This is on edge of graft from previous surgery years ago he is not sure what cancer he had.  Patient has no other skin complaints today.  Remainder of the HPI, Meds, PMH, Allergies, FH, and SH was reviewed in chart.      Past Medical History:   Diagnosis Date     Basal cell carcinoma      CAD (coronary artery disease)      Depression      Hyperlipidemia      Hypertension      Lymphoma (H)      Malignant melanoma (H)      Melanoma (H)      Squamous cell carcinoma        Past Surgical History:   Procedure Laterality Date     AXILLARY SURGERY      S/P resection and adjuvant radiation     BONE MARROW BIOPSY, BONE SPECIMEN, NEEDLE/TROCAR N/A 7/8/2019    Procedure: BIOPSY, BONE MARROW;  Surgeon: Husam Issa MD;  Location: [facility]     BONE MARROW BIOPSY, BONE SPECIMEN, NEEDLE/TROCAR Left 2/24/2022    Procedure: BIOPSY, BONE MARROW;  Surgeon: Cailin Anthony PA-C;  Location: [facility] OR     CARDIAC SURGERY       CHOLECYSTECTOMY       HERNIA REPAIR, UMBILICAL       PHACOEMULSIFICATION WITH STANDARD INTRAOCULAR LENS IMPLANT Right 10/2/2019    Procedure: Cataract Removal with Implant;  Surgeon: Dinesh Ivey MD;  Location: [facility] OR     PHACOEMULSIFICATION WITH STANDARD INTRAOCULAR LENS IMPLANT Left 10/21/2019    Procedure: Cataract Removal with Implant;  Surgeon: Dinesh Ivey MD;  Location: [facility] OR     STENT      PTCA with drug-eluting stent of RCA, circumflex, and LAD     VASCULAR SURGERY          Family  History   Problem Relation Age of Onset     Asthma Other      Cancer Other         melanoma     Blood Disease Other         bleeding disorder     Lung Cancer Mother         Smoker     Prostate Cancer Father      Melanoma No family hx of        Social History     Socioeconomic History     Marital status: Single     Spouse name: Not on file     Number of children: 0     Years of education: Not on file     Highest education level: Not on file   Occupational History     Occupation: Retired     Comment: [employer]    Tobacco Use     Smoking status: Never     Smokeless tobacco: Never   Vaping Use     Vaping status: Never Used     Passive vaping exposure: Yes   Substance and Sexual Activity     Alcohol use: Yes     Comment: glass of wine couple times per week     Drug use: Never     Sexual activity: Not on file   Other Topics Concern     Parent/sibling w/ CABG, MI or angioplasty before 65F 55M? Not Asked   Social History Narrative     Not on file     Social Determinants of Health     Financial Resource Strain: Low Risk  (11/9/2021)    Overall Financial Resource Strain (CARDIA)      Difficulty of Paying Living Expenses: Not hard at all   Food Insecurity: No Food Insecurity (11/9/2021)    Hunger Vital Sign      Worried About Running Out of Food in the Last Year: Never true      Ran Out of Food in the Last Year: Never true   Transportation Needs: No Transportation Needs (11/9/2021)    PRAPARE - Transportation      Lack of Transportation (Medical): No      Lack of Transportation (Non-Medical): No   Physical Activity: Inactive (11/9/2021)    Exercise Vital Sign      Days of Exercise per Week: 0 days      Minutes of Exercise per Session: 0 min   Stress: No Stress Concern Present (11/9/2021)    [employer] [place] [employer] - Occupational Stress Questionnaire      Feeling of Stress : Not at all   Social Connections: Socially Isolated (11/9/2021)    Social Connection and Isolation Panel [NHANES]       Frequency of Communication with Friends and Family: Once a week      Frequency of Social Gatherings with Friends and Family: Once a week      Attends Holiness Services: More than 4 times per year      Active Member of Clubs or Organizations: No      Attends Club or Organization Meetings: Not on file      Marital Status: Never    Intimate Partner Violence: Not on file   Housing Stability: Low Risk  (11/9/2021)    Housing Stability Vital Sign      Unable to Pay for Housing in the Last Year: No      Number of Places Lived in the Last Year: 1      Unstable Housing in the Last Year: No       Outpatient Encounter Medications as of 5/23/2023   Medication Sig Dispense Refill     aspirin (ASA) 81 MG EC tablet Take 81 mg by mouth daily       atorvastatin (LIPITOR) 20 MG tablet TAKE 1 TABLET BY MOUTH EVERY DAY 90 tablet 0     ferrous sulfate (FE TABS) 325 (65 Fe) MG EC tablet Take 1 tablet (325 mg) by mouth daily 90 tablet 0     furosemide (LASIX) 20 MG tablet Take 2 tablets (40 mg) by mouth every morning AND 1 tablet (20 mg) daily at 2 pm. 270 tablet 3     magnesium oxide (MAG-OX) 400 MG tablet Take 1 tablet (400 mg) by mouth 2 times daily 60 tablet 1     metoprolol succinate ER (TOPROL XL) 25 MG 24 hr tablet Take 1 tablet (25 mg) by mouth daily 90 tablet 3     nitroGLYcerin (NITROSTAT) 0.4 MG sublingual tablet Place 1 tablet (0.4 mg) under the tongue See Admin Instructions for chest pain 30 tablet 0     ORDER FOR DME Light Therapy with Full Spectrum Light (59489 lux) Start with 10-15 minute exposure per day, Increase exposure to 30 to 45 minutes per day, Maximum exposure: 90 minutes per day,Look periodically at light during each session  (Patient not taking: Reported on 4/25/2023) 1 0     potassium chloride ER (KLOR-CON M) 20 MEQ CR tablet Take 1 tablet (20 mEq) by mouth 2 times daily 60 tablet 0     ramipril (ALTACE) 5 MG capsule Take 1 capsule (5 mg) by mouth daily 90 capsule 3     tamsulosin (FLOMAX) 0.4 MG  capsule Take 1 capsule (0.4 mg) by mouth every evening 90 capsule 3     triamcinolone (KENALOG) 0.1 % external cream Twice daily to legs prn itching 454 g 3     [DISCONTINUED] amLODIPine (NORVASC) 5 MG tablet Take 1 tablet (5 mg) by mouth daily 90 tablet 3     [DISCONTINUED] carvedilol (COREG) 12.5 MG tablet Take 1 tablet (12.5 mg) by mouth 2 times daily (with meals) 180 tablet 3     [DISCONTINUED] cloNIDine (CATAPRES) 0.1 MG tablet Take 1 tablet (0.1 mg) by mouth 2 times daily 180 tablet 3     [DISCONTINUED] clopidogrel (PLAVIX) 75 MG tablet Take 1 tablet (75 mg) by mouth daily 90 tablet 3     [DISCONTINUED] hydrALAZINE (APRESOLINE) 50 MG tablet Take 1 tablet (50 mg) by mouth 2 times daily 180 tablet 1     [DISCONTINUED] naloxone (NARCAN) 4 MG/0.1ML nasal spray Spray 1 spray (4 mg) into one nostril alternating nostrils once as needed for opioid reversal Every 2-3 minutes until patient responsive or EMS arrives 0.2 mL 0     [DISCONTINUED] pioglitazone (ACTOS) 30 MG tablet Take 1 tablet (30 mg) by mouth daily 90 tablet 0     Facility-Administered Encounter Medications as of 5/23/2023   Medication Dose Route Frequency Provider Last Rate Last Admin     sodium hyaluronate (HEALON DUET)    PRN Dinesh Ivey MD   1 kit at 10/02/19 1149             O:   NAD, WDWN, Alert & Oriented, Mood & Affect wnl, Vitals stable   Here today alone   General appearance normal   Vitals stable   Alert, oriented and in no acute distress      Following lymph nodes palpated: Occipital, Cervical, Supraclavicular no lad  R temple 1.8cm ulcerated plaque on edge of graft       Eyes: Conjunctivae/lids:Normal     ENT: Lips, buccal mucosa, tongue: normal    MSK:Normal    Cardiovascular: peripheral edema none    Pulm: Breathing Normal    Lymph Nodes: No Head and Neck Lymphadenopathy     Neuro/Psych: Orientation:Alert and Orientedx3 ; Mood/Affect:normal       A/P:  1. ? Recurrent squamous cell carcinoma   MOHS:   Location    The rationale  for Mohs surgery was discussed with the patient and consent was obtained.  The risks and benefits as well as alternatives to therapy were discussed, in detail.  Specifically, the risks of infection, scarring, bleeding, prolonged wound healing, incomplete removal, allergy to anesthesia, nerve injury and recurrence were addressed.  Indication for Mohs was Location. Prior to the procedure, the treatment site was clearly identified and, if available, confirmed with previous photos and confirmed by the patient   All components of the Universal Protocol/PAUSE rule were completed.  The Mohs surgeon operated in two distinct and integrated capacities as the surgeon and pathologist.      The area was prepped with Betasept.  A rim of normal appearing skin was marked circumferentially around the lesion.  The area was infiltrated with local anesthesia.  The tumor was first debulked to remove all clinically apparent tumor.  An incision following the standard Mohs approach was done and the specimen was oriented,mapped and placed in 1 block(s).  Each specimen was then chromacoded and processed in the Mohs laboratory using standard Mohs technique and submitted for frozen section histology.  Frozen section analysis showed no residual tumor but CLEAR MARGINS.      The tumor was excised using standard Mohs technique in 1 stages(s).  CLEAR MARGINS OBTAINED and Final defect size was 2.5 cm.     We discussed the options for wound management in full with the patient including risks/benefits/ possible outcomes.        REPAIR SECOND INTENT: We discussed the options for wound management in full with the patient including risks/benefits/possible outcomes. Decision made to allow the wound to heal by second intention. Cautery was used for for hemostasis. EBL minimal; complications none; wound care routine.  The patient was discharged in good condition and will return in one month or prn for wound evaluation.  It was a pleasure speaking to Flash  FELICIA Brown today.  Previous clinic notes and pertinent laboratory tests were reviewed prior to Flash Brown's visit.  Signs and Symptoms of skin cancer discussed with patient.  Patient encouraged to perform monthly skin exams.  UV precautions reviewed with patient.  Risks of non-melanoma skin cancer discussed with patient   Return to clinic 4 months        Again, thank you for allowing me to participate in the care of your patient.        Sincerely,        Dinesh Roque MD

## 2023-05-23 NOTE — NURSING NOTE
"Initial There were no vitals taken for this visit. Estimated body mass index is 36.87 kg/m  as calculated from the following:    Height as of 4/12/23: 1.664 m (5' 5.5\").    Weight as of 4/25/23: 102.1 kg (225 lb). .      "

## 2023-05-23 NOTE — PATIENT INSTRUCTIONS
Open Wound Care     For Temple        No strenuous activity for 48 hours    Take Tylenol as needed for discomfort.                                                .         Do not drink alcoholic beverages for 48 hours.    Keep the pressure bandage in place for 24 hours. If the bandage becomes blood tinged or loose, reinforce it with gauze and tape.        (Refer to the reverse side of this page for management of bleeding).    Remove bandage in 24 hours and begin wound care as follows:     Clean area with tap water using a Q tip or gauze pad, (shower / bathe normally)  Dry wound with Q tip or gauze pad  Apply Aquaphor, Vaseline, Polysporin or Bacitracin Ointment with a Q tip  Do NOT use Neosporin Ointment *  Cover the wound with a band-aid or nonstick gauze pad and paper tape.  Repeat wound care once a day until wound is completely healed.    It is an old wives tale that a wound heals better when it is exposed to air and allowed to dry out. The wound will heal faster with a better cosmetic result if it is kept moist with ointment and covered with a bandage.  Do not let the wound dry out.      Supplies Needed:                Qtips or gauze pads                Polysporin or Bacitracin Ointment                Bandaids or nonstick gauze pads and paper tape    Wound care kits and brown paper tape are available for purchase at   the pharmacy.    BLEEDING:    Use tightly rolled up gauze or cloth to apply direct pressure over the bandage for 20   minutes.  Reapply pressure for an additional 20 minutes if necessary  Call the office or go to the nearest emergency room if pressure fails to stop the bleeding.  Use additional gauze and tape to maintain pressure once the bleeding has stopped.  Begin wound care 24 hours after surgery as directed.                  WOUND HEALING    One week after surgery a pink / red halo will form around the outside of the wound.   This is new skin.  The center of the wound will appear yellowish  white and produce some drainage.  The pink halo will slowly migrate in toward the center of the wound until the wound is covered with new shiny pink skin.  There will be no more drainage when the wound is completely healed.  It will take six months to one year for the redness to fade.  The scar may be itchy, tight and sensitive to extreme temperatures for a year after the surgery.  Massaging the area several times a day for several minutes after the wound is completely healed will help the scar soften and normalize faster. Begin massage only after healing is complete.      In case of emergency call: Dr Roque: [phone]    [facility]: [phone]    [facility]:[phone]

## 2023-05-23 NOTE — PROGRESS NOTES
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  This is on edge of graft from previous surgery years ago he is not sure what cancer he had.  Patient has no other skin complaints today.  Remainder of the HPI, Meds, PMH, Allergies, FH, and SH was reviewed in chart.      Past Medical History:   Diagnosis Date     Basal cell carcinoma      CAD (coronary artery disease)      Depression      Hyperlipidemia      Hypertension      Lymphoma (H)      Malignant melanoma (H)      Melanoma (H)      Squamous cell carcinoma        Past Surgical History:   Procedure Laterality Date     AXILLARY SURGERY      S/P resection and adjuvant radiation     BONE MARROW BIOPSY, BONE SPECIMEN, NEEDLE/TROCAR N/A 7/8/2019    Procedure: BIOPSY, BONE MARROW;  Surgeon: Husam Issa MD;  Location: [facility]     BONE MARROW BIOPSY, BONE SPECIMEN, NEEDLE/TROCAR Left 2/24/2022    Procedure: BIOPSY, BONE MARROW;  Surgeon: Cailin Anthony PA-C;  Location: [facility] OR     CARDIAC SURGERY       CHOLECYSTECTOMY       HERNIA REPAIR, UMBILICAL       PHACOEMULSIFICATION WITH STANDARD INTRAOCULAR LENS IMPLANT Right 10/2/2019    Procedure: Cataract Removal with Implant;  Surgeon: Dinesh Ivey MD;  Location: [facility]     PHACOEMULSIFICATION WITH STANDARD INTRAOCULAR LENS IMPLANT Left 10/21/2019    Procedure: Cataract Removal with Implant;  Surgeon: Dinesh Ivey MD;  Location: [facility]     STENT      PTCA with drug-eluting stent of RCA, circumflex, and LAD     VASCULAR SURGERY          Family History   Problem Relation Age of Onset     Asthma Other      Cancer Other         melanoma     Blood Disease Other         bleeding disorder     Lung Cancer Mother         Smoker     Prostate Cancer Father      Melanoma No family hx of        Social History     Socioeconomic History     Marital status: Single     Spouse name: Not on file     Number of  children: 0     Years of education: Not on file     Highest education level: Not on file   Occupational History     Occupation: Retired     Comment: [employer]    Tobacco Use     Smoking status: Never     Smokeless tobacco: Never   Vaping Use     Vaping status: Never Used     Passive vaping exposure: Yes   Substance and Sexual Activity     Alcohol use: Yes     Comment: glass of wine couple times per week     Drug use: Never     Sexual activity: Not on file   Other Topics Concern     Parent/sibling w/ CABG, MI or angioplasty before 65F 55M? Not Asked   Social History Narrative     Not on file     Social Determinants of Health     Financial Resource Strain: Low Risk  (11/9/2021)    Overall Financial Resource Strain (CARDIA)      Difficulty of Paying Living Expenses: Not hard at all   Food Insecurity: No Food Insecurity (11/9/2021)    Hunger Vital Sign      Worried About Running Out of Food in the Last Year: Never true      Ran Out of Food in the Last Year: Never true   Transportation Needs: No Transportation Needs (11/9/2021)    PRAPARE - Transportation      Lack of Transportation (Medical): No      Lack of Transportation (Non-Medical): No   Physical Activity: Inactive (11/9/2021)    Exercise Vital Sign      Days of Exercise per Week: 0 days      Minutes of Exercise per Session: 0 min   Stress: No Stress Concern Present (11/9/2021)    [employer] [place] [employer] - Occupational Stress Questionnaire      Feeling of Stress : Not at all   Social Connections: Socially Isolated (11/9/2021)    Social Connection and Isolation Panel [NHANES]      Frequency of Communication with Friends and Family: Once a week      Frequency of Social Gatherings with Friends and Family: Once a week      Attends Mandaen Services: More than 4 times per year      Active Member of Clubs or Organizations: No      Attends Club or Organization Meetings: Not on file      Marital Status: Never    Intimate Partner  Violence: Not on file   Housing Stability: Low Risk  (11/9/2021)    Housing Stability Vital Sign      Unable to Pay for Housing in the Last Year: No      Number of Places Lived in the Last Year: 1      Unstable Housing in the Last Year: No       Outpatient Encounter Medications as of 5/23/2023   Medication Sig Dispense Refill     aspirin (ASA) 81 MG EC tablet Take 81 mg by mouth daily       atorvastatin (LIPITOR) 20 MG tablet TAKE 1 TABLET BY MOUTH EVERY DAY 90 tablet 0     ferrous sulfate (FE TABS) 325 (65 Fe) MG EC tablet Take 1 tablet (325 mg) by mouth daily 90 tablet 0     furosemide (LASIX) 20 MG tablet Take 2 tablets (40 mg) by mouth every morning AND 1 tablet (20 mg) daily at 2 pm. 270 tablet 3     magnesium oxide (MAG-OX) 400 MG tablet Take 1 tablet (400 mg) by mouth 2 times daily 60 tablet 1     metoprolol succinate ER (TOPROL XL) 25 MG 24 hr tablet Take 1 tablet (25 mg) by mouth daily 90 tablet 3     nitroGLYcerin (NITROSTAT) 0.4 MG sublingual tablet Place 1 tablet (0.4 mg) under the tongue See Admin Instructions for chest pain 30 tablet 0     ORDER FOR DME Light Therapy with Full Spectrum Light (61004 lux) Start with 10-15 minute exposure per day, Increase exposure to 30 to 45 minutes per day, Maximum exposure: 90 minutes per day,Look periodically at light during each session  (Patient not taking: Reported on 4/25/2023) 1 0     potassium chloride ER (KLOR-CON M) 20 MEQ CR tablet Take 1 tablet (20 mEq) by mouth 2 times daily 60 tablet 0     ramipril (ALTACE) 5 MG capsule Take 1 capsule (5 mg) by mouth daily 90 capsule 3     tamsulosin (FLOMAX) 0.4 MG capsule Take 1 capsule (0.4 mg) by mouth every evening 90 capsule 3     triamcinolone (KENALOG) 0.1 % external cream Twice daily to legs prn itching 454 g 3     [DISCONTINUED] amLODIPine (NORVASC) 5 MG tablet Take 1 tablet (5 mg) by mouth daily 90 tablet 3     [DISCONTINUED] carvedilol (COREG) 12.5 MG tablet Take 1 tablet (12.5 mg) by mouth 2 times daily (with  meals) 180 tablet 3     [DISCONTINUED] cloNIDine (CATAPRES) 0.1 MG tablet Take 1 tablet (0.1 mg) by mouth 2 times daily 180 tablet 3     [DISCONTINUED] clopidogrel (PLAVIX) 75 MG tablet Take 1 tablet (75 mg) by mouth daily 90 tablet 3     [DISCONTINUED] hydrALAZINE (APRESOLINE) 50 MG tablet Take 1 tablet (50 mg) by mouth 2 times daily 180 tablet 1     [DISCONTINUED] naloxone (NARCAN) 4 MG/0.1ML nasal spray Spray 1 spray (4 mg) into one nostril alternating nostrils once as needed for opioid reversal Every 2-3 minutes until patient responsive or EMS arrives 0.2 mL 0     [DISCONTINUED] pioglitazone (ACTOS) 30 MG tablet Take 1 tablet (30 mg) by mouth daily 90 tablet 0     Facility-Administered Encounter Medications as of 5/23/2023   Medication Dose Route Frequency Provider Last Rate Last Admin     sodium hyaluronate (HEALON DUET)    Dinesh Matos MD   1 kit at 10/02/19 1149             O:   NAD, WDWN, Alert & Oriented, Mood & Affect wnl, Vitals stable   Here today alone   General appearance normal   Vitals stable   Alert, oriented and in no acute distress      Following lymph nodes palpated: Occipital, Cervical, Supraclavicular no lad  R temple 1.8cm ulcerated plaque on edge of graft       Eyes: Conjunctivae/lids:Normal     ENT: Lips, buccal mucosa, tongue: normal    MSK:Normal    Cardiovascular: peripheral edema none    Pulm: Breathing Normal    Lymph Nodes: No Head and Neck Lymphadenopathy     Neuro/Psych: Orientation:Alert and Orientedx3 ; Mood/Affect:normal       A/P:  1. ? Recurrent squamous cell carcinoma   MOHS:   Location    The rationale for Mohs surgery was discussed with the patient and consent was obtained.  The risks and benefits as well as alternatives to therapy were discussed, in detail.  Specifically, the risks of infection, scarring, bleeding, prolonged wound healing, incomplete removal, allergy to anesthesia, nerve injury and recurrence were addressed.  Indication for Mohs was Location.  Prior to the procedure, the treatment site was clearly identified and, if available, confirmed with previous photos and confirmed by the patient   All components of the Universal Protocol/PAUSE rule were completed.  The Mohs surgeon operated in two distinct and integrated capacities as the surgeon and pathologist.      The area was prepped with Betasept.  A rim of normal appearing skin was marked circumferentially around the lesion.  The area was infiltrated with local anesthesia.  The tumor was first debulked to remove all clinically apparent tumor.  An incision following the standard Mohs approach was done and the specimen was oriented,mapped and placed in 1 block(s).  Each specimen was then chromacoded and processed in the Mohs laboratory using standard Mohs technique and submitted for frozen section histology.  Frozen section analysis showed residual tumor but CLEAR MARGINS.      1st stage:There is a proliferation of irregular nests of abnormal squamous cells arising from the epidermis and invading the dermis. These are well differentiated. The dermis shows a variable superficial perivascular inflammatory infiltrate.     The tumor was excised using standard Mohs technique in 2 stages(s).  CLEAR MARGINS OBTAINED and Final defect size was 2.8x3.1 cm.     We discussed the options for wound management in full with the patient including risks/benefits/ possible outcomes.      Because of the relatively superficial nature of the wound and patient s preference, an intermediate repair was planned.  Guiding sutures were carefully placed across the wound to control the direction of wound closure, to prevent distortion from wound contraction, and to minimize wound size to facilitate wound care and healing. 3.1cm No complications; EBL minimal.  Routine wound care discussed with patient.    It was a pleasure speaking to Flash Brown today.  Previous clinic notes and pertinent laboratory tests were reviewed prior to Flash  A Brown's visit.  Signs and Symptoms of skin cancer discussed with patient.  Patient encouraged to perform monthly skin exams.  UV precautions reviewed with patient.  Risks of non-melanoma skin cancer discussed with patient   Return to clinic 4 months

## 2023-05-30 ENCOUNTER — OFFICE VISIT (OUTPATIENT)
Dept: DERMATOLOGY | Facility: CLINIC | Age: 83
End: 2023-05-30
Payer: MEDICARE

## 2023-05-30 ENCOUNTER — TELEPHONE (OUTPATIENT)
Dept: INTERVENTIONAL RADIOLOGY/VASCULAR | Facility: CLINIC | Age: 83
End: 2023-05-30

## 2023-05-30 VITALS — OXYGEN SATURATION: 94 % | HEART RATE: 74 BPM | RESPIRATION RATE: 12 BRPM

## 2023-05-30 DIAGNOSIS — C44.329 SQUAMOUS CELL CANCER OF SKIN OF LEFT CHEEK: Primary | ICD-10-CM

## 2023-05-30 PROCEDURE — 17311 MOHS 1 STAGE H/N/HF/G: CPT | Mod: 79 | Performed by: DERMATOLOGY

## 2023-05-30 PROCEDURE — 13132 CMPLX RPR F/C/C/M/N/AX/G/H/F: CPT | Mod: 79 | Performed by: DERMATOLOGY

## 2023-05-30 ASSESSMENT — PAIN SCALES - GENERAL: PAINLEVEL: NO PAIN (0)

## 2023-05-30 NOTE — PROGRESS NOTES
Flash Brown is an extremely pleasant 82 year old year old male patient here today for evaluation and managment of squamous cell carcinoma on left cheek.  Previous site healing well.  Patient has no other skin complaints today.  Remainder of the HPI, Meds, PMH, Allergies, FH, and SH was reviewed in chart.      Past Medical History:   Diagnosis Date     Basal cell carcinoma      CAD (coronary artery disease)      Depression      Hyperlipidemia      Hypertension      Lymphoma (H)      Malignant melanoma (H)      Melanoma (H)      Squamous cell carcinoma        Past Surgical History:   Procedure Laterality Date     AXILLARY SURGERY      S/P resection and adjuvant radiation     BONE MARROW BIOPSY, BONE SPECIMEN, NEEDLE/TROCAR N/A 7/8/2019    Procedure: BIOPSY, BONE MARROW;  Surgeon: Husam Issa MD;  Location: [facility]     BONE MARROW BIOPSY, BONE SPECIMEN, NEEDLE/TROCAR Left 2/24/2022    Procedure: BIOPSY, BONE MARROW;  Surgeon: Cailin Anthony PA-C;  Location: [facility]     CARDIAC SURGERY       CHOLECYSTECTOMY       HERNIA REPAIR, UMBILICAL       PHACOEMULSIFICATION WITH STANDARD INTRAOCULAR LENS IMPLANT Right 10/2/2019    Procedure: Cataract Removal with Implant;  Surgeon: Dinesh Ivey MD;  Location: [facility]     PHACOEMULSIFICATION WITH STANDARD INTRAOCULAR LENS IMPLANT Left 10/21/2019    Procedure: Cataract Removal with Implant;  Surgeon: Dinesh Ivey MD;  Location: [facility]     STENT      PTCA with drug-eluting stent of RCA, circumflex, and LAD     VASCULAR SURGERY          Family History   Problem Relation Age of Onset     Asthma Other      Cancer Other         melanoma     Blood Disease Other         bleeding disorder     Lung Cancer Mother         Smoker     Prostate Cancer Father      Melanoma No family hx of        Social History     Socioeconomic History     Marital status: Single     Spouse name: Not on file     Number of children: 0     Years of education: Not on file     Highest  education level: Not on file   Occupational History     Occupation: Retired     Comment: [employer]    Tobacco Use     Smoking status: Never     Smokeless tobacco: Never   Vaping Use     Vaping status: Never Used     Passive vaping exposure: Yes   Substance and Sexual Activity     Alcohol use: Yes     Comment: glass of wine couple times per week     Drug use: Never     Sexual activity: Not on file   Other Topics Concern     Parent/sibling w/ CABG, MI or angioplasty before 65F 55M? Not Asked   Social History Narrative     Not on file     Social Determinants of Health     Financial Resource Strain: Low Risk  (11/9/2021)    Overall Financial Resource Strain (CARDIA)      Difficulty of Paying Living Expenses: Not hard at all   Food Insecurity: No Food Insecurity (11/9/2021)    Hunger Vital Sign      Worried About Running Out of Food in the Last Year: Never true      Ran Out of Food in the Last Year: Never true   Transportation Needs: No Transportation Needs (11/9/2021)    PRAPARE - Transportation      Lack of Transportation (Medical): No      Lack of Transportation (Non-Medical): No   Physical Activity: Inactive (11/9/2021)    Exercise Vital Sign      Days of Exercise per Week: 0 days      Minutes of Exercise per Session: 0 min   Stress: No Stress Concern Present (11/9/2021)    [employer] [place] [employer] - Occupational Stress Questionnaire      Feeling of Stress : Not at all   Social Connections: Socially Isolated (11/9/2021)    Social Connection and Isolation Panel [NHANES]      Frequency of Communication with Friends and Family: Once a week      Frequency of Social Gatherings with Friends and Family: Once a week      Attends Scientologist Services: More than 4 times per year      Active Member of Clubs or Organizations: No      Attends Club or Organization Meetings: Not on file      Marital Status: Never    Intimate Partner Violence: Not on file   Housing Stability: Low Risk  (11/9/2021)     Housing Stability Vital Sign      Unable to Pay for Housing in the Last Year: No      Number of Places Lived in the Last Year: 1      Unstable Housing in the Last Year: No       Outpatient Encounter Medications as of 5/30/2023   Medication Sig Dispense Refill     aspirin (ASA) 81 MG EC tablet Take 81 mg by mouth daily       atorvastatin (LIPITOR) 20 MG tablet TAKE 1 TABLET BY MOUTH EVERY DAY 90 tablet 0     ferrous sulfate (FE TABS) 325 (65 Fe) MG EC tablet Take 1 tablet (325 mg) by mouth daily 90 tablet 0     furosemide (LASIX) 20 MG tablet Take 2 tablets (40 mg) by mouth every morning AND 1 tablet (20 mg) daily at 2 pm. 270 tablet 3     magnesium oxide (MAG-OX) 400 MG tablet Take 1 tablet (400 mg) by mouth 2 times daily 60 tablet 1     metoprolol succinate ER (TOPROL XL) 25 MG 24 hr tablet Take 1 tablet (25 mg) by mouth daily 90 tablet 3     nitroGLYcerin (NITROSTAT) 0.4 MG sublingual tablet Place 1 tablet (0.4 mg) under the tongue See Admin Instructions for chest pain 30 tablet 0     ORDER FOR DME Light Therapy with Full Spectrum Light (43703 lux) Start with 10-15 minute exposure per day, Increase exposure to 30 to 45 minutes per day, Maximum exposure: 90 minutes per day,Look periodically at light during each session  (Patient not taking: Reported on 4/25/2023) 1 0     potassium chloride ER (KLOR-CON M) 20 MEQ CR tablet Take 1 tablet (20 mEq) by mouth 2 times daily 60 tablet 0     ramipril (ALTACE) 5 MG capsule Take 1 capsule (5 mg) by mouth daily 90 capsule 3     tamsulosin (FLOMAX) 0.4 MG capsule Take 1 capsule (0.4 mg) by mouth every evening 90 capsule 3     triamcinolone (KENALOG) 0.1 % external cream Twice daily to legs prn itching 454 g 3     [DISCONTINUED] amLODIPine (NORVASC) 5 MG tablet Take 1 tablet (5 mg) by mouth daily 90 tablet 3     [DISCONTINUED] carvedilol (COREG) 12.5 MG tablet Take 1 tablet (12.5 mg) by mouth 2 times daily (with meals) 180 tablet 3     [DISCONTINUED] cloNIDine (CATAPRES) 0.1 MG  tablet Take 1 tablet (0.1 mg) by mouth 2 times daily 180 tablet 3     [DISCONTINUED] clopidogrel (PLAVIX) 75 MG tablet Take 1 tablet (75 mg) by mouth daily 90 tablet 3     [DISCONTINUED] hydrALAZINE (APRESOLINE) 50 MG tablet Take 1 tablet (50 mg) by mouth 2 times daily 180 tablet 1     [DISCONTINUED] naloxone (NARCAN) 4 MG/0.1ML nasal spray Spray 1 spray (4 mg) into one nostril alternating nostrils once as needed for opioid reversal Every 2-3 minutes until patient responsive or EMS arrives 0.2 mL 0     [DISCONTINUED] pioglitazone (ACTOS) 30 MG tablet Take 1 tablet (30 mg) by mouth daily 90 tablet 0     Facility-Administered Encounter Medications as of 5/30/2023   Medication Dose Route Frequency Provider Last Rate Last Admin     sodium hyaluronate (HEALON DUET)    Dinesh Matos MD   1 kit at 10/02/19 1149             O:   NAD, WDWN, Alert & Oriented, Mood & Affect wnl, Vitals stable   Here today alone   Pulse 74   Resp 12   SpO2 94%    General appearance normal   Vitals stable   Alert, oriented and in no acute distress     L cheek 1cm red scaly papule         Eyes: Conjunctivae/lids:Normal     ENT: Lips, buccal mucosa, tongue: normal    MSK:Normal    Cardiovascular: peripheral edema none    Pulm: Breathing Normal    Lymph Nodes: No Head and Neck Lymphadenopathy     Neuro/Psych: Orientation:Alert and Orientedx3 ; Mood/Affect:normal       A/P:  1. L cheek squamous cell carcinoma   MOHS:   Location    The rationale for Mohs surgery was discussed with the patient and consent was obtained.  The risks and benefits as well as alternatives to therapy were discussed, in detail.  Specifically, the risks of infection, scarring, bleeding, prolonged wound healing, incomplete removal, allergy to anesthesia, nerve injury and recurrence were addressed.  Indication for Mohs was Location. Prior to the procedure, the treatment site was clearly identified and, if available, confirmed with previous photos and confirmed  by the patient   All components of the Universal Protocol/PAUSE rule were completed.  The Mohs surgeon operated in two distinct and integrated capacities as the surgeon and pathologist.      The area was prepped with Betasept.  A rim of normal appearing skin was marked circumferentially around the lesion.  The area was infiltrated with local anesthesia.  The tumor was first debulked to remove all clinically apparent tumor.  An incision following the standard Mohs approach was done and the specimen was oriented,mapped and placed in 1 block(s).  Each specimen was then chromacoded and processed in the Mohs laboratory using standard Mohs technique and submitted for frozen section histology.  Frozen section analysis showed no residual tumor but CLEAR MARGINS.      The tumor was excised using standard Mohs technique in 1 stages(s).  CLEAR MARGINS OBTAINED and Final defect size was 1.7 cm.     We discussed the options for wound management in full with the patient including risks/benefits/ possible outcomes.        REPAIR COMPLEX: Because of the tightness of the surrounding skin and Because of the size and full thickness nature of the defect, Because of the tightness of the surrounding skin, To maintain form and function, In order to avoid distortion and Because of the proximity to the lid, a complex closure was planned. After LE anesthesia and prep, Burow's triangles were excised in the relaxed skin tension lines. The wound edges were widely undermined greater than width of the defect on both sides by dissection in the subcutaneous plane until adequate tissue mobility was obtained. Hemostasis was obtained. The wound edges were closed in a layered fashion using Vicryl and Fast Absorbing Plain Gut sutures. Postoperative length was 5 cm.   EBL minimal; complications none; wound care routine.  The patient was discharged in good condition and will return in one week for wound evaluation.  It was a pleasure speaking to Flash DUARTE  Stephanie today.  Previous clinic notes and pertinent laboratory tests were reviewed prior to Flash Brown's visit.  Signs and Symptoms of skin cancer discussed with patient.  Patient encouraged to perform monthly skin exams.  UV precautions reviewed with patient.  Risks of non-melanoma skin cancer discussed with patient   Return to clinic 6 months

## 2023-05-30 NOTE — NURSING NOTE
"Initial Pulse 74   Resp 12   SpO2 94%  Estimated body mass index is 36.87 kg/m  as calculated from the following:    Height as of 4/12/23: 1.664 m (5' 5.5\").    Weight as of 4/25/23: 102.1 kg (225 lb). .      "

## 2023-05-30 NOTE — LETTER
5/30/2023         RE: Flash Brown  [address]  [facility] [address]        Dear Colleague,    Thank you for referring your patient, Flash Brown, to the [facility]. Please see a copy of my visit note below.    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  Previous site healing well.  Patient has no other skin complaints today.  Remainder of the HPI, Meds, PMH, Allergies, FH, and SH was reviewed in chart.      Past Medical History:   Diagnosis Date     Basal cell carcinoma      CAD (coronary artery disease)      Depression      Hyperlipidemia      Hypertension      Lymphoma (H)      Malignant melanoma (H)      Melanoma (H)      Squamous cell carcinoma        Past Surgical History:   Procedure Laterality Date     AXILLARY SURGERY      S/P resection and adjuvant radiation     BONE MARROW BIOPSY, BONE SPECIMEN, NEEDLE/TROCAR N/A 7/8/2019    Procedure: BIOPSY, BONE MARROW;  Surgeon: Husam Issa MD;  Location: [facility]     BONE MARROW BIOPSY, BONE SPECIMEN, NEEDLE/TROCAR Left 2/24/2022    Procedure: BIOPSY, BONE MARROW;  Surgeon: Cailin Anthony PA-C;  Location: [facility] OR     CARDIAC SURGERY       CHOLECYSTECTOMY       HERNIA REPAIR, UMBILICAL       PHACOEMULSIFICATION WITH STANDARD INTRAOCULAR LENS IMPLANT Right 10/2/2019    Procedure: Cataract Removal with Implant;  Surgeon: Dinesh Ivey MD;  Location: [facility] OR     PHACOEMULSIFICATION WITH STANDARD INTRAOCULAR LENS IMPLANT Left 10/21/2019    Procedure: Cataract Removal with Implant;  Surgeon: Dinesh Ivey MD;  Location: [facility] OR     STENT      PTCA with drug-eluting stent of RCA, circumflex, and LAD     VASCULAR SURGERY          Family History   Problem Relation Age of Onset     Asthma Other       Cancer Other         melanoma     Blood Disease Other         bleeding disorder     Lung Cancer Mother         Smoker     Prostate Cancer Father      Melanoma No family hx of        Social History     Socioeconomic History     Marital status: Single     Spouse name: Not on file     Number of children: 0     Years of education: Not on file     Highest education level: Not on file   Occupational History     Occupation: Retired     Comment: [employer]    Tobacco Use     Smoking status: Never     Smokeless tobacco: Never   Vaping Use     Vaping status: Never Used     Passive vaping exposure: Yes   Substance and Sexual Activity     Alcohol use: Yes     Comment: glass of wine couple times per week     Drug use: Never     Sexual activity: Not on file   Other Topics Concern     Parent/sibling w/ CABG, MI or angioplasty before 65F 55M? Not Asked   Social History Narrative     Not on file     Social Determinants of Health     Financial Resource Strain: Low Risk  (11/9/2021)    Overall Financial Resource Strain (CARDIA)      Difficulty of Paying Living Expenses: Not hard at all   Food Insecurity: No Food Insecurity (11/9/2021)    Hunger Vital Sign      Worried About Running Out of Food in the Last Year: Never true      Ran Out of Food in the Last Year: Never true   Transportation Needs: No Transportation Needs (11/9/2021)    PRAPARE - Transportation      Lack of Transportation (Medical): No      Lack of Transportation (Non-Medical): No   Physical Activity: Inactive (11/9/2021)    Exercise Vital Sign      Days of Exercise per Week: 0 days      Minutes of Exercise per Session: 0 min   Stress: No Stress Concern Present (11/9/2021)    [employer] [place] [employer] - Occupational Stress Questionnaire      Feeling of Stress : Not at all   Social Connections: Socially Isolated (11/9/2021)    Social Connection and Isolation Panel [NHANES]      Frequency of Communication with Friends and Family: Once a week       Frequency of Social Gatherings with Friends and Family: Once a week      Attends Christian Services: More than 4 times per year      Active Member of Clubs or Organizations: No      Attends Club or Organization Meetings: Not on file      Marital Status: Never    Intimate Partner Violence: Not on file   Housing Stability: Low Risk  (11/9/2021)    Housing Stability Vital Sign      Unable to Pay for Housing in the Last Year: No      Number of Places Lived in the Last Year: 1      Unstable Housing in the Last Year: No       Outpatient Encounter Medications as of 5/30/2023   Medication Sig Dispense Refill     aspirin (ASA) 81 MG EC tablet Take 81 mg by mouth daily       atorvastatin (LIPITOR) 20 MG tablet TAKE 1 TABLET BY MOUTH EVERY DAY 90 tablet 0     ferrous sulfate (FE TABS) 325 (65 Fe) MG EC tablet Take 1 tablet (325 mg) by mouth daily 90 tablet 0     furosemide (LASIX) 20 MG tablet Take 2 tablets (40 mg) by mouth every morning AND 1 tablet (20 mg) daily at 2 pm. 270 tablet 3     magnesium oxide (MAG-OX) 400 MG tablet Take 1 tablet (400 mg) by mouth 2 times daily 60 tablet 1     metoprolol succinate ER (TOPROL XL) 25 MG 24 hr tablet Take 1 tablet (25 mg) by mouth daily 90 tablet 3     nitroGLYcerin (NITROSTAT) 0.4 MG sublingual tablet Place 1 tablet (0.4 mg) under the tongue See Admin Instructions for chest pain 30 tablet 0     ORDER FOR DME Light Therapy with Full Spectrum Light (77505 lux) Start with 10-15 minute exposure per day, Increase exposure to 30 to 45 minutes per day, Maximum exposure: 90 minutes per day,Look periodically at light during each session  (Patient not taking: Reported on 4/25/2023) 1 0     potassium chloride ER (KLOR-CON M) 20 MEQ CR tablet Take 1 tablet (20 mEq) by mouth 2 times daily 60 tablet 0     ramipril (ALTACE) 5 MG capsule Take 1 capsule (5 mg) by mouth daily 90 capsule 3     tamsulosin (FLOMAX) 0.4 MG capsule Take 1 capsule (0.4 mg) by mouth every evening 90 capsule 3      triamcinolone (KENALOG) 0.1 % external cream Twice daily to legs prn itching 454 g 3     [DISCONTINUED] amLODIPine (NORVASC) 5 MG tablet Take 1 tablet (5 mg) by mouth daily 90 tablet 3     [DISCONTINUED] carvedilol (COREG) 12.5 MG tablet Take 1 tablet (12.5 mg) by mouth 2 times daily (with meals) 180 tablet 3     [DISCONTINUED] cloNIDine (CATAPRES) 0.1 MG tablet Take 1 tablet (0.1 mg) by mouth 2 times daily 180 tablet 3     [DISCONTINUED] clopidogrel (PLAVIX) 75 MG tablet Take 1 tablet (75 mg) by mouth daily 90 tablet 3     [DISCONTINUED] hydrALAZINE (APRESOLINE) 50 MG tablet Take 1 tablet (50 mg) by mouth 2 times daily 180 tablet 1     [DISCONTINUED] naloxone (NARCAN) 4 MG/0.1ML nasal spray Spray 1 spray (4 mg) into one nostril alternating nostrils once as needed for opioid reversal Every 2-3 minutes until patient responsive or EMS arrives 0.2 mL 0     [DISCONTINUED] pioglitazone (ACTOS) 30 MG tablet Take 1 tablet (30 mg) by mouth daily 90 tablet 0     Facility-Administered Encounter Medications as of 5/30/2023   Medication Dose Route Frequency Provider Last Rate Last Admin     sodium hyaluronate (HEALON DUET)    PRN Dinesh Ivey MD   1 kit at 10/02/19 1149             O:   NAD, WDWN, Alert & Oriented, Mood & Affect wnl, Vitals stable   Here today alone   Pulse 74   Resp 12   SpO2 94%    General appearance normal   Vitals stable   Alert, oriented and in no acute distress     L cheek 1cm red scaly papule         Eyes: Conjunctivae/lids:Normal     ENT: Lips, buccal mucosa, tongue: normal    MSK:Normal    Cardiovascular: peripheral edema none    Pulm: Breathing Normal    Lymph Nodes: No Head and Neck Lymphadenopathy     Neuro/Psych: Orientation:Alert and Orientedx3 ; Mood/Affect:normal       A/P:  1. L cheek squamous cell carcinoma   MOHS:   Location    The rationale for Mohs surgery was discussed with the patient and consent was obtained.  The risks and benefits as well as alternatives to therapy were  discussed, in detail.  Specifically, the risks of infection, scarring, bleeding, prolonged wound healing, incomplete removal, allergy to anesthesia, nerve injury and recurrence were addressed.  Indication for Mohs was Location. Prior to the procedure, the treatment site was clearly identified and, if available, confirmed with previous photos and confirmed by the patient   All components of the Universal Protocol/PAUSE rule were completed.  The Mohs surgeon operated in two distinct and integrated capacities as the surgeon and pathologist.      The area was prepped with Betasept.  A rim of normal appearing skin was marked circumferentially around the lesion.  The area was infiltrated with local anesthesia.  The tumor was first debulked to remove all clinically apparent tumor.  An incision following the standard Mohs approach was done and the specimen was oriented,mapped and placed in 1 block(s).  Each specimen was then chromacoded and processed in the Mohs laboratory using standard Mohs technique and submitted for frozen section histology.  Frozen section analysis showed no residual tumor but CLEAR MARGINS.      The tumor was excised using standard Mohs technique in 1 stages(s).  CLEAR MARGINS OBTAINED and Final defect size was 1.7 cm.     We discussed the options for wound management in full with the patient including risks/benefits/ possible outcomes.        REPAIR COMPLEX: Because of the tightness of the surrounding skin and Because of the size and full thickness nature of the defect, Because of the tightness of the surrounding skin, To maintain form and function, In order to avoid distortion and Because of the proximity to the lid, a complex closure was planned. After LE anesthesia and prep, Burow's triangles were excised in the relaxed skin tension lines. The wound edges were widely undermined greater than width of the defect on both sides by dissection in the subcutaneous plane until adequate tissue mobility was  obtained. Hemostasis was obtained. The wound edges were closed in a layered fashion using Vicryl and Fast Absorbing Plain Gut sutures. Postoperative length was 5 cm.   EBL minimal; complications none; wound care routine.  The patient was discharged in good condition and will return in one week for wound evaluation.  It was a pleasure speaking to Flash Brown today.  Previous clinic notes and pertinent laboratory tests were reviewed prior to Flash Brown's visit.  Signs and Symptoms of skin cancer discussed with patient.  Patient encouraged to perform monthly skin exams.  UV precautions reviewed with patient.  Risks of non-melanoma skin cancer discussed with patient   Return to clinic 6 months        Again, thank you for allowing me to participate in the care of your patient.        Sincerely,        Dinesh Roque MD

## 2023-05-30 NOTE — PATIENT INSTRUCTIONS
Sutured Wound Care     [facility]: [phone]    [facility]: [phone]          No strenuous activity for 48 hours. Resume moderate activity in 48 hours. No heavy exercising until you are seen for follow up in one week.     Take Tylenol as needed for discomfort.                         Do not drink alcoholic beverages for 48 hours.     Keep the pressure bandage in place for 24 hours. If the bandage becomes blood tinged or loose, reinforce it with gauze and tape.        (Refer to the reverse side of this page for management of bleeding).    Remove pressure bandage in 24 hours     Leave the flat bandage in place until your follow up appointment.    Keep the bandage dry. Wash around it carefully.    If the tape becomes soiled or starts to come off, reinforce it with additional paper tape.    Do not smoke for 3 weeks; smoking is detrimental to wound healing.    It is normal to have swelling and bruising around the surgical site. The bruising will fade in approximately 10-14 days. Elevate the area to reduce swelling.    Numbness, itchiness and sensitivity to temperature changes can occur after surgery and may take up to 18 months to normalize.      POSSIBLE COMPLICATIONS    BLEEDING:    Leave the bandage in place.  Use tightly rolled up gauze or a cloth to apply direct pressure over the bandage for 20   minutes.  Reapply pressure for an additional 20 minutes if necessary  Call the office or go to the nearest emergency room if pressure fails to stop the bleeding.  Use additional gauze and tape to maintain pressure once the bleeding has stopped.        PAIN:    Post operative pain should slowly get better, never worse.  A severe increase in pain may indicate a problem. Call the office if this occurs.    In case of emergency phone:Dr Roque [phone]

## 2023-06-02 ENCOUNTER — APPOINTMENT (OUTPATIENT)
Dept: MEDSURG UNIT | Facility: CLINIC | Age: 83
End: 2023-06-02
Attending: INTERNAL MEDICINE
Payer: MEDICARE

## 2023-06-02 ENCOUNTER — HOSPITAL ENCOUNTER (OUTPATIENT)
Facility: CLINIC | Age: 83
Discharge: HOME | End: 2023-06-02
Attending: INTERNAL MEDICINE | Admitting: RADIOLOGY
Payer: MEDICARE

## 2023-06-02 ENCOUNTER — APPOINTMENT (OUTPATIENT)
Dept: INTERVENTIONAL RADIOLOGY/VASCULAR | Facility: CLINIC | Age: 83
End: 2023-06-02
Attending: INTERNAL MEDICINE
Payer: MEDICARE

## 2023-06-02 VITALS
WEIGHT: 225 LBS | SYSTOLIC BLOOD PRESSURE: 128 MMHG | BODY MASS INDEX: 36.16 KG/M2 | OXYGEN SATURATION: 97 % | DIASTOLIC BLOOD PRESSURE: 53 MMHG | HEART RATE: 60 BPM | RESPIRATION RATE: 16 BRPM | TEMPERATURE: 98.4 F | HEIGHT: 66 IN

## 2023-06-02 DIAGNOSIS — R16.0 LIVER MASS, LEFT LOBE: ICD-10-CM

## 2023-06-02 LAB
ERYTHROCYTE [DISTWIDTH] IN BLOOD BY AUTOMATED COUNT: 14.8 % (ref 10–15)
HCT VFR BLD AUTO: 36.4 % (ref 40–53)
HGB BLD-MCNC: 11.9 G/DL (ref 13.3–17.7)
INR PPP: 1.28 (ref 0.85–1.15)
MCH RBC QN AUTO: 27.5 PG (ref 26.5–33)
MCHC RBC AUTO-ENTMCNC: 32.7 G/DL (ref 31.5–36.5)
MCV RBC AUTO: 84 FL (ref 78–100)
PLATELET # BLD AUTO: 91 10E3/UL (ref 150–450)
RBC # BLD AUTO: 4.33 10E6/UL (ref 4.4–5.9)
WBC # BLD AUTO: 3.2 10E3/UL (ref 4–11)

## 2023-06-02 PROCEDURE — 250N000011 HC RX IP 250 OP 636: Performed by: STUDENT IN AN ORGANIZED HEALTH CARE EDUCATION/TRAINING PROGRAM

## 2023-06-02 PROCEDURE — 258N000003 HC RX IP 258 OP 636: Performed by: NURSE PRACTITIONER

## 2023-06-02 PROCEDURE — 88333 PATH CONSLTJ SURG CYTO XM 1: CPT | Mod: TC | Performed by: INTERNAL MEDICINE

## 2023-06-02 PROCEDURE — 250N000009 HC RX 250: Performed by: STUDENT IN AN ORGANIZED HEALTH CARE EDUCATION/TRAINING PROGRAM

## 2023-06-02 PROCEDURE — 36415 COLL VENOUS BLD VENIPUNCTURE: CPT | Performed by: NURSE PRACTITIONER

## 2023-06-02 PROCEDURE — 999N000142 HC STATISTIC PROCEDURE PREP ONLY

## 2023-06-02 PROCEDURE — 88305 TISSUE EXAM BY PATHOLOGIST: CPT | Mod: TC | Performed by: INTERNAL MEDICINE

## 2023-06-02 PROCEDURE — 88342 IMHCHEM/IMCYTCHM 1ST ANTB: CPT | Mod: 26 | Performed by: STUDENT IN AN ORGANIZED HEALTH CARE EDUCATION/TRAINING PROGRAM

## 2023-06-02 PROCEDURE — 88333 PATH CONSLTJ SURG CYTO XM 1: CPT | Mod: 26 | Performed by: STUDENT IN AN ORGANIZED HEALTH CARE EDUCATION/TRAINING PROGRAM

## 2023-06-02 PROCEDURE — 999N000133 HC STATISTIC PP CARE STAGE 2

## 2023-06-02 PROCEDURE — 88313 SPECIAL STAINS GROUP 2: CPT | Mod: 26 | Performed by: STUDENT IN AN ORGANIZED HEALTH CARE EDUCATION/TRAINING PROGRAM

## 2023-06-02 PROCEDURE — 85027 COMPLETE CBC AUTOMATED: CPT | Performed by: NURSE PRACTITIONER

## 2023-06-02 PROCEDURE — 85610 PROTHROMBIN TIME: CPT | Performed by: NURSE PRACTITIONER

## 2023-06-02 PROCEDURE — 99152 MOD SED SAME PHYS/QHP 5/>YRS: CPT | Mod: GC | Performed by: RADIOLOGY

## 2023-06-02 PROCEDURE — 88305 TISSUE EXAM BY PATHOLOGIST: CPT | Mod: 26 | Performed by: STUDENT IN AN ORGANIZED HEALTH CARE EDUCATION/TRAINING PROGRAM

## 2023-06-02 PROCEDURE — 99152 MOD SED SAME PHYS/QHP 5/>YRS: CPT

## 2023-06-02 PROCEDURE — 272N000505 IR LIVER BIOPSY PERCUTANEOUS

## 2023-06-02 PROCEDURE — 47000 NEEDLE BIOPSY OF LIVER PERQ: CPT | Mod: GC | Performed by: RADIOLOGY

## 2023-06-02 PROCEDURE — 76942 ECHO GUIDE FOR BIOPSY: CPT | Mod: 26 | Performed by: RADIOLOGY

## 2023-06-02 RX ORDER — SODIUM CHLORIDE 9 MG/ML
INJECTION, SOLUTION INTRAVENOUS CONTINUOUS
Status: DISCONTINUED | OUTPATIENT
Start: 2023-06-02 | End: 2023-06-02 | Stop reason: HOSPADM

## 2023-06-02 RX ORDER — FENTANYL CITRATE 50 UG/ML
25-50 INJECTION, SOLUTION INTRAMUSCULAR; INTRAVENOUS EVERY 5 MIN PRN
Status: DISCONTINUED | OUTPATIENT
Start: 2023-06-02 | End: 2023-06-02 | Stop reason: HOSPADM

## 2023-06-02 RX ORDER — FLUMAZENIL 0.1 MG/ML
0.2 INJECTION, SOLUTION INTRAVENOUS
Status: DISCONTINUED | OUTPATIENT
Start: 2023-06-02 | End: 2023-06-02 | Stop reason: HOSPADM

## 2023-06-02 RX ORDER — LIDOCAINE 40 MG/G
CREAM TOPICAL
Status: DISCONTINUED | OUTPATIENT
Start: 2023-06-02 | End: 2023-06-02 | Stop reason: HOSPADM

## 2023-06-02 RX ORDER — NALOXONE HYDROCHLORIDE 0.4 MG/ML
0.2 INJECTION, SOLUTION INTRAMUSCULAR; INTRAVENOUS; SUBCUTANEOUS
Status: DISCONTINUED | OUTPATIENT
Start: 2023-06-02 | End: 2023-06-02 | Stop reason: HOSPADM

## 2023-06-02 RX ORDER — NALOXONE HYDROCHLORIDE 0.4 MG/ML
0.4 INJECTION, SOLUTION INTRAMUSCULAR; INTRAVENOUS; SUBCUTANEOUS
Status: DISCONTINUED | OUTPATIENT
Start: 2023-06-02 | End: 2023-06-02 | Stop reason: HOSPADM

## 2023-06-02 RX ADMIN — MIDAZOLAM 0.5 MG: 1 INJECTION INTRAMUSCULAR; INTRAVENOUS at 13:36

## 2023-06-02 RX ADMIN — FENTANYL CITRATE 25 MCG: 50 INJECTION, SOLUTION INTRAMUSCULAR; INTRAVENOUS at 13:48

## 2023-06-02 RX ADMIN — LIDOCAINE HYDROCHLORIDE 6 ML: 10 INJECTION, SOLUTION EPIDURAL; INFILTRATION; INTRACAUDAL; PERINEURAL at 13:53

## 2023-06-02 RX ADMIN — FENTANYL CITRATE 25 MCG: 50 INJECTION, SOLUTION INTRAMUSCULAR; INTRAVENOUS at 13:36

## 2023-06-02 RX ADMIN — SODIUM CHLORIDE: 9 INJECTION, SOLUTION INTRAVENOUS at 11:42

## 2023-06-02 ASSESSMENT — ACTIVITIES OF DAILY LIVING (ADL)
ADLS_ACUITY_SCORE: 35

## 2023-06-02 NOTE — PROCEDURES
[facility]    Procedure: IR Procedure Note    Date/Time: 6/2/2023 2:10 PM    Performed by: Emerson Carbajal MD  Authorized by: Dayna Anthony MD  IR Fellow Physician:  Radiology Resident Physician: Omkar Carbajal        UNIVERSAL PROTOCOL   Site Marked: NA  Prior Images Obtained and Reviewed:  Yes  Required items: Required blood products, implants, devices and special equipment available    Patient identity confirmed:  Verbally with patient, arm band, provided demographic data and hospital-assigned identification number  Patient was reevaluated immediately before administering moderate or deep sedation or anesthesia  Confirmation Checklist:  Patient's identity using two indicators, relevant allergies, procedure was appropriate and matched the consent or emergent situation and correct equipment/implants were available  Time out: Immediately prior to the procedure a time out was called    Universal Protocol: the Joint Commission Universal Protocol was followed    Preparation: Patient was prepped and draped in usual sterile fashion    ESBL (mL):  4     ANESTHESIA    Anesthesia: Local infiltration  Local Anesthetic:  Lidocaine 1% without epinephrine      SEDATION  Patient Sedated: Yes    Sedation Type:  Moderate (conscious) sedation  Sedation:  Fentanyl and midazolam  Vital signs: Vital signs monitored during sedation    See dictated procedure note for full details.  Findings: Redemonstrated left hepatic lobe lesion amenable to biopsy. Multiple cores obtained. No acute complication.    Specimens: core needle biopsy specimens sent for pathological analysis    Complications: None    Condition: Stable    Plan:   -Bedrest for 2 hours total.      PROCEDURE  Describe Procedure: Successful US-guided left hepatic lesion core biopsy (x7).  Patient Tolerance:  Patient tolerated the procedure well with no immediate complications  Length of time physician/provider present for 1:1  monitoring during sedation: 25

## 2023-06-02 NOTE — DISCHARGE INSTRUCTIONS
[facility]    Interventional Radiology  Patient Instructions Following Biopsy    AFTER YOU GO HOME  If you were given sedation DO NOT drive or operate machinery at home or at work for at least 24 hours  DO NOT SMOKE FOR AT LEAST 24 HOURS, if you have been given any medications that were to help you relax or sedate you during your procedure  DO NOT drink alcoholic beverages the day of your procedure  DO NOT make any important or legal decisions for 24 hours following your procedure  DO relax and take it easy for 48 hours, no strenuous activity for 24 hours  DO NOT do any strenuous exercise or lifting (> 10 lbs) for at least 7 days following your procedure  DO drink plenty of fluids  DO resume your regular diet, unless otherwise instructed by your Primary Physician  There should be minimal drainage from the biopsy site  Keep the dressing dry and in place for 24 hours. After 24 hours, remove the dressing. You may shower at that time. Do not scrub the procedure site vigorously for 5 days. Do not submerge the procedure site under water for 5 days (such as going swimming, using a hot tub, taking a bath). Re apply a band aid for 3 days. Change daily and as needed. Never leave a wet or dirty dressing in place.    CALL THE PHYSICIAN IF:  You start bleeding from the procedure site.  If you do start to bleed from that site, lie down flat and hold pressure on the site for a minimum of 10 minutes.  Your physician will tell you if you need to return to the hospital  You develop nausea or vomiting  You have excessive swelling, redness, or tenderness at the site  You have drainage that looks like it is infected.  You experience severe pain  You develop hives or a rash or unexplained itching  You develop shortness of breath  You develop a temperature of 101 degrees F or greater  You develop bloody clots or red urine after you are discharged  You develop chest pain or cough up blood, lightheadedness or  fainting    ADDITIONAL INSTRUCTIONS  If you are taking Coumadin, restart tonight.  Follow up with your Coumadin Clinic or Primary Care MD to have your INR rechecked.    [facility] INTERVENTIONAL RADIOLOGY DEPARTMENT  Procedure Physicians: Dr. Jo Dr. Reason                                    Date of procedure: June 2, 2023    Telephone Numbers: [phone]      Monday-Friday 7:30 am to 4:00 pm  [phone] After 4:00 pm Monday-Friday, Weekends & Holidays. Ask the  to contact the Interventional Radiologist on call.  Someone is on call 24 hrs/day  [facility] toll free number: [phone] Monday-Friday 8:00 am to 4:30 pm  [facility] Emergency Dept: [phone]

## 2023-06-02 NOTE — PROGRESS NOTES
Recovery time completed without complications. Tolerated PO. Oxygen via NC titrated off. Tolerated ambulation around unit using own WW. Able to void without complications. Discharge instructions reviewed with pt & pt's sister, Daylin. Questions & concerns addressed. RUQ procedure site remained stable; unchanged. Pt stable; meets discharge criteria.

## 2023-06-02 NOTE — PRE-PROCEDURE
GENERAL PRE-PROCEDURE:   Procedure:  Image-guided liver lesion biopsy  Date/Time:  6/2/2023 12:09 PM    Verbal consent obtained?: Yes    Written consent obtained?: Yes    Risks and benefits: Risks, benefits and alternatives were discussed    Consent given by:  Patient  Patient states understanding of procedure being performed: Yes    Patient's understanding of procedure matches consent: Yes    Procedure consent matches procedure scheduled: Yes    Expected level of sedation:  Moderate  Appropriately NPO:  Yes  ASA Class:  3  Mallampati  :  Grade 3- soft palate visible, posterior pharyngeal wall not visible  Lungs:  Lungs clear with good breath sounds bilaterally  Heart:  Normal heart sounds and rate  History & Physical reviewed:  Abbreviated history and physical done prior to moderate sedation  Statement of review:  I have reviewed the lab findings, diagnostic data, medications, and the plan for sedation

## 2023-06-02 NOTE — IR NOTE
Patient Name: Flash Brown  Medical Record Number: [id number]  Today's Date: 6/2/2023    Procedure: Liver lesion biopsy  Proceduralist: Dr. Jo Miles  Pathology present: yes- 7 cores taken    Procedure Start: 1340  Procedure end: 1407  Sedation medications administered: 50 mcg fentanyl, 0.5 mg versed     Report given to: Betty WYNNE   : ASIF    Other Notes: Pt arrived to IR room 6 from . Consent reviewed. Pt denies any questions or concerns regarding procedure. Pt positioned supine and monitored per protocol. Pt tolerated procedure without any noted complications. Pt transferred back to .

## 2023-06-02 NOTE — PROGRESS NOTES
Arrived to Unit 2a for liver biopsy procedure in IR. AFVSS. Denies pain. Labs processing. Pt's sister, Daylin, at bedside. Pt stable; ready for consent.

## 2023-06-02 NOTE — PROGRESS NOTES
Arrived back to Unit 2a post liver lesion biopsy procedure in IR. Oxygen 90% on RA. Pt drowsy; easily awaken to voice; but falls immediately back to sleep. Oxygen via NC started. Oxygen currently 97% on 1L via NC. Denies pain. R upper abd procedure site C/D/I; no bleeding/hematoma noted. Pt's sister, Daylin, at bedside. Pt stable.

## 2023-06-05 ENCOUNTER — TELEPHONE (OUTPATIENT)
Dept: INTERVENTIONAL RADIOLOGY/VASCULAR | Facility: CLINIC | Age: 83
End: 2023-06-05
Payer: MEDICARE

## 2023-06-05 NOTE — TELEPHONE ENCOUNTER
POST CALL    Spoke with: Rea Guevara (Sister)  Call attempt: 1  Any pain: No  Any fever: No  Any redness/swelling/ abnormal drainage around puncture site: No  Were you instructed well enough to take care of yourself at home: Yes  Are you satisfied with the care you received: Yes  Any additional concerns or questions: No  IR nurse triage line number provided: Yes    Post call completed.   June 5, 2023 10:51 AM  Jordyn Heller RN

## 2023-06-06 ENCOUNTER — OFFICE VISIT (OUTPATIENT)
Dept: DERMATOLOGY | Facility: CLINIC | Age: 83
End: 2023-06-06
Payer: MEDICARE

## 2023-06-06 ENCOUNTER — ALLIED HEALTH/NURSE VISIT (OUTPATIENT)
Dept: DERMATOLOGY | Facility: CLINIC | Age: 83
End: 2023-06-06
Payer: MEDICARE

## 2023-06-06 VITALS — DIASTOLIC BLOOD PRESSURE: 65 MMHG | HEART RATE: 74 BPM | OXYGEN SATURATION: 95 % | SYSTOLIC BLOOD PRESSURE: 140 MMHG

## 2023-06-06 DIAGNOSIS — D04.39 SQUAMOUS CELL CARCINOMA IN SITU (SCCIS) OF SKIN OF RIGHT CHEEK: Primary | ICD-10-CM

## 2023-06-06 DIAGNOSIS — Z48.01 ENCOUNTER FOR CHANGE OR REMOVAL OF SURGICAL WOUND DRESSING: Primary | ICD-10-CM

## 2023-06-06 LAB
PATH REPORT.COMMENTS IMP SPEC: ABNORMAL
PATH REPORT.COMMENTS IMP SPEC: YES
PATH REPORT.FINAL DX SPEC: ABNORMAL
PATH REPORT.GROSS SPEC: ABNORMAL
PATH REPORT.MICROSCOPIC SPEC OTHER STN: ABNORMAL
PATH REPORT.RELEVANT HX SPEC: ABNORMAL
PHOTO IMAGE: ABNORMAL

## 2023-06-06 PROCEDURE — 17311 MOHS 1 STAGE H/N/HF/G: CPT | Mod: 79 | Performed by: DERMATOLOGY

## 2023-06-06 PROCEDURE — 99207 PR NO CHARGE NURSE ONLY: CPT

## 2023-06-06 ASSESSMENT — PAIN SCALES - GENERAL: PAINLEVEL: NO PAIN (0)

## 2023-06-06 NOTE — NURSING NOTE
Chief Complaint   Patient presents with     Derm Problem     Mohs- R Cheek        Vitals:    06/06/23 0811   BP: (!) 140/65   BP Location: Left arm   Patient Position: Sitting   Cuff Size: Adult Large   Pulse: 74   SpO2: 95%     Wt Readings from Last 1 Encounters:   06/02/23 102.1 kg (225 lb)       Mary Peace LPN .................6/6/2023

## 2023-06-06 NOTE — PROGRESS NOTES
Flash Brown comes into clinic today at the request of  Ordering Provider for Wound Check Action taken: See Below.    This service provided today was under the supervising provider of the day Dr. Roque, who was available if needed.    Pt returned to clinic for post surgery 1 week follow up bandage change. Pt has no complaints, denies pain. Bandage removed from left cheek, area cleansed with normal saline. Site is healing and wound edges approximating well. Reapplied new steri strips and paper tape.    Advised to watch for signs/sx of infection; spreading redness, drainage, odor, fever. Call or report promptly to clinic. Pt given written instructions and informed to rtc as needed. Patient verbalized understanding.       Mary Peace LPN   6/6/2023

## 2023-06-06 NOTE — LETTER
6/6/2023         RE: Flsah Brown  [address]  [facility] [address]        Dear Colleague,    Thank you for referring your patient, Flash Brown, to the [facility]. Please see a copy of my visit note below.    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 Previous sites healing well.  Patient has no other skin complaints today.  Remainder of the HPI, Meds, PMH, Allergies, FH, and SH was reviewed in chart.      Past Medical History:   Diagnosis Date     Basal cell carcinoma      CAD (coronary artery disease)      Depression      Hyperlipidemia      Hypertension      Lymphoma (H)      Malignant melanoma (H)      Melanoma (H)      Squamous cell carcinoma        Past Surgical History:   Procedure Laterality Date     AXILLARY SURGERY      S/P resection and adjuvant radiation     BONE MARROW BIOPSY, BONE SPECIMEN, NEEDLE/TROCAR N/A 7/8/2019    Procedure: BIOPSY, BONE MARROW;  Surgeon: Husam Issa MD;  Location: [facility]     BONE MARROW BIOPSY, BONE SPECIMEN, NEEDLE/TROCAR Left 2/24/2022    Procedure: BIOPSY, BONE MARROW;  Surgeon: Cailin Anthony PA-C;  Location: [facility]     CARDIAC SURGERY       CHOLECYSTECTOMY       HERNIA REPAIR, UMBILICAL       PHACOEMULSIFICATION WITH STANDARD INTRAOCULAR LENS IMPLANT Right 10/2/2019    Procedure: Cataract Removal with Implant;  Surgeon: Dinesh Ivey MD;  Location: [facility]     PHACOEMULSIFICATION WITH STANDARD INTRAOCULAR LENS IMPLANT Left 10/21/2019    Procedure: Cataract Removal with Implant;  Surgeon: Dinesh Ivey MD;  Location: [facility] OR     STENT      PTCA with drug-eluting stent of RCA, circumflex, and LAD     VASCULAR SURGERY          Family History   Problem Relation Age of Onset     Asthma Other       Cancer Other         melanoma     Blood Disease Other         bleeding disorder     Lung Cancer Mother         Smoker     Prostate Cancer Father      Melanoma No family hx of        Social History     Socioeconomic History     Marital status: Single     Spouse name: Not on file     Number of children: 0     Years of education: Not on file     Highest education level: Not on file   Occupational History     Occupation: Retired     Comment: [employer]    Tobacco Use     Smoking status: Never     Smokeless tobacco: Never   Vaping Use     Vaping status: Never Used     Passive vaping exposure: Yes   Substance and Sexual Activity     Alcohol use: Yes     Comment: glass of wine couple times per week     Drug use: Never     Sexual activity: Not on file   Other Topics Concern     Parent/sibling w/ CABG, MI or angioplasty before 65F 55M? Not Asked   Social History Narrative     Not on file     Social Determinants of Health     Financial Resource Strain: Low Risk  (11/9/2021)    Overall Financial Resource Strain (CARDIA)      Difficulty of Paying Living Expenses: Not hard at all   Food Insecurity: No Food Insecurity (11/9/2021)    Hunger Vital Sign      Worried About Running Out of Food in the Last Year: Never true      Ran Out of Food in the Last Year: Never true   Transportation Needs: No Transportation Needs (11/9/2021)    PRAPARE - Transportation      Lack of Transportation (Medical): No      Lack of Transportation (Non-Medical): No   Physical Activity: Inactive (11/9/2021)    Exercise Vital Sign      Days of Exercise per Week: 0 days      Minutes of Exercise per Session: 0 min   Stress: No Stress Concern Present (11/9/2021)    [place] [employer] - Occupational Stress Questionnaire      Feeling of Stress : Not at all   Social Connections: Socially Isolated (11/9/2021)    Social Connection and Isolation Panel [NHANES]      Frequency of Communication with Friends and Family: Once a week       Frequency of Social Gatherings with Friends and Family: Once a week      Attends Nondenominational Services: More than 4 times per year      Active Member of Clubs or Organizations: No      Attends Club or Organization Meetings: Not on file      Marital Status: Never    Intimate Partner Violence: Not on file   Housing Stability: Low Risk  (11/9/2021)    Housing Stability Vital Sign      Unable to Pay for Housing in the Last Year: No      Number of Places Lived in the Last Year: 1      Unstable Housing in the Last Year: No       Outpatient Encounter Medications as of 6/6/2023   Medication Sig Dispense Refill     aspirin (ASA) 81 MG EC tablet Take 81 mg by mouth daily       atorvastatin (LIPITOR) 20 MG tablet TAKE 1 TABLET BY MOUTH EVERY DAY 90 tablet 0     ferrous sulfate (FE TABS) 325 (65 Fe) MG EC tablet Take 1 tablet (325 mg) by mouth daily 90 tablet 0     furosemide (LASIX) 20 MG tablet Take 2 tablets (40 mg) by mouth every morning AND 1 tablet (20 mg) daily at 2 pm. 270 tablet 3     magnesium oxide (MAG-OX) 400 MG tablet Take 1 tablet (400 mg) by mouth 2 times daily 60 tablet 1     metoprolol succinate ER (TOPROL XL) 25 MG 24 hr tablet Take 1 tablet (25 mg) by mouth daily 90 tablet 3     nitroGLYcerin (NITROSTAT) 0.4 MG sublingual tablet Place 1 tablet (0.4 mg) under the tongue See Admin Instructions for chest pain 30 tablet 0     ORDER FOR DME Light Therapy with Full Spectrum Light (34495 lux) Start with 10-15 minute exposure per day, Increase exposure to 30 to 45 minutes per day, Maximum exposure: 90 minutes per day,Look periodically at light during each session  (Patient not taking: Reported on 4/25/2023) 1 0     potassium chloride ER (KLOR-CON M) 20 MEQ CR tablet Take 1 tablet (20 mEq) by mouth 2 times daily 60 tablet 0     ramipril (ALTACE) 5 MG capsule Take 1 capsule (5 mg) by mouth daily 90 capsule 3     tamsulosin (FLOMAX) 0.4 MG capsule Take 1 capsule (0.4 mg) by mouth every evening 90 capsule 3      triamcinolone (KENALOG) 0.1 % external cream Twice daily to legs prn itching 454 g 3     [DISCONTINUED] amLODIPine (NORVASC) 5 MG tablet Take 1 tablet (5 mg) by mouth daily 90 tablet 3     [DISCONTINUED] carvedilol (COREG) 12.5 MG tablet Take 1 tablet (12.5 mg) by mouth 2 times daily (with meals) 180 tablet 3     [DISCONTINUED] cloNIDine (CATAPRES) 0.1 MG tablet Take 1 tablet (0.1 mg) by mouth 2 times daily 180 tablet 3     [DISCONTINUED] clopidogrel (PLAVIX) 75 MG tablet Take 1 tablet (75 mg) by mouth daily 90 tablet 3     [DISCONTINUED] hydrALAZINE (APRESOLINE) 50 MG tablet Take 1 tablet (50 mg) by mouth 2 times daily 180 tablet 1     [DISCONTINUED] naloxone (NARCAN) 4 MG/0.1ML nasal spray Spray 1 spray (4 mg) into one nostril alternating nostrils once as needed for opioid reversal Every 2-3 minutes until patient responsive or EMS arrives 0.2 mL 0     [DISCONTINUED] pioglitazone (ACTOS) 30 MG tablet Take 1 tablet (30 mg) by mouth daily 90 tablet 0     Facility-Administered Encounter Medications as of 6/6/2023   Medication Dose Route Frequency Provider Last Rate Last Admin     sodium hyaluronate (HEALON DUET)    PRN Dinesh Ivey MD   1 kit at 10/02/19 1149             O:   NAD, WDWN, Alert & Oriented, Mood & Affect wnl, Vitals stable   Here today alone   BP (!) 140/65 (BP Location: Left arm, Patient Position: Sitting, Cuff Size: Adult Large)   Pulse 74   SpO2 95%    General appearance normal   Vitals stable   Alert, oriented and in no acute distress      Following lymph nodes palpated: Occipital, Cervical, Supraclavicular no lad    R cheek 6mm scaly papule       Eyes: Conjunctivae/lids:Normal     ENT: Lips, buccal mucosa, tongue: normal    MSK:Normal    Cardiovascular: peripheral edema none    Pulm: Breathing Normal    Lymph Nodes: No Head and Neck Lymphadenopathy     Neuro/Psych: Orientation:Alert and Orientedx3 ; Mood/Affect:normal       A/P:  1. R cheek squamous cell carcinoma in situ   MOHS:    Location    The rationale for Mohs surgery was discussed with the patient and consent was obtained.  The risks and benefits as well as alternatives to therapy were discussed, in detail.  Specifically, the risks of infection, scarring, bleeding, prolonged wound healing, incomplete removal, allergy to anesthesia, nerve injury and recurrence were addressed.  Indication for Mohs was Location. Prior to the procedure, the treatment site was clearly identified and, if available, confirmed with previous photos and confirmed by the patient   All components of the Universal Protocol/PAUSE rule were completed.  The Mohs surgeon operated in two distinct and integrated capacities as the surgeon and pathologist.      The area was prepped with Betasept.  A rim of normal appearing skin was marked circumferentially around the lesion.  The area was infiltrated with local anesthesia.  The tumor was first debulked to remove all clinically apparent tumor.  An incision following the standard Mohs approach was done and the specimen was oriented,mapped and placed in 1 block(s).  Each specimen was then chromacoded and processed in the Mohs laboratory using standard Mohs technique and submitted for frozen section histology.  Frozen section analysis showed no residual tumor but CLEAR MARGINS.      The tumor was excised using standard Mohs technique in 1 stages(s).  CLEAR MARGINS OBTAINED and Final defect size was 1.2 cm.     We discussed the options for wound management in full with the patient including risks/benefits/ possible outcomes.        REPAIR SECOND INTENT: We discussed the options for wound management in full with the patient including risks/benefits/possible outcomes. Decision made to allow the wound to heal by second intention. Cautery was used for for hemostasis. EBL minimal; complications none; wound care routine.  The patient was discharged in good condition and will return in one month or prn for wound evaluation.  It was a  pleasure speaking to Flash Brown today.  Previous clinic notes and pertinent laboratory tests were reviewed prior to Flash Brown's visit.  Signs and Symptoms of skin cancer discussed with patient.  Patient encouraged to perform monthly skin exams.  UV precautions reviewed with patient.  Risks of non-melanoma skin cancer discussed with patient   Return to clinic 6 months        Again, thank you for allowing me to participate in the care of your patient.        Sincerely,        Dinesh Roque MD

## 2023-06-06 NOTE — PATIENT INSTRUCTIONS
WOUND CARE INSTRUCTIONS  for  ONE WEEK AFTER SURGERY          Left Cheek     Leave flat bandage on your skin for one week after today s bandage change.  In one week when you remove the bandage, you may resume your regular skin care routine, including washing with mild soap and water, applying moisturizer, make-up and sunscreen.    If there are any open or bleeding areas at the incision/graft site you should begin to cover the area with a bandage daily as follows:    Clean and dry the area with plain tap water using a Q-tip or sterile gauze pad.  Apply Polysporin or Bacitracin ointment to the open area.  Cover the wound with a band-aid or a sterile non-stick gauze pad and micropore paper tape.     SIGNS OF INFECTION  - If you notice any of these signs of infection, call your doctor right away: expanding redness around the wound.  - Yellow or greenish-colored pus or cloudy wound drainage.    - Red streaking spreading from the wound.  - Increased swelling, tenderness, or pain around the wound.   - Fever.    Please remember that yellow and clear drainage from a wound can be normal and related to normal wound healing.  Isolated drainage from a wound without a combination of the above features does not indicate infection.       *Once the bandages are removed, the scar will be red and firm (especially in the lip/chin area). This is normal and will fade in time. It might take 6-12 months for this to happen.     *Massaging the area will help the scar soften and fade quicker. Begin to massage the area one month after the bandages have been removed. To massage apply pressure directly and firmly over the scar with the fingertips and move in a circular motion. Massage the area for a few minutes several times a day. Continue to massage the site for several months.    *Approximately 6-8 weeks after surgery it is not uncommon to see the formation of  tender pimple-like  bump along the scar. This is normal. As the scar continues to  mature and the stitches underneath the skin begin to dissolve, this might occur. Do not pick or squeeze, this will resolve on it s own. Should one break open producing a small amount of drainage, apply Polysporin or Bacitracin ointment a few times a day until the wound is completely healed.    *Numbness in the surgical area is expected. It might take 12-18 months for the feeling to return to normal. During this time sensations of itchiness, tingling and occasional sharp pains might be noted. These feelings are normal and will subside once the nerves have completely healed.     IN CASE OF EMERGENCY: Dr Roque [phone]   If you were seen in [place] call: [phone]  If you were seen in [place] call: [phone]       Open Wound Care     for right cheek    No strenuous activity for 48 hours    Take Tylenol as needed for discomfort.                                                .         Do not drink alcoholic beverages for 48 hours.    Keep the pressure bandage in place for 24 hours. If the bandage becomes blood tinged or loose, reinforce it with gauze and tape.        (Refer to the reverse side of this page for management of bleeding).    Remove bandage in 24 hours and begin wound care as follows:     Clean area with tap water using a Q tip or gauze pad, (shower / bathe normally)  Dry wound with Q tip or gauze pad  Apply Aquaphor, Vaseline, Polysporin or Bacitracin Ointment with a Q tip  Do NOT use Neosporin Ointment *  Cover the wound with a band-aid or nonstick gauze pad and paper tape.  Repeat wound care once a day until wound is completely healed.    It is an old wives tale that a wound heals better when it is exposed to air and allowed to dry out. The wound will heal faster with a better cosmetic result if it is kept moist with ointment and covered with a bandage.  Do not let the wound dry out.      Supplies Needed:                Qtips or gauze pads                Polysporin or Bacitracin Ointment                 Bandaids or nonstick gauze pads and paper tape    Wound care kits and brown paper tape are available for purchase at   the pharmacy.    BLEEDING:    Use tightly rolled up gauze or cloth to apply direct pressure over the bandage for 20   minutes.  Reapply pressure for an additional 20 minutes if necessary  Call the office or go to the nearest emergency room if pressure fails to stop the bleeding.  Use additional gauze and tape to maintain pressure once the bleeding has stopped.  Begin wound care 24 hours after surgery as directed.                  WOUND HEALING    One week after surgery a pink / red halo will form around the outside of the wound.   This is new skin.  The center of the wound will appear yellowish white and produce some drainage.  The pink halo will slowly migrate in toward the center of the wound until the wound is covered with new shiny pink skin.  There will be no more drainage when the wound is completely healed.  It will take six months to one year for the redness to fade.  The scar may be itchy, tight and sensitive to extreme temperatures for a year after the surgery.  Massaging the area several times a day for several minutes after the wound is completely healed will help the scar soften and normalize faster. Begin massage only after healing is complete.      In case of emergency call: Dr Roque: [phone]    [facility]: [phone]    [facility]:[phone]

## 2023-06-06 NOTE — PROGRESS NOTES
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 Previous sites healing well.  Patient has no other skin complaints today.  Remainder of the HPI, Meds, PMH, Allergies, FH, and SH was reviewed in chart.      Past Medical History:   Diagnosis Date     Basal cell carcinoma      CAD (coronary artery disease)      Depression      Hyperlipidemia      Hypertension      Lymphoma (H)      Malignant melanoma (H)      Melanoma (H)      Squamous cell carcinoma        Past Surgical History:   Procedure Laterality Date     AXILLARY SURGERY      S/P resection and adjuvant radiation     BONE MARROW BIOPSY, BONE SPECIMEN, NEEDLE/TROCAR N/A 7/8/2019    Procedure: BIOPSY, BONE MARROW;  Surgeon: Husam Issa MD;  Location: [facility]     BONE MARROW BIOPSY, BONE SPECIMEN, NEEDLE/TROCAR Left 2/24/2022    Procedure: BIOPSY, BONE MARROW;  Surgeon: Cailin Anthony PA-C;  [facility]     CARDIAC SURGERY       CHOLECYSTECTOMY       HERNIA REPAIR, UMBILICAL       PHACOEMULSIFICATION WITH STANDARD INTRAOCULAR LENS IMPLANT Right 10/2/2019    Procedure: Cataract Removal with Implant;  Surgeon: Dinesh Ivey MD;  Location: [facility]     PHACOEMULSIFICATION WITH STANDARD INTRAOCULAR LENS IMPLANT Left 10/21/2019    Procedure: Cataract Removal with Implant;  Surgeon: Dinesh Ivey MD;  Location: [facility]     STENT      PTCA with drug-eluting stent of RCA, circumflex, and LAD     VASCULAR SURGERY          Family History   Problem Relation Age of Onset     Asthma Other      Cancer Other         melanoma     Blood Disease Other         bleeding disorder     Lung Cancer Mother         Smoker     Prostate Cancer Father      Melanoma No family hx of        Social History     Socioeconomic History     Marital status: Single     Spouse name: Not on file     Number of children: 0     Years of education: Not on file      Highest education level: Not on file   Occupational History     Occupation: Retired     Comment: [employer]    Tobacco Use     Smoking status: Never     Smokeless tobacco: Never   Vaping Use     Vaping status: Never Used     Passive vaping exposure: Yes   Substance and Sexual Activity     Alcohol use: Yes     Comment: glass of wine couple times per week     Drug use: Never     Sexual activity: Not on file   Other Topics Concern     Parent/sibling w/ CABG, MI or angioplasty before 65F 55M? Not Asked   Social History Narrative     Not on file     Social Determinants of Health     Financial Resource Strain: Low Risk  (11/9/2021)    Overall Financial Resource Strain (CARDIA)      Difficulty of Paying Living Expenses: Not hard at all   Food Insecurity: No Food Insecurity (11/9/2021)    Hunger Vital Sign      Worried About Running Out of Food in the Last Year: Never true      Ran Out of Food in the Last Year: Never true   Transportation Needs: No Transportation Needs (11/9/2021)    PRAPARE - Transportation      Lack of Transportation (Medical): No      Lack of Transportation (Non-Medical): No   Physical Activity: Inactive (11/9/2021)    Exercise Vital Sign      Days of Exercise per Week: 0 days      Minutes of Exercise per Session: 0 min   Stress: No Stress Concern Present (11/9/2021)    [employer] - Occupational Stress Questionnaire      Feeling of Stress : Not at all   Social Connections: Socially Isolated (11/9/2021)    Social Connection and Isolation Panel [NHANES]      Frequency of Communication with Friends and Family: Once a week      Frequency of Social Gatherings with Friends and Family: Once a week      Attends Buddhist Services: More than 4 times per year      Active Member of Clubs or Organizations: No      Attends Club or Organization Meetings: Not on file      Marital Status: Never    Intimate Partner Violence: Not on file   Housing Stability: Low Risk   (11/9/2021)    Housing Stability Vital Sign      Unable to Pay for Housing in the Last Year: No      Number of Places Lived in the Last Year: 1      Unstable Housing in the Last Year: No       Outpatient Encounter Medications as of 6/6/2023   Medication Sig Dispense Refill     aspirin (ASA) 81 MG EC tablet Take 81 mg by mouth daily       atorvastatin (LIPITOR) 20 MG tablet TAKE 1 TABLET BY MOUTH EVERY DAY 90 tablet 0     ferrous sulfate (FE TABS) 325 (65 Fe) MG EC tablet Take 1 tablet (325 mg) by mouth daily 90 tablet 0     furosemide (LASIX) 20 MG tablet Take 2 tablets (40 mg) by mouth every morning AND 1 tablet (20 mg) daily at 2 pm. 270 tablet 3     magnesium oxide (MAG-OX) 400 MG tablet Take 1 tablet (400 mg) by mouth 2 times daily 60 tablet 1     metoprolol succinate ER (TOPROL XL) 25 MG 24 hr tablet Take 1 tablet (25 mg) by mouth daily 90 tablet 3     nitroGLYcerin (NITROSTAT) 0.4 MG sublingual tablet Place 1 tablet (0.4 mg) under the tongue See Admin Instructions for chest pain 30 tablet 0     ORDER FOR DME Light Therapy with Full Spectrum Light (22795 lux) Start with 10-15 minute exposure per day, Increase exposure to 30 to 45 minutes per day, Maximum exposure: 90 minutes per day,Look periodically at light during each session  (Patient not taking: Reported on 4/25/2023) 1 0     potassium chloride ER (KLOR-CON M) 20 MEQ CR tablet Take 1 tablet (20 mEq) by mouth 2 times daily 60 tablet 0     ramipril (ALTACE) 5 MG capsule Take 1 capsule (5 mg) by mouth daily 90 capsule 3     tamsulosin (FLOMAX) 0.4 MG capsule Take 1 capsule (0.4 mg) by mouth every evening 90 capsule 3     triamcinolone (KENALOG) 0.1 % external cream Twice daily to legs prn itching 454 g 3     [DISCONTINUED] amLODIPine (NORVASC) 5 MG tablet Take 1 tablet (5 mg) by mouth daily 90 tablet 3     [DISCONTINUED] carvedilol (COREG) 12.5 MG tablet Take 1 tablet (12.5 mg) by mouth 2 times daily (with meals) 180 tablet 3     [DISCONTINUED] cloNIDine  (CATAPRES) 0.1 MG tablet Take 1 tablet (0.1 mg) by mouth 2 times daily 180 tablet 3     [DISCONTINUED] clopidogrel (PLAVIX) 75 MG tablet Take 1 tablet (75 mg) by mouth daily 90 tablet 3     [DISCONTINUED] hydrALAZINE (APRESOLINE) 50 MG tablet Take 1 tablet (50 mg) by mouth 2 times daily 180 tablet 1     [DISCONTINUED] naloxone (NARCAN) 4 MG/0.1ML nasal spray Spray 1 spray (4 mg) into one nostril alternating nostrils once as needed for opioid reversal Every 2-3 minutes until patient responsive or EMS arrives 0.2 mL 0     [DISCONTINUED] pioglitazone (ACTOS) 30 MG tablet Take 1 tablet (30 mg) by mouth daily 90 tablet 0     Facility-Administered Encounter Medications as of 6/6/2023   Medication Dose Route Frequency Provider Last Rate Last Admin     sodium hyaluronate (HEALON DUET)    Dinesh Matos MD   1 kit at 10/02/19 1149             O:   NAD, WDWN, Alert & Oriented, Mood & Affect wnl, Vitals stable   Here today alone   BP (!) 140/65 (BP Location: Left arm, Patient Position: Sitting, Cuff Size: Adult Large)   Pulse 74   SpO2 95%    General appearance normal   Vitals stable   Alert, oriented and in no acute distress      Following lymph nodes palpated: Occipital, Cervical, Supraclavicular no lad    R cheek 6mm scaly papule       Eyes: Conjunctivae/lids:Normal     ENT: Lips, buccal mucosa, tongue: normal    MSK:Normal    Cardiovascular: peripheral edema none    Pulm: Breathing Normal    Lymph Nodes: No Head and Neck Lymphadenopathy     Neuro/Psych: Orientation:Alert and Orientedx3 ; Mood/Affect:normal       A/P:  1. R cheek squamous cell carcinoma in situ   MOHS:   Location    The rationale for Mohs surgery was discussed with the patient and consent was obtained.  The risks and benefits as well as alternatives to therapy were discussed, in detail.  Specifically, the risks of infection, scarring, bleeding, prolonged wound healing, incomplete removal, allergy to anesthesia, nerve injury and recurrence  were addressed.  Indication for Mohs was Location. Prior to the procedure, the treatment site was clearly identified and, if available, confirmed with previous photos and confirmed by the patient   All components of the Universal Protocol/PAUSE rule were completed.  The Mohs surgeon operated in two distinct and integrated capacities as the surgeon and pathologist.      The area was prepped with Betasept.  A rim of normal appearing skin was marked circumferentially around the lesion.  The area was infiltrated with local anesthesia.  The tumor was first debulked to remove all clinically apparent tumor.  An incision following the standard Mohs approach was done and the specimen was oriented,mapped and placed in 1 block(s).  Each specimen was then chromacoded and processed in the Mohs laboratory using standard Mohs technique and submitted for frozen section histology.  Frozen section analysis showed no residual tumor but CLEAR MARGINS.      The tumor was excised using standard Mohs technique in 1 stages(s).  CLEAR MARGINS OBTAINED and Final defect size was 1.2 cm.     We discussed the options for wound management in full with the patient including risks/benefits/ possible outcomes.        REPAIR SECOND INTENT: We discussed the options for wound management in full with the patient including risks/benefits/possible outcomes. Decision made to allow the wound to heal by second intention. Cautery was used for for hemostasis. EBL minimal; complications none; wound care routine.  The patient was discharged in good condition and will return in one month or prn for wound evaluation.  It was a pleasure speaking to Flash Brown today.  Previous clinic notes and pertinent laboratory tests were reviewed prior to Flash Brown's visit.  Signs and Symptoms of skin cancer discussed with patient.  Patient encouraged to perform monthly skin exams.  UV precautions reviewed with patient.  Risks of non-melanoma skin cancer discussed  with patient   Return to clinic 6 months

## 2023-06-09 DIAGNOSIS — C22.0 HEPATOCELLULAR CARCINOMA (H): Primary | ICD-10-CM

## 2023-06-09 NOTE — PROGRESS NOTES
Reviewed liver biopsy results    Called the patient's sister to review    Plan:  - CT Chest non-con and Bone Scan to eval for mets  - Based on these findings will refer to IR or Onc, depending    Thomas M. Leventhal, M.D.   of Medicine  Advanced/Transplant Hepatology & Critical Care Medicine  [facility]

## 2023-06-25 DIAGNOSIS — E78.49 OTHER HYPERLIPIDEMIA: ICD-10-CM

## 2023-06-26 RX ORDER — ATORVASTATIN CALCIUM 20 MG/1
TABLET, FILM COATED ORAL
Qty: 90 TABLET | Refills: 0 | Status: SHIPPED | OUTPATIENT
Start: 2023-06-26 | End: 2023-07-06

## 2023-06-27 ENCOUNTER — OFFICE VISIT (OUTPATIENT)
Dept: DERMATOLOGY | Facility: CLINIC | Age: 83
End: 2023-06-27
Payer: MEDICARE

## 2023-06-27 VITALS — DIASTOLIC BLOOD PRESSURE: 61 MMHG | SYSTOLIC BLOOD PRESSURE: 135 MMHG

## 2023-06-27 DIAGNOSIS — Z85.828 HISTORY OF SKIN CANCER: Primary | ICD-10-CM

## 2023-06-27 PROCEDURE — 99212 OFFICE O/P EST SF 10 MIN: CPT | Performed by: DERMATOLOGY

## 2023-06-27 NOTE — LETTER
6/27/2023         RE: Flash Brown  [address]  [facility] [address]        Dear Colleague,    Thank you for referring your patient, Flash Brown, to the [facility]. Please see a copy of my visit note below.    Flash Brown is an extremely pleasant 82 year old year old male patient here today for hx of non-melanoma skin cancer.  Wound check temple all healed no issues.    Patient has no other skin complaints today.  Remainder of the HPI, Meds, PMH, Allergies, FH, and SH was reviewed in chart.      Past Medical History:   Diagnosis Date     Basal cell carcinoma      CAD (coronary artery disease)      Depression      Hyperlipidemia      Hypertension      Lymphoma (H)      Malignant melanoma (H)      Melanoma (H)      Squamous cell carcinoma        Past Surgical History:   Procedure Laterality Date     AXILLARY SURGERY      S/P resection and adjuvant radiation     BONE MARROW BIOPSY, BONE SPECIMEN, NEEDLE/TROCAR N/A 7/8/2019    Procedure: BIOPSY, BONE MARROW;  Surgeon: Husam Issa MD;  Location: [facility]     BONE MARROW BIOPSY, BONE SPECIMEN, NEEDLE/TROCAR Left 2/24/2022    Procedure: BIOPSY, BONE MARROW;  Surgeon: Cailin Anthony PA-C;  Location: [facility] OR     CARDIAC SURGERY       CHOLECYSTECTOMY       HERNIA REPAIR, UMBILICAL       IR LIVER BIOPSY PERCUTANEOUS  6/2/2023     PHACOEMULSIFICATION WITH STANDARD INTRAOCULAR LENS IMPLANT Right 10/2/2019    Procedure: Cataract Removal with Implant;  Surgeon: Dinesh Ivey MD;  Location: [facility]     PHACOEMULSIFICATION WITH STANDARD INTRAOCULAR LENS IMPLANT Left 10/21/2019    Procedure: Cataract Removal with Implant;  Surgeon: Dinesh Ivey MD;  Location: [facility]     STENT      PTCA with drug-eluting stent of RCA, circumflex, and LAD     VASCULAR SURGERY          Family History   Problem Relation Age of Onset     Asthma Other      Cancer Other         melanoma     Blood Disease Other         bleeding disorder      Lung Cancer Mother         Smoker     Prostate Cancer Father      Melanoma No family hx of        Social History     Socioeconomic History     Marital status: Single     Spouse name: Not on file     Number of children: 0     Years of education: Not on file     Highest education level: Not on file   Occupational History     Occupation: Retired     Comment: [employer]    Tobacco Use     Smoking status: Never     Smokeless tobacco: Never   Vaping Use     Vaping Use: Never used   Substance and Sexual Activity     Alcohol use: Yes     Comment: glass of wine couple times per week     Drug use: Never     Sexual activity: Not on file   Other Topics Concern     Parent/sibling w/ CABG, MI or angioplasty before 65F 55M? Not Asked   Social History Narrative     Not on file     Social Determinants of Health     Financial Resource Strain: Low Risk  (11/9/2021)    Overall Financial Resource Strain (CARDIA)      Difficulty of Paying Living Expenses: Not hard at all   Food Insecurity: No Food Insecurity (11/9/2021)    Hunger Vital Sign      Worried About Running Out of Food in the Last Year: Never true      Ran Out of Food in the Last Year: Never true   Transportation Needs: No Transportation Needs (11/9/2021)    PRAPARE - Transportation      Lack of Transportation (Medical): No      Lack of Transportation (Non-Medical): No   Physical Activity: Inactive (11/9/2021)    Exercise Vital Sign      Days of Exercise per Week: 0 days      Minutes of Exercise per Session: 0 min   Stress: No Stress Concern Present (11/9/2021)    [employer] - Occupational Stress Questionnaire      Feeling of Stress : Not at all   Social Connections: Socially Isolated (11/9/2021)    Social Connection and Isolation Panel [NHANES]      Frequency of Communication with Friends and Family: Once a week      Frequency of Social Gatherings with Friends and Family: Once a week      Attends Spiritism Services: More than 4 times  per year      Active Member of Clubs or Organizations: No      Attends Club or Organization Meetings: Not on file      Marital Status: Never    Intimate Partner Violence: Not on file   Housing Stability: Low Risk  (11/9/2021)    Housing Stability Vital Sign      Unable to Pay for Housing in the Last Year: No      Number of Places Lived in the Last Year: 1      Unstable Housing in the Last Year: No       Outpatient Encounter Medications as of 6/27/2023   Medication Sig Dispense Refill     aspirin (ASA) 81 MG EC tablet Take 81 mg by mouth daily       atorvastatin (LIPITOR) 20 MG tablet TAKE 1 TABLET BY MOUTH EVERY DAY 90 tablet 0     ferrous sulfate (FE TABS) 325 (65 Fe) MG EC tablet Take 1 tablet (325 mg) by mouth daily 90 tablet 0     furosemide (LASIX) 20 MG tablet Take 2 tablets (40 mg) by mouth every morning AND 1 tablet (20 mg) daily at 2 pm. 270 tablet 3     magnesium oxide (MAG-OX) 400 MG tablet Take 1 tablet (400 mg) by mouth 2 times daily 60 tablet 1     metoprolol succinate ER (TOPROL XL) 25 MG 24 hr tablet Take 1 tablet (25 mg) by mouth daily 90 tablet 3     nitroGLYcerin (NITROSTAT) 0.4 MG sublingual tablet Place 1 tablet (0.4 mg) under the tongue See Admin Instructions for chest pain 30 tablet 0     ORDER FOR DME Light Therapy with Full Spectrum Light (79789 lux) Start with 10-15 minute exposure per day, Increase exposure to 30 to 45 minutes per day, Maximum exposure: 90 minutes per day,Look periodically at light during each session  (Patient not taking: Reported on 4/25/2023) 1 0     potassium chloride ER (KLOR-CON M) 20 MEQ CR tablet Take 1 tablet (20 mEq) by mouth 2 times daily 60 tablet 0     ramipril (ALTACE) 5 MG capsule Take 1 capsule (5 mg) by mouth daily 90 capsule 3     tamsulosin (FLOMAX) 0.4 MG capsule Take 1 capsule (0.4 mg) by mouth every evening 90 capsule 3     triamcinolone (KENALOG) 0.1 % external cream Twice daily to legs prn itching 454 g 3     [DISCONTINUED] amLODIPine  (NORVASC) 5 MG tablet Take 1 tablet (5 mg) by mouth daily 90 tablet 3     [DISCONTINUED] carvedilol (COREG) 12.5 MG tablet Take 1 tablet (12.5 mg) by mouth 2 times daily (with meals) 180 tablet 3     [DISCONTINUED] cloNIDine (CATAPRES) 0.1 MG tablet Take 1 tablet (0.1 mg) by mouth 2 times daily 180 tablet 3     [DISCONTINUED] clopidogrel (PLAVIX) 75 MG tablet Take 1 tablet (75 mg) by mouth daily 90 tablet 3     [DISCONTINUED] hydrALAZINE (APRESOLINE) 50 MG tablet Take 1 tablet (50 mg) by mouth 2 times daily 180 tablet 1     [DISCONTINUED] naloxone (NARCAN) 4 MG/0.1ML nasal spray Spray 1 spray (4 mg) into one nostril alternating nostrils once as needed for opioid reversal Every 2-3 minutes until patient responsive or EMS arrives 0.2 mL 0     [DISCONTINUED] pioglitazone (ACTOS) 30 MG tablet Take 1 tablet (30 mg) by mouth daily 90 tablet 0     Facility-Administered Encounter Medications as of 6/27/2023   Medication Dose Route Frequency Provider Last Rate Last Admin     sodium hyaluronate (HEALON DUET)    Dinesh Matos MD   1 kit at 10/02/19 1149             O:   NAD, WDWN, Alert & Oriented, Mood & Affect wnl, Vitals stable   Here today with sister    /61    General appearance normal   Vitals stable   Alert, oriented and in no acute distress      Following lymph nodes palpated: Occipital, Cervical, Supraclavicular no lad  R temple healed      Eyes: Conjunctivae/lids:Normal     ENT: Lips, buccal mucosa, tongue: normal    MSK:Normal    Cardiovascular: peripheral edema none    Pulm: Breathing Normal    Lymph Nodes: No Head and Neck Lymphadenopathy     Neuro/Psych: Orientation:Alert and Orientedx3 ; Mood/Affect:normal       A/P:  1. Hx of non-melanoma skin cancer all healed  Return to clinic 3 months  It was a pleasure speaking to Flash Brown today.  Previous clinic notes and pertinent laboratory tests were reviewed prior to Flash Brown's visit.  Signs and Symptoms of skin cancer discussed with  patient.  Patient encouraged to perform monthly skin exams.  UV precautions reviewed with patient.        Again, thank you for allowing me to participate in the care of your patient.        Sincerely,        Dinesh Roque MD

## 2023-06-27 NOTE — PROGRESS NOTES
Flash Brown is an extremely pleasant 82 year old year old male patient here today for hx of non-melanoma skin cancer.  Wound check temple all healed no issues.    Patient has no other skin complaints today.  Remainder of the HPI, Meds, PMH, Allergies, FH, and SH was reviewed in chart.      Past Medical History:   Diagnosis Date     Basal cell carcinoma      CAD (coronary artery disease)      Depression      Hyperlipidemia      Hypertension      Lymphoma (H)      Malignant melanoma (H)      Melanoma (H)      Squamous cell carcinoma        Past Surgical History:   Procedure Laterality Date     AXILLARY SURGERY      S/P resection and adjuvant radiation     BONE MARROW BIOPSY, BONE SPECIMEN, NEEDLE/TROCAR N/A 7/8/2019    Procedure: BIOPSY, BONE MARROW;  Surgeon: Husam Issa MD;  Location: [facility]     BONE MARROW BIOPSY, BONE SPECIMEN, NEEDLE/TROCAR Left 2/24/2022    Procedure: BIOPSY, BONE MARROW;  Surgeon: Cailin Anthony PA-C;  [place] [facility] [place]     CARDIAC SURGERY       CHOLECYSTECTOMY       HERNIA REPAIR, UMBILICAL       IR LIVER BIOPSY PERCUTANEOUS  6/2/2023     PHACOEMULSIFICATION WITH STANDARD INTRAOCULAR LENS IMPLANT Right 10/2/2019    Procedure: Cataract Removal with Implant;  Surgeon: Dinesh Ivey MD;  Location: [facility]     PHACOEMULSIFICATION WITH STANDARD INTRAOCULAR LENS IMPLANT Left 10/21/2019    Procedure: Cataract Removal with Implant;  Surgeon: Dinesh Ivey MD;  Location: [facility]     STENT      PTCA with drug-eluting stent of RCA, circumflex, and LAD     VASCULAR SURGERY          Family History   Problem Relation Age of Onset     Asthma Other      Cancer Other         melanoma     Blood Disease Other         bleeding disorder     Lung Cancer Mother         Smoker     Prostate Cancer Father      Melanoma No family hx of        Social History     Socioeconomic History     Marital status: Single     Spouse name: Not on file     Number of children: 0     Years of education: Not on  file     Highest education level: Not on file   Occupational History     Occupation: Retired     Comment: [employer]    Tobacco Use     Smoking status: Never     Smokeless tobacco: Never   Vaping Use     Vaping Use: Never used   Substance and Sexual Activity     Alcohol use: Yes     Comment: glass of wine couple times per week     Drug use: Never     Sexual activity: Not on file   Other Topics Concern     Parent/sibling w/ CABG, MI or angioplasty before 65F 55M? Not Asked   Social History Narrative     Not on file     Social Determinants of Health     Financial Resource Strain: Low Risk  (11/9/2021)    Overall Financial Resource Strain (CARDIA)      Difficulty of Paying Living Expenses: Not hard at all   Food Insecurity: No Food Insecurity (11/9/2021)    Hunger Vital Sign      Worried About Running Out of Food in the Last Year: Never true      Ran Out of Food in the Last Year: Never true   Transportation Needs: No Transportation Needs (11/9/2021)    PRAPARE - Transportation      Lack of Transportation (Medical): No      Lack of Transportation (Non-Medical): No   Physical Activity: Inactive (11/9/2021)    Exercise Vital Sign      Days of Exercise per Week: 0 days      Minutes of Exercise per Session: 0 min   Stress: No Stress Concern Present (11/9/2021)    [employer] [place] [employer] - Occupational Stress Questionnaire      Feeling of Stress : Not at all   Social Connections: Socially Isolated (11/9/2021)    Social Connection and Isolation Panel [NHANES]      Frequency of Communication with Friends and Family: Once a week      Frequency of Social Gatherings with Friends and Family: Once a week      Attends Alevism Services: More than 4 times per year      Active Member of Clubs or Organizations: No      Attends Club or Organization Meetings: Not on file      Marital Status: Never    Intimate Partner Violence: Not on file   Housing Stability: Low Risk  (11/9/2021)    Housing Stability  Vital Sign      Unable to Pay for Housing in the Last Year: No      Number of Places Lived in the Last Year: 1      Unstable Housing in the Last Year: No       Outpatient Encounter Medications as of 6/27/2023   Medication Sig Dispense Refill     aspirin (ASA) 81 MG EC tablet Take 81 mg by mouth daily       atorvastatin (LIPITOR) 20 MG tablet TAKE 1 TABLET BY MOUTH EVERY DAY 90 tablet 0     ferrous sulfate (FE TABS) 325 (65 Fe) MG EC tablet Take 1 tablet (325 mg) by mouth daily 90 tablet 0     furosemide (LASIX) 20 MG tablet Take 2 tablets (40 mg) by mouth every morning AND 1 tablet (20 mg) daily at 2 pm. 270 tablet 3     magnesium oxide (MAG-OX) 400 MG tablet Take 1 tablet (400 mg) by mouth 2 times daily 60 tablet 1     metoprolol succinate ER (TOPROL XL) 25 MG 24 hr tablet Take 1 tablet (25 mg) by mouth daily 90 tablet 3     nitroGLYcerin (NITROSTAT) 0.4 MG sublingual tablet Place 1 tablet (0.4 mg) under the tongue See Admin Instructions for chest pain 30 tablet 0     ORDER FOR DME Light Therapy with Full Spectrum Light (10259 lux) Start with 10-15 minute exposure per day, Increase exposure to 30 to 45 minutes per day, Maximum exposure: 90 minutes per day,Look periodically at light during each session  (Patient not taking: Reported on 4/25/2023) 1 0     potassium chloride ER (KLOR-CON M) 20 MEQ CR tablet Take 1 tablet (20 mEq) by mouth 2 times daily 60 tablet 0     ramipril (ALTACE) 5 MG capsule Take 1 capsule (5 mg) by mouth daily 90 capsule 3     tamsulosin (FLOMAX) 0.4 MG capsule Take 1 capsule (0.4 mg) by mouth every evening 90 capsule 3     triamcinolone (KENALOG) 0.1 % external cream Twice daily to legs prn itching 454 g 3     [DISCONTINUED] amLODIPine (NORVASC) 5 MG tablet Take 1 tablet (5 mg) by mouth daily 90 tablet 3     [DISCONTINUED] carvedilol (COREG) 12.5 MG tablet Take 1 tablet (12.5 mg) by mouth 2 times daily (with meals) 180 tablet 3     [DISCONTINUED] cloNIDine (CATAPRES) 0.1 MG tablet Take 1  tablet (0.1 mg) by mouth 2 times daily 180 tablet 3     [DISCONTINUED] clopidogrel (PLAVIX) 75 MG tablet Take 1 tablet (75 mg) by mouth daily 90 tablet 3     [DISCONTINUED] hydrALAZINE (APRESOLINE) 50 MG tablet Take 1 tablet (50 mg) by mouth 2 times daily 180 tablet 1     [DISCONTINUED] naloxone (NARCAN) 4 MG/0.1ML nasal spray Spray 1 spray (4 mg) into one nostril alternating nostrils once as needed for opioid reversal Every 2-3 minutes until patient responsive or EMS arrives 0.2 mL 0     [DISCONTINUED] pioglitazone (ACTOS) 30 MG tablet Take 1 tablet (30 mg) by mouth daily 90 tablet 0     Facility-Administered Encounter Medications as of 6/27/2023   Medication Dose Route Frequency Provider Last Rate Last Admin     sodium hyaluronate (HEALON DUET)    Dinesh Matos MD   1 kit at 10/02/19 1149             O:   NAD, WDWN, Alert & Oriented, Mood & Affect wnl, Vitals stable   Here today with sister    /61    General appearance normal   Vitals stable   Alert, oriented and in no acute distress      Following lymph nodes palpated: Occipital, Cervical, Supraclavicular no lad  R temple healed      Eyes: Conjunctivae/lids:Normal     ENT: Lips, buccal mucosa, tongue: normal    MSK:Normal    Cardiovascular: peripheral edema none    Pulm: Breathing Normal    Lymph Nodes: No Head and Neck Lymphadenopathy     Neuro/Psych: Orientation:Alert and Orientedx3 ; Mood/Affect:normal       A/P:  1. Hx of non-melanoma skin cancer all healed  Return to clinic 3 months  It was a pleasure speaking to Flash Brown today.  Previous clinic notes and pertinent laboratory tests were reviewed prior to Flash Brown's visit.  Signs and Symptoms of skin cancer discussed with patient.  Patient encouraged to perform monthly skin exams.  UV precautions reviewed with patient.

## 2023-06-29 ENCOUNTER — HOSPITAL ENCOUNTER (OUTPATIENT)
Dept: CT IMAGING | Facility: CLINIC | Age: 83
Discharge: HOME | End: 2023-06-29
Attending: INTERNAL MEDICINE | Admitting: INTERNAL MEDICINE
Payer: MEDICARE

## 2023-06-29 ENCOUNTER — HOSPITAL ENCOUNTER (OUTPATIENT)
Dept: NUCLEAR MEDICINE | Facility: CLINIC | Age: 83
Setting detail: NUCLEAR MEDICINE
Discharge: HOME | End: 2023-06-29
Attending: INTERNAL MEDICINE
Payer: MEDICARE

## 2023-06-29 DIAGNOSIS — C22.0 HEPATOCELLULAR CARCINOMA (H): ICD-10-CM

## 2023-06-29 LAB
CREAT BLD-MCNC: 0.9 MG/DL (ref 0.7–1.3)
GFR SERPL CREATININE-BSD FRML MDRD: >60 ML/MIN/1.73M2

## 2023-06-29 PROCEDURE — G1010 CDSM STANSON: HCPCS

## 2023-06-29 PROCEDURE — 250N000009 HC RX 250: Performed by: RADIOLOGY

## 2023-06-29 PROCEDURE — 343N000001 HC RX 343: Performed by: INTERNAL MEDICINE

## 2023-06-29 PROCEDURE — 82565 ASSAY OF CREATININE: CPT

## 2023-06-29 PROCEDURE — 250N000011 HC RX IP 250 OP 636: Performed by: RADIOLOGY

## 2023-06-29 PROCEDURE — A9561 TC99M OXIDRONATE: HCPCS | Performed by: INTERNAL MEDICINE

## 2023-06-29 RX ORDER — IOPAMIDOL 755 MG/ML
100 INJECTION, SOLUTION INTRAVASCULAR ONCE
Status: COMPLETED | OUTPATIENT
Start: 2023-06-29 | End: 2023-06-29

## 2023-06-29 RX ADMIN — IOPAMIDOL 100 ML: 755 INJECTION, SOLUTION INTRAVENOUS at 08:29

## 2023-06-29 RX ADMIN — SODIUM CHLORIDE 68 ML: 9 INJECTION, SOLUTION INTRAVENOUS at 08:29

## 2023-06-29 RX ADMIN — Medication 25.3 MILLICURIE: at 07:56

## 2023-07-06 ENCOUNTER — OFFICE VISIT (OUTPATIENT)
Dept: FAMILY MEDICINE | Facility: CLINIC | Age: 83
End: 2023-07-06
Payer: MEDICARE

## 2023-07-06 VITALS
OXYGEN SATURATION: 96 % | HEART RATE: 94 BPM | WEIGHT: 215 LBS | SYSTOLIC BLOOD PRESSURE: 118 MMHG | HEIGHT: 65 IN | TEMPERATURE: 97.2 F | BODY MASS INDEX: 35.82 KG/M2 | DIASTOLIC BLOOD PRESSURE: 50 MMHG | RESPIRATION RATE: 20 BRPM

## 2023-07-06 DIAGNOSIS — N40.1 BENIGN PROSTATIC HYPERPLASIA WITH URINARY OBSTRUCTION: ICD-10-CM

## 2023-07-06 DIAGNOSIS — E78.49 OTHER HYPERLIPIDEMIA: ICD-10-CM

## 2023-07-06 DIAGNOSIS — Z12.5 SCREENING FOR PROSTATE CANCER: ICD-10-CM

## 2023-07-06 DIAGNOSIS — Z00.00 ENCOUNTER FOR MEDICARE ANNUAL WELLNESS EXAM: Primary | ICD-10-CM

## 2023-07-06 DIAGNOSIS — N13.8 BENIGN PROSTATIC HYPERPLASIA WITH URINARY OBSTRUCTION: ICD-10-CM

## 2023-07-06 DIAGNOSIS — E66.01 MORBID OBESITY (H): ICD-10-CM

## 2023-07-06 DIAGNOSIS — R35.0 URINARY FREQUENCY: ICD-10-CM

## 2023-07-06 DIAGNOSIS — R73.03 BORDERLINE DIABETES: ICD-10-CM

## 2023-07-06 DIAGNOSIS — Z13.29 SCREENING FOR THYROID DISORDER: ICD-10-CM

## 2023-07-06 DIAGNOSIS — C82.24 GRADE 3 FOLLICULAR LYMPHOMA OF LYMPH NODES OF AXILLA (H): ICD-10-CM

## 2023-07-06 DIAGNOSIS — R60.1 GENERALIZED EDEMA: ICD-10-CM

## 2023-07-06 LAB
ALBUMIN UR-MCNC: NEGATIVE MG/DL
APPEARANCE UR: CLEAR
BACTERIA #/AREA URNS HPF: ABNORMAL /HPF
BILIRUB UR QL STRIP: NEGATIVE
COLOR UR AUTO: YELLOW
GLUCOSE UR STRIP-MCNC: >=1000 MG/DL
HBA1C MFR BLD: 13.1 % (ref 0–5.6)
HGB UR QL STRIP: ABNORMAL
KETONES UR STRIP-MCNC: NEGATIVE MG/DL
LEUKOCYTE ESTERASE UR QL STRIP: NEGATIVE
NITRATE UR QL: NEGATIVE
PH UR STRIP: 5.5 [PH] (ref 5–7)
RBC #/AREA URNS AUTO: ABNORMAL /HPF
SP GR UR STRIP: <=1.005 (ref 1–1.03)
UROBILINOGEN UR STRIP-ACNC: 0.2 E.U./DL
WBC #/AREA URNS AUTO: ABNORMAL /HPF

## 2023-07-06 PROCEDURE — 99214 OFFICE O/P EST MOD 30 MIN: CPT | Mod: 25 | Performed by: FAMILY MEDICINE

## 2023-07-06 PROCEDURE — G0103 PSA SCREENING: HCPCS | Performed by: FAMILY MEDICINE

## 2023-07-06 PROCEDURE — G0439 PPPS, SUBSEQ VISIT: HCPCS | Performed by: FAMILY MEDICINE

## 2023-07-06 PROCEDURE — 84443 ASSAY THYROID STIM HORMONE: CPT | Performed by: FAMILY MEDICINE

## 2023-07-06 PROCEDURE — 83036 HEMOGLOBIN GLYCOSYLATED A1C: CPT | Performed by: FAMILY MEDICINE

## 2023-07-06 PROCEDURE — 36415 COLL VENOUS BLD VENIPUNCTURE: CPT | Performed by: FAMILY MEDICINE

## 2023-07-06 PROCEDURE — 81001 URINALYSIS AUTO W/SCOPE: CPT | Performed by: FAMILY MEDICINE

## 2023-07-06 RX ORDER — ATORVASTATIN CALCIUM 20 MG/1
20 TABLET, FILM COATED ORAL DAILY
Qty: 90 TABLET | Refills: 3 | Status: SHIPPED | OUTPATIENT
Start: 2023-07-06

## 2023-07-06 RX ORDER — TAMSULOSIN HYDROCHLORIDE 0.4 MG/1
0.4 CAPSULE ORAL EVERY EVENING
Qty: 90 CAPSULE | Refills: 3 | Status: SHIPPED | OUTPATIENT
Start: 2023-07-06

## 2023-07-06 RX ORDER — FUROSEMIDE 20 MG
TABLET ORAL
Qty: 180 TABLET | Refills: 3 | Status: SHIPPED | OUTPATIENT
Start: 2023-07-06 | End: 2024-04-26 | Stop reason: ALTCHOICE

## 2023-07-06 ASSESSMENT — PAIN SCALES - GENERAL: PAINLEVEL: NO PAIN (0)

## 2023-07-06 ASSESSMENT — ENCOUNTER SYMPTOMS: SHORTNESS OF BREATH: 1

## 2023-07-06 NOTE — PROGRESS NOTES
1. Encounter for Medicare annual wellness exam  - PRIMARY CARE FOLLOW-UP SCHEDULING; Future    2. Other hyperlipidemia  - atorvastatin (LIPITOR) 20 MG tablet; Take 1 tablet (20 mg) by mouth daily  Dispense: 90 tablet; Refill: 3    3. Benign prostatic hyperplasia with urinary obstruction  - tamsulosin (FLOMAX) 0.4 MG capsule; Take 1 capsule (0.4 mg) by mouth every evening  Dispense: 90 capsule; Refill: 3    4. Morbid obesity (H)  Stressed the importance of diet and exercise.  Advised that this is most likely attributing to patient's hyperlipidemia.    5. Borderline diabetes  - HEMOGLOBIN A1C; Future  - HEMOGLOBIN A1C    6. Screening for thyroid disorder  - TSH WITH FREE T4 REFLEX; Future  - TSH WITH FREE T4 REFLEX    7. Urinary frequency  - UA Macroscopic with reflex to Microscopic and Culture; Future  - UA Macroscopic with reflex to Microscopic and Culture  - UA Microscopic with Reflex to Culture    8. Screening for prostate cancer  - PSA, screen; Future  - PSA, screen    9. Generalized edema  Would like to decrease the dosage  - furosemide (LASIX) 20 MG tablet; Take 1 tablet (20 mg) by mouth every morning AND 1 tablet (20 mg) daily at 2 pm.  Dispense: 180 tablet; Refill: 3    10. 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  Currently being followed by oncology.         Bairon Sanchez is a 82 year old, presenting for the following health issues:  Mass, Shortness of Breath, and Urinary Problem (Frequency since changing medications)        7/6/2023     2:08 PM   Additional Questions   Roomed by Brook   Accompanied by sister- Rea Gomez  Pertinent negatives include no arthralgias, chest pain, chills, congestion, coughing, fever, headaches, joint swelling, myalgias, rash, sore throat or weakness.   Shortness of Breath  Pertinent negatives include no arthralgias, chest pain, chills, congestion, coughing, fever, headaches, joint swelling, myalgias, rash, sore throat or weakness.   History of  Present Illness       Diabetes:   He presents for follow up of diabetes.  He is not checking blood glucose. He is concerned about other.  He is having excessive thirst. The patient has not had a diabetic eye exam in the last 12 months.         Heart Failure:  He presents for follow up of heart failure. He is experiencing shortness of breath with rest and activity, which is same as usual. He is not experiencing any lower extremity edema.   He denies orthopenea and is not coughing at night. Patient is checking weight daily. He has recently had a None. He has side effects from medications including fatigue. He has has a medical visit for heart failure 1 time since the last visit.    Hypertension: He presents for follow up of hypertension.  He does not check blood pressure  regularly outside of the clinic. Outpatient blood pressures have not been over 140/90. He follows a low salt diet.     He eats 2-3 servings of fruits and vegetables daily.He consumes 2 sweetened beverage(s) daily.He exercises with enough effort to increase his heart rate 9 or less minutes per day.  He exercises with enough effort to increase his heart rate 3 or less days per week. He is missing 1 dose(s) of medications per week.       Patient needs refills of meds today and has many concerns.  Has SOB concerns  Has increased urination since his increase of Flomax, and water pill.    New Cancer DX    Wants to discuss assisted living options         Patient has a mass/ lump on his back, fluid filled.    Annual Wellness Visit    Patient has been advised of split billing requirements and indicates understanding: Yes     Are you in the first 12 months of your Medicare Part B coverage?  No    Physical Health:    In general, how would you rate your overall physical health? fair    Outside of work, how many days during the week do you exercise?none    Outside of work, approximately how many minutes a day do you exercise?less than 15 minutes    If you drink  "alcohol do you typically have >3 drinks per day or >7 drinks per week? No    Do you usually eat at least 4 servings of fruit and vegetables a day, include whole grains & fiber and avoid regularly eating high fat or \"junk\" foods? Yes    Do you have any problems taking medications regularly? No    Do you have any side effects from medications? none    Needs assistance for the following daily activities: transportation and shopping    Which of the following safety concerns are present in your home?  none identified     Hearing impairment: No    In the past 6 months, have you been bothered by leaking of urine? yes    Mental Health:    In general, how would you rate your overall mental or emotional health? good  PHQ-2 Score:      Do you feel safe in your environment? Yes    Have you ever done Advance Care Planning? (For example, a Health Directive, POLST, or a discussion with a medical provider or your loved ones about your wishes)? Yes, patient states has an Advance Care Planning document and will bring a copy to the clinic.    Fall risk:  Fallen 2 or more times in the past year?: No  Any fall with injury in the past year?: No  Cognitive Screenin) Repeat 3 items (Leader, Season, Table)  3  2) Clock draw:   NORMAL  3) 3 item recall:   Recalls 3 objects  Results: 3 items recalled: COGNITIVE IMPAIRMENT LESS LIKELY    Mini-CogTM Copyright HIRA Boyd. Licensed by the author for use in [facility]; reprinted with permission (lino@.[facility]). All rights reserved.      Do you have sleep apnea, excessive snoring or daytime drowsiness?: no    Social History     Tobacco Use     Smoking status: Never     Passive exposure: Never     Smokeless tobacco: Never   Substance Use Topics     Alcohol use: Yes     Comment: glass of wine couple times per week             2021    10:35 AM   Alcohol Use   Prescreen: >3 drinks/day or >7 drinks/week? No          No data to display              Do you have a current opioid " "prescription? No  Do you use any other controlled substances or medications that are not prescribed by a provider? None    Current providers sharing in care for this patient include:   Patient Care Team:  Thomas Jackson DO as PCP - General (Family Practice)  Thomas Jackson DO as Assigned PCP  Sang Avendaño MD as Assigned Cancer Care Provider  Dinesh Roque MD as MD (Dermatology)  Leventhal, Thomas Michael, MD as MD (Gastroenterology)  Dinesh Roque MD as Assigned Surgical Provider  Leventhal, Thomas Michael, MD as Assigned Gastroenterology Provider  Micah Tate MD as Assigned Heart and Vascular Provider    Patient has been advised of split billing requirements and indicates understanding: Yes    I have reviewed Opioid Use Disorder and Substance Use Disorder risk factors and made any needed referrals.        1. MAWV    2. 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 Leventhal.  Patient recently NM bone scan and chest CT scan.    3. Congestive heart failure: Patient is currently on furosemide.  States states of urinary every hour.     4. Swelling involving back: Approximately 1 month ago.  Now resolved.  Felt like fluid retention.      Review of Systems   Constitutional: Negative for chills and fever.   HENT: Negative for congestion, ear pain, hearing loss and sore throat.    Respiratory: Positive for shortness of breath. Negative for cough.    Cardiovascular: Negative for chest pain, palpitations and peripheral edema.   Musculoskeletal: Negative for arthralgias, joint swelling and myalgias.   Skin: Negative for rash.   Neurological: Negative for dizziness, weakness, headaches and paresthesias.   Psychiatric/Behavioral: Negative for mood changes. The patient is not nervous/anxious.             Objective    /50   Pulse 94   Temp 97.2  F (36.2  C) (Temporal)   Resp 20   Ht 1.655 m (5' 5.16\")   Wt 97.5 kg (215 lb)   SpO2 96%   BMI 35.61 kg/m    Body " mass index is 35.61 kg/m .  Physical Exam  Constitutional:       General: He is not in acute distress.     Appearance: He is well-developed.   HENT:      Head: Normocephalic and atraumatic.      Nose: Nose normal.   Eyes:      Conjunctiva/sclera: Conjunctivae normal.   Neck:      Trachea: No tracheal deviation.   Cardiovascular:      Rate and Rhythm: Normal rate and regular rhythm.      Heart sounds: Normal heart sounds.   Pulmonary:      Effort: Pulmonary effort is normal.      Breath sounds: No wheezing.   Musculoskeletal:         General: Normal range of motion.      Cervical back: Normal range of motion.   Skin:     Findings: No erythema or rash.   Neurological:      Mental Status: He is alert and oriented to person, place, and time.   Psychiatric:         Behavior: Behavior normal.

## 2023-07-06 NOTE — PATIENT INSTRUCTIONS
Kirt Sanchez,    Thank you for allowing [facility] to manage your care.    I ordered some blood work, please go to the laboratory to get your laboratory studies.    I sent your prescriptions to your pharmacy.    For you heart failure, we are decreasing your furesomide to 20 mg twice a day     For your convenience, test results are released as soon as they are available  Please allow 1-2 business days for me to send you a comment about your results.  If not done so, I encourage you to login into [employer] ([email or web address] to review your results in real time.     If you have any questions or concerns, please feel free to call us at [phone].    Sincerely,    Dr. Jackson    Did you know?      You can schedule a video visit for follow-up appointments as well as future appointments for certain conditions.  Please see the below link.     [email or web address]    If you have not already done so,  I encourage you to sign up for [employer] ([email or web address]).  This will allow you to review your results, securely communicate with a provider, and schedule virtual visits as well.        Patient Education   Personalized Prevention Plan  You are due for the preventive services outlined below.  Your care team is available to assist you in scheduling these services.  If you have already completed any of these items, please share that information with your care team to update in your medical record.  Health Maintenance Due   Topic Date Due    Heart Failure Action Plan  Never done    Hepatitis B Vaccine (1 of 3 - Risk 3-dose series) Never done    Diabetic Foot Exam  08/03/2022    Thyroid Function Lab  11/09/2022    A1C Lab  11/24/2022    Eye Exam  03/01/2023    Kidney Microalbumin Urine Test  08/02/2023    ANNUAL REVIEW OF HM ORDERS  08/02/2023

## 2023-07-07 ENCOUNTER — TELEPHONE (OUTPATIENT)
Dept: RADIOLOGY | Facility: CLINIC | Age: 83
End: 2023-07-07
Payer: MEDICARE

## 2023-07-07 DIAGNOSIS — C22.0 HEPATOCELLULAR CARCINOMA (H): Primary | ICD-10-CM

## 2023-07-07 DIAGNOSIS — E11.8 DIABETES MELLITUS TYPE 2 WITH COMPLICATIONS (H): Primary | ICD-10-CM

## 2023-07-07 LAB
PSA SERPL DL<=0.01 NG/ML-MCNC: 0.85 NG/ML
TSH SERPL DL<=0.005 MIU/L-ACNC: 1.71 UIU/ML (ref 0.3–4.2)

## 2023-07-07 RX ORDER — GLIPIZIDE 10 MG/1
10 TABLET, FILM COATED, EXTENDED RELEASE ORAL DAILY
Qty: 90 TABLET | Refills: 3 | Status: SHIPPED | OUTPATIENT
Start: 2023-07-07 | End: 2024-04-26

## 2023-07-07 ASSESSMENT — ENCOUNTER SYMPTOMS
ARTHRALGIAS: 0
MYALGIAS: 0
SORE THROAT: 0
NERVOUS/ANXIOUS: 0
PALPITATIONS: 0
FEVER: 0
PARESTHESIAS: 0
COUGH: 0
CHILLS: 0
JOINT SWELLING: 0
DIZZINESS: 0
HEADACHES: 0
WEAKNESS: 0

## 2023-07-07 ASSESSMENT — ACTIVITIES OF DAILY LIVING (ADL)
CURRENT_FUNCTION: NEEDS ASSISTANCE
CURRENT_FUNCTION: NEEDS ASSISTANCE

## 2023-07-07 NOTE — PROGRESS NOTES
Noted no evidence of metastatic disease    Will place IR referral for locoregional therapy    Discussed care plan with sister Daylin.    Any communications should go through Daylin for scheduling.    Thomas M. Leventhal, M.D.  Associate Professor of Medicine  Advanced/Transplant Hepatology & Critical Care Medicine  [facility]

## 2023-07-07 NOTE — TELEPHONE ENCOUNTER
Called and spoke to Daylin regarding referral to IR, message is out to Dr. Osorio regarding scheduling into clinic on 7/14. Will confirm with Dr. Osorio when she returns Monday 7/10. Daylin verbalized understanding.     Teresa SPARKS RN, BSN   Interventional Radiology [facility]   [phone]

## 2023-07-10 ENCOUNTER — OFFICE VISIT (OUTPATIENT)
Dept: FAMILY MEDICINE | Facility: CLINIC | Age: 83
End: 2023-07-10
Payer: MEDICARE

## 2023-07-10 VITALS
WEIGHT: 211.3 LBS | TEMPERATURE: 98.1 F | SYSTOLIC BLOOD PRESSURE: 140 MMHG | HEIGHT: 65 IN | HEART RATE: 93 BPM | DIASTOLIC BLOOD PRESSURE: 62 MMHG | BODY MASS INDEX: 35.2 KG/M2 | OXYGEN SATURATION: 98 % | RESPIRATION RATE: 16 BRPM

## 2023-07-10 DIAGNOSIS — E11.8 DIABETES MELLITUS TYPE 2 WITH COMPLICATIONS (H): ICD-10-CM

## 2023-07-10 DIAGNOSIS — S39.012A STRAIN OF LUMBAR REGION, INITIAL ENCOUNTER: Primary | ICD-10-CM

## 2023-07-10 DIAGNOSIS — C82.24 GRADE 3 FOLLICULAR LYMPHOMA OF LYMPH NODES OF AXILLA (H): ICD-10-CM

## 2023-07-10 PROCEDURE — 99213 OFFICE O/P EST LOW 20 MIN: CPT | Performed by: FAMILY MEDICINE

## 2023-07-10 RX ORDER — CYCLOBENZAPRINE HCL 5 MG
5 TABLET ORAL
Qty: 30 TABLET | Refills: 0 | Status: ON HOLD | OUTPATIENT
Start: 2023-07-10 | End: 2023-07-31

## 2023-07-10 RX ORDER — LIDOCAINE 50 MG/G
OINTMENT TOPICAL 4 TIMES DAILY PRN
Qty: 50 G | Refills: 1 | Status: SHIPPED | OUTPATIENT
Start: 2023-07-10

## 2023-07-10 ASSESSMENT — PAIN SCALES - GENERAL: PAINLEVEL: SEVERE PAIN (6)

## 2023-07-10 ASSESSMENT — ENCOUNTER SYMPTOMS: BACK PAIN: 1

## 2023-07-10 NOTE — PROGRESS NOTES
"  Assessment & Plan     Strain of lumbar region, initial encounter  Advised with home daily stretches and exercises  Advised pt to stay active, try to stand and walk,using a walker or a cane  Declined PT today.  Recent Bone scan, no acute finding.    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    Weight as of this encounter: 95.8 kg (211 lb 4.8 oz).   Weight management plan: Discussed healthy diet and exercise guidelines    Work on weight loss  Regular exercise      Bairon Sanchez is a 82 year old, presenting for the following health issues:  Back Pain  For the past 2 days he has been having stiffness in the left lower lumbar region.  He has no injury, no falls, no trauma.  No lower extremity weakness or numbness.  Sister reports he has been sitting in his recliner, not doing much of activities.      Recently been diagnosed with hepatocellular carcinoma of the liver, diabetes.  Patient did have a PETscan which showed no metastasis disease, and no acute finding        7/10/2023     1:14 PM   Additional Questions   Roomed by Contreras MILLER MA   Accompanied by Sister Rea     History of Present Illness       Diabetes:   He presents for follow up of diabetes.  He is not checking blood glucose. He is concerned about other.  He is having excessive thirst. The patient has not had a diabetic eye exam in the last 12 months.         Heart Failure:  He presents for follow up " of heart failure. He is experiencing shortness of breath with rest and activity, which is same as usual. He is not experiencing any lower extremity edema.   He denies orthopenea and is not coughing at night. Patient is checking weight daily. He has recently had a None. He has side effects from medications including fatigue. He has has a medical visit for heart failure 1 time since the last visit.    Hypertension: He presents for follow up of hypertension.  He does not check blood pressure  regularly outside of the clinic. Outpatient blood pressures have not been over 140/90. He follows a low salt diet.     He eats 2-3 servings of fruits and vegetables daily.He consumes 2 sweetened beverage(s) daily.He exercises with enough effort to increase his heart rate 9 or less minutes per day.  He exercises with enough effort to increase his heart rate 3 or less days per week. He is missing 1 dose(s) of medications per week.     Patient started having confusion and weakness. Started on glipizide and metformin this past few days. Started having diarrhea as well. Pt is concerned about high blood sugar.     Pain History:  When did you first notice your pain? Yesterday, laying in chair for past two days, and when he got out of recliner this pain started.     Have you seen anyone else for your pain? No  How has your pain affected your ability to work? Not applicable  Where in your body do you have pain? Back Pain  Onset/Duration: yesterday  Description:   Location of pain: middle of back both  Character of pain: sharp pain  Pain radiation: none  New numbness or weakness in legs, not attributed to pain: YES, left leg weakness  Intensity: Currently 6-7/10  Progression of Symptoms: worsening  History:   Specific cause: possibly from getting up from a recliner  Pain interferes with job: N/A  History of back problems: once before and got a muscle relaxer  Any previous MRI or X-rays: None  Sees a specialist for back pain: No  Alleviating  "factors:   Improved by: NSAIDs    Precipitating factors:  Worsened by: Sitting  Therapies tried and outcome: NSAIDs did not help    Accompanying Signs & Symptoms:  Risk of Fracture: None  Risk of Cauda Equina: None  Risk of Infection: None  Risk of Cancer: None  Risk of Ankylosing Spondylitis: Onset at age <35, male, AND morning back stiffness No      Review of Systems   Constitutional, HEENT, cardiovascular, pulmonary, gi and gu systems are negative, except as otherwise noted.      Objective    BP (!) 150/50 (BP Location: Left arm, Patient Position: Chair, Cuff Size: Adult Large)   Pulse 93   Temp 98.1  F (36.7  C) (Tympanic)   Resp 16   Ht 1.655 m (5' 5.16\")   Wt 95.8 kg (211 lb 4.8 oz)   SpO2 98%   BMI 34.99 kg/m    Body mass index is 34.99 kg/m .  Physical Exam   GENERAL: healthy, alert and no distress  ABDOMEN: soft, nontender, no hepatosplenomegaly, no masses and bowel sounds normal  MS: no gross musculoskeletal defects noted, no edema  NEURO: Normal strength and tone, mentation intact and speech normal  BACK: no CVA tenderness, no paralumbar tenderness  Comprehensive back pain exam:  Tenderness of left lower lumbar, Range of motion not limited by pain, Lower extremity strength functional and equal on both sides, Lower extremity reflexes within normal limits bilaterally, Lower extremity sensation normal and equal on both sides and Straight leg raise negative bilaterally    Recent Results (from the past 744 hour(s))   CT Chest w Contrast    Narrative    CT CHEST WITH CONTRAST 6/29/2023 8:43 AM    CLINICAL HISTORY: 82-year-old male with 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 Evaluate for mets; Hepatocellular carcinoma  (H).    TECHNIQUE: CT chest with IV contrast. Multiplanar reformats were  obtained. Dose reduction techniques were used.    CONTRAST: 100mL Isovue 370    COMPARISON: 5/8/2023, 8/19/2020, 7/11/2019.    FINDINGS:   LUNGS AND PLEURA: Suspected pleural-parenchymal scarring in " the  anterolateral right upper lobe is similar to comparison, including a  previously seen mixed density groundglass and subsolid opacity along  the lateral aspect adjacent to the pleura measuring 2.4 x 1.3 cm  (4-106), previously measuring up to 2.1 x 1.6 cm dating back to 2019.  A few small calcified and noncalcified pulmonary nodules are seen  bilaterally measuring 2-3 mm, which are unchanged back 2019 and are  considered benign. No specific follow-up is necessary. No pleural  effusion. Large airways are patent.    MEDIASTINUM/AXILLAE: Heart size is upper limits of normal. No  pericardial effusion. Severe coronary artery atherosclerosis is  present. Thoracic aorta is normal in course and caliber. Mild to  moderate thoracic aortic atherosclerosis is seen. No enlarged thoracic  lymph nodes.    UPPER ABDOMEN: There is a lobulated contour of the liver, compatible  with cirrhosis. There is a heterogeneously enhancing mixed density  lesion in the left hepatic lobe measuring 5 x 4.5 cm similar to  comparison MRI. The spleen is enlarged measuring at least 15.3 cm in  craniocaudal dimension. A nonspecific mildly enlarged periportal lymph  node measuring 23 x 15 mm (3-140) is noted and can be monitored on  future surveillance imaging. A couple additional prominent, but not  technically enlarged, periportal lymph nodes are also noted.    MUSCULOSKELETAL: Chronic appearing ununited distal right clavicular  shaft fracture. Multilevel hypertrophic degenerative changes of the  spine, including multilevel bridging osteophytes. No acute osseous  abnormality.      Impression    IMPRESSION:   1.  No evidence of metastatic disease to the chest.  2.  Similar-appearing suspected pleural parenchymal scarring in the  anterolateral right upper lobe, including a mixed density nodular  component.  3.  Unchanged tiny calcified and noncalcified pulmonary nodules,  benign.  4.  Mixed density enhancing mass in the left hepatic lobe, similar  to  recent liver MRI, in keeping with history of hepatocellular carcinoma.  5.  Cirrhotic configuration of the liver.  6.  Splenomegaly.    BLANCA CHINCHILLA MD         SYSTEM ID:  [id number]   NM Bone Scan Whole Body    Narrative    EXAM: NM BONE SCAN WHOLE BODY  LOCATION: [facility]  DATE: 6/29/2023    INDICATION: Hepatocellular carcinoma. Concern for bone metastases.  COMPARISON: CT chest 06/29/2023.  TECHNIQUE: 25.3 mCi technetium-99m MDP, IV. Anterior and posterior delayed whole-body images at 3 hours with additional spot images of the skull.    FINDINGS: Radiotracer uptake in a pattern typical of degenerative change involving the shoulders, spine, knees, ankles, mid feet, hands, and great toes, without suspicious areas of altered uptake to suggest osseous metastasis. Small focus of uptake   projecting over the anterior right hemiabdomen, likely contamination/artifactual.      Impression    IMPRESSION:    No scintigraphic evidence of osseous metastasis.           Anna Koroma MD

## 2023-07-12 DIAGNOSIS — K74.69 OTHER CIRRHOSIS OF LIVER (H): Primary | ICD-10-CM

## 2023-07-12 NOTE — PROGRESS NOTES
Interventional Radiology Clinic Visit    Date of this visit: 7/14/2023    Flash Brown is referred by Dr. Tom Leventhal for treatment recommendations. The patient requires evaluation for diagnosis of hepatocellular carcinoma (HCC).    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    Recently established care at the [facility] for management of his chronic liver disease and liver ultrasound showed a mass in the left hepatic lobe.  MRI showed concern for HCC and subsequent percutaneous liver lesion biopsy confirmed moderately differentiated HCC.  He has been referred for liver directed therapy.    Today states he has SOB, mild at rest, worse on exertion. No chest pain, cough or fever. No abdominal pain, leg swelling or jaundice.    Review of Systems:    As above.    Past Medical/Surgical History:    Past Medical History:   Diagnosis Date     Basal cell carcinoma      CAD (coronary artery disease)      Depression      Hyperlipidemia      Hypertension      Lymphoma (H)      Malignant melanoma (H)      Melanoma (H)      Squamous cell carcinoma      Past Surgical History:   Procedure Laterality Date     AXILLARY SURGERY      S/P resection and adjuvant radiation     BONE MARROW BIOPSY, BONE SPECIMEN, NEEDLE/TROCAR N/A 7/8/2019    Procedure: BIOPSY, BONE MARROW;  Surgeon: Husam Issa MD;  Location: [facility]     BONE MARROW BIOPSY, BONE SPECIMEN, NEEDLE/TROCAR Left 2/24/2022    Procedure: BIOPSY, BONE MARROW;  Surgeon: Cailin Anthony PA-C;  Location: [facility] OR     CARDIAC SURGERY       CHOLECYSTECTOMY       HERNIA REPAIR, UMBILICAL       IR LIVER BIOPSY PERCUTANEOUS  6/2/2023      PHACOEMULSIFICATION WITH STANDARD INTRAOCULAR LENS IMPLANT Right 10/2/2019    Procedure: Cataract Removal with Implant;  Surgeon: Dinesh Ivey MD;  Location: [facility]     PHACOEMULSIFICATION WITH STANDARD INTRAOCULAR LENS IMPLANT Left 10/21/2019    Procedure: Cataract Removal with Implant;  Surgeon: Dinesh Ivey MD;  Location: [facility]     STENT      PTCA with drug-eluting stent of RCA, circumflex, and LAD     VASCULAR SURGERY         Current Medications:    Current Outpatient Medications   Medication Sig Dispense Refill     aspirin (ASA) 81 MG EC tablet Take 81 mg by mouth daily       atorvastatin (LIPITOR) 20 MG tablet Take 1 tablet (20 mg) by mouth daily 90 tablet 3     cyclobenzaprine (FLEXERIL) 5 MG tablet Take 1 tablet (5 mg) by mouth nightly as needed for muscle spasms 30 tablet 0     ferrous sulfate (FE TABS) 325 (65 Fe) MG EC tablet Take 1 tablet (325 mg) by mouth daily 90 tablet 0     furosemide (LASIX) 20 MG tablet Take 1 tablet (20 mg) by mouth every morning AND 1 tablet (20 mg) daily at 2 pm. 180 tablet 3     glipiZIDE (GLUCOTROL XL) 10 MG 24 hr tablet Take 1 tablet (10 mg) by mouth daily 90 tablet 3     lidocaine (XYLOCAINE) 5 % external ointment Apply topically 4 times daily as needed for moderate pain 50 g 1     magnesium oxide (MAG-OX) 400 MG tablet Take 1 tablet (400 mg) by mouth 2 times daily 60 tablet 1     metFORMIN (GLUCOPHAGE) 500 MG tablet Take 1 tablet (500 mg) by mouth 2 times daily (with meals) 180 tablet 3     metoprolol succinate ER (TOPROL XL) 25 MG 24 hr tablet Take 1 tablet (25 mg) by mouth daily 90 tablet 3     nitroGLYcerin (NITROSTAT) 0.4 MG sublingual tablet Place 1 tablet (0.4 mg) under the tongue See Admin Instructions for chest pain 30 tablet 0     ORDER FOR DME Light Therapy with Full Spectrum Light (70916 lux) Start with 10-15 minute exposure per day, Increase exposure to 30 to 45 minutes per day, Maximum exposure: 90 minutes per day,Look periodically at  light during each session  1 0     potassium chloride ER (KLOR-CON M) 20 MEQ CR tablet Take 1 tablet (20 mEq) by mouth 2 times daily 60 tablet 0     ramipril (ALTACE) 5 MG capsule Take 1 capsule (5 mg) by mouth daily 90 capsule 3     tamsulosin (FLOMAX) 0.4 MG capsule Take 1 capsule (0.4 mg) by mouth every evening 90 capsule 3     triamcinolone (KENALOG) 0.1 % external cream Twice daily to legs prn itching 454 g 3       Allergies:    Iodine and Iodinated contrast media    Family History:    Family History   Problem Relation Age of Onset     Asthma Other      Cancer Other         melanoma     Blood Disease Other         bleeding disorder     Lung Cancer Mother         Smoker     Prostate Cancer Father      Melanoma No family hx of        Social History:    Lives alone in senior citizen apartment. Independent ADLs. Does not have a 's licence; sister drives him around. Walks outside to do gardening. Uses walker.    Social History     Socioeconomic History     Marital status: Single     Number of children: 0   Occupational History     Occupation: Retired     Comment: [employer]    Tobacco Use     Smoking status: Never     Passive exposure: Never     Smokeless tobacco: Never   Vaping Use     Vaping Use: Never used   Substance and Sexual Activity     Alcohol use: Yes     Comment: glass of wine couple times per week     Drug use: Never     Social Determinants of Health     Financial Resource Strain: Low Risk  (11/9/2021)    Overall Financial Resource Strain (CARDIA)      Difficulty of Paying Living Expenses: Not hard at all   Food Insecurity: No Food Insecurity (11/9/2021)    Hunger Vital Sign      Worried About Running Out of Food in the Last Year: Never true      Ran Out of Food in the Last Year: Never true   Transportation Needs: No Transportation Needs (11/9/2021)    PRAPARE - Transportation      Lack of Transportation (Medical): No      Lack of Transportation (Non-Medical): No   Physical Activity:  Inactive (11/9/2021)    Exercise Vital Sign      Days of Exercise per Week: 0 days      Minutes of Exercise per Session: 0 min   Stress: No Stress Concern Present (11/9/2021)    [employer] [place] [employer] - Occupational Stress Questionnaire      Feeling of Stress : Not at all   Social Connections: Socially Isolated (11/9/2021)    Social Connection and Isolation Panel [NHANES]      Frequency of Communication with Friends and Family: Once a week      Frequency of Social Gatherings with Friends and Family: Once a week      Attends [employer] Services: More than 4 times per year      Active Member of Clubs or Organizations: No      Marital Status: Never    Housing Stability: Low Risk  (11/9/2021)    Housing Stability Vital Sign      Unable to Pay for Housing in the Last Year: No      Number of Places Lived in the Last Year: 1      Unstable Housing in the Last Year: No       Physical Exam:    CONSTITUTIONAL: healthy, alert and no distress.  PSYCHIATRIC: mentation appears normal and affect normal.  NEURO: Normal movements and speech.  EYES: No jaundice or pallor.  SKIN: No jaundice.   RESP: No audible cough or wheeze.      Laboratory Studies:    Lab Results   Component Value Date    HGB 12.9 07/13/2023    HGB 12.0 01/04/2021     Lab Results   Component Value Date    PLT 91 07/13/2023    PLT 92 01/04/2021     Lab Results   Component Value Date    WBC 3.1 07/13/2023    WBC 3.2 01/04/2021       Lab Results   Component Value Date    INR 1.22 07/13/2023       Lab Results   Component Value Date    PROTTOTAL 6.9 07/13/2023    PROTTOTAL 6.7 04/23/2021      Lab Results   Component Value Date    ALBUMIN 3.8 07/13/2023    ALBUMIN 3.3 04/25/2023    ALBUMIN 3.1 04/23/2021     Lab Results   Component Value Date    BILITOTAL 1.6 07/13/2023    BILITOTAL 0.9 04/23/2021      Lab Results   Component Value Date    ALKPHOS 97 07/13/2023    ALKPHOS 114 04/23/2021     Lab Results   Component Value Date    AST 41 07/13/2023     AST 18 04/23/2021     Lab Results   Component Value Date    ALT 28 07/13/2023    ALT 25 04/23/2021       Lab Results   Component Value Date    CR 0.90 07/13/2023    CR 1.04 04/23/2021       AFP 7/13/23 = 5.2      Imaging:     I personally reviewed and interpreted his imaging as follows:    Liver ultrasound 4/26/2023: Mixed echogenicity mass in left hepatic lobe, 5 cm.    MRI liver 5/8/2023: 4.9 cm mass in lateral segments of left hepatic lobe with significant macroscopic fat content and in the right side of the mass there is arterial enhancement with washout.  Pseudocapsule is present.    CT chest 6/29/2023: Stable punctate lung nodules.  No definite evidence of lung metastases.  Cirrhotic configuration of the liver.  Left hepatic lobe mass with extensive macroscopic fat is seen.  Splenomegaly suggesting portal hypertension.    Nuclear medicine bone scan 6/29/2023: No evidence of bone metastases.      ASSESSMENT:      Flash Brown is a 82 year old male with a 4.9 cm solitary biopsy-proven hepatocellular carcinoma lesion in the left hepatic lobe.    Child-Campbell score: A5  ECOG performance status: 0    The patient is a suitable candidate for radioembolization.    I showed the patient the imaging and discussed the findings. I discussed with them that the goal of the procedure is disease control with a survival benefit rather than a cure given the nature of the disease. Alternatives were reviewed, including surgery, but the patient is not a surgical candidate.  I explained the radioembolization procedure  - that it is a two step procedure on two different days that involves an angiogram and nuclear medicine study on each day, and that it requires insurance pre-approval. I explained that the first procedure involves mapping the arteries to the liver and embolizing any sidebranches that place the patient at risk of non-target radiation injury, then checking for shunting to the lungs. The second procedure involves  delivering the radiation beads intra-arterially and imaging the liver afterwards to assess the pattern of radiation distribution.   I explained that both procedures are performed under conscious sedation. We discussed what will happen before, during and after the procedure; what to expect in the post procedure recovery period; and what the follow-up will be. We discussed post-radioembolization side effects which consists of varying degrees of pain, nausea, and lethargy. We discussed the risks of the procedure which include, but are not limited to, vascular injury causing bleeding or occlusion of blood vessels, groin hematoma, infection, liver failure, non-target radiation injury to structures such as gallbladder/bowel/pancreas/lung.  We also discussed that the treatment sometimes need more than one treatment session to gain control of the tumor, particularly for large or multifocal tumors, that may involve other modalities including ablation.  I also explained that new tumors may develop elsewhere given the nature of the disease. Most patients go home the same day, but occasionally circumstances are such that a longer admission may be required. All of the patient's questions were answered and they agreed to proceed.   PLAN:  Pending insurance pre-approval, we will schedule the patient for the procedure accordingly.          It was a pleasure seeing the patient.     SignedDayna M.D.    Interventional Radiology  Department of Radiology  [facility]  Patient Care Team:  Thomas Jackson DO as PCP - General (Family Practice)  Thomas Jackson DO as Assigned PCP  Sang Avendaño MD as Assigned Cancer Care Provider  Dinesh Roque MD as MD (Dermatology)  Leventhal, Thomas Michael, MD as MD (Gastroenterology)  Dinesh Roque MD as Assigned Surgical Provider  Leventhal, Thomas Michael, MD as Assigned Gastroenterology Provider  Micah Tate MD as  Assigned Heart and Vascular Provider  LEVENTHAL, THOMAS MICHAEL         45 minutes spent by me on the date of the encounter doing chart review, history and exam, imaging review, documentation and further activities per the note.      Video-Visit Details     Type of service:  Video Visit     Video Start and End Time:8:59 - 9:25 am    Originating Location (pt. Location): Home     Distant Location (provider location):  [facility] [place]      Platform used for Video Visit: VerbalizeIt

## 2023-07-13 ENCOUNTER — LAB (OUTPATIENT)
Dept: LAB | Facility: CLINIC | Age: 83
End: 2023-07-13
Payer: MEDICARE

## 2023-07-13 DIAGNOSIS — K74.69 OTHER CIRRHOSIS OF LIVER (H): ICD-10-CM

## 2023-07-13 LAB
ERYTHROCYTE [DISTWIDTH] IN BLOOD BY AUTOMATED COUNT: 14.8 % (ref 10–15)
HCT VFR BLD AUTO: 39.2 % (ref 40–53)
HGB BLD-MCNC: 12.9 G/DL (ref 13.3–17.7)
INR PPP: 1.22 (ref 0.85–1.15)
MCH RBC QN AUTO: 27.6 PG (ref 26.5–33)
MCHC RBC AUTO-ENTMCNC: 32.9 G/DL (ref 31.5–36.5)
MCV RBC AUTO: 84 FL (ref 78–100)
PLATELET # BLD AUTO: 91 10E3/UL (ref 150–450)
RBC # BLD AUTO: 4.67 10E6/UL (ref 4.4–5.9)
WBC # BLD AUTO: 3.1 10E3/UL (ref 4–11)

## 2023-07-13 PROCEDURE — 82248 BILIRUBIN DIRECT: CPT

## 2023-07-13 PROCEDURE — 85610 PROTHROMBIN TIME: CPT

## 2023-07-13 PROCEDURE — 80053 COMPREHEN METABOLIC PANEL: CPT

## 2023-07-13 PROCEDURE — 82105 ALPHA-FETOPROTEIN SERUM: CPT

## 2023-07-13 PROCEDURE — 36415 COLL VENOUS BLD VENIPUNCTURE: CPT

## 2023-07-13 PROCEDURE — 85027 COMPLETE CBC AUTOMATED: CPT

## 2023-07-14 ENCOUNTER — VIRTUAL VISIT (OUTPATIENT)
Dept: RADIOLOGY | Facility: CLINIC | Age: 83
End: 2023-07-14
Attending: RADIOLOGY
Payer: MEDICARE

## 2023-07-14 VITALS — WEIGHT: 210 LBS | BODY MASS INDEX: 34.99 KG/M2 | HEIGHT: 65 IN

## 2023-07-14 DIAGNOSIS — C22.0 HEPATOCELLULAR CARCINOMA (H): ICD-10-CM

## 2023-07-14 LAB
AFP SERPL-MCNC: 5.2 NG/ML
ALBUMIN SERPL BCG-MCNC: 3.8 G/DL (ref 3.5–5.2)
ALP SERPL-CCNC: 97 U/L (ref 40–129)
ALT SERPL W P-5'-P-CCNC: 28 U/L (ref 0–70)
ANION GAP SERPL CALCULATED.3IONS-SCNC: 15 MMOL/L (ref 7–15)
AST SERPL W P-5'-P-CCNC: 41 U/L (ref 0–45)
BILIRUB DIRECT SERPL-MCNC: 0.6 MG/DL (ref 0–0.3)
BILIRUB SERPL-MCNC: 1.6 MG/DL
BUN SERPL-MCNC: 18.9 MG/DL (ref 8–23)
CALCIUM SERPL-MCNC: 9.2 MG/DL (ref 8.8–10.2)
CHLORIDE SERPL-SCNC: 98 MMOL/L (ref 98–107)
CREAT SERPL-MCNC: 0.9 MG/DL (ref 0.67–1.17)
DEPRECATED HCO3 PLAS-SCNC: 23 MMOL/L (ref 22–29)
GFR SERPL CREATININE-BSD FRML MDRD: 86 ML/MIN/1.73M2
GLUCOSE SERPL-MCNC: 294 MG/DL (ref 70–99)
POTASSIUM SERPL-SCNC: 3.9 MMOL/L (ref 3.4–5.3)
PROT SERPL-MCNC: 6.9 G/DL (ref 6.4–8.3)
SODIUM SERPL-SCNC: 136 MMOL/L (ref 136–145)

## 2023-07-14 PROCEDURE — 99214 OFFICE O/P EST MOD 30 MIN: CPT | Mod: 95 | Performed by: RADIOLOGY

## 2023-07-14 NOTE — NURSING NOTE
Is the patient currently in the state of [place]? YES    Visit mode:VIDEO    If the visit is dropped, the patient can be reconnected by: VIDEO VISIT: Text to cell phone: [phone]    Will anyone else be joining the visit? NO      How would you like to obtain your AVS? MyChart    Are changes needed to the allergy or medication list? NO    Reason for visit: Consult      REIG Newberry

## 2023-07-14 NOTE — LETTER
7/14/2023         RE: Flash Brown  [address]  [facility] [id number]        Dear Colleague,    Thank you for referring your patient, Flash Brown, to the [facility]. Please see a copy of my visit note below.            Interventional Radiology Clinic Visit    Date of this visit: 7/14/2023    Flash Brown is referred by Dr. Tom Leventhal for treatment recommendations. The patient requires evaluation for diagnosis of hepatocellular carcinoma (HCC).    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    Recently established care at the [facility] for management of his chronic liver disease and liver ultrasound showed a mass in the left hepatic lobe.  MRI showed concern for HCC and subsequent percutaneous liver lesion biopsy confirmed moderately differentiated HCC.  He has been referred for liver directed therapy.    Today states he has SOB, mild at rest, worse on exertion. No chest pain, cough or fever. No abdominal pain, leg swelling or jaundice.    Review of Systems:    As above.    Past Medical/Surgical History:    Past Medical History:   Diagnosis Date    Basal cell carcinoma     CAD (coronary artery disease)     Depression     Hyperlipidemia     Hypertension     Lymphoma (H)     Malignant melanoma (H)     Melanoma (H)     Squamous cell carcinoma      Past Surgical History:   Procedure Laterality Date    AXILLARY SURGERY      S/P resection and adjuvant radiation    BONE MARROW BIOPSY, BONE SPECIMEN, NEEDLE/TROCAR N/A 7/8/2019    Procedure: BIOPSY, BONE MARROW;  Surgeon: Husam Issa MD;  [facility]    BONE MARROW BIOPSY,  BONE SPECIMEN, NEEDLE/TROCAR Left 2/24/2022    Procedure: BIOPSY, BONE MARROW;  Surgeon: Cailin Anthony PA-C;  Location: [facility] OR    CARDIAC SURGERY      CHOLECYSTECTOMY      HERNIA REPAIR, UMBILICAL      IR LIVER BIOPSY PERCUTANEOUS  6/2/2023    PHACOEMULSIFICATION WITH STANDARD INTRAOCULAR LENS IMPLANT Right 10/2/2019    Procedure: Cataract Removal with Implant;  Surgeon: Dinesh Ivey MD;  Location: [facility] OR    PHACOEMULSIFICATION WITH STANDARD INTRAOCULAR LENS IMPLANT Left 10/21/2019    Procedure: Cataract Removal with Implant;  Surgeon: Diensh Ivey MD;  Location: [facility] OR    STENT      PTCA with drug-eluting stent of RCA, circumflex, and LAD    VASCULAR SURGERY         Current Medications:    Current Outpatient Medications   Medication Sig Dispense Refill    aspirin (ASA) 81 MG EC tablet Take 81 mg by mouth daily      atorvastatin (LIPITOR) 20 MG tablet Take 1 tablet (20 mg) by mouth daily 90 tablet 3    cyclobenzaprine (FLEXERIL) 5 MG tablet Take 1 tablet (5 mg) by mouth nightly as needed for muscle spasms 30 tablet 0    ferrous sulfate (FE TABS) 325 (65 Fe) MG EC tablet Take 1 tablet (325 mg) by mouth daily 90 tablet 0    furosemide (LASIX) 20 MG tablet Take 1 tablet (20 mg) by mouth every morning AND 1 tablet (20 mg) daily at 2 pm. 180 tablet 3    glipiZIDE (GLUCOTROL XL) 10 MG 24 hr tablet Take 1 tablet (10 mg) by mouth daily 90 tablet 3    lidocaine (XYLOCAINE) 5 % external ointment Apply topically 4 times daily as needed for moderate pain 50 g 1    magnesium oxide (MAG-OX) 400 MG tablet Take 1 tablet (400 mg) by mouth 2 times daily 60 tablet 1    metFORMIN (GLUCOPHAGE) 500 MG tablet Take 1 tablet (500 mg) by mouth 2 times daily (with meals) 180 tablet 3    metoprolol succinate ER (TOPROL XL) 25 MG 24 hr tablet Take 1 tablet (25 mg) by mouth daily 90 tablet 3    nitroGLYcerin (NITROSTAT) 0.4 MG sublingual tablet Place 1 tablet (0.4 mg) under the tongue See Admin Instructions for chest  pain 30 tablet 0    ORDER FOR DME Light Therapy with Full Spectrum Light (66805 lux) Start with 10-15 minute exposure per day, Increase exposure to 30 to 45 minutes per day, Maximum exposure: 90 minutes per day,Look periodically at light during each session  1 0    potassium chloride ER (KLOR-CON M) 20 MEQ CR tablet Take 1 tablet (20 mEq) by mouth 2 times daily 60 tablet 0    ramipril (ALTACE) 5 MG capsule Take 1 capsule (5 mg) by mouth daily 90 capsule 3    tamsulosin (FLOMAX) 0.4 MG capsule Take 1 capsule (0.4 mg) by mouth every evening 90 capsule 3    triamcinolone (KENALOG) 0.1 % external cream Twice daily to legs prn itching 454 g 3       Allergies:    Iodine and Iodinated contrast media    Family History:    Family History   Problem Relation Age of Onset    Asthma Other     Cancer Other         melanoma    Blood Disease Other         bleeding disorder    Lung Cancer Mother         Smoker    Prostate Cancer Father     Melanoma No family hx of        Social History:    Lives alone in senior citizen apartment. Independent ADLs. Does not have a 's licence; sister drives him around. Walks outside to do gardening. Uses walker.    Social History     Socioeconomic History    Marital status: Single    Number of children: 0   Occupational History    Occupation: Retired     Comment: [employer]    Tobacco Use    Smoking status: Never     Passive exposure: Never    Smokeless tobacco: Never   Vaping Use    Vaping Use: Never used   Substance and Sexual Activity    Alcohol use: Yes     Comment: glass of wine couple times per week    Drug use: Never     Social Determinants of Health     Financial Resource Strain: Low Risk  (11/9/2021)    Overall Financial Resource Strain (CARDIA)     Difficulty of Paying Living Expenses: Not hard at all   Food Insecurity: No Food Insecurity (11/9/2021)    Hunger Vital Sign     Worried About Running Out of Food in the Last Year: Never true     Ran Out of Food in the Last Year:  Never true   Transportation Needs: No Transportation Needs (11/9/2021)    PRAPARE - Transportation     Lack of Transportation (Medical): No     Lack of Transportation (Non-Medical): No   Physical Activity: Inactive (11/9/2021)    Exercise Vital Sign     Days of Exercise per Week: 0 days     Minutes of Exercise per Session: 0 min   Stress: No Stress Concern Present (11/9/2021)    [employer] - Occupational Stress Questionnaire     Feeling of Stress : Not at all   Social Connections: Socially Isolated (11/9/2021)    Social Connection and Isolation Panel [NHANES]     Frequency of Communication with Friends and Family: Once a week     Frequency of Social Gatherings with Friends and Family: Once a week     Attends Christianity Services: More than 4 times per year     Active Member of Clubs or Organizations: No     Marital Status: Never    Housing Stability: Low Risk  (11/9/2021)    Housing Stability Vital Sign     Unable to Pay for Housing in the Last Year: No     Number of Places Lived in the Last Year: 1     Unstable Housing in the Last Year: No       Physical Exam:    CONSTITUTIONAL: healthy, alert and no distress.  PSYCHIATRIC: mentation appears normal and affect normal.  NEURO: Normal movements and speech.  EYES: No jaundice or pallor.  SKIN: No jaundice.   RESP: No audible cough or wheeze.      Laboratory Studies:    Lab Results   Component Value Date    HGB 12.9 07/13/2023    HGB 12.0 01/04/2021     Lab Results   Component Value Date    PLT 91 07/13/2023    PLT 92 01/04/2021     Lab Results   Component Value Date    WBC 3.1 07/13/2023    WBC 3.2 01/04/2021       Lab Results   Component Value Date    INR 1.22 07/13/2023       Lab Results   Component Value Date    PROTTOTAL 6.9 07/13/2023    PROTTOTAL 6.7 04/23/2021      Lab Results   Component Value Date    ALBUMIN 3.8 07/13/2023    ALBUMIN 3.3 04/25/2023    ALBUMIN 3.1 04/23/2021     Lab Results   Component Value Date    BILITOTAL  1.6 07/13/2023    BILITOTAL 0.9 04/23/2021      Lab Results   Component Value Date    ALKPHOS 97 07/13/2023    ALKPHOS 114 04/23/2021     Lab Results   Component Value Date    AST 41 07/13/2023    AST 18 04/23/2021     Lab Results   Component Value Date    ALT 28 07/13/2023    ALT 25 04/23/2021       Lab Results   Component Value Date    CR 0.90 07/13/2023    CR 1.04 04/23/2021       AFP 7/13/23 = 5.2      Imaging:     I personally reviewed and interpreted his imaging as follows:    Liver ultrasound 4/26/2023: Mixed echogenicity mass in left hepatic lobe, 5 cm.    MRI liver 5/8/2023: 4.9 cm mass in lateral segments of left hepatic lobe with significant macroscopic fat content and in the right side of the mass there is arterial enhancement with washout.  Pseudocapsule is present.    CT chest 6/29/2023: Stable punctate lung nodules.  No definite evidence of lung metastases.  Cirrhotic configuration of the liver.  Left hepatic lobe mass with extensive macroscopic fat is seen.  Splenomegaly suggesting portal hypertension.    Nuclear medicine bone scan 6/29/2023: No evidence of bone metastases.      ASSESSMENT:      Flash Brown is a 82 year old male with a 4.9 cm solitary biopsy-proven hepatocellular carcinoma lesion in the left hepatic lobe.    Child-Campbell score: A5  ECOG performance status: 0    The patient is a suitable candidate for radioembolization.    I showed the patient the imaging and discussed the findings. I discussed with them that the goal of the procedure is disease control with a survival benefit rather than a cure given the nature of the disease. Alternatives were reviewed, including surgery, but the patient is not a surgical candidate.  I explained the radioembolization procedure  - that it is a two step procedure on two different days that involves an angiogram and nuclear medicine study on each day, and that it requires insurance pre-approval. I explained that the first procedure involves mapping  the arteries to the liver and embolizing any sidebranches that place the patient at risk of non-target radiation injury, then checking for shunting to the lungs. The second procedure involves delivering the radiation beads intra-arterially and imaging the liver afterwards to assess the pattern of radiation distribution.   I explained that both procedures are performed under conscious sedation. We discussed what will happen before, during and after the procedure; what to expect in the post procedure recovery period; and what the follow-up will be. We discussed post-radioembolization side effects which consists of varying degrees of pain, nausea, and lethargy. We discussed the risks of the procedure which include, but are not limited to, vascular injury causing bleeding or occlusion of blood vessels, groin hematoma, infection, liver failure, non-target radiation injury to structures such as gallbladder/bowel/pancreas/lung.  We also discussed that the treatment sometimes need more than one treatment session to gain control of the tumor, particularly for large or multifocal tumors, that may involve other modalities including ablation.  I also explained that new tumors may develop elsewhere given the nature of the disease. Most patients go home the same day, but occasionally circumstances are such that a longer admission may be required. All of the patient's questions were answered and they agreed to proceed.   PLAN:  Pending insurance pre-approval, we will schedule the patient for the procedure accordingly.          It was a pleasure seeing the patient.     SignedDayna M.D.    Interventional Radiology  Department of Radiology  [facility]    CC  Patient Care Team:  Thomas Jackson DO as PCP - General (Family Practice)  Thomas Jackson DO as Assigned PCP  Sang Avendaño MD as Assigned Cancer Care Provider  Dinesh Roque MD as MD (Dermatology)  Leventhal,  Payam Montiel MD as MD (Gastroenterology)  Dinesh Roque MD as Assigned Surgical Provider  Leventhal, Thomas Michael, MD as Assigned Gastroenterology Provider  Micah Tate MD as Assigned Heart and Vascular Provider  LEVENTHAL, THOMAS MICHAEL         45 minutes spent by me on the date of the encounter doing chart review, history and exam, imaging review, documentation and further activities per the note.      Video-Visit Details     Type of service:  Video Visit     Video Start and End Time:8:59 - 9:25 am    Originating Location (pt. Location): Home     Distant Location (provider location):  [facility] [place]      Platform used for Video Visit: [employer]

## 2023-07-21 ENCOUNTER — TELEPHONE (OUTPATIENT)
Dept: FAMILY MEDICINE | Facility: CLINIC | Age: 83
End: 2023-07-21

## 2023-07-21 ENCOUNTER — MEDICAL CORRESPONDENCE (OUTPATIENT)
Dept: HEALTH INFORMATION MANAGEMENT | Facility: CLINIC | Age: 83
End: 2023-07-21

## 2023-07-21 RX ORDER — HEPARIN SODIUM 200 [USP'U]/100ML
1 INJECTION, SOLUTION INTRAVENOUS CONTINUOUS PRN
Status: CANCELLED | OUTPATIENT
Start: 2023-07-21

## 2023-07-21 RX ORDER — DEXTROSE MONOHYDRATE 25 G/50ML
25-50 INJECTION, SOLUTION INTRAVENOUS
Status: CANCELLED | OUTPATIENT
Start: 2023-07-21

## 2023-07-21 RX ORDER — SODIUM CHLORIDE 9 MG/ML
INJECTION, SOLUTION INTRAVENOUS CONTINUOUS
Status: CANCELLED | OUTPATIENT
Start: 2023-07-21

## 2023-07-21 RX ORDER — NICOTINE POLACRILEX 4 MG
15-30 LOZENGE BUCCAL
Status: CANCELLED | OUTPATIENT
Start: 2023-07-21

## 2023-07-21 RX ORDER — LIDOCAINE 40 MG/G
CREAM TOPICAL
Status: CANCELLED | OUTPATIENT
Start: 2023-07-21

## 2023-07-21 NOTE — TELEPHONE ENCOUNTER
Home Care is calling regarding an established patient with [facility].       Requesting orders from: Thomas Jackson  Provider is following patient: No       SN to provide cp, medication, diabetes management, skin integrity, safety, and pain    Orders Requested    Skilled Nursing  Request for initial certification (first set of orders)   Frequency: 1x/wk for 1 wks  then 2x/wk for 3 wks  1x/wk for 1 wk and 3 PRN    Physical Therapy  Request for Evaluation and treatment l      Occupational Therapy  Request for Evaluation and treatment       PCA (Patient Care Assistant) Care  Request for initial certification (first set of orders)       Confirmed ok to leave a detailed message with call back.  Contact information confirmed and updated as needed.    Jaci Figueroa RN

## 2023-07-24 ENCOUNTER — TELEPHONE (OUTPATIENT)
Dept: RADIOLOGY | Facility: CLINIC | Age: 83
End: 2023-07-24
Payer: MEDICARE

## 2023-07-24 NOTE — TELEPHONE ENCOUNTER
RN left detailed message with verbal orders on Radha's voicemail.    Anju Oscar RN on 7/24/2023 at 8:22 AM

## 2023-07-24 NOTE — TELEPHONE ENCOUNTER
Called and spoke to Daniel and his sister Daylin regarding his upcoming radioembolization procedures. Letter including all instructions was mailed to the patient, they verbalized that they received it. All questions answered at this time.     Teresa SPARKS RN, BSN   Interventional Radiology [facility]   [phone]

## 2023-07-25 ENCOUNTER — TELEPHONE (OUTPATIENT)
Dept: FAMILY MEDICINE | Facility: CLINIC | Age: 83
End: 2023-07-25
Payer: MEDICARE

## 2023-07-25 NOTE — TELEPHONE ENCOUNTER
Home Care is calling regarding an established patient with [facility].       Requesting orders from: Thomas Jackson  Provider is following patient: Yes  Is this a 60-day recertification request?  No    Orders Requested    Physical Therapy  Request for initial certification (first set of orders)   Frequency:  1x/wk for 1 wks  then 2x/wk for 2 wks  Then 1 x/wk for 1 wk      Confirmed ok to leave a detailed message with call back.  Contact information confirmed and updated as needed.    Jaci Figueroa RN

## 2023-07-26 ENCOUNTER — APPOINTMENT (OUTPATIENT)
Dept: MEDSURG UNIT | Facility: CLINIC | Age: 83
End: 2023-07-26
Attending: RADIOLOGY
Payer: MEDICARE

## 2023-07-26 ENCOUNTER — APPOINTMENT (OUTPATIENT)
Dept: INTERVENTIONAL RADIOLOGY/VASCULAR | Facility: CLINIC | Age: 83
End: 2023-07-26
Attending: RADIOLOGY
Payer: MEDICARE

## 2023-07-26 ENCOUNTER — HOSPITAL ENCOUNTER (OUTPATIENT)
Facility: CLINIC | Age: 83
Discharge: HOME | End: 2023-07-26
Attending: RADIOLOGY | Admitting: RADIOLOGY
Payer: MEDICARE

## 2023-07-26 ENCOUNTER — HOSPITAL ENCOUNTER (OUTPATIENT)
Dept: NUCLEAR MEDICINE | Facility: CLINIC | Age: 83
Setting detail: NUCLEAR MEDICINE
Discharge: HOME | End: 2023-07-26
Attending: RADIOLOGY
Payer: MEDICARE

## 2023-07-26 VITALS
SYSTOLIC BLOOD PRESSURE: 113 MMHG | BODY MASS INDEX: 34.63 KG/M2 | RESPIRATION RATE: 15 BRPM | HEART RATE: 64 BPM | WEIGHT: 208.1 LBS | TEMPERATURE: 97.6 F | DIASTOLIC BLOOD PRESSURE: 83 MMHG | OXYGEN SATURATION: 98 %

## 2023-07-26 DIAGNOSIS — C22.0 HEPATOCELLULAR CARCINOMA (H): ICD-10-CM

## 2023-07-26 LAB
ALBUMIN SERPL BCG-MCNC: 3.8 G/DL (ref 3.5–5.2)
ALP SERPL-CCNC: 129 U/L (ref 40–129)
ALT SERPL W P-5'-P-CCNC: 58 U/L (ref 0–70)
ANION GAP SERPL CALCULATED.3IONS-SCNC: 13 MMOL/L (ref 7–15)
AST SERPL W P-5'-P-CCNC: 60 U/L (ref 0–45)
BILIRUB DIRECT SERPL-MCNC: 0.48 MG/DL (ref 0–0.3)
BILIRUB SERPL-MCNC: 1.3 MG/DL
BUN SERPL-MCNC: 14.8 MG/DL (ref 8–23)
CALCIUM SERPL-MCNC: 9 MG/DL (ref 8.8–10.2)
CHLORIDE SERPL-SCNC: 103 MMOL/L (ref 98–107)
CREAT SERPL-MCNC: 0.93 MG/DL (ref 0.67–1.17)
DEPRECATED HCO3 PLAS-SCNC: 23 MMOL/L (ref 22–29)
ERYTHROCYTE [DISTWIDTH] IN BLOOD BY AUTOMATED COUNT: 15.1 % (ref 10–15)
GFR SERPL CREATININE-BSD FRML MDRD: 83 ML/MIN/1.73M2
GLUCOSE SERPL-MCNC: 151 MG/DL (ref 70–99)
HCT VFR BLD AUTO: 41.7 % (ref 40–53)
HGB BLD-MCNC: 13.3 G/DL (ref 13.3–17.7)
INR PPP: 1.25 (ref 0.85–1.15)
MCH RBC QN AUTO: 27.1 PG (ref 26.5–33)
MCHC RBC AUTO-ENTMCNC: 31.9 G/DL (ref 31.5–36.5)
MCV RBC AUTO: 85 FL (ref 78–100)
PLATELET # BLD AUTO: 89 10E3/UL (ref 150–450)
POTASSIUM SERPL-SCNC: 4.3 MMOL/L (ref 3.4–5.3)
PROT SERPL-MCNC: 6.9 G/DL (ref 6.4–8.3)
RBC # BLD AUTO: 4.91 10E6/UL (ref 4.4–5.9)
SODIUM SERPL-SCNC: 139 MMOL/L (ref 136–145)
WBC # BLD AUTO: 3.5 10E3/UL (ref 4–11)

## 2023-07-26 PROCEDURE — 255N000002 HC RX 255 OP 636: Performed by: RADIOLOGY

## 2023-07-26 PROCEDURE — 258N000003 HC RX IP 258 OP 636: Performed by: NURSE PRACTITIONER

## 2023-07-26 PROCEDURE — 36248 INS CATH ABD/L-EXT ART ADDL: CPT

## 2023-07-26 PROCEDURE — 250N000011 HC RX IP 250 OP 636: Performed by: STUDENT IN AN ORGANIZED HEALTH CARE EDUCATION/TRAINING PROGRAM

## 2023-07-26 PROCEDURE — A9540 TC99M MAA: HCPCS | Performed by: RADIOLOGY

## 2023-07-26 PROCEDURE — 36247 INS CATH ABD/L-EXT ART 3RD: CPT

## 2023-07-26 PROCEDURE — 36247 INS CATH ABD/L-EXT ART 3RD: CPT | Mod: GC | Performed by: RADIOLOGY

## 2023-07-26 PROCEDURE — 272N000506 HC NEEDLE CR6

## 2023-07-26 PROCEDURE — 999N000132 HC STATISTIC PP CARE STAGE 1

## 2023-07-26 PROCEDURE — 76377 3D RENDER W/INTRP POSTPROCES: CPT | Mod: 26 | Performed by: RADIOLOGY

## 2023-07-26 PROCEDURE — 250N000011 HC RX IP 250 OP 636: Performed by: RADIOLOGY

## 2023-07-26 PROCEDURE — 82248 BILIRUBIN DIRECT: CPT | Performed by: NURSE PRACTITIONER

## 2023-07-26 PROCEDURE — 272N000504 HC NEEDLE CR4

## 2023-07-26 PROCEDURE — G1010 CDSM STANSON: HCPCS | Mod: GC | Performed by: RADIOLOGY

## 2023-07-26 PROCEDURE — 77300 RADIATION THERAPY DOSE PLAN: CPT | Mod: 26 | Performed by: RADIOLOGY

## 2023-07-26 PROCEDURE — 75774 ARTERY X-RAY EACH VESSEL: CPT | Mod: 26 | Performed by: RADIOLOGY

## 2023-07-26 PROCEDURE — 272N000143 HC KIT CR3

## 2023-07-26 PROCEDURE — 85610 PROTHROMBIN TIME: CPT | Performed by: NURSE PRACTITIONER

## 2023-07-26 PROCEDURE — 77263 THER RADIOLOGY TX PLNG CPLX: CPT | Mod: GC | Performed by: RADIOLOGY

## 2023-07-26 PROCEDURE — 272N000566 HC SHEATH CR3

## 2023-07-26 PROCEDURE — C1769 GUIDE WIRE: HCPCS

## 2023-07-26 PROCEDURE — 99152 MOD SED SAME PHYS/QHP 5/>YRS: CPT

## 2023-07-26 PROCEDURE — 999N000134 HC STATISTIC PP CARE STAGE 3

## 2023-07-26 PROCEDURE — C1887 CATHETER, GUIDING: HCPCS

## 2023-07-26 PROCEDURE — 76937 US GUIDE VASCULAR ACCESS: CPT | Mod: 26 | Performed by: RADIOLOGY

## 2023-07-26 PROCEDURE — 343N000001 HC RX 343: Performed by: RADIOLOGY

## 2023-07-26 PROCEDURE — 96374 THER/PROPH/DIAG INJ IV PUSH: CPT

## 2023-07-26 PROCEDURE — 78830 RP LOCLZJ TUM SPECT W/CT 1: CPT | Mod: MG

## 2023-07-26 PROCEDURE — 36415 COLL VENOUS BLD VENIPUNCTURE: CPT | Performed by: NURSE PRACTITIONER

## 2023-07-26 PROCEDURE — 75774 ARTERY X-RAY EACH VESSEL: CPT

## 2023-07-26 PROCEDURE — G1010 CDSM STANSON: HCPCS

## 2023-07-26 PROCEDURE — 85027 COMPLETE CBC AUTOMATED: CPT | Performed by: NURSE PRACTITIONER

## 2023-07-26 PROCEDURE — 75726 ARTERY X-RAYS ABDOMEN: CPT | Mod: 26 | Performed by: RADIOLOGY

## 2023-07-26 PROCEDURE — 78830 RP LOCLZJ TUM SPECT W/CT 1: CPT | Mod: 26 | Performed by: RADIOLOGY

## 2023-07-26 PROCEDURE — 250N000009 HC RX 250: Performed by: STUDENT IN AN ORGANIZED HEALTH CARE EDUCATION/TRAINING PROGRAM

## 2023-07-26 RX ORDER — HYDROMORPHONE HCL IN WATER/PF 6 MG/30 ML
0.2 PATIENT CONTROLLED ANALGESIA SYRINGE INTRAVENOUS ONCE
Status: COMPLETED | OUTPATIENT
Start: 2023-07-26 | End: 2023-07-26

## 2023-07-26 RX ORDER — LIDOCAINE 40 MG/G
CREAM TOPICAL
Status: DISCONTINUED | OUTPATIENT
Start: 2023-07-26 | End: 2023-07-26 | Stop reason: HOSPADM

## 2023-07-26 RX ORDER — NALOXONE HYDROCHLORIDE 0.4 MG/ML
0.2 INJECTION, SOLUTION INTRAMUSCULAR; INTRAVENOUS; SUBCUTANEOUS
Status: DISCONTINUED | OUTPATIENT
Start: 2023-07-26 | End: 2023-07-26 | Stop reason: HOSPADM

## 2023-07-26 RX ORDER — FLUMAZENIL 0.1 MG/ML
0.2 INJECTION, SOLUTION INTRAVENOUS
Status: DISCONTINUED | OUTPATIENT
Start: 2023-07-26 | End: 2023-07-26 | Stop reason: HOSPADM

## 2023-07-26 RX ORDER — SODIUM CHLORIDE 9 MG/ML
INJECTION, SOLUTION INTRAVENOUS CONTINUOUS
Status: DISCONTINUED | OUTPATIENT
Start: 2023-07-26 | End: 2023-07-26 | Stop reason: HOSPADM

## 2023-07-26 RX ORDER — FENTANYL CITRATE 50 UG/ML
25-50 INJECTION, SOLUTION INTRAMUSCULAR; INTRAVENOUS EVERY 5 MIN PRN
Status: DISCONTINUED | OUTPATIENT
Start: 2023-07-26 | End: 2023-07-26 | Stop reason: HOSPADM

## 2023-07-26 RX ORDER — DEXTROSE MONOHYDRATE 25 G/50ML
25-50 INJECTION, SOLUTION INTRAVENOUS
Status: DISCONTINUED | OUTPATIENT
Start: 2023-07-26 | End: 2023-07-26 | Stop reason: HOSPADM

## 2023-07-26 RX ORDER — NICOTINE POLACRILEX 4 MG
15-30 LOZENGE BUCCAL
Status: DISCONTINUED | OUTPATIENT
Start: 2023-07-26 | End: 2023-07-26 | Stop reason: HOSPADM

## 2023-07-26 RX ORDER — IODIXANOL 320 MG/ML
100 INJECTION, SOLUTION INTRAVASCULAR ONCE
Status: COMPLETED | OUTPATIENT
Start: 2023-07-26 | End: 2023-07-26

## 2023-07-26 RX ORDER — NALOXONE HYDROCHLORIDE 0.4 MG/ML
0.4 INJECTION, SOLUTION INTRAMUSCULAR; INTRAVENOUS; SUBCUTANEOUS
Status: DISCONTINUED | OUTPATIENT
Start: 2023-07-26 | End: 2023-07-26 | Stop reason: HOSPADM

## 2023-07-26 RX ORDER — HEPARIN SODIUM 200 [USP'U]/100ML
1 INJECTION, SOLUTION INTRAVENOUS CONTINUOUS PRN
Status: DISCONTINUED | OUTPATIENT
Start: 2023-07-26 | End: 2023-07-26 | Stop reason: HOSPADM

## 2023-07-26 RX ADMIN — FENTANYL CITRATE 25 MCG: 50 INJECTION, SOLUTION INTRAMUSCULAR; INTRAVENOUS at 14:39

## 2023-07-26 RX ADMIN — FENTANYL CITRATE 25 MCG: 50 INJECTION, SOLUTION INTRAMUSCULAR; INTRAVENOUS at 13:58

## 2023-07-26 RX ADMIN — FENTANYL CITRATE 25 MCG: 50 INJECTION, SOLUTION INTRAMUSCULAR; INTRAVENOUS at 15:33

## 2023-07-26 RX ADMIN — MIDAZOLAM HYDROCHLORIDE 0.5 MG: 1 INJECTION, SOLUTION INTRAMUSCULAR; INTRAVENOUS at 15:27

## 2023-07-26 RX ADMIN — SODIUM CHLORIDE: 9 INJECTION, SOLUTION INTRAVENOUS at 10:47

## 2023-07-26 RX ADMIN — FENTANYL CITRATE 25 MCG: 50 INJECTION, SOLUTION INTRAMUSCULAR; INTRAVENOUS at 15:59

## 2023-07-26 RX ADMIN — FENTANYL CITRATE 25 MCG: 50 INJECTION, SOLUTION INTRAMUSCULAR; INTRAVENOUS at 16:06

## 2023-07-26 RX ADMIN — MIDAZOLAM HYDROCHLORIDE 0.5 MG: 1 INJECTION, SOLUTION INTRAMUSCULAR; INTRAVENOUS at 14:39

## 2023-07-26 RX ADMIN — MIDAZOLAM HYDROCHLORIDE 0.5 MG: 1 INJECTION, SOLUTION INTRAMUSCULAR; INTRAVENOUS at 15:37

## 2023-07-26 RX ADMIN — MIDAZOLAM HYDROCHLORIDE 0.5 MG: 1 INJECTION, SOLUTION INTRAMUSCULAR; INTRAVENOUS at 15:46

## 2023-07-26 RX ADMIN — FENTANYL CITRATE 25 MCG: 50 INJECTION, SOLUTION INTRAMUSCULAR; INTRAVENOUS at 15:47

## 2023-07-26 RX ADMIN — KIT FOR THE PREPARATION OF TECHNETIUM TC 99M ALBUMIN AGGREGATED 4 MILLICURIE: 2.5 INJECTION, POWDER, FOR SOLUTION INTRAVENOUS at 17:00

## 2023-07-26 RX ADMIN — HYDROMORPHONE HYDROCHLORIDE 0.2 MG: 0.2 INJECTION, SOLUTION INTRAMUSCULAR; INTRAVENOUS; SUBCUTANEOUS at 17:31

## 2023-07-26 RX ADMIN — MIDAZOLAM HYDROCHLORIDE 0.5 MG: 1 INJECTION, SOLUTION INTRAMUSCULAR; INTRAVENOUS at 15:59

## 2023-07-26 RX ADMIN — HEPARIN SODIUM 2 BAG: 200 INJECTION, SOLUTION INTRAVENOUS at 16:05

## 2023-07-26 RX ADMIN — IODIXANOL 45 ML: 320 INJECTION, SOLUTION INTRAVASCULAR at 16:21

## 2023-07-26 RX ADMIN — MIDAZOLAM HYDROCHLORIDE 0.5 MG: 1 INJECTION, SOLUTION INTRAMUSCULAR; INTRAVENOUS at 13:59

## 2023-07-26 RX ADMIN — LIDOCAINE HYDROCHLORIDE 8 ML: 10 INJECTION, SOLUTION EPIDURAL; INFILTRATION; INTRACAUDAL; PERINEURAL at 14:45

## 2023-07-26 ASSESSMENT — ACTIVITIES OF DAILY LIVING (ADL)
ADLS_ACUITY_SCORE: 35

## 2023-07-26 NOTE — PROGRESS NOTES
Writer took over post procedure care from previous RN s/p Y90 mapping. VSS. Right groin site intact with angio seal, no hematoma, scant amount of leaking blood. Patient is confused and forgets to leave leg straight and needs reminders about flat bedrest. [facility], patient experienced severe back pain, MD contacted and IV dilaudid given so patient could complete scan. Family updated via phone by Dr. Anthony.

## 2023-07-26 NOTE — PROCEDURES
[facility]    Procedure: IR Procedure Note    Date/Time: 7/26/2023 4:10 PM    Performed by: Carmine Aguilar MD  Authorized by: Carmine Aguilar MD  IR Fellow Physician:  Radiology Resident Physician: Carmine Aguilar  Other(s) attending procedure: Dayna D'patel      UNIVERSAL PROTOCOL   Site Marked: NA  Prior Images Obtained and Reviewed:  Yes  Required items: Required blood products, implants, devices and special equipment available    Patient identity confirmed:  Verbally with patient, arm band, provided demographic data and hospital-assigned identification number  Patient was reevaluated immediately before administering moderate or deep sedation or anesthesia  Confirmation Checklist:  Patient's identity using two indicators, relevant allergies, procedure was appropriate and matched the consent or emergent situation and correct equipment/implants were available  Time out: Immediately prior to the procedure a time out was called    Universal Protocol: the Joint Commission Universal Protocol was followed    Preparation: Patient was prepped and draped in usual sterile fashion       ANESTHESIA    Anesthesia: Local infiltration  Local Anesthetic:  Lidocaine 1% without epinephrine      SEDATION  Patient Sedated: Yes    Sedation Type:  Moderate (conscious) sedation  Sedation:  Fentanyl and midazolam  Vital signs: Vital signs monitored during sedation    See dictated procedure note for full details.  Findings: Successful Tc99 Y90 mapping. Right groin puncture closed with 6 fr angioseal    Specimens: none    Complications: None    Condition: Stable    Plan: 2 hours bedrest with right leg straight  Follow up next week for Y90 delivery      PROCEDURE  Describe Procedure: Successful Tc99 Y90 mapping. Right groin puncture closed with 6 fr angioseal  Patient Tolerance:  Patient tolerated the procedure well with no immediate complications  Length of time physician/provider present for  1:1 monitoring during sedation: 105

## 2023-07-26 NOTE — PRE-PROCEDURE
GENERAL PRE-PROCEDURE:   Procedure:  Visceral angiogram for Y90 mapping    Verbal consent obtained?: Yes    Written consent obtained?: Yes    Risks and benefits: Risks, benefits and alternatives were discussed    Consent given by:  Patient  Patient states understanding of procedure being performed: Yes    Patient's understanding of procedure matches consent: Yes    Procedure consent matches procedure scheduled: Yes    Expected level of sedation:  Moderate  Appropriately NPO:  Yes  ASA Class:  1  Mallampati  :  Grade 1- soft palate, uvula, tonsillar pillars, and posterior pharyngeal wall visible  Lungs:  Lungs clear with good breath sounds bilaterally  Heart:  Normal heart sounds and rate  History & Physical reviewed:  History and physical reviewed and no updates needed  Statement of review:  I have reviewed the lab findings, diagnostic data, medications, and the plan for sedation

## 2023-07-26 NOTE — DISCHARGE INSTRUCTIONS
[facility]   Interventional Radiology  Discharge Instructions Post Angiography for Insertion of   Radioactive Microspheres to the Liver    AFTER YOU GO HOME       Relax and take it easy for 24 hours.       Drink plenty of fluids.       Resume your regular diet, unless otherwise instructed by your Primary Physician.       DO NOT smoke for at least 24 hours, if you were given any sedation.       DO NOT drink alcoholic beverages the day of your procedure.       DO NOT drive or operate machinery at home or at work for 24 hours.          DO NOT make any important or legal decisions for 24 hours following your procedure.       DO NOT take a shower for at least 12 hours following your procedure. No tub bath, hot tubs, or swimming for 5 days             Care of groin site  It is normal to have a small bruise or lump at the site.  For the first 2 days, when you cough, sneeze or move your bowels, hold your hand over the puncture site and press gently.  Do NOT lift more than 10 pounds or do any strenuous exercise for at least 3 to 5 days.  Do not use lotion or powder near the puncture site for 3 days.  If you start bleeding from the site in your groin: lie down flat and press firmly on the site. Call your doctor as soon as you can.   Call 911 right away if you have: Heavy bleeding, bleeding that does not stop.    CALL THE PHYSICIAN IF:      - You start bleeding from the procedure site. A small lump or bruise is common at the puncture site. Your physician will tell you if you need to return to the hospital.      - You develop numbness, coolness or a change in color of the leg that was punctured.      - You experience increased pain or redness at the puncture site.      - You develop hives or a rash or unexplained itching.      - You develop a temperature of 101 degrees F or greater.    Additional Information:         Follow the Discharge Instructions for the Liver Brachytherapy; Contrast Instructions and  Instructions for the [employer]iopharmaceuticals.     Closure Device: Angioseal             [facility] INTERVENTIONAL RADIOLOGY DEPARTMENT         Procedure Physician: Dr. Piper                              Date of Procedure:July 26, 2023       Telephone Numbers:   [phone]      Monday-Friday 7:30 am to 4:00 pm                                        [phone]     After 4:00 pm Monday-Friday, Weekend and Holidays. Ask for the Interventional Radiologist on call. Someone is on call 24 hrs/day.

## 2023-07-26 NOTE — PRE-PROCEDURE
GENERAL PRE-PROCEDURE:   Procedure:  Y90 mapping  Date/Time:  7/26/2023 1:37 PM    Written consent obtained?: Yes    Risks and benefits: Risks, benefits and alternatives were discussed    Consent given by:  Patient  Patient states understanding of procedure being performed: Yes    Patient's understanding of procedure matches consent: Yes    Procedure consent matches procedure scheduled: Yes    Expected level of sedation:  Moderate  Appropriately NPO:  Yes  Mallampati  :  Grade 2- soft palate, base of uvula, tonsillar pillars, and portion of posterior pharyngeal wall visible  Lungs:  Lungs clear with good breath sounds bilaterally  Heart:  Normal heart sounds and rate  History & Physical reviewed:  History and physical reviewed and no updates needed  Statement of review:  I have reviewed the lab findings, diagnostic data, medications, and the plan for sedation

## 2023-07-26 NOTE — IR NOTE
Patient Name: Flash Brown  Medical Record Number: [id number]  Today's Date: 7/26/2023    Procedure: Visceral angiogram for Y90 Mapping  Proceduralist: Dr. Osorio, Dr. Aguilar  Pathology present: na    Procedure Start: 1423  Procedure end: 1607  Sedation medications administered:   Fentanyl 150 mcg  Versed 3 mg  Sedation time: 128 minutes (1359 - 1607)     Report given to:  RN  : varun    Other Notes: Pt arrived to IR room 1 from . Consent reviewed. Pt denies any questions or concerns regarding procedure other than back pain while lying flat. Pt positioned supine and monitored per protocol.     Patient with sinus bradycardia  HR 38- 55 with pauses and ST depression. Patient unaware of current rhythm and currently asymptomatic.  Appears unchanged from ED visit on 7/19/23. Patient also may have a second degree type 2 block. Dr. Osorio made aware. No EKG at this time.    Pt tolerated procedure without any noted complications.     Right Groin Access:  AngioSeal deployed at 1605.  Bedrest x 2 hours.  Ambulation time: 1805    Pt transferred back to .

## 2023-07-26 NOTE — PROGRESS NOTES
Pt prepped for Y90 Mapping.  VSS.  Denies pain.  Left PIV placed and currently infusing NS at 100 ml/hr.  Groins prepped.  Pulses doppler and marked.  Per pt report, history of allergy to iodine +20 years ago.  Per discussion with pt's sister, pt has received contrast dye with MRI/CT procedures recently and no reaction or premedication needed.  Labs pending.  Awaiting consent.  Pt's shadi Neil at the bedside and will be taking pt home post procedure.

## 2023-07-26 NOTE — PROGRESS NOTES
Nurse Lucretia SLOAN RN called from [facility], she is there with pt, he is having post mapping imaging; per JULES pt requests pain med for pt to complete scan. Spoke with Dr Osorio, she gave verbal order for Dilaudid IV dose; med given in Nuc med per IV; see Mar for details. BANGZ at bedside with pt during scan.

## 2023-07-27 NOTE — PROGRESS NOTES
Patient ambulated, voided, eating and drinking. Right groin site, intact, no bleeding or hematoma. Discharge instructions printed and gone over with patient and family at bedside. Patient reports back pain is relieved. IV removed. Discharged via wheelchair with sister and brother in law.

## 2023-07-28 ENCOUNTER — TELEPHONE (OUTPATIENT)
Dept: RADIOLOGY | Facility: CLINIC | Age: 83
End: 2023-07-28
Payer: MEDICARE

## 2023-07-28 NOTE — TELEPHONE ENCOUNTER
Called and spoke to Page regarding his recent Mapping procedure. Groin site remains clean dry and intact. Discuss delivery procedure on Monday, no further questions at this time.     Teresa SPARKS RN, BSN   Interventional Radiology [facility]   [phone]

## 2023-07-31 ENCOUNTER — APPOINTMENT (OUTPATIENT)
Dept: MEDSURG UNIT | Facility: CLINIC | Age: 83
End: 2023-07-31
Attending: RADIOLOGY
Payer: MEDICARE

## 2023-07-31 ENCOUNTER — HOSPITAL ENCOUNTER (OUTPATIENT)
Facility: CLINIC | Age: 83
Discharge: HOME | End: 2023-07-31
Attending: RADIOLOGY | Admitting: RADIOLOGY
Payer: MEDICARE

## 2023-07-31 ENCOUNTER — HOSPITAL ENCOUNTER (OUTPATIENT)
Dept: NUCLEAR MEDICINE | Facility: CLINIC | Age: 83
Setting detail: NUCLEAR MEDICINE
Discharge: HOME | End: 2023-07-31
Attending: RADIOLOGY | Admitting: RADIOLOGY
Payer: MEDICARE

## 2023-07-31 ENCOUNTER — APPOINTMENT (OUTPATIENT)
Dept: INTERVENTIONAL RADIOLOGY/VASCULAR | Facility: CLINIC | Age: 83
End: 2023-07-31
Attending: RADIOLOGY
Payer: MEDICARE

## 2023-07-31 VITALS
TEMPERATURE: 97.6 F | RESPIRATION RATE: 16 BRPM | OXYGEN SATURATION: 96 % | DIASTOLIC BLOOD PRESSURE: 71 MMHG | HEART RATE: 71 BPM | WEIGHT: 208 LBS | BODY MASS INDEX: 34.61 KG/M2 | SYSTOLIC BLOOD PRESSURE: 170 MMHG

## 2023-07-31 DIAGNOSIS — C22.0 HEPATOCELLULAR CARCINOMA (H): ICD-10-CM

## 2023-07-31 LAB
ALBUMIN SERPL BCG-MCNC: 3.6 G/DL (ref 3.5–5.2)
ALP SERPL-CCNC: 120 U/L (ref 40–129)
ALT SERPL W P-5'-P-CCNC: 39 U/L (ref 0–70)
AST SERPL W P-5'-P-CCNC: 48 U/L (ref 0–45)
BILIRUB DIRECT SERPL-MCNC: 0.42 MG/DL (ref 0–0.3)
BILIRUB SERPL-MCNC: 1.2 MG/DL
GLUCOSE SERPL-MCNC: 115 MG/DL (ref 70–99)
PROT SERPL-MCNC: 6.5 G/DL (ref 6.4–8.3)

## 2023-07-31 PROCEDURE — 272N000143 HC KIT CR3

## 2023-07-31 PROCEDURE — C1887 CATHETER, GUIDING: HCPCS

## 2023-07-31 PROCEDURE — 37243 VASC EMBOLIZE/OCCLUDE ORGAN: CPT | Mod: GC | Performed by: RADIOLOGY

## 2023-07-31 PROCEDURE — 82947 ASSAY GLUCOSE BLOOD QUANT: CPT | Performed by: RADIOLOGY

## 2023-07-31 PROCEDURE — C2616 BRACHYTX, NON-STR,YTTRIUM-90: HCPCS | Performed by: RADIOLOGY

## 2023-07-31 PROCEDURE — 99152 MOD SED SAME PHYS/QHP 5/>YRS: CPT | Mod: GC | Performed by: RADIOLOGY

## 2023-07-31 PROCEDURE — 36415 COLL VENOUS BLD VENIPUNCTURE: CPT | Performed by: PHYSICIAN ASSISTANT

## 2023-07-31 PROCEDURE — 999N000142 HC STATISTIC PROCEDURE PREP ONLY

## 2023-07-31 PROCEDURE — 79445 NUCLEAR RX INTRA-ARTERIAL: CPT | Mod: 26 | Performed by: RADIOLOGY

## 2023-07-31 PROCEDURE — 258N000003 HC RX IP 258 OP 636: Performed by: PHYSICIAN ASSISTANT

## 2023-07-31 PROCEDURE — 79445 NUCLEAR RX INTRA-ARTERIAL: CPT

## 2023-07-31 PROCEDURE — 36247 INS CATH ABD/L-EXT ART 3RD: CPT | Mod: GC | Performed by: RADIOLOGY

## 2023-07-31 PROCEDURE — 272N000506 HC NEEDLE CR6

## 2023-07-31 PROCEDURE — C1760 CLOSURE DEV, VASC: HCPCS

## 2023-07-31 PROCEDURE — G1010 CDSM STANSON: HCPCS | Performed by: RADIOLOGY

## 2023-07-31 PROCEDURE — 76937 US GUIDE VASCULAR ACCESS: CPT

## 2023-07-31 PROCEDURE — 36248 INS CATH ABD/L-EXT ART ADDL: CPT

## 2023-07-31 PROCEDURE — 36247 INS CATH ABD/L-EXT ART 3RD: CPT

## 2023-07-31 PROCEDURE — 250N000011 HC RX IP 250 OP 636: Performed by: PHYSICIAN ASSISTANT

## 2023-07-31 PROCEDURE — 250N000011 HC RX IP 250 OP 636: Performed by: STUDENT IN AN ORGANIZED HEALTH CARE EDUCATION/TRAINING PROGRAM

## 2023-07-31 PROCEDURE — 99152 MOD SED SAME PHYS/QHP 5/>YRS: CPT

## 2023-07-31 PROCEDURE — 250N000009 HC RX 250: Performed by: STUDENT IN AN ORGANIZED HEALTH CARE EDUCATION/TRAINING PROGRAM

## 2023-07-31 PROCEDURE — 82040 ASSAY OF SERUM ALBUMIN: CPT | Performed by: PHYSICIAN ASSISTANT

## 2023-07-31 PROCEDURE — 250N000011 HC RX IP 250 OP 636: Mod: JZ | Performed by: RADIOLOGY

## 2023-07-31 PROCEDURE — 255N000002 HC RX 255 OP 636: Performed by: RADIOLOGY

## 2023-07-31 PROCEDURE — 278N000001 HC RX 278: Performed by: RADIOLOGY

## 2023-07-31 PROCEDURE — 37243 VASC EMBOLIZE/OCCLUDE ORGAN: CPT

## 2023-07-31 PROCEDURE — C1769 GUIDE WIRE: HCPCS

## 2023-07-31 PROCEDURE — 999N000134 HC STATISTIC PP CARE STAGE 3

## 2023-07-31 PROCEDURE — 2894A VOIDCORRECT: CPT | Mod: 26 | Performed by: RADIOLOGY

## 2023-07-31 PROCEDURE — 272N000566 HC SHEATH CR3

## 2023-07-31 PROCEDURE — 76937 US GUIDE VASCULAR ACCESS: CPT | Mod: 26 | Performed by: RADIOLOGY

## 2023-07-31 PROCEDURE — 272N000567 HC SHEATH CR4

## 2023-07-31 PROCEDURE — 78800 RP LOCLZJ TUM 1 AREA 1 D IMG: CPT | Mod: 26 | Performed by: RADIOLOGY

## 2023-07-31 PROCEDURE — C9113 INJ PANTOPRAZOLE SODIUM, VIA: HCPCS | Performed by: PHYSICIAN ASSISTANT

## 2023-07-31 PROCEDURE — 75726 ARTERY X-RAYS ABDOMEN: CPT

## 2023-07-31 PROCEDURE — G1010 CDSM STANSON: HCPCS

## 2023-07-31 RX ORDER — ONDANSETRON 2 MG/ML
4 INJECTION INTRAMUSCULAR; INTRAVENOUS ONCE
Status: COMPLETED | OUTPATIENT
Start: 2023-07-31 | End: 2023-07-31

## 2023-07-31 RX ORDER — NALOXONE HYDROCHLORIDE 0.4 MG/ML
0.4 INJECTION, SOLUTION INTRAMUSCULAR; INTRAVENOUS; SUBCUTANEOUS
Status: DISCONTINUED | OUTPATIENT
Start: 2023-07-31 | End: 2023-07-31 | Stop reason: HOSPADM

## 2023-07-31 RX ORDER — PANTOPRAZOLE SODIUM 40 MG/1
40 TABLET, DELAYED RELEASE ORAL DAILY
Qty: 30 TABLET | Refills: 0 | Status: SHIPPED | OUTPATIENT
Start: 2023-07-31 | End: 2023-08-16

## 2023-07-31 RX ORDER — NALOXONE HYDROCHLORIDE 0.4 MG/ML
0.2 INJECTION, SOLUTION INTRAMUSCULAR; INTRAVENOUS; SUBCUTANEOUS
Status: DISCONTINUED | OUTPATIENT
Start: 2023-07-31 | End: 2023-07-31 | Stop reason: HOSPADM

## 2023-07-31 RX ORDER — SODIUM CHLORIDE 9 MG/ML
INJECTION, SOLUTION INTRAVENOUS CONTINUOUS
Status: DISCONTINUED | OUTPATIENT
Start: 2023-07-31 | End: 2023-07-31 | Stop reason: HOSPADM

## 2023-07-31 RX ORDER — FLUMAZENIL 0.1 MG/ML
0.2 INJECTION, SOLUTION INTRAVENOUS
Status: DISCONTINUED | OUTPATIENT
Start: 2023-07-31 | End: 2023-07-31 | Stop reason: HOSPADM

## 2023-07-31 RX ORDER — FENTANYL CITRATE 50 UG/ML
25-50 INJECTION, SOLUTION INTRAMUSCULAR; INTRAVENOUS EVERY 5 MIN PRN
Status: DISCONTINUED | OUTPATIENT
Start: 2023-07-31 | End: 2023-07-31 | Stop reason: HOSPADM

## 2023-07-31 RX ORDER — CIPROFLOXACIN 500 MG/1
500 TABLET, FILM COATED ORAL DAILY
Qty: 14 TABLET | Refills: 0 | Status: SHIPPED | OUTPATIENT
Start: 2023-07-31 | End: 2023-08-14

## 2023-07-31 RX ORDER — AMPICILLIN AND SULBACTAM 2; 1 G/1; G/1
3 INJECTION, POWDER, FOR SOLUTION INTRAMUSCULAR; INTRAVENOUS
Status: COMPLETED | OUTPATIENT
Start: 2023-07-31 | End: 2023-07-31

## 2023-07-31 RX ORDER — HYDROMORPHONE HYDROCHLORIDE 2 MG/1
4 TABLET ORAL EVERY 4 HOURS PRN
Status: DISCONTINUED | OUTPATIENT
Start: 2023-07-31 | End: 2023-07-31 | Stop reason: HOSPADM

## 2023-07-31 RX ORDER — HYDROMORPHONE HYDROCHLORIDE 2 MG/1
2 TABLET ORAL EVERY 4 HOURS PRN
Status: DISCONTINUED | OUTPATIENT
Start: 2023-07-31 | End: 2023-07-31 | Stop reason: HOSPADM

## 2023-07-31 RX ORDER — HEPARIN SODIUM 200 [USP'U]/100ML
1 INJECTION, SOLUTION INTRAVENOUS CONTINUOUS PRN
Status: DISCONTINUED | OUTPATIENT
Start: 2023-07-31 | End: 2023-07-31 | Stop reason: HOSPADM

## 2023-07-31 RX ORDER — OXYCODONE HYDROCHLORIDE 5 MG/1
TABLET ORAL
Qty: 10 TABLET | Refills: 0 | Status: SHIPPED | OUTPATIENT
Start: 2023-07-31 | End: 2023-08-16

## 2023-07-31 RX ORDER — LIDOCAINE 40 MG/G
CREAM TOPICAL
Status: DISCONTINUED | OUTPATIENT
Start: 2023-07-31 | End: 2023-07-31 | Stop reason: HOSPADM

## 2023-07-31 RX ORDER — IODIXANOL 320 MG/ML
100 INJECTION, SOLUTION INTRAVASCULAR ONCE
Status: COMPLETED | OUTPATIENT
Start: 2023-07-31 | End: 2023-07-31

## 2023-07-31 RX ORDER — METRONIDAZOLE 250 MG/1
TABLET ORAL
Qty: 56 TABLET | Refills: 0 | Status: SHIPPED | OUTPATIENT
Start: 2023-07-31 | End: 2023-08-16

## 2023-07-31 RX ORDER — ACETAMINOPHEN 325 MG/1
650 TABLET ORAL EVERY 4 HOURS PRN
Status: DISCONTINUED | OUTPATIENT
Start: 2023-07-31 | End: 2023-07-31 | Stop reason: HOSPADM

## 2023-07-31 RX ORDER — METHYLPREDNISOLONE 4 MG
TABLET, DOSE PACK ORAL
Qty: 21 TABLET | Refills: 0 | Status: SHIPPED | OUTPATIENT
Start: 2023-08-01 | End: 2023-08-16

## 2023-07-31 RX ORDER — HYDRALAZINE HYDROCHLORIDE 20 MG/ML
10-20 INJECTION INTRAMUSCULAR; INTRAVENOUS
Status: COMPLETED | OUTPATIENT
Start: 2023-07-31 | End: 2023-07-31

## 2023-07-31 RX ORDER — ACETAMINOPHEN 325 MG/1
650 TABLET ORAL EVERY 4 HOURS PRN
Status: CANCELLED | OUTPATIENT
Start: 2023-07-31

## 2023-07-31 RX ORDER — ONDANSETRON 4 MG/1
4-8 TABLET, FILM COATED ORAL EVERY 6 HOURS PRN
Qty: 40 TABLET | Refills: 0 | Status: SHIPPED | OUTPATIENT
Start: 2023-07-31 | End: 2023-08-16

## 2023-07-31 RX ADMIN — MIDAZOLAM 0.5 MG: 1 INJECTION INTRAMUSCULAR; INTRAVENOUS at 10:45

## 2023-07-31 RX ADMIN — HYDROCORTISONE SODIUM SUCCINATE 100 MG: 100 INJECTION, POWDER, FOR SOLUTION INTRAMUSCULAR; INTRAVENOUS at 07:26

## 2023-07-31 RX ADMIN — PANTOPRAZOLE SODIUM 40 MG: 40 INJECTION, POWDER, FOR SOLUTION INTRAVENOUS at 07:31

## 2023-07-31 RX ADMIN — MIDAZOLAM 0.5 MG: 1 INJECTION INTRAMUSCULAR; INTRAVENOUS at 11:11

## 2023-07-31 RX ADMIN — AMPICILLIN SODIUM AND SULBACTAM SODIUM 3 G: 2; 1 INJECTION, POWDER, FOR SOLUTION INTRAMUSCULAR; INTRAVENOUS at 07:40

## 2023-07-31 RX ADMIN — LIDOCAINE HYDROCHLORIDE 10 ML: 10 INJECTION, SOLUTION EPIDURAL; INFILTRATION; INTRACAUDAL; PERINEURAL at 12:02

## 2023-07-31 RX ADMIN — MIDAZOLAM 0.5 MG: 1 INJECTION INTRAMUSCULAR; INTRAVENOUS at 11:41

## 2023-07-31 RX ADMIN — HYDRALAZINE HYDROCHLORIDE 10 MG: 20 INJECTION, SOLUTION INTRAMUSCULAR; INTRAVENOUS at 12:01

## 2023-07-31 RX ADMIN — ONDANSETRON 4 MG: 2 INJECTION INTRAMUSCULAR; INTRAVENOUS at 11:17

## 2023-07-31 RX ADMIN — IODIXANOL 40 ML: 320 INJECTION, SOLUTION INTRAVASCULAR at 12:19

## 2023-07-31 RX ADMIN — HEPARIN SODIUM 2 BAG: 200 INJECTION, SOLUTION INTRAVENOUS at 10:51

## 2023-07-31 RX ADMIN — Medication 57.2 MILLICURIE: at 13:48

## 2023-07-31 RX ADMIN — FENTANYL CITRATE 25 MCG: 50 INJECTION, SOLUTION INTRAMUSCULAR; INTRAVENOUS at 10:46

## 2023-07-31 RX ADMIN — FENTANYL CITRATE 25 MCG: 50 INJECTION, SOLUTION INTRAMUSCULAR; INTRAVENOUS at 11:11

## 2023-07-31 RX ADMIN — SODIUM CHLORIDE: 9 INJECTION, SOLUTION INTRAVENOUS at 07:26

## 2023-07-31 RX ADMIN — FENTANYL CITRATE 25 MCG: 50 INJECTION, SOLUTION INTRAMUSCULAR; INTRAVENOUS at 11:41

## 2023-07-31 ASSESSMENT — ACTIVITIES OF DAILY LIVING (ADL)
ADLS_ACUITY_SCORE: 35

## 2023-07-31 NOTE — DISCHARGE INSTRUCTIONS
[facility]   Interventional Radiology  Discharge Instructions Post Angiography for Insertion of   Radioactive Microspheres to the Liver    AFTER YOU GO HOME:        Relax and take it easy for 24 hours.       Drink plenty of fluids.       Resume your regular diet, unless otherwise instructed by your Primary Physician.       DO NOT smoke for at least 24 hours, if you were given any sedation.       DO NOT drink alcoholic beverages the day of your procedure.       DO NOT drive or operate machinery at home or at work for 24 hours.          DO NOT make any important or legal decisions for 24 hours following your procedure.       DO NOT take a shower for at least 12 hours following your procedure. No tub bath, hot tubs, or swimming for 5 days    Care of groin site  It is normal to have a small bruise or lump at the site.  For the first 2 days, when you cough, sneeze or move your bowels, hold your hand over the puncture site and press gently.  Do NOT lift more than 10 pounds or do any strenuous exercise for at least 3 to 5 days.  Do not use lotion or powder near the puncture site for 3 days.  If you start bleeding from the site in your groin: lie down flat and press firmly on the site. Call your doctor as soon as you can.   Call 911 right away if you have: Heavy bleeding, bleeding that does not stop.      CALL THE PHYSICIAN IF:       - You start bleeding from the procedure site. A small lump or bruise is common at the puncture site. Your physician will tell you if you need to return to the hospital.      - You develop numbness, coolness or a change in color of the leg that was punctured.      - You experience increased pain or redness at the puncture site.      - You develop hives or a rash or unexplained itching.      - You develop a temperature of 101 degrees F or greater.    Additional Information:         Follow the Discharge Instructions for the Liver Brachytherapy; Contrast Instructions and  Instructions for the [employer].     Closure Device: Angioseal          [facility] INTERVENTIONAL RADIOLOGY DEPARTMENT         Procedure Physician:   Dr. Osorio, Dr. Heshmatzadeh Behzadi       Date of Procedure:July 31, 2023       Telephone Numbers:   [phone]      Monday-Friday 7:30 am to 4:00 pm                                        [phone]     After 4:00 pm Monday-Friday, Weekend and Holidays. Ask for the Interventional Radiologist on call. Someone is on call 24 hrs/day.

## 2023-07-31 NOTE — PRE-PROCEDURE
GENERAL PRE-PROCEDURE:   Procedure:  Y90 Theraspheres delivery  Date/Time:  7/31/2023 8:06 AM    Risks and benefits: Risks, benefits and alternatives were discussed    DC Plan: Appropriate discharge home plan in place for patients who are going home after procedure   Consent given by:  Patient  Patient states understanding of procedure being performed: Yes    Patient's understanding of procedure matches consent: Yes    Procedure consent matches procedure scheduled: Yes    Expected level of sedation:  Moderate  Appropriately NPO:  Yes  ASA Class:  3  Mallampati  :  Grade 3- soft palate visible, posterior pharyngeal wall not visible  Lungs:  Lungs clear with good breath sounds bilaterally  Heart:  Normal heart sounds and rate  History & Physical reviewed:  History and physical reviewed and no updates needed  Statement of review:  I have reviewed the lab findings, diagnostic data, medications, and the plan for sedation

## 2023-07-31 NOTE — TELEPHONE ENCOUNTER
RN gave verbal orders as indicated on note and approved by PCP.    Anju Oscar RN on 7/31/2023 at 2:20 PM

## 2023-07-31 NOTE — PROCEDURES
[facility]    Procedure: IR Procedure Note    Date/Time: 7/31/2023 12:47 PM    Performed by: Behzadi, Heshmatzadeh, MD  Authorized by: Dayna Anthony MD  IR Fellow Physician: Ashkan Behzadi, M.D.      UNIVERSAL PROTOCOL   Site Marked: Yes  Prior Images Obtained and Reviewed:  Yes  Required items: Required blood products, implants, devices and special equipment available    Patient identity confirmed:  Verbally with patient, arm band, provided demographic data and hospital-assigned identification number  Patient was reevaluated immediately before administering moderate or deep sedation or anesthesia  Confirmation Checklist:  Patient's identity using two indicators, relevant allergies, procedure was appropriate and matched the consent or emergent situation and correct equipment/implants were available  Time out: Immediately prior to the procedure a time out was called    Universal Protocol: the Joint Commission Universal Protocol was followed    Preparation: Patient was prepped and draped in usual sterile fashion       ANESTHESIA    Anesthesia:  Local infiltration  Local Anesthetic:  Lidocaine 1% without epinephrine      SEDATION  Patient Sedated: Yes    Sedation:  Fentanyl and midazolam  Vital signs: Vital signs monitored during sedation    See dictated procedure note for full details.  Findings: - Hepatic radioembolization preparatory angiogram and Tc99m-MAA  administration.  - Catheterization of the common hepatic artery with  diagnostic angiography.  - Catheterization of the left hepatic artery with  diagnostic angiography.  - Catheterization of the segment 2/3 hepatic artery with   Delivery of theraspheres y-90.  - Right common femoral arteriotomy closure using Angio-seal      Specimens: none    Complications: None    Condition: Stable    Plan: Discharge in 2 hours      PROCEDURE  Describe Procedure: Hepatic radioembolization preparatory angiogram and  Tc99m-MAA  administration.  - Catheterization of the common hepatic artery with  diagnostic angiography.  - Catheterization of the left hepatic artery with  diagnostic angiography.  - Catheterization of the segment 2/3 hepatic artery with   Delivery of theraspheres y-90.  - Right common femoral arteriotomy closure using Angio-seal         Patient Tolerance:  Patient tolerated the procedure well with no immediate complications  Length of time physician/provider present for 1:1 monitoring during sedation: 60

## 2023-07-31 NOTE — PROGRESS NOTES
Don taken to Nuclear Medicine study at 15:30, accompanied by his sister.  Don discharged from unit 2a prior to nuclear medicine study.

## 2023-07-31 NOTE — PROGRESS NOTES
Pt tolerated recovery without complication. Discharge instructions reviewed with pt's sister, copy given to sister. Pt tolerated oral intake and ambulation. Voided. Right groin remains f/d/I. Rx picked up at discharge pharmacy, reviewed with pt's sister.  PIV dc'd. Pt will go to [facility] study and discharge home from there.

## 2023-07-31 NOTE — IR NOTE
Patient Name: Flash Brown  Medical Record Number: [id number]  Today's Date: 7/31/2023    Procedure: y-90 delivery  Proceduralist: Dr. D'Souza, Dr. Heshmatzadeh Behzadi    Procedure Start: 1110  Procedure end: 1209  Sedation medications administered: versed  1.5 mg., fentanyl 75  mcg.     Report given to: Adrianne WYNNE 2A    Other Notes: Pt arrived to IR room 1 from . Consent reviewed. Pt denies any questions or concerns regarding procedure. Pt positioned supine  and monitored per protocol. Pt tolerated procedure without any noted complications. Pt transferred back to .    Angioseal closure device to right groin at 1205.  Bedrest for 2 hours until 1405.

## 2023-07-31 NOTE — PROGRESS NOTES
Patient arrived to room via stretcher with Ely RN s/p Y-90 delivery. VSS. Denies/report pain. Pt alert and oriented x4. R groin site CDI, CMS+. Two hour bedrest.

## 2023-08-01 ENCOUNTER — TELEPHONE (OUTPATIENT)
Dept: FAMILY MEDICINE | Facility: CLINIC | Age: 83
End: 2023-08-01

## 2023-08-01 NOTE — TELEPHONE ENCOUNTER
Forms received from: [employer]   Phone number listed: [phone]   Fax listed: [phone]  Date received: 7/25/23  Form description: PT ID # [id number]  Once forms are completed, please return to [employer] via fax.  Is patient requesting to be contacted when forms are completed: varun  Phone: na  Form placed: varun Michelle

## 2023-08-03 ENCOUNTER — TELEPHONE (OUTPATIENT)
Dept: RADIOLOGY | Facility: CLINIC | Age: 83
End: 2023-08-03
Payer: MEDICARE

## 2023-08-03 DIAGNOSIS — C22.0 HEPATOCELLULAR CARCINOMA (H): Primary | ICD-10-CM

## 2023-08-03 NOTE — TELEPHONE ENCOUNTER
Called and spoke to Daniel and his sister Daylin regarding his recent Y90 procedure. He reports he is feeling well post procedure, denies nausea, vomiting or abdominal pain. His groin access site remains clean dry and intact. Discussed the plan for one month follow up, including and MRI, labs and visit with Dr. Osorio. Message sent to clinic coordinators to facilitate scheduling follow up appointments.     Teresa SPARKS RN, BSN   Interventional Radiology [facility]   [phone]

## 2023-08-04 ENCOUNTER — MEDICAL CORRESPONDENCE (OUTPATIENT)
Dept: HEALTH INFORMATION MANAGEMENT | Facility: CLINIC | Age: 83
End: 2023-08-04
Payer: MEDICARE

## 2023-08-07 ENCOUNTER — TELEPHONE (OUTPATIENT)
Dept: FAMILY MEDICINE | Facility: CLINIC | Age: 83
End: 2023-08-07
Payer: MEDICARE

## 2023-08-07 NOTE — TELEPHONE ENCOUNTER
Forms received from: [employer]   Phone number listed: [phone]   Fax listed: [phone]  Date received: 8/2/23  Form description: [facility] ID # [id number]  Once forms are completed, please return to [employer] via fax.  Is patient requesting to be contacted when forms are completed: na  Phone: na  Form placed:  Dr. Renetta Michelle

## 2023-08-08 ENCOUNTER — MEDICAL CORRESPONDENCE (OUTPATIENT)
Dept: HEALTH INFORMATION MANAGEMENT | Facility: CLINIC | Age: 83
End: 2023-08-08
Payer: MEDICARE

## 2023-08-08 ENCOUNTER — TELEPHONE (OUTPATIENT)
Dept: FAMILY MEDICINE | Facility: CLINIC | Age: 83
End: 2023-08-08
Payer: MEDICARE

## 2023-08-08 NOTE — TELEPHONE ENCOUNTER
Forms received from: [employer]   Phone number listed: [phone]   Fax listed: [phone]  Date received: 8/5/23  Form description: [facility] discharge summary D # [id number]/OT ID # [id number]  Once forms are completed, please return to [employer] via fax.  Is patient requesting to be contacted when forms are completed: na  Phone: na  Form placed:  Dr. Renetta Michelle

## 2023-08-09 ENCOUNTER — TELEPHONE (OUTPATIENT)
Dept: FAMILY MEDICINE | Facility: CLINIC | Age: 83
End: 2023-08-09
Payer: MEDICARE

## 2023-08-09 NOTE — TELEPHONE ENCOUNTER
Forms received from: [employer]   Phone number listed: [phone]   Fax listed: [phone]  Date received: 8/8/23  Form description: Home health plan of care 7/21/23 ID # [id number] & [id number]  Once forms are completed, please return to [employer] via fax.  Is patient requesting to be contacted when forms are completed: na  Phone: na  Form placed:  Dr. Renetta Michelle

## 2023-08-15 ENCOUNTER — TELEPHONE (OUTPATIENT)
Dept: FAMILY MEDICINE | Facility: CLINIC | Age: 83
End: 2023-08-15

## 2023-08-15 NOTE — TELEPHONE ENCOUNTER
Patient has a meeting with [facility] tomorrow at 2 pm.    They need last history and physical, signed med list.  PCP is not in clinic tomorrow.    Patient has not seen provider after last hospital follow ups, and no showed appointment today.  Daylin () stated that she totally forgot about the appointment.  Patient just needs the appointment with assisted living before he can get into it.  They are trying to get him in, by the end of the month..  She is going to cancel it tomorrow.    They would like PCP to please see patient tomorrow or Friday for follow ups.  They will also need a signed copy of most recent history and physical, med list and problem list at appointment.    Routed to PCP to advise.      Jenny TORRES RN  Triage Nurse  [facility]

## 2023-08-15 NOTE — TELEPHONE ENCOUNTER
Called and scheduled patient for tomorrow with Sudarshan Lizarraga PA-C.  Patient will need forms filled out by 2 pm for assisted living facility meeting .    Jenny TORRES RN  Triage Nurse  [facility]

## 2023-08-15 NOTE — TELEPHONE ENCOUNTER
"Couple options: May schedule a 40 minutes hospital follow-up with Sam tomorrow using \"acute slots\".  Or schedule same day appointment with me on Friday.   "

## 2023-08-15 NOTE — TELEPHONE ENCOUNTER
Forms received from: "[employer]   Phone number listed: [phone]   Fax listed: [phone]  Date received: 8/15/23   Form description:  Need med list & history or physical signed and faxed to Tessy @ [phone] by 2:00 tomorrow.  Once forms are completed, please return to "[employer] via fax.  Is patient requesting to be contacted when forms are completed: na  Phone: na  Form placed:  Dr. Renetta Michelle

## 2023-08-15 NOTE — TELEPHONE ENCOUNTER
Attempted to call patient and emergency contact - Rea- no answer; VM box is full    Please recall and assist with scheduling a hospital follow up for tomorrow or an appointment with PCP on Friday (see below)    Molly Cuellar RN  [facility]

## 2023-08-16 ENCOUNTER — OFFICE VISIT (OUTPATIENT)
Dept: FAMILY MEDICINE | Facility: CLINIC | Age: 83
End: 2023-08-16
Payer: MEDICARE

## 2023-08-16 VITALS
OXYGEN SATURATION: 99 % | SYSTOLIC BLOOD PRESSURE: 124 MMHG | DIASTOLIC BLOOD PRESSURE: 40 MMHG | HEIGHT: 65 IN | TEMPERATURE: 97 F | RESPIRATION RATE: 20 BRPM | BODY MASS INDEX: 34.62 KG/M2 | HEART RATE: 86 BPM | WEIGHT: 207.8 LBS

## 2023-08-16 DIAGNOSIS — E11.65 TYPE 2 DIABETES MELLITUS WITH HYPERGLYCEMIA, WITHOUT LONG-TERM CURRENT USE OF INSULIN (H): ICD-10-CM

## 2023-08-16 DIAGNOSIS — C22.0 HEPATOCELLULAR CARCINOMA (H): ICD-10-CM

## 2023-08-16 DIAGNOSIS — I50.32 CHRONIC DIASTOLIC HEART FAILURE (H): Primary | ICD-10-CM

## 2023-08-16 PROCEDURE — 99213 OFFICE O/P EST LOW 20 MIN: CPT | Performed by: PHYSICIAN ASSISTANT

## 2023-08-16 ASSESSMENT — PAIN SCALES - GENERAL: PAINLEVEL: NO PAIN (0)

## 2023-08-16 NOTE — PROGRESS NOTES
Assessment & Plan   Problem List Items Addressed This Visit          Digestive    Hepatocellular carcinoma (H)       Endocrine    Diabetes mellitus, type 2 (H)       Circulatory    Chronic diastolic heart failure (H) - Primary      Daniel is a pleasant 82-year-old male here to discuss his medication list.  Medication reconciliation performed based on his discharge summary, visits since his discharge as well as history provided by his Sister Rea who is here.  It is a bit too early to recheck his A1c or electrolytes and he has follow-up with his primary care provider in roughly 2 months as well as with dermatology and gastroenterology in the near future. Follow up with me prn.    Complete history and physical exam as below. Afebrile with normal vital signs.    DDx and Dx discussed with and explained to the pt to their satisfaction.  All questions were answered at this time. Pt expressed understanding of and agreement with this dx, tx, and plan. No further workup warranted and standard medication warnings given. I have given the patient a list of pertinent indications for re-evaluation. Will go to the Emergency Department if symptoms worsen or new concerning symptoms arise. Patient left in no apparent distress.     Prescription drug management  28 minutes spent by me on the date of the encounter doing chart review, history and exam, documentation and further activities per the note     MED REC REQUIRED  Post Medication Reconciliation Status: discharge medications reconciled and changed, per note/orders  See Patient Instructions    BEAR Weems  [facility] BRIAN Sanchez is a 82 year old, presenting for the following health issues:  Hospital F/U        8/16/2023    10:28 AM   Additional Questions   Roomed by yaneth mendoza   Accompanied by Sister         8/16/2023    10:28 AM   Patient Reported Additional Medications   Patient reports taking the following new medications none       HPI        Hospital Follow-up Visit:    Hospital/Nursing Home/IP Rehab Facility:  [facility]  Date of Admission: 7/23, 7/31/23  Date of Discharge: 7/31/23  Reason(s) for Admission: radiation treatment for cancer    Was your hospitalization related to COVID-19? No   Problems taking medications regularly:  None  Medication changes since discharge: got rid of the nausea and Oxycodone  Problems adhering to non-medication therapy:  None    Hospital Follow-up Visit:    Hospital/Nursing Home/IP Rehab Facility: [facility]  Date of Admission: beginning of 7/2023  Date of Discharge: 7/20/23  Reason(s) for Admission: low heart rate, confusion, panic attack  Metoprolol was discontinued at the last ER visit. Recently finished Flagyl course post liver CA treatment.   Was your hospitalization related to COVID-19? No   Problems taking medications regularly:  None  Medication changes since discharge: they took away the Metoprolol  Problems adhering to non-medication therapy:  None      Needs the doctor to sign a physical, medication list and a history to get into assisted living.  He currently lives with his sister, but they are hoping that he can move to assisted living in the near future. No complaints today.    Summary of hospitalization:  [facility] information obtained and reviewed.  Daniel was hospitalized in mid July of this year for pneumonia as well as bradycardia thought due to beta-blocker use.  He finished antibiotics and his symptoms resolved.  He is also being treated for liver cancer and had a visit for this in late July.  He has finished postprocedural antibiotics for this as well.  Diagnostic Tests/Treatments reviewed.  Follow up needed: primary in 2 months  Other Healthcare Providers Involved in Patient s Care:         Specialist appointment - cardiology and GI  appts upcoming.   Update since discharge: improved. Plan of care communicated with patient and family       Review of Systems   Constitutional,  "HEENT, cardiovascular, pulmonary, gi and gu systems are negative, except as otherwise noted.      Objective    /40   Pulse 86   Temp 97  F (36.1  C) (Temporal)   Resp 20   Ht 1.656 m (5' 5.2\")   Wt 94.3 kg (207 lb 12.8 oz)   SpO2 99%   BMI 34.37 kg/m    Body mass index is 34.37 kg/m .  Physical Exam  Vitals and nursing note reviewed.   Constitutional:       General: He is not in acute distress.     Appearance: Normal appearance. He is not ill-appearing or diaphoretic.   HENT:      Head: Normocephalic and atraumatic.      Nose: Nose normal.      Mouth/Throat:      Mouth: Mucous membranes are moist.   Eyes:      Conjunctiva/sclera: Conjunctivae normal.   Cardiovascular:      Rate and Rhythm: Normal rate and regular rhythm.      Heart sounds: Normal heart sounds. No murmur heard.     No friction rub. No gallop.   Pulmonary:      Effort: Pulmonary effort is normal. No respiratory distress.      Breath sounds: Normal breath sounds. No stridor. No wheezing, rhonchi or rales.   Abdominal:      General: Bowel sounds are normal. There is no distension.      Palpations: Abdomen is soft. There is no mass.      Tenderness: There is no abdominal tenderness. There is no guarding or rebound.      Hernia: No hernia is present.   Skin:     General: Skin is warm and dry.      Coloration: Skin is not jaundiced or pale.   Neurological:      General: No focal deficit present.      Mental Status: He is alert. Mental status is at baseline.   Psychiatric:         Mood and Affect: Mood normal.         Behavior: Behavior normal.                            "

## 2023-08-17 ENCOUNTER — MEDICAL CORRESPONDENCE (OUTPATIENT)
Dept: HEALTH INFORMATION MANAGEMENT | Facility: CLINIC | Age: 83
End: 2023-08-17
Payer: MEDICARE

## 2023-08-17 ENCOUNTER — TELEPHONE (OUTPATIENT)
Dept: FAMILY MEDICINE | Facility: CLINIC | Age: 83
End: 2023-08-17
Payer: MEDICARE

## 2023-08-17 NOTE — TELEPHONE ENCOUNTER
Forms received from: [employer]   Phone number listed: [phone]   Fax listed: [phone]  Date received: 8/15/23  Form description: Discharge summary ID # [id number] & OT ID # [id number]  Once forms are completed, please return to [employer] via fax.  Is patient requesting to be contacted when forms are completed: na  Phone: na  Form placed:  Dr. Renetta Michelle

## 2023-08-22 ENCOUNTER — TELEPHONE (OUTPATIENT)
Dept: RADIOLOGY | Facility: CLINIC | Age: 83
End: 2023-08-22
Payer: MEDICARE

## 2023-08-22 DIAGNOSIS — F41.9 ANXIETY: Primary | ICD-10-CM

## 2023-08-25 RX ORDER — DIAZEPAM 5 MG
TABLET ORAL
Qty: 1 TABLET | Refills: 0 | Status: SHIPPED | OUTPATIENT
Start: 2023-08-25 | End: 2023-10-10

## 2023-08-27 NOTE — DISCHARGE INSTRUCTIONS
Anesthesia   Same Day Surgery Discharge Instructions  Special Precautions After Surgery - Adult    1. It is not unusual to feel lightheaded or faint, up to 24 hours after surgery or while taking pain medication.  If you have these symptoms; sit for a few minutes before standing and have someone assist you when getting up.  2. You should rest and relax for the next 24 hours and must have someone stay with you for at least 24 hours after your discharge.  3. DO NOT DRIVE any vehicle or operate mechanical equipment for 24 hours following the end of your surgery.  DO NOT DRIVE while taking narcotic pain medications that have been prescribed by your physician.  If you had a limb operated on, you must be able to use it fully to drive.  4. DO NOT drink alcoholic beverages for 24 hours following surgery or while taking prescription pain medication.  5. Drink clear liquids (apple juice, ginger ale, broth, 7-Up, etc.).  Progress to your regular diet as you feel able.  6. Any questions call your physician and do not make important decisions for 24 hours.    __________________________________________________________________________________________________________________________________  IMPORTANT NUMBERS:    [facility] Main Number:  [phone], [phone]  Pharmacy:  [phone]  Same Day Surgery:  [phone], Monday - Friday until 8:30 p.m.  Urgent Care:  [phone]  Emergency Room:  [phone]      [facility]:  [phone]                                                                             [facility]:  [phone] option 1  [facility]:  [phone]     [facility] Clinic:  [phone]   [facility]ty Clinic:  [phone]   Home Medical Equipment: [phone]  [facility]:  [phone]      
No indicators present

## 2023-09-14 ENCOUNTER — OFFICE VISIT (OUTPATIENT)
Dept: CARDIOLOGY | Facility: CLINIC | Age: 83
End: 2023-09-14
Attending: INTERNAL MEDICINE
Payer: MEDICARE

## 2023-09-14 VITALS
BODY MASS INDEX: 34.9 KG/M2 | HEART RATE: 76 BPM | SYSTOLIC BLOOD PRESSURE: 109 MMHG | WEIGHT: 211 LBS | DIASTOLIC BLOOD PRESSURE: 56 MMHG | OXYGEN SATURATION: 95 %

## 2023-09-14 DIAGNOSIS — E78.5 HYPERLIPIDEMIA LDL GOAL <70: ICD-10-CM

## 2023-09-14 DIAGNOSIS — I50.32 CHRONIC HEART FAILURE WITH PRESERVED EJECTION FRACTION (H): ICD-10-CM

## 2023-09-14 DIAGNOSIS — R60.1 GENERALIZED EDEMA: ICD-10-CM

## 2023-09-14 DIAGNOSIS — I50.32 CHRONIC DIASTOLIC HEART FAILURE (H): ICD-10-CM

## 2023-09-14 DIAGNOSIS — I10 BENIGN ESSENTIAL HYPERTENSION: ICD-10-CM

## 2023-09-14 DIAGNOSIS — I25.10 CORONARY ARTERY DISEASE INVOLVING NATIVE CORONARY ARTERY OF NATIVE HEART WITHOUT ANGINA PECTORIS: Primary | ICD-10-CM

## 2023-09-14 PROCEDURE — 99214 OFFICE O/P EST MOD 30 MIN: CPT | Performed by: NURSE PRACTITIONER

## 2023-09-14 NOTE — PATIENT INSTRUCTIONS
Medication Changes:  None     Recommendations:  Check blood pressure at least 1 hour after medications. Call the clinic if your blood pressure is consistently greater than 130/80.   Check daily weights and call the clinic if your weight has increased more than 2 lbs in one day or 5 lbs in one week; if you feel more short of breath or have worsening swelling in your legs or abdomen.    Follow-up:  Cardiology follow up at [facility]: Dr. Tate in 6 months.   Annual fasting lab in 2 months (lipid/ALT)  Call 6 months prior, to schedule.     Cardiology Scheduling~[phone]  Cardiology Clinic RN~[phone] (Brittny RN, Barby RN)

## 2023-09-14 NOTE — PROGRESS NOTES
Cardiology Clinic Progress Note  Flash Brown MRN# [id number]   YOB: 1940 Age: 82 year old      Primary Cardiologist:   Dr. Tate          History of Presenting Illness:      Flash Brown is a pleasant 82 year old patient with a past cardiac history significant for   CAD  2010 with LUDIVINA to proximal to mid RCA, distal circumflex, proximal and mid LAD  Angiogram 2011 (recurrent chest pain) 70% pLCx with negative FFR so no intervention  2015 stress test with moderate ischemia-no angio med mangaged   Chronic diastolic heart failure  Onset 8/2022  H/o untreated sleep apnea  Hypertension  Hyperlipidemia  Past medical history significant for diabetes, hepatocellular carcinoma 6/2023 s/p radiation, Hodgkin's lymphoma 1983 in remission , CKD, depression, ALLY not CPAP compliant.      echocardiogram 8/12/2022 showed normal EF 60 to 65% with normal RV, no significant valvular disease.     Patient was seen by Dr. Tate in March 2023 for routine follow-up.  Dry weight was noted to be 228 to 230 pounds.  Patient was doing well from a cardiac standpoint.    Patient presents today for 6-month follow-up. Most recent lipid profile, BMP, ALT reviewed today.  His sister notes that he was given metoprolol during hospital stay and had significant reaction.  He developed difficulty breathing and significant bradycardia.  He also had confusion.  I have added this to his allergy list.  Blood pressure today is well controlled.  Weight is down 18 pounds in the last 6 months to 211 lbs.  He denies any heart failure symptoms.   He uses a walker to get around and can walk 200 yards without any anginal symptoms.  He does not get any routine exercise. Patient reports no chest pain, shortness of breath, PND, orthopnea, presyncope, syncope, heart racing, or palpitations.                         Assessment and Plan:       Plan  Patient Instructions   Medication Changes:  None     Recommendations:  Check blood pressure at least 1  hour after medications. Call the clinic if your blood pressure is consistently greater than 130/80.   Check daily weights and call the clinic if your weight has increased more than 2 lbs in one day or 5 lbs in one week; if you feel more short of breath or have worsening swelling in your legs or abdomen.    Follow-up:  Cardiology follow up at [facility]: Dr. Tate in 6 months.   Annual fasting lab in 2 months (lipid/ALT)  Call 6 months prior, to schedule.     Cardiology Scheduling~[phone]  Cardiology Clinic RN~[phone] (Brittny RN, Barby RN)               Coronary artery disease involving native coronary artery of native heart without angina pectoris  Chronic heart failure with preserved ejection fraction (H)  Benign essential hypertension  Hyperlipidemia LDL goal <70  Chronic diastolic heart failure (H)  Generalized edema      CAD  No angina   Continue GDMT  If angina in the future, consider sending straight for cath given known LCx disease        Chronic diastolic heart failure  Mild edema stable  NYHA class II  Continue GDMT Lasix        Hypertension  Controlled   Continue current medication  Consider restarting amlodipine, carvedilol, clonidine, hydralazine if needed        Hyperlipidemia  Lipids controlled  Continue statin         Respiratory:  clear to auscultation; normal symmetry        Cardiac: regular rate and rhythm     GI:  nondistended     Extremities and Muscular Skeletal:  bilat LE edema 1+           Thank you for allowing me to participate in this delightful patient's care.      This note was completed in part using Dragon voice recognition software. Although reviewed after completion, some word and grammatical errors may occur.    Ute Apple, FIONA CNP

## 2023-09-14 NOTE — LETTER
9/14/2023    Thomas Jackson DO  [address]  [place] [address]    RE: Flash Brown       Dear Colleague,     I had the pleasure of seeing Flash Brown in the [facility].  Cardiology Clinic Progress Note  Flash Brown MRN# [id number]   YOB: 1940 Age: 82 year old      Primary Cardiologist:   Dr. Tate          History of Presenting Illness:      Flash Brown is a pleasant 82 year old patient with a past cardiac history significant for   CAD  2010 with LUDIVINA to proximal to mid RCA, distal circumflex, proximal and mid LAD  Angiogram 2011 (recurrent chest pain) 70% pLCx with negative FFR so no intervention  2015 stress test with moderate ischemia-no angio med mangaged   Chronic diastolic heart failure  Onset 8/2022  H/o untreated sleep apnea  Hypertension  Hyperlipidemia  Past medical history significant for diabetes, hepatocellular carcinoma 6/2023 s/p radiation, Hodgkin's lymphoma 1983 in remission , CKD, depression, ALLY not CPAP compliant.      echocardiogram 8/12/2022 showed normal EF 60 to 65% with normal RV, no significant valvular disease.     Patient was seen by Dr. Tate in March 2023 for routine follow-up.  Dry weight was noted to be 228 to 230 pounds.  Patient was doing well from a cardiac standpoint.    Patient presents today for 6-month follow-up. Most recent lipid profile, BMP, ALT reviewed today.  His sister notes that he was given metoprolol during hospital stay and had significant reaction.  He developed difficulty breathing and significant bradycardia.  He also had confusion.  I have added this to his allergy list.  Blood pressure today is well controlled.  Weight is down 18 pounds in the last 6 months to 211 lbs.  He denies any heart failure symptoms.   He uses a walker to get around and can walk 200 yards without any anginal symptoms.  He does not get any routine exercise. Patient reports no chest pain, shortness of breath, PND, orthopnea,  presyncope, syncope, heart racing, or palpitations.                         Assessment and Plan:       Plan  Patient Instructions   Medication Changes:  None     Recommendations:  Check blood pressure at least 1 hour after medications. Call the clinic if your blood pressure is consistently greater than 130/80.   Check daily weights and call the clinic if your weight has increased more than 2 lbs in one day or 5 lbs in one week; if you feel more short of breath or have worsening swelling in your legs or abdomen.    Follow-up:  Cardiology follow up at [facility]: Dr. Tate in 6 months.   Annual fasting lab in 2 months (lipid/ALT)  Call 6 months prior, to schedule.     Cardiology Scheduling~[phone]  Cardiology Clinic RN~[phone] (Brittny RN, Baryb RN)               Coronary artery disease involving native coronary artery of native heart without angina pectoris  Chronic heart failure with preserved ejection fraction (H)  Benign essential hypertension  Hyperlipidemia LDL goal <70  Chronic diastolic heart failure (H)  Generalized edema      CAD  No angina   Continue GDMT  If angina in the future, consider sending straight for cath given known LCx disease        Chronic diastolic heart failure  Mild edema stable  NYHA class II  Continue GDMT Lasix        Hypertension  Controlled   Continue current medication  Consider restarting amlodipine, carvedilol, clonidine, hydralazine if needed        Hyperlipidemia  Lipids controlled  Continue statin         Respiratory:  clear to auscultation; normal symmetry        Cardiac: regular rate and rhythm     GI:  nondistended     Extremities and Muscular Skeletal:  bilat LE edema 1+           Thank you for allowing me to participate in this delightful patient's care.      This note was completed in part using Dragon voice recognition software. Although reviewed after completion, some word and grammatical errors may occur.    Ute Apple, APRN  CNP        Thank you for allowing me to participate in the care of your patient.      Sincerely,     FIONA Che CNP     [facility]  cc:   Micah Tate MD  [address]  [place] [address]

## 2023-09-18 ENCOUNTER — LAB (OUTPATIENT)
Dept: LAB | Facility: CLINIC | Age: 83
End: 2023-09-18
Payer: MEDICARE

## 2023-09-18 ENCOUNTER — HOSPITAL ENCOUNTER (OUTPATIENT)
Dept: MRI IMAGING | Facility: CLINIC | Age: 83
Discharge: HOME | End: 2023-09-18
Attending: RADIOLOGY | Admitting: RADIOLOGY
Payer: MEDICARE

## 2023-09-18 DIAGNOSIS — C22.0 HEPATOCELLULAR CARCINOMA (H): ICD-10-CM

## 2023-09-18 LAB
AFP SERPL-MCNC: 10.2 NG/ML
ALBUMIN SERPL BCG-MCNC: 3.6 G/DL (ref 3.5–5.2)
ALP SERPL-CCNC: 123 U/L (ref 40–129)
ALT SERPL W P-5'-P-CCNC: 20 U/L (ref 0–70)
ANION GAP SERPL CALCULATED.3IONS-SCNC: 12 MMOL/L (ref 7–15)
AST SERPL W P-5'-P-CCNC: 30 U/L (ref 0–45)
BILIRUB DIRECT SERPL-MCNC: 0.44 MG/DL (ref 0–0.3)
BILIRUB SERPL-MCNC: 1.4 MG/DL
BUN SERPL-MCNC: 14.6 MG/DL (ref 8–23)
CALCIUM SERPL-MCNC: 9.1 MG/DL (ref 8.8–10.2)
CHLORIDE SERPL-SCNC: 102 MMOL/L (ref 98–107)
CREAT SERPL-MCNC: 0.83 MG/DL (ref 0.67–1.17)
DEPRECATED HCO3 PLAS-SCNC: 26 MMOL/L (ref 22–29)
EGFRCR SERPLBLD CKD-EPI 2021: 88 ML/MIN/1.73M2
ERYTHROCYTE [DISTWIDTH] IN BLOOD BY AUTOMATED COUNT: 16.3 % (ref 10–15)
GLUCOSE SERPL-MCNC: 214 MG/DL (ref 70–99)
HCT VFR BLD AUTO: 34.7 % (ref 40–53)
HGB BLD-MCNC: 11.3 G/DL (ref 13.3–17.7)
INR PPP: 1.19 (ref 0.85–1.15)
MCH RBC QN AUTO: 28.7 PG (ref 26.5–33)
MCHC RBC AUTO-ENTMCNC: 32.6 G/DL (ref 31.5–36.5)
MCV RBC AUTO: 88 FL (ref 78–100)
PLATELET # BLD AUTO: 88 10E3/UL (ref 150–450)
POTASSIUM SERPL-SCNC: 4.4 MMOL/L (ref 3.4–5.3)
PROT SERPL-MCNC: 6.6 G/DL (ref 6.4–8.3)
RBC # BLD AUTO: 3.94 10E6/UL (ref 4.4–5.9)
SODIUM SERPL-SCNC: 140 MMOL/L (ref 136–145)
WBC # BLD AUTO: 2.8 10E3/UL (ref 4–11)

## 2023-09-18 PROCEDURE — 36415 COLL VENOUS BLD VENIPUNCTURE: CPT

## 2023-09-18 PROCEDURE — 82248 BILIRUBIN DIRECT: CPT

## 2023-09-18 PROCEDURE — 85027 COMPLETE CBC AUTOMATED: CPT

## 2023-09-18 PROCEDURE — 85610 PROTHROMBIN TIME: CPT

## 2023-09-18 PROCEDURE — 258N000003 HC RX IP 258 OP 636: Performed by: RADIOLOGY

## 2023-09-18 PROCEDURE — 74183 MRI ABD W/O CNTR FLWD CNTR: CPT | Mod: MG

## 2023-09-18 PROCEDURE — 80053 COMPREHEN METABOLIC PANEL: CPT

## 2023-09-18 PROCEDURE — 82105 ALPHA-FETOPROTEIN SERUM: CPT

## 2023-09-18 PROCEDURE — A9585 GADOBUTROL INJECTION: HCPCS | Performed by: RADIOLOGY

## 2023-09-18 PROCEDURE — 255N000002 HC RX 255 OP 636: Performed by: RADIOLOGY

## 2023-09-18 RX ORDER — GADOBUTROL 604.72 MG/ML
9.5 INJECTION INTRAVENOUS ONCE
Status: COMPLETED | OUTPATIENT
Start: 2023-09-18 | End: 2023-09-18

## 2023-09-18 RX ADMIN — SODIUM CHLORIDE 50 ML: 9 INJECTION, SOLUTION INTRAVENOUS at 14:09

## 2023-09-18 RX ADMIN — GADOBUTROL 9.5 ML: 604.72 INJECTION INTRAVENOUS at 14:09

## 2023-09-19 NOTE — PROGRESS NOTES
Interventional Radiology Clinic Visit    Date of this visit: 9/22/2023    Flash Brown returns for follow up post radioembolization.    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    On surveillance of his chronic liver disease he was found to have a mass in the left hepatic lobe and percutaneous liver lesion biopsy confirmed moderately differentiated HCC.  On 7/31/2023 we treated it with radioembolization. 400 Gy was administered selectively to the tumor with very good uptake. He presents for his first post treatment follow-up today.    Today he states he feels well and did well after the procedure. Did not have any fevers, abdominal pain, nausea or vomiting. Feels like he is back to baseline. No current concerns.      Review of Systems:    As above    Past Medical/Surgical History:    Past Medical History:   Diagnosis Date    Basal cell carcinoma     CAD (coronary artery disease)     Depression     Diabetes (H)     Hyperlipidemia     Hypertension     Lymphoma (H)     Malignant melanoma (H)     Melanoma (H)     Squamous cell carcinoma      Past Surgical History:   Procedure Laterality Date    AXILLARY SURGERY      S/P resection and adjuvant radiation    BONE MARROW BIOPSY, BONE SPECIMEN, NEEDLE/TROCAR N/A 7/8/2019    Procedure: BIOPSY, BONE MARROW;  Surgeon: Husam Issa MD;  Location: [facility]    BONE MARROW BIOPSY, BONE SPECIMEN, NEEDLE/TROCAR Left 2/24/2022    Procedure: BIOPSY, BONE MARROW;  Surgeon: Cailin Anthony PA-C;  Location: [facility] OR    CARDIAC SURGERY      CHOLECYSTECTOMY      HERNIA REPAIR, UMBILICAL      IR LIVER BIOPSY PERCUTANEOUS  6/2/2023    IR SIRT (SELECTIVE INTERNAL  RADIO THERAPY)  7/26/2023    IR VISCERAL ANGIOGRAM  7/26/2023    IR VISCERAL EMBOLIZATION  7/31/2023    PHACOEMULSIFICATION WITH STANDARD INTRAOCULAR LENS IMPLANT Right 10/2/2019    Procedure: Cataract Removal with Implant;  Surgeon: Dinesh Ivey MD;  Location: [facility]    PHACOEMULSIFICATION WITH STANDARD INTRAOCULAR LENS IMPLANT Left 10/21/2019    Procedure: Cataract Removal with Implant;  Surgeon: Dinesh Ivey MD;  Location: [facility]    STENT      PTCA with drug-eluting stent of RCA, circumflex, and LAD    VASCULAR SURGERY         Current Medications:    Current Outpatient Medications   Medication Sig Dispense Refill    aspirin (ASA) 81 MG EC tablet Take 81 mg by mouth daily      atorvastatin (LIPITOR) 20 MG tablet Take 1 tablet (20 mg) by mouth daily 90 tablet 3    diazepam (VALIUM) 5 MG tablet Take one tablet 30 minutes prior to MRI. (Patient not taking: Reported on 9/14/2023) 1 tablet 0    ferrous sulfate (FE TABS) 325 (65 Fe) MG EC tablet Take 1 tablet (325 mg) by mouth daily 90 tablet 0    furosemide (LASIX) 20 MG tablet Take 1 tablet (20 mg) by mouth every morning AND 1 tablet (20 mg) daily at 2 pm. 180 tablet 3    glipiZIDE (GLUCOTROL XL) 10 MG 24 hr tablet Take 1 tablet (10 mg) by mouth daily 90 tablet 3    lidocaine (XYLOCAINE) 5 % external ointment Apply topically 4 times daily as needed for moderate pain 50 g 1    magnesium oxide (MAG-OX) 400 MG tablet Take 1 tablet (400 mg) by mouth 2 times daily 60 tablet 1    metFORMIN (GLUCOPHAGE) 500 MG tablet Take 1 tablet (500 mg) by mouth 2 times daily (with meals) 180 tablet 3    nitroGLYcerin (NITROSTAT) 0.4 MG sublingual tablet Place 1 tablet (0.4 mg) under the tongue See Admin Instructions for chest pain 30 tablet 0    ORDER FOR DME Light Therapy with Full Spectrum Light (18539 lux) Start with 10-15 minute exposure per day, Increase exposure to 30 to 45 minutes per day, Maximum exposure: 90 minutes per day,Look periodically at light during  each session  1 0    ramipril (ALTACE) 5 MG capsule Take 1 capsule (5 mg) by mouth daily 90 capsule 3    tamsulosin (FLOMAX) 0.4 MG capsule Take 1 capsule (0.4 mg) by mouth every evening 90 capsule 3    triamcinolone (KENALOG) 0.1 % external cream Twice daily to legs prn itching 454 g 3       Allergies:    Iodine, Iodinated contrast media, and Metoprolol    Family History:    Family History   Problem Relation Age of Onset    Asthma Other     Cancer Other         melanoma    Blood Disease Other         bleeding disorder    Lung Cancer Mother         Smoker    Prostate Cancer Father     Melanoma No family hx of        Social History:    Social History     Socioeconomic History    Marital status: Single    Number of children: 0   Occupational History    Occupation: Retired     Comment: [employer]    Tobacco Use    Smoking status: Never     Passive exposure: Never    Smokeless tobacco: Never   Vaping Use    Vaping Use: Never used   Substance and Sexual Activity    Alcohol use: Yes     Comment: glass of wine couple times per week    Drug use: Never     Social Determinants of Health     Financial Resource Strain: Low Risk  (11/9/2021)    Overall Financial Resource Strain (CARDIA)     Difficulty of Paying Living Expenses: Not hard at all   Food Insecurity: No Food Insecurity (11/9/2021)    Hunger Vital Sign     Worried About Running Out of Food in the Last Year: Never true     Ran Out of Food in the Last Year: Never true   Transportation Needs: No Transportation Needs (11/9/2021)    PRAPARE - Transportation     Lack of Transportation (Medical): No     Lack of Transportation (Non-Medical): No   Physical Activity: Inactive (11/9/2021)    Exercise Vital Sign     Days of Exercise per Week: 0 days     Minutes of Exercise per Session: 0 min   Stress: No Stress Concern Present (11/9/2021)    [employer] - Occupational Stress Questionnaire     Feeling of Stress : Not at all   Social  Connections: Socially Isolated (11/9/2021)    Social Connection and Isolation Panel [NHANES]     Frequency of Communication with Friends and Family: Once a week     Frequency of Social Gatherings with Friends and Family: Once a week     Attends [employer] Services: More than 4 times per year     Active Member of Clubs or Organizations: No     Marital Status: Never    Housing Stability: Low Risk  (11/9/2021)    Housing Stability Vital Sign     Unable to Pay for Housing in the Last Year: No     Number of Places Lived in the Last Year: 1     Unstable Housing in the Last Year: No       Physical Exam:    CONSTITUTIONAL: healthy, alert and no distress.  PSYCHIATRIC: mentation appears normal and affect normal.  NEURO: Normal movements and speech.  EYES: No jaundice or pallor.  SKIN: No jaundice.   RESP: No audible cough or wheeze.      Laboratory Studies:    Lab Results   Component Value Date    HGB 11.3 09/18/2023    HGB 12.0 01/04/2021     Lab Results   Component Value Date    PLT 88 09/18/2023    PLT 92 01/04/2021     Lab Results   Component Value Date    WBC 2.8 09/18/2023    WBC 3.2 01/04/2021       Lab Results   Component Value Date    INR 1.19 09/18/2023       Lab Results   Component Value Date    PROTTOTAL 6.6 09/18/2023    PROTTOTAL 6.7 04/23/2021      Lab Results   Component Value Date    ALBUMIN 3.6 09/18/2023    ALBUMIN 3.3 04/25/2023    ALBUMIN 3.1 04/23/2021     Lab Results   Component Value Date    BILITOTAL 1.4 09/18/2023    BILITOTAL 0.9 04/23/2021     Lab Results   Component Value Date    ALKPHOS 123 09/18/2023    ALKPHOS 114 04/23/2021     Lab Results   Component Value Date    AST 30 09/18/2023    AST 18 04/23/2021     Lab Results   Component Value Date    ALT 20 09/18/2023    ALT 25 04/23/2021       Lab Results   Component Value Date    CR 0.83 09/18/2023    CR 1.04 04/23/2021     Lab Results   Component Value Date    BUN 14.6 09/18/2023    BUN 12 04/25/2023    BUN 17 04/23/2021       AFP 9/18/23  = 10.2  AFP 7/13/23 = 5.2      Imaging:     I personally reviewed and interpreted the MRI abdomen from 9/18/23. It shows expected early post treatment changes with small amount of enhancement anteriorly in the tumor. The surrounding parenchyma in segment 2/3 also demonstrates post treatment changes. LIRADS 3 lesions are unchanged in the untreated right hepatic lobe.    ASSESSMENT/PLAN:      Flash Brown is a 82 year old male with DEWITT cirrhosis and HCC who is one month post radioembolization and made a good recovery. Follow up imaging shows expected early post treatment changes and decreased enhancement of the tumor. He also has some stable LIRADS 3 lesions that we will also monitor. Unfortunately the formal radiology read of the MRI does not mention that he had Y90 treatment and instead describes disease progression adjacent to the lesion, so it appears they did not see that he had just had treatment and that the changes around the lesion are post radiation changes in the adjacent parenchyma. I will have our own radiologists take a look and if they agree that these are post treatment changes, I will see him again in 3 months with another liver MRI and CT chest. If they have any concern, I will let him know. I discussed all of this with him and he agreed with this plan.    Update: We discussed this patient at tumor conference today and our radiologists agreed that the appearance is consistent with posttreatment changes rather than tumor progression.  The LI-RADS 3 lesion may have subtle washout.  We will follow the patient with MRI in a few months as planned.      It was a pleasure seeing the patient.     SignedDayna M.D.    Interventional Radiology  Department of Radiology  [facility]      CC  Patient Care Team:  Thomas Jackson DO as PCP - General (Family Practice)  Thomas Jackson DO as Assigned PCP  Sang Avendaño MD as Assigned Cancer Care  Provider  Dinesh Roque MD as MD (Dermatology)  Leventhal, Thomas Michael, MD as MD (Gastroenterology)  Dinesh Roque MD as Assigned Surgical Provider  Leventhal, Thomas Michael, MD as Assigned Gastroenterology Provider  Micah Tate MD as Assigned Heart and Vascular Provider  Thomas Jackson DO as Assigned Pain Medication Provider  Ute Suazo APRN CNP as Nurse Practitioner (Cardiovascular Disease)  SELF, REFERRED           25 minutes spent by me on the date of the encounter doing chart review, history and exam, imaging review, documentation and further activities per the note.      Video-Visit Details     Type of service:  Video Visit     Video Start and End Time: 8:37 - 8:45 AM    Originating Location (pt. Location): Home     Distant Location (provider location):  [facility] [place]      Platform used for Video Visit: Newsy

## 2023-09-20 ENCOUNTER — HOSPITAL ENCOUNTER (INPATIENT)
Dept: GENERAL RADIOLOGY | Facility: CLINIC | Age: 83
Discharge: HOME | End: 2023-09-20
Attending: RADIOLOGY
Payer: MEDICARE

## 2023-09-20 DIAGNOSIS — C22.0 HEPATOCELLULAR CARCINOMA (H): ICD-10-CM

## 2023-09-20 DIAGNOSIS — C22.0 HEPATOCELLULAR CARCINOMA (H): Primary | ICD-10-CM

## 2023-09-20 PROCEDURE — 74183 MRI ABD W/O CNTR FLWD CNTR: CPT | Mod: 26 | Performed by: STUDENT IN AN ORGANIZED HEALTH CARE EDUCATION/TRAINING PROGRAM

## 2023-09-20 NOTE — PROGRESS NOTES
Date: 9/20/2023    Time of Call: 10:07 AM     Diagnosis:  HCC     [ VORB ] Ordering provider: Dr Dayna Osorio  Order: MRI Outside Read     Order received by: Mami Bruner LPN       Follow-up/additional notes:

## 2023-09-22 ENCOUNTER — VIRTUAL VISIT (OUTPATIENT)
Dept: RADIOLOGY | Facility: CLINIC | Age: 83
End: 2023-09-22
Attending: RADIOLOGY
Payer: MEDICARE

## 2023-09-22 VITALS — WEIGHT: 211 LBS | BODY MASS INDEX: 33.91 KG/M2 | HEIGHT: 66 IN

## 2023-09-22 DIAGNOSIS — C22.0 HEPATOCELLULAR CARCINOMA (H): Primary | ICD-10-CM

## 2023-09-22 PROCEDURE — 99213 OFFICE O/P EST LOW 20 MIN: CPT | Mod: 95 | Performed by: RADIOLOGY

## 2023-09-22 NOTE — NURSING NOTE
Is the patient currently in the state of [place]? YES    Visit mode:VIDEO    If the visit is dropped, the patient can be reconnected by: VIDEO VISIT: Send to e-mail at: [email or web address]    Will anyone else be joining the visit? NO  (If patient encounters technical issues they should call [phone])    How would you like to obtain your AVS? MyChart    Are changes needed to the allergy or medication list? No    Reason for visit: Video Visit    Zena WHALEN

## 2023-09-22 NOTE — LETTER
9/22/2023         RE: Flash Brown  [address]  [facility] [address]        Dear Colleague,    Thank you for referring your patient, Flash Brown, to the [facility]. Please see a copy of my visit note below.        Interventional Radiology Clinic Visit    Date of this visit: 9/22/2023    Flash Brown returns for follow up post radioembolization.    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    On surveillance of his chronic liver disease he was found to have a mass in the left hepatic lobe and percutaneous liver lesion biopsy confirmed moderately differentiated HCC.  On 7/31/2023 we treated it with radioembolization. 400 Gy was administered selectively to the tumor with very good uptake. He presents for his first post treatment follow-up today.    Today he states he feels well and did well after the procedure. Did not have any fevers, abdominal pain, nausea or vomiting. Feels like he is back to baseline. No current concerns.      Review of Systems:    As above    Past Medical/Surgical History:    Past Medical History:   Diagnosis Date    Basal cell carcinoma     CAD (coronary artery disease)     Depression     Diabetes (H)     Hyperlipidemia     Hypertension     Lymphoma (H)     Malignant melanoma (H)     Melanoma (H)     Squamous cell carcinoma      Past Surgical History:   Procedure Laterality Date    AXILLARY SURGERY      S/P resection and adjuvant radiation    BONE MARROW BIOPSY, BONE SPECIMEN, NEEDLE/TROCAR N/A 7/8/2019    Procedure: BIOPSY, BONE MARROW;  Surgeon: Husam Issa MD;  Location: [facility]    BONE MARROW BIOPSY, BONE SPECIMEN,  NEEDLE/TROCAR Left 2/24/2022    Procedure: BIOPSY, BONE MARROW;  Surgeon: Cailin Anthony PA-C;  Location: [facility] OR    CARDIAC SURGERY      CHOLECYSTECTOMY      HERNIA REPAIR, UMBILICAL      IR LIVER BIOPSY PERCUTANEOUS  6/2/2023    IR SIRT (SELECTIVE INTERNAL RADIO THERAPY)  7/26/2023    IR VISCERAL ANGIOGRAM  7/26/2023    IR VISCERAL EMBOLIZATION  7/31/2023    PHACOEMULSIFICATION WITH STANDARD INTRAOCULAR LENS IMPLANT Right 10/2/2019    Procedure: Cataract Removal with Implant;  Surgeon: Dinesh Ivey MD;  Location: [facility] OR    PHACOEMULSIFICATION WITH STANDARD INTRAOCULAR LENS IMPLANT Left 10/21/2019    Procedure: Cataract Removal with Implant;  Surgeon: Dinesh Ivey MD;  Location: [facility] OR    STENT      PTCA with drug-eluting stent of RCA, circumflex, and LAD    VASCULAR SURGERY         Current Medications:    Current Outpatient Medications   Medication Sig Dispense Refill    aspirin (ASA) 81 MG EC tablet Take 81 mg by mouth daily      atorvastatin (LIPITOR) 20 MG tablet Take 1 tablet (20 mg) by mouth daily 90 tablet 3    diazepam (VALIUM) 5 MG tablet Take one tablet 30 minutes prior to MRI. (Patient not taking: Reported on 9/14/2023) 1 tablet 0    ferrous sulfate (FE TABS) 325 (65 Fe) MG EC tablet Take 1 tablet (325 mg) by mouth daily 90 tablet 0    furosemide (LASIX) 20 MG tablet Take 1 tablet (20 mg) by mouth every morning AND 1 tablet (20 mg) daily at 2 pm. 180 tablet 3    glipiZIDE (GLUCOTROL XL) 10 MG 24 hr tablet Take 1 tablet (10 mg) by mouth daily 90 tablet 3    lidocaine (XYLOCAINE) 5 % external ointment Apply topically 4 times daily as needed for moderate pain 50 g 1    magnesium oxide (MAG-OX) 400 MG tablet Take 1 tablet (400 mg) by mouth 2 times daily 60 tablet 1    metFORMIN (GLUCOPHAGE) 500 MG tablet Take 1 tablet (500 mg) by mouth 2 times daily (with meals) 180 tablet 3    nitroGLYcerin (NITROSTAT) 0.4 MG sublingual tablet Place 1 tablet (0.4 mg) under the tongue See Admin  Instructions for chest pain 30 tablet 0    ORDER FOR DME Light Therapy with Full Spectrum Light (62999 lux) Start with 10-15 minute exposure per day, Increase exposure to 30 to 45 minutes per day, Maximum exposure: 90 minutes per day,Look periodically at light during each session  1 0    ramipril (ALTACE) 5 MG capsule Take 1 capsule (5 mg) by mouth daily 90 capsule 3    tamsulosin (FLOMAX) 0.4 MG capsule Take 1 capsule (0.4 mg) by mouth every evening 90 capsule 3    triamcinolone (KENALOG) 0.1 % external cream Twice daily to legs prn itching 454 g 3       Allergies:    Iodine, Iodinated contrast media, and Metoprolol    Family History:    Family History   Problem Relation Age of Onset    Asthma Other     Cancer Other         melanoma    Blood Disease Other         bleeding disorder    Lung Cancer Mother         Smoker    Prostate Cancer Father     Melanoma No family hx of        Social History:    Social History     Socioeconomic History    Marital status: Single    Number of children: 0   Occupational History    Occupation: Retired     Comment: [employer]    Tobacco Use    Smoking status: Never     Passive exposure: Never    Smokeless tobacco: Never   Vaping Use    Vaping Use: Never used   Substance and Sexual Activity    Alcohol use: Yes     Comment: glass of wine couple times per week    Drug use: Never     Social Determinants of Health     Financial Resource Strain: Low Risk  (11/9/2021)    Overall Financial Resource Strain (CARDIA)     Difficulty of Paying Living Expenses: Not hard at all   Food Insecurity: No Food Insecurity (11/9/2021)    Hunger Vital Sign     Worried About Running Out of Food in the Last Year: Never true     Ran Out of Food in the Last Year: Never true   Transportation Needs: No Transportation Needs (11/9/2021)    PRAPARE - Transportation     Lack of Transportation (Medical): No     Lack of Transportation (Non-Medical): No   Physical Activity: Inactive (11/9/2021)    Exercise  Vital Sign     Days of Exercise per Week: 0 days     Minutes of Exercise per Session: 0 min   Stress: No Stress Concern Present (11/9/2021)    [employer] - Occupational Stress Questionnaire     Feeling of Stress : Not at all   Social Connections: Socially Isolated (11/9/2021)    Social Connection and Isolation Panel [NHANES]     Frequency of Communication with Friends and Family: Once a week     Frequency of Social Gatherings with Friends and Family: Once a week     Attends Spiritism Services: More than 4 times per year     Active Member of Clubs or Organizations: No     Marital Status: Never    Housing Stability: Low Risk  (11/9/2021)    Housing Stability Vital Sign     Unable to Pay for Housing in the Last Year: No     Number of Places Lived in the Last Year: 1     Unstable Housing in the Last Year: No       Physical Exam:    CONSTITUTIONAL: healthy, alert and no distress.  PSYCHIATRIC: mentation appears normal and affect normal.  NEURO: Normal movements and speech.  EYES: No jaundice or pallor.  SKIN: No jaundice.   RESP: No audible cough or wheeze.      Laboratory Studies:    Lab Results   Component Value Date    HGB 11.3 09/18/2023    HGB 12.0 01/04/2021     Lab Results   Component Value Date    PLT 88 09/18/2023    PLT 92 01/04/2021     Lab Results   Component Value Date    WBC 2.8 09/18/2023    WBC 3.2 01/04/2021       Lab Results   Component Value Date    INR 1.19 09/18/2023       Lab Results   Component Value Date    PROTTOTAL 6.6 09/18/2023    PROTTOTAL 6.7 04/23/2021      Lab Results   Component Value Date    ALBUMIN 3.6 09/18/2023    ALBUMIN 3.3 04/25/2023    ALBUMIN 3.1 04/23/2021     Lab Results   Component Value Date    BILITOTAL 1.4 09/18/2023    BILITOTAL 0.9 04/23/2021     Lab Results   Component Value Date    ALKPHOS 123 09/18/2023    ALKPHOS 114 04/23/2021     Lab Results   Component Value Date    AST 30 09/18/2023    AST 18 04/23/2021     Lab Results    Component Value Date    ALT 20 09/18/2023    ALT 25 04/23/2021       Lab Results   Component Value Date    CR 0.83 09/18/2023    CR 1.04 04/23/2021     Lab Results   Component Value Date    BUN 14.6 09/18/2023    BUN 12 04/25/2023    BUN 17 04/23/2021       AFP 9/18/23 = 10.2  AFP 7/13/23 = 5.2      Imaging:     I personally reviewed and interpreted the MRI abdomen from 9/18/23. It shows expected early post treatment changes with small amount of enhancement anteriorly in the tumor. The surrounding parenchyma in segment 2/3 also demonstrates post treatment changes. LIRADS 3 lesions are unchanged in the untreated right hepatic lobe.    ASSESSMENT/PLAN:      Flash Brown is a 82 year old male with DEWITT cirrhosis and HCC who is one month post radioembolization and made a good recovery. Follow up imaging shows expected early post treatment changes and decreased enhancement of the tumor. He also has some stable LIRADS 3 lesions that we will also monitor. Unfortunately the formal radiology read of the MRI does not mention that he had Y90 treatment and instead describes disease progression adjacent to the lesion, so it appears they did not see that he had just had treatment and that the changes around the lesion are post radiation changes in the adjacent parenchyma. I will have our own radiologists take a look and if they agree that these are post treatment changes, I will see him again in 3 months with another liver MRI and CT chest. If they have any concern, I will let him know. I discussed all of this with him and he agreed with this plan.    Update: We discussed this patient at tumor conference today and our radiologists agreed that the appearance is consistent with posttreatment changes rather than tumor progression.  The LI-RADS 3 lesion may have subtle washout.  We will follow the patient with MRI in a few months as planned.      It was a pleasure seeing the patient.     Signed,    Dayna Osorio M.D.  Associate  Professor  Interventional Radiology  Department of Radiology  [facility]      CC  Patient Care Team:  Thomas Jackson DO as PCP - General (Family Practice)  Thomas Jackson DO as Assigned PCP  Sang Avendaño MD as Assigned Cancer Care Provider  Dinesh Roque MD as MD (Dermatology)  Leventhal, Thomas Michael, MD as MD (Gastroenterology)  Dinesh Roque MD as Assigned Surgical Provider  Leventhal, Thomas Michael, MD as Assigned Gastroenterology Provider  Micah Tate MD as Assigned Heart and Vascular Provider  Thomas Jackson DO as Assigned Pain Medication Provider  Ute Suazo APRN CNP as Nurse Practitioner (Cardiovascular Disease)  SELF, REFERRED           25 minutes spent by me on the date of the encounter doing chart review, history and exam, imaging review, documentation and further activities per the note.      Video-Visit Details     Type of service:  Video Visit     Video Start and End Time: 8:37 - 8:45 AM    Originating Location (pt. Location): Home     Distant Location (provider location):  [facility] [place]      Platform used for Video Visit: Aj[employer]o

## 2023-09-22 NOTE — PATIENT INSTRUCTIONS
You were seen today in the IR Clinic by Dr Dayna Osorio for follow up regarding HCC.    Plan:    - We will see you back in 3 months with testing prior. In person or virtual is ok.     - Prior to this visit, please repeat an MRI, labs and CT Chest. If the MRI can be done in [place], that would be preferred for quality of imaging. If not, we will do an internal read.     Please call or send a MyChart with any questions or concerns.    Mami MIRELES LPN/ Teresa CARRASCO, RNCC  [phone]

## 2023-09-22 NOTE — PROGRESS NOTES
"Virtual Visit Details    Type of service:  Video Visit   Video Start Time: {video visit start/end time for provider to select:507577}  Video End Time:{video visit start/end time for provider to select:187679}    Originating Location (pt. Location): {video visit patient location:898206::\"Home\"}  {PROVIDER LOCATION On-site should be selected for visits conducted from your clinic location or adjoining [facility] hospital, academic office, or other nearby [facility] building. Off-site should be selected for all other provider locations, including home:614063}  Distant Location (provider location):  {virtual location provider:682722}  Platform used for Video Visit: {Virtual Visit Platforms:131118::\"Samplesaint\"}    "

## 2023-09-27 ENCOUNTER — TELEPHONE (OUTPATIENT)
Dept: RADIOLOGY | Facility: CLINIC | Age: 83
End: 2023-09-27
Payer: MEDICARE

## 2023-09-27 NOTE — TELEPHONE ENCOUNTER
[facility] Call Center    Phone Message    May a detailed message be left on voicemail: yes     Reason for Call: Other: Per sister Daylin they need a signed copy of the aftercare summary, MRI results, and treatment plan sent to [email or web address] to provide to the nursing home.     Action Taken: Message routed to:  [facility] LEIF/JUAN    Travel Screening: Not Applicable

## 2023-10-02 ENCOUNTER — MEDICAL CORRESPONDENCE (OUTPATIENT)
Dept: HEALTH INFORMATION MANAGEMENT | Facility: CLINIC | Age: 83
End: 2023-10-02

## 2023-10-10 ENCOUNTER — OFFICE VISIT (OUTPATIENT)
Dept: FAMILY MEDICINE | Facility: CLINIC | Age: 83
End: 2023-10-10
Payer: MEDICARE

## 2023-10-10 VITALS
HEIGHT: 66 IN | HEART RATE: 88 BPM | WEIGHT: 216 LBS | BODY MASS INDEX: 34.72 KG/M2 | DIASTOLIC BLOOD PRESSURE: 66 MMHG | OXYGEN SATURATION: 95 % | SYSTOLIC BLOOD PRESSURE: 110 MMHG | TEMPERATURE: 96.6 F | RESPIRATION RATE: 20 BRPM

## 2023-10-10 DIAGNOSIS — E11.65 TYPE 2 DIABETES MELLITUS WITH HYPERGLYCEMIA, WITHOUT LONG-TERM CURRENT USE OF INSULIN (H): Primary | ICD-10-CM

## 2023-10-10 DIAGNOSIS — C22.0 HEPATOCELLULAR CARCINOMA (H): ICD-10-CM

## 2023-10-10 DIAGNOSIS — E78.49 OTHER HYPERLIPIDEMIA: ICD-10-CM

## 2023-10-10 LAB — HBA1C MFR BLD: 5.9 % (ref 0–5.6)

## 2023-10-10 PROCEDURE — 99214 OFFICE O/P EST MOD 30 MIN: CPT | Performed by: FAMILY MEDICINE

## 2023-10-10 PROCEDURE — 82570 ASSAY OF URINE CREATININE: CPT | Performed by: FAMILY MEDICINE

## 2023-10-10 PROCEDURE — 80061 LIPID PANEL: CPT | Performed by: FAMILY MEDICINE

## 2023-10-10 PROCEDURE — 36415 COLL VENOUS BLD VENIPUNCTURE: CPT | Performed by: FAMILY MEDICINE

## 2023-10-10 PROCEDURE — 83036 HEMOGLOBIN GLYCOSYLATED A1C: CPT | Performed by: FAMILY MEDICINE

## 2023-10-10 PROCEDURE — 82043 UR ALBUMIN QUANTITATIVE: CPT | Performed by: FAMILY MEDICINE

## 2023-10-10 ASSESSMENT — PATIENT HEALTH QUESTIONNAIRE - PHQ9
SUM OF ALL RESPONSES TO PHQ QUESTIONS 1-9: 1
SUM OF ALL RESPONSES TO PHQ QUESTIONS 1-9: 1
10. IF YOU CHECKED OFF ANY PROBLEMS, HOW DIFFICULT HAVE THESE PROBLEMS MADE IT FOR YOU TO DO YOUR WORK, TAKE CARE OF THINGS AT HOME, OR GET ALONG WITH OTHER PEOPLE: NOT DIFFICULT AT ALL

## 2023-10-10 ASSESSMENT — ENCOUNTER SYMPTOMS
WEAKNESS: 0
COUGH: 0
CHILLS: 0
JOINT SWELLING: 0
HEADACHES: 0
MYALGIAS: 0
DIZZINESS: 0
NERVOUS/ANXIOUS: 0
SORE THROAT: 0
ARTHRALGIAS: 0
PALPITATIONS: 0
PARESTHESIAS: 0
FEVER: 0
SHORTNESS OF BREATH: 0

## 2023-10-10 NOTE — PROGRESS NOTES
1. Type 2 diabetes mellitus with hyperglycemia, without long-term current use of insulin (H)  Continue with glipizide and metformin  - Adult Eye  Referral; Future  - Albumin Random Urine Quantitative with Creat Ratio; Future  - Hemoglobin A1c; Future  - PRIMARY CARE FOLLOW-UP SCHEDULING; Future  - Albumin Random Urine Quantitative with Creat Ratio  - Hemoglobin A1c    2. Other hyperlipidemia  Continue with atorvastatin  - Lipid panel reflex to direct LDL Fasting; Future  - Lipid panel reflex to direct LDL Fasting    3. Hepatocellular carcinoma (H)  S/P radiation embolization.     I spent 30 minutes with patient of which 50% was spent counseling and coordinating patient's care as well as discussing patient's plan of care.        Bairon Sanchez is a 82 year old, presenting for the following health issues:  RECHECK and Forms        10/10/2023    10:32 AM   Additional Questions   Roomed by MP   Accompanied by sister         10/10/2023    10:32 AM   Patient Reported Additional Medications   Patient reports taking the following new medications None per patient     Patient needs forms completed for [facility]    Patient is following on liver cancer and MRI  History of Present Illness       Reason for visit:  Liver cancer follow up and doctor signature for [facility] application    He eats 2-3 servings of fruits and vegetables daily.He consumes 0 sweetened beverage(s) daily.He exercises with enough effort to increase his heart rate 9 or less minutes per day.  He exercises with enough effort to increase his heart rate 3 or less days per week. He is missing 1 dose(s) of medications per week.               Diabetes follow-up: Currently on metformin and glipizide.  Just moved into nursing home.  Does not check his blood sugars.     2. Hyperlipidemia: currently on atorvastatin.     3. HCC: S/P radioembolization for moderately differentiated HCC.  Pending MRI and CT chest.  States that appetite is good.  Patient needs form completed  "to get VA assistance at the nursing home. AFP tumor marker was elevated.       Review of Systems   Constitutional:  Negative for chills and fever.   HENT:  Negative for congestion, ear pain, hearing loss and sore throat.    Respiratory:  Negative for cough and shortness of breath.    Cardiovascular:  Negative for chest pain, palpitations and peripheral edema.   Musculoskeletal:  Negative for arthralgias, joint swelling and myalgias.   Skin:  Negative for rash.   Neurological:  Negative for dizziness, weakness, headaches and paresthesias.   Psychiatric/Behavioral:  Negative for mood changes. The patient is not nervous/anxious.             Objective    /66   Pulse 88   Temp (!) 96.6  F (35.9  C) (Temporal)   Resp 20   Ht 1.676 m (5' 6\")   Wt 98 kg (216 lb)   SpO2 95%   BMI 34.86 kg/m    Body mass index is 34.86 kg/m .  Physical Exam  Constitutional:       General: He is not in acute distress.     Appearance: He is well-developed.   HENT:      Head: Normocephalic and atraumatic.      Nose: Nose normal.   Eyes:      Conjunctiva/sclera: Conjunctivae normal.   Neck:      Trachea: No tracheal deviation.   Cardiovascular:      Rate and Rhythm: Normal rate and regular rhythm.      Heart sounds: Normal heart sounds.   Pulmonary:      Effort: Pulmonary effort is normal.      Breath sounds: No wheezing.   Musculoskeletal:         General: Normal range of motion.      Cervical back: Normal range of motion.   Skin:     Findings: No erythema or rash.   Neurological:      Mental Status: He is alert and oriented to person, place, and time.   Psychiatric:         Behavior: Behavior normal.            Component      Latest Ref Rng 3/13/2008  8:17 AM 6/11/2019  9:57 AM 12/9/2019  11:07 AM   Cholesterol      <200 mg/dL 137  110     Triglycerides      <150 mg/dL 116  80     HDL Cholesterol      >=40 mg/dL 37 (L)  47     LDL Cholesterol Calculated      <=100 mg/dL 76  47     VLDL-Cholesterol      0 - 30 mg/dL 23    "   Cholesterol/HDL Ratio      0.0 - 5.0  4.0      Hemoglobin A1C      0.0 - 5.6 % 6.1 (H)  5.4  5.5    Non HDL Cholesterol      <130 mg/dL  63     Patient Fasting?        Component      Latest Ref Rng 8/20/2020  12:00 AM 9/8/2020  10:02 AM 4/23/2021  1:55 PM   Cholesterol      <200 mg/dL  105     Triglycerides      <150 mg/dL  87     HDL Cholesterol      >=40 mg/dL  39 (L)     LDL Cholesterol Calculated      <=100 mg/dL  49     VLDL-Cholesterol      0 - 30 mg/dL      Cholesterol/HDL Ratio      0.0 - 5.0       Hemoglobin A1C      0.0 - 5.6 % 5.8 ! (E)  5.8 (H)    Non HDL Cholesterol      <130 mg/dL  66     Patient Fasting?        Component      Latest Ref Rng 11/9/2021  11:25 AM 5/24/2022  3:57 PM 11/10/2022  11:03 AM   Cholesterol      <200 mg/dL 106   105    Triglycerides      <150 mg/dL 110   76    HDL Cholesterol      >=40 mg/dL 42   46    LDL Cholesterol Calculated      <=100 mg/dL 42   44    VLDL-Cholesterol      0 - 30 mg/dL      Cholesterol/HDL Ratio      0.0 - 5.0       Hemoglobin A1C      0.0 - 5.6 % 5.8 (H)  5.5     Non HDL Cholesterol      <130 mg/dL 64   59    Patient Fasting? No        Component      Latest Ref Rng 7/6/2023  3:17 PM   Cholesterol      <200 mg/dL    Triglycerides      <150 mg/dL    HDL Cholesterol      >=40 mg/dL    LDL Cholesterol Calculated      <=100 mg/dL    VLDL-Cholesterol      0 - 30 mg/dL    Cholesterol/HDL Ratio      0.0 - 5.0     Hemoglobin A1C      0.0 - 5.6 % 13.1 (H)    Non HDL Cholesterol      <130 mg/dL    Patient Fasting?       Legend:  (L) Low  (H) High  ! Abnormal  (E) External lab result

## 2023-10-10 NOTE — PATIENT INSTRUCTIONS
Kirt Sanchez,    Thank you for allowing [facility] to manage your care.    I ordered some blood work, please go to the laboratory to get your laboratory studies.    I made an optometry referral, they will be calling in approximately 1 week to set up your appointment.  If you do not hear from them, please call the specialty number on your after visit.     For your convenience, test results are released as soon as they are available  Please allow 1-2 business days for me to send you a comment about your results.  If not done so, I encourage you to login into [email or web address] ([email or web address] to review your results in real time.     If you have any questions or concerns, please feel free to call us at [phone].    Sincerely,    Dr. Jackson    Did you know?      You can schedule a video visit for follow-up appointments as well as future appointments for certain conditions.  Please see the below link.     [email or web address]    If you have not already done so,  I encourage you to sign up for [employer] ([email or web address]).  This will allow you to review your results, securely communicate with a provider, and schedule virtual visits as well.

## 2023-10-10 NOTE — LETTER
October 11, 2023      Daniel Brown  [address] [facility] [address]        Dear ,    We are writing to inform you of your test results.    Recent labs show show the following:  -Cholesterol levels are at your goal levels.  ADVISE: continuing your medication, a regular exercise program with at least 150 minutes of aerobic exercise per week, and eating a low saturated fat/low carbohydrate diet.  Also, you should recheck this fasting cholesterol panel in 12 months.  -Microalbumin (urine protein) test is normal.  ADVISE: rechecking this annually.    Resulted Orders   Albumin Random Urine Quantitative with Creat Ratio   Result Value Ref Range    Creatinine Urine mg/dL 24.2 mg/dL      Comment:      The reference ranges have not been established in urine creatinine. The results should be integrated into the clinical context for interpretation.    Albumin Urine mg/L <12.0 mg/L      Comment:      The reference ranges have not been established in urine albumin. The results should be integrated into the clinical context for interpretation.    Albumin Urine mg/g Cr        Comment:      Unable to calculate, urine albumin and/or urine creatinine is outside detectable limits.  Microalbuminuria is defined as an albumin:creatinine ratio of 17 to 299 for males and 25 to 299 for females. A ratio of albumin:creatinine of 300 or higher is indicative of overt proteinuria.  Due to biologic variability, positive results should be confirmed by a second, first-morning random or 24-hour timed urine specimen. If there is discrepancy, a third specimen is recommended. When 2 out of 3 results are in the microalbuminuria range, this is evidence for incipient nephropathy and warrants increased efforts at glucose control, blood pressure control, and institution of therapy with an angiotensin-converting-enzyme (ACE) inhibitor (if the patient can tolerate it).     Hemoglobin A1c   Result Value Ref Range    Hemoglobin A1C 5.9 (H) 0.0  - 5.6 %   Lipid panel reflex to direct LDL Fasting   Result Value Ref Range    Cholesterol 135 <200 mg/dL    Triglycerides 55 <150 mg/dL    Direct Measure HDL 63 >=40 mg/dL    LDL Cholesterol Calculated 61 <=100 mg/dL    Non HDL Cholesterol 72 <130 mg/dL    Narrative    Cholesterol  Desirable:  <200 mg/dL    Triglycerides  Normal:  Less than 150 mg/dL  Borderline High:  150-199 mg/dL  High:  200-499 mg/dL  Very High:  Greater than or equal to 500 mg/dL    Direct Measure HDL  Female:  Greater than or equal to 50 mg/dL   Male:  Greater than or equal to 40 mg/dL    LDL Cholesterol  Desirable:  <100mg/dL  Above Desirable:  100-129 mg/dL   Borderline High:  130-159 mg/dL   High:  160-189 mg/dL   Very High:  >= 190 mg/dL    Non HDL Cholesterol  Desirable:  130 mg/dL  Above Desirable:  130-159 mg/dL  Borderline High:  160-189 mg/dL  High:  190-219 mg/dL  Very High:  Greater than or equal to 220 mg/dL       If you have any questions or concerns, please call the clinic at the number listed above.       Sincerely,      Thomas Jackson DO

## 2023-10-11 LAB
CHOLEST SERPL-MCNC: 135 MG/DL
CREAT UR-MCNC: 24.2 MG/DL
HDLC SERPL-MCNC: 63 MG/DL
LDLC SERPL CALC-MCNC: 61 MG/DL
MICROALBUMIN UR-MCNC: <12 MG/L
MICROALBUMIN/CREAT UR: NORMAL MG/G{CREAT}
NONHDLC SERPL-MCNC: 72 MG/DL
TRIGL SERPL-MCNC: 55 MG/DL

## 2023-10-12 ENCOUNTER — TELEPHONE (OUTPATIENT)
Dept: FAMILY MEDICINE | Facility: CLINIC | Age: 83
End: 2023-10-12
Payer: MEDICARE

## 2023-10-12 NOTE — TELEPHONE ENCOUNTER
Forms received from:  [employer]   Phone number listed: [phone]   Fax listed: [phone]  Date received: 10/10/23  Form description: POLST form  Once forms are completed, please return to [employer]  via fax.  Is patient requesting to be contacted when forms are completed: na  Phone: na  Form placed:  Dr. Renetta Michelle

## 2023-10-17 ENCOUNTER — TRANSFERRED RECORDS (OUTPATIENT)
Dept: HEALTH INFORMATION MANAGEMENT | Facility: CLINIC | Age: 83
End: 2023-10-17
Payer: MEDICARE

## 2023-10-17 ENCOUNTER — DOCUMENTATION ONLY (OUTPATIENT)
Dept: OTHER | Facility: CLINIC | Age: 83
End: 2023-10-17
Payer: MEDICARE

## 2023-10-17 LAB — RETINOPATHY: NEGATIVE

## 2023-10-23 DIAGNOSIS — C22.0 HEPATOCELLULAR CARCINOMA (H): Primary | ICD-10-CM

## 2023-10-30 ENCOUNTER — LAB (OUTPATIENT)
Dept: LAB | Facility: CLINIC | Age: 83
End: 2023-10-30
Attending: INTERNAL MEDICINE
Payer: MEDICARE

## 2023-10-30 ENCOUNTER — OFFICE VISIT (OUTPATIENT)
Dept: GASTROENTEROLOGY | Facility: CLINIC | Age: 83
End: 2023-10-30
Attending: INTERNAL MEDICINE
Payer: MEDICARE

## 2023-10-30 VITALS
WEIGHT: 219 LBS | HEIGHT: 66 IN | DIASTOLIC BLOOD PRESSURE: 70 MMHG | BODY MASS INDEX: 35.2 KG/M2 | SYSTOLIC BLOOD PRESSURE: 139 MMHG | HEART RATE: 84 BPM

## 2023-10-30 DIAGNOSIS — K74.69 OTHER CIRRHOSIS OF LIVER (H): ICD-10-CM

## 2023-10-30 DIAGNOSIS — E66.01 MORBID OBESITY (H): ICD-10-CM

## 2023-10-30 DIAGNOSIS — E78.5 HYPERLIPIDEMIA LDL GOAL <70: ICD-10-CM

## 2023-10-30 DIAGNOSIS — K75.81 LIVER CIRRHOSIS SECONDARY TO NONALCOHOLIC STEATOHEPATITIS (NASH) (H): ICD-10-CM

## 2023-10-30 DIAGNOSIS — E11.8 DIABETES MELLITUS TYPE 2 WITH COMPLICATIONS (H): ICD-10-CM

## 2023-10-30 DIAGNOSIS — C22.0 HEPATOCELLULAR CARCINOMA (H): Primary | ICD-10-CM

## 2023-10-30 DIAGNOSIS — K74.60 LIVER CIRRHOSIS SECONDARY TO NONALCOHOLIC STEATOHEPATITIS (NASH) (H): ICD-10-CM

## 2023-10-30 DIAGNOSIS — C22.0 HEPATOCELLULAR CARCINOMA (H): ICD-10-CM

## 2023-10-30 LAB
ALBUMIN SERPL BCG-MCNC: 3.7 G/DL (ref 3.5–5.2)
ALP SERPL-CCNC: 115 U/L (ref 40–129)
ALT SERPL W P-5'-P-CCNC: 18 U/L (ref 0–70)
ANION GAP SERPL CALCULATED.3IONS-SCNC: 9 MMOL/L (ref 7–15)
AST SERPL W P-5'-P-CCNC: 32 U/L (ref 0–45)
BILIRUB DIRECT SERPL-MCNC: 0.35 MG/DL (ref 0–0.3)
BILIRUB SERPL-MCNC: 0.9 MG/DL
BUN SERPL-MCNC: 11.3 MG/DL (ref 8–23)
CALCIUM SERPL-MCNC: 9.1 MG/DL (ref 8.8–10.2)
CHLORIDE SERPL-SCNC: 104 MMOL/L (ref 98–107)
CHOLEST SERPL-MCNC: 125 MG/DL
CREAT SERPL-MCNC: 0.8 MG/DL (ref 0.67–1.17)
DEPRECATED HCO3 PLAS-SCNC: 28 MMOL/L (ref 22–29)
EGFRCR SERPLBLD CKD-EPI 2021: 89 ML/MIN/1.73M2
ERYTHROCYTE [DISTWIDTH] IN BLOOD BY AUTOMATED COUNT: 14.6 % (ref 10–15)
GLUCOSE SERPL-MCNC: 234 MG/DL (ref 70–99)
HBA1C MFR BLD: 6.3 %
HCT VFR BLD AUTO: 35.5 % (ref 40–53)
HDLC SERPL-MCNC: 65 MG/DL
HGB BLD-MCNC: 11.3 G/DL (ref 13.3–17.7)
INR PPP: 1.37 (ref 0.85–1.15)
LDLC SERPL CALC-MCNC: 45 MG/DL
MCH RBC QN AUTO: 27.8 PG (ref 26.5–33)
MCHC RBC AUTO-ENTMCNC: 31.8 G/DL (ref 31.5–36.5)
MCV RBC AUTO: 87 FL (ref 78–100)
NONHDLC SERPL-MCNC: 60 MG/DL
PLATELET # BLD AUTO: 69 10E3/UL (ref 150–450)
POTASSIUM SERPL-SCNC: 4 MMOL/L (ref 3.4–5.3)
PROT SERPL-MCNC: 6.8 G/DL (ref 6.4–8.3)
RBC # BLD AUTO: 4.07 10E6/UL (ref 4.4–5.9)
SODIUM SERPL-SCNC: 141 MMOL/L (ref 135–145)
TRIGL SERPL-MCNC: 74 MG/DL
WBC # BLD AUTO: 2.5 10E3/UL (ref 4–11)

## 2023-10-30 PROCEDURE — 99214 OFFICE O/P EST MOD 30 MIN: CPT | Performed by: INTERNAL MEDICINE

## 2023-10-30 PROCEDURE — 82248 BILIRUBIN DIRECT: CPT | Performed by: PATHOLOGY

## 2023-10-30 PROCEDURE — 85610 PROTHROMBIN TIME: CPT | Performed by: PATHOLOGY

## 2023-10-30 PROCEDURE — 83036 HEMOGLOBIN GLYCOSYLATED A1C: CPT | Performed by: FAMILY MEDICINE

## 2023-10-30 PROCEDURE — 36415 COLL VENOUS BLD VENIPUNCTURE: CPT | Performed by: PATHOLOGY

## 2023-10-30 PROCEDURE — 80061 LIPID PANEL: CPT | Performed by: PATHOLOGY

## 2023-10-30 PROCEDURE — 80053 COMPREHEN METABOLIC PANEL: CPT | Performed by: PATHOLOGY

## 2023-10-30 PROCEDURE — 99000 SPECIMEN HANDLING OFFICE-LAB: CPT | Performed by: PATHOLOGY

## 2023-10-30 PROCEDURE — G0463 HOSPITAL OUTPT CLINIC VISIT: HCPCS | Performed by: INTERNAL MEDICINE

## 2023-10-30 PROCEDURE — 85027 COMPLETE CBC AUTOMATED: CPT | Performed by: PATHOLOGY

## 2023-10-30 ASSESSMENT — PAIN SCALES - GENERAL: PAINLEVEL: NO PAIN (0)

## 2023-10-30 NOTE — LETTER
10/30/2023         RE: Flash Brown  [address]  [facility] [address]        Dear Colleague,    Thank you for referring your patient, Flash Brown, to the [facility] [place]. Please see a copy of my visit note below.    Date of Service: October 30, 2023     Referring Provider: Dr. 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     Since last seen he has undergone Y90 therapy to segment 4 HCC on 7/31/2023.  He denies having any overt abdominal pain, confusion, side effects from the procedure.    He has moved into an assisted living facility in the past few months.  With this he has had more regular access to food, and in this setting has put on both water weight and non-water weight.    Denies overt issues with confusion, impaired memory or concentration    He denies ever being told he had any issues related to his liver throughout life.    Past Medical History:  Past Medical History:   Diagnosis Date     Basal cell carcinoma      CAD (coronary artery disease)      Depression      Diabetes (H)      Hyperlipidemia      Hypertension      Lymphoma (H)      Malignant melanoma (H)      Melanoma (H)      Squamous cell carcinoma    Diastolic heart failure  Diabetes mellitus, not on long-term insulin therapy    Surgical History:  Past Surgical History:   Procedure Laterality Date     AXILLARY SURGERY      S/P resection and adjuvant radiation     BONE MARROW BIOPSY, BONE SPECIMEN, NEEDLE/TROCAR N/A 7/8/2019    Procedure: BIOPSY, BONE MARROW;  Surgeon: Husam Issa MD;  [facility]     BONE MARROW BIOPSY, BONE SPECIMEN, NEEDLE/TROCAR Left 2/24/2022     Procedure: BIOPSY, BONE MARROW;  Surgeon: Cailin Anthony PA-C;  Location: [facility] OR     CARDIAC SURGERY       CHOLECYSTECTOMY       HERNIA REPAIR, UMBILICAL       IR LIVER BIOPSY PERCUTANEOUS  6/2/2023     IR SIRT (SELECTIVE INTERNAL RADIO THERAPY)  7/26/2023     IR VISCERAL ANGIOGRAM  7/26/2023     IR VISCERAL EMBOLIZATION  7/31/2023     PHACOEMULSIFICATION WITH STANDARD INTRAOCULAR LENS IMPLANT Right 10/2/2019    Procedure: Cataract Removal with Implant;  Surgeon: Dinesh Ivey MD;  Location: [facility]     PHACOEMULSIFICATION WITH STANDARD INTRAOCULAR LENS IMPLANT Left 10/21/2019    Procedure: Cataract Removal with Implant;  Surgeon: Dinesh Ivey MD;  Location: [facility]     STENT      PTCA with drug-eluting stent of RCA, circumflex, and LAD     VASCULAR SURGERY         Social History:  Social History     Tobacco Use     Smoking status: Never     Passive exposure: Never     Smokeless tobacco: Never   Vaping Use     Vaping Use: Never used   Substance Use Topics     Alcohol use: Yes     Comment: glass of wine couple times per week     Drug use: Never        Family History:  Family History   Problem Relation Age of Onset     Asthma Other      Cancer Other         melanoma     Blood Disease Other         bleeding disorder     Lung Cancer Mother         Smoker     Prostate Cancer Father      Melanoma No family hx of        Medications:  Current Outpatient Medications   Medication     aspirin (ASA) 81 MG EC tablet     atorvastatin (LIPITOR) 20 MG tablet     ferrous sulfate (FE TABS) 325 (65 Fe) MG EC tablet     furosemide (LASIX) 20 MG tablet     glipiZIDE (GLUCOTROL XL) 10 MG 24 hr tablet     lidocaine (XYLOCAINE) 5 % external ointment     magnesium oxide (MAG-OX) 400 MG tablet     metFORMIN (GLUCOPHAGE) 500 MG tablet     nitroGLYcerin (NITROSTAT) 0.4 MG sublingual tablet     ORDER FOR DME     ramipril (ALTACE) 5 MG capsule     tamsulosin (FLOMAX) 0.4 MG capsule     triamcinolone (KENALOG) 0.1 %  "external cream     No current facility-administered medications for this visit.     Facility-Administered Medications Ordered in Other Visits   Medication     sodium hyaluronate (HEALON DUET)       Review of Systems  A complete 10 point review of systems was asked and answered in the negative unless specifically commented upon in the HPI    Objective:         Vitals:    10/30/23 1058   BP: 139/70   Pulse: 84   Weight: 99.3 kg (219 lb)   Height: 1.676 m (5' 6\")     Body mass index is 35.35 kg/m .     Physical Exam    Vitals reviewed.   Constitutional:  no apparent distress.    HEENT: no scleral icterus.  Cardiac:  Regular rate  Respiratory: Normal respiratory excursion   GI:  Abdomen soft,obese  Skin:  scattered lesions and scars from previous resections on his scalp, temples, forearms  Peripheral Vascular: trace b/l edema  Musculoskeletal:  ROM intact, normal muscle bulk    Psychiatric: Normal mood and affect. Behavior is normal.  Neuro: No asterixis, no tremor    Labs and Diagnostic tests:  MELD 3.0: 9 at 10/30/2023  9:57 AM  MELD-Na: 10 at 10/30/2023  9:57 AM  Calculated from:  Serum Creatinine: 0.80 mg/dL (Using min of 1 mg/dL) at 10/30/2023  9:57 AM  Serum Sodium: 141 mmol/L (Using max of 137 mmol/L) at 10/30/2023  9:57 AM  Total Bilirubin: 0.9 mg/dL (Using min of 1 mg/dL) at 10/30/2023  9:57 AM  Serum Albumin: 3.7 g/dL (Using max of 3.5 g/dL) at 10/30/2023  9:57 AM  INR(ratio): 1.37 at 10/30/2023  9:57 AM  Age at listing (hypothetical): 82 years  Sex: Male at 10/30/2023  9:57 AM          Imaging:  MRI 9/18/2023  IMPRESSION:   1. New radioembolization changes to hepatic segment 2/3 of adjacent  edema and rim enhancement, which are favored to represent  postprocedural changes, however there are new rim-enhancing nodules  adjacent to the treatment zone, which are indeterminant.  LR-TR  equivocal.  2. Multiple LR-3 lesions as described above, including a few new  subcentimeter observations in hepatic segment 3. "   3. Splenomegaly.  4. Unchanged mildly enlarged lymph node at the franklyn hepatis.  Assessment and Plan:    Cirrhosis:  - Secondary to metabolic associated steatotic liver disease  - Compensated at this time  - MELD 3.0: 9      Management of MASLD/MASH  - We spent time discussing necessary lifestyle modifications including: appropriate diet, exercise and weight loss plan.      - Recommend exercise regularly: at least 4 times per week, with a minimum of 40-45 minutes per day.      - It has shown that patients who exercise regularly can have improvement of insulin resistance, increase in baseline metabolic activity and resolution of fatty liver disease (even if  appreciable weight loss does not occur)    - Recommend a low-carbohydrate, low-calorie diet (~1700 calories per day).    - An ideal weight loss plan would be to lose 7-10% of body weight over the next six months.  This has been proven to resolve fat and inflammation in the liver, and can lead to regression of scarring that might be present  - If the patient has diabetes, we recommend assertive management: ideal goal Hgb A1c goal of =/< 7%.     - There are no overt contraindications to medications used in the treatment of diabetes in persons with chronic liver disease  - Management of cholesterol is also very important.    - The use of statins are an effective means of therapy and are not contra-indicated in those with abnormal liver tests OR those with cirrhosis.  The value of these medications in this population far outweigh the minor risks of abnormal liver tests.   - Goal LDL in those with DEWITT are < 100 mg/dL    HCC:  - Biopsy-proven Mod differentiated HCC from 6/2023  - MRI Liver 9/20/23 reviewed in tumor board - likely post treatment effects  - Repeat imaging in 12/2023    Risk of Esophageal Varices:  - deferred by patient preference    Volume status:  - Discussed need for sodium restriction as able    Follow Up:  6 months     Thank you very much for the  opportunity to participate in the care of this patient.  If you have any further questions, please don't hesitate to contact our office.    Thomas M. Leventhal, M.D.   of Medicine  Advanced & Transplant Hepatology  [facility]      Again, thank you for allowing me to participate in the care of your patient.        Sincerely,        Thomas M. Leventhal, MD

## 2023-10-30 NOTE — PATIENT INSTRUCTIONS
- More water less juice    - MRI in December    - Follow up with us in 6 months    Cirrhosis Education    We recommend you go to the website: https://cirrhosiscare.ca to review valuable information about your liver disease, good dietary choices, exercise options, and symptom management strategies    Nutrition  - For patients with cirrhosis, it is very important to eat the right types and amounts of foods.  We recommend a diet low in carbohydrates/sugars and high in fresh fruits/vegetables, with the right amount of protein.  We typically recommend 1.5gm/kg/day of protein, or  grams of protein every day.  - In regard to protein intake, you can focus on: All meats, cooked fish and seafood, vegetable-based protein meat products, tofu, eggs, legumes, dairy products (including milk and yogurt and cheese), unsalted nuts, etc...  - You should eat at least three meals a day and three to four snacks between meals  - Bedtime snacks are especially important (preferrably something with some protein)    - Patients with malnutrition and/or loss of muscular mass can improve their nutrition and muscular mass by drinking two cans of dietary supplements daily, particularly at bedtime.  These would include: Ensure, Boost, Jessie Instant Milk, Glucerna, or powdered whey protein (or similar supplements)  - Please avoid eating raw seafood, especially shellfish, because of risk of serious illness  - We recommend all patients with cirrhosis limit their daily sodium intake to less than 2,000mg      General Liver Health  - Avoid the use of all non-steroid anti-inflammatory medicines (ibuprofen, Aleve, naproxen, aspirin, etc.) as this can cause serious injury to your kidneys in the setting of liver disease  - If you see a doctor and they prescribe you a new medication, please contact our clinic to let us know what changes are being made  - If you become acutely ill and present to an ER or are admitted to a hospital, please let us know  as soon as you are able.  - Patients with chronic liver disease be vaccinated against hepatitis A and B.  Please discuss with your primary provider the need for these vaccines  - As patients with liver disease are at an increased risk of developing osteoporosis, we recommend that you have a DEXA scan with appropriate follow up and treatment.   - We recommend screening for Vitamin D deficiency (at least yearly) and aggressive replacement/supplementation as warranted.    Sleep Troubles:  - Sleep issues are very common in patients with chronic liver disease  - Recommend strict avoidance of medications such as narcotics, sedatives and sleep aids.    - Low dose benadryl or melatonin can be used for insomnia, if needed.

## 2023-10-30 NOTE — PROGRESS NOTES
Date of Service: October 30, 2023     Referring Provider: Dr. 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     Since last seen he has undergone Y90 therapy to segment 4 HCC on 7/31/2023.  He denies having any overt abdominal pain, confusion, side effects from the procedure.    He has moved into an assisted living facility in the past few months.  With this he has had more regular access to food, and in this setting has put on both water weight and non-water weight.    Denies overt issues with confusion, impaired memory or concentration    He denies ever being told he had any issues related to his liver throughout life.    Past Medical History:  Past Medical History:   Diagnosis Date    Basal cell carcinoma     CAD (coronary artery disease)     Depression     Diabetes (H)     Hyperlipidemia     Hypertension     Lymphoma (H)     Malignant melanoma (H)     Melanoma (H)     Squamous cell carcinoma    Diastolic heart failure  Diabetes mellitus, not on long-term insulin therapy    Surgical History:  Past Surgical History:   Procedure Laterality Date    AXILLARY SURGERY      S/P resection and adjuvant radiation    BONE MARROW BIOPSY, BONE SPECIMEN, NEEDLE/TROCAR N/A 7/8/2019    Procedure: BIOPSY, BONE MARROW;  Surgeon: Husam Issa MD;  Location: [facility]    BONE MARROW BIOPSY, BONE SPECIMEN, NEEDLE/TROCAR Left 2/24/2022    Procedure: BIOPSY, BONE MARROW;  Surgeon: Cailin Anthony PA-C;  Location: [facility] OR    CARDIAC SURGERY      CHOLECYSTECTOMY      HERNIA REPAIR, UMBILICAL      IR LIVER BIOPSY PERCUTANEOUS  6/2/2023    IR SIRT (SELECTIVE INTERNAL RADIO THERAPY)  7/26/2023    IR VISCERAL  ANGIOGRAM  7/26/2023    IR VISCERAL EMBOLIZATION  7/31/2023    PHACOEMULSIFICATION WITH STANDARD INTRAOCULAR LENS IMPLANT Right 10/2/2019    Procedure: Cataract Removal with Implant;  Surgeon: Dinesh Ivey MD;  Location: [facility]    PHACOEMULSIFICATION WITH STANDARD INTRAOCULAR LENS IMPLANT Left 10/21/2019    Procedure: Cataract Removal with Implant;  Surgeon: Dinesh Ivey MD;  Location: [facility]    STENT      PTCA with drug-eluting stent of RCA, circumflex, and LAD    VASCULAR SURGERY         Social History:  Social History     Tobacco Use    Smoking status: Never     Passive exposure: Never    Smokeless tobacco: Never   Vaping Use    Vaping Use: Never used   Substance Use Topics    Alcohol use: Yes     Comment: glass of wine couple times per week    Drug use: Never        Family History:  Family History   Problem Relation Age of Onset    Asthma Other     Cancer Other         melanoma    Blood Disease Other         bleeding disorder    Lung Cancer Mother         Smoker    Prostate Cancer Father     Melanoma No family hx of        Medications:  Current Outpatient Medications   Medication    aspirin (ASA) 81 MG EC tablet    atorvastatin (LIPITOR) 20 MG tablet    ferrous sulfate (FE TABS) 325 (65 Fe) MG EC tablet    furosemide (LASIX) 20 MG tablet    glipiZIDE (GLUCOTROL XL) 10 MG 24 hr tablet    lidocaine (XYLOCAINE) 5 % external ointment    magnesium oxide (MAG-OX) 400 MG tablet    metFORMIN (GLUCOPHAGE) 500 MG tablet    nitroGLYcerin (NITROSTAT) 0.4 MG sublingual tablet    ORDER FOR DME    ramipril (ALTACE) 5 MG capsule    tamsulosin (FLOMAX) 0.4 MG capsule    triamcinolone (KENALOG) 0.1 % external cream     No current facility-administered medications for this visit.     Facility-Administered Medications Ordered in Other Visits   Medication    sodium hyaluronate (HEALON DUET)       Review of Systems  A complete 10 point review of systems was asked and answered in the negative unless specifically  "commented upon in the HPI    Objective:         Vitals:    10/30/23 1058   BP: 139/70   Pulse: 84   Weight: 99.3 kg (219 lb)   Height: 1.676 m (5' 6\")     Body mass index is 35.35 kg/m .     Physical Exam    Vitals reviewed.   Constitutional:  no apparent distress.    HEENT: no scleral icterus.  Cardiac:  Regular rate  Respiratory: Normal respiratory excursion   GI:  Abdomen soft,obese  Skin:  scattered lesions and scars from previous resections on his scalp, temples, forearms  Peripheral Vascular: trace b/l edema  Musculoskeletal:  ROM intact, normal muscle bulk    Psychiatric: Normal mood and affect. Behavior is normal.  Neuro: No asterixis, no tremor    Labs and Diagnostic tests:  MELD 3.0: 9 at 10/30/2023  9:57 AM  MELD-Na: 10 at 10/30/2023  9:57 AM  Calculated from:  Serum Creatinine: 0.80 mg/dL (Using min of 1 mg/dL) at 10/30/2023  9:57 AM  Serum Sodium: 141 mmol/L (Using max of 137 mmol/L) at 10/30/2023  9:57 AM  Total Bilirubin: 0.9 mg/dL (Using min of 1 mg/dL) at 10/30/2023  9:57 AM  Serum Albumin: 3.7 g/dL (Using max of 3.5 g/dL) at 10/30/2023  9:57 AM  INR(ratio): 1.37 at 10/30/2023  9:57 AM  Age at listing (hypothetical): 82 years  Sex: Male at 10/30/2023  9:57 AM          Imaging:  MRI 9/18/2023  IMPRESSION:   1. New radioembolization changes to hepatic segment 2/3 of adjacent  edema and rim enhancement, which are favored to represent  postprocedural changes, however there are new rim-enhancing nodules  adjacent to the treatment zone, which are indeterminant.  LR-TR  equivocal.  2. Multiple LR-3 lesions as described above, including a few new  subcentimeter observations in hepatic segment 3.   3. Splenomegaly.  4. Unchanged mildly enlarged lymph node at the franklyn hepatis.  Assessment and Plan:    Cirrhosis:  - Secondary to metabolic associated steatotic liver disease  - Compensated at this time  - MELD 3.0: 9      Management of MASLD/MASH  - We spent time discussing necessary lifestyle modifications " including: appropriate diet, exercise and weight loss plan.      - Recommend exercise regularly: at least 4 times per week, with a minimum of 40-45 minutes per day.      - It has shown that patients who exercise regularly can have improvement of insulin resistance, increase in baseline metabolic activity and resolution of fatty liver disease (even if  appreciable weight loss does not occur)    - Recommend a low-carbohydrate, low-calorie diet (~1700 calories per day).    - An ideal weight loss plan would be to lose 7-10% of body weight over the next six months.  This has been proven to resolve fat and inflammation in the liver, and can lead to regression of scarring that might be present  - If the patient has diabetes, we recommend assertive management: ideal goal Hgb A1c goal of =/< 7%.     - There are no overt contraindications to medications used in the treatment of diabetes in persons with chronic liver disease  - Management of cholesterol is also very important.    - The use of statins are an effective means of therapy and are not contra-indicated in those with abnormal liver tests OR those with cirrhosis.  The value of these medications in this population far outweigh the minor risks of abnormal liver tests.   - Goal LDL in those with DEWITT are < 100 mg/dL    HCC:  - Biopsy-proven Mod differentiated HCC from 6/2023  - MRI Liver 9/20/23 reviewed in tumor board - likely post treatment effects  - Repeat imaging in 12/2023    Risk of Esophageal Varices:  - deferred by patient preference    Volume status:  - Discussed need for sodium restriction as able    Follow Up:  6 months     Thank you very much for the opportunity to participate in the care of this patient.  If you have any further questions, please don't hesitate to contact our office.    Thomas M. Leventhal, M.D.   of Medicine  Advanced & Transplant Hepatology  [facility]

## 2023-10-30 NOTE — NURSING NOTE
"Chief Complaint   Patient presents with    RECHECK     Follow up with HCC     /70   Pulse 84   Ht 1.676 m (5' 6\")   Wt 99.3 kg (219 lb)   BMI 35.35 kg/m    Joanie Nichols CMA on 10/30/2023 at 10:59 AM    "

## 2023-12-07 ENCOUNTER — DOCUMENTATION ONLY (OUTPATIENT)
Dept: OTHER | Facility: CLINIC | Age: 83
End: 2023-12-07
Payer: MEDICARE

## 2024-01-08 DIAGNOSIS — I10 BENIGN ESSENTIAL HYPERTENSION: ICD-10-CM

## 2024-01-08 RX ORDER — RAMIPRIL 5 MG/1
5 CAPSULE ORAL DAILY
Qty: 90 CAPSULE | Refills: 2 | Status: SHIPPED | OUTPATIENT
Start: 2024-01-08 | End: 2024-04-26

## 2024-01-08 NOTE — TELEPHONE ENCOUNTER
[facility] Cardiology Refill Guideline reviewed.  Medication meets criteria for refill.    Barby Ball RN

## 2024-01-08 NOTE — TELEPHONE ENCOUNTER
Last Office Visit: 09/14/23 NORAH Suazo  Next Office Visit: 03/19/24 Dr. Tate  Last Fill Date: 11/2/23  Heidi Zavala MA Cardiology   1/8/2024 8:05 AM

## 2024-01-17 DIAGNOSIS — F41.9 ANXIETY: Primary | ICD-10-CM

## 2024-01-17 RX ORDER — DIAZEPAM 5 MG
5 TABLET ORAL ONCE
Qty: 1 TABLET | Refills: 0 | Status: SHIPPED | OUTPATIENT
Start: 2024-01-17 | End: 2024-01-17

## 2024-01-22 ENCOUNTER — LAB (OUTPATIENT)
Dept: LAB | Facility: CLINIC | Age: 84
End: 2024-01-22
Payer: MEDICARE

## 2024-01-22 ENCOUNTER — ANCILLARY PROCEDURE (OUTPATIENT)
Dept: MRI IMAGING | Facility: CLINIC | Age: 84
End: 2024-01-22
Attending: RADIOLOGY
Payer: MEDICARE

## 2024-01-22 ENCOUNTER — ANCILLARY PROCEDURE (OUTPATIENT)
Dept: CT IMAGING | Facility: CLINIC | Age: 84
End: 2024-01-22
Attending: RADIOLOGY
Payer: MEDICARE

## 2024-01-22 DIAGNOSIS — C22.0 HEPATOCELLULAR CARCINOMA (H): ICD-10-CM

## 2024-01-22 DIAGNOSIS — E78.5 HYPERLIPIDEMIA LDL GOAL <70: ICD-10-CM

## 2024-01-22 LAB
AFP SERPL-MCNC: 9.7 NG/ML
ALBUMIN SERPL BCG-MCNC: 3.9 G/DL (ref 3.5–5.2)
ALP SERPL-CCNC: 109 U/L (ref 40–150)
ALT SERPL W P-5'-P-CCNC: 23 U/L (ref 0–70)
ANION GAP SERPL CALCULATED.3IONS-SCNC: 9 MMOL/L (ref 7–15)
AST SERPL W P-5'-P-CCNC: 34 U/L (ref 0–45)
BILIRUB DIRECT SERPL-MCNC: 0.47 MG/DL (ref 0–0.3)
BILIRUB SERPL-MCNC: 1.5 MG/DL
BUN SERPL-MCNC: 15.6 MG/DL (ref 8–23)
CALCIUM SERPL-MCNC: 9 MG/DL (ref 8.8–10.2)
CHLORIDE SERPL-SCNC: 101 MMOL/L (ref 98–107)
CREAT SERPL-MCNC: 0.93 MG/DL (ref 0.67–1.17)
DEPRECATED HCO3 PLAS-SCNC: 29 MMOL/L (ref 22–29)
EGFRCR SERPLBLD CKD-EPI 2021: 82 ML/MIN/1.73M2
ERYTHROCYTE [DISTWIDTH] IN BLOOD BY AUTOMATED COUNT: 16.4 % (ref 10–15)
GLUCOSE SERPL-MCNC: 107 MG/DL (ref 70–99)
HCT VFR BLD AUTO: 37.2 % (ref 40–53)
HGB BLD-MCNC: 12.2 G/DL (ref 13.3–17.7)
INR PPP: 1.29 (ref 0.85–1.15)
MCH RBC QN AUTO: 27.3 PG (ref 26.5–33)
MCHC RBC AUTO-ENTMCNC: 32.8 G/DL (ref 31.5–36.5)
MCV RBC AUTO: 83 FL (ref 78–100)
PLATELET # BLD AUTO: 78 10E3/UL (ref 150–450)
POTASSIUM SERPL-SCNC: 4 MMOL/L (ref 3.4–5.3)
PROT SERPL-MCNC: 7.5 G/DL (ref 6.4–8.3)
RBC # BLD AUTO: 4.47 10E6/UL (ref 4.4–5.9)
SODIUM SERPL-SCNC: 139 MMOL/L (ref 135–145)
WBC # BLD AUTO: 3.1 10E3/UL (ref 4–11)

## 2024-01-22 PROCEDURE — 99000 SPECIMEN HANDLING OFFICE-LAB: CPT | Performed by: PATHOLOGY

## 2024-01-22 PROCEDURE — G1010 CDSM STANSON: HCPCS | Performed by: RADIOLOGY

## 2024-01-22 PROCEDURE — A9585 GADOBUTROL INJECTION: HCPCS | Performed by: RADIOLOGY

## 2024-01-22 PROCEDURE — 85027 COMPLETE CBC AUTOMATED: CPT | Performed by: PATHOLOGY

## 2024-01-22 PROCEDURE — 74183 MRI ABD W/O CNTR FLWD CNTR: CPT | Mod: MG | Performed by: RADIOLOGY

## 2024-01-22 PROCEDURE — 71250 CT THORAX DX C-: CPT | Mod: MG | Performed by: RADIOLOGY

## 2024-01-22 PROCEDURE — 82105 ALPHA-FETOPROTEIN SERUM: CPT | Performed by: RADIOLOGY

## 2024-01-22 PROCEDURE — 82248 BILIRUBIN DIRECT: CPT | Performed by: PATHOLOGY

## 2024-01-22 PROCEDURE — 85610 PROTHROMBIN TIME: CPT | Performed by: PATHOLOGY

## 2024-01-22 PROCEDURE — G1010 CDSM STANSON: HCPCS | Mod: GC | Performed by: RADIOLOGY

## 2024-01-22 PROCEDURE — 80053 COMPREHEN METABOLIC PANEL: CPT | Performed by: PATHOLOGY

## 2024-01-22 PROCEDURE — 36415 COLL VENOUS BLD VENIPUNCTURE: CPT | Performed by: PATHOLOGY

## 2024-01-22 RX ORDER — GADOBUTROL 604.72 MG/ML
10 INJECTION INTRAVENOUS ONCE
Status: COMPLETED | OUTPATIENT
Start: 2024-01-22 | End: 2024-01-22

## 2024-01-22 RX ADMIN — GADOBUTROL 10 ML: 604.72 INJECTION INTRAVENOUS at 10:44

## 2024-01-22 NOTE — DISCHARGE INSTRUCTIONS
MRI Contrast Discharge Instructions    The IV contrast you received today will pass out of your body in your  urine. This will happen in the next 24 hours. You will not feel this process.  Your urine will not change color.    Drink at least 4 extra glasses of water or juice today (unless your doctor  has restricted your fluids). This reduces the stress on your kidneys.  You may take your regular medicines.    If you are on dialysis: It is best to have dialysis today.    If you have a reaction: Most reactions happen right away. If you have  any new symptoms after leaving the hospital (such as hives or swelling),  call your hospital at the correct number below. Or call your family doctor.  If you have breathing distress or wheezing, call 911.    Special instructions: ***    I have read and understand the above information.    Signature:______________________________________ Date:___________    Staff:__________________________________________ Date:___________     Time:__________    [place] [facility] Departments:    ___[facility]: [phone]  ___[facility]: [phone]  ___[facility]: [phone] ___[facility]: [phone]  ___[facility]: [phone]  ___[facility]: [phone]  ___[phone]  ___[facility]: [phone]  ___[phone]

## 2024-01-26 ENCOUNTER — VIRTUAL VISIT (OUTPATIENT)
Dept: RADIOLOGY | Facility: CLINIC | Age: 84
End: 2024-01-26
Attending: RADIOLOGY
Payer: MEDICARE

## 2024-01-26 VITALS — WEIGHT: 225 LBS | BODY MASS INDEX: 36.16 KG/M2 | HEIGHT: 66 IN

## 2024-01-26 DIAGNOSIS — C22.0 HEPATOCELLULAR CARCINOMA (H): Primary | ICD-10-CM

## 2024-01-26 PROCEDURE — 99215 OFFICE O/P EST HI 40 MIN: CPT | Mod: 95 | Performed by: RADIOLOGY

## 2024-01-26 ASSESSMENT — PAIN SCALES - GENERAL: PAINLEVEL: NO PAIN (0)

## 2024-01-26 NOTE — LETTER
1/26/2024         RE: Flash Brown  [address]  [facility] [address]        Dear Colleague,    Thank you for referring your patient, Flash Brown, to the [facility]. Please see a copy of my visit note below.          Interventional Radiology Clinic Visit    Date of this visit: 1/26/2024    Flash Brown returns for follow up post radioembolization.    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     On surveillance of his chronic liver disease he was found to have a mass in the left hepatic lobe and percutaneous liver lesion biopsy confirmed moderately differentiated HCC.  On 7/31/2023 we treated it with radioembolization. 400 Gy was administered selectively to the tumor with very good response. He presents for his second post treatment follow-up today.    He feels that he has been doing well. His sister says that he has gained 20 lb and is retaining fluid and is concerned about him going into heart failure again. Last time, he was up 45 lb when he went into hearth failure. He denies any chest pain or shortness of breath. Denies any abdominal discomfort, pruritus, or jaundice.     He was following with an oncologist in [place] previously, but has not seen an oncologist in a long time. The oncologist he was seeing is no longer working in that clinic.     Review of Systems:    As above    Past Medical/Surgical History:    Past Medical History:   Diagnosis Date    Basal cell carcinoma     CAD (coronary artery disease)     Depression     Diabetes (H)     Hyperlipidemia     Hypertension     Lymphoma (H)     Malignant melanoma (H)     Melanoma (H)      Squamous cell carcinoma      Past Surgical History:   Procedure Laterality Date    AXILLARY SURGERY      S/P resection and adjuvant radiation    BONE MARROW BIOPSY, BONE SPECIMEN, NEEDLE/TROCAR N/A 7/8/2019    Procedure: BIOPSY, BONE MARROW;  Surgeon: Husam Issa MD;  Location: [facility]    BONE MARROW BIOPSY, BONE SPECIMEN, NEEDLE/TROCAR Left 2/24/2022    Procedure: BIOPSY, BONE MARROW;  Surgeon: Cailin Anthony PA-C;  Location: [facility] OR    CARDIAC SURGERY      CHOLECYSTECTOMY      HERNIA REPAIR, UMBILICAL      IR LIVER BIOPSY PERCUTANEOUS  6/2/2023    IR SIRT (SELECTIVE INTERNAL RADIO THERAPY)  7/26/2023    IR VISCERAL ANGIOGRAM  7/26/2023    IR VISCERAL EMBOLIZATION  7/31/2023    PHACOEMULSIFICATION WITH STANDARD INTRAOCULAR LENS IMPLANT Right 10/2/2019    Procedure: Cataract Removal with Implant;  Surgeon: Dinesh Ivey MD;  Location: [facility]    PHACOEMULSIFICATION WITH STANDARD INTRAOCULAR LENS IMPLANT Left 10/21/2019    Procedure: Cataract Removal with Implant;  Surgeon: Dinesh Ivey MD;  Location: [facility]    STENT      PTCA with drug-eluting stent of RCA, circumflex, and LAD    VASCULAR SURGERY         Current Medications:    Current Outpatient Medications   Medication Sig Dispense Refill    aspirin (ASA) 81 MG EC tablet Take 81 mg by mouth daily      atorvastatin (LIPITOR) 20 MG tablet Take 1 tablet (20 mg) by mouth daily 90 tablet 3    ferrous sulfate (FE TABS) 325 (65 Fe) MG EC tablet Take 1 tablet (325 mg) by mouth daily 90 tablet 0    furosemide (LASIX) 20 MG tablet Take 1 tablet (20 mg) by mouth every morning AND 1 tablet (20 mg) daily at 2 pm. 180 tablet 3    glipiZIDE (GLUCOTROL XL) 10 MG 24 hr tablet Take 1 tablet (10 mg) by mouth daily 90 tablet 3    lidocaine (XYLOCAINE) 5 % external ointment Apply topically 4 times daily as needed for moderate pain 50 g 1    magnesium oxide (MAG-OX) 400 MG tablet Take 1 tablet (400 mg) by mouth 2 times daily 60 tablet 1    metFORMIN (GLUCOPHAGE)  500 MG tablet Take 1 tablet (500 mg) by mouth 2 times daily (with meals) 180 tablet 3    nitroGLYcerin (NITROSTAT) 0.4 MG sublingual tablet Place 1 tablet (0.4 mg) under the tongue See Admin Instructions for chest pain 30 tablet 0    ORDER FOR DME Light Therapy with Full Spectrum Light (68811 lux) Start with 10-15 minute exposure per day, Increase exposure to 30 to 45 minutes per day, Maximum exposure: 90 minutes per day,Look periodically at light during each session  1 0    ramipril (ALTACE) 5 MG capsule Take 1 capsule (5 mg) by mouth daily 90 capsule 2    tamsulosin (FLOMAX) 0.4 MG capsule Take 1 capsule (0.4 mg) by mouth every evening 90 capsule 3    triamcinolone (KENALOG) 0.1 % external cream Twice daily to legs prn itching 454 g 3       Allergies:    Iodine, Iodinated contrast media, and Metoprolol    Family History:    Family History   Problem Relation Age of Onset    Asthma Other     Cancer Other         melanoma    Blood Disease Other         bleeding disorder    Lung Cancer Mother         Smoker    Prostate Cancer Father     Melanoma No family hx of        Social History:    Social History     Socioeconomic History    Marital status: Single     Spouse name: None    Number of children: 0    Years of education: None    Highest education level: None   Occupational History    Occupation: Retired     Comment: [employer]    Tobacco Use    Smoking status: Never     Passive exposure: Never    Smokeless tobacco: Never   Vaping Use    Vaping Use: Never used   Substance and Sexual Activity    Alcohol use: Yes     Comment: glass of wine couple times per week    Drug use: Never     Social Determinants of Health     Financial Resource Strain: Low Risk  (10/10/2023)    Financial Resource Strain     Within the past 12 months, have you or your family members you live with been unable to get utilities (heat, electricity) when it was really needed?: No   Food Insecurity: Low Risk  (10/10/2023)    Food Insecurity  "    Within the past 12 months, did you worry that your food would run out before you got money to buy more?: No     Within the past 12 months, did the food you bought just not last and you didn t have money to get more?: No   Transportation Needs: Low Risk  (10/10/2023)    Transportation Needs     Within the past 12 months, has lack of transportation kept you from medical appointments, getting your medicines, non-medical meetings or appointments, work, or from getting things that you need?: No   Physical Activity: Inactive (11/9/2021)    Exercise Vital Sign     Days of Exercise per Week: 0 days     Minutes of Exercise per Session: 0 min   Stress: No Stress Concern Present (11/9/2021)    [employer] [place] [employer] - Occupational Stress Questionnaire     Feeling of Stress : Not at all   Social Connections: Socially Isolated (11/9/2021)    Social Connection and Isolation Panel [NHANES]     Frequency of Communication with Friends and Family: Once a week     Frequency of Social Gatherings with Friends and Family: Once a week     Attends Restoration Services: More than 4 times per year     Active Member of Clubs or Organizations: No     Marital Status: Never    Housing Stability: Low Risk  (10/10/2023)    Housing Stability     Do you have housing? : Yes     Are you worried about losing your housing?: No       Physical Exam:    Ht 1.676 m (5' 6\")   Wt 102.1 kg (225 lb)   BMI 36.32 kg/m       CONSTITUTIONAL: Frail appearing.  PSYCHIATRIC: mentation appears normal and affect normal.  NEURO: Normal movements and speech.  EYES: No jaundice or pallor.  SKIN: No jaundice.   RESP: Visibly dyspneic while talking.      Laboratory Studies:    Lab Results   Component Value Date    HGB 12.2 01/22/2024    HGB 12.0 01/04/2021     Lab Results   Component Value Date    PLT 78 01/22/2024    PLT 92 01/04/2021     Lab Results   Component Value Date    WBC 3.1 01/22/2024    WBC 3.2 01/04/2021       Lab Results   Component " Value Date    INR 1.29 01/22/2024       Lab Results   Component Value Date    PROTTOTAL 7.5 01/22/2024    PROTTOTAL 6.7 04/23/2021      Lab Results   Component Value Date    ALBUMIN 3.9 01/22/2024    ALBUMIN 3.3 04/25/2023    ALBUMIN 3.1 04/23/2021     Lab Results   Component Value Date    BILITOTAL 1.5 01/22/2024    BILITOTAL 0.9 04/23/2021     Lab Results   Component Value Date    ALKPHOS 109 01/22/2024    ALKPHOS 114 04/23/2021     Lab Results   Component Value Date    AST 34 01/22/2024    AST 18 04/23/2021     Lab Results   Component Value Date    ALT 23 01/22/2024    ALT 25 04/23/2021       Lab Results   Component Value Date    CR 0.93 01/22/2024    CR 1.04 04/23/2021     Lab Results   Component Value Date    BUN 15.6 01/22/2024    BUN 12 04/25/2023    BUN 17 04/23/2021       AFP 9.7 1/22/24    Imaging:     I personally reviewed the MR abdomen 1/22/2024. It shows post Y90 changes of segment 2/3. There is a tiny focus of new nodular internal enhancement. Increased size of the segment 8 lesion, now measuring 3.1 cm, previously 1.9 cm, with arterial enhancement, washout, and pseudocapsule, LR-5. Increased size of segment 4A lesion measuring 1.8 cm, previously 1.3 cm, with arterial enhancement and diffusion restriction, no washout or pseudocapsule, LR-4. Increased size of the segment 6 lesion, measuring 1.3 cm, previously 0.8 cm with arterial enhancement and diffusion restriction, no washout or pseudocapsule, LR-4. Increased size of the segment 7 lesion, measuring 1.3 cm, previously 9 mm, with arterial enhancement, questionable washout, LR-4. There are additional multiple subcentimeter arterially enhancing foci that are new/growing.    ASSESSMENT/PLAN:      Flash Brown is a 82 year old male with DEWITT cirrhosis and HCC who is 6 month post radioembolization of a segment 2/3 mass and made a good recovery. His follow up imaging demonstrates good response with possible new tiny focus of recurrence in the treatment  bed, however most notably there is significant disease progression elsewhere in the liver, characterized by new or growing lesions throughout the lesion, some of which definitively show characteristics of HCC and others most likely HCC.  He appears frail today and although he does not endorse shortness of breath, he is visibly dyspneic while talking.  He has also gained 20 pounds with fluid retention and there is concern for recurrent heart failure. Due to the dispersion and number of the lesions, Y90 is not a viable treatment, as he would be at high risk for liver failure. Additionally, with his heart failure, he is a high risk candidate for the general anesthesia required for microwave ablation and the number of lesions would require more than one procedure.  We discussed all of this with him and his sister Daylin, and we discussed that due to the degree of progression of disease and his poor clinical status, we would recommend he see oncology for consideration of systemic treatment.  Will make referral to oncology accordingly since his prior oncologist no longer works at [facility].  They expressed understanding and agreed with this plan.      It was a pleasure seeing the patient.     Signed,  Yary Ac DO  PGY5    Co-signed,  Dayna Osorio M.D.    Interventional Radiology  Department of Radiology  [facility]      CC  Patient Care Team:  Thomas Jackson DO as PCP - General (Family Practice)  Thomas Jackson DO as Assigned PCP  Sang Avendaño MD as Assigned Cancer Care Provider  Dinesh Roque MD as MD (Dermatology)  Leventhal, Thomas Michael, MD as MD (Gastroenterology)  Dinesh Roque MD as Assigned Surgical Provider  Leventhal, Thomas Michael, MD as Assigned Gastroenterology Provider  Ute Suazo APRN CNP as Nurse Practitioner (Cardiovascular Disease)  Ute Suazo APRN CNP as Assigned Heart and Vascular Provider  Jude  Dinesh Robledo MD as MD (Dermatology)  SELF, REFERRED      I, Dr Dayna Osorio, was present with the fellow during the clinic visit, including the history and exam. I discussed the case with the fellow and agree with the findings as documented in the assessment and plan.         40 minutes spent by me on the date of the encounter doing chart review, history and exam, imaging review, documentation and further activities per the note.      Video-Visit Details     Type of service:  Video Visit     Video Start and End Time:  Joined the call at 1/26/2024, 10:44:47 am.  Left the call at 1/26/2024, 11:08:44 am.  You were on the call for 23 minutes 57 seconds .  Originating Location (pt. Location): Home     Distant Location (provider location):  [facility] [place]      Platform used for Video Visit: [employer]

## 2024-01-26 NOTE — PROGRESS NOTES
Interventional Radiology Clinic Visit    Date of this visit: 1/26/2024    Flash Brown returns for follow up post radioembolization.    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     On surveillance of his chronic liver disease he was found to have a mass in the left hepatic lobe and percutaneous liver lesion biopsy confirmed moderately differentiated HCC.  On 7/31/2023 we treated it with radioembolization. 400 Gy was administered selectively to the tumor with very good response. He presents for his second post treatment follow-up today.    He feels that he has been doing well. His sister says that he has gained 20 lb and is retaining fluid and is concerned about him going into heart failure again. Last time, he was up 45 lb when he went into hearth failure. He denies any chest pain or shortness of breath. Denies any abdominal discomfort, pruritus, or jaundice.     He was following with an oncologist in [place] previously, but has not seen an oncologist in a long time. The oncologist he was seeing is no longer working in that clinic.     Review of Systems:    As above    Past Medical/Surgical History:    Past Medical History:   Diagnosis Date    Basal cell carcinoma     CAD (coronary artery disease)     Depression     Diabetes (H)     Hyperlipidemia     Hypertension     Lymphoma (H)     Malignant melanoma (H)     Melanoma (H)     Squamous cell carcinoma      Past Surgical History:   Procedure Laterality Date    AXILLARY SURGERY      S/P resection and adjuvant radiation    BONE MARROW BIOPSY, BONE SPECIMEN, NEEDLE/TROCAR N/A 7/8/2019    Procedure: BIOPSY, BONE MARROW;  Surgeon: Husam Issa  MD PEBBLES;  Location: [facility]    BONE MARROW BIOPSY, BONE SPECIMEN, NEEDLE/TROCAR Left 2/24/2022    Procedure: BIOPSY, BONE MARROW;  Surgeon: Cailin Anthony PA-C;  Location: [facility] OR    CARDIAC SURGERY      CHOLECYSTECTOMY      HERNIA REPAIR, UMBILICAL      IR LIVER BIOPSY PERCUTANEOUS  6/2/2023    IR SIRT (SELECTIVE INTERNAL RADIO THERAPY)  7/26/2023    IR VISCERAL ANGIOGRAM  7/26/2023    IR VISCERAL EMBOLIZATION  7/31/2023    PHACOEMULSIFICATION WITH STANDARD INTRAOCULAR LENS IMPLANT Right 10/2/2019    Procedure: Cataract Removal with Implant;  Surgeon: Dinseh Ivey MD;  Location: [facility]    PHACOEMULSIFICATION WITH STANDARD INTRAOCULAR LENS IMPLANT Left 10/21/2019    Procedure: Cataract Removal with Implant;  Surgeon: Dinesh Ivey MD;  Location: [facility]    STENT      PTCA with drug-eluting stent of RCA, circumflex, and LAD    VASCULAR SURGERY         Current Medications:    Current Outpatient Medications   Medication Sig Dispense Refill    aspirin (ASA) 81 MG EC tablet Take 81 mg by mouth daily      atorvastatin (LIPITOR) 20 MG tablet Take 1 tablet (20 mg) by mouth daily 90 tablet 3    ferrous sulfate (FE TABS) 325 (65 Fe) MG EC tablet Take 1 tablet (325 mg) by mouth daily 90 tablet 0    furosemide (LASIX) 20 MG tablet Take 1 tablet (20 mg) by mouth every morning AND 1 tablet (20 mg) daily at 2 pm. 180 tablet 3    glipiZIDE (GLUCOTROL XL) 10 MG 24 hr tablet Take 1 tablet (10 mg) by mouth daily 90 tablet 3    lidocaine (XYLOCAINE) 5 % external ointment Apply topically 4 times daily as needed for moderate pain 50 g 1    magnesium oxide (MAG-OX) 400 MG tablet Take 1 tablet (400 mg) by mouth 2 times daily 60 tablet 1    metFORMIN (GLUCOPHAGE) 500 MG tablet Take 1 tablet (500 mg) by mouth 2 times daily (with meals) 180 tablet 3    nitroGLYcerin (NITROSTAT) 0.4 MG sublingual tablet Place 1 tablet (0.4 mg) under the tongue See Admin Instructions for chest pain 30 tablet 0    ORDER FOR DME Light Therapy  with Full Spectrum Light (61058 lux) Start with 10-15 minute exposure per day, Increase exposure to 30 to 45 minutes per day, Maximum exposure: 90 minutes per day,Look periodically at light during each session  1 0    ramipril (ALTACE) 5 MG capsule Take 1 capsule (5 mg) by mouth daily 90 capsule 2    tamsulosin (FLOMAX) 0.4 MG capsule Take 1 capsule (0.4 mg) by mouth every evening 90 capsule 3    triamcinolone (KENALOG) 0.1 % external cream Twice daily to legs prn itching 454 g 3       Allergies:    Iodine, Iodinated contrast media, and Metoprolol    Family History:    Family History   Problem Relation Age of Onset    Asthma Other     Cancer Other         melanoma    Blood Disease Other         bleeding disorder    Lung Cancer Mother         Smoker    Prostate Cancer Father     Melanoma No family hx of        Social History:    Social History     Socioeconomic History    Marital status: Single     Spouse name: None    Number of children: 0    Years of education: None    Highest education level: None   Occupational History    Occupation: Retired     Comment: [employer]    Tobacco Use    Smoking status: Never     Passive exposure: Never    Smokeless tobacco: Never   Vaping Use    Vaping Use: Never used   Substance and Sexual Activity    Alcohol use: Yes     Comment: glass of wine couple times per week    Drug use: Never     Social Determinants of Health     Financial Resource Strain: Low Risk  (10/10/2023)    Financial Resource Strain     Within the past 12 months, have you or your family members you live with been unable to get utilities (heat, electricity) when it was really needed?: No   Food Insecurity: Low Risk  (10/10/2023)    Food Insecurity     Within the past 12 months, did you worry that your food would run out before you got money to buy more?: No     Within the past 12 months, did the food you bought just not last and you didn t have money to get more?: No   Transportation Needs: Low Risk   "(10/10/2023)    Transportation Needs     Within the past 12 months, has lack of transportation kept you from medical appointments, getting your medicines, non-medical meetings or appointments, work, or from getting things that you need?: No   Physical Activity: Inactive (11/9/2021)    Exercise Vital Sign     Days of Exercise per Week: 0 days     Minutes of Exercise per Session: 0 min   Stress: No Stress Concern Present (11/9/2021)    [employer] [place] [employer] - Occupational Stress Questionnaire     Feeling of Stress : Not at all   Social Connections: Socially Isolated (11/9/2021)    Social Connection and Isolation Panel [NHANES]     Frequency of Communication with Friends and Family: Once a week     Frequency of Social Gatherings with Friends and Family: Once a week     Attends [employer] Services: More than 4 times per year     Active Member of Clubs or Organizations: No     Marital Status: Never    Housing Stability: Low Risk  (10/10/2023)    Housing Stability     Do you have housing? : Yes     Are you worried about losing your housing?: No       Physical Exam:    Ht 1.676 m (5' 6\")   Wt 102.1 kg (225 lb)   BMI 36.32 kg/m       CONSTITUTIONAL: Frail appearing.  PSYCHIATRIC: mentation appears normal and affect normal.  NEURO: Normal movements and speech.  EYES: No jaundice or pallor.  SKIN: No jaundice.   RESP: Visibly dyspneic while talking.      Laboratory Studies:    Lab Results   Component Value Date    HGB 12.2 01/22/2024    HGB 12.0 01/04/2021     Lab Results   Component Value Date    PLT 78 01/22/2024    PLT 92 01/04/2021     Lab Results   Component Value Date    WBC 3.1 01/22/2024    WBC 3.2 01/04/2021       Lab Results   Component Value Date    INR 1.29 01/22/2024       Lab Results   Component Value Date    PROTTOTAL 7.5 01/22/2024    PROTTOTAL 6.7 04/23/2021      Lab Results   Component Value Date    ALBUMIN 3.9 01/22/2024    ALBUMIN 3.3 04/25/2023    ALBUMIN 3.1 04/23/2021     Lab " Results   Component Value Date    BILITOTAL 1.5 01/22/2024    BILITOTAL 0.9 04/23/2021     Lab Results   Component Value Date    ALKPHOS 109 01/22/2024    ALKPHOS 114 04/23/2021     Lab Results   Component Value Date    AST 34 01/22/2024    AST 18 04/23/2021     Lab Results   Component Value Date    ALT 23 01/22/2024    ALT 25 04/23/2021       Lab Results   Component Value Date    CR 0.93 01/22/2024    CR 1.04 04/23/2021     Lab Results   Component Value Date    BUN 15.6 01/22/2024    BUN 12 04/25/2023    BUN 17 04/23/2021       AFP 9.7 1/22/24    Imaging:     I personally reviewed the MR abdomen 1/22/2024. It shows post Y90 changes of segment 2/3. There is a tiny focus of new nodular internal enhancement. Increased size of the segment 8 lesion, now measuring 3.1 cm, previously 1.9 cm, with arterial enhancement, washout, and pseudocapsule, LR-5. Increased size of segment 4A lesion measuring 1.8 cm, previously 1.3 cm, with arterial enhancement and diffusion restriction, no washout or pseudocapsule, LR-4. Increased size of the segment 6 lesion, measuring 1.3 cm, previously 0.8 cm with arterial enhancement and diffusion restriction, no washout or pseudocapsule, LR-4. Increased size of the segment 7 lesion, measuring 1.3 cm, previously 9 mm, with arterial enhancement, questionable washout, LR-4. There are additional multiple subcentimeter arterially enhancing foci that are new/growing.    ASSESSMENT/PLAN:      Flash Brown is a 82 year old male with DEWITT cirrhosis and HCC who is 6 month post radioembolization of a segment 2/3 mass and made a good recovery. His follow up imaging demonstrates good response with possible new tiny focus of recurrence in the treatment bed, however most notably there is significant disease progression elsewhere in the liver, characterized by new or growing lesions throughout the lesion, some of which definitively show characteristics of HCC and others most likely HCC.  He appears frail  today and although he does not endorse shortness of breath, he is visibly dyspneic while talking.  He has also gained 20 pounds with fluid retention and there is concern for recurrent heart failure. Due to the dispersion and number of the lesions, Y90 is not a viable treatment, as he would be at high risk for liver failure. Additionally, with his heart failure, he is a high risk candidate for the general anesthesia required for microwave ablation and the number of lesions would require more than one procedure.  We discussed all of this with him and his sister Daylin, and we discussed that due to the degree of progression of disease and his poor clinical status, we would recommend he see oncology for consideration of systemic treatment.  Will make referral to oncology accordingly since his prior oncologist no longer works at [facility].  They expressed understanding and agreed with this plan.      It was a pleasure seeing the patient.     Signed,  Yary Ac DO  PGY5    Co-signed,  Dayna Osorio M.D.    Interventional Radiology  Department of Radiology  [facility]  Patient Care Team:  Thomas Jackson DO as PCP - General (Family Practice)  Thomas Jackson DO as Assigned PCP  Sang Avendaño MD as Assigned Cancer Care Provider  Dinesh Roque MD as MD (Dermatology)  Leventhal, Thomas Michael, MD as MD (Gastroenterology)  Dinesh Roque MD as Assigned Surgical Provider  Leventhal, Thomas Michael, MD as Assigned Gastroenterology Provider  Ute Suazo APRN CNP as Nurse Practitioner (Cardiovascular Disease)  Ute Suazo APRN CNP as Assigned Heart and Vascular Provider  Dinesh Roque MD as MD (Dermatology)  SELF, REFERRED      I, Dr Dayna Osorio, was present with the fellow during the clinic visit, including the history and exam. I discussed the case with the fellow and agree with the findings as documented in the  assessment and plan.         40 minutes spent by me on the date of the encounter doing chart review, history and exam, imaging review, documentation and further activities per the note.      Video-Visit Details     Type of service:  Video Visit     Video Start and End Time:  Joined the call at 1/26/2024, 10:44:47 am.  Left the call at 1/26/2024, 11:08:44 am.  You were on the call for 23 minutes 57 seconds .  Originating Location (pt. Location): Home     Distant Location (provider location):  [facility] [place]      Platform used for Video Visit: Health Integrated

## 2024-01-26 NOTE — NURSING NOTE
Is the patient currently in the state of [place]? YES    Visit mode:VIDEO    If the visit is dropped, the patient can be reconnected by: VIDEO VISIT: Text to cell phone:   Telephone Information:   Mobile [phone]    and VIDEO VISIT: Send to e-mail at: [email or web address]    Will anyone else be joining the visit? NO  (If patient encounters technical issues they should call [phone])    How would you like to obtain your AVS? Mail a copy    Are changes needed to the allergy or medication list? Pt stated no changes to allergies and Pt stated no med changes    Reason for visit: OSMANI Carver LPN

## 2024-01-29 ENCOUNTER — PATIENT OUTREACH (OUTPATIENT)
Dept: ONCOLOGY | Facility: CLINIC | Age: 84
End: 2024-01-29
Payer: MEDICARE

## 2024-01-29 NOTE — PROGRESS NOTES
Oncology referral from Dr Osorio (IR) for patient with multifocal hepatocellular carcinoma.    (See my Epic bookmarks for pertinent records)      Hx 6/2023 liver bx + HCC at [facility], s/p Y90 7/2023 by Dr Osorio. Recent scans 1/2024 show liver HCC multi focal progression, IR ref to Med Onc for systemic tx. PMH heart failure, lymphoma 1980s, cutaneous SCC/melanoma, HCC.    Pt was seen by Dr Avendaño 3/2023 at [place] for hx of lymphoma and skin cancer. Pt will need new Med Onc for the HCC diagnosis. New consult openings at [place] are several weeks away, however, Dr Watts is available 2/14/24 at his [place] location. Pt can establish with MD at [facility], with goal to eventually transfer care to MD's [place] location, space permitting. Or schedule any Onc for HCC per pt/sister preference.    Alin Valenzuela RN  Oncology Nurse Navigator  [facility]  [phone]

## 2024-02-12 NOTE — TELEPHONE ENCOUNTER
RECORDS STATUS - ALL OTHER DIAGNOSIS      RECORDS RECEIVED FROM: [facility] - Internal Records   DATE RECEIVED: 2/12

## 2024-02-14 ENCOUNTER — PRE VISIT (OUTPATIENT)
Dept: ONCOLOGY | Facility: HOSPITAL | Age: 84
End: 2024-02-14
Payer: MEDICARE

## 2024-02-14 ENCOUNTER — MEDICAL CORRESPONDENCE (OUTPATIENT)
Dept: HEALTH INFORMATION MANAGEMENT | Facility: CLINIC | Age: 84
End: 2024-02-14

## 2024-02-14 ENCOUNTER — ONCOLOGY VISIT (OUTPATIENT)
Dept: ONCOLOGY | Facility: HOSPITAL | Age: 84
End: 2024-02-14
Attending: RADIOLOGY
Payer: MEDICARE

## 2024-02-14 VITALS
HEART RATE: 67 BPM | RESPIRATION RATE: 28 BRPM | SYSTOLIC BLOOD PRESSURE: 129 MMHG | BODY MASS INDEX: 35.92 KG/M2 | HEIGHT: 66 IN | OXYGEN SATURATION: 96 % | WEIGHT: 223.5 LBS | DIASTOLIC BLOOD PRESSURE: 62 MMHG | TEMPERATURE: 97.6 F

## 2024-02-14 DIAGNOSIS — C22.0 HEPATOCELLULAR CARCINOMA (H): ICD-10-CM

## 2024-02-14 PROCEDURE — 99205 OFFICE O/P NEW HI 60 MIN: CPT | Performed by: INTERNAL MEDICINE

## 2024-02-14 PROCEDURE — G0463 HOSPITAL OUTPT CLINIC VISIT: HCPCS | Performed by: INTERNAL MEDICINE

## 2024-02-14 ASSESSMENT — PAIN SCALES - GENERAL: PAINLEVEL: NO PAIN (0)

## 2024-02-14 NOTE — PROGRESS NOTES
[facility] Hematology and Oncology Consult Note    Patient: Flash Brown  MRN: [id number]  Date of Service: 02/14/2024      Reason for Visit    Chief Complaint   Patient presents with    Oncology Clinic Visit     New patient consult related to Hepatocellular carcinoma         Assessment/Plan    Problem List Items Addressed This Visit          Digestive    Hepatocellular carcinoma (H)    Relevant Orders    Physical Therapy Referral     Advanced HCC  Not amenable for locoregional therapy  I reviewed his chart in detail.  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  He was diagnosed with HCC in June last year.  Had an enhancing lesion measuring 4.9 cm in segment 2 and 3.  Biopsy-proven to be HCC.  Underwent embolization.  This was done on 7/31/2023.  Subsequent MRIs have shown progressive disease.  Most recent MRI showing extensive disease with multiple lesions.  Not a candidate for any further local regional therapy.  Also has significant medical issues including congestive heart failure, coronary artery disease etc.    Liver function tests are pretty stable.  Bilirubin less than 2.  No evidence of ascites.  Child-Campbell class A.    In the setting I reviewed systemic therapy for advanced HCC.  Options include atezolizumab with bevacizumab or durvalumab with tremelimumab or single agent therapy.  Reviewed the rationale behind this.  Reviewed pertinent side effects and complications he is not a candidate for bevacizumab due to his history of coronary artery disease and congestive heart failure.  I think he can handle immunotherapy.  Potentially could try a priming dose of tremelimumab with durvalumab followed by durvalumab maintenance.  Reviewed potential side effects and complications associated with immunotherapy in detail today.  Although his performance status is not that great, he is able to get up and move around.  It is just that he does not have enough motivation.  Does not have any autoimmune  disorders or contraindications for immunotherapy.  I gave him information regarding immunotherapy today.  He wants to do treatments as much as possible in [facility].  Will put a plan and get approval and get going with the treatment in the near future.    ECOG Performance    2 - Ambulatory and independent in all ADLs; cannot work; up > 50% of the time    Problem List    Patient Active Problem List   Diagnosis    Benign essential hypertension    Malignant melanoma s/p resection in [place], [place]    RT axillary lymphoma s/p resection, with adjuvant radiation - [place], [place]    Basal cell skin cancer over nose s/p resection - [place], [place]    Mixed hyperlipidemia    Obstructive sleep apnea    ? 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)    Hyperlipidemia LDL goal <70    Other pancytopenia (H)    History of lymphoma    Lymph node cancer (H)    Acute congestive heart failure, unspecified heart failure type (H)    Sinus bradycardia    BPH (benign prostatic hyperplasia)    (HFpEF) heart failure with preserved ejection fraction (H)    Acute kidney failure, unspecified (H24)    Hypomagnesemia    Acute diastolic heart failure (H)    Chronic diastolic heart failure (H)    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        History of presenting illness:  Flash Brown  is an 83-year-old male with history of coronary artery disease, skin cancers including malignant melanoma, history of liver cirrhosis secondary to DEWITT, hepatocellular carcinoma status post radioembolization and July 2023 was been referred by interventional radiology for further evaluation management of recurrent HCC.    He was diagnosed with unresectable HCC last year in the setting of background DEWITT related cirrhosis.  He underwent radioembolization on 7/31/2023 to the left hepatic lobe mass.  He reported decent response.  Had been following with them with repeat MRIs.  Recently had an MRI done on 1.24 which showed cirrhotic appearing liver with evidence of portal hypertension.  He had evidence of embolization in hepatic segments 2 and 3 with persistent nodular internal enhancement measuring 3.5 cm suspicious for residual tumor.  He also had an enlarging 3.1 cm lesion in the segment 8 with washout and pseudocapsule formation consistent with HCC.  Additionally he had another arterially enhancing lesions which are all consistent with HCC.  He was deemed not a candidate for any further intervention and has been referred to us for consideration for systemic therapy.    As patient earlier he has multiple medical issues including coronary artery disease, heart failure, multiple skin malignancies etc.  Performance status is not robust.    Never smoker.  Does use alcohol pretty regularly.          Past History    Past Medical History:   Diagnosis Date    Basal cell carcinoma     CAD (coronary artery disease)     Depression     Diabetes (H)     Hyperlipidemia     Hypertension     Lymphoma (H)     Malignant melanoma (H)     Melanoma (H)     Squamous cell carcinoma     Family History   Problem Relation Age of Onset    Asthma Other     Cancer Other         melanoma    Blood Disease Other         bleeding disorder    Lung Cancer Mother         Smoker    Prostate Cancer Father     Melanoma No family hx of       Past Surgical  History:   Procedure Laterality Date    AXILLARY SURGERY      S/P resection and adjuvant radiation    BONE MARROW BIOPSY, BONE SPECIMEN, NEEDLE/TROCAR N/A 7/8/2019    Procedure: BIOPSY, BONE MARROW;  Surgeon: Husam Issa MD;  Location: [facility]    BONE MARROW BIOPSY, BONE SPECIMEN, NEEDLE/TROCAR Left 2/24/2022    Procedure: BIOPSY, BONE MARROW;  Surgeon: Cailin Anthony PA-C;  Location: [facility] OR    CARDIAC SURGERY      CHOLECYSTECTOMY      HERNIA REPAIR, UMBILICAL      IR LIVER BIOPSY PERCUTANEOUS  6/2/2023    IR SIRT (SELECTIVE INTERNAL RADIO THERAPY)  7/26/2023    IR VISCERAL ANGIOGRAM  7/26/2023    IR VISCERAL EMBOLIZATION  7/31/2023    PHACOEMULSIFICATION WITH STANDARD INTRAOCULAR LENS IMPLANT Right 10/2/2019    Procedure: Cataract Removal with Implant;  Surgeon: Dinesh Ivey MD;  Location: [facility]    PHACOEMULSIFICATION WITH STANDARD INTRAOCULAR LENS IMPLANT Left 10/21/2019    Procedure: Cataract Removal with Implant;  Surgeon: Dinesh Ivey MD;  Location: [facility]    STENT      PTCA with drug-eluting stent of RCA, circumflex, and LAD    VASCULAR SURGERY      Social History     Socioeconomic History    Marital status: Single     Spouse name: Not on file    Number of children: 0    Years of education: Not on file    Highest education level: Not on file   Occupational History    Occupation: Retired     Comment: [employer]    Tobacco Use    Smoking status: Never     Passive exposure: Never    Smokeless tobacco: Never   Vaping Use    Vaping Use: Never used   Substance and Sexual Activity    Alcohol use: Yes     Comment: glass of wine couple times per week    Drug use: Never    Sexual activity: Not on file   Other Topics Concern    Parent/sibling w/ CABG, MI or angioplasty before 65F 55M? Not Asked   Social History Narrative    Not on file     Social Determinants of Health     Financial Resource Strain: Low Risk  (10/10/2023)    Financial Resource Strain     Within the past 12 months,  have you or your family members you live with been unable to get utilities (heat, electricity) when it was really needed?: No   Food Insecurity: Low Risk  (10/10/2023)    Food Insecurity     Within the past 12 months, did you worry that your food would run out before you got money to buy more?: No     Within the past 12 months, did the food you bought just not last and you didn t have money to get more?: No   Transportation Needs: Low Risk  (10/10/2023)    Transportation Needs     Within the past 12 months, has lack of transportation kept you from medical appointments, getting your medicines, non-medical meetings or appointments, work, or from getting things that you need?: No   Physical Activity: Inactive (11/9/2021)    Exercise Vital Sign     Days of Exercise per Week: 0 days     Minutes of Exercise per Session: 0 min   Stress: No Stress Concern Present (11/9/2021)    [place] [employer] - Occupational Stress Questionnaire     Feeling of Stress : Not at all   Social Connections: Socially Isolated (11/9/2021)    Social Connection and Isolation Panel [NHANES]     Frequency of Communication with Friends and Family: Once a week     Frequency of Social Gatherings with Friends and Family: Once a week     Attends Christianity Services: More than 4 times per year     Active Member of Clubs or Organizations: No     Attends Club or Organization Meetings: Not on file     Marital Status: Never    Interpersonal Safety: Not on file   Housing Stability: Low Risk  (10/10/2023)    Housing Stability     Do you have housing? : Yes     Are you worried about losing your housing?: No        Allergies    Allergies   Allergen Reactions    Iodine Swelling     Patient states reaction was 20-30 years ago. Has had CT dye and coronary angiograms since then without premedication and did not have reaction.    Iodinated Contrast Media     Metoprolol Difficulty breathing     Difficulty breathing and bradycardia         Review of Systems    Pertinent items are noted in HPI.      Physical Exam        2/14/2024     1:06 PM   Oncology Vitals   Height 166 cm   Weight 101.379 kg   BSA (m2) 2.16 m2   /62   Pulse 67   Temp 97.6  F (36.4  C)   Temp src Tympanic   SpO2 96 %   Pain Score 0 (None)       General: alert and cooperative  HEENT: Head: Normal, normocephalic, atraumatic.  Eye: Normal external eye, conjunctiva, lids cornea, [place].  Chest: Clear to auscultation bilaterally  Cardiac: S1, S2 normal, regular rate and rhythm  Abdomen: Soft and nontender  Extremities: atraumatic, no peripheral edema  CNS: Alert and oriented x3, neurologic exam grossly normal.  Lymphatics: No bilateral cervical, axillary, supraclavicular adenopathy noted      Lab Results    No results found for this or any previous visit (from the past 168 hour(s)).    Imaging Results    MR Abdomen w/o & w Contrast    Result Date: 1/22/2024  MRI ABDOMEN CLINICAL HISTORY:  HCC status post Y90 radioembolization. TECHNIQUE: Images were acquired with and without intravenous contrast through the abdomen. The following MR images were acquired: TrueFISP, multiplanar T2 weighted, axial T1 in/out of phase, diffusion-weighted. Multiplanar T1-weighted images with fat saturation were before contrast administration and at multiple time points following the administration of intravenous contrast. Contrast dose: 10.0mL Gadavist FINDINGS: Comparison study: MRI 9/18/2023 Liver: Cirrhotic morphology of liver. Diffuse loss of signal on in phase imaging, suggestive of iron deposition. Lesion 1: Radioembolization changes in hepatic segment 2/3 with geographic arterial and progressive enhancement surrounding the targeted lesion with a persistent area of nodular internal enhancement measuring 3.5 cm along the anterior superior margin of the treated lesion (17/38). LR-TR viable. Lesion 2: Enlarging 3.1 cm arterially enhancing focus with central washout and pseudocapsule formation in  segment 8, previously measuring up to 1.9 cm (17/27, 19/32). There is a tiny enhancing nodule along the superior margin of the mass, concerning for satellite nodularity. LR-5b Lesion 3: Enlarging 1.8 cm arterially enhancing observation in segment 4A (17/32), previously measuring 1.3 cm. Associated mildly increased signal on T2 and diffusion-weighted images. There is no evidence of washout or pseudocapsule formation. LR-4 Lesion 4: Enlarging 13 mm arterially enhancing observation in segment 6 (17/43), previously measuring up to 8 mm. No definite washout or pseudocapsule formation. There is associated intermediate T2 signal and increased signal on diffusion-weighted images. OPTN 5a on the basis of growth criteria Lesion 5: Enlarging 13 mm arterially enhancing observation in segment 7 (17/35) with questionable washout on delayed phase images (22/36), previously measuring 9 mm. LR-4 There are multiple subcentimeter subcapsular arterially enhancing foci without pseudocapsule or washout for example in the segment 6 (17/58), in segment 6 (17/43) and in segment 4A (17/17). LR-3 Gallbladder: Surgically absent. Spleen: Enlarged up to 16.4 cm. Kidneys: Simple renal cysts. No hydronephrosis. Adrenal glands: Normal Pancreas: Diffuse fatty atrophy. No pancreatic ductal dilatation. Unchanged 10 mm trapped lobule of fat in the pancreatic body/tail (4/36 and 12/26). Bowel: No evidence of bowel obstruction. Moderate to large chronic stool burden. Lymph nodes: Decreased 11 mm mildly enlarged lymph node at the franklyn hepatis, previously measuring 13 mm (19/49). No new lymphadenopathy. Blood vessels: Patient vasculature. No abdominal aortic aneurysm. Lung bases: The lung bases are clear. Bones and soft tissues: No suspicious or aggressive appearing bone lesions. Mesentery and abdominal wall: No hernia. Ascites: None     IMPRESSION:  1. Cirrhosis and evidence of portal hypertension. 2. Radioembolization changes in hepatic segments 2/3  with persistent nodular internal enhancement measuring 3.5 cm along the anterior superior aspect of the treated lesion, suspicious for residual tumor. LR-TR viable. 3. Enlarging 3.1 cm observation in segment 8 with washout and pseudocapsule formation. OPTN-5b 4. 1.3 cm arterially enhancing lesion in segment 6 with associated increased signal on T2 and diffusion-weighted images, meeting criteria for hepatocellular carcinoma on the basis of growth criteria, previously measuring 0.8 mm on 9/18/2023. OPTN 5a-g. 5. Two additional enlarging LR-4 lesions in segments 4A and 7, as described above. 5. Multiple subcentimeter subcapsular foci of arterial enhancement without washout or pseudocapsule formation. LR-3. 6. Based on this exam only, the patient is not within [place] criteria. 7. Decreased mildly enlarged lymph node at the franklyn hepatis. OPTN/LIRADS definitions.  LIRADS v2018. LIRADS 1:  Definitely benign. LIRADS 2:  Probably benign. LIRADS 3:  Indeterminate for HCC. LIRADS 4:  Probably HCC. LIRADS M:  Probably malignant.  Not specific for HCC. LIRADS 5TR- nonviable:  Previously treated HCC without residual malignancy identified LIRADS 5TR- viable:  Previously treated HCC with findings indicating residual viable malignancy LIRADS 5TR- equivocal:  Previously treated HCC with findings that may be treatment changes or viable malignancy OPTN 5a:  Diagnostic for HCC.  < 2 cm. OPTN 5b:  Diagnostic for HCC.  > Or = 2 cm and < 5 cm. OPTN 5x:  Diagnostic for HCC.  > Or = 5 cm. [place] criteria for liver transplantation:  1. Presence of no HCCs greater than 5 cm. One HCC measuring 3-5 cm is allowed if no other HCCs are present. 2. Maximum of 3 HCCs measuring 3 cm or less. 3. No vascular invasion. 4. No extrahepatic metastases. I have personally reviewed the examination and initial interpretation and I agree with the findings. LUIS M DOHERTY DO   SYSTEM ID:  [id number]    CT Chest w/o contrast    Result Date: 1/22/2024  CT CHEST W/O  CONTRAST 1/22/2024 9:40 AM History: HCC. Eval for mets.; Hepatocellular carcinoma (H) Comparison: CT chest 6/29/2023 , MRI abdomen 9/18/2023., CT chest abdomen and pelvis 7/11/2019 Technique: CT of the chest was obtained without intravenous contrast. Axial, coronal, and sagittal reconstructions were obtained and reviewed. Contrast: None Findings: Lungs: There is a lateral right upper lobe mixed solid/groundglass opacity measuring approximately 2.2 x 1.5 cm, not significantly changed and measures similarly on 7/11/2019; this likely represents scarring. Right apical scarring. No new or enlarging pulmonary nodules. Scattered calcified granulomas. Airways: Mild debris and the proximal right mainstem bronchus. Vessels: Main pulmonary artery and aorta are normal in caliber. Atherosclerosis of the thoracic aorta. Normal three-vessel arch Heart: Heart size is normal without pericardial effusion. Coronary stents. Lymph nodes: No suspicious mediastinal or hilar lymphadenopathy. Thyroid: Within normal limits. Esophagus: Within normal limits Upper abdomen: Left hepatic lobe heterogeneous lesion, better evaluated on MRI 9/18/2023. Cholecystectomy. Atherosclerosis of the abdominal aorta and celiac axis. Bones and soft tissues: No suspicious axillary lymphadenopathy or soft tissue mass. No acute osseous lesion. Chronic right clavicle fracture. Multilevel degenerative changes of the spine. Diffuse hepatic skeletal hyperostosis. Mid thoracic dextrocurvature.     Impression: 1.  No evidence of metastatic disease to the chest. 2. Stable right upper lobe mixed opacity likely representing pleural parenchymal scarring, unchanged since at least 7/11/2019. 3. Left hepatic lobe heterogeneous lesion, better evaluated on MRI 9/18/2023 and consistent with history of HCC. I have personally reviewed the examination and initial interpretation and I agree with the findings. AVE FAIRBANKS MD   SYSTEM ID:  [id number]     A total of 60 minutes  was spent today on this visit including face to face conversation with the patient, EMR review (labs, imaging studies, pathology reports and outside records), counseling and care co-ordination and documentation.    Signed by: Bk Watts MD

## 2024-02-14 NOTE — PROGRESS NOTES
"Oncology Rooming Note    February 14, 2024 1:09 PM   Flash Brown is a 83 year old male who presents for:    Chief Complaint   Patient presents with    Oncology Clinic Visit     New patient consult related to Hepatocellular carcinoma     Initial Vitals: /62 (BP Location: Left arm, Patient Position: Sitting, Cuff Size: Adult Large)   Pulse 67   Temp 97.6  F (36.4  C) (Tympanic)   Resp 28   Ht 1.664 m (5' 5.5\")   Wt 101.4 kg (223 lb 8 oz)   SpO2 96%   BMI 36.63 kg/m   Estimated body mass index is 36.63 kg/m  as calculated from the following:    Height as of this encounter: 1.664 m (5' 5.5\").    Weight as of this encounter: 101.4 kg (223 lb 8 oz). Body surface area is 2.16 meters squared.  No Pain (0) Comment: Data Unavailable   No LMP for male patient.  Allergies reviewed: Yes  Medications reviewed: Yes    Medications: Medication refills not needed today.  Pharmacy name entered into [facility]: [id number] [place] [address]     Frailty Screening:   Is the patient here for a new oncology consult visit in cancer care? 1. Yes. Over the past month, have you experienced difficulty or required a caregiver to assist with:   1. Balance, walking or general mobility (including any falls)? YES  2. Completion of self-care tasks such as bathing, dressing, toileting, grooming/hygiene?  YES  3. Concentration or memory that affects your daily life?  NO       Clinical concerns: New patient consult related to Hepatocellular carcinoma      MATTIE CARDOZO CMA              "

## 2024-02-14 NOTE — LETTER
2/14/2024         RE: Flash Brown  [address]  [facility] [address]        Dear Colleague,    Thank you for referring your patient, Flash Brown, to the [facility]. Please see a copy of my visit note below.    [facility] Hematology and Oncology Consult Note    Patient: Flash Brown  MRN: [id number]  Date of Service: 02/14/2024      Reason for Visit    Chief Complaint   Patient presents with     Oncology Clinic Visit     New patient consult related to Hepatocellular carcinoma         Assessment/Plan    Problem List Items Addressed This Visit          Digestive    Hepatocellular carcinoma (H)    Relevant Orders    Physical Therapy Referral     Advanced HCC  Not amenable for locoregional therapy  I reviewed his chart in detail.  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  He was diagnosed with HCC in June last year.  Had an enhancing lesion measuring 4.9 cm in segment 2 and 3.  Biopsy-proven to be HCC.  Underwent embolization.  This was done on 7/31/2023.  Subsequent MRIs have shown progressive disease.  Most recent MRI showing extensive disease with multiple lesions.  Not a candidate for any further local regional therapy.  Also has significant medical issues including congestive heart failure, coronary artery disease etc.    Liver function tests are pretty stable.  Bilirubin less than 2.  No evidence of ascites.  Child-Campbell class A.    In the setting I reviewed systemic therapy for advanced HCC.  Options include atezolizumab with bevacizumab or durvalumab with tremelimumab or single agent therapy.  Reviewed the rationale behind this.  Reviewed pertinent side effects and complications he is not a candidate for bevacizumab due to his history of coronary artery disease and congestive heart failure.  I think he can handle immunotherapy.  Potentially could try a priming dose of tremelimumab with durvalumab followed by durvalumab maintenance.  Reviewed  potential side effects and complications associated with immunotherapy in detail today.  Although his performance status is not that great, he is able to get up and move around.  It is just that he does not have enough motivation.  Does not have any autoimmune disorders or contraindications for immunotherapy.  I gave him information regarding immunotherapy today.  He wants to do treatments as much as possible in [facility].  Will put a plan and get approval and get going with the treatment in the near future.    ECOG Performance    2 - Ambulatory and independent in all ADLs; cannot work; up > 50% of the time    Problem List    Patient Active Problem List   Diagnosis     Benign essential hypertension     Malignant melanoma s/p resection in [place], [place]     RT axillary lymphoma s/p resection, with adjuvant radiation - [place], [place]     Basal cell skin cancer over nose s/p resection - [place], [place]     Mixed hyperlipidemia     Obstructive sleep apnea     ? 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)     Hyperlipidemia LDL goal <70     Other pancytopenia (H)     History of lymphoma     Lymph node cancer (H)     Acute congestive heart failure, unspecified heart failure type (H)     Sinus bradycardia     BPH (benign prostatic hyperplasia)     (HFpEF) heart failure with preserved ejection fraction (H)     Acute kidney failure, unspecified (H24)     Hypomagnesemia     Acute diastolic heart failure (H)     Chronic diastolic heart failure (H)     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        History of presenting illness:  Flash Brown is an 83-year-old male with history of coronary artery disease, skin cancers including malignant melanoma, history of liver cirrhosis secondary to DEWITT, hepatocellular carcinoma status post radioembolization and July 2023 was been referred by interventional radiology for further evaluation management of recurrent HCC.    He was diagnosed with unresectable HCC last year in the setting of background DEWITT related cirrhosis.  He underwent radioembolization on 7/31/2023 to the left hepatic lobe mass.  He reported decent response.  Had been following with them with repeat MRIs.  Recently had an MRI done on 1.24 which showed cirrhotic appearing liver with evidence of portal hypertension.  He had evidence of embolization in hepatic segments 2 and 3 with persistent nodular internal enhancement measuring 3.5 cm suspicious for residual tumor.  He also had an enlarging 3.1 cm lesion in the segment 8 with washout and pseudocapsule formation consistent with HCC.  Additionally he had another arterially enhancing lesions which are all consistent with HCC.  He was deemed not a candidate for any further intervention and has been referred to us for consideration for systemic therapy.    As patient earlier he has multiple medical issues including coronary artery disease, heart failure, multiple skin malignancies etc.  Performance status is not robust.    Never smoker.  Does use alcohol pretty regularly.          Past History    Past Medical History:   Diagnosis Date     Basal cell carcinoma      CAD (coronary artery disease)      Depression      Diabetes (H)      Hyperlipidemia      Hypertension      Lymphoma (H)      Malignant melanoma (H)       Melanoma (H)      Squamous cell carcinoma     Family History   Problem Relation Age of Onset     Asthma Other      Cancer Other         melanoma     Blood Disease Other         bleeding disorder     Lung Cancer Mother         Smoker     Prostate Cancer Father      Melanoma No family hx of       Past Surgical History:   Procedure Laterality Date     AXILLARY SURGERY      S/P resection and adjuvant radiation     BONE MARROW BIOPSY, BONE SPECIMEN, NEEDLE/TROCAR N/A 7/8/2019    Procedure: BIOPSY, BONE MARROW;  Surgeon: Husam Issa MD;  Location: [facility]     BONE MARROW BIOPSY, BONE SPECIMEN, NEEDLE/TROCAR Left 2/24/2022    Procedure: BIOPSY, BONE MARROW;  Surgeon: Cailin Anthony PA-C;  Location: [facility] OR     CARDIAC SURGERY       CHOLECYSTECTOMY       HERNIA REPAIR, UMBILICAL       IR LIVER BIOPSY PERCUTANEOUS  6/2/2023     IR SIRT (SELECTIVE INTERNAL RADIO THERAPY)  7/26/2023     IR VISCERAL ANGIOGRAM  7/26/2023     IR VISCERAL EMBOLIZATION  7/31/2023     PHACOEMULSIFICATION WITH STANDARD INTRAOCULAR LENS IMPLANT Right 10/2/2019    Procedure: Cataract Removal with Implant;  Surgeon: Dinesh Ivey MD;  Location: [facility]     PHACOEMULSIFICATION WITH STANDARD INTRAOCULAR LENS IMPLANT Left 10/21/2019    Procedure: Cataract Removal with Implant;  Surgeon: Dinesh Ivey MD;  Location: [facility]     STENT      PTCA with drug-eluting stent of RCA, circumflex, and LAD     VASCULAR SURGERY      Social History     Socioeconomic History     Marital status: Single     Spouse name: Not on file     Number of children: 0     Years of education: Not on file     Highest education level: Not on file   Occupational History     Occupation: Retired     Comment: [employer]    Tobacco Use     Smoking status: Never     Passive exposure: Never     Smokeless tobacco: Never   Vaping Use     Vaping Use: Never used   Substance and Sexual Activity     Alcohol use: Yes     Comment: glass of wine couple times per week      Drug use: Never     Sexual activity: Not on file   Other Topics Concern     Parent/sibling w/ CABG, MI or angioplasty before 65F 55M? Not Asked   Social History Narrative     Not on file     Social Determinants of Health     Financial Resource Strain: Low Risk  (10/10/2023)    Financial Resource Strain      Within the past 12 months, have you or your family members you live with been unable to get utilities (heat, electricity) when it was really needed?: No   Food Insecurity: Low Risk  (10/10/2023)    Food Insecurity      Within the past 12 months, did you worry that your food would run out before you got money to buy more?: No      Within the past 12 months, did the food you bought just not last and you didn t have money to get more?: No   Transportation Needs: Low Risk  (10/10/2023)    Transportation Needs      Within the past 12 months, has lack of transportation kept you from medical appointments, getting your medicines, non-medical meetings or appointments, work, or from getting things that you need?: No   Physical Activity: Inactive (11/9/2021)    Exercise Vital Sign      Days of Exercise per Week: 0 days      Minutes of Exercise per Session: 0 min   Stress: No Stress Concern Present (11/9/2021)    [employer] [place] [employer] - Occupational Stress Questionnaire      Feeling of Stress : Not at all   Social Connections: Socially Isolated (11/9/2021)    Social Connection and Isolation Panel [NHANES]      Frequency of Communication with Friends and Family: Once a week      Frequency of Social Gatherings with Friends and Family: Once a week      Attends Buddhist Services: More than 4 times per year      Active Member of Clubs or Organizations: No      Attends Club or Organization Meetings: Not on file      Marital Status: Never    Interpersonal Safety: Not on file   Housing Stability: Low Risk  (10/10/2023)    Housing Stability      Do you have housing? : Yes      Are you worried about losing  your housing?: No        Allergies    Allergies   Allergen Reactions     Iodine Swelling     Patient states reaction was 20-30 years ago. Has had CT dye and coronary angiograms since then without premedication and did not have reaction.     Iodinated Contrast Media      Metoprolol Difficulty breathing     Difficulty breathing and bradycardia        Review of Systems    Pertinent items are noted in HPI.      Physical Exam        2/14/2024     1:06 PM   Oncology Vitals   Height 166 cm   Weight 101.379 kg   BSA (m2) 2.16 m2   /62   Pulse 67   Temp 97.6  F (36.4  C)   Temp src Tympanic   SpO2 96 %   Pain Score 0 (None)       General: alert and cooperative  HEENT: Head: Normal, normocephalic, atraumatic.  Eye: Normal external eye, conjunctiva, lids cornea, FRANKLIN.  Chest: Clear to auscultation bilaterally  Cardiac: S1, S2 normal, regular rate and rhythm  Abdomen: Soft and nontender  Extremities: atraumatic, no peripheral edema  CNS: Alert and oriented x3, neurologic exam grossly normal.  Lymphatics: No bilateral cervical, axillary, supraclavicular adenopathy noted      Lab Results    No results found for this or any previous visit (from the past 168 hour(s)).    Imaging Results    MR Abdomen w/o & w Contrast    Result Date: 1/22/2024  MRI ABDOMEN CLINICAL HISTORY:  HCC status post Y90 radioembolization. TECHNIQUE: Images were acquired with and without intravenous contrast through the abdomen. The following MR images were acquired: TrueFISP, multiplanar T2 weighted, axial T1 in/out of phase, diffusion-weighted. Multiplanar T1-weighted images with fat saturation were before contrast administration and at multiple time points following the administration of intravenous contrast. Contrast dose: 10.0mL Gadavist FINDINGS: Comparison study: MRI 9/18/2023 Liver: Cirrhotic morphology of liver. Diffuse loss of signal on in phase imaging, suggestive of iron deposition. Lesion 1: Radioembolization changes in hepatic segment 2/3  with geographic arterial and progressive enhancement surrounding the targeted lesion with a persistent area of nodular internal enhancement measuring 3.5 cm along the anterior superior margin of the treated lesion (17/38). LR-TR viable. Lesion 2: Enlarging 3.1 cm arterially enhancing focus with central washout and pseudocapsule formation in segment 8, previously measuring up to 1.9 cm (17/27, 19/32). There is a tiny enhancing nodule along the superior margin of the mass, concerning for satellite nodularity. LR-5b Lesion 3: Enlarging 1.8 cm arterially enhancing observation in segment 4A (17/32), previously measuring 1.3 cm. Associated mildly increased signal on T2 and diffusion-weighted images. There is no evidence of washout or pseudocapsule formation. LR-4 Lesion 4: Enlarging 13 mm arterially enhancing observation in segment 6 (17/43), previously measuring up to 8 mm. No definite washout or pseudocapsule formation. There is associated intermediate T2 signal and increased signal on diffusion-weighted images. OPTN 5a on the basis of growth criteria Lesion 5: Enlarging 13 mm arterially enhancing observation in segment 7 (17/35) with questionable washout on delayed phase images (22/36), previously measuring 9 mm. LR-4 There are multiple subcentimeter subcapsular arterially enhancing foci without pseudocapsule or washout for example in the segment 6 (17/58), in segment 6 (17/43) and in segment 4A (17/17). LR-3 Gallbladder: Surgically absent. Spleen: Enlarged up to 16.4 cm. Kidneys: Simple renal cysts. No hydronephrosis. Adrenal glands: Normal Pancreas: Diffuse fatty atrophy. No pancreatic ductal dilatation. Unchanged 10 mm trapped lobule of fat in the pancreatic body/tail (4/36 and 12/26). Bowel: No evidence of bowel obstruction. Moderate to large chronic stool burden. Lymph nodes: Decreased 11 mm mildly enlarged lymph node at the franklyn hepatis, previously measuring 13 mm (19/49). No new lymphadenopathy. Blood vessels:  Patient vasculature. No abdominal aortic aneurysm. Lung bases: The lung bases are clear. Bones and soft tissues: No suspicious or aggressive appearing bone lesions. Mesentery and abdominal wall: No hernia. Ascites: None     IMPRESSION:  1. Cirrhosis and evidence of portal hypertension. 2. Radioembolization changes in hepatic segments 2/3 with persistent nodular internal enhancement measuring 3.5 cm along the anterior superior aspect of the treated lesion, suspicious for residual tumor. LR-TR viable. 3. Enlarging 3.1 cm observation in segment 8 with washout and pseudocapsule formation. OPTN-5b 4. 1.3 cm arterially enhancing lesion in segment 6 with associated increased signal on T2 and diffusion-weighted images, meeting criteria for hepatocellular carcinoma on the basis of growth criteria, previously measuring 0.8 mm on 9/18/2023. OPTN 5a-g. 5. Two additional enlarging LR-4 lesions in segments 4A and 7, as described above. 5. Multiple subcentimeter subcapsular foci of arterial enhancement without washout or pseudocapsule formation. LR-3. 6. Based on this exam only, the patient is not within Godwin criteria. 7. Decreased mildly enlarged lymph node at the franklyn hepatis. OPTN/LIRADS definitions.  LIRADS v2018. LIRADS 1:  Definitely benign. LIRADS 2:  Probably benign. LIRADS 3:  Indeterminate for HCC. LIRADS 4:  Probably HCC. LIRADS M:  Probably malignant.  Not specific for HCC. LIRADS 5TR- nonviable:  Previously treated HCC without residual malignancy identified LIRADS 5TR- viable:  Previously treated HCC with findings indicating residual viable malignancy LIRADS 5TR- equivocal:  Previously treated HCC with findings that may be treatment changes or viable malignancy OPTN 5a:  Diagnostic for HCC.  < 2 cm. OPTN 5b:  Diagnostic for HCC.  > Or = 2 cm and < 5 cm. OPTN 5x:  Diagnostic for HCC.  > Or = 5 cm. [place] criteria for liver transplantation:  1. Presence of no HCCs greater than 5 cm. One HCC measuring 3-5 cm is allowed  if no other HCCs are present. 2. Maximum of 3 HCCs measuring 3 cm or less. 3. No vascular invasion. 4. No extrahepatic metastases. I have personally reviewed the examination and initial interpretation and I agree with the findings. LUIS M DUARTE DO CHAS   SYSTEM ID:  [id number]    CT Chest w/o contrast    Result Date: 1/22/2024  CT CHEST W/O CONTRAST 1/22/2024 9:40 AM History: HCC. Eval for mets.; Hepatocellular carcinoma (H) Comparison: CT chest 6/29/2023 , MRI abdomen 9/18/2023., CT chest abdomen and pelvis 7/11/2019 Technique: CT of the chest was obtained without intravenous contrast. Axial, coronal, and sagittal reconstructions were obtained and reviewed. Contrast: None Findings: Lungs: There is a lateral right upper lobe mixed solid/groundglass opacity measuring approximately 2.2 x 1.5 cm, not significantly changed and measures similarly on 7/11/2019; this likely represents scarring. Right apical scarring. No new or enlarging pulmonary nodules. Scattered calcified granulomas. Airways: Mild debris and the proximal right mainstem bronchus. Vessels: Main pulmonary artery and aorta are normal in caliber. Atherosclerosis of the thoracic aorta. Normal three-vessel arch Heart: Heart size is normal without pericardial effusion. Coronary stents. Lymph nodes: No suspicious mediastinal or hilar lymphadenopathy. Thyroid: Within normal limits. Esophagus: Within normal limits Upper abdomen: Left hepatic lobe heterogeneous lesion, better evaluated on MRI 9/18/2023. Cholecystectomy. Atherosclerosis of the abdominal aorta and celiac axis. Bones and soft tissues: No suspicious axillary lymphadenopathy or soft tissue mass. No acute osseous lesion. Chronic right clavicle fracture. Multilevel degenerative changes of the spine. Diffuse hepatic skeletal hyperostosis. Mid thoracic dextrocurvature.     Impression: 1.  No evidence of metastatic disease to the chest. 2. Stable right upper lobe mixed opacity likely representing pleural  "parenchymal scarring, unchanged since at least 7/11/2019. 3. Left hepatic lobe heterogeneous lesion, better evaluated on MRI 9/18/2023 and consistent with history of HCC. I have personally reviewed the examination and initial interpretation and I agree with the findings. AVE FAIRBANKS MD   SYSTEM ID:  [id number]     A total of 60 minutes was spent today on this visit including face to face conversation with the patient, EMR review (labs, imaging studies, pathology reports and outside records), counseling and care co-ordination and documentation.    Signed by: Bk Watts MD      Oncology Rooming Note    February 14, 2024 1:09 PM   Flash Brown is a 83 year old male who presents for:    Chief Complaint   Patient presents with     Oncology Clinic Visit     New patient consult related to Hepatocellular carcinoma     Initial Vitals: /62 (BP Location: Left arm, Patient Position: Sitting, Cuff Size: Adult Large)   Pulse 67   Temp 97.6  F (36.4  C) (Tympanic)   Resp 28   Ht 1.664 m (5' 5.5\")   Wt 101.4 kg (223 lb 8 oz)   SpO2 96%   BMI 36.63 kg/m   Estimated body mass index is 36.63 kg/m  as calculated from the following:    Height as of this encounter: 1.664 m (5' 5.5\").    Weight as of this encounter: 101.4 kg (223 lb 8 oz). Body surface area is 2.16 meters squared.  No Pain (0) Comment: Data Unavailable   No LMP for male patient.  Allergies reviewed: Yes  Medications reviewed: Yes    Medications: Medication refills not needed today.  Pharmacy name entered into [facility]: [id number] [place] [id number] [address]     Frailty Screening:   Is the patient here for a new oncology consult visit in cancer care? 1. Yes. Over the past month, have you experienced difficulty or required a caregiver to assist with:   1. Balance, walking or general mobility (including any falls)? YES  2. Completion of self-care tasks such as bathing, dressing, toileting, grooming/hygiene?  YES  3. Concentration or " memory that affects your daily life?  NO       Clinical concerns: New patient consult related to Hepatocellular carcinoma      MATTIE CARDOZO CMA                Again, thank you for allowing me to participate in the care of your patient.        Sincerely,        Bk Watts MD

## 2024-03-07 ENCOUNTER — PATIENT OUTREACH (OUTPATIENT)
Dept: ONCOLOGY | Facility: CLINIC | Age: 84
End: 2024-03-07

## 2024-03-07 ENCOUNTER — VIRTUAL VISIT (OUTPATIENT)
Dept: ONCOLOGY | Facility: CLINIC | Age: 84
End: 2024-03-07
Attending: INTERNAL MEDICINE
Payer: MEDICARE

## 2024-03-07 ENCOUNTER — APPOINTMENT (OUTPATIENT)
Dept: LAB | Facility: CLINIC | Age: 84
End: 2024-03-07
Payer: MEDICARE

## 2024-03-07 ENCOUNTER — INFUSION THERAPY VISIT (OUTPATIENT)
Dept: INFUSION THERAPY | Facility: CLINIC | Age: 84
End: 2024-03-07
Attending: INTERNAL MEDICINE
Payer: MEDICARE

## 2024-03-07 VITALS — SYSTOLIC BLOOD PRESSURE: 144 MMHG | HEART RATE: 68 BPM | DIASTOLIC BLOOD PRESSURE: 69 MMHG

## 2024-03-07 VITALS
HEART RATE: 78 BPM | BODY MASS INDEX: 35.84 KG/M2 | RESPIRATION RATE: 12 BRPM | TEMPERATURE: 97.9 F | HEIGHT: 66 IN | SYSTOLIC BLOOD PRESSURE: 131 MMHG | WEIGHT: 223 LBS | OXYGEN SATURATION: 98 % | DIASTOLIC BLOOD PRESSURE: 57 MMHG

## 2024-03-07 DIAGNOSIS — C22.0 HEPATOCELLULAR CARCINOMA (H): Primary | ICD-10-CM

## 2024-03-07 LAB
ALBUMIN SERPL BCG-MCNC: 3.8 G/DL (ref 3.5–5.2)
ALP SERPL-CCNC: 105 U/L (ref 40–150)
ALT SERPL W P-5'-P-CCNC: 24 U/L (ref 0–70)
ANION GAP SERPL CALCULATED.3IONS-SCNC: 11 MMOL/L (ref 7–15)
AST SERPL W P-5'-P-CCNC: 32 U/L (ref 0–45)
BASOPHILS # BLD AUTO: 0 10E3/UL (ref 0–0.2)
BASOPHILS NFR BLD AUTO: 1 %
BILIRUB SERPL-MCNC: 0.9 MG/DL
BUN SERPL-MCNC: 16 MG/DL (ref 8–23)
CALCIUM SERPL-MCNC: 8.9 MG/DL (ref 8.8–10.2)
CHLORIDE SERPL-SCNC: 100 MMOL/L (ref 98–107)
CREAT SERPL-MCNC: 0.97 MG/DL (ref 0.67–1.17)
DEPRECATED HCO3 PLAS-SCNC: 28 MMOL/L (ref 22–29)
EGFRCR SERPLBLD CKD-EPI 2021: 78 ML/MIN/1.73M2
EOSINOPHIL # BLD AUTO: 0.1 10E3/UL (ref 0–0.7)
EOSINOPHIL NFR BLD AUTO: 2 %
ERYTHROCYTE [DISTWIDTH] IN BLOOD BY AUTOMATED COUNT: 15.4 % (ref 10–15)
GLUCOSE SERPL-MCNC: 244 MG/DL (ref 70–99)
HCT VFR BLD AUTO: 35.7 % (ref 40–53)
HGB BLD-MCNC: 11.6 G/DL (ref 13.3–17.7)
IMM GRANULOCYTES # BLD: 0 10E3/UL
IMM GRANULOCYTES NFR BLD: 0 %
INR PPP: 1.27 (ref 0.85–1.15)
LYMPHOCYTES # BLD AUTO: 0.4 10E3/UL (ref 0–5.3)
LYMPHOCYTES NFR BLD AUTO: 14 %
MCH RBC QN AUTO: 27.4 PG (ref 26.5–33)
MCHC RBC AUTO-ENTMCNC: 32.5 G/DL (ref 31.5–36.5)
MCV RBC AUTO: 84 FL (ref 78–100)
MONOCYTES # BLD AUTO: 0.3 10E3/UL (ref 0–1.3)
MONOCYTES NFR BLD AUTO: 10 %
NEUTROPHILS # BLD AUTO: 2 10E3/UL (ref 1.6–8.3)
NEUTROPHILS NFR BLD AUTO: 73 %
NRBC # BLD AUTO: 0 10E3/UL
NRBC BLD AUTO-RTO: 0 /100
PLATELET # BLD AUTO: 68 10E3/UL (ref 150–450)
POTASSIUM SERPL-SCNC: 3.9 MMOL/L (ref 3.4–5.3)
PROT SERPL-MCNC: 6.9 G/DL (ref 6.4–8.3)
RBC # BLD AUTO: 4.24 10E6/UL (ref 4.4–5.9)
SODIUM SERPL-SCNC: 139 MMOL/L (ref 135–145)
TSH SERPL DL<=0.005 MIU/L-ACNC: 2.86 UIU/ML (ref 0.3–4.2)
WBC # BLD AUTO: 2.7 10E3/UL (ref 4–11)

## 2024-03-07 PROCEDURE — G2211 COMPLEX E/M VISIT ADD ON: HCPCS | Mod: 95 | Performed by: INTERNAL MEDICINE

## 2024-03-07 PROCEDURE — 82024 ASSAY OF ACTH: CPT | Performed by: INTERNAL MEDICINE

## 2024-03-07 PROCEDURE — 82247 BILIRUBIN TOTAL: CPT | Performed by: INTERNAL MEDICINE

## 2024-03-07 PROCEDURE — 84443 ASSAY THYROID STIM HORMONE: CPT | Performed by: INTERNAL MEDICINE

## 2024-03-07 PROCEDURE — 96375 TX/PRO/DX INJ NEW DRUG ADDON: CPT

## 2024-03-07 PROCEDURE — 258N000003 HC RX IP 258 OP 636: Performed by: INTERNAL MEDICINE

## 2024-03-07 PROCEDURE — 96417 CHEMO IV INFUS EACH ADDL SEQ: CPT

## 2024-03-07 PROCEDURE — 85610 PROTHROMBIN TIME: CPT | Performed by: INTERNAL MEDICINE

## 2024-03-07 PROCEDURE — 250N000011 HC RX IP 250 OP 636: Mod: JZ | Performed by: INTERNAL MEDICINE

## 2024-03-07 PROCEDURE — 99213 OFFICE O/P EST LOW 20 MIN: CPT | Mod: 95 | Performed by: INTERNAL MEDICINE

## 2024-03-07 PROCEDURE — 96413 CHEMO IV INFUSION 1 HR: CPT

## 2024-03-07 PROCEDURE — 85004 AUTOMATED DIFF WBC COUNT: CPT | Performed by: INTERNAL MEDICINE

## 2024-03-07 PROCEDURE — 36415 COLL VENOUS BLD VENIPUNCTURE: CPT | Performed by: INTERNAL MEDICINE

## 2024-03-07 RX ORDER — HEPARIN SODIUM (PORCINE) LOCK FLUSH IV SOLN 100 UNIT/ML 100 UNIT/ML
5 SOLUTION INTRAVENOUS
Status: CANCELLED | OUTPATIENT
Start: 2024-03-07

## 2024-03-07 RX ORDER — SPIRONOLACTONE 25 MG/1
TABLET ORAL
COMMUNITY
Start: 2024-02-12 | End: 2024-04-26

## 2024-03-07 RX ORDER — HEPARIN SODIUM,PORCINE 10 UNIT/ML
5-20 VIAL (ML) INTRAVENOUS DAILY PRN
Status: CANCELLED | OUTPATIENT
Start: 2024-03-07

## 2024-03-07 RX ORDER — EPINEPHRINE 1 MG/ML
0.3 INJECTION, SOLUTION, CONCENTRATE INTRAVENOUS EVERY 5 MIN PRN
Status: CANCELLED | OUTPATIENT
Start: 2024-03-07

## 2024-03-07 RX ORDER — ALBUTEROL SULFATE 0.83 MG/ML
2.5 SOLUTION RESPIRATORY (INHALATION)
Status: CANCELLED | OUTPATIENT
Start: 2024-03-07

## 2024-03-07 RX ORDER — ALBUTEROL SULFATE 90 UG/1
1-2 AEROSOL, METERED RESPIRATORY (INHALATION)
Status: CANCELLED
Start: 2024-03-07

## 2024-03-07 RX ORDER — DIPHENHYDRAMINE HYDROCHLORIDE 50 MG/ML
50 INJECTION INTRAMUSCULAR; INTRAVENOUS
Status: CANCELLED
Start: 2024-03-07

## 2024-03-07 RX ORDER — MEPERIDINE HYDROCHLORIDE 25 MG/ML
25 INJECTION INTRAMUSCULAR; INTRAVENOUS; SUBCUTANEOUS EVERY 30 MIN PRN
Status: CANCELLED | OUTPATIENT
Start: 2024-03-07

## 2024-03-07 RX ORDER — LORAZEPAM 2 MG/ML
0.5 INJECTION INTRAMUSCULAR EVERY 4 HOURS PRN
Status: CANCELLED | OUTPATIENT
Start: 2024-03-07

## 2024-03-07 RX ORDER — METHYLPREDNISOLONE SODIUM SUCCINATE 125 MG/2ML
125 INJECTION, POWDER, LYOPHILIZED, FOR SOLUTION INTRAMUSCULAR; INTRAVENOUS
Status: CANCELLED
Start: 2024-03-07

## 2024-03-07 RX ADMIN — SODIUM CHLORIDE 1500 MG: 9 INJECTION, SOLUTION INTRAVENOUS at 11:58

## 2024-03-07 RX ADMIN — SODIUM CHLORIDE 250 ML: 9 INJECTION, SOLUTION INTRAVENOUS at 09:56

## 2024-03-07 RX ADMIN — FAMOTIDINE 20 MG: 10 INJECTION, SOLUTION INTRAVENOUS at 09:56

## 2024-03-07 RX ADMIN — SODIUM CHLORIDE 300 MG: 9 INJECTION, SOLUTION INTRAVENOUS at 10:01

## 2024-03-07 ASSESSMENT — PAIN SCALES - GENERAL: PAINLEVEL: NO PAIN (0)

## 2024-03-07 NOTE — LETTER
3/7/2024         RE: Flash Brown  [address]  [facility] [place] [facility] [address]        Dear Colleague,    Thank you for referring your patient, Flash Brown, to the [facility]. Please see a copy of my visit note below.      Video-Visit Details    Type of service:  Video Visit    Video Start Time:  9:01 AM  Video End Time: 9:08 AM    Originating Location (pt. Location): Other [place] [facility]  in [place]    Distant Location (provider location):  [place]    Platform used for Video Visit: [facility] Hematology and Oncology Progress Note    Patient: Flash Brown  MRN: [id number]  3/07/24        Reason for Visit    No chief complaint on file.        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  Clinically he is doing well.  Trying to be active is much as possible.  Denies any abdominal pain.  Labs today reviewed.  LFTs are normal.  Bilirubin 0.9.  INR is 1.27.  CBC shows mild thrombocytopenia with a platelet count of 68,000.  Mild anemia.  Also has mild leukopenia.  Overall no contraindications to proceed with treatment today.  Will see him back in 2 weeks with repeat labs for toxicity check.  From cycle 2 onwards he will get only durvalumab.  He and his sister are in agreement with the plan.    Cancer Staging   No matching staging information was found for the patient.      ECOG Performance    2 - Ambulatory and independent in all ADLs; cannot work; up > 50% of the time         Problem List    Patient Active Problem List   Diagnosis     Benign essential hypertension     Malignant melanoma s/p resection in [place], [place]     RT axillary lymphoma s/p resection, with  adjuvant radiation - [place], [place]     Basal cell skin cancer over nose s/p resection - [place], [place]     Mixed hyperlipidemia     Obstructive sleep apnea     ? 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)     Hyperlipidemia LDL goal <70     Other pancytopenia (H)     History of lymphoma     Lymph node cancer (H)     Acute congestive heart failure, unspecified heart failure type (H)     Sinus bradycardia     BPH (benign prostatic hyperplasia)     (HFpEF) heart failure with preserved ejection fraction (H)     Acute kidney failure, unspecified (H24)     Hypomagnesemia     Acute diastolic heart failure (H)     Chronic diastolic heart failure (H)     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 He underwent radioembolization on 7/31/2023 to the left hepatic lobe mass. He reported decent response. Had been following with them with repeat MRIs. Recently had an MRI done on 1.24 which showed cirrhotic appearing liver with evidence of portal hypertension. He had evidence of embolization in hepatic segments 2 and 3 with persistent nodular internal enhancement measuring 3.5 cm suspicious for residual tumor. He also had an enlarging 3.1 cm lesion in the segment 8 with washout and pseudocapsule formation consistent with HCC. Additionally he had another arterially enhancing lesions which are all consistent with HCC. He was  deemed not a candidate for any further intervention and has been referred to us for consideration for systemic therapy.     Interval History   Flash Brown is a 83 year old male with advanced HCC not amenable for locoregional therapy who is seen as a video visit prior to starting systemic therapy with durvalumab and tremelimumab.    Doing well since last visit.  Previously had discussed about management of advanced HCC in the absence of local regional therapy.  He was a candidate for immunotherapy.  I do not think he would tolerate bevacizumab and hence decided to go with durvalumab and belimumab.  His bili was less than 2 and did not have any ascites on encephalopathy.  Child Campbell class A.  He is here to start therapy.  Denies any abdominal pain.        Review of Systems  A comprehensive review of systems was negative except for what is noted in the interval history    Current Outpatient Medications   Medication     aspirin (ASA) 81 MG EC tablet     atorvastatin (LIPITOR) 20 MG tablet     ferrous sulfate (FE TABS) 325 (65 Fe) MG EC tablet     furosemide (LASIX) 20 MG tablet     glipiZIDE (GLUCOTROL XL) 10 MG 24 hr tablet     lidocaine (XYLOCAINE) 5 % external ointment     magnesium oxide (MAG-OX) 400 MG tablet     metFORMIN (GLUCOPHAGE) 500 MG tablet     nitroGLYcerin (NITROSTAT) 0.4 MG sublingual tablet     ORDER FOR DME     ramipril (ALTACE) 5 MG capsule     tamsulosin (FLOMAX) 0.4 MG capsule     triamcinolone (KENALOG) 0.1 % external cream     No current facility-administered medications for this visit.     Facility-Administered Medications Ordered in Other Visits   Medication     sodium hyaluronate (HEALON DUET)        Physical Exam    Failed to redirect to the Timeline version of the [facility] SmartLink.    General: alert and cooperative      Lab Results    No results found for this or any previous visit (from the past 168 hour(s)).    Imaging    No results found.    The longitudinal plan of care for the  diagnosis(es)/condition(s) as documented were addressed during this visit. Due to the added complexity in care, I will continue to support Don in the subsequent management and with ongoing continuity of care.      Signed by: Bk Watts MD      Again, thank you for allowing me to participate in the care of your patient.        Sincerely,        Bk Watts MD

## 2024-03-07 NOTE — PROGRESS NOTES
Virtual Visit Details    Type of service:  Video Visit   Video Start Time: {video visit start/end time for provider to select:464593}  Video End Time:{video visit start/end time for provider to select:801473}    Originating Location (pt. Location): Other in clinic    Distant Location (provider location):  Off-site  Platform used for Video Visit: Christina Arias CMA on 3/7/2024 at 8:43 AM

## 2024-03-07 NOTE — PROGRESS NOTES
Video-Visit Details    Type of service:  Video Visit    Video Start Time:  9:01 AM  Video End Time: 9:08 AM    Originating Location (pt. Location): Other [place] [facility]  in [place]    Distant Location (provider location):  Home/[place]    Platform used for Video Visit: [facility] Hematology and Oncology Progress Note    Patient: Flash Brown  MRN: [id number]  3/07/24        Reason for Visit    No chief complaint on file.        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  Clinically he is doing well.  Trying to be active is much as possible.  Denies any abdominal pain.  Labs today reviewed.  LFTs are normal.  Bilirubin 0.9.  INR is 1.27.  CBC shows mild thrombocytopenia with a platelet count of 68,000.  Mild anemia.  Also has mild leukopenia.  Overall no contraindications to proceed with treatment today.  Will see him back in 2 weeks with repeat labs for toxicity check.  From cycle 2 onwards he will get only durvalumab.  He and his sister are in agreement with the plan.    Cancer Staging   No matching staging information was found for the patient.      ECOG Performance    2 - Ambulatory and independent in all ADLs; cannot work; up > 50% of the time         Problem List    Patient Active Problem List   Diagnosis    Benign essential hypertension    Malignant melanoma s/p resection in [place], [place]    RT axillary lymphoma s/p resection, with adjuvant radiation - [place], [place]    Basal cell skin cancer over nose s/p resection - [place], [place]    Mixed hyperlipidemia    Obstructive sleep apnea    ? 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)    Hyperlipidemia LDL goal <70    Other pancytopenia (H)    History of lymphoma    Lymph node cancer (H)    Acute congestive heart failure, unspecified heart failure type (H)    Sinus bradycardia    BPH (benign prostatic hyperplasia)    (HFpEF) heart failure with preserved ejection fraction (H)    Acute kidney failure, unspecified (H24)    Hypomagnesemia    Acute diastolic heart failure (H)    Chronic diastolic heart failure (H)    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 He underwent radioembolization on 7/31/2023 to the left hepatic lobe mass. He reported decent response. Had been following with them with repeat MRIs. Recently had an MRI done on 1.24 which showed cirrhotic appearing liver with evidence of portal hypertension. He had evidence of embolization in hepatic segments 2 and 3 with persistent nodular internal enhancement measuring 3.5 cm suspicious for residual tumor. He also had an enlarging 3.1 cm lesion in the segment 8 with washout and pseudocapsule formation consistent with HCC. Additionally he had another arterially enhancing lesions which are all consistent with HCC. He was deemed not a candidate for any further intervention and has been referred to us for consideration for systemic therapy.     Interval History   Flash Brown is a 83 year old male with advanced HCC not amenable for locoregional therapy who is seen as a video visit prior to starting  systemic therapy with durvalumab and tremelimumab.    Doing well since last visit.  Previously had discussed about management of advanced HCC in the absence of local regional therapy.  He was a candidate for immunotherapy.  I do not think he would tolerate bevacizumab and hence decided to go with durvalumab and belimumab.  His bili was less than 2 and did not have any ascites on encephalopathy.  Child Campbell class A.  He is here to start therapy.  Denies any abdominal pain.        Review of Systems  A comprehensive review of systems was negative except for what is noted in the interval history    Current Outpatient Medications   Medication    aspirin (ASA) 81 MG EC tablet    atorvastatin (LIPITOR) 20 MG tablet    ferrous sulfate (FE TABS) 325 (65 Fe) MG EC tablet    furosemide (LASIX) 20 MG tablet    glipiZIDE (GLUCOTROL XL) 10 MG 24 hr tablet    lidocaine (XYLOCAINE) 5 % external ointment    magnesium oxide (MAG-OX) 400 MG tablet    metFORMIN (GLUCOPHAGE) 500 MG tablet    nitroGLYcerin (NITROSTAT) 0.4 MG sublingual tablet    ORDER FOR DME    ramipril (ALTACE) 5 MG capsule    tamsulosin (FLOMAX) 0.4 MG capsule    triamcinolone (KENALOG) 0.1 % external cream     No current facility-administered medications for this visit.     Facility-Administered Medications Ordered in Other Visits   Medication    sodium hyaluronate (HEALON DUET)        Physical Exam    Failed to redirect to the Timeline version of the [facility] SmartLink.    General: alert and cooperative      Lab Results    No results found for this or any previous visit (from the past 168 hour(s)).    Imaging    No results found.    The longitudinal plan of care for the diagnosis(es)/condition(s) as documented were addressed during this visit. Due to the added complexity in care, I will continue to support Don in the subsequent management and with ongoing continuity of care.      Signed by: Bk Watts MD

## 2024-03-07 NOTE — LETTER
3/7/2024         RE: Flash Brown  [address]  [facility] [address]        Dear Colleague,    Thank you for referring your patient, Flash Brown, to the [facility] CANCER [facility]. Please see a copy of my visit note below.      Video-Visit Details    Type of service:  Video Visit    Video Start Time:  9:01 AM  Video End Time: 9:08 AM    Originating Location (pt. Location): Other [place] [facility]  in [place]    Distant Location (provider location):  [place]    Platform used for Video Visit: [facility] Hematology and Oncology Progress Note    Patient: Flash Brown  MRN: [id number]  3/07/24        Reason for Visit    No chief complaint on file.        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  Clinically he is doing well.  Trying to be active is much as possible.  Denies any abdominal pain.  Labs today reviewed.  LFTs are normal.  Bilirubin 0.9.  INR is 1.27.  CBC shows mild thrombocytopenia with a platelet count of 68,000.  Mild anemia.  Also has mild leukopenia.  Overall no contraindications to proceed with treatment today.  Will see him back in 2 weeks with repeat labs for toxicity check.  From cycle 2 onwards he will get only durvalumab.  He and his sister are in agreement with the plan.    Cancer Staging   No matching staging information was found for the patient.      ECOG Performance    2 - Ambulatory and independent in all ADLs; cannot work; up > 50% of the time         Problem List    Patient Active Problem List   Diagnosis     Benign essential hypertension     Malignant melanoma s/p resection in [place], [place]     RT axillary lymphoma s/p resection, with  adjuvant radiation - [place], [place]     Basal cell skin cancer over nose s/p resection - [place], [place]     Mixed hyperlipidemia     Obstructive sleep apnea     ? 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)     Hyperlipidemia LDL goal <70     Other pancytopenia (H)     History of lymphoma     Lymph node cancer (H)     Acute congestive heart failure, unspecified heart failure type (H)     Sinus bradycardia     BPH (benign prostatic hyperplasia)     (HFpEF) heart failure with preserved ejection fraction (H)     Acute kidney failure, unspecified (H24)     Hypomagnesemia     Acute diastolic heart failure (H)     Chronic diastolic heart failure (H)     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 He underwent radioembolization on 7/31/2023 to the left hepatic lobe mass. He reported decent response. Had been following with them with repeat MRIs. Recently had an MRI done on 1.24 which showed cirrhotic appearing liver with evidence of portal hypertension. He had evidence of embolization in hepatic segments 2 and 3 with persistent nodular internal enhancement measuring 3.5 cm suspicious for residual tumor. He also had an enlarging 3.1 cm lesion in the segment 8 with washout and pseudocapsule formation consistent with HCC. Additionally he had another arterially enhancing lesions which are all consistent with HCC. He was  deemed not a candidate for any further intervention and has been referred to us for consideration for systemic therapy.     Interval History   Flash Brown is a 83 year old male with advanced HCC not amenable for locoregional therapy who is seen as a video visit prior to starting systemic therapy with durvalumab and tremelimumab.    Doing well since last visit.  Previously had discussed about management of advanced HCC in the absence of local regional therapy.  He was a candidate for immunotherapy.  I do not think he would tolerate bevacizumab and hence decided to go with durvalumab and belimumab.  His bili was less than 2 and did not have any ascites on encephalopathy.  Child Campbell class A.  He is here to start therapy.  Denies any abdominal pain.        Review of Systems  A comprehensive review of systems was negative except for what is noted in the interval history    Current Outpatient Medications   Medication     aspirin (ASA) 81 MG EC tablet     atorvastatin (LIPITOR) 20 MG tablet     ferrous sulfate (FE TABS) 325 (65 Fe) MG EC tablet     furosemide (LASIX) 20 MG tablet     glipiZIDE (GLUCOTROL XL) 10 MG 24 hr tablet     lidocaine (XYLOCAINE) 5 % external ointment     magnesium oxide (MAG-OX) 400 MG tablet     metFORMIN (GLUCOPHAGE) 500 MG tablet     nitroGLYcerin (NITROSTAT) 0.4 MG sublingual tablet     ORDER FOR DME     ramipril (ALTACE) 5 MG capsule     tamsulosin (FLOMAX) 0.4 MG capsule     triamcinolone (KENALOG) 0.1 % external cream     No current facility-administered medications for this visit.     Facility-Administered Medications Ordered in Other Visits   Medication     sodium hyaluronate (HEALON DUET)        Physical Exam    Failed to redirect to the Timeline version of the [facility] SmartLink.    General: alert and cooperative      Lab Results    No results found for this or any previous visit (from the past 168 hour(s)).    Imaging    No results found.    The longitudinal plan of care for the  diagnosis(es)/condition(s) as documented were addressed during this visit. Due to the added complexity in care, I will continue to support Don in the subsequent management and with ongoing continuity of care.      Signed by: Bk Watts MD      Again, thank you for allowing me to participate in the care of your patient.        Sincerely,        Bk Watts MD

## 2024-03-07 NOTE — PROGRESS NOTES
[facility]: Cancer Care Initial Note                                    Discussion with Patient:                                                      Immunotherapy teach          Assessment:                                                      Initial  Current living arrangement:: I live in assisted living      Assessment:                                                      Assessment completed with:: Patient    Plan of Care Education   Yearly learning assessment completed?: Yes (see Education tab)  Diagnosis:: Hepatocellular Carcinoma  Does patient understand diagnosis?: Yes  Tx plan/regimen:: Tremelimumab/Durvalumab  Does patient understand treatment plan/regimen?: Yes  Preparing for treatment:: Reviewed treatment preparation information with patient (vascular access, day of chemo, visitor policy, what to bring, etc.)  Vascular access education provided for:: Peripheral IV  Side effect education:: Immune-mediated effects;Fatigue;Diarrhea/Constipation;Lab value monitoring (anemia, neutropenia, thrombocytopenia);Nausea/Vomiting;Skin changes  Safety/self care at home reviewed with patient:: Yes  Coping - concerns/fears reviewed with patient:: Yes  Plan of Care:: MD follow-up appointment;Treatment schedule;RABIA follow-up appointment  When to call provider:: Bleeding;Increased shortness of breath;Uncontrolled nausea/vomiting;New/worsening pain;Shaking chills;Temperature >100.4F;Uncontrolled diarrhea/constipation  Reasons for deferring treatment reviewed with patient:: Yes    Evaluation of Learning  Patient Education Provided: Yes  Readiness:: Acceptance  Method:: Booklet/Handout;Explanation  Response:: Verbalizes understanding      Intervention/Education provided during outreach:                                                       C1D1 today, status check on monday    Follow up call in 1-2 weeks  Patient to follow up as scheduled at next appt    Signature:  Mary Wasserman RN

## 2024-03-08 LAB — ACTH PLAS-MCNC: 15 PG/ML

## 2024-03-11 ENCOUNTER — PATIENT OUTREACH (OUTPATIENT)
Dept: ONCOLOGY | Facility: CLINIC | Age: 84
End: 2024-03-11
Payer: MEDICARE

## 2024-03-11 NOTE — PROGRESS NOTES
[facility]: Cancer Care                                                                                          Called pt sister to see how the weekend went and she reports pt is doing well, no side effect besides slight fatigue.     She will call if anything comes up. Pt has a follow up with D15.       Signature:  Mary Wasserman RN

## 2024-03-20 ENCOUNTER — LAB (OUTPATIENT)
Dept: LAB | Facility: CLINIC | Age: 84
End: 2024-03-20
Payer: MEDICARE

## 2024-03-20 ENCOUNTER — ONCOLOGY VISIT (OUTPATIENT)
Dept: ONCOLOGY | Facility: CLINIC | Age: 84
End: 2024-03-20
Attending: NURSE PRACTITIONER
Payer: MEDICARE

## 2024-03-20 VITALS
DIASTOLIC BLOOD PRESSURE: 47 MMHG | RESPIRATION RATE: 16 BRPM | HEART RATE: 68 BPM | OXYGEN SATURATION: 95 % | WEIGHT: 224 LBS | BODY MASS INDEX: 36 KG/M2 | HEIGHT: 66 IN | SYSTOLIC BLOOD PRESSURE: 141 MMHG | TEMPERATURE: 97.5 F

## 2024-03-20 DIAGNOSIS — C22.0 HEPATOCELLULAR CARCINOMA (H): Primary | ICD-10-CM

## 2024-03-20 DIAGNOSIS — C22.0 HEPATOCELLULAR CARCINOMA (H): ICD-10-CM

## 2024-03-20 LAB
ALBUMIN SERPL BCG-MCNC: 3.7 G/DL (ref 3.5–5.2)
ALP SERPL-CCNC: 100 U/L (ref 40–150)
ALT SERPL W P-5'-P-CCNC: 23 U/L (ref 0–70)
ANION GAP SERPL CALCULATED.3IONS-SCNC: 10 MMOL/L (ref 7–15)
AST SERPL W P-5'-P-CCNC: 30 U/L (ref 0–45)
BASOPHILS # BLD AUTO: 0 10E3/UL (ref 0–0.2)
BASOPHILS NFR BLD AUTO: 1 %
BILIRUB SERPL-MCNC: 1.1 MG/DL
BUN SERPL-MCNC: 19.1 MG/DL (ref 8–23)
CALCIUM SERPL-MCNC: 9.1 MG/DL (ref 8.8–10.2)
CHLORIDE SERPL-SCNC: 100 MMOL/L (ref 98–107)
CREAT SERPL-MCNC: 1.06 MG/DL (ref 0.67–1.17)
DEPRECATED HCO3 PLAS-SCNC: 28 MMOL/L (ref 22–29)
EGFRCR SERPLBLD CKD-EPI 2021: 71 ML/MIN/1.73M2
EOSINOPHIL # BLD AUTO: 0.1 10E3/UL (ref 0–0.7)
EOSINOPHIL NFR BLD AUTO: 3 %
ERYTHROCYTE [DISTWIDTH] IN BLOOD BY AUTOMATED COUNT: 15.9 % (ref 10–15)
GLUCOSE SERPL-MCNC: 135 MG/DL (ref 70–99)
HCT VFR BLD AUTO: 34.4 % (ref 40–53)
HGB BLD-MCNC: 11.3 G/DL (ref 13.3–17.7)
IMM GRANULOCYTES # BLD: 0 10E3/UL
IMM GRANULOCYTES NFR BLD: 0 %
LYMPHOCYTES # BLD AUTO: 0.5 10E3/UL (ref 0.8–5.3)
LYMPHOCYTES NFR BLD AUTO: 18 %
MCH RBC QN AUTO: 28 PG (ref 26.5–33)
MCHC RBC AUTO-ENTMCNC: 32.8 G/DL (ref 31.5–36.5)
MCV RBC AUTO: 85 FL (ref 78–100)
MONOCYTES # BLD AUTO: 0.4 10E3/UL (ref 0–1.3)
MONOCYTES NFR BLD AUTO: 13 %
NEUTROPHILS # BLD AUTO: 2 10E3/UL (ref 1.6–8.3)
NEUTROPHILS NFR BLD AUTO: 65 %
NRBC # BLD AUTO: 0 10E3/UL
NRBC BLD AUTO-RTO: 0 /100
PLATELET # BLD AUTO: 60 10E3/UL (ref 150–450)
POTASSIUM SERPL-SCNC: 3.9 MMOL/L (ref 3.4–5.3)
PROT SERPL-MCNC: 6.8 G/DL (ref 6.4–8.3)
RBC # BLD AUTO: 4.03 10E6/UL (ref 4.4–5.9)
SODIUM SERPL-SCNC: 138 MMOL/L (ref 135–145)
WBC # BLD AUTO: 3.1 10E3/UL (ref 4–11)

## 2024-03-20 PROCEDURE — 36415 COLL VENOUS BLD VENIPUNCTURE: CPT

## 2024-03-20 PROCEDURE — G0463 HOSPITAL OUTPT CLINIC VISIT: HCPCS | Performed by: NURSE PRACTITIONER

## 2024-03-20 PROCEDURE — 82040 ASSAY OF SERUM ALBUMIN: CPT

## 2024-03-20 PROCEDURE — G2211 COMPLEX E/M VISIT ADD ON: HCPCS | Performed by: NURSE PRACTITIONER

## 2024-03-20 PROCEDURE — 99214 OFFICE O/P EST MOD 30 MIN: CPT | Performed by: NURSE PRACTITIONER

## 2024-03-20 PROCEDURE — 85004 AUTOMATED DIFF WBC COUNT: CPT

## 2024-03-20 ASSESSMENT — PAIN SCALES - GENERAL: PAINLEVEL: NO PAIN (0)

## 2024-03-20 NOTE — PROGRESS NOTES
[facility] Hematology and Oncology Progress Note    Patient: Flash Brown  MRN: [id number]  3/20/24        Reason for Visit    HCC    Primary Oncologist: Dr. 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  Tolerating this very well with mild fatigue a few days after infusion, now resolved.  Labs: Hgb 11.3, WBC 3.1, plat 60; CMP: WNL    Clinically he is doing well.  Trying to be active is much as possible.  Asymptomatic from the cancer at this time.     Plan:  -Return in 2 weeks for cycle 2. Starting with C2, will get durvalumab only  -If doing well, will see Ely/Dr. Watts every 4 week cycle  -Trend AFP and LFTs   -Repeat imaging after 3-4 cycles       Oncology history  7/2023: Unresectable multifocal HCC, in setting of background DEWITT-related cirrhosis.   -dominant L hepatic lobe mass, treated by embolization; then followed  -1/2024 MRI: cirrhotic appearing liver with evidence of portal hypertension. Post-embolization change of hepatic segments 2 and 3 with persistent nodular internal enhancement measuring 3.5 cm suspicious for residual tumor. Enlarging 3.1 cm lesion in the segment 8 with washout and pseudocapsule formation consistent with HCC. Additionally,  other arterially enhancing lesions which are all consistent with HCC. Further local therapies not an option.    Treatment:  -7/31/2023: radioembolization to L hepatic lobe mass; partial response      -3/7/2024 - present: palliative durvalumab and tremelimumab (cycle 1 only); then durvalumab maintenance every 28 days (not felt to be a candidate for bevacizumab)  --AFP 10.2 (9/2023) ahead of start    Interval History   Flash Brown is a 83 year old male with advanced HCC who initiated palliative durvalumab and tremelimumab two weeks ago. Returns for 2-week mid-cycle eval.    Doing well since  last visit. Tolerating his first cycle of immunotherapy very well aside from mild fatigue x 4 days after infusion. No diarrhea. No dyspnea. No pruritus/rashes.     Denies any abdominal pain/distension.  Eats well, gaining some weight. No LE edema.      ECOG Performance    2 - Ambulatory and independent in all ADLs; cannot work; up > 50% of the time      Physical Exam    General: alert and cooperative. Accompanied by sister  HEENT: No icterus  Lymph: Not assessed today  Lungs: regular respiratory effort  Abd: Soft, non-tender, non-distended.   Extremities: No LE edema  Neuro: non-focal    Lab Results    Recent Results (from the past 168 hour(s))   Comprehensive metabolic panel (BMP + Alb, Alk Phos, ALT, AST, Total. Bili, TP)   Result Value Ref Range    Sodium 138 135 - 145 mmol/L    Potassium 3.9 3.4 - 5.3 mmol/L    Carbon Dioxide (CO2) 28 22 - 29 mmol/L    Anion Gap 10 7 - 15 mmol/L    Urea Nitrogen 19.1 8.0 - 23.0 mg/dL    Creatinine 1.06 0.67 - 1.17 mg/dL    GFR Estimate 71 >60 mL/min/1.73m2    Calcium 9.1 8.8 - 10.2 mg/dL    Chloride 100 98 - 107 mmol/L    Glucose 135 (H) 70 - 99 mg/dL    Alkaline Phosphatase 100 40 - 150 U/L    AST 30 0 - 45 U/L    ALT 23 0 - 70 U/L    Protein Total 6.8 6.4 - 8.3 g/dL    Albumin 3.7 3.5 - 5.2 g/dL    Bilirubin Total 1.1 <=1.2 mg/dL   CBC with platelets and differential   Result Value Ref Range    WBC Count 3.1 (L) 4.0 - 11.0 10e3/uL    RBC Count 4.03 (L) 4.40 - 5.90 10e6/uL    Hemoglobin 11.3 (L) 13.3 - 17.7 g/dL    Hematocrit 34.4 (L) 40.0 - 53.0 %    MCV 85 78 - 100 fL    MCH 28.0 26.5 - 33.0 pg    MCHC 32.8 31.5 - 36.5 g/dL    RDW 15.9 (H) 10.0 - 15.0 %    Platelet Count 60 (L) 150 - 450 10e3/uL    % Neutrophils 65 %    % Lymphocytes 18 %    % Monocytes 13 %    % Eosinophils 3 %    % Basophils 1 %    % Immature Granulocytes 0 %    NRBCs per 100 WBC 0 <1 /100    Absolute Neutrophils 2.0 1.6 - 8.3 10e3/uL    Absolute Lymphocytes 0.5 (L) 0.8 - 5.3 10e3/uL    Absolute Monocytes  0.4 0.0 - 1.3 10e3/uL    Absolute Eosinophils 0.1 0.0 - 0.7 10e3/uL    Absolute Basophils 0.0 0.0 - 0.2 10e3/uL    Absolute Immature Granulocytes 0.0 <=0.4 10e3/uL    Absolute NRBCs 0.0 10e3/uL       Imaging    No results found.    Total time 30 minutes, to include face to face visit, review of EMR, ordering, documentation and coordination of care on date of service.    complexity modifier for longitudinal care.       Signed by: Ely Flores, NP

## 2024-03-20 NOTE — LETTER
3/20/2024         RE: Flash Brown  [address]  [facility] [address]        Dear Colleague,    Thank you for referring your patient, Flash Brown, to the [facility] [place]. Please see a copy of my visit note below.    [facility] Hematology and Oncology Progress Note    Patient: Flash Brown  MRN: [id number]  3/20/24        Reason for Visit    HCC    Primary Oncologist: Dr. 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  Tolerating this very well with mild fatigue a few days after infusion, now resolved.  Labs: Hgb 11.3, WBC 3.1, plat 60; CMP: WNL    Clinically he is doing well.  Trying to be active is much as possible.  Asymptomatic from the cancer at this time.     Plan:  -Return in 2 weeks for cycle 2. Starting with C2, will get durvalumab only  -If doing well, will see E[place]/Dr. Watts every 4 week cycle  -Trend AFP and LFTs   -Repeat imaging after 3-4 cycles       Oncology history  7/2023: Unresectable multifocal HCC, in setting of background DEWITT-related cirrhosis.   -dominant L hepatic lobe mass, treated by embolization; then followed  -1/2024 MRI: cirrhotic appearing liver with evidence of portal hypertension. Post-embolization change of hepatic segments 2 and 3 with persistent nodular internal enhancement measuring 3.5 cm suspicious for residual tumor. Enlarging 3.1 cm lesion in the segment 8 with washout and pseudocapsule formation consistent with HCC. Additionally,  other arterially enhancing lesions which are all consistent with HCC. Further local therapies not an option.    Treatment:  -7/31/2023: radioembolization to L hepatic lobe mass; partial response      -3/7/2024 - present: palliative durvalumab and tremelimumab (cycle 1 only); then durvalumab maintenance every 28 days (not felt to be a  candidate for bevacizumab)  --AFP 10.2 (9/2023) ahead of start    Interval History   Flash Brown is a 83 year old male with advanced HCC who initiated palliative durvalumab and tremelimumab two weeks ago. Returns for 2-week mid-cycle eval.    Doing well since last visit. Tolerating his first cycle of immunotherapy very well aside from mild fatigue x 4 days after infusion. No diarrhea. No dyspnea. No pruritus/rashes.     Denies any abdominal pain/distension.  Eats well, gaining some weight. No LE edema.      ECOG Performance    2 - Ambulatory and independent in all ADLs; cannot work; up > 50% of the time      Physical Exam    General: alert and cooperative. Accompanied by sister  HEENT: No icterus  Lymph: Not assessed today  Lungs: regular respiratory effort  Abd: Soft, non-tender, non-distended.   Extremities: No LE edema  Neuro: non-focal    Lab Results    Recent Results (from the past 168 hour(s))   Comprehensive metabolic panel (BMP + Alb, Alk Phos, ALT, AST, Total. Bili, TP)   Result Value Ref Range    Sodium 138 135 - 145 mmol/L    Potassium 3.9 3.4 - 5.3 mmol/L    Carbon Dioxide (CO2) 28 22 - 29 mmol/L    Anion Gap 10 7 - 15 mmol/L    Urea Nitrogen 19.1 8.0 - 23.0 mg/dL    Creatinine 1.06 0.67 - 1.17 mg/dL    GFR Estimate 71 >60 mL/min/1.73m2    Calcium 9.1 8.8 - 10.2 mg/dL    Chloride 100 98 - 107 mmol/L    Glucose 135 (H) 70 - 99 mg/dL    Alkaline Phosphatase 100 40 - 150 U/L    AST 30 0 - 45 U/L    ALT 23 0 - 70 U/L    Protein Total 6.8 6.4 - 8.3 g/dL    Albumin 3.7 3.5 - 5.2 g/dL    Bilirubin Total 1.1 <=1.2 mg/dL   CBC with platelets and differential   Result Value Ref Range    WBC Count 3.1 (L) 4.0 - 11.0 10e3/uL    RBC Count 4.03 (L) 4.40 - 5.90 10e6/uL    Hemoglobin 11.3 (L) 13.3 - 17.7 g/dL    Hematocrit 34.4 (L) 40.0 - 53.0 %    MCV 85 78 - 100 fL    MCH 28.0 26.5 - 33.0 pg    MCHC 32.8 31.5 - 36.5 g/dL    RDW 15.9 (H) 10.0 - 15.0 %    Platelet Count 60 (L) 150 - 450 10e3/uL    % Neutrophils 65 %  "   % Lymphocytes 18 %    % Monocytes 13 %    % Eosinophils 3 %    % Basophils 1 %    % Immature Granulocytes 0 %    NRBCs per 100 WBC 0 <1 /100    Absolute Neutrophils 2.0 1.6 - 8.3 10e3/uL    Absolute Lymphocytes 0.5 (L) 0.8 - 5.3 10e3/uL    Absolute Monocytes 0.4 0.0 - 1.3 10e3/uL    Absolute Eosinophils 0.1 0.0 - 0.7 10e3/uL    Absolute Basophils 0.0 0.0 - 0.2 10e3/uL    Absolute Immature Granulocytes 0.0 <=0.4 10e3/uL    Absolute NRBCs 0.0 10e3/uL       Imaging    No results found.    Total time 30 minutes, to include face to face visit, review of EMR, ordering, documentation and coordination of care on date of service.    complexity modifier for longitudinal care.       Signed by: Ely Flores NP      Oncology Rooming Note    March 20, 2024 8:48 AM   Flash Brown is a 83 year old male who presents for:    Chief Complaint   Patient presents with     Oncology Clinic Visit     Hepatocellular carcinoma - Labs and provider visits     Initial Vitals: BP (!) 141/47 (BP Location: Right arm, Patient Position: Sitting, Cuff Size: Adult Large)   Pulse 68   Temp 97.5  F (36.4  C) (Tympanic)   Resp 16   Ht 1.664 m (5' 5.5\")   Wt 101.6 kg (224 lb)   SpO2 95%   BMI 36.71 kg/m   Estimated body mass index is 36.71 kg/m  as calculated from the following:    Height as of this encounter: 1.664 m (5' 5.5\").    Weight as of this encounter: 101.6 kg (224 lb). Body surface area is 2.17 meters squared.  No Pain (0) Comment: Data Unavailable   No LMP for male patient.  Allergies reviewed: Yes  Medications reviewed: Yes    Medications: Medication refills not needed today.  Pharmacy name entered into [employer]:    [id number] [place], [place] - [address]  [place] - [address] [place] [address]    Frailty Screening:   Is the patient here for a new oncology consult visit in cancer care? 2. No      Clinical concerns:  None      Marcelle Arias CMA                Again, thank you for allowing me " to participate in the care of your patient.        Sincerely,        Ely Flores NP

## 2024-03-20 NOTE — PROGRESS NOTES
"Oncology Rooming Note    March 20, 2024 8:48 AM   Flash Brown is a 83 year old male who presents for:    Chief Complaint   Patient presents with    Oncology Clinic Visit     Hepatocellular carcinoma - Labs and provider visits     Initial Vitals: BP (!) 141/47 (BP Location: Right arm, Patient Position: Sitting, Cuff Size: Adult Large)   Pulse 68   Temp 97.5  F (36.4  C) (Tympanic)   Resp 16   Ht 1.664 m (5' 5.5\")   Wt 101.6 kg (224 lb)   SpO2 95%   BMI 36.71 kg/m   Estimated body mass index is 36.71 kg/m  as calculated from the following:    Height as of this encounter: 1.664 m (5' 5.5\").    Weight as of this encounter: 101.6 kg (224 lb). Body surface area is 2.17 meters squared.  No Pain (0) Comment: Data Unavailable   No LMP for male patient.  Allergies reviewed: Yes  Medications reviewed: Yes    Medications: Medication refills not needed today.  Pharmacy name entered into [employer]:    [id number] [place], [place] - [address]  [place], [place] - [address] [place] [address]    Frailty Screening:   Is the patient here for a new oncology consult visit in cancer care? 2. No      Clinical concerns:  None      Marcelle Arias CMA              "

## 2024-04-04 ENCOUNTER — VIRTUAL VISIT (OUTPATIENT)
Dept: ONCOLOGY | Facility: CLINIC | Age: 84
End: 2024-04-04
Attending: INTERNAL MEDICINE
Payer: MEDICARE

## 2024-04-04 ENCOUNTER — INFUSION THERAPY VISIT (OUTPATIENT)
Dept: INFUSION THERAPY | Facility: CLINIC | Age: 84
End: 2024-04-04
Attending: INTERNAL MEDICINE
Payer: MEDICARE

## 2024-04-04 ENCOUNTER — LAB (OUTPATIENT)
Dept: LAB | Facility: CLINIC | Age: 84
End: 2024-04-04
Payer: MEDICARE

## 2024-04-04 VITALS
SYSTOLIC BLOOD PRESSURE: 140 MMHG | RESPIRATION RATE: 16 BRPM | BODY MASS INDEX: 35.84 KG/M2 | DIASTOLIC BLOOD PRESSURE: 48 MMHG | OXYGEN SATURATION: 96 % | WEIGHT: 223 LBS | HEIGHT: 66 IN | TEMPERATURE: 97.6 F | HEART RATE: 68 BPM

## 2024-04-04 DIAGNOSIS — C22.0 HEPATOCELLULAR CARCINOMA (H): Primary | ICD-10-CM

## 2024-04-04 DIAGNOSIS — R35.0 INCREASED FREQUENCY OF URINATION: ICD-10-CM

## 2024-04-04 DIAGNOSIS — D69.6 THROMBOCYTOPENIA (H): Primary | ICD-10-CM

## 2024-04-04 DIAGNOSIS — C22.0 HEPATOCELLULAR CARCINOMA (H): ICD-10-CM

## 2024-04-04 LAB
AFP SERPL-MCNC: 10.1 NG/ML
ALBUMIN SERPL BCG-MCNC: 3.5 G/DL (ref 3.5–5.2)
ALP SERPL-CCNC: 92 U/L (ref 40–150)
ALT SERPL W P-5'-P-CCNC: 23 U/L (ref 0–70)
ANION GAP SERPL CALCULATED.3IONS-SCNC: 7 MMOL/L (ref 7–15)
AST SERPL W P-5'-P-CCNC: 29 U/L (ref 0–45)
BILIRUB SERPL-MCNC: 1.1 MG/DL
BUN SERPL-MCNC: 15.9 MG/DL (ref 8–23)
CALCIUM SERPL-MCNC: 9.2 MG/DL (ref 8.8–10.2)
CHLORIDE SERPL-SCNC: 103 MMOL/L (ref 98–107)
CREAT SERPL-MCNC: 0.98 MG/DL (ref 0.67–1.17)
DEPRECATED HCO3 PLAS-SCNC: 29 MMOL/L (ref 22–29)
EGFRCR SERPLBLD CKD-EPI 2021: 77 ML/MIN/1.73M2
GLUCOSE SERPL-MCNC: 164 MG/DL (ref 70–99)
HOLD SPECIMEN: NORMAL
POTASSIUM SERPL-SCNC: 4.4 MMOL/L (ref 3.4–5.3)
PROT SERPL-MCNC: 6.7 G/DL (ref 6.4–8.3)
SODIUM SERPL-SCNC: 139 MMOL/L (ref 135–145)
TSH SERPL DL<=0.005 MIU/L-ACNC: 3.15 UIU/ML (ref 0.3–4.2)

## 2024-04-04 PROCEDURE — 82105 ALPHA-FETOPROTEIN SERUM: CPT | Performed by: NURSE PRACTITIONER

## 2024-04-04 PROCEDURE — 84443 ASSAY THYROID STIM HORMONE: CPT | Performed by: NURSE PRACTITIONER

## 2024-04-04 PROCEDURE — G2211 COMPLEX E/M VISIT ADD ON: HCPCS | Mod: 95 | Performed by: INTERNAL MEDICINE

## 2024-04-04 PROCEDURE — 96413 CHEMO IV INFUSION 1 HR: CPT

## 2024-04-04 PROCEDURE — 99213 OFFICE O/P EST LOW 20 MIN: CPT | Mod: 95 | Performed by: INTERNAL MEDICINE

## 2024-04-04 PROCEDURE — 258N000003 HC RX IP 258 OP 636: Performed by: INTERNAL MEDICINE

## 2024-04-04 PROCEDURE — 36415 COLL VENOUS BLD VENIPUNCTURE: CPT | Performed by: NURSE PRACTITIONER

## 2024-04-04 PROCEDURE — 250N000011 HC RX IP 250 OP 636: Mod: JZ | Performed by: INTERNAL MEDICINE

## 2024-04-04 PROCEDURE — 80053 COMPREHEN METABOLIC PANEL: CPT | Performed by: NURSE PRACTITIONER

## 2024-04-04 RX ORDER — LORAZEPAM 2 MG/ML
0.5 INJECTION INTRAMUSCULAR EVERY 4 HOURS PRN
Status: CANCELLED | OUTPATIENT
Start: 2024-04-04

## 2024-04-04 RX ORDER — ALBUTEROL SULFATE 0.83 MG/ML
2.5 SOLUTION RESPIRATORY (INHALATION)
Status: CANCELLED | OUTPATIENT
Start: 2024-04-04

## 2024-04-04 RX ORDER — HEPARIN SODIUM (PORCINE) LOCK FLUSH IV SOLN 100 UNIT/ML 100 UNIT/ML
5 SOLUTION INTRAVENOUS
Status: CANCELLED | OUTPATIENT
Start: 2024-04-04

## 2024-04-04 RX ORDER — ALBUTEROL SULFATE 90 UG/1
1-2 AEROSOL, METERED RESPIRATORY (INHALATION)
Status: CANCELLED
Start: 2024-04-04

## 2024-04-04 RX ORDER — EPINEPHRINE 1 MG/ML
0.3 INJECTION, SOLUTION, CONCENTRATE INTRAVENOUS EVERY 5 MIN PRN
Status: CANCELLED | OUTPATIENT
Start: 2024-04-04

## 2024-04-04 RX ORDER — HEPARIN SODIUM,PORCINE 10 UNIT/ML
5-20 VIAL (ML) INTRAVENOUS DAILY PRN
Status: CANCELLED | OUTPATIENT
Start: 2024-04-04

## 2024-04-04 RX ORDER — METHYLPREDNISOLONE SODIUM SUCCINATE 125 MG/2ML
125 INJECTION, POWDER, LYOPHILIZED, FOR SOLUTION INTRAMUSCULAR; INTRAVENOUS
Status: CANCELLED
Start: 2024-04-04

## 2024-04-04 RX ORDER — DIPHENHYDRAMINE HYDROCHLORIDE 50 MG/ML
50 INJECTION INTRAMUSCULAR; INTRAVENOUS
Status: CANCELLED
Start: 2024-04-04

## 2024-04-04 RX ORDER — MEPERIDINE HYDROCHLORIDE 25 MG/ML
25 INJECTION INTRAMUSCULAR; INTRAVENOUS; SUBCUTANEOUS EVERY 30 MIN PRN
Status: CANCELLED | OUTPATIENT
Start: 2024-04-04

## 2024-04-04 RX ADMIN — SODIUM CHLORIDE 1500 MG: 9 INJECTION, SOLUTION INTRAVENOUS at 09:44

## 2024-04-04 RX ADMIN — SODIUM CHLORIDE 250 ML: 9 INJECTION, SOLUTION INTRAVENOUS at 09:44

## 2024-04-04 NOTE — PROGRESS NOTES
Video-Visit Details    Type of service:  Video Visit    Video Start Time:  8:56 AM  Video End Time:  9:03 AM    Originating Location (pt. Location): Other [place] cancer clinic  in [place]    Distant Location (provider location): Off-site    Platform used for Video Visit: [facility] Hematology and Oncology Progress Note    Patient: Flash Brown  MRN: [id number]  4/04/24        Reason for Visit    HCC    Primary Oncologist: Dr. 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  Did fairly well with it.  No significant side effects.  She is here to continue immunotherapy with single agent durvalumab.    Labs reviewed today.  Serum chemistry is normal.  LFTs are within normal limits.  Bilirubin is normal at 1.1.  TSH is normal at 3.15.  Glucose is mildly elevated.  No contraindications to proceed with durvalumab today.  There is no significant immunotherapy related side effect so far.    No contraindications to proceed with cycle 2.  From this cycle onwards he will get durvalumab only.  I will see him back in 4 weeks with repeat labs.  Plan is to repeat imaging after 4 cycles.    He does complain of increased frequency of urination.  Happens mostly at night.  He is also on diuretics.  His PSA was checked last year which was normal.  No signs of infection.  Will continue to monitor for now.       Oncology history  7/2023: Unresectable multifocal HCC, in setting of background DEWITT-related cirrhosis.   -dominant L hepatic lobe mass, treated by embolization; then followed  -1/2024 MRI: cirrhotic appearing liver with evidence of portal hypertension. Post-embolization change of hepatic segments 2 and 3 with persistent nodular internal enhancement measuring 3.5 cm suspicious for residual tumor. Enlarging 3.1 cm lesion in the segment 8 with washout and  pseudocapsule formation consistent with HCC. Additionally,  other arterially enhancing lesions which are all consistent with HCC. Further local therapies not an option.    Treatment:  -7/31/2023: radioembolization to L hepatic lobe mass; partial response      -3/7/2024 - present: palliative durvalumab and tremelimumab (cycle 1 only); then durvalumab maintenance every 28 days (not felt to be a candidate for bevacizumab)  --AFP 10.2 (9/2023) ahead of start    Interval History   Flash Brown is a 83 year old male with advanced HCC who initiated palliative durvalumab and tremelimumab.  Received for cycle 4 weeks ago.  He is seen as a video visit today with repeat labs and consideration for cycle 2 durvalumab.    Has done really well on treatment so far.  No significant side effects with first cycle.  He received both durvalumab and priming dose of tremelimumab.  No diarrhea.  No skin rash.  No other immunotherapy related side effects.  He does complain of some increased frequency of urination at night.  No fevers.  Labs reviewed today.      ECOG Performance    2 - Ambulatory and independent in all ADLs; cannot work; up > 50% of the time      Physical Exam    General: alert and cooperative. Accompanied by sister      Lab Results    Recent Results (from the past 168 hour(s))   Comprehensive metabolic panel   Result Value Ref Range    Sodium 139 135 - 145 mmol/L    Potassium 4.4 3.4 - 5.3 mmol/L    Carbon Dioxide (CO2) 29 22 - 29 mmol/L    Anion Gap 7 7 - 15 mmol/L    Urea Nitrogen 15.9 8.0 - 23.0 mg/dL    Creatinine 0.98 0.67 - 1.17 mg/dL    GFR Estimate 77 >60 mL/min/1.73m2    Calcium 9.2 8.8 - 10.2 mg/dL    Chloride 103 98 - 107 mmol/L    Glucose 164 (H) 70 - 99 mg/dL    Alkaline Phosphatase 92 40 - 150 U/L    AST 29 0 - 45 U/L    ALT 23 0 - 70 U/L    Protein Total 6.7 6.4 - 8.3 g/dL    Albumin 3.5 3.5 - 5.2 g/dL    Bilirubin Total 1.1 <=1.2 mg/dL   TSH with free T4 reflex   Result Value Ref Range    TSH 3.15 0.30 -  4.20 uIU/mL         Imaging    No results found.      The longitudinal plan of care for the diagnosis(es)/condition(s) as documented were addressed during this visit. Due to the added complexity in care, I will continue to support Don in the subsequent management and with ongoing continuity of care.        Signed by: Bk Watts MD

## 2024-04-04 NOTE — LETTER
4/4/2024         RE: Flash Brown  [address]  [facility] [address]        Dear Colleague,    Thank you for referring your patient, Flash Brown, to the [facility]. Please see a copy of my visit note below.    Video-Visit Details    Type of service:  Video Visit    Video Start Time:  8:56 AM  Video End Time:  9:03 AM    Originating Location (pt. Location): Other [place] [facility] clinic  in [place]    Distant Location (provider location): Off-site    Platform used for Video Visit: [facility] Hematology and Oncology Progress Note    Patient: Flash Brown  MRN: [id number]  4/04/24        Reason for Visit    HCC    Primary Oncologist: Dr. 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  Did fairly well with it.  No significant side effects.  She is here to continue immunotherapy with single agent durvalumab.    Labs reviewed today.  Serum chemistry is normal.  LFTs are within normal limits.  Bilirubin is normal at 1.1.  TSH is normal at 3.15.  Glucose is mildly elevated.  No contraindications to proceed with durvalumab today.  There is no significant immunotherapy related side effect so far.    No contraindications to proceed with cycle 2.  From this cycle onwards he will get durvalumab only.  I will see him back in 4 weeks with repeat labs.  Plan is to repeat imaging after 4 cycles.    He does complain of increased frequency of urination.  Happens mostly at night.  He is also on diuretics.  His PSA was checked last year which was normal.  No signs of infection.  Will continue to monitor for now.       Oncology history  7/2023: Unresectable multifocal HCC, in setting of background DEWITT-related cirrhosis.   -dominant L hepatic lobe mass, treated by embolization; then followed  -1/2024 MRI: cirrhotic appearing  liver with evidence of portal hypertension. Post-embolization change of hepatic segments 2 and 3 with persistent nodular internal enhancement measuring 3.5 cm suspicious for residual tumor. Enlarging 3.1 cm lesion in the segment 8 with washout and pseudocapsule formation consistent with HCC. Additionally,  other arterially enhancing lesions which are all consistent with HCC. Further local therapies not an option.    Treatment:  -7/31/2023: radioembolization to L hepatic lobe mass; partial response      -3/7/2024 - present: palliative durvalumab and tremelimumab (cycle 1 only); then durvalumab maintenance every 28 days (not felt to be a candidate for bevacizumab)  --AFP 10.2 (9/2023) ahead of start    Interval History   Flash Brown is a 83 year old male with advanced HCC who initiated palliative durvalumab and tremelimumab.  Received for cycle 4 weeks ago.  He is seen as a video visit today with repeat labs and consideration for cycle 2 durvalumab.    Has done really well on treatment so far.  No significant side effects with first cycle.  He received both durvalumab and priming dose of tremelimumab.  No diarrhea.  No skin rash.  No other immunotherapy related side effects.  He does complain of some increased frequency of urination at night.  No fevers.  Labs reviewed today.      ECOG Performance    2 - Ambulatory and independent in all ADLs; cannot work; up > 50% of the time      Physical Exam    General: alert and cooperative. Accompanied by sister      Lab Results    Recent Results (from the past 168 hour(s))   Comprehensive metabolic panel   Result Value Ref Range    Sodium 139 135 - 145 mmol/L    Potassium 4.4 3.4 - 5.3 mmol/L    Carbon Dioxide (CO2) 29 22 - 29 mmol/L    Anion Gap 7 7 - 15 mmol/L    Urea Nitrogen 15.9 8.0 - 23.0 mg/dL    Creatinine 0.98 0.67 - 1.17 mg/dL    GFR Estimate 77 >60 mL/min/1.73m2    Calcium 9.2 8.8 - 10.2 mg/dL    Chloride 103 98 - 107 mmol/L    Glucose 164 (H) 70 - 99 mg/dL     Alkaline Phosphatase 92 40 - 150 U/L    AST 29 0 - 45 U/L    ALT 23 0 - 70 U/L    Protein Total 6.7 6.4 - 8.3 g/dL    Albumin 3.5 3.5 - 5.2 g/dL    Bilirubin Total 1.1 <=1.2 mg/dL   TSH with free T4 reflex   Result Value Ref Range    TSH 3.15 0.30 - 4.20 uIU/mL         Imaging    No results found.      The longitudinal plan of care for the diagnosis(es)/condition(s) as documented were addressed during this visit. Due to the added complexity in care, I will continue to support Don in the subsequent management and with ongoing continuity of care.        Signed by: Bk Watts MD      Again, thank you for allowing me to participate in the care of your patient.        Sincerely,        Bk Watts MD

## 2024-04-04 NOTE — LETTER
4/4/2024         RE: Flash Brown  [address]  [facility] [address]        Dear Colleague,    Thank you for referring your patient, Flash Brown, to the [facility] CANCER [facility]. Please see a copy of my visit note below.    Video-Visit Details    Type of service:  Video Visit    Video Start Time:  8:56 AM  Video End Time:  9:03 AM    Originating Location (pt. Location): Other [place] [facility]  in [place]    Distant Location (provider location): Off-site    Platform used for Video Visit: [facility] Hematology and Oncology Progress Note    Patient: Flash Brown  MRN: [id number]  4/04/24        Reason for Visit    HCC    Primary Oncologist: Dr. 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  Did fairly well with it.  No significant side effects.  She is here to continue immunotherapy with single agent durvalumab.    Labs reviewed today.  Serum chemistry is normal.  LFTs are within normal limits.  Bilirubin is normal at 1.1.  TSH is normal at 3.15.  Glucose is mildly elevated.  No contraindications to proceed with durvalumab today.  There is no significant immunotherapy related side effect so far.    No contraindications to proceed with cycle 2.  From this cycle onwards he will get durvalumab only.  I will see him back in 4 weeks with repeat labs.  Plan is to repeat imaging after 4 cycles.    He does complain of increased frequency of urination.  Happens mostly at night.  He is also on diuretics.  His PSA was checked last year which was normal.  No signs of infection.  Will continue to monitor for now.       Oncology history  7/2023: Unresectable multifocal HCC, in setting of background DEWITT-related cirrhosis.   -dominant L hepatic lobe mass, treated by embolization; then followed  -1/2024 MRI: cirrhotic appearing  liver with evidence of portal hypertension. Post-embolization change of hepatic segments 2 and 3 with persistent nodular internal enhancement measuring 3.5 cm suspicious for residual tumor. Enlarging 3.1 cm lesion in the segment 8 with washout and pseudocapsule formation consistent with HCC. Additionally,  other arterially enhancing lesions which are all consistent with HCC. Further local therapies not an option.    Treatment:  -7/31/2023: radioembolization to L hepatic lobe mass; partial response      -3/7/2024 - present: palliative durvalumab and tremelimumab (cycle 1 only); then durvalumab maintenance every 28 days (not felt to be a candidate for bevacizumab)  --AFP 10.2 (9/2023) ahead of start    Interval History   Flash Brown is a 83 year old male with advanced HCC who initiated palliative durvalumab and tremelimumab.  Received for cycle 4 weeks ago.  He is seen as a video visit today with repeat labs and consideration for cycle 2 durvalumab.    Has done really well on treatment so far.  No significant side effects with first cycle.  He received both durvalumab and priming dose of tremelimumab.  No diarrhea.  No skin rash.  No other immunotherapy related side effects.  He does complain of some increased frequency of urination at night.  No fevers.  Labs reviewed today.      ECOG Performance    2 - Ambulatory and independent in all ADLs; cannot work; up > 50% of the time      Physical Exam    General: alert and cooperative. Accompanied by sister      Lab Results    Recent Results (from the past 168 hour(s))   Comprehensive metabolic panel   Result Value Ref Range    Sodium 139 135 - 145 mmol/L    Potassium 4.4 3.4 - 5.3 mmol/L    Carbon Dioxide (CO2) 29 22 - 29 mmol/L    Anion Gap 7 7 - 15 mmol/L    Urea Nitrogen 15.9 8.0 - 23.0 mg/dL    Creatinine 0.98 0.67 - 1.17 mg/dL    GFR Estimate 77 >60 mL/min/1.73m2    Calcium 9.2 8.8 - 10.2 mg/dL    Chloride 103 98 - 107 mmol/L    Glucose 164 (H) 70 - 99 mg/dL     Alkaline Phosphatase 92 40 - 150 U/L    AST 29 0 - 45 U/L    ALT 23 0 - 70 U/L    Protein Total 6.7 6.4 - 8.3 g/dL    Albumin 3.5 3.5 - 5.2 g/dL    Bilirubin Total 1.1 <=1.2 mg/dL   TSH with free T4 reflex   Result Value Ref Range    TSH 3.15 0.30 - 4.20 uIU/mL         Imaging    No results found.      The longitudinal plan of care for the diagnosis(es)/condition(s) as documented were addressed during this visit. Due to the added complexity in care, I will continue to support Don in the subsequent management and with ongoing continuity of care.        Signed by: Bk Watts MD      Again, thank you for allowing me to participate in the care of your patient.        Sincerely,        Bk Watts MD

## 2024-04-04 NOTE — PROGRESS NOTES
Infusion Nursing Note:  Flash Brown presents today for C2D1 Imfinzi.    Patient seen by provider today: Yes: Dr. Watts virtually   present during visit today: Not Applicable.    Note: N/A.      Intravenous Access:  Labs drawn without difficulty.  Peripheral IV placed.    Treatment Conditions:  Lab Results   Component Value Date     04/04/2024    POTASSIUM 4.4 04/04/2024    MAG 1.8 09/02/2022    CR 0.98 04/04/2024    NATALIIA 9.2 04/04/2024    BILITOTAL 1.1 04/04/2024    ALBUMIN 3.5 04/04/2024    ALT 23 04/04/2024    AST 29 04/04/2024       Results reviewed, labs MET treatment parameters, ok to proceed with treatment.      Post Infusion Assessment:  Patient tolerated infusion without incident.  Blood return noted pre and post infusion.  Site patent and intact, free from redness, edema or discomfort.  No evidence of extravasations.  Access discontinued per protocol.       Discharge Plan:   Patient discharged in stable condition accompanied by: wife.  Departure Mode: Ambulatory with walker.  Pt to return 5/2/24 at 12:00 pm for labs followed by Dr. Watts in clinic and then C3D1 Imfinzi.      Danna Mackey RN

## 2024-04-24 ENCOUNTER — DOCUMENTATION ONLY (OUTPATIENT)
Dept: GERIATRICS | Facility: CLINIC | Age: 84
End: 2024-04-24
Payer: MEDICARE

## 2024-04-26 ENCOUNTER — TRANSITIONAL CARE UNIT VISIT (OUTPATIENT)
Dept: GERIATRICS | Facility: CLINIC | Age: 84
End: 2024-04-26
Payer: MEDICARE

## 2024-04-26 VITALS
WEIGHT: 219.4 LBS | HEART RATE: 53 BPM | OXYGEN SATURATION: 97 % | SYSTOLIC BLOOD PRESSURE: 124 MMHG | TEMPERATURE: 98 F | RESPIRATION RATE: 18 BRPM | DIASTOLIC BLOOD PRESSURE: 58 MMHG | BODY MASS INDEX: 35.95 KG/M2

## 2024-04-26 DIAGNOSIS — K75.81 LIVER CIRRHOSIS SECONDARY TO NONALCOHOLIC STEATOHEPATITIS (NASH) (H): ICD-10-CM

## 2024-04-26 DIAGNOSIS — E11.9 DIABETES MELLITUS WITHOUT COMPLICATION (H): ICD-10-CM

## 2024-04-26 DIAGNOSIS — R60.1 GENERALIZED EDEMA: ICD-10-CM

## 2024-04-26 DIAGNOSIS — M62.81 GENERALIZED MUSCLE WEAKNESS: ICD-10-CM

## 2024-04-26 DIAGNOSIS — K74.60 LIVER CIRRHOSIS SECONDARY TO NONALCOHOLIC STEATOHEPATITIS (NASH) (H): ICD-10-CM

## 2024-04-26 DIAGNOSIS — I50.32 CHRONIC DIASTOLIC HEART FAILURE (H): Primary | ICD-10-CM

## 2024-04-26 DIAGNOSIS — I10 BENIGN ESSENTIAL HYPERTENSION: ICD-10-CM

## 2024-04-26 DIAGNOSIS — R00.1 SYMPTOMATIC BRADYCARDIA: ICD-10-CM

## 2024-04-26 DIAGNOSIS — L29.9 PRURITIC DISORDER: ICD-10-CM

## 2024-04-26 DIAGNOSIS — I25.10 CORONARY ARTERY DISEASE INVOLVING NATIVE CORONARY ARTERY OF NATIVE HEART WITHOUT ANGINA PECTORIS: ICD-10-CM

## 2024-04-26 PROCEDURE — 99309 SBSQ NF CARE MODERATE MDM 30: CPT | Performed by: NURSE PRACTITIONER

## 2024-04-26 RX ORDER — FUROSEMIDE 20 MG
20 TABLET ORAL DAILY
Status: SHIPPED
Start: 2024-04-26

## 2024-04-26 RX ORDER — HYDROXYZINE HYDROCHLORIDE 25 MG/1
25 TABLET, FILM COATED ORAL 3 TIMES DAILY
Status: SHIPPED
Start: 2024-04-26 | End: 2024-04-30

## 2024-04-26 NOTE — PROGRESS NOTES
[facility] GERIATRICS    PRIMARY CARE PROVIDER AND CLINIC:  Thomas Jackson DO, [address]  / [place] [address]  Chief Complaint   Patient presents with    Hospital F/U      [facility] Medical Record Number:  [id number]  Place of Service where encounter took place:  [facility]  AT [facility] [[id number]]    Flash Brown  is a 83 year old  (1940), admitted to the above facility from  [facility] . Hospital stay 04/21/2024 through 04/24/2024..   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 male with a past medical history significant for CAD, type 2 diabetes mellitus, liver cirrhosis, chronic diastolic CHF, hypertension, hyperlipidemia, history of lymphoma, liver cancer.  Patient is from assisted living facility and was noted to have an episode of nausea and vomiting. Afterwards patient was noted to be confused. EMS noted his blood sugar was 207, was deemed to be in A-fib with heart rates between 50-70, blood pressure did drop to systolic 80s systolic. Did receive a dose of saline bolus after which his blood pressure improved.  In the ED patient heart rate was fluctuating, would dip down to 40s.     Symptomatic bradycardia  -Presenting with episode of nausea, confusion, noted to have bradycardia with heart rate in 40s, associated hypotension.  -EKG showed possible A-fib with slow ventricular rate. Upon cardiology review this is ever felt to be sinus rhythm with second-degree AV block with baseline artifact.  -History of similar episode in 7/2023, symptoms improved at that time following discontinuation  of beta-blocker. Currently not on any beta-blockers.  -Initial HS troponin was 21, follow-up troponin was 24. Not acute coronary syndrome  Per cardiology patient has known second-degree AV block, Mobitz type I  Heart rate improves with activity and basically in the 60s or above  P per cardiology patient has short, nonsustained episodes of 2-1 AV block with ventricular rates greater than 40 without documentation of any associated symptoms  At this point there is no indication for a pacemaker.  DC with Zio patch  He displayed significant orthostatic blood pressure check drops  After patient came back from a walk on 4/21 his systolic blood pressure dropped to 70 and he felt lightheaded. Fluid bolus was given.   Will start with physical interventions, abdominal binder when up and thigh-high teds during the day. Discussed with patient  Continue to hold furosemide and spironolactone as well as ramipril for now.    Confusion  -Patient initially appeared anxious, confused and did not know what is going on despite him being explained couple of times already.  -CT head brain without contrast was unremarkable. UA is unremarkable.  By the afternoon following admission patient was feeling better although not back at baseline, but was aware of his confusional episode.  Ammonia level was slightly elevated at 67, upper end of normal is 51.  Hepatic encephalopathy is likely part of it, but he may have some underlying cognitive impairment  Started lactulose 30 g p.o. twice daily  Ammonia has now improved to 45 although his mentation has not really changed.  Checked TSH, normal. Will also check vitamin B12  -PT/OT following. Recommending STR at this point    Type 2 diabetes mellitus  Diabetic diet has been resumed, patient's blood sugars are in the 100 range  Resumed metformin, DC glipizide  -On sliding scale insulin  -Hypoglycemia protocol.  Monitor for insulin toxicity    Hypertension  Normotensive most of the time, but blood  pressures are soft at times and he significant orthostatic blood pressure drops  Continue to hold ramipril and diuretics for now    Hepatocellular cancer  -Followed by oncology at [facility]  -Radioembolization of left hepatic lobe mass on 7/31/2022 with partial response. Received durvalumab and tremelimumab 1 cycle followed by durvalumab maintenance.  -Follow-up with oncology as outpatient.    Liver cirrhosis  -Due to DEWITT  LFTs unremarkable  -No ascites clinically.  Hold diuretics    Patient seen today for follow up, ambulating in hallways with walker, confused, anxious, moderate historian, appears healthy, VS WNL, pruritus arms and legs with scratch marks, edema legs, overall no distress, wants to return home ASAP.    CODE STATUS/ADVANCE DIRECTIVES DISCUSSION: Full code   ALLERGIES:   Allergies   Allergen Reactions    Iodine Swelling     Patient states reaction was 20-30 years ago. Has had CT dye and coronary angiograms since then without premedication and did not have reaction.    Iodinated Contrast Media     Metoprolol Difficulty breathing     Difficulty breathing and bradycardia       PAST MEDICAL HISTORY:   Past Medical History:   Diagnosis Date    Basal cell carcinoma     CAD (coronary artery disease)     Depression     Diabetes (H)     Hyperlipidemia     Hypertension     Lymphoma (H)     Malignant melanoma (H)     Melanoma (H)     Squamous cell carcinoma       PAST SURGICAL HISTORY:   has a past surgical history that includes Axillary Surgery; Stent; Cholecystectomy; hernia repair, umbilical; Bone marrow biopsy, bone specimen, needle/trocar (N/A, 7/8/2019); vascular surgery; Cardiac surgery; Phacoemulsification with standard intraocular lens implant (Right, 10/2/2019); Phacoemulsification with standard intraocular lens implant (Left, 10/21/2019); Bone marrow biopsy, bone specimen, needle/trocar (Left, 2/24/2022); IR Liver Biopsy Percutaneous (6/2/2023); IR Visceral Angiogram (7/26/2023); IR SIRT (Selective  Internal Radio Therapy) (7/26/2023); and IR Visceral Embolization (7/31/2023).  FAMILY HISTORY: family history includes Asthma in an other family member; Blood Disease in an other family member; Cancer in an other family member; Lung Cancer in his mother; Prostate Cancer in his father.  SOCIAL HISTORY:   reports that he has never smoked. He has never been exposed to tobacco smoke. He has never used smokeless tobacco. He reports current alcohol use. He reports that he does not use drugs.  Patient's living condition: lives in an assisted living facility    Post Discharge Medication Reconciliation Status:   MED REC REQUIRED  Post Medication Reconciliation Status: discharge medications reconciled, continue medications without change       Current Outpatient Medications   Medication Sig Dispense Refill    aspirin (ASA) 81 MG EC tablet Take 81 mg by mouth daily      atorvastatin (LIPITOR) 20 MG tablet Take 1 tablet (20 mg) by mouth daily 90 tablet 3    ferrous sulfate (FE TABS) 325 (65 Fe) MG EC tablet Take 1 tablet (325 mg) by mouth daily 90 tablet 0    furosemide (LASIX) 20 MG tablet Take 1 tablet (20 mg) by mouth daily      hydrOXYzine HCl (ATARAX) 25 MG tablet Take 1 tablet (25 mg) by mouth 3 times daily      lidocaine (XYLOCAINE) 5 % external ointment Apply topically 4 times daily as needed for moderate pain 50 g 1    magnesium oxide (MAG-OX) 400 MG tablet Take 1 tablet (400 mg) by mouth 2 times daily 60 tablet 1    metFORMIN (GLUCOPHAGE) 500 MG tablet Take 1 tablet (500 mg) by mouth 2 times daily (with meals) 180 tablet 3    nitroGLYcerin (NITROSTAT) 0.4 MG sublingual tablet Place 1 tablet (0.4 mg) under the tongue See Admin Instructions for chest pain 30 tablet 0    tamsulosin (FLOMAX) 0.4 MG capsule Take 1 capsule (0.4 mg) by mouth every evening 90 capsule 3     No current facility-administered medications for this visit.     Facility-Administered Medications Ordered in Other Visits   Medication Dose Route  Frequency Provider Last Rate Last Admin    sodium hyaluronate (HEALON DUET)    PRDinesh Ornelas MD   1 kit at 10/02/19 1149       ROS:  4 point ROS including Respiratory, CV, GI and , other than that noted in the HPI,  is negative    Vitals:  /58   Pulse 53   Temp 98  F (36.7  C)   Resp 18   Wt 99.5 kg (219 lb 6.4 oz)   SpO2 97%   BMI 35.95 kg/m    Exam:  GENERAL APPEARANCE:  in no distress, appears healthy  ENT:  Mouth and posterior oropharynx normal, moist mucous membranes  RESP:  lungs clear to auscultation , no respiratory distress  CV:  regular rate and rhythm, no murmur, rub, or gallop, peripheral edema 2+ in lower legs  ABDOMEN:  bowel sounds normal  M/S:   Gait and station abnormal unsteady  SKIN:  Inspection of skin and subcutaneous tissue baseline  NEURO:   Examination of sensation by touch normal  PSYCH:  affect and mood normal    Lab/Diagnostic data:  Recent labs in [facility] reviewed by me today.     ASSESSMENT/PLAN:    (I50.32) Chronic diastolic heart failure (H)  (primary encounter diagnosis)  (I25.10) Coronary artery disease involving native coronary artery of native heart without angina pectoris  (R00.1) Symptomatic bradycardia  (I10) Benign essential hypertension  (R60.1) Generalized edema  (M62.81) Generalized muscle weakness  Comment: denies CP, , RRR, edema 2+, mild hypervolemia  Plan:   -PT/OT eval and treat  -CBC, BMP, BNP< Mg and Fe on 4/29  -start hydroxyzine, lotion arms/legs at bedtime  -TEDs bilateral on am off pm  -ziopatch; return when complete  -follow up cardiology 5/2    (L29.9) Pruritic disorder  Comment: itchy arms and legs  Plan:   -start hydroxyzine 25mg TID  -lotion arms/legs Qhs    (E11.9) Diabetes mellitus without complication (H)  Comment: no recent A1C  Plan: continue metformin BID  -A1C on 4/29    (K75.81,  K74.60) 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

## 2024-04-26 NOTE — LETTER
4/26/2024        RE: Flash Brown  [address] [facility] [place] [address]        [facility] GERIATRICS    PRIMARY CARE PROVIDER AND CLINIC:  Thomas Jackson DO, [address] [facility]  / [place] [address]  Chief Complaint   Patient presents with     Hospital F/U      [place] Medical Record Number:  [id number]  Place of Service where encounter took place:  [facility] AT [facility]) [[id number]]    Flash Brown  is a 83 year old  (1940), admitted to the above facility from  [facility] . Hospital stay 04/21/2024 through 04/24/2024..   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 male with a past medical history significant for CAD, type 2 diabetes mellitus, liver cirrhosis, chronic diastolic CHF, hypertension, hyperlipidemia, history of lymphoma, liver cancer.  Patient is from assisted living facility and was noted to have an episode of nausea and vomiting. Afterwards patient was noted to be confused. EMS noted his blood sugar was 207, was deemed to be in A-fib with heart rates between 50-70, blood pressure did drop to systolic 80s systolic. Did receive a dose of saline bolus after which his blood pressure improved.  In the ED patient heart rate was fluctuating, would dip down to 40s.     Symptomatic bradycardia  -Presenting with episode of nausea, confusion, noted to have bradycardia with heart rate in 40s, associated hypotension.  -EKG showed possible A-fib with slow ventricular rate. Upon cardiology review this is ever felt to be sinus rhythm with second-degree AV block with baseline  artifact.  -History of similar episode in 7/2023, symptoms improved at that time following discontinuation of beta-blocker. Currently not on any beta-blockers.  -Initial HS troponin was 21, follow-up troponin was 24. Not acute coronary syndrome  Per cardiology patient has known second-degree AV block, Mobitz type I  Heart rate improves with activity and basically in the 60s or above  P per cardiology patient has short, nonsustained episodes of 2-1 AV block with ventricular rates greater than 40 without documentation of any associated symptoms  At this point there is no indication for a pacemaker.  DC with Zio patch  He displayed significant orthostatic blood pressure check drops  After patient came back from a walk on 4/21 his systolic blood pressure dropped to 70 and he felt lightheaded. Fluid bolus was given.   Will start with physical interventions, abdominal binder when up and thigh-high teds during the day. Discussed with patient  Continue to hold furosemide and spironolactone as well as ramipril for now.    Confusion  -Patient initially appeared anxious, confused and did not know what is going on despite him being explained couple of times already.  -CT head brain without contrast was unremarkable. UA is unremarkable.  By the afternoon following admission patient was feeling better although not back at baseline, but was aware of his confusional episode.  Ammonia level was slightly elevated at 67, upper end of normal is 51.  Hepatic encephalopathy is likely part of it, but he may have some underlying cognitive impairment  Started lactulose 30 g p.o. twice daily  Ammonia has now improved to 45 although his mentation has not really changed.  Checked TSH, normal. Will also check vitamin B12  -PT/OT following. Recommending STR at this point    Type 2 diabetes mellitus  Diabetic diet has been resumed, patient's blood sugars are in the 100 range  Resumed metformin, DC glipizide  -On sliding scale  insulin  -Hypoglycemia protocol.  Monitor for insulin toxicity    Hypertension  Normotensive most of the time, but blood pressures are soft at times and he significant orthostatic blood pressure drops  Continue to hold ramipril and diuretics for now    Hepatocellular cancer  -Followed by oncology at [facility]  -Radioembolization of left hepatic lobe mass on 7/31/2022 with partial response. Received durvalumab and tremelimumab 1 cycle followed by durvalumab maintenance.  -Follow-up with oncology as outpatient.    Liver cirrhosis  -Due to DEWITT  LFTs unremarkable  -No ascites clinically.  Hold diuretics    Patient seen today for follow up, ambulating in hallways with walker, confused, anxious, moderate historian, appears healthy, VS WNL, pruritus arms and legs with scratch marks, edema legs, overall no distress, wants to return home ASAP.    CODE STATUS/ADVANCE DIRECTIVES DISCUSSION: Full code   ALLERGIES:   Allergies   Allergen Reactions     Iodine Swelling     Patient states reaction was 20-30 years ago. Has had CT dye and coronary angiograms since then without premedication and did not have reaction.     Iodinated Contrast Media      Metoprolol Difficulty breathing     Difficulty breathing and bradycardia       PAST MEDICAL HISTORY:   Past Medical History:   Diagnosis Date     Basal cell carcinoma      CAD (coronary artery disease)      Depression      Diabetes (H)      Hyperlipidemia      Hypertension      Lymphoma (H)      Malignant melanoma (H)      Melanoma (H)      Squamous cell carcinoma       PAST SURGICAL HISTORY:   has a past surgical history that includes Axillary Surgery; Stent; Cholecystectomy; hernia repair, umbilical; Bone marrow biopsy, bone specimen, needle/trocar (N/A, 7/8/2019); vascular surgery; Cardiac surgery; Phacoemulsification with standard intraocular lens implant (Right, 10/2/2019); Phacoemulsification with standard intraocular lens implant (Left, 10/21/2019); Bone marrow biopsy, bone  specimen, needle/trocar (Left, 2/24/2022); IR Liver Biopsy Percutaneous (6/2/2023); IR Visceral Angiogram (7/26/2023); IR SIRT (Selective Internal Radio Therapy) (7/26/2023); and IR Visceral Embolization (7/31/2023).  FAMILY HISTORY: family history includes Asthma in an other family member; Blood Disease in an other family member; Cancer in an other family member; Lung Cancer in his mother; Prostate Cancer in his father.  SOCIAL HISTORY:   reports that he has never smoked. He has never been exposed to tobacco smoke. He has never used smokeless tobacco. He reports current alcohol use. He reports that he does not use drugs.  Patient's living condition: lives in an assisted living facility    Post Discharge Medication Reconciliation Status:   MED REC REQUIRED  Post Medication Reconciliation Status: discharge medications reconciled, continue medications without change       Current Outpatient Medications   Medication Sig Dispense Refill     aspirin (ASA) 81 MG EC tablet Take 81 mg by mouth daily       atorvastatin (LIPITOR) 20 MG tablet Take 1 tablet (20 mg) by mouth daily 90 tablet 3     ferrous sulfate (FE TABS) 325 (65 Fe) MG EC tablet Take 1 tablet (325 mg) by mouth daily 90 tablet 0     furosemide (LASIX) 20 MG tablet Take 1 tablet (20 mg) by mouth daily       hydrOXYzine HCl (ATARAX) 25 MG tablet Take 1 tablet (25 mg) by mouth 3 times daily       lidocaine (XYLOCAINE) 5 % external ointment Apply topically 4 times daily as needed for moderate pain 50 g 1     magnesium oxide (MAG-OX) 400 MG tablet Take 1 tablet (400 mg) by mouth 2 times daily 60 tablet 1     metFORMIN (GLUCOPHAGE) 500 MG tablet Take 1 tablet (500 mg) by mouth 2 times daily (with meals) 180 tablet 3     nitroGLYcerin (NITROSTAT) 0.4 MG sublingual tablet Place 1 tablet (0.4 mg) under the tongue See Admin Instructions for chest pain 30 tablet 0     tamsulosin (FLOMAX) 0.4 MG capsule Take 1 capsule (0.4 mg) by mouth every evening 90 capsule 3     No  current facility-administered medications for this visit.     Facility-Administered Medications Ordered in Other Visits   Medication Dose Route Frequency Provider Last Rate Last Admin     sodium hyaluronate (HEALON DUET)    PRDinesh Ornelas MD   1 kit at 10/02/19 1149       ROS:  4 point ROS including Respiratory, CV, GI and , other than that noted in the HPI,  is negative    Vitals:  /58   Pulse 53   Temp 98  F (36.7  C)   Resp 18   Wt 99.5 kg (219 lb 6.4 oz)   SpO2 97%   BMI 35.95 kg/m    Exam:  GENERAL APPEARANCE:  in no distress, appears healthy  ENT:  Mouth and posterior oropharynx normal, moist mucous membranes  RESP:  lungs clear to auscultation , no respiratory distress  CV:  regular rate and rhythm, no murmur, rub, or gallop, peripheral edema 2+ in lower legs  ABDOMEN:  bowel sounds normal  M/S:   Gait and station abnormal unsteady  SKIN:  Inspection of skin and subcutaneous tissue baseline  NEURO:   Examination of sensation by touch normal  PSYCH:  affect and mood normal    Lab/Diagnostic data:  Recent labs in [facility] reviewed by me today.     ASSESSMENT/PLAN:    (I50.32) Chronic diastolic heart failure (H)  (primary encounter diagnosis)  (I25.10) Coronary artery disease involving native coronary artery of native heart without angina pectoris  (R00.1) Symptomatic bradycardia  (I10) Benign essential hypertension  (R60.1) Generalized edema  (M62.81) Generalized muscle weakness  Comment: denies CP, , RRR, edema 2+, mild hypervolemia  Plan:   -PT/OT eval and treat  -CBC, BMP, BNP< Mg and Fe on 4/29  -start hydroxyzine, lotion arms/legs at bedtime  -TEDs bilateral on am off pm  -ziopatch; return when complete  -follow up cardiology 5/2    (L29.9) Pruritic disorder  Comment: itchy arms and legs  Plan:   -start hydroxyzine 25mg TID  -lotion arms/legs Qhs    (E11.9) Diabetes mellitus without complication (H)  Comment: no recent A1C  Plan: continue metformin BID  -A1C on  4/29    (K75.81,  K74.60) 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

## 2024-04-29 ENCOUNTER — LAB REQUISITION (OUTPATIENT)
Dept: LAB | Facility: CLINIC | Age: 84
End: 2024-04-29

## 2024-04-29 DIAGNOSIS — I50.32 CHRONIC DIASTOLIC (CONGESTIVE) HEART FAILURE (H): ICD-10-CM

## 2024-04-29 DIAGNOSIS — D64.9 ANEMIA, UNSPECIFIED: ICD-10-CM

## 2024-04-29 DIAGNOSIS — E11.9 TYPE 2 DIABETES MELLITUS WITHOUT COMPLICATIONS (H): ICD-10-CM

## 2024-04-29 DIAGNOSIS — I10 ESSENTIAL (PRIMARY) HYPERTENSION: ICD-10-CM

## 2024-04-29 DIAGNOSIS — E83.42 HYPOMAGNESEMIA: ICD-10-CM

## 2024-04-29 LAB
ERYTHROCYTE [DISTWIDTH] IN BLOOD BY AUTOMATED COUNT: 16.6 % (ref 10–15)
HCT VFR BLD AUTO: 34.4 % (ref 40–53)
HGB BLD-MCNC: 11.2 G/DL (ref 13.3–17.7)
MCH RBC QN AUTO: 28.6 PG (ref 26.5–33)
MCHC RBC AUTO-ENTMCNC: 32.6 G/DL (ref 31.5–36.5)
MCV RBC AUTO: 88 FL (ref 78–100)
PLATELET # BLD AUTO: 67 10E3/UL (ref 150–450)
RBC # BLD AUTO: 3.91 10E6/UL (ref 4.4–5.9)
WBC # BLD AUTO: 2.8 10E3/UL (ref 4–11)

## 2024-04-29 PROCEDURE — 83735 ASSAY OF MAGNESIUM: CPT | Performed by: NURSE PRACTITIONER

## 2024-04-29 PROCEDURE — 85027 COMPLETE CBC AUTOMATED: CPT | Performed by: NURSE PRACTITIONER

## 2024-04-29 PROCEDURE — 80048 BASIC METABOLIC PNL TOTAL CA: CPT | Performed by: NURSE PRACTITIONER

## 2024-04-29 PROCEDURE — 83036 HEMOGLOBIN GLYCOSYLATED A1C: CPT | Performed by: NURSE PRACTITIONER

## 2024-04-29 PROCEDURE — 83880 ASSAY OF NATRIURETIC PEPTIDE: CPT | Performed by: NURSE PRACTITIONER

## 2024-04-29 PROCEDURE — 83540 ASSAY OF IRON: CPT | Performed by: NURSE PRACTITIONER

## 2024-04-30 ENCOUNTER — TRANSITIONAL CARE UNIT VISIT (OUTPATIENT)
Dept: GERIATRICS | Facility: CLINIC | Age: 84
End: 2024-04-30
Payer: MEDICARE

## 2024-04-30 VITALS
RESPIRATION RATE: 17 BRPM | TEMPERATURE: 97.7 F | BODY MASS INDEX: 36.97 KG/M2 | OXYGEN SATURATION: 95 % | WEIGHT: 225.6 LBS | HEART RATE: 62 BPM | SYSTOLIC BLOOD PRESSURE: 112 MMHG | DIASTOLIC BLOOD PRESSURE: 95 MMHG

## 2024-04-30 DIAGNOSIS — Z71.89 ACP (ADVANCE CARE PLANNING): ICD-10-CM

## 2024-04-30 DIAGNOSIS — R00.1 SYMPTOMATIC BRADYCARDIA: ICD-10-CM

## 2024-04-30 DIAGNOSIS — L29.9 PRURITIC DISORDER: ICD-10-CM

## 2024-04-30 DIAGNOSIS — I50.32 CHRONIC DIASTOLIC HEART FAILURE (H): Primary | ICD-10-CM

## 2024-04-30 LAB
ANION GAP SERPL CALCULATED.3IONS-SCNC: 18 MMOL/L (ref 7–15)
BUN SERPL-MCNC: 15.2 MG/DL (ref 8–23)
CALCIUM SERPL-MCNC: 8.6 MG/DL (ref 8.8–10.2)
CHLORIDE SERPL-SCNC: 105 MMOL/L (ref 98–107)
CREAT SERPL-MCNC: 0.78 MG/DL (ref 0.67–1.17)
DEPRECATED HCO3 PLAS-SCNC: 13 MMOL/L (ref 22–29)
EGFRCR SERPLBLD CKD-EPI 2021: 90 ML/MIN/1.73M2
GLUCOSE SERPL-MCNC: 242 MG/DL (ref 70–99)
HBA1C MFR BLD: 6.9 %
IRON SERPL-MCNC: 71 UG/DL (ref 61–157)
MAGNESIUM SERPL-MCNC: 1.8 MG/DL (ref 1.7–2.3)
NT-PROBNP SERPL-MCNC: 152 PG/ML (ref 0–1800)
POTASSIUM SERPL-SCNC: 4.9 MMOL/L (ref 3.4–5.3)
SODIUM SERPL-SCNC: 136 MMOL/L (ref 135–145)

## 2024-04-30 PROCEDURE — 99497 ADVNCD CARE PLAN 30 MIN: CPT | Performed by: NURSE PRACTITIONER

## 2024-04-30 PROCEDURE — 99309 SBSQ NF CARE MODERATE MDM 30: CPT | Performed by: NURSE PRACTITIONER

## 2024-04-30 RX ORDER — HYDROXYZINE HYDROCHLORIDE 25 MG/1
25 TABLET, FILM COATED ORAL 4 TIMES DAILY
Status: SHIPPED
Start: 2024-04-30

## 2024-04-30 NOTE — LETTER
4/30/2024        RE: Flash Brown  [address]  [facility] [address]        [facility] GERIATRICS    Chief Complaint   Patient presents with     RECHECK     HPI:  Flash Brown is a 83 year old  (1940), who is being seen today for an episodic care visit at: [facility] AT [facility] [[id number]]. Today's concern is:   1. Chronic diastolic heart failure (H)    2. Symptomatic bradycardia    3. Pruritic disorder      Patient seen for follow up, high anxiety, states wanting to go home ASAP, post hospital with bradycardia with HR dropping into the 40's, in TCU VS WNL, labs on 4/29 with , renal intact, afebrile, HR's in the 60's, ziopatch in place, still itchy and has scratch marks on arms/legs and abdomen, overall appears healthy, ambulating without person assist.    Advance Care Planning Goals of Care Discussion 4/30/2024  Goals of care discussed with Flash Brown on 4/30. Present at discussion: patient. Questions discussed and patient response:  What is your understanding now of where you are with your illness?: good. How much information about what is likely to be ahead with your illness would you like to have?: all. As the clinician I communicated the following to the patient regarding their prognosis: good. If your health situation worsens, what are your most important goals?: get home. What are your biggest fears and worries about the future with your health?: none. Unacceptable function : NA. What abilities are so critical to your life that you cannot imagine living without them?: independence in home. Pt does NOT want to: die. If you become sicker, how much are you willing to go through for the possibility of gaining more time?: maybe. Would this change if these were permanent states, if they did not get better?: maybe. How much does your agent and/or family know about your priorities and wishes?: everything. Added by FIONA Solorzano CNP      Allergies, and  PMH/PSH reviewed in EP[facility] today.  REVIEW OF SYSTEMS:  4 point ROS including Respiratory, CV, GI and , other than that noted in the HPI,  is negative    Objective:   BP (!) 112/95   Pulse 62   Temp 97.7  F (36.5  C)   Resp 17   Wt 102.3 kg (225 lb 9.6 oz)   SpO2 95%   BMI 36.97 kg/m    GENERAL APPEARANCE:  in no distress, appears healthy  ENT:  Mouth and posterior oropharynx normal, moist mucous membranes  RESP:  lungs clear to auscultation , no respiratory distress  CV:  regular rate and rhythm, no murmur, rub, or gallop, peripheral edema mild+ in lower legs  ABDOMEN:  bowel sounds normal  M/S:   Gait and station abnormal unsteady  SKIN:  Inspection of skin and subcutaneous tissue baseline  NEURO:   Examination of sensation by touch normal  PSYCH:  affect and mood normal    Labs done in SNF are in [facility]. Please refer to them using [employer]/Care Everywhere.    Assessment/Plan:  (I50.32) Chronic diastolic heart failure (H)  (primary encounter diagnosis)  (R00.1) Symptomatic bradycardia  Comment: VS WNL, , no s/s exacerbation  Plan: PT/OT working with  -may discharge this week  -elevate legs as possible  -return ziopatch on 5/7, box in med cart  -follow up Md on 5/2  -continue lasix 20, statin    (L29.9) Pruritic disorder  Comment: still itchy  Plan:   -increase hydroxyzine to 25mg QID      (Z71.89) ACP (advance care planning)  Comment: code DNR  Plan: [place] ADVANCE CARE PLANNING FIRST 30 MINS          I spent 17 minutes F2F with patient in addition to todays visit completing a POLST form.      MED REC REQUIRED  Post Medication Reconciliation Status: medication reconcilation previously completed during another office visit        Electronically signed by: FIONA Solorzano CNP          Sincerely,        FIONA Solorzano CNP

## 2024-04-30 NOTE — PROGRESS NOTES
[facility] GERIATRICS    Chief Complaint   Patient presents with    RECHECK     HPI:  Flash Brown is a 83 year old  (1940), who is being seen today for an episodic care visit at: [facility] [[id number]]. Today's concern is:   1. Chronic diastolic heart failure (H)    2. Symptomatic bradycardia    3. Pruritic disorder      Patient seen for follow up, high anxiety, states wanting to go home ASAP, post hospital with bradycardia with HR dropping into the 40's, in TCU VS WNL, labs on 4/29 with , renal intact, afebrile, HR's in the 60's, ziopatch in place, still itchy and has scratch marks on arms/legs and abdomen, overall appears healthy, ambulating without person assist.    Advance Care Planning Goals of Care Discussion 4/30/2024  Goals of care discussed with Flash Brown on 4/30. Present at discussion: patient. Questions discussed and patient response:  What is your understanding now of where you are with your illness?: good. How much information about what is likely to be ahead with your illness would you like to have?: all. As the clinician I communicated the following to the patient regarding their prognosis: good. If your health situation worsens, what are your most important goals?: get home. What are your biggest fears and worries about the future with your health?: none. Unacceptable function : NA. What abilities are so critical to your life that you cannot imagine living without them?: independence in home. Pt does NOT want to: die. If you become sicker, how much are you willing to go through for the possibility of gaining more time?: maybe. Would this change if these were permanent states, if they did not get better?: maybe. How much does your agent and/or family know about your priorities and wishes?: everything. Added by FIONA Solorzano CNP      Allergies, and PMH/PSH reviewed in [facility] today.  REVIEW OF SYSTEMS:  4 point ROS including Respiratory, CV, GI  and , other than that noted in the HPI,  is negative    Objective:   BP (!) 112/95   Pulse 62   Temp 97.7  F (36.5  C)   Resp 17   Wt 102.3 kg (225 lb 9.6 oz)   SpO2 95%   BMI 36.97 kg/m    GENERAL APPEARANCE:  in no distress, appears healthy  ENT:  Mouth and posterior oropharynx normal, moist mucous membranes  RESP:  lungs clear to auscultation , no respiratory distress  CV:  regular rate and rhythm, no murmur, rub, or gallop, peripheral edema mild+ in lower legs  ABDOMEN:  bowel sounds normal  M/S:   Gait and station abnormal unsteady  SKIN:  Inspection of skin and subcutaneous tissue baseline  NEURO:   Examination of sensation by touch normal  PSYCH:  affect and mood normal    Labs done in SNF are in [place] [facility]. Please refer to them using [employer] Everywhere.    Assessment/Plan:  (I50.32) Chronic diastolic heart failure (H)  (primary encounter diagnosis)  (R00.1) Symptomatic bradycardia  Comment: VS WNL, , no s/s exacerbation  Plan: PT/OT working with  -may discharge this week  -elevate legs as possible  -return ziopatch on 5/7, box in med cart  -follow up Md on 5/2  -continue lasix 20, statin    (L29.9) Pruritic disorder  Comment: still itchy  Plan:   -increase hydroxyzine to 25mg QID      (Z71.89) ACP (advance care planning)  Comment: code DNR  Plan: [place] ADVANCE CARE PLANNING FIRST 30 MINS          I spent 17 minutes F2F with patient in addition to todays visit completing a POLST form.      MED REC REQUIRED  Post Medication Reconciliation Status: medication reconcilation previously completed during another office visit        Electronically signed by: FIONA Solorzano CNP

## 2024-05-02 ENCOUNTER — TELEPHONE (OUTPATIENT)
Dept: GERIATRICS | Facility: CLINIC | Age: 84
End: 2024-05-02

## 2024-05-03 DIAGNOSIS — C22.0 HEPATOCELLULAR CARCINOMA (H): ICD-10-CM

## 2024-05-03 DIAGNOSIS — K74.60 LIVER CIRRHOSIS SECONDARY TO NONALCOHOLIC STEATOHEPATITIS (NASH) (H): Primary | ICD-10-CM

## 2024-05-03 DIAGNOSIS — K75.81 LIVER CIRRHOSIS SECONDARY TO NONALCOHOLIC STEATOHEPATITIS (NASH) (H): Primary | ICD-10-CM

## 2024-05-09 ENCOUNTER — LAB (OUTPATIENT)
Dept: LAB | Facility: CLINIC | Age: 84
End: 2024-05-09
Attending: NURSE PRACTITIONER
Payer: MEDICARE

## 2024-05-09 ENCOUNTER — VIRTUAL VISIT (OUTPATIENT)
Dept: ONCOLOGY | Facility: CLINIC | Age: 84
End: 2024-05-09
Attending: NURSE PRACTITIONER
Payer: MEDICARE

## 2024-05-09 ENCOUNTER — INFUSION THERAPY VISIT (OUTPATIENT)
Dept: INFUSION THERAPY | Facility: CLINIC | Age: 84
End: 2024-05-09
Attending: NURSE PRACTITIONER
Payer: MEDICARE

## 2024-05-09 VITALS
DIASTOLIC BLOOD PRESSURE: 53 MMHG | WEIGHT: 231 LBS | HEART RATE: 61 BPM | OXYGEN SATURATION: 96 % | SYSTOLIC BLOOD PRESSURE: 157 MMHG | TEMPERATURE: 98.7 F | HEIGHT: 66 IN | BODY MASS INDEX: 37.12 KG/M2 | RESPIRATION RATE: 16 BRPM

## 2024-05-09 VITALS — DIASTOLIC BLOOD PRESSURE: 58 MMHG | HEART RATE: 65 BPM | SYSTOLIC BLOOD PRESSURE: 93 MMHG

## 2024-05-09 DIAGNOSIS — K74.60 LIVER CIRRHOSIS SECONDARY TO NONALCOHOLIC STEATOHEPATITIS (NASH) (H): ICD-10-CM

## 2024-05-09 DIAGNOSIS — Z79.899 HIGH RISK MEDICATION USE: Primary | ICD-10-CM

## 2024-05-09 DIAGNOSIS — C22.0 HEPATOCELLULAR CARCINOMA (H): Primary | ICD-10-CM

## 2024-05-09 DIAGNOSIS — K75.81 LIVER CIRRHOSIS SECONDARY TO NONALCOHOLIC STEATOHEPATITIS (NASH) (H): ICD-10-CM

## 2024-05-09 LAB
AFP SERPL-MCNC: 8.8 NG/ML
ALBUMIN SERPL BCG-MCNC: 3.3 G/DL (ref 3.5–5.2)
ALP SERPL-CCNC: 103 U/L (ref 40–150)
ALT SERPL W P-5'-P-CCNC: 22 U/L (ref 0–70)
ANION GAP SERPL CALCULATED.3IONS-SCNC: 9 MMOL/L (ref 7–15)
AST SERPL W P-5'-P-CCNC: 29 U/L (ref 0–45)
BILIRUB SERPL-MCNC: 1.1 MG/DL
BUN SERPL-MCNC: 9.7 MG/DL (ref 8–23)
CALCIUM SERPL-MCNC: 8.8 MG/DL (ref 8.8–10.2)
CHLORIDE SERPL-SCNC: 103 MMOL/L (ref 98–107)
CREAT SERPL-MCNC: 0.87 MG/DL (ref 0.67–1.17)
DEPRECATED HCO3 PLAS-SCNC: 26 MMOL/L (ref 22–29)
EGFRCR SERPLBLD CKD-EPI 2021: 87 ML/MIN/1.73M2
GLUCOSE SERPL-MCNC: 302 MG/DL (ref 70–99)
HOLD SPECIMEN: NORMAL
POTASSIUM SERPL-SCNC: 4.1 MMOL/L (ref 3.4–5.3)
PROT SERPL-MCNC: 6.5 G/DL (ref 6.4–8.3)
SODIUM SERPL-SCNC: 138 MMOL/L (ref 135–145)
TSH SERPL DL<=0.005 MIU/L-ACNC: 3.03 UIU/ML (ref 0.3–4.2)

## 2024-05-09 PROCEDURE — 258N000003 HC RX IP 258 OP 636: Performed by: INTERNAL MEDICINE

## 2024-05-09 PROCEDURE — 82040 ASSAY OF SERUM ALBUMIN: CPT | Performed by: NURSE PRACTITIONER

## 2024-05-09 PROCEDURE — 96413 CHEMO IV INFUSION 1 HR: CPT

## 2024-05-09 PROCEDURE — 36415 COLL VENOUS BLD VENIPUNCTURE: CPT

## 2024-05-09 PROCEDURE — 99213 OFFICE O/P EST LOW 20 MIN: CPT | Mod: 95 | Performed by: INTERNAL MEDICINE

## 2024-05-09 PROCEDURE — 82105 ALPHA-FETOPROTEIN SERUM: CPT | Performed by: NURSE PRACTITIONER

## 2024-05-09 PROCEDURE — 250N000011 HC RX IP 250 OP 636: Mod: JZ | Performed by: INTERNAL MEDICINE

## 2024-05-09 PROCEDURE — 82374 ASSAY BLOOD CARBON DIOXIDE: CPT | Performed by: NURSE PRACTITIONER

## 2024-05-09 PROCEDURE — 84443 ASSAY THYROID STIM HORMONE: CPT | Performed by: NURSE PRACTITIONER

## 2024-05-09 RX ORDER — EPINEPHRINE 1 MG/ML
0.3 INJECTION, SOLUTION, CONCENTRATE INTRAVENOUS EVERY 5 MIN PRN
Status: CANCELLED | OUTPATIENT
Start: 2024-05-09

## 2024-05-09 RX ORDER — LORAZEPAM 2 MG/ML
0.5 INJECTION INTRAMUSCULAR EVERY 4 HOURS PRN
Status: CANCELLED | OUTPATIENT
Start: 2024-05-09

## 2024-05-09 RX ORDER — ALBUTEROL SULFATE 0.83 MG/ML
2.5 SOLUTION RESPIRATORY (INHALATION)
Status: CANCELLED | OUTPATIENT
Start: 2024-05-09

## 2024-05-09 RX ORDER — ALBUTEROL SULFATE 90 UG/1
1-2 AEROSOL, METERED RESPIRATORY (INHALATION)
Status: CANCELLED
Start: 2024-05-09

## 2024-05-09 RX ORDER — HEPARIN SODIUM,PORCINE 10 UNIT/ML
5-20 VIAL (ML) INTRAVENOUS DAILY PRN
Status: CANCELLED | OUTPATIENT
Start: 2024-05-09

## 2024-05-09 RX ORDER — MEPERIDINE HYDROCHLORIDE 25 MG/ML
25 INJECTION INTRAMUSCULAR; INTRAVENOUS; SUBCUTANEOUS EVERY 30 MIN PRN
Status: CANCELLED | OUTPATIENT
Start: 2024-05-09

## 2024-05-09 RX ORDER — ESCITALOPRAM OXALATE 5 MG/1
TABLET ORAL
COMMUNITY
Start: 2024-05-02

## 2024-05-09 RX ORDER — METHYLPREDNISOLONE SODIUM SUCCINATE 125 MG/2ML
125 INJECTION, POWDER, LYOPHILIZED, FOR SOLUTION INTRAMUSCULAR; INTRAVENOUS
Status: CANCELLED
Start: 2024-05-09

## 2024-05-09 RX ORDER — HEPARIN SODIUM (PORCINE) LOCK FLUSH IV SOLN 100 UNIT/ML 100 UNIT/ML
5 SOLUTION INTRAVENOUS
Status: CANCELLED | OUTPATIENT
Start: 2024-05-09

## 2024-05-09 RX ORDER — DIPHENHYDRAMINE HYDROCHLORIDE 50 MG/ML
50 INJECTION INTRAMUSCULAR; INTRAVENOUS
Status: CANCELLED
Start: 2024-05-09

## 2024-05-09 RX ADMIN — SODIUM CHLORIDE 1500 MG: 9 INJECTION, SOLUTION INTRAVENOUS at 08:50

## 2024-05-09 RX ADMIN — SODIUM CHLORIDE 250 ML: 9 INJECTION, SOLUTION INTRAVENOUS at 08:49

## 2024-05-09 ASSESSMENT — PAIN SCALES - GENERAL: PAINLEVEL: NO PAIN (0)

## 2024-05-09 NOTE — LETTER
5/9/2024         RE: Flash Brown  [address]  [facility] [address]        Dear Colleague,    Thank you for referring your patient, Flash Brown, to the [facility] CANCER [facility]. Please see a copy of my visit note below.    Video-Visit Details    Type of service:  Video Visit    Video Start Time:  7:58 AM  Video End Time:  8:04 AM    Originating Location (pt. Location): Other [place] cancer clinic  in [place]    Distant Location (provider location): Off-site    Platform used for Video Visit: [facility] Hematology and Oncology Progress Note    Patient: Flash Brown  MRN: [id number]  5/09/24        Reason for Visit    HCC    Primary Oncologist: Dr. Watts    Assessment and Plan  Advanced/multifocal HCC  Child eral class A cirrhosis (DEWITT-related)  Pancytopenias (r/t cirrhosis)  He received durvalumab with tremelimumab cycle 1.  From cycle 2 onwards he has been getting durvalumab only.  Has done fairly well on it.  No significant side effects reported.    Labs reviewed today.  Serum chemistries mostly stable.  Albumin is mildly low at 3.3.  LFTs are normal.  Bilirubin 1.1.  Creatinine and electrolytes are within normal limits.    No contraindication to proceed with cycle 3 durvalumab today.  He will come back in 4 weeks.  Plan is to repeat imaging (MRI of the liver) after 4 cycles of before fifth cycle.         Oncology history  7/2023: Unresectable multifocal HCC, in setting of background DEWITT-related cirrhosis.   -dominant L hepatic lobe mass, treated by embolization; then followed  -1/2024 MRI: cirrhotic appearing liver with evidence of portal hypertension. Post-embolization change of hepatic segments 2 and 3 with persistent nodular internal enhancement measuring 3.5 cm suspicious for residual tumor. Enlarging 3.1 cm lesion in the segment 8 with washout and pseudocapsule formation consistent with HCC. Additionally,  other arterially enhancing lesions which are  all consistent with HCC. Further local therapies not an option.    Treatment:  -7/31/2023: radioembolization to L hepatic lobe mass; partial response      -3/7/2024 - present: palliative durvalumab and tremelimumab (cycle 1 only); then durvalumab maintenance every 28 days (not felt to be a candidate for bevacizumab)  --AFP 10.2 (9/2023) ahead of start    Interval History   Flash Brown is a 83 year old male with advanced HCC who initiated palliative durvalumab and tremelimumab.  Received for cycle 4 weeks ago.  He is seen as a video visit today with repeat labs and consideration for cycle 3 durvalumab.    Has done fairly well on treatment so far.  No significant side effects.  Recently was hospitalized at outside hospital for anxiety and shortness of breath issues.  Was found to be in bradycardia.  He has completed rehab.  Feeling better now.  Denies any new issues today.  No abdominal pain.  He is still not very motivated.  Not very physically active.  Here with repeat labs.      ECOG Performance    2 - Ambulatory and independent in all ADLs; cannot work; up > 50% of the time      Physical Exam    General: alert and cooperative. Accompanied by sister      Lab Results    Recent Results (from the past 168 hour(s))   Comprehensive metabolic panel   Result Value Ref Range    Sodium 138 135 - 145 mmol/L    Potassium 4.1 3.4 - 5.3 mmol/L    Carbon Dioxide (CO2) 26 22 - 29 mmol/L    Anion Gap 9 7 - 15 mmol/L    Urea Nitrogen 9.7 8.0 - 23.0 mg/dL    Creatinine 0.87 0.67 - 1.17 mg/dL    GFR Estimate 87 >60 mL/min/1.73m2    Calcium 8.8 8.8 - 10.2 mg/dL    Chloride 103 98 - 107 mmol/L    Glucose 302 (H) 70 - 99 mg/dL    Alkaline Phosphatase 103 40 - 150 U/L    AST 29 0 - 45 U/L    ALT 22 0 - 70 U/L    Protein Total 6.5 6.4 - 8.3 g/dL    Albumin 3.3 (L) 3.5 - 5.2 g/dL    Bilirubin Total 1.1 <=1.2 mg/dL           Imaging    CT HEAD W/O CONTRAST    Result Date: 4/19/2024  For Patients: As a result of the 21st Century Cures  Act, medical imaging exams and procedure reports are released immediately into your electronic medical record. You may view this report before your referring provider. If you have questions, please contact your health care provider. EXAM: CT HEAD BRAIN WO LOCATION: [facility] DATE: 4/19/2024 INDICATION: Mental status change, unknown cause. COMPARISON: CT brain 08/23/2022. TECHNIQUE: Routine CT Head without IV contrast. Multiplanar reformats. Dose reduction techniques were used. FINDINGS: INTRACRANIAL CONTENTS: No finding for intracranial hemorrhage, mass, or acute infarct. At least mild prominence of the lateral ventricles. Asymmetric prominence of the CSF space along the superolateral margin left cerebellar hemisphere, stable compared to prior and likely reflecting an arachnoid cyst versus volume loss. Mild presumed sequela of chronic microvascular ischemic change. No transcortical areas of low-attenuation change. No mass effect or midline shift. VISUALIZED ORBITS/SINUSES/MASTOIDS: Prior cataract surgeries. No paranasal sinus mucosal disease. No middle ear or mastoid effusion. BONES/SOFT TISSUES: Calvarium is intact, without fracture or suspicious lytic or blastic foci.    1.  No finding for intracranial hemorrhage, mass, or acute infarct. 2.  Mild volume loss and presumed sequela of chronic microvascular ischemic change.       The longitudinal plan of care for the diagnosis(es)/condition(s) as documented were addressed during this visit. Due to the added complexity in care, I will continue to support Don in the subsequent management and with ongoing continuity of care.      Signed by: Bk Watts MD      Again, thank you for allowing me to participate in the care of your patient.        Sincerely,        Bk Watts MD

## 2024-05-09 NOTE — PROGRESS NOTES
Video-Visit Details    Type of service:  Video Visit    Video Start Time:  7:58 AM  Video End Time:  8:04 AM    Originating Location (pt. Location): Other [place] [facility]  in [place]    Distant Location (provider location): Off-site    Platform used for Video Visit: [facility] Hematology and Oncology Progress Note    Patient: Flash Brown  MRN: [id number]  5/09/24        Reason for Visit    HCC    Primary Oncologist: Dr. 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  From cycle 2 onwards he has been getting durvalumab only.  Has done fairly well on it.  No significant side effects reported.    Labs reviewed today.  Serum chemistries mostly stable.  Albumin is mildly low at 3.3.  LFTs are normal.  Bilirubin 1.1.  Creatinine and electrolytes are within normal limits.    No contraindication to proceed with cycle 3 durvalumab today.  He will come back in 4 weeks.  Plan is to repeat imaging (MRI of the liver) after 4 cycles of before fifth cycle.         Oncology history  7/2023: Unresectable multifocal HCC, in setting of background DEWITT-related cirrhosis.   -dominant L hepatic lobe mass, treated by embolization; then followed  -1/2024 MRI: cirrhotic appearing liver with evidence of portal hypertension. Post-embolization change of hepatic segments 2 and 3 with persistent nodular internal enhancement measuring 3.5 cm suspicious for residual tumor. Enlarging 3.1 cm lesion in the segment 8 with washout and pseudocapsule formation consistent with HCC. Additionally,  other arterially enhancing lesions which are all consistent with HCC. Further local therapies not an option.    Treatment:  -7/31/2023: radioembolization to L hepatic lobe mass; partial response      -3/7/2024 - present: palliative durvalumab and tremelimumab (cycle 1 only); then durvalumab  maintenance every 28 days (not felt to be a candidate for bevacizumab)  --AFP 10.2 (9/2023) ahead of start    Interval History   Flash Brown is a 83 year old male with advanced HCC who initiated palliative durvalumab and tremelimumab.  Received for cycle 4 weeks ago.  He is seen as a video visit today with repeat labs and consideration for cycle 3 durvalumab.    Has done fairly well on treatment so far.  No significant side effects.  Recently was hospitalized at outside hospital for anxiety and shortness of breath issues.  Was found to be in bradycardia.  He has completed rehab.  Feeling better now.  Denies any new issues today.  No abdominal pain.  He is still not very motivated.  Not very physically active.  Here with repeat labs.      ECOG Performance    2 - Ambulatory and independent in all ADLs; cannot work; up > 50% of the time      Physical Exam    General: alert and cooperative. Accompanied by sister      Lab Results    Recent Results (from the past 168 hour(s))   Comprehensive metabolic panel   Result Value Ref Range    Sodium 138 135 - 145 mmol/L    Potassium 4.1 3.4 - 5.3 mmol/L    Carbon Dioxide (CO2) 26 22 - 29 mmol/L    Anion Gap 9 7 - 15 mmol/L    Urea Nitrogen 9.7 8.0 - 23.0 mg/dL    Creatinine 0.87 0.67 - 1.17 mg/dL    GFR Estimate 87 >60 mL/min/1.73m2    Calcium 8.8 8.8 - 10.2 mg/dL    Chloride 103 98 - 107 mmol/L    Glucose 302 (H) 70 - 99 mg/dL    Alkaline Phosphatase 103 40 - 150 U/L    AST 29 0 - 45 U/L    ALT 22 0 - 70 U/L    Protein Total 6.5 6.4 - 8.3 g/dL    Albumin 3.3 (L) 3.5 - 5.2 g/dL    Bilirubin Total 1.1 <=1.2 mg/dL           Imaging    CT HEAD W/O CONTRAST    Result Date: 4/19/2024  For Patients: As a result of the 21st Century Cures Act, medical imaging exams and procedure reports are released immediately into your electronic medical record. You may view this report before your referring provider. If you have questions, please contact your health care provider. EXAM: CT HEAD  BRAIN WO LOCATION: [facility] DATE: 4/19/2024 INDICATION: Mental status change, unknown cause. COMPARISON: CT brain 08/23/2022. TECHNIQUE: Routine CT Head without IV contrast. Multiplanar reformats. Dose reduction techniques were used. FINDINGS: INTRACRANIAL CONTENTS: No finding for intracranial hemorrhage, mass, or acute infarct. At least mild prominence of the lateral ventricles. Asymmetric prominence of the CSF space along the superolateral margin left cerebellar hemisphere, stable compared to prior and likely reflecting an arachnoid cyst versus volume loss. Mild presumed sequela of chronic microvascular ischemic change. No transcortical areas of low-attenuation change. No mass effect or midline shift. VISUALIZED ORBITS/SINUSES/MASTOIDS: Prior cataract surgeries. No paranasal sinus mucosal disease. No middle ear or mastoid effusion. BONES/SOFT TISSUES: Calvarium is intact, without fracture or suspicious lytic or blastic foci.    1.  No finding for intracranial hemorrhage, mass, or acute infarct. 2.  Mild volume loss and presumed sequela of chronic microvascular ischemic change.       The longitudinal plan of care for the diagnosis(es)/condition(s) as documented were addressed during this visit. Due to the added complexity in care, I will continue to support Don in the subsequent management and with ongoing continuity of care.      Signed by: Bk Watts MD

## 2024-05-09 NOTE — PROGRESS NOTES
Virtual Visit Details    Type of service:  Video Visit   Video Start Time: {video visit start/end time for provider to select:163108}  Video End Time:{video visit start/end time for provider to select:057824}    Originating Location (pt. Location): Other in clinic    Distant Location (provider location):  Off-site  Platform used for Video Visit: Christina Arias CMA on 5/9/2024 at 7:55 AM

## 2024-05-09 NOTE — LETTER
5/9/2024         RE: Flash Brown  [address]  [facility] [id number]        Dear Colleague,    Thank you for referring your patient, Flash Brown, to the [facility]. Please see a copy of my visit note below.    Video-Visit Details    Type of service:  Video Visit    Video Start Time:  7:58 AM  Video End Time:  8:04 AM    Originating Location (pt. Location): Other [place] [facility]  in [place]    Distant Location (provider location): Off-site    Platform used for Video Visit: [facility] Hematology and Oncology Progress Note    Patient: Flash Brown  MRN: [id number]  5/09/24        Reason for Visit    HCC    Primary Oncologist: Dr. 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  From cycle 2 onwards he has been getting durvalumab only.  Has done fairly well on it.  No significant side effects reported.    Labs reviewed today.  Serum chemistries mostly stable.  Albumin is mildly low at 3.3.  LFTs are normal.  Bilirubin 1.1.  Creatinine and electrolytes are within normal limits.    No contraindication to proceed with cycle 3 durvalumab today.  He will come back in 4 weeks.  Plan is to repeat imaging (MRI of the liver) after 4 cycles of before fifth cycle.         Oncology history  7/2023: Unresectable multifocal HCC, in setting of background DEWITT-related cirrhosis.   -dominant L hepatic lobe mass, treated by embolization; then followed  -1/2024 MRI: cirrhotic appearing liver with evidence of portal hypertension. Post-embolization change of hepatic segments 2 and 3 with persistent nodular internal enhancement measuring 3.5 cm suspicious for residual tumor. Enlarging 3.1 cm lesion in the segment 8 with washout and pseudocapsule formation consistent with HCC. Additionally,  other arterially enhancing lesions which are  all consistent with HCC. Further local therapies not an option.    Treatment:  -7/31/2023: radioembolization to L hepatic lobe mass; partial response      -3/7/2024 - present: palliative durvalumab and tremelimumab (cycle 1 only); then durvalumab maintenance every 28 days (not felt to be a candidate for bevacizumab)  --AFP 10.2 (9/2023) ahead of start    Interval History   Flash Brown is a 83 year old male with advanced HCC who initiated palliative durvalumab and tremelimumab.  Received for cycle 4 weeks ago.  He is seen as a video visit today with repeat labs and consideration for cycle 3 durvalumab.    Has done fairly well on treatment so far.  No significant side effects.  Recently was hospitalized at outside hospital for anxiety and shortness of breath issues.  Was found to be in bradycardia.  He has completed rehab.  Feeling better now.  Denies any new issues today.  No abdominal pain.  He is still not very motivated.  Not very physically active.  Here with repeat labs.      ECOG Performance    2 - Ambulatory and independent in all ADLs; cannot work; up > 50% of the time      Physical Exam    General: alert and cooperative. Accompanied by sister      Lab Results    Recent Results (from the past 168 hour(s))   Comprehensive metabolic panel   Result Value Ref Range    Sodium 138 135 - 145 mmol/L    Potassium 4.1 3.4 - 5.3 mmol/L    Carbon Dioxide (CO2) 26 22 - 29 mmol/L    Anion Gap 9 7 - 15 mmol/L    Urea Nitrogen 9.7 8.0 - 23.0 mg/dL    Creatinine 0.87 0.67 - 1.17 mg/dL    GFR Estimate 87 >60 mL/min/1.73m2    Calcium 8.8 8.8 - 10.2 mg/dL    Chloride 103 98 - 107 mmol/L    Glucose 302 (H) 70 - 99 mg/dL    Alkaline Phosphatase 103 40 - 150 U/L    AST 29 0 - 45 U/L    ALT 22 0 - 70 U/L    Protein Total 6.5 6.4 - 8.3 g/dL    Albumin 3.3 (L) 3.5 - 5.2 g/dL    Bilirubin Total 1.1 <=1.2 mg/dL           Imaging    CT HEAD W/O CONTRAST    Result Date: 4/19/2024  For Patients: As a result of the 21st Century Cures  Act, medical imaging exams and procedure reports are released immediately into your electronic medical record. You may view this report before your referring provider. If you have questions, please contact your health care provider. EXAM: CT HEAD BRAIN WO LOCATION: [facility] DATE: 4/19/2024 INDICATION: Mental status change, unknown cause. COMPARISON: CT brain 08/23/2022. TECHNIQUE: Routine CT Head without IV contrast. Multiplanar reformats. Dose reduction techniques were used. FINDINGS: INTRACRANIAL CONTENTS: No finding for intracranial hemorrhage, mass, or acute infarct. At least mild prominence of the lateral ventricles. Asymmetric prominence of the CSF space along the superolateral margin left cerebellar hemisphere, stable compared to prior and likely reflecting an arachnoid cyst versus volume loss. Mild presumed sequela of chronic microvascular ischemic change. No transcortical areas of low-attenuation change. No mass effect or midline shift. VISUALIZED ORBITS/SINUSES/MASTOIDS: Prior cataract surgeries. No paranasal sinus mucosal disease. No middle ear or mastoid effusion. BONES/SOFT TISSUES: Calvarium is intact, without fracture or suspicious lytic or blastic foci.    1.  No finding for intracranial hemorrhage, mass, or acute infarct. 2.  Mild volume loss and presumed sequela of chronic microvascular ischemic change.       The longitudinal plan of care for the diagnosis(es)/condition(s) as documented were addressed during this visit. Due to the added complexity in care, I will continue to support Don in the subsequent management and with ongoing continuity of care.      Signed by: Bk Watts MD      Again, thank you for allowing me to participate in the care of your patient.        Sincerely,        Bk Watts MD

## 2024-05-09 NOTE — PROGRESS NOTES
Infusion Nursing Note:  Flash Brown presents today for Imfin[facility].    Patient seen by provider today: Yes   present during visit today: Not Applicable.    Note: Labs drawn peripherally.      Intravenous Access:  Peripheral IV placed.    Treatment Conditions:  Lab Results   Component Value Date     05/09/2024    POTASSIUM 4.1 05/09/2024    MAG 1.8 04/29/2024    CR 0.87 05/09/2024    NATALIIA 8.8 05/09/2024    BILITOTAL 1.1 05/09/2024    ALBUMIN 3.3 (L) 05/09/2024    ALT 22 05/09/2024    AST 29 05/09/2024       Results reviewed, labs MET treatment parameters, ok to proceed with treatment.      Post Infusion Assessment:  Patient tolerated infusion without incident.  Site patent and intact, free from redness, edema or discomfort.  No evidence of extravasations.  Access discontinued per protocol.       Discharge Plan:   Patient discharged in stable condition accompanied by: sister.  Departure Mode: Ambulatory with walker.      Jenny Stubbs RN

## 2024-05-15 ENCOUNTER — OFFICE VISIT (OUTPATIENT)
Dept: GASTROENTEROLOGY | Facility: CLINIC | Age: 84
End: 2024-05-15
Attending: INTERNAL MEDICINE
Payer: MEDICARE

## 2024-05-15 ENCOUNTER — LAB (OUTPATIENT)
Dept: LAB | Facility: CLINIC | Age: 84
End: 2024-05-15
Attending: PHYSICIAN ASSISTANT
Payer: MEDICARE

## 2024-05-15 VITALS
RESPIRATION RATE: 20 BRPM | HEIGHT: 66 IN | OXYGEN SATURATION: 97 % | HEART RATE: 61 BPM | BODY MASS INDEX: 36.5 KG/M2 | TEMPERATURE: 97.7 F | DIASTOLIC BLOOD PRESSURE: 63 MMHG | SYSTOLIC BLOOD PRESSURE: 120 MMHG | WEIGHT: 227.1 LBS

## 2024-05-15 DIAGNOSIS — C22.0 HEPATOCELLULAR CARCINOMA (H): ICD-10-CM

## 2024-05-15 DIAGNOSIS — K74.60 LIVER CIRRHOSIS SECONDARY TO NONALCOHOLIC STEATOHEPATITIS (NASH) (H): Primary | ICD-10-CM

## 2024-05-15 DIAGNOSIS — K74.60 LIVER CIRRHOSIS SECONDARY TO NONALCOHOLIC STEATOHEPATITIS (NASH) (H): ICD-10-CM

## 2024-05-15 DIAGNOSIS — D68.9 COAGULOPATHY (H): ICD-10-CM

## 2024-05-15 DIAGNOSIS — K75.81 LIVER CIRRHOSIS SECONDARY TO NONALCOHOLIC STEATOHEPATITIS (NASH) (H): Primary | ICD-10-CM

## 2024-05-15 DIAGNOSIS — E72.20 HYPERAMMONEMIA (H): ICD-10-CM

## 2024-05-15 DIAGNOSIS — D61.818 OTHER PANCYTOPENIA (H): ICD-10-CM

## 2024-05-15 DIAGNOSIS — K75.81 LIVER CIRRHOSIS SECONDARY TO NONALCOHOLIC STEATOHEPATITIS (NASH) (H): ICD-10-CM

## 2024-05-15 PROBLEM — H90.0 CONDUCTIVE HEARING LOSS, BILATERAL: Status: ACTIVE | Noted: 2024-05-15

## 2024-05-15 PROBLEM — G31.84 MILD COGNITIVE IMPAIRMENT OF UNCERTAIN OR UNKNOWN ETIOLOGY: Status: ACTIVE | Noted: 2022-09-02

## 2024-05-15 PROBLEM — R06.00 DYSPNEA, UNSPECIFIED: Status: ACTIVE | Noted: 2022-09-03

## 2024-05-15 PROBLEM — R07.9 CHEST PAIN: Status: ACTIVE | Noted: 2020-08-20

## 2024-05-15 PROBLEM — R26.2 DIFFICULTY IN WALKING, NOT ELSEWHERE CLASSIFIED: Status: ACTIVE | Noted: 2022-09-02

## 2024-05-15 PROBLEM — R60.9 EDEMA: Status: ACTIVE | Noted: 2024-05-15

## 2024-05-15 PROBLEM — Z85.72 HISTORY OF NON-HODGKIN'S LYMPHOMA: Status: ACTIVE | Noted: 2024-05-15

## 2024-05-15 PROBLEM — R41.841 COGNITIVE COMMUNICATION DEFICIT: Status: ACTIVE | Noted: 2022-09-02

## 2024-05-15 PROBLEM — L57.0 ACTINIC KERATOSIS: Status: ACTIVE | Noted: 2024-05-15

## 2024-05-15 PROBLEM — K63.5 POLYP OF COLON: Status: ACTIVE | Noted: 2024-05-15

## 2024-05-15 LAB
ALBUMIN SERPL BCG-MCNC: 3.5 G/DL (ref 3.5–5.2)
ALP SERPL-CCNC: 91 U/L (ref 40–150)
ALT SERPL W P-5'-P-CCNC: 18 U/L (ref 0–70)
ANION GAP SERPL CALCULATED.3IONS-SCNC: 10 MMOL/L (ref 7–15)
AST SERPL W P-5'-P-CCNC: 31 U/L (ref 0–45)
BILIRUB DIRECT SERPL-MCNC: 0.44 MG/DL (ref 0–0.3)
BILIRUB SERPL-MCNC: 1.1 MG/DL
BUN SERPL-MCNC: 15 MG/DL (ref 8–23)
CALCIUM SERPL-MCNC: 8.7 MG/DL (ref 8.8–10.2)
CHLORIDE SERPL-SCNC: 102 MMOL/L (ref 98–107)
CREAT SERPL-MCNC: 0.95 MG/DL (ref 0.67–1.17)
DEPRECATED HCO3 PLAS-SCNC: 25 MMOL/L (ref 22–29)
EGFRCR SERPLBLD CKD-EPI 2021: 80 ML/MIN/1.73M2
ERYTHROCYTE [DISTWIDTH] IN BLOOD BY AUTOMATED COUNT: 15.2 % (ref 10–15)
GLUCOSE SERPL-MCNC: 236 MG/DL (ref 70–99)
HCT VFR BLD AUTO: 34 % (ref 40–53)
HGB BLD-MCNC: 11.2 G/DL (ref 13.3–17.7)
INR PPP: 1.48 (ref 0.85–1.15)
MCH RBC QN AUTO: 28.3 PG (ref 26.5–33)
MCHC RBC AUTO-ENTMCNC: 32.9 G/DL (ref 31.5–36.5)
MCV RBC AUTO: 86 FL (ref 78–100)
PLATELET # BLD AUTO: 66 10E3/UL (ref 150–450)
POTASSIUM SERPL-SCNC: 4.2 MMOL/L (ref 3.4–5.3)
PROT SERPL-MCNC: 6.5 G/DL (ref 6.4–8.3)
RBC # BLD AUTO: 3.96 10E6/UL (ref 4.4–5.9)
SODIUM SERPL-SCNC: 137 MMOL/L (ref 135–145)
WBC # BLD AUTO: 2.5 10E3/UL (ref 4–11)

## 2024-05-15 PROCEDURE — 85027 COMPLETE CBC AUTOMATED: CPT | Performed by: PATHOLOGY

## 2024-05-15 PROCEDURE — 80053 COMPREHEN METABOLIC PANEL: CPT | Performed by: PATHOLOGY

## 2024-05-15 PROCEDURE — 99214 OFFICE O/P EST MOD 30 MIN: CPT | Performed by: PHYSICIAN ASSISTANT

## 2024-05-15 PROCEDURE — 85610 PROTHROMBIN TIME: CPT | Performed by: PATHOLOGY

## 2024-05-15 PROCEDURE — 36415 COLL VENOUS BLD VENIPUNCTURE: CPT | Performed by: PATHOLOGY

## 2024-05-15 PROCEDURE — G0463 HOSPITAL OUTPT CLINIC VISIT: HCPCS | Performed by: PHYSICIAN ASSISTANT

## 2024-05-15 PROCEDURE — 82248 BILIRUBIN DIRECT: CPT | Performed by: PATHOLOGY

## 2024-05-15 PROCEDURE — 99000 SPECIMEN HANDLING OFFICE-LAB: CPT | Performed by: PATHOLOGY

## 2024-05-15 PROCEDURE — 86708 HEPATITIS A ANTIBODY: CPT | Performed by: PHYSICIAN ASSISTANT

## 2024-05-15 RX ORDER — RAMIPRIL 5 MG/1
5 CAPSULE ORAL DAILY
COMMUNITY

## 2024-05-15 RX ORDER — LACTULOSE 10 G/15ML
20 SOLUTION ORAL DAILY
Qty: 946 ML | Refills: 2 | Status: SHIPPED | OUTPATIENT
Start: 2024-05-15 | End: 2024-05-16

## 2024-05-15 RX ORDER — SPIRONOLACTONE 25 MG/1
12.5 TABLET ORAL DAILY
COMMUNITY

## 2024-05-15 RX ORDER — TRIAMCINOLONE ACETONIDE 1 MG/G
CREAM TOPICAL 2 TIMES DAILY PRN
COMMUNITY

## 2024-05-15 RX ORDER — GLIPIZIDE 10 MG/1
10 TABLET, FILM COATED, EXTENDED RELEASE ORAL DAILY
COMMUNITY

## 2024-05-15 ASSESSMENT — PAIN SCALES - GENERAL: PAINLEVEL: NO PAIN (0)

## 2024-05-15 NOTE — Clinical Note
Hey Dr. Leventhal,       Please let me know if you agree with my plan on this patient of yours who I saw in clinic for follow up!   Thanks,   Brittney

## 2024-05-15 NOTE — LETTER
"    5/15/2024         RE: Flash Brown  [address]  [facility] [address]        Dear Colleague,    Thank you for referring your patient, Flash Brown, to the [facility] [place]. Please see a copy of my visit note below.    Hepatology Clinic Note  Flash Brown   Date of Birth 1940  Date of Service 5/15/2024    REASON FOR FOLLOW UP: HCC, Cirrhosis 2/2 metabolic associated steatotic liver disease  REFERRING PROVIDER: Dr. 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 Last seen in clinic by Dr. Leventhal 10/30/23.     Cirrhosis, decompensated in setting of recent HE concerns    - 2/2 metabolic associated steatotic liver disease  - MELD 3.0: 10     Recent concerns of hepatic encephalopathy    > Ammonia 67 4/20/24; do suspect pt also has some degree of confusion and memory dysfunction in setting of old age    > Recently admitted to [facility] 4/19/24-4/24/24 with mentions of confusion: \"hepatic encephalopathy is likely part of it, but he may have some underlying cognitive impairment. Started lactulose 30 g p.o. twice daily. Ammonia has now improved to 45 although his mentation has not really changed.\"    Coagulopathy, INR 1.48    Pancytopenia, chronic      Risk of Esophageal Varices:  - Previously pt had deferred EGD but is currently willing to schedule EGD after discussing varices and risk of UGIB today     Volume status:  - No recent need for paracentesis and no ascites on MRI Jan 2024   > on low dose diuretics     Management of MASLD/MASH  - Re-discussed necessary lifestyle modifications: appropriate diet, exercise and weight loss      - Recommend exercise regularly as tolerated               - It has shown that pts who exercise regularly can have improvement of insulin resistance, increase " in baseline metabolic activity and resolution of fatty liver disease (even if  appreciable weight loss does not occur)    - Recommend a low-carb, low-calorie diet (~1700 kimmie per day).    - Recommend assertive management: ideal goal Hgb A1c goal of =/< 7%.     - No overt contraindications to meds used in tx of diabetes in persons with chronic liver disease  - Management of cholesterol is also very important.    - Use of statins are an effective means of therapy and are not contra-indicated in those with abnormal liver tests OR those with cirrhosis.  The value of these medications in this population far outweigh the minor risks of abnormal liver tests.   - Goal LDL in those with DEWITT are < 100 mg/dL     Advanced, multifocal HCC:  - Biopsy-proven Mod differentiated HCC from 6/23  - 7/31/23: radioembolization to L hepatic lobe mass; partial response    - MRI Liver 9/20/23 reviewed in tumor board - likely post tx effects  - Most recent imaging 1/22/24: Cirrhosis and portal HTN. Radioembolization changes in segments 2/3 with persistent nodular internal enhancement, 3.5 cm along anterior superior aspect of treated lesion, suspicious for residual tumor. LR-TR viable. Enlarging 3.1 cm observation in segment 8 w/ washout and pseudocapsule. OPTN-5b. 1.3 cm arterially enhancing lesion in segment 6 w/ increased signal on T2 and diffusion-weighted images, meeting criteria for HCC on basis of growth criteria, previously 0.8 mm 9/18/23. OPTN 5a-g. 2 enlarging LR-4 lesions in segments 4A and 7, as described above. Multiple subcentimeter subcapsular foci of arterial enhancement w/o washout or pseudocapsule formation. LR-3. Based on this exam only, pt is not within [place]. Decreased mildly enlarged lymph node at [facility].  -3/7/2024 - present: palliative durvalumab and tremelimumab (cycle 1 only); then durvalumab maintenance every 28 days (not felt to be a candidate for bevacizumab)  - AFP 10.2 (9/2023) ahead of start  - AFP 10.1  "4/4/24  - AFP 8.8 5/9/24     PLAN:  - Highly encouraged pt/pt's sister obtain up-to-date medication list through facility where patient resides   - Schedule EGD for variceal screen  - Encouraged Hepatitis A/B vaccines through pharmacy or PCP  - Low sodium diet (as able due to living in Assisted Living)  - Advised patient to avoid all alcohol use  - No liver indication for diuretics as this time; will leave diuretic use/doses up to outside Cardiology team   - Sent in refill of lactulose   > Discussed goal of 3-5 BM's per day  - Avoid NSAID use   - Continue to follow with Oncology routinely as recommended    > Next follow up scheduled for 6/6/24   > Per note 5/9/24: \"No contraindication to proceed with cycle 3 durvalumab today. He will come back in 4 weeks. Plan is to repeat imaging (MRI of the liver) after 4 cycles of before fifth cycle.\"   > Repeat MRI liver per Oncology recs     RTC in ~6 mo with Dr. Leventhal (or sooner as needed) with MELD labs same day   > Likely not considered liver transplant candidate given age and ongoing HCC concerns     Brittney Uriostegui PA-C  [facility]   -----------------------------------------------------       HPI:     Presents today for follow up with his sister. Last seen by Dr. Leventhal October 2023.     History obtained from both patient and his sister.    Patient states he is currently residing in the assisted living facility in [place] and has been living there for the past 7 months.  Patient's sister states the facility now helps patient with his medications.  Previously, patient sister had been helping to administer medications.  Patient's sister states she does not currently have a updated medication list through the facility.    Patient sister admits patient was recently hospitalized 4/19 - 4/24 and that ammonia levels at that time were elevated.  Patient was started on lactulose during that time.  Patient sister believes patient was dealing " with some anxiety in the hospital and that a potential new medication was started for that.  Patient states lately he is aware of his name, location and why he is where he is.  No recent yellowing of eyes or skin.  No recent feelings of fluid in his abdomen.  No abnormal chest pain or shortness of breath.  States he has an upcoming infusion in June per oncology and will have repeat MRI afterwards.  Reports his appetite is fairly good.  Eating 3 meals per day.  Facility where patient lives does not offer different diets for different patients.  Patient states he does not add salt to his food.  Patient has gained approximately 20 pounds in the past 7 months.  Admits he drinks a lot of juice.  Typically passing stool 2-3 times per day.  No recent prior blood per rectum or melena.  No recent falls.  Uses his walker all the time.  Occasionally uses a cane in the home.  Patient sister states the facility where patient lives is supposed to take daily temperature and weights.    No current/former tobacco use.  Admits to very rare alcohol use.  No illicit drug use.    Health Maintenance:  Dexa scan: none per chart review   Hep A/B vaccines: Hep A Ab total nonreactive; HBV Sab nonreactive   Colonoscopy: none per chart review     Medical hx Surgical hx   Past Medical History:   Diagnosis Date     Basal cell carcinoma      CAD (coronary artery disease)      Depression      Diabetes (H)      Hyperlipidemia      Hypertension      Lymphoma (H)      Malignant melanoma (H)      Melanoma (H)      Squamous cell carcinoma     Past Surgical History:   Procedure Laterality Date     AXILLARY SURGERY      S/P resection and adjuvant radiation     BONE MARROW BIOPSY, BONE SPECIMEN, NEEDLE/TROCAR N/A 7/8/2019    Procedure: BIOPSY, BONE MARROW;  Surgeon: Husam Issa MD;  Location: [facility]     BONE MARROW BIOPSY, BONE SPECIMEN, NEEDLE/TROCAR Left 2/24/2022    Procedure: BIOPSY, BONE MARROW;  Surgeon: Cailin Anthony PA-C;  [place] [facility]      CARDIAC SURGERY       CHOLECYSTECTOMY       HERNIA REPAIR, UMBILICAL       IR LIVER BIOPSY PERCUTANEOUS  6/2/2023     IR SIRT (SELECTIVE INTERNAL RADIO THERAPY)  7/26/2023     IR VISCERAL ANGIOGRAM  7/26/2023     IR VISCERAL EMBOLIZATION  7/31/2023     PHACOEMULSIFICATION WITH STANDARD INTRAOCULAR LENS IMPLANT Right 10/2/2019    Procedure: Cataract Removal with Implant;  Surgeon: Dinesh Ivey MD;  Location: [facility]     PHACOEMULSIFICATION WITH STANDARD INTRAOCULAR LENS IMPLANT Left 10/21/2019    Procedure: Cataract Removal with Implant;  Surgeon: Dinesh Ivey MD;  Location: [facility]     STENT      PTCA with drug-eluting stent of RCA, circumflex, and LAD     VASCULAR SURGERY                 Medications:     Current Outpatient Medications   Medication Sig Dispense Refill     aspirin (ASA) 81 MG EC tablet Take 81 mg by mouth daily       atorvastatin (LIPITOR) 20 MG tablet Take 1 tablet (20 mg) by mouth daily 90 tablet 3     escitalopram (LEXAPRO) 5 MG tablet        ferrous sulfate (FE TABS) 325 (65 Fe) MG EC tablet Take 1 tablet (325 mg) by mouth daily 90 tablet 0     furosemide (LASIX) 20 MG tablet Take 1 tablet (20 mg) by mouth daily       hydrOXYzine HCl (ATARAX) 25 MG tablet Take 1 tablet (25 mg) by mouth 4 times daily       lidocaine (XYLOCAINE) 5 % external ointment Apply topically 4 times daily as needed for moderate pain 50 g 1     magnesium oxide (MAG-OX) 400 MG tablet Take 1 tablet (400 mg) by mouth 2 times daily 60 tablet 1     metFORMIN (GLUCOPHAGE) 500 MG tablet Take 1 tablet (500 mg) by mouth 2 times daily (with meals) 180 tablet 3     nitroGLYcerin (NITROSTAT) 0.4 MG sublingual tablet Place 1 tablet (0.4 mg) under the tongue See Admin Instructions for chest pain 30 tablet 0     tamsulosin (FLOMAX) 0.4 MG capsule Take 1 capsule (0.4 mg) by mouth every evening 90 capsule 3     No current facility-administered medications for this visit.     Facility-Administered Medications Ordered  in Other Visits   Medication Dose Route Frequency Provider Last Rate Last Admin     sodium hyaluronate (HEALON DUET)    Dinesh Matos MD   1 kit at 10/02/19 0589          Allergies:     Allergies   Allergen Reactions     Iodine Swelling     Patient states reaction was 20-30 years ago. Has had CT dye and coronary angiograms since then without premedication and did not have reaction.     Iodinated Contrast Media      Metoprolol Difficulty breathing     Difficulty breathing and bradycardia           Social History:     Social History     Socioeconomic History     Marital status: Single     Spouse name: Not on file     Number of children: 0     Years of education: Not on file     Highest education level: Not on file   Occupational History     Occupation: Retired     Comment: [employer]    Tobacco Use     Smoking status: Never     Passive exposure: Never     Smokeless tobacco: Never   Vaping Use     Vaping status: Never Used   Substance and Sexual Activity     Alcohol use: Yes     Comment: glass of wine couple times per week     Drug use: Never     Sexual activity: Not on file   Other Topics Concern     Parent/sibling w/ CABG, MI or angioplasty before 65F 55M? Not Asked   Social History Narrative     Not on file     Social Determinants of Health     Financial Resource Strain: Low Risk  (4/21/2024)    [facility]    Financial Resource Strain      Difficulty of Paying Living Expenses: 3      Difficulty of Paying Living Expenses: Not on file   Food Insecurity: No Food Insecurity (4/21/2024)    [facility]    Food Insecurity      Worried About Running Out of Food in the Last Year: 1   Transportation Needs: No Transportation Needs (4/21/2024)    Received from [facility]    Transportation Needs      Lack of Transportation (Medical): 1   Physical Activity: Inactive (11/9/2021)  "   Exercise Vital Sign      Days of Exercise per Week: 0 days      Minutes of Exercise per Session: 0 min   Stress: No Stress Concern Present (11/9/2021)    [employer] - Occupational Stress Questionnaire      Feeling of Stress : Not at all   Social Connections: Socially Integrated (4/21/2024)    Received from [email or web address]    Social Connections      Frequency of Communication with Friends and Family: 0   Interpersonal Safety: Not on file   Housing Stability: Low Risk  (4/20/2024)    Received from [email or web address]    Housing Stability      Unable to Pay for Housing in the Last Year: 1          Family History:     Family History   Problem Relation Age of Onset     Asthma Other      Cancer Other         melanoma     Blood Disease Other         bleeding disorder     Lung Cancer Mother         Smoker     Prostate Cancer Father      Melanoma No family hx of           Review of Systems:   GEN: See HPI         Physical Exam:     Vital signs:  Temp: 97.7  F (36.5  C) Temp src: Oral BP: 120/63 Pulse: 61   Resp: 20 SpO2: 97 %     Height: 166.4 cm (5' 5.51\") Weight: 103 kg (227 lb 1.6 oz)  Estimated body mass index is 37.2 kg/m  as calculated from the following:    Height as of this encounter: 1.664 m (5' 5.51\").    Weight as of this encounter: 103 kg (227 lb 1.6 oz).  Gen: A&Ox3, NAD  HEENT: Sclera anicteric   CV: irregular rate and rhythm, no overt murmurs  Lung: CTA, no wheezing or crackles.   Abd: soft, NT, ND, BS+   Ext: no edema, intact pulses.   Skin: No rash or jaundice  Neuro: grossly intact, no asterixis   Psych: appropriate mood and affects         Data:   Reviewed in person and significant for:    Lab Results   Component Value Date     05/09/2024     04/23/2021      Lab Results   Component Value Date    POTASSIUM 4.1 05/09/2024    POTASSIUM 4.0 04/25/2023    POTASSIUM 4.7 04/23/2021     Lab Results   Component Value " "Date    CHLORIDE 103 05/09/2024    CHLORIDE 103 04/25/2023    CHLORIDE 107 04/23/2021     Lab Results   Component Value Date    CO2 26 05/09/2024    CO2 31 04/25/2023    CO2 29 04/23/2021     Lab Results   Component Value Date    BUN 9.7 05/09/2024    BUN 12 04/25/2023    BUN 17 04/23/2021     Lab Results   Component Value Date    CR 0.87 05/09/2024    CR 1.04 04/23/2021       Lab Results   Component Value Date    WBC 2.8 04/29/2024    WBC 3.2 01/04/2021     Lab Results   Component Value Date    HGB 11.2 04/29/2024    HGB 12.0 01/04/2021     Lab Results   Component Value Date    HCT 34.4 04/29/2024    HCT 37.5 01/04/2021     Lab Results   Component Value Date    MCV 88 04/29/2024    MCV 90 01/04/2021     Lab Results   Component Value Date    PLT 67 04/29/2024    PLT 92 01/04/2021       Lab Results   Component Value Date    AST 29 05/09/2024    AST 18 04/23/2021     Lab Results   Component Value Date    ALT 22 05/09/2024    ALT 25 04/23/2021     No results found for: \"BILICONJ\"   Lab Results   Component Value Date    BILITOTAL 1.1 05/09/2024    BILITOTAL 0.9 04/23/2021       Lab Results   Component Value Date    ALBUMIN 3.3 05/09/2024    ALBUMIN 3.3 04/25/2023    ALBUMIN 3.1 04/23/2021     Lab Results   Component Value Date    PROTTOTAL 6.5 05/09/2024    PROTTOTAL 6.7 04/23/2021      Lab Results   Component Value Date    ALKPHOS 103 05/09/2024    ALKPHOS 114 04/23/2021     Lab Results   Component Value Date    INR 1.27 03/07/2024         Again, thank you for allowing me to participate in the care of your patient.        Sincerely,        Brittney Uriostegui PA-C  "

## 2024-05-15 NOTE — PROGRESS NOTES
"Hepatology Clinic Note  Flash Brown   Date of Birth 1940  Date of Service 5/15/2024    REASON FOR FOLLOW UP: HCC, Cirrhosis 2/2 metabolic associated steatotic liver disease  REFERRING PROVIDER: Dr. 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 Last seen in clinic by Dr. Leventhal 10/30/23.     Cirrhosis, decompensated in setting of recent HE concerns    - 2/2 metabolic associated steatotic liver disease  - MELD 3.0: 10     Recent concerns of hepatic encephalopathy    > Ammonia 67 4/20/24; do suspect pt also has some degree of confusion and memory dysfunction in setting of old age    > Recently admitted to [facility] 4/19/24-4/24/24 with mentions of confusion: \"hepatic encephalopathy is likely part of it, but he may have some underlying cognitive impairment. Started lactulose 30 g p.o. twice daily. Ammonia has now improved to 45 although his mentation has not really changed.\"    Coagulopathy, INR 1.48    Pancytopenia, chronic      Risk of Esophageal Varices:  - Previously pt had deferred EGD but is currently willing to schedule EGD after discussing varices and risk of UGIB today     Volume status:  - No recent need for paracentesis and no ascites on MRI Jan 2024   > on low dose diuretics     Management of MASLD/MASH  - Re-discussed necessary lifestyle modifications: appropriate diet, exercise and weight loss      - Recommend exercise regularly as tolerated               - It has shown that pts who exercise regularly can have improvement of insulin resistance, increase in baseline metabolic activity and resolution of fatty liver disease (even if  appreciable weight loss does not occur)    - Recommend a low-carb, low-calorie diet (~1700 kimmie per day).    - Recommend assertive management: ideal goal Hgb A1c goal of =/< 7%.     - No " overt contraindications to meds used in tx of diabetes in persons with chronic liver disease  - Management of cholesterol is also very important.    - Use of statins are an effective means of therapy and are not contra-indicated in those with abnormal liver tests OR those with cirrhosis.  The value of these medications in this population far outweigh the minor risks of abnormal liver tests.   - Goal LDL in those with DEWITT are < 100 mg/dL     Advanced, multifocal HCC:  - Biopsy-proven Mod differentiated HCC from 6/23  - 7/31/23: radioembolization to L hepatic lobe mass; partial response    - MRI Liver 9/20/23 reviewed in tumor board - likely post tx effects  - Most recent imaging 1/22/24: Cirrhosis and portal HTN. Radioembolization changes in segments 2/3 with persistent nodular internal enhancement, 3.5 cm along anterior superior aspect of treated lesion, suspicious for residual tumor. LR-TR viable. Enlarging 3.1 cm observation in segment 8 w/ washout and pseudocapsule. OPTN-5b. 1.3 cm arterially enhancing lesion in segment 6 w/ increased signal on T2 and diffusion-weighted images, meeting criteria for HCC on basis of growth criteria, previously 0.8 mm 9/18/23. OPTN 5a-g. 2 enlarging LR-4 lesions in segments 4A and 7, as described above. Multiple subcentimeter subcapsular foci of arterial enhancement w/o washout or pseudocapsule formation. LR-3. Based on this exam only, pt is not within [place]. Decreased mildly enlarged lymph node at [facility].  -3/7/2024 - present: palliative durvalumab and tremelimumab (cycle 1 only); then durvalumab maintenance every 28 days (not felt to be a candidate for bevacizumab)  - AFP 10.2 (9/2023) ahead of start  - AFP 10.1 4/4/24  - AFP 8.8 5/9/24     PLAN:  - Highly encouraged pt/pt's sister obtain up-to-date medication list through facility where patient resides   - Schedule EGD for variceal screen  - Encouraged Hepatitis A/B vaccines through pharmacy or PCP  - Low sodium diet (as  "able due to living in Assisted Living)  - Advised patient to avoid all alcohol use  - No liver indication for diuretics as this time; will leave diuretic use/doses up to outside Cardiology team   - Sent in refill of lactulose   > Discussed goal of 3-5 BM's per day  - Avoid NSAID use   - Continue to follow with Oncology routinely as recommended    > Next follow up scheduled for 6/6/24   > Per note 5/9/24: \"No contraindication to proceed with cycle 3 durvalumab today. He will come back in 4 weeks. Plan is to repeat imaging (MRI of the liver) after 4 cycles of before fifth cycle.\"   > Repeat MRI liver per Oncology recs     RTC in ~6 mo with Dr. Leventhal (or sooner as needed) with MELD labs same day   > Likely not considered liver transplant candidate given age and ongoing HCC concerns     Brittney Uriostegui PA-C  [facility]   -----------------------------------------------------       HPI:     Presents today for follow up with his sister. Last seen by Dr. Leventhal October 2023.     History obtained from both patient and his sister.    Patient states he is currently residing in the assisted living facility in [place] and has been living there for the past 7 months.  Patient's sister states the facility now helps patient with his medications.  Previously, patient sister had been helping to administer medications.  Patient's sister states she does not currently have a updated medication list through the facility.    Patient sister admits patient was recently hospitalized 4/19 - 4/24 and that ammonia levels at that time were elevated.  Patient was started on lactulose during that time.  Patient sister believes patient was dealing with some anxiety in the hospital and that a potential new medication was started for that.  Patient states lately he is aware of his name, location and why he is where he is.  No recent yellowing of eyes or skin.  No recent feelings of fluid in his abdomen.  No " abnormal chest pain or shortness of breath.  States he has an upcoming infusion in June per oncology and will have repeat MRI afterwards.  Reports his appetite is fairly good.  Eating 3 meals per day.  Facility where patient lives does not offer different diets for different patients.  Patient states he does not add salt to his food.  Patient has gained approximately 20 pounds in the past 7 months.  Admits he drinks a lot of juice.  Typically passing stool 2-3 times per day.  No recent prior blood per rectum or melena.  No recent falls.  Uses his walker all the time.  Occasionally uses a cane in the home.  Patient sister states the facility where patient lives is supposed to take daily temperature and weights.    No current/former tobacco use.  Admits to very rare alcohol use.  No illicit drug use.    Health Maintenance:  Dexa scan: none per chart review   Hep A/B vaccines: Hep A Ab total nonreactive; HBV Sab nonreactive   Colonoscopy: none per chart review     Medical hx Surgical hx   Past Medical History:   Diagnosis Date    Basal cell carcinoma     CAD (coronary artery disease)     Depression     Diabetes (H)     Hyperlipidemia     Hypertension     Lymphoma (H)     Malignant melanoma (H)     Melanoma (H)     Squamous cell carcinoma     Past Surgical History:   Procedure Laterality Date    AXILLARY SURGERY      S/P resection and adjuvant radiation    BONE MARROW BIOPSY, BONE SPECIMEN, NEEDLE/TROCAR N/A 7/8/2019    Procedure: BIOPSY, BONE MARROW;  Surgeon: Husam Issa MD;  Location: [facility]    BONE MARROW BIOPSY, BONE SPECIMEN, NEEDLE/TROCAR Left 2/24/2022    Procedure: BIOPSY, BONE MARROW;  Surgeon: Cailin Anthony PA-C;  Location: [facility] OR    CARDIAC SURGERY      CHOLECYSTECTOMY      HERNIA REPAIR, UMBILICAL      IR LIVER BIOPSY PERCUTANEOUS  6/2/2023    IR SIRT (SELECTIVE INTERNAL RADIO THERAPY)  7/26/2023    IR VISCERAL ANGIOGRAM  7/26/2023    IR VISCERAL EMBOLIZATION  7/31/2023    PHACOEMULSIFICATION WITH  STANDARD INTRAOCULAR LENS IMPLANT Right 10/2/2019    Procedure: Cataract Removal with Implant;  Surgeon: Dinesh Ivey MD;  Location: [facility]    PHACOEMULSIFICATION WITH STANDARD INTRAOCULAR LENS IMPLANT Left 10/21/2019    Procedure: Cataract Removal with Implant;  Surgeon: Dinesh Ivey MD;  Location: [facility]    STENT      PTCA with drug-eluting stent of RCA, circumflex, and LAD    VASCULAR SURGERY                 Medications:     Current Outpatient Medications   Medication Sig Dispense Refill    aspirin (ASA) 81 MG EC tablet Take 81 mg by mouth daily      atorvastatin (LIPITOR) 20 MG tablet Take 1 tablet (20 mg) by mouth daily 90 tablet 3    escitalopram (LEXAPRO) 5 MG tablet       ferrous sulfate (FE TABS) 325 (65 Fe) MG EC tablet Take 1 tablet (325 mg) by mouth daily 90 tablet 0    furosemide (LASIX) 20 MG tablet Take 1 tablet (20 mg) by mouth daily      hydrOXYzine HCl (ATARAX) 25 MG tablet Take 1 tablet (25 mg) by mouth 4 times daily      lidocaine (XYLOCAINE) 5 % external ointment Apply topically 4 times daily as needed for moderate pain 50 g 1    magnesium oxide (MAG-OX) 400 MG tablet Take 1 tablet (400 mg) by mouth 2 times daily 60 tablet 1    metFORMIN (GLUCOPHAGE) 500 MG tablet Take 1 tablet (500 mg) by mouth 2 times daily (with meals) 180 tablet 3    nitroGLYcerin (NITROSTAT) 0.4 MG sublingual tablet Place 1 tablet (0.4 mg) under the tongue See Admin Instructions for chest pain 30 tablet 0    tamsulosin (FLOMAX) 0.4 MG capsule Take 1 capsule (0.4 mg) by mouth every evening 90 capsule 3     No current facility-administered medications for this visit.     Facility-Administered Medications Ordered in Other Visits   Medication Dose Route Frequency Provider Last Rate Last Admin    sodium hyaluronate (HEALON DUET)    PRN Dinesh Ivey MD   1 kit at 10/02/19 5972          Allergies:     Allergies   Allergen Reactions    Iodine Swelling     Patient states reaction was 20-30 years  ago. Has had CT dye and coronary angiograms since then without premedication and did not have reaction.    Iodinated Contrast Media     Metoprolol Difficulty breathing     Difficulty breathing and bradycardia           Social History:     Social History     Socioeconomic History    Marital status: Single     Spouse name: Not on file    Number of children: 0    Years of education: Not on file    Highest education level: Not on file   Occupational History    Occupation: Retired     Comment: [employer]    Tobacco Use    Smoking status: Never     Passive exposure: Never    Smokeless tobacco: Never   Vaping Use    Vaping status: Never Used   Substance and Sexual Activity    Alcohol use: Yes     Comment: glass of wine couple times per week    Drug use: Never    Sexual activity: Not on file   Other Topics Concern    Parent/sibling w/ CABG, MI or angioplasty before 65F 55M? Not Asked   Social History Narrative    Not on file     Social Determinants of Health     Financial Resource Strain: Low Risk  (4/21/2024)    [facility]    Financial Resource Strain     Difficulty of Paying Living Expenses: 3     Difficulty of Paying Living Expenses: Not on file   Food Insecurity: No Food Insecurity (4/21/2024)    Received from [employer]    Food Insecurity     Worried About Running Out of Food in the Last Year: 1   Transportation Needs: No Transportation Needs (4/21/2024)    Received from [employer]    Transportation Needs     Lack of Transportation (Medical): 1   Physical Activity: Inactive (11/9/2021)    Exercise Vital Sign     Days of Exercise per Week: 0 days     Minutes of Exercise per Session: 0 min   Stress: No Stress Concern Present (11/9/2021)    [employer] [place] [employer] - Occupational Stress Questionnaire     Feeling of Stress : Not at all   Social Connections: Socially Integrated  "(4/21/2024)    Received from [employer]    Social Connections     Frequency of Communication with Friends and Family: 0   Interpersonal Safety: Not on file   Housing Stability: Low Risk  (4/20/2024)    Received from [facility]    Housing Stability     Unable to Pay for Housing in the Last Year: 1          Family History:     Family History   Problem Relation Age of Onset    Asthma Other     Cancer Other         melanoma    Blood Disease Other         bleeding disorder    Lung Cancer Mother         Smoker    Prostate Cancer Father     Melanoma No family hx of           Review of Systems:   GEN: See HPI         Physical Exam:     Vital signs:  Temp: 97.7  F (36.5  C) Temp src: Oral BP: 120/63 Pulse: 61   Resp: 20 SpO2: 97 %     Height: 166.4 cm (5' 5.51\") Weight: 103 kg (227 lb 1.6 oz)  Estimated body mass index is 37.2 kg/m  as calculated from the following:    Height as of this encounter: 1.664 m (5' 5.51\").    Weight as of this encounter: 103 kg (227 lb 1.6 oz).  Gen: A&Ox3, NAD  HEENT: Sclera anicteric   CV: irregular rate and rhythm, no overt murmurs  Lung: CTA, no wheezing or crackles.   Abd: soft, NT, ND, BS+   Ext: no edema, intact pulses.   Skin: No rash or jaundice  Neuro: grossly intact, no asterixis   Psych: appropriate mood and affects         Data:   Reviewed in person and significant for:    Lab Results   Component Value Date     05/09/2024     04/23/2021      Lab Results   Component Value Date    POTASSIUM 4.1 05/09/2024    POTASSIUM 4.0 04/25/2023    POTASSIUM 4.7 04/23/2021     Lab Results   Component Value Date    CHLORIDE 103 05/09/2024    CHLORIDE 103 04/25/2023    CHLORIDE 107 04/23/2021     Lab Results   Component Value Date    CO2 26 05/09/2024    CO2 31 04/25/2023    CO2 29 04/23/2021     Lab Results   Component Value Date    BUN 9.7 05/09/2024    BUN 12 04/25/2023    BUN 17 04/23/2021     Lab Results   Component " "Value Date    CR 0.87 05/09/2024    CR 1.04 04/23/2021       Lab Results   Component Value Date    WBC 2.8 04/29/2024    WBC 3.2 01/04/2021     Lab Results   Component Value Date    HGB 11.2 04/29/2024    HGB 12.0 01/04/2021     Lab Results   Component Value Date    HCT 34.4 04/29/2024    HCT 37.5 01/04/2021     Lab Results   Component Value Date    MCV 88 04/29/2024    MCV 90 01/04/2021     Lab Results   Component Value Date    PLT 67 04/29/2024    PLT 92 01/04/2021       Lab Results   Component Value Date    AST 29 05/09/2024    AST 18 04/23/2021     Lab Results   Component Value Date    ALT 22 05/09/2024    ALT 25 04/23/2021     No results found for: \"BILICONJ\"   Lab Results   Component Value Date    BILITOTAL 1.1 05/09/2024    BILITOTAL 0.9 04/23/2021       Lab Results   Component Value Date    ALBUMIN 3.3 05/09/2024    ALBUMIN 3.3 04/25/2023    ALBUMIN 3.1 04/23/2021     Lab Results   Component Value Date    PROTTOTAL 6.5 05/09/2024    PROTTOTAL 6.7 04/23/2021      Lab Results   Component Value Date    ALKPHOS 103 05/09/2024    ALKPHOS 114 04/23/2021     Lab Results   Component Value Date    INR 1.27 03/07/2024     "

## 2024-05-15 NOTE — NURSING NOTE
"Chief Complaint   Patient presents with    RECHECK     Cirrhosis      Vital signs:  Temp: 97.7  F (36.5  C) Temp src: Oral BP: 120/63 Pulse: 61   Resp: 20 SpO2: 97 %     Height: 166.4 cm (5' 5.51\") Weight: 103 kg (227 lb 1.6 oz)  Estimated body mass index is 37.2 kg/m  as calculated from the following:    Height as of this encounter: 1.664 m (5' 5.51\").    Weight as of this encounter: 103 kg (227 lb 1.6 oz).      Lucila Katz, [facility]  5/15/2024 3:33 PM    "

## 2024-05-16 DIAGNOSIS — K75.81 LIVER CIRRHOSIS SECONDARY TO NONALCOHOLIC STEATOHEPATITIS (NASH) (H): ICD-10-CM

## 2024-05-16 DIAGNOSIS — K74.60 LIVER CIRRHOSIS SECONDARY TO NONALCOHOLIC STEATOHEPATITIS (NASH) (H): ICD-10-CM

## 2024-05-16 DIAGNOSIS — K76.82 HEPATIC ENCEPHALOPATHY (H): Primary | ICD-10-CM

## 2024-05-16 LAB — HAV AB SER QL IA: NONREACTIVE

## 2024-05-16 RX ORDER — LACTULOSE 10 G/15ML
20 SOLUTION ORAL DAILY
Qty: 946 ML | Refills: 11 | Status: SHIPPED | OUTPATIENT
Start: 2024-05-16

## 2024-05-29 ENCOUNTER — OFFICE VISIT (OUTPATIENT)
Dept: DERMATOLOGY | Facility: CLINIC | Age: 84
End: 2024-05-29
Payer: MEDICARE

## 2024-05-29 DIAGNOSIS — L57.0 AK (ACTINIC KERATOSIS): ICD-10-CM

## 2024-05-29 DIAGNOSIS — L82.1 SEBORRHEIC KERATOSES: Primary | ICD-10-CM

## 2024-05-29 DIAGNOSIS — L81.4 LENTIGO: ICD-10-CM

## 2024-05-29 DIAGNOSIS — D23.9 DERMAL NEVUS: ICD-10-CM

## 2024-05-29 DIAGNOSIS — Z85.828 HISTORY OF SKIN CANCER: ICD-10-CM

## 2024-05-29 DIAGNOSIS — D18.01 ANGIOMA OF SKIN: ICD-10-CM

## 2024-05-29 PROCEDURE — 17000 DESTRUCT PREMALG LESION: CPT | Performed by: DERMATOLOGY

## 2024-05-29 PROCEDURE — 99213 OFFICE O/P EST LOW 20 MIN: CPT | Mod: 25 | Performed by: DERMATOLOGY

## 2024-05-29 PROCEDURE — 17003 DESTRUCT PREMALG LES 2-14: CPT | Performed by: DERMATOLOGY

## 2024-05-29 NOTE — LETTER
5/29/2024         RE: Flash Brown  [address]  [place] [address]        Dear Colleague,    Thank you for referring your patient, Flash Brown, to the [facility]. Please see a copy of my visit note below.    Flash Brown is an extremely pleasant 83 year old year old male patient here today for hx of non-melanoma skin cancer.  Patient has no other skin complaints today.  Remainder of the HPI, Meds, PMH, Allergies, FH, and SH was reviewed in chart.      Past Medical History:   Diagnosis Date     Basal cell carcinoma      CAD (coronary artery disease)      Depression      Diabetes (H)      Hyperlipidemia      Hypertension      Lymphoma (H)      Malignant melanoma (H)      Melanoma (H)      Squamous cell carcinoma        Past Surgical History:   Procedure Laterality Date     AXILLARY SURGERY      S/P resection and adjuvant radiation     BONE MARROW BIOPSY, BONE SPECIMEN, NEEDLE/TROCAR N/A 7/8/2019    Procedure: BIOPSY, BONE MARROW;  Surgeon: Husam Issa MD;  Location: [facility]     BONE MARROW BIOPSY, BONE SPECIMEN, NEEDLE/TROCAR Left 2/24/2022    Procedure: BIOPSY, BONE MARROW;  Surgeon: Cailin Anthony PA-C;  Location: [facility] OR     CARDIAC SURGERY       CHOLECYSTECTOMY       HERNIA REPAIR, UMBILICAL       IR LIVER BIOPSY PERCUTANEOUS  6/2/2023     IR SIRT (SELECTIVE INTERNAL RADIO THERAPY)  7/26/2023     IR VISCERAL ANGIOGRAM  7/26/2023     IR VISCERAL EMBOLIZATION  7/31/2023     PHACOEMULSIFICATION WITH STANDARD INTRAOCULAR LENS IMPLANT Right 10/2/2019    Procedure: Cataract Removal with Implant;  Surgeon: Dinesh Ivey MD;  Location: [facility]     PHACOEMULSIFICATION WITH STANDARD INTRAOCULAR LENS IMPLANT Left 10/21/2019    Procedure: Cataract Removal with Implant;  Surgeon: Dinesh Ivey MD;  Location: [facility]     STENT      PTCA with drug-eluting stent of RCA, circumflex, and LAD     VASCULAR SURGERY          Family History   Problem Relation Age of  Onset     Asthma Other      Cancer Other         melanoma     Blood Disease Other         bleeding disorder     Lung Cancer Mother         Smoker     Prostate Cancer Father      Melanoma No family hx of        Social History     Socioeconomic History     Marital status: Single     Spouse name: Not on file     Number of children: 0     Years of education: Not on file     Highest education level: Not on file   Occupational History     Occupation: Retired     Comment: [employer]    Tobacco Use     Smoking status: Never     Passive exposure: Never     Smokeless tobacco: Never   Vaping Use     Vaping status: Never Used   Substance and Sexual Activity     Alcohol use: Yes     Comment: very rarely     Drug use: Not Currently     Sexual activity: Not on file   Other Topics Concern     Parent/sibling w/ CABG, MI or angioplasty before 65F 55M? Not Asked   Social History Narrative     Not on file     Social Determinants of Health     Financial Resource Strain: Low Risk  (4/21/2024)    Received from [employer]    Financial Resource Strain      Difficulty of Paying Living Expenses: 3      Difficulty of Paying Living Expenses: Not on file   Food Insecurity: No Food Insecurity (4/21/2024)    Received from [employer]    Food Insecurity      Worried About Running Out of Food in the Last Year: 1   Transportation Needs: No Transportation Needs (4/21/2024)    Received from [employer]    Transportation Needs      Lack of Transportation (Medical): 1   Physical Activity: Inactive (11/9/2021)    Exercise Vital Sign      Days of Exercise per Week: 0 days      Minutes of Exercise per Session: 0 min   Stress: No Stress Concern Present (11/9/2021)    [employer] [place] [employer] [place] [employer] - Occupational Stress Questionnaire      Feeling of Stress : Not at all   Social Connections: Socially Integrated (4/21/2024)    [email or web address]    Social Connections      Frequency of Communication with Friends and Family: 0   Interpersonal Safety: Not on file   Housing Stability: Low Risk  (4/20/2024)    Received from [employer] [facility]    Housing Stability      Unable to Pay for Housing in the Last Year: 1       Outpatient Encounter Medications as of 5/29/2024   Medication Sig Dispense Refill     aspirin (ASA) 81 MG EC tablet Take 81 mg by mouth daily       atorvastatin (LIPITOR) 20 MG tablet Take 1 tablet (20 mg) by mouth daily 90 tablet 3     escitalopram (LEXAPRO) 5 MG tablet        ferrous sulfate (FE TABS) 325 (65 Fe) MG EC tablet Take 1 tablet (325 mg) by mouth daily 90 tablet 0     furosemide (LASIX) 20 MG tablet Take 1 tablet (20 mg) by mouth daily       glipiZIDE (GLUCOTROL XL) 10 MG 24 hr tablet Take 10 mg by mouth daily       hydrOXYzine HCl (ATARAX) 25 MG tablet Take 1 tablet (25 mg) by mouth 4 times daily       lactulose (CHRONULAC) 10 GM/15ML solution Take 30 mLs (20 g) by mouth daily 946 mL 11     lidocaine (XYLOCAINE) 5 % external ointment Apply topically 4 times daily as needed for moderate pain 50 g 1     magnesium oxide (MAG-OX) 400 MG tablet Take 1 tablet (400 mg) by mouth 2 times daily 60 tablet 1     metFORMIN (GLUCOPHAGE) 500 MG tablet Take 1 tablet (500 mg) by mouth 2 times daily (with meals) 180 tablet 3     nitroGLYcerin (NITROSTAT) 0.4 MG sublingual tablet Place 1 tablet (0.4 mg) under the tongue See Admin Instructions for chest pain 30 tablet 0     ramipril (ALTACE) 5 MG capsule Take 5 mg by mouth daily       spironolactone (ALDACTONE) 25 MG tablet Take 12.5 mg by mouth daily       tamsulosin (FLOMAX) 0.4 MG capsule Take 1 capsule (0.4 mg) by mouth every evening 90 capsule 3     triamcinolone (KENALOG) 0.1 % external cream Apply topically 2 times daily as needed for irritation       [DISCONTINUED] amLODIPine (NORVASC) 5 MG tablet Take 1 tablet (5 mg) by  mouth daily 90 tablet 3     [DISCONTINUED] carvedilol (COREG) 12.5 MG tablet Take 1 tablet (12.5 mg) by mouth 2 times daily (with meals) 180 tablet 3     [DISCONTINUED] cloNIDine (CATAPRES) 0.1 MG tablet Take 1 tablet (0.1 mg) by mouth 2 times daily 180 tablet 3     [DISCONTINUED] clopidogrel (PLAVIX) 75 MG tablet Take 1 tablet (75 mg) by mouth daily 90 tablet 3     [DISCONTINUED] hydrALAZINE (APRESOLINE) 50 MG tablet Take 1 tablet (50 mg) by mouth 2 times daily 180 tablet 1     [DISCONTINUED] naloxone (NARCAN) 4 MG/0.1ML nasal spray Spray 1 spray (4 mg) into one nostril alternating nostrils once as needed for opioid reversal Every 2-3 minutes until patient responsive or EMS arrives 0.2 mL 0     [DISCONTINUED] pioglitazone (ACTOS) 30 MG tablet Take 1 tablet (30 mg) by mouth daily 90 tablet 0     Facility-Administered Encounter Medications as of 5/29/2024   Medication Dose Route Frequency Provider Last Rate Last Admin     sodium hyaluronate (HEALON DUET)    Dinesh Matos MD   1 kit at 10/02/19 1149             O:   NAD, WDWN, Alert & Oriented, Mood & Affect wnl, Vitals stable   General appearance normal   Vitals stable   Alert, oriented and in no acute distress     Gritty scaly papule on L and R cheek    Stuck on papules and brown macules on trunk and ext   Red papules on trunk  Flesh colored papules on trunk         Eyes: Conjunctivae/lids:Normal     ENT: Lips, mucosa: normal    MSK:Normal    Cardiovascular: peripheral edema none    Pulm: Breathing Normal    Lymph Nodes: No Head and Neck Lymphadenopathy     Neuro/Psych: Orientation:Alert and Orientedx3 ; Mood/Affect:normal       A/P:  1. Seborrheic keratosis, lentigo, angioma, dermal nevus, hx of non-melanoma skin cancer   2. Actinic keratosis   R and L cheek   LN2:  Treated with LN2 for 5s for 1-2 cycles. Warned risks of blistering, pain, pigment change, scarring, and incomplete resolution.  Advised patient to return if lesions do not completely  resolve.  Wound care sheet given.  It was a pleasure speaking to Flash Brown today.  Previous clinic notes and pertinent laboratory tests were reviewed prior to Flash Brown's visit.  Signs and Symptoms of skin cancer discussed with patient.  Patient encouraged to perform monthly skin exams.  UV precautions reviewed with patient.  Return to clinic 6 months      Again, thank you for allowing me to participate in the care of your patient.        Sincerely,        Dinesh Roque MD

## 2024-05-29 NOTE — PROGRESS NOTES
Flash Brown is an extremely pleasant 83 year old year old male patient here today for hx of non-melanoma skin cancer.  Patient has no other skin complaints today.  Remainder of the HPI, Meds, PMH, Allergies, FH, and SH was reviewed in chart.      Past Medical History:   Diagnosis Date    Basal cell carcinoma     CAD (coronary artery disease)     Depression     Diabetes (H)     Hyperlipidemia     Hypertension     Lymphoma (H)     Malignant melanoma (H)     Melanoma (H)     Squamous cell carcinoma        Past Surgical History:   Procedure Laterality Date    AXILLARY SURGERY      S/P resection and adjuvant radiation    BONE MARROW BIOPSY, BONE SPECIMEN, NEEDLE/TROCAR N/A 7/8/2019    Procedure: BIOPSY, BONE MARROW;  Surgeon: Husam Issa MD;  Location: [facility]    BONE MARROW BIOPSY, BONE SPECIMEN, NEEDLE/TROCAR Left 2/24/2022    Procedure: BIOPSY, BONE MARROW;  Surgeon: Cailin Anthony PA-C;  Location: [facility] OR    CARDIAC SURGERY      CHOLECYSTECTOMY      HERNIA REPAIR, UMBILICAL      IR LIVER BIOPSY PERCUTANEOUS  6/2/2023    IR SIRT (SELECTIVE INTERNAL RADIO THERAPY)  7/26/2023    IR VISCERAL ANGIOGRAM  7/26/2023    IR VISCERAL EMBOLIZATION  7/31/2023    PHACOEMULSIFICATION WITH STANDARD INTRAOCULAR LENS IMPLANT Right 10/2/2019    Procedure: Cataract Removal with Implant;  Surgeon: Dinesh Ivey MD;  Location: [facility]    PHACOEMULSIFICATION WITH STANDARD INTRAOCULAR LENS IMPLANT Left 10/21/2019    Procedure: Cataract Removal with Implant;  Surgeon: Dinesh Ivey MD;  Location: [facility]    STENT      PTCA with drug-eluting stent of RCA, circumflex, and LAD    VASCULAR SURGERY          Family History   Problem Relation Age of Onset    Asthma Other     Cancer Other         melanoma    Blood Disease Other         bleeding disorder    Lung Cancer Mother         Smoker    Prostate Cancer Father     Melanoma No family hx of        Social History     Socioeconomic History    Marital status: Single      Spouse name: Not on file    Number of children: 0    Years of education: Not on file    Highest education level: Not on file   Occupational History    Occupation: Retired     Comment: [employer]    Tobacco Use    Smoking status: Never     Passive exposure: Never    Smokeless tobacco: Never   Vaping Use    Vaping status: Never Used   Substance and Sexual Activity    Alcohol use: Yes     Comment: very rarely    Drug use: Not Currently    Sexual activity: Not on file   Other Topics Concern    Parent/sibling w/ CABG, MI or angioplasty before 65F 55M? Not Asked   Social History Narrative    Not on file     Social Determinants of Health     Financial Resource Strain: Low Risk  (4/21/2024)    Received from [facility]    Financial Resource Strain     Difficulty of Paying Living Expenses: 3     Difficulty of Paying Living Expenses: Not on file   Food Insecurity: No Food Insecurity (4/21/2024)    Received from [facility]    Food Insecurity     Worried About Running Out of Food in the Last Year: 1   Transportation Needs: No Transportation Needs (4/21/2024)    Received from [facility]    Transportation Needs     Lack of Transportation (Medical): 1   Physical Activity: Inactive (11/9/2021)    Exercise Vital Sign     Days of Exercise per Week: 0 days     Minutes of Exercise per Session: 0 min   Stress: No Stress Concern Present (11/9/2021)    [place] [employer] - Occupational Stress Questionnaire     Feeling of Stress : Not at all   Social Connections: Socially Integrated (4/21/2024)    Received from [facility]    Social Connections     Frequency of Communication with Friends and Family: 0   Interpersonal Safety: Not on file   Housing Stability: Low Risk  (4/20/2024)    Received from [facility]    Housing Stability     Unable to  Pay for Housing in the Last Year: 1       Outpatient Encounter Medications as of 5/29/2024   Medication Sig Dispense Refill    aspirin (ASA) 81 MG EC tablet Take 81 mg by mouth daily      atorvastatin (LIPITOR) 20 MG tablet Take 1 tablet (20 mg) by mouth daily 90 tablet 3    escitalopram (LEXAPRO) 5 MG tablet       ferrous sulfate (FE TABS) 325 (65 Fe) MG EC tablet Take 1 tablet (325 mg) by mouth daily 90 tablet 0    furosemide (LASIX) 20 MG tablet Take 1 tablet (20 mg) by mouth daily      glipiZIDE (GLUCOTROL XL) 10 MG 24 hr tablet Take 10 mg by mouth daily      hydrOXYzine HCl (ATARAX) 25 MG tablet Take 1 tablet (25 mg) by mouth 4 times daily      lactulose (CHRONULAC) 10 GM/15ML solution Take 30 mLs (20 g) by mouth daily 946 mL 11    lidocaine (XYLOCAINE) 5 % external ointment Apply topically 4 times daily as needed for moderate pain 50 g 1    magnesium oxide (MAG-OX) 400 MG tablet Take 1 tablet (400 mg) by mouth 2 times daily 60 tablet 1    metFORMIN (GLUCOPHAGE) 500 MG tablet Take 1 tablet (500 mg) by mouth 2 times daily (with meals) 180 tablet 3    nitroGLYcerin (NITROSTAT) 0.4 MG sublingual tablet Place 1 tablet (0.4 mg) under the tongue See Admin Instructions for chest pain 30 tablet 0    ramipril (ALTACE) 5 MG capsule Take 5 mg by mouth daily      spironolactone (ALDACTONE) 25 MG tablet Take 12.5 mg by mouth daily      tamsulosin (FLOMAX) 0.4 MG capsule Take 1 capsule (0.4 mg) by mouth every evening 90 capsule 3    triamcinolone (KENALOG) 0.1 % external cream Apply topically 2 times daily as needed for irritation      [DISCONTINUED] amLODIPine (NORVASC) 5 MG tablet Take 1 tablet (5 mg) by mouth daily 90 tablet 3    [DISCONTINUED] carvedilol (COREG) 12.5 MG tablet Take 1 tablet (12.5 mg) by mouth 2 times daily (with meals) 180 tablet 3    [DISCONTINUED] cloNIDine (CATAPRES) 0.1 MG tablet Take 1 tablet (0.1 mg) by mouth 2 times daily 180 tablet 3    [DISCONTINUED] clopidogrel (PLAVIX) 75 MG tablet Take 1  tablet (75 mg) by mouth daily 90 tablet 3    [DISCONTINUED] hydrALAZINE (APRESOLINE) 50 MG tablet Take 1 tablet (50 mg) by mouth 2 times daily 180 tablet 1    [DISCONTINUED] naloxone (NARCAN) 4 MG/0.1ML nasal spray Spray 1 spray (4 mg) into one nostril alternating nostrils once as needed for opioid reversal Every 2-3 minutes until patient responsive or EMS arrives 0.2 mL 0    [DISCONTINUED] pioglitazone (ACTOS) 30 MG tablet Take 1 tablet (30 mg) by mouth daily 90 tablet 0     Facility-Administered Encounter Medications as of 5/29/2024   Medication Dose Route Frequency Provider Last Rate Last Admin    sodium hyaluronate (HEALON DUET)    Dinesh Matos MD   1 kit at 10/02/19 1149             O:   NAD, WDWN, Alert & Oriented, Mood & Affect wnl, Vitals stable   General appearance normal   Vitals stable   Alert, oriented and in no acute distress     Gritty scaly papule on L and R cheek    Stuck on papules and brown macules on trunk and ext   Red papules on trunk  Flesh colored papules on trunk         Eyes: Conjunctivae/lids:Normal     ENT: Lips, mucosa: normal    MSK:Normal    Cardiovascular: peripheral edema none    Pulm: Breathing Normal    Lymph Nodes: No Head and Neck Lymphadenopathy     Neuro/Psych: Orientation:Alert and Orientedx3 ; Mood/Affect:normal       A/P:  1. Seborrheic keratosis, lentigo, angioma, dermal nevus, hx of non-melanoma skin cancer   2. Actinic keratosis   R and L cheek   LN2:  Treated with LN2 for 5s for 1-2 cycles. Warned risks of blistering, pain, pigment change, scarring, and incomplete resolution.  Advised patient to return if lesions do not completely resolve.  Wound care sheet given.  It was a pleasure speaking to Flash Brown today.  Previous clinic notes and pertinent laboratory tests were reviewed prior to Flash Brown's visit.  Signs and Symptoms of skin cancer discussed with patient.  Patient encouraged to perform monthly skin exams.  UV precautions reviewed with  patient.  Return to clinic 6 months

## 2024-05-31 ENCOUNTER — HOSPITAL ENCOUNTER (OUTPATIENT)
Facility: CLINIC | Age: 84
End: 2024-05-31
Attending: INTERNAL MEDICINE | Admitting: INTERNAL MEDICINE
Payer: MEDICARE

## 2024-05-31 ENCOUNTER — TELEPHONE (OUTPATIENT)
Dept: GASTROENTEROLOGY | Facility: CLINIC | Age: 84
End: 2024-05-31
Payer: MEDICARE

## 2024-05-31 NOTE — TELEPHONE ENCOUNTER
Pre Assessment RN Review    Focused Assessments      Cirrhosis Assessment    Results in Past 90 Days  Result Component Current Result Ref Range Previous Result Ref Range   Hemoglobin 11.2 (L) (5/15/2024) 13.3 - 17.7 g/dL 11.2 (L) (4/29/2024) 13.3 - 17.7 g/dL   INR 1.48 (H) (5/15/2024) 0.85 - 1.15 1.27 (H) (3/7/2024) 0.85 - 1.15   Platelet Count 66 (L) (5/15/2024) 150 - 450 10e3/uL 67 (L) (4/29/2024) 150 - 450 10e3/uL       MELD 3.0: 10 at 5/15/2024  1:58 PM  MELD-Na: 11 at 5/15/2024  1:58 PM  Calculated from:  Serum Creatinine: 0.95 mg/dL (Using min of 1 mg/dL) at 5/15/2024  1:58 PM  Serum Sodium: 137 mmol/L at 5/15/2024  1:58 PM  Total Bilirubin: 1.1 mg/dL at 5/15/2024  1:58 PM  Serum Albumin: 3.5 g/dL at 5/15/2024  1:58 PM  INR(ratio): 1.48 at 5/15/2024  1:58 PM  Age at listing (hypothetical): 83 years  Sex: Male at 5/15/2024  1:58 PM      INR <= 2.0*  and  Hgb >= 9 g/dL  and  Plt >= 50 10^9/L INR > 2.0   OR  Hgb < 9 g/dL   OR  Plt < 50 10^9/L   No Scheduling Restrictions Hospital Only   *Exception for patients on anticoagulation therapy.    Hx of variceal bleeding? No. (no scheduling restrictions)    Paracentesis in the last month? No      Cardiac Review      Has the patient had a stent placed in the last year?  No.     Patient has hx of hospitalization for a cardiac event/procedure in the last 6 months. Contact cardiologist and ordering provider to discuss appropriateness of procedure and recommended delay of procedure for at least 6 months. Appropriateness of procedure will be reevaluated according to provider recommendation.    4/19/24 admit for bradycardia. Hospital follow up with [facility] on 5/16/24 noting that they are awaiting zio monitoring results and to return in 3 months.     Scheduling Status & Recommendations    Sedation: MAC/Deep Sedation - Per order.  Location Type: Hospital - Per exclusion criteria.    Schedules are far out past six months from cardiac. Ok to proceed with scheduling. Hepatology  provider recommended.     Paoal Packer RN  Endoscopy Procedure Pre Assessment   [phone] option 4

## 2024-05-31 NOTE — TELEPHONE ENCOUNTER
"Endoscopy Scheduling Screen    Have you had a positive Covid test in the last 14 days?  No    What is your communication preference for Instructions and/or Bowel Prep?   Mail/USPS    What insurance is in the chart?  Other:  MEDICARE/AARP    Ordering/Referring Provider:     ZAY URIOSTEGUI      (If ordering provider performs procedure, schedule with ordering provider unless otherwise instructed. )    BMI: Estimated body mass index is 37.2 kg/m  as calculated from the following:    Height as of 5/15/24: 1.664 m (5' 5.51\").    Weight as of 5/15/24: 103 kg (227 lb 1.6 oz).     Sedation Ordered  MAC/deep sedation.   BMI<= 45 45 < BMI <= 48 48 < BMI < = 50  BMI > 50   No Restrictions No [facility]  No [facility]  [facility] with exceptions Hospital Only OR Only       Do you have a history of malignant hyperthermia?  No    (Females) Are you currently pregnant?   No     Have you been diagnosed or told you have pulmonary hypertension?   No    Do you have an LVAD?  No    Have you been told you have moderate to severe sleep apnea?  No    Have you been told you have COPD, asthma, or any other lung disease?  No    Do you have any heart conditions?  Yes     In the past year, have you had any hospitalizations for heart related issues including cardiomyopathy, heart attack, or stent placement?  Yes (RN Review required for scheduling.)    Do you have any implantable devices in your body (pacemaker, ICD)?  Other stents - 20 years ago    Do you take nitroglycerine?  No    Have you ever had or are you waiting for an organ transplant?  No. Continue scheduling, no site restrictions.    Have you had a stroke or transient ischemic attack (TIA aka \"mini stroke\" in the last 6 months?   No    Have you been diagnosed with or been told you have cirrhosis of the liver?   Yes (RN Review required for scheduling unless scheduling in Hospital.)    Are you currently on dialysis?   No    Do you need assistance transferring?   No    BMI: Estimated body " "mass index is 37.2 kg/m  as calculated from the following:    Height as of 5/15/24: 1.664 m (5' 5.51\").    Weight as of 5/15/24: 103 kg (227 lb 1.6 oz).     Is patients BMI > 40 and scheduling location [facility]?  No    Do you take an injectable medication for weight loss or diabetes (excluding insulin)?  No    Do you take the medication Naltrexone?  No    Do you take blood thinners?  No       Prep   Are you currently on dialysis or do you have chronic kidney disease?  No    Do you have a diagnosis of diabetes?  Yes ([place]ely Prep)    Do you have a diagnosis of cystic fibrosis (CF)?  No    On a regular basis do you go 3 -5 days between bowel movements?  No    BMI > 40?  No    Preferred Pharmacy:      Final Scheduling Details     Procedure scheduled  Upper endoscopy (EGD)    Surgeon:  ALEXIS     Date of procedure:  12/12/2024     Pre-OP / PAC:   No - Not required for this site.    Location  [facility] - Per order.    Sedation   MAC/Deep Sedation - Per order.      Patient Reminders:   You will receive a call from a Nurse to review instructions and health history.  This assessment must be completed prior to your procedure.  Failure to complete the Nurse assessment may result in the procedure being cancelled.      On the day of your procedure, please designate an adult(s) who can drive you home stay with you for the next 24 hours. The medicines used in the exam will make you sleepy. You will not be able to drive.      You cannot take public transportation, ride share services, or non-medical taxi service without a responsible caregiver.  Medical transport services are allowed with the requirement that a responsible caregiver will receive you at your destination.  We require that drivers and caregivers are confirmed prior to your procedure.  "

## 2024-06-06 ENCOUNTER — LAB (OUTPATIENT)
Dept: LAB | Facility: CLINIC | Age: 84
End: 2024-06-06
Attending: INTERNAL MEDICINE
Payer: MEDICARE

## 2024-06-06 ENCOUNTER — INFUSION THERAPY VISIT (OUTPATIENT)
Dept: INFUSION THERAPY | Facility: CLINIC | Age: 84
End: 2024-06-06
Attending: INTERNAL MEDICINE
Payer: MEDICARE

## 2024-06-06 ENCOUNTER — VIRTUAL VISIT (OUTPATIENT)
Dept: ONCOLOGY | Facility: CLINIC | Age: 84
End: 2024-06-06
Attending: INTERNAL MEDICINE
Payer: MEDICARE

## 2024-06-06 VITALS
SYSTOLIC BLOOD PRESSURE: 107 MMHG | OXYGEN SATURATION: 96 % | DIASTOLIC BLOOD PRESSURE: 50 MMHG | RESPIRATION RATE: 12 BRPM | TEMPERATURE: 97.9 F | HEIGHT: 66 IN | BODY MASS INDEX: 36 KG/M2 | WEIGHT: 224 LBS | HEART RATE: 65 BPM

## 2024-06-06 VITALS
HEART RATE: 73 BPM | SYSTOLIC BLOOD PRESSURE: 109 MMHG | DIASTOLIC BLOOD PRESSURE: 54 MMHG | RESPIRATION RATE: 14 BRPM | OXYGEN SATURATION: 98 %

## 2024-06-06 DIAGNOSIS — K75.81 LIVER CIRRHOSIS SECONDARY TO NONALCOHOLIC STEATOHEPATITIS (NASH) (H): ICD-10-CM

## 2024-06-06 DIAGNOSIS — K74.60 LIVER CIRRHOSIS SECONDARY TO NONALCOHOLIC STEATOHEPATITIS (NASH) (H): ICD-10-CM

## 2024-06-06 DIAGNOSIS — C22.0 HEPATOCELLULAR CARCINOMA (H): Primary | ICD-10-CM

## 2024-06-06 LAB
ALBUMIN SERPL BCG-MCNC: 3.7 G/DL (ref 3.5–5.2)
ALP SERPL-CCNC: 95 U/L (ref 40–150)
ALT SERPL W P-5'-P-CCNC: 21 U/L (ref 0–70)
ANION GAP SERPL CALCULATED.3IONS-SCNC: 15 MMOL/L (ref 7–15)
AST SERPL W P-5'-P-CCNC: 32 U/L (ref 0–45)
BILIRUB SERPL-MCNC: 1.1 MG/DL
BUN SERPL-MCNC: 17.3 MG/DL (ref 8–23)
CALCIUM SERPL-MCNC: 9 MG/DL (ref 8.8–10.2)
CHLORIDE SERPL-SCNC: 101 MMOL/L (ref 98–107)
CREAT SERPL-MCNC: 0.94 MG/DL (ref 0.67–1.17)
DEPRECATED HCO3 PLAS-SCNC: 22 MMOL/L (ref 22–29)
EGFRCR SERPLBLD CKD-EPI 2021: 81 ML/MIN/1.73M2
GLUCOSE SERPL-MCNC: 189 MG/DL (ref 70–99)
HOLD SPECIMEN: NORMAL
POTASSIUM SERPL-SCNC: 4.1 MMOL/L (ref 3.4–5.3)
PROT SERPL-MCNC: 6.9 G/DL (ref 6.4–8.3)
SODIUM SERPL-SCNC: 138 MMOL/L (ref 135–145)
TSH SERPL DL<=0.005 MIU/L-ACNC: 3.73 UIU/ML (ref 0.3–4.2)

## 2024-06-06 PROCEDURE — 250N000011 HC RX IP 250 OP 636: Mod: JZ | Performed by: INTERNAL MEDICINE

## 2024-06-06 PROCEDURE — 80053 COMPREHEN METABOLIC PANEL: CPT | Performed by: INTERNAL MEDICINE

## 2024-06-06 PROCEDURE — G2211 COMPLEX E/M VISIT ADD ON: HCPCS | Mod: 95 | Performed by: INTERNAL MEDICINE

## 2024-06-06 PROCEDURE — 84443 ASSAY THYROID STIM HORMONE: CPT | Performed by: INTERNAL MEDICINE

## 2024-06-06 PROCEDURE — 82105 ALPHA-FETOPROTEIN SERUM: CPT | Performed by: INTERNAL MEDICINE

## 2024-06-06 PROCEDURE — 99214 OFFICE O/P EST MOD 30 MIN: CPT | Mod: 95 | Performed by: INTERNAL MEDICINE

## 2024-06-06 PROCEDURE — 96413 CHEMO IV INFUSION 1 HR: CPT

## 2024-06-06 PROCEDURE — 36415 COLL VENOUS BLD VENIPUNCTURE: CPT

## 2024-06-06 PROCEDURE — 258N000003 HC RX IP 258 OP 636: Mod: JZ | Performed by: INTERNAL MEDICINE

## 2024-06-06 RX ORDER — ALBUTEROL SULFATE 90 UG/1
1-2 AEROSOL, METERED RESPIRATORY (INHALATION)
Status: CANCELLED
Start: 2024-06-06

## 2024-06-06 RX ORDER — ACETAMINOPHEN 500 MG
500-1000 TABLET ORAL EVERY 6 HOURS PRN
COMMUNITY

## 2024-06-06 RX ORDER — ALBUTEROL SULFATE 0.83 MG/ML
2.5 SOLUTION RESPIRATORY (INHALATION)
Status: CANCELLED | OUTPATIENT
Start: 2024-06-06

## 2024-06-06 RX ORDER — METHYLPREDNISOLONE SODIUM SUCCINATE 125 MG/2ML
125 INJECTION, POWDER, LYOPHILIZED, FOR SOLUTION INTRAMUSCULAR; INTRAVENOUS
Status: CANCELLED
Start: 2024-06-06

## 2024-06-06 RX ORDER — HEPARIN SODIUM,PORCINE 10 UNIT/ML
5-20 VIAL (ML) INTRAVENOUS DAILY PRN
Status: CANCELLED | OUTPATIENT
Start: 2024-06-06

## 2024-06-06 RX ORDER — LORAZEPAM 1 MG/1
1 TABLET ORAL SEE ADMIN INSTRUCTIONS
Qty: 1 TABLET | Refills: 0 | Status: SHIPPED | OUTPATIENT
Start: 2024-06-06 | End: 2024-10-03

## 2024-06-06 RX ORDER — HEPARIN SODIUM (PORCINE) LOCK FLUSH IV SOLN 100 UNIT/ML 100 UNIT/ML
5 SOLUTION INTRAVENOUS
Status: CANCELLED | OUTPATIENT
Start: 2024-06-06

## 2024-06-06 RX ORDER — DIPHENHYDRAMINE HYDROCHLORIDE 50 MG/ML
50 INJECTION INTRAMUSCULAR; INTRAVENOUS
Status: CANCELLED
Start: 2024-06-06

## 2024-06-06 RX ORDER — EPINEPHRINE 1 MG/ML
0.3 INJECTION, SOLUTION, CONCENTRATE INTRAVENOUS EVERY 5 MIN PRN
Status: CANCELLED | OUTPATIENT
Start: 2024-06-06

## 2024-06-06 RX ORDER — MEPERIDINE HYDROCHLORIDE 25 MG/ML
25 INJECTION INTRAMUSCULAR; INTRAVENOUS; SUBCUTANEOUS EVERY 30 MIN PRN
Status: CANCELLED | OUTPATIENT
Start: 2024-06-06

## 2024-06-06 RX ORDER — LORAZEPAM 2 MG/ML
0.5 INJECTION INTRAMUSCULAR EVERY 4 HOURS PRN
Status: CANCELLED | OUTPATIENT
Start: 2024-06-06

## 2024-06-06 RX ADMIN — SODIUM CHLORIDE 1500 MG: 9 INJECTION, SOLUTION INTRAVENOUS at 13:10

## 2024-06-06 RX ADMIN — SODIUM CHLORIDE 250 ML: 9 INJECTION, SOLUTION INTRAVENOUS at 12:44

## 2024-06-06 ASSESSMENT — PAIN SCALES - GENERAL: PAINLEVEL: MILD PAIN (2)

## 2024-06-06 NOTE — LETTER
6/6/2024      Flash Brown  [address]  [facility] [place] [address]      Dear Colleague,    Thank you for referring your patient, Flash Brown, to the [facility]. Please see a copy of my visit note below.    Video-Visit Details    Type of service:  Video Visit    Video Start Time:  11:42 AM  Video End Time:  11:54 AM    Originating Location (pt. Location): Other [place] [facility]  in [place]    Distant Location (provider location): Off-site    Platform used for Video Visit: [facility] Hematology and Oncology Progress Note    Patient: Flash Brown  MRN: [id number]  6/06/24        Reason for Visit    HCC    Primary Oncologist: Dr. 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  From cycle 2 onwards he has been getting durvalumab only.     Doing well on infusions.  No significant immune related side effects.    Reviewed  labs.  Serum chemistry is pretty stable.  His AFP had been coming down.  It was 8.8 last time.  Labs from today are still pending.  No significant side effects from durvalumab so far.  Will proceed with cycle 4 infusion today.  He will come back in 4 weeks with repeat labs and MRI of the abdomen with and without contrast along with CT of the chest without contrast at the [place] (at his and his sister's request) mild to look for response and continuation of treatment.  He aFP has been coming down which is reassuring.  Since he has significant anxiety issues we will send 1 mg lorazepam to be taken half an hour prior to the MRI procedure.  He and his sister are in agreement with the plan.     Oncology history  7/2023: Unresectable multifocal HCC, in setting of background DEWITT-related cirrhosis.   -dominant L hepatic lobe mass, treated by embolization; then followed  -1/2024 MRI: cirrhotic appearing liver  with evidence of portal hypertension. Post-embolization change of hepatic segments 2 and 3 with persistent nodular internal enhancement measuring 3.5 cm suspicious for residual tumor. Enlarging 3.1 cm lesion in the segment 8 with washout and pseudocapsule formation consistent with HCC. Additionally,  other arterially enhancing lesions which are all consistent with HCC. Further local therapies not an option.    Treatment:  -7/31/2023: radioembolization to L hepatic lobe mass; partial response      -3/7/2024 - present: palliative durvalumab and tremelimumab (cycle 1 only); then durvalumab maintenance every 28 days (not felt to be a candidate for bevacizumab)  --AFP 10.2 (9/2023) ahead of start    Interval History   Flash Brown is a 83 year old male with advanced HCC who initiated palliative durvalumab and tremelimumab.  Received for cycle 4 weeks ago.  He is seen as a video visit today with repeat labs and consideration for cycle 4 durvalumab.    Has done fairly well on treatment so far.  No significant side effects.     Denies any new issues today.  No diarrhea.  No skin rash.  No abdominal pain.      ECOG Performance    2 - Ambulatory and independent in all ADLs; cannot work; up > 50% of the time      Physical Exam    General: alert and cooperative. Accompanied by sister      Lab Results    Recent Results (from the past 168 hour(s))   Comprehensive metabolic panel   Result Value Ref Range    Sodium 138 135 - 145 mmol/L    Potassium 4.1 3.4 - 5.3 mmol/L    Carbon Dioxide (CO2) 22 22 - 29 mmol/L    Anion Gap 15 7 - 15 mmol/L    Urea Nitrogen 17.3 8.0 - 23.0 mg/dL    Creatinine 0.94 0.67 - 1.17 mg/dL    GFR Estimate 81 >60 mL/min/1.73m2    Calcium 9.0 8.8 - 10.2 mg/dL    Chloride 101 98 - 107 mmol/L    Glucose 189 (H) 70 - 99 mg/dL    Alkaline Phosphatase 95 40 - 150 U/L    AST 32 0 - 45 U/L    ALT 21 0 - 70 U/L    Protein Total 6.9 6.4 - 8.3 g/dL    Albumin 3.7 3.5 - 5.2 g/dL    Bilirubin Total 1.1 <=1.2 mg/dL    TSH with free T4 reflex   Result Value Ref Range    TSH 3.73 0.30 - 4.20 uIU/mL             Imaging    No results found.      The longitudinal plan of care for the diagnosis(es)/condition(s) as documented were addressed during this visit. Due to the added complexity in care, I will continue to support Don in the subsequent management and with ongoing continuity of care.      Signed by: Bk Watts MD      Again, thank you for allowing me to participate in the care of your patient.        Sincerely,        Bk Watts MD

## 2024-06-06 NOTE — LETTER
6/6/2024      Flash Brown  [address]  [place] [address]      Dear Colleague,    Thank you for referring your patient, Flash Brown, to the [facility]. Please see a copy of my visit note below.    Video-Visit Details    Type of service:  Video Visit    Video Start Time:  11:42 AM  Video End Time:  11:54 AM    Originating Location (pt. Location): Other [place] cancer clinic  in [place]    Distant Location (provider location): Off-site    Platform used for Video Visit: [facility] Hematology and Oncology Progress Note    Patient: Flash Brown  MRN: [id number]  6/06/24        Reason for Visit    HCC    Primary Oncologist: Dr. 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  From cycle 2 onwards he has been getting durvalumab only.     Doing well on infusions.  No significant immune related side effects.    Reviewed  labs.  Serum chemistry is pretty stable.  His AFP had been coming down.  It was 8.8 last time.  Labs from today are still pending.  No significant side effects from durvalumab so far.  Will proceed with cycle 4 infusion today.  He will come back in 4 weeks with repeat labs and MRI of the abdomen with and without contrast along with CT of the chest without contrast at the [place] (at his and his sister's request) mild to look for response and continuation of treatment.  He aFP has been coming down which is reassuring.  Since he has significant anxiety issues we will send 1 mg lorazepam to be taken half an hour prior to the MRI procedure.  He and his sister are in agreement with the plan.     Oncology history  7/2023: Unresectable multifocal HCC, in setting of background DEWITT-related cirrhosis.   -dominant L hepatic lobe mass, treated by embolization; then followed  -1/2024 MRI: cirrhotic appearing liver  with evidence of portal hypertension. Post-embolization change of hepatic segments 2 and 3 with persistent nodular internal enhancement measuring 3.5 cm suspicious for residual tumor. Enlarging 3.1 cm lesion in the segment 8 with washout and pseudocapsule formation consistent with HCC. Additionally,  other arterially enhancing lesions which are all consistent with HCC. Further local therapies not an option.    Treatment:  -7/31/2023: radioembolization to L hepatic lobe mass; partial response      -3/7/2024 - present: palliative durvalumab and tremelimumab (cycle 1 only); then durvalumab maintenance every 28 days (not felt to be a candidate for bevacizumab)  --AFP 10.2 (9/2023) ahead of start    Interval History   Flash Brown is a 83 year old male with advanced HCC who initiated palliative durvalumab and tremelimumab.  Received for cycle 4 weeks ago.  He is seen as a video visit today with repeat labs and consideration for cycle 4 durvalumab.    Has done fairly well on treatment so far.  No significant side effects.     Denies any new issues today.  No diarrhea.  No skin rash.  No abdominal pain.      ECOG Performance    2 - Ambulatory and independent in all ADLs; cannot work; up > 50% of the time      Physical Exam    General: alert and cooperative. Accompanied by sister      Lab Results    Recent Results (from the past 168 hour(s))   Comprehensive metabolic panel   Result Value Ref Range    Sodium 138 135 - 145 mmol/L    Potassium 4.1 3.4 - 5.3 mmol/L    Carbon Dioxide (CO2) 22 22 - 29 mmol/L    Anion Gap 15 7 - 15 mmol/L    Urea Nitrogen 17.3 8.0 - 23.0 mg/dL    Creatinine 0.94 0.67 - 1.17 mg/dL    GFR Estimate 81 >60 mL/min/1.73m2    Calcium 9.0 8.8 - 10.2 mg/dL    Chloride 101 98 - 107 mmol/L    Glucose 189 (H) 70 - 99 mg/dL    Alkaline Phosphatase 95 40 - 150 U/L    AST 32 0 - 45 U/L    ALT 21 0 - 70 U/L    Protein Total 6.9 6.4 - 8.3 g/dL    Albumin 3.7 3.5 - 5.2 g/dL    Bilirubin Total 1.1 <=1.2 mg/dL    TSH with free T4 reflex   Result Value Ref Range    TSH 3.73 0.30 - 4.20 uIU/mL             Imaging    No results found.      The longitudinal plan of care for the diagnosis(es)/condition(s) as documented were addressed during this visit. Due to the added complexity in care, I will continue to support Don in the subsequent management and with ongoing continuity of care.      Signed by: Bk Watts MD      Again, thank you for allowing me to participate in the care of your patient.        Sincerely,        Bk Watts MD

## 2024-06-06 NOTE — PROGRESS NOTES
Video-Visit Details    Type of service:  Video Visit    Video Start Time:  11:42 AM  Video End Time:  11:54 AM    Originating Location (pt. Location): Other [place] [facility]  in [place]    Distant Location (provider location): Off-site    Platform used for Video Visit: [facility] Hematology and Oncology Progress Note    Patient: Flash Brown  MRN: [id number]  6/06/24        Reason for Visit    HCC    Primary Oncologist: Dr. 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  From cycle 2 onwards he has been getting durvalumab only.     Doing well on infusions.  No significant immune related side effects.    Reviewed  labs.  Serum chemistry is pretty stable.  His AFP had been coming down.  It was 8.8 last time.  Labs from today are still pending.  No significant side effects from durvalumab so far.  Will proceed with cycle 4 infusion today.  He will come back in 4 weeks with repeat labs and MRI of the abdomen with and without contrast along with CT of the chest without contrast at the [place] (at his and his sister's request) mild to look for response and continuation of treatment.  He aFP has been coming down which is reassuring.  Since he has significant anxiety issues we will send 1 mg lorazepam to be taken half an hour prior to the MRI procedure.  He and his sister are in agreement with the plan.     Oncology history  7/2023: Unresectable multifocal HCC, in setting of background DEWITT-related cirrhosis.   -dominant L hepatic lobe mass, treated by embolization; then followed  -1/2024 MRI: cirrhotic appearing liver with evidence of portal hypertension. Post-embolization change of hepatic segments 2 and 3 with persistent nodular internal enhancement measuring 3.5 cm suspicious for residual tumor. Enlarging 3.1 cm lesion in the segment 8 with washout and  pseudocapsule formation consistent with HCC. Additionally,  other arterially enhancing lesions which are all consistent with HCC. Further local therapies not an option.    Treatment:  -7/31/2023: radioembolization to L hepatic lobe mass; partial response      -3/7/2024 - present: palliative durvalumab and tremelimumab (cycle 1 only); then durvalumab maintenance every 28 days (not felt to be a candidate for bevacizumab)  --AFP 10.2 (9/2023) ahead of start    Interval History   Flash Brown is a 83 year old male with advanced HCC who initiated palliative durvalumab and tremelimumab.  Received for cycle 4 weeks ago.  He is seen as a video visit today with repeat labs and consideration for cycle 4 durvalumab.    Has done fairly well on treatment so far.  No significant side effects.     Denies any new issues today.  No diarrhea.  No skin rash.  No abdominal pain.      ECOG Performance    2 - Ambulatory and independent in all ADLs; cannot work; up > 50% of the time      Physical Exam    General: alert and cooperative. Accompanied by sister      Lab Results    Recent Results (from the past 168 hour(s))   Comprehensive metabolic panel   Result Value Ref Range    Sodium 138 135 - 145 mmol/L    Potassium 4.1 3.4 - 5.3 mmol/L    Carbon Dioxide (CO2) 22 22 - 29 mmol/L    Anion Gap 15 7 - 15 mmol/L    Urea Nitrogen 17.3 8.0 - 23.0 mg/dL    Creatinine 0.94 0.67 - 1.17 mg/dL    GFR Estimate 81 >60 mL/min/1.73m2    Calcium 9.0 8.8 - 10.2 mg/dL    Chloride 101 98 - 107 mmol/L    Glucose 189 (H) 70 - 99 mg/dL    Alkaline Phosphatase 95 40 - 150 U/L    AST 32 0 - 45 U/L    ALT 21 0 - 70 U/L    Protein Total 6.9 6.4 - 8.3 g/dL    Albumin 3.7 3.5 - 5.2 g/dL    Bilirubin Total 1.1 <=1.2 mg/dL   TSH with free T4 reflex   Result Value Ref Range    TSH 3.73 0.30 - 4.20 uIU/mL             Imaging    No results found.      The longitudinal plan of care for the diagnosis(es)/condition(s) as documented were addressed during this visit. Due  to the added complexity in care, I will continue to support Don in the subsequent management and with ongoing continuity of care.      Signed by: Bk Watts MD

## 2024-06-06 NOTE — PROGRESS NOTES
Virtual Visit Details    Type of service:  Video Visit   Video Start Time: {video visit start/end time for provider to select:567740}  Video End Time:{video visit start/end time for provider to select:117676}    Originating Location (pt. Location): Other in  clinic    Distant Location (provider location):  Off-site  Platform used for Video Visit: Christina Arias CMA on 6/6/2024 at 11:34 AM

## 2024-06-06 NOTE — PROGRESS NOTES
Infusion Nursing Note:  Flash Brown presents today for Imfinzi.    Patient seen by provider today: Yes: Mac so assessment has been deferred   present during visit today: Not Applicable.    Note: Pt reports being nervous about IV starts, however he did wonderfully.      Intravenous Access:  Peripheral IV placed.    Treatment Conditions:  Results reviewed, labs MET treatment parameters, ok to proceed with treatment.      Post Infusion Assessment:  Patient tolerated infusion without incident.  Site patent and intact, free from redness, edema or discomfort.  No evidence of extravasations.  Access discontinued per protocol.       Discharge Plan:   Discharge instructions reviewed with: Patient.  Patient and/or family verbalized understanding of discharge instructions and all questions answered.  Patient discharged in stable condition accompanied by: self.  Departure Mode: Ambulatory and walker.      Mabel Mendoza RN

## 2024-06-07 LAB — AFP SERPL-MCNC: 11.2 NG/ML

## 2024-07-05 ENCOUNTER — LAB (OUTPATIENT)
Dept: LAB | Facility: CLINIC | Age: 84
End: 2024-07-05
Attending: INTERNAL MEDICINE
Payer: MEDICARE

## 2024-07-05 ENCOUNTER — VIRTUAL VISIT (OUTPATIENT)
Dept: ONCOLOGY | Facility: HOSPITAL | Age: 84
End: 2024-07-05
Attending: INTERNAL MEDICINE
Payer: MEDICARE

## 2024-07-05 ENCOUNTER — INFUSION THERAPY VISIT (OUTPATIENT)
Dept: INFUSION THERAPY | Facility: CLINIC | Age: 84
End: 2024-07-05
Attending: INTERNAL MEDICINE
Payer: MEDICARE

## 2024-07-05 VITALS
SYSTOLIC BLOOD PRESSURE: 133 MMHG | HEIGHT: 66 IN | BODY MASS INDEX: 35.68 KG/M2 | DIASTOLIC BLOOD PRESSURE: 48 MMHG | TEMPERATURE: 97.8 F | RESPIRATION RATE: 13 BRPM | HEART RATE: 61 BPM | WEIGHT: 222 LBS | OXYGEN SATURATION: 98 %

## 2024-07-05 VITALS — SYSTOLIC BLOOD PRESSURE: 116 MMHG | HEART RATE: 69 BPM | DIASTOLIC BLOOD PRESSURE: 67 MMHG

## 2024-07-05 DIAGNOSIS — C22.0 HEPATOCELLULAR CARCINOMA (H): Primary | ICD-10-CM

## 2024-07-05 LAB
AFP SERPL-MCNC: 9.1 NG/ML
ALBUMIN SERPL BCG-MCNC: 3.5 G/DL (ref 3.5–5.2)
ALP SERPL-CCNC: 95 U/L (ref 40–150)
ALT SERPL W P-5'-P-CCNC: 23 U/L (ref 0–70)
ANION GAP SERPL CALCULATED.3IONS-SCNC: 9 MMOL/L (ref 7–15)
AST SERPL W P-5'-P-CCNC: 31 U/L (ref 0–45)
BILIRUB SERPL-MCNC: 0.9 MG/DL
BUN SERPL-MCNC: 17.6 MG/DL (ref 8–23)
CALCIUM SERPL-MCNC: 8.8 MG/DL (ref 8.8–10.2)
CHLORIDE SERPL-SCNC: 104 MMOL/L (ref 98–107)
CREAT SERPL-MCNC: 1.07 MG/DL (ref 0.67–1.17)
DEPRECATED HCO3 PLAS-SCNC: 26 MMOL/L (ref 22–29)
EGFRCR SERPLBLD CKD-EPI 2021: 70 ML/MIN/1.73M2
GLUCOSE SERPL-MCNC: 284 MG/DL (ref 70–99)
POTASSIUM SERPL-SCNC: 4.3 MMOL/L (ref 3.4–5.3)
PROT SERPL-MCNC: 6.9 G/DL (ref 6.4–8.3)
SODIUM SERPL-SCNC: 139 MMOL/L (ref 135–145)
TSH SERPL DL<=0.005 MIU/L-ACNC: 3.95 UIU/ML (ref 0.3–4.2)

## 2024-07-05 PROCEDURE — 250N000011 HC RX IP 250 OP 636: Mod: JZ | Performed by: INTERNAL MEDICINE

## 2024-07-05 PROCEDURE — 258N000003 HC RX IP 258 OP 636: Performed by: INTERNAL MEDICINE

## 2024-07-05 PROCEDURE — 80053 COMPREHEN METABOLIC PANEL: CPT | Performed by: INTERNAL MEDICINE

## 2024-07-05 PROCEDURE — 84443 ASSAY THYROID STIM HORMONE: CPT | Performed by: INTERNAL MEDICINE

## 2024-07-05 PROCEDURE — 36415 COLL VENOUS BLD VENIPUNCTURE: CPT

## 2024-07-05 PROCEDURE — G2211 COMPLEX E/M VISIT ADD ON: HCPCS | Mod: 95 | Performed by: INTERNAL MEDICINE

## 2024-07-05 PROCEDURE — 96413 CHEMO IV INFUSION 1 HR: CPT

## 2024-07-05 PROCEDURE — 99213 OFFICE O/P EST LOW 20 MIN: CPT | Mod: 95 | Performed by: INTERNAL MEDICINE

## 2024-07-05 PROCEDURE — 82105 ALPHA-FETOPROTEIN SERUM: CPT

## 2024-07-05 RX ORDER — MEPERIDINE HYDROCHLORIDE 25 MG/ML
25 INJECTION INTRAMUSCULAR; INTRAVENOUS; SUBCUTANEOUS EVERY 30 MIN PRN
Status: CANCELLED | OUTPATIENT
Start: 2024-07-05

## 2024-07-05 RX ORDER — ALBUTEROL SULFATE 90 UG/1
1-2 AEROSOL, METERED RESPIRATORY (INHALATION)
Status: CANCELLED
Start: 2024-07-05

## 2024-07-05 RX ORDER — HEPARIN SODIUM,PORCINE 10 UNIT/ML
5-20 VIAL (ML) INTRAVENOUS DAILY PRN
Status: CANCELLED | OUTPATIENT
Start: 2024-07-05

## 2024-07-05 RX ORDER — LORAZEPAM 2 MG/ML
0.5 INJECTION INTRAMUSCULAR EVERY 4 HOURS PRN
Status: CANCELLED | OUTPATIENT
Start: 2024-07-05

## 2024-07-05 RX ORDER — HEPARIN SODIUM (PORCINE) LOCK FLUSH IV SOLN 100 UNIT/ML 100 UNIT/ML
5 SOLUTION INTRAVENOUS
Status: CANCELLED | OUTPATIENT
Start: 2024-07-05

## 2024-07-05 RX ORDER — ALBUTEROL SULFATE 0.83 MG/ML
2.5 SOLUTION RESPIRATORY (INHALATION)
Status: CANCELLED | OUTPATIENT
Start: 2024-07-05

## 2024-07-05 RX ORDER — METHYLPREDNISOLONE SODIUM SUCCINATE 125 MG/2ML
125 INJECTION, POWDER, LYOPHILIZED, FOR SOLUTION INTRAMUSCULAR; INTRAVENOUS
Status: CANCELLED
Start: 2024-07-05

## 2024-07-05 RX ORDER — EPINEPHRINE 1 MG/ML
0.3 INJECTION, SOLUTION INTRAMUSCULAR; SUBCUTANEOUS EVERY 5 MIN PRN
Status: CANCELLED | OUTPATIENT
Start: 2024-07-05

## 2024-07-05 RX ORDER — DIPHENHYDRAMINE HYDROCHLORIDE 50 MG/ML
50 INJECTION INTRAMUSCULAR; INTRAVENOUS
Status: CANCELLED
Start: 2024-07-05

## 2024-07-05 RX ADMIN — SODIUM CHLORIDE 250 ML: 9 INJECTION, SOLUTION INTRAVENOUS at 12:37

## 2024-07-05 RX ADMIN — SODIUM CHLORIDE 1500 MG: 9 INJECTION, SOLUTION INTRAVENOUS at 12:35

## 2024-07-05 ASSESSMENT — PAIN SCALES - GENERAL: PAINLEVEL: NO PAIN (0)

## 2024-07-05 NOTE — PROGRESS NOTES
Virtual Visit Details    Type of service:  Video Visit   Video Start Time: {video visit start/end time for provider to select:612831}  Video End Time:{video visit start/end time for provider to select:883163}    Originating Location (pt. Location): Other in [facility]  Distant Location (provider location):  Off-site - [facility]   Platform used for Video Visit: Christina Garcia CMA([facility])

## 2024-07-05 NOTE — PROGRESS NOTES
Video-Visit Details    Type of service:  Video Visit    Video Start Time:  11:19 AM  Video End Time:  11:24 AM    Originating Location (pt. Location): Other [place] [facility]  in [place]    Distant Location (provider location): Onsite    Platform used for Video Visit: [facility] Hematology and Oncology Progress Note    Patient: Flash Brown  MRN: [id number]  7/05/24        Reason for Visit    HCC    Primary Oncologist: Dr. 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  From cycle 2 onwards he has been getting durvalumab only.     Doing well on infusions.  No significant immune related side effects.    Status post 4 cycles.  He was supposed to get CT and MRI prior to this visit but unfortunately due to holiday week this has been scheduled for tomorrow.  His AFP last time was mildly up at 11.1.  Serum chemistry was otherwise within normal limits.  Not sure what to make of the mildly elevated AFP.  Will have to see what the scans look like tomorrow.  I do not think there should be any contraindications to proceed with durvalumab today.  If he has evidence of disease progression on durvalumab will have to discuss further management going forward.    Labs reviewed today.  Serum chemistry is pretty stable.  Elevated glucose.  Proceed with durvalumab today.  I will see him back in 4 weeks with repeat labs.  In the meantime we will look forward to the scan results from tomorrow.  If there is any evidence of significant disease progression then will probably see him earlier to discuss further management.  Otherwise 4-week follow-up.  He and his sister are in agreement with the plan.     Oncology history  7/2023: Unresectable multifocal HCC, in setting of background DEWITT-related cirrhosis.   -dominant L hepatic lobe mass, treated by embolization; then  followed  -1/2024 MRI: cirrhotic appearing liver with evidence of portal hypertension. Post-embolization change of hepatic segments 2 and 3 with persistent nodular internal enhancement measuring 3.5 cm suspicious for residual tumor. Enlarging 3.1 cm lesion in the segment 8 with washout and pseudocapsule formation consistent with HCC. Additionally,  other arterially enhancing lesions which are all consistent with HCC. Further local therapies not an option.    Treatment:  -7/31/2023: radioembolization to L hepatic lobe mass; partial response      -3/7/2024 - present: palliative durvalumab and tremelimumab (cycle 1 only); then durvalumab maintenance every 28 days (not felt to be a candidate for bevacizumab)  --AFP 10.2 (9/2023) ahead of start    Interval History   Flash Brown is a 83 year old male with advanced HCC who initiated palliative durvalumab and tremelimumab.  Received for cycle 4 weeks ago.  He is seen as a video visit today with repeat labs and consideration for cycle 5 durvalumab.    Has done fairly well on treatment so far.  No significant side effects.     He has done 4 cycles.  He has an MRI and CT scan scheduled for tomorrow.  Some diarrhea here and there.  Nothing significant.  Labs have been pretty stable.      ECOG Performance    2 - Ambulatory and independent in all ADLs; cannot work; up > 50% of the time      Physical Exam    General: alert and cooperative. Accompanied by sister      Lab Results    No results found for this or any previous visit (from the past 168 hour(s)).            Imaging    No results found.    The longitudinal plan of care for the diagnosis(es)/condition(s) as documented were addressed during this visit. Due to the added complexity in care, I will continue to support Daniel in the subsequent management and with ongoing continuity of care.      Signed by: Bk Watts MD

## 2024-07-05 NOTE — PROGRESS NOTES
Infusion Nursing Note:  Flash DUARTE Brown presents today for C5D1 Imfinzi.    Patient seen by provider today: Yes: virtual visit with Dr. Watts   present during visit today: Not Applicable.    Note: N/A.      Intravenous Access:  Peripheral IV placed.    Treatment Conditions:  Results reviewed, labs MET treatment parameters, ok to proceed with treatment.      Post Infusion Assessment:  Patient tolerated infusion without incident.  Blood return noted pre and post infusion.  Site patent and intact, free from redness, edema or discomfort.  No evidence of extravasations.  Access discontinued per protocol.       Discharge Plan:   Patient discharged in stable condition accompanied by: family  Departure Mode: Ambulatory, with walker.      Disha Jordan RN

## 2024-07-06 ENCOUNTER — ANCILLARY PROCEDURE (OUTPATIENT)
Dept: CT IMAGING | Facility: CLINIC | Age: 84
End: 2024-07-06
Attending: INTERNAL MEDICINE
Payer: MEDICARE

## 2024-07-06 ENCOUNTER — ANCILLARY PROCEDURE (OUTPATIENT)
Dept: MRI IMAGING | Facility: CLINIC | Age: 84
End: 2024-07-06
Attending: INTERNAL MEDICINE
Payer: MEDICARE

## 2024-07-06 DIAGNOSIS — C22.0 HEPATOCELLULAR CARCINOMA (H): ICD-10-CM

## 2024-07-06 PROCEDURE — 71250 CT THORAX DX C-: CPT | Mod: MG | Performed by: RADIOLOGY

## 2024-07-06 PROCEDURE — A9585 GADOBUTROL INJECTION: HCPCS | Mod: JZ | Performed by: RADIOLOGY

## 2024-07-06 PROCEDURE — 74183 MRI ABD W/O CNTR FLWD CNTR: CPT | Mod: MG | Performed by: RADIOLOGY

## 2024-07-06 PROCEDURE — G1010 CDSM STANSON: HCPCS | Mod: GC | Performed by: RADIOLOGY

## 2024-07-06 PROCEDURE — G1010 CDSM STANSON: HCPCS | Performed by: RADIOLOGY

## 2024-07-06 RX ORDER — GADOBUTROL 604.72 MG/ML
10 INJECTION INTRAVENOUS ONCE
Status: COMPLETED | OUTPATIENT
Start: 2024-07-06 | End: 2024-07-06

## 2024-07-06 RX ADMIN — GADOBUTROL 10 ML: 604.72 INJECTION INTRAVENOUS at 10:03

## 2024-07-06 NOTE — DISCHARGE INSTRUCTIONS
MRI Contrast Discharge Instructions    The IV contrast you received today will pass out of your body in your  urine. This will happen in the next 24 hours. You will not feel this process.  Your urine will not change color.    Drink at least 4 extra glasses of water or juice today (unless your doctor  has restricted your fluids). This reduces the stress on your kidneys.  You may take your regular medicines.    If you are on dialysis: It is best to have dialysis today.    If you have a reaction: Most reactions happen right away. If you have  any new symptoms after leaving the hospital (such as hives or swelling),  call your hospital at the correct number below. Or call your family doctor.  If you have breathing distress or wheezing, call 911.    Special instructions: ***    I have read and understand the above information.    Signature:______________________________________ Date:___________    Staff:__________________________________________ Date:___________     Time:__________    [place] [facility] Departments:    ___[place]: [phone]  ___[facility]: [phone]  ___[place]: [phone] ___[facility]: [phone]  ___[facility]: [phone]  ___[facility]: [phone]  ___[phone]  ___[facility]: [phone]  ___[phone]

## 2024-07-10 NOTE — PATIENT INSTRUCTIONS
Detail Level: Detailed Monika Sanchez,    Thank you for allowing [facility] to manage your care.    Please continue with your plan of care and upcoming appointments. Your med list is up to date.    If you develop worsening/changing symptoms at any time, please call 911 or go to the emergency department for evaluation as we discussed.    If you have any questions or concerns, please feel free to call us at [phone]    Sincerely,    Sam Lizarraga PA-C    Did you know?      You can schedule a video visit for follow-up appointments as well as future appointments for certain conditions.  Please see the below link.     [email or web address]    If you have not already done so,  I encourage you to sign up for [employer] ([email or web address]).  This will allow you to review your results, securely communicate with a provider, and schedule virtual visits as well.     Quality 358: Patient-Centered Surgical Risk Assessment And Communication: A patient-specific risk assessment with a risk calculator was not completed or communicated to patient and/or family. Quality 47: Advance Care Plan: Advance Care Planning discussed and documented; advance care plan or surrogate decision maker documented in the medical record. Name And Contact Information For Health Care Proxy: Mariano Dykes [phone] Quality 130: Documentation Of Current Medications In The Medical Record: Current Medications Documented

## 2024-07-15 ENCOUNTER — TELEPHONE (OUTPATIENT)
Dept: ONCOLOGY | Facility: HOSPITAL | Age: 84
End: 2024-07-15
Payer: MEDICARE

## 2024-07-15 NOTE — TELEPHONE ENCOUNTER
Attempted to call, VM box is full. Will try again.     Mary Wasserman RN on 7/15/2024 at 3:17 PM

## 2024-07-15 NOTE — TELEPHONE ENCOUNTER
I received a phone call today from Daniel's sister, Rea.  She is calling because Daniel had a CT scan and abdomen MRI on 7/6.  They have been waiting to hear the results and are requesting a call back.  The best number to call is [phone].    Zora Oliveira RN on 7/15/2024 at 12:08 PM

## 2024-08-01 ENCOUNTER — LAB (OUTPATIENT)
Dept: LAB | Facility: CLINIC | Age: 84
End: 2024-08-01
Attending: INTERNAL MEDICINE
Payer: MEDICARE

## 2024-08-01 ENCOUNTER — INFUSION THERAPY VISIT (OUTPATIENT)
Dept: INFUSION THERAPY | Facility: CLINIC | Age: 84
End: 2024-08-01
Attending: INTERNAL MEDICINE
Payer: MEDICARE

## 2024-08-01 ENCOUNTER — ONCOLOGY VISIT (OUTPATIENT)
Dept: ONCOLOGY | Facility: CLINIC | Age: 84
End: 2024-08-01
Attending: NURSE PRACTITIONER
Payer: MEDICARE

## 2024-08-01 ENCOUNTER — TELEPHONE (OUTPATIENT)
Dept: ONCOLOGY | Facility: CLINIC | Age: 84
End: 2024-08-01

## 2024-08-01 VITALS
DIASTOLIC BLOOD PRESSURE: 42 MMHG | BODY MASS INDEX: 35.68 KG/M2 | RESPIRATION RATE: 16 BRPM | TEMPERATURE: 98 F | WEIGHT: 222 LBS | SYSTOLIC BLOOD PRESSURE: 146 MMHG | HEART RATE: 71 BPM | OXYGEN SATURATION: 97 % | HEIGHT: 66 IN

## 2024-08-01 DIAGNOSIS — D61.818 OTHER PANCYTOPENIA (H): ICD-10-CM

## 2024-08-01 DIAGNOSIS — C22.0 HEPATOCELLULAR CARCINOMA (H): Primary | ICD-10-CM

## 2024-08-01 DIAGNOSIS — C22.0 HEPATOCELLULAR CARCINOMA (H): ICD-10-CM

## 2024-08-01 DIAGNOSIS — D69.6 THROMBOCYTOPENIA (H): ICD-10-CM

## 2024-08-01 PROBLEM — C77.9: Status: RESOLVED | Noted: 2022-02-17 | Resolved: 2024-08-01

## 2024-08-01 LAB
AFP SERPL-MCNC: 10.3 NG/ML
ALBUMIN MFR UR ELPH: <6 MG/DL
ALBUMIN SERPL BCG-MCNC: 3.5 G/DL (ref 3.5–5.2)
ALP SERPL-CCNC: 94 U/L (ref 40–150)
ALT SERPL W P-5'-P-CCNC: 26 U/L (ref 0–70)
ANION GAP SERPL CALCULATED.3IONS-SCNC: 10 MMOL/L (ref 7–15)
AST SERPL W P-5'-P-CCNC: 33 U/L (ref 0–45)
BASOPHILS # BLD AUTO: 0 10E3/UL (ref 0–0.2)
BASOPHILS NFR BLD AUTO: 1 %
BILIRUB SERPL-MCNC: 0.8 MG/DL
BUN SERPL-MCNC: 15.2 MG/DL (ref 8–23)
CALCIUM SERPL-MCNC: 8.8 MG/DL (ref 8.8–10.4)
CHLORIDE SERPL-SCNC: 101 MMOL/L (ref 98–107)
CREAT SERPL-MCNC: 0.9 MG/DL (ref 0.67–1.17)
CREAT UR-MCNC: 50.1 MG/DL
EGFRCR SERPLBLD CKD-EPI 2021: 86 ML/MIN/1.73M2
EOSINOPHIL # BLD AUTO: 0.1 10E3/UL (ref 0–0.7)
EOSINOPHIL NFR BLD AUTO: 2 %
ERYTHROCYTE [DISTWIDTH] IN BLOOD BY AUTOMATED COUNT: 15.7 % (ref 10–15)
GLUCOSE SERPL-MCNC: 365 MG/DL (ref 70–99)
HCO3 SERPL-SCNC: 26 MMOL/L (ref 22–29)
HCT VFR BLD AUTO: 34.8 % (ref 40–53)
HGB BLD-MCNC: 11.2 G/DL (ref 13.3–17.7)
HOLD SPECIMEN: NORMAL
IMM GRANULOCYTES # BLD: 0 10E3/UL
IMM GRANULOCYTES NFR BLD: 1 %
LYMPHOCYTES # BLD AUTO: 0.3 10E3/UL (ref 0.8–5.3)
LYMPHOCYTES NFR BLD AUTO: 16 %
MCH RBC QN AUTO: 27.8 PG (ref 26.5–33)
MCHC RBC AUTO-ENTMCNC: 32.2 G/DL (ref 31.5–36.5)
MCV RBC AUTO: 86 FL (ref 78–100)
MONOCYTES # BLD AUTO: 0.3 10E3/UL (ref 0–1.3)
MONOCYTES NFR BLD AUTO: 15 %
NEUTROPHILS # BLD AUTO: 1.4 10E3/UL (ref 1.6–8.3)
NEUTROPHILS NFR BLD AUTO: 66 %
NRBC # BLD AUTO: 0 10E3/UL
NRBC BLD AUTO-RTO: 0 /100
PLATELET # BLD AUTO: 63 10E3/UL (ref 150–450)
POTASSIUM SERPL-SCNC: 4.4 MMOL/L (ref 3.4–5.3)
PROT SERPL-MCNC: 6.8 G/DL (ref 6.4–8.3)
PROT/CREAT 24H UR: NORMAL MG/G{CREAT}
RBC # BLD AUTO: 4.03 10E6/UL (ref 4.4–5.9)
SODIUM SERPL-SCNC: 137 MMOL/L (ref 135–145)
TSH SERPL DL<=0.005 MIU/L-ACNC: 3.35 UIU/ML (ref 0.3–4.2)
WBC # BLD AUTO: 2.1 10E3/UL (ref 4–11)

## 2024-08-01 PROCEDURE — 80053 COMPREHEN METABOLIC PANEL: CPT | Performed by: INTERNAL MEDICINE

## 2024-08-01 PROCEDURE — G0463 HOSPITAL OUTPT CLINIC VISIT: HCPCS | Performed by: NURSE PRACTITIONER

## 2024-08-01 PROCEDURE — 82105 ALPHA-FETOPROTEIN SERUM: CPT

## 2024-08-01 PROCEDURE — G2211 COMPLEX E/M VISIT ADD ON: HCPCS | Performed by: NURSE PRACTITIONER

## 2024-08-01 PROCEDURE — 36415 COLL VENOUS BLD VENIPUNCTURE: CPT

## 2024-08-01 PROCEDURE — 85004 AUTOMATED DIFF WBC COUNT: CPT | Performed by: NURSE PRACTITIONER

## 2024-08-01 PROCEDURE — 99215 OFFICE O/P EST HI 40 MIN: CPT | Performed by: NURSE PRACTITIONER

## 2024-08-01 PROCEDURE — 84156 ASSAY OF PROTEIN URINE: CPT | Performed by: NURSE PRACTITIONER

## 2024-08-01 PROCEDURE — 84443 ASSAY THYROID STIM HORMONE: CPT | Performed by: INTERNAL MEDICINE

## 2024-08-01 RX ORDER — ONDANSETRON 8 MG/1
8 TABLET, FILM COATED ORAL EVERY 8 HOURS PRN
Qty: 30 TABLET | Refills: 2 | Status: SHIPPED | OUTPATIENT
Start: 2024-08-05

## 2024-08-01 ASSESSMENT — PAIN SCALES - GENERAL: PAINLEVEL: NO PAIN (0)

## 2024-08-01 NOTE — TELEPHONE ENCOUNTER
Prior Authorization Approval    Medication: CABOMETYX 20 MG PO TABS  Authorization Effective Date: 8/1/2024  Authorization Expiration Date: 12/31/2024  Approved Dose/Quantity: 30 for 30 days  Reference #: [id number]   Insurance Company: [employer] - Phone [phone] Fax [phone]  Expected CoPay: $ 2,751.62  CoPay Card Available:      Financial Assistance Needed: yes  Which Pharmacy is filling the prescription:    Pharmacy Notified: n/a  Patient Notified: will call pt and discuss possible copay assistance

## 2024-08-01 NOTE — PATIENT INSTRUCTIONS
Will start cabozantinib 20 mg daily (oral chemo pill) as soon as approved by insurance and pharmacy sends to you. Pharmacist will be in touch to confirm status and review in more detail before starting.     For diarrhea - Imodium (over the counter) as needed. Take 2 in AM and repeat with 1 tab after each loose stool, as needed (max 8/day)    For nausea - odansetron (Zofran) as needed. Rx sent    Keep hands/feet well-moisturized while on new chemo drug.     2 + 4 weeks after starting new chemo - lab, visit in Oncology to manage side effects    Try wearing CPAP every night for sleep apnea to improve daytime fatigue.

## 2024-08-01 NOTE — PROGRESS NOTES
"Oncology Rooming Note    August 1, 2024 10:45 AM   Flash Brown is a 83 year old male who presents for:    Chief Complaint   Patient presents with    Oncology Clinic Visit     Hepatocellular carcinoma - Labs provider and infusion     Initial Vitals: BP (!) 146/42 (BP Location: Left arm, Patient Position: Sitting, Cuff Size: Adult Large)   Pulse 71   Temp 98  F (36.7  C) (Tympanic)   Resp 16   Ht 1.664 m (5' 5.5\")   Wt 100.7 kg (222 lb)   SpO2 97%   BMI 36.38 kg/m   Estimated body mass index is 36.38 kg/m  as calculated from the following:    Height as of this encounter: 1.664 m (5' 5.5\").    Weight as of this encounter: 100.7 kg (222 lb). Body surface area is 2.16 meters squared.  No Pain (0) Comment: Data Unavailable   No LMP for male patient.  Allergies reviewed: Yes  Medications reviewed: No unsure of medications taking    Medications: Medication refills not needed today.  Pharmacy name entered into [facility]:    [id number] [place] [facility] [address]  [facility] [address]  [place] [address] [address]    Frailty Screening:   Is the patient here for a new oncology consult visit in cancer care? 2. No      Clinical concerns:  None      Marcelle Arias CMA              "

## 2024-08-01 NOTE — TELEPHONE ENCOUNTER
PANCHO APPROVED    Medication: CABOMETYX 20 MG PO TABS  Amount: $  NO CAP AMOUNT  Foundation Name: [facility] Phone: [phone]  [facility] Fax: [phone]  Member ID: [id number]  BIN: [id number]  PCN: AS  Group: [id number]  Foundation Effective Date: 1/1/2024  Foundation Expiration Date: 12/31/2024  Additional Information: see below  Patient Notified: yes, notified his sister

## 2024-08-01 NOTE — LETTER
8/1/2024      Flash Brown  [address]  [facility] [address]      Dear Colleague,    Thank you for referring your patient, Flash Brown, to the [facility]. Please see a copy of my visit note below.    [facility] Hematology and Oncology Progress Note    Patient: Flash Brown  MRN: [id number]  8/01/24        Reason for Visit    HCC    Primary Oncologist: Dr. Watts    Assessment and Plan  #. Advanced/multifocal HCC  #. 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 Pancytopenias (r/t cirrhosis)  Don has been on durvalumab x 5 mths.  Tolerating this very well.    In June, his AFP was up a bit to 11.2 but then declined to 9.1. Pending today.    MRI liver and CT chest were done a few weeks ago.   Chest CT negative.   MRI shows progression in 3 dominant hepatic masses by >20%, consistent with progression.     Reviewed with Dr. Watts, assessment is he's progressing on durvalumab.     Labs: Hgb 11.2, WBC 2.1, ANC 1.4, plat 65; CMP: WNL (aside from elevated glucose), TSH WNL.    Clinically he is doing well.  Trying to be active is much as possible but fatigued and PS 2.  Asymptomatic from the cancer at this time.     Reviewed scan results of progression with pt and his sister who assists him in his healthcare decisions. They understand goals of care are palliative and treatment is aimed to slow cancer progression, but not cure it. Chemo side effects/risks have to be carefully weighed. He desires continuing treatment at this time.     Plan (discussed/recs per Dr. Watts):  -With progression, will stop durvalumab  -He's not a candidate for further local hepatic therapies  -2nd line options include cabozantinib or regorafenib. Dr. Watts favors cabozantinib as 2nd line. Given age and underlying liver disease, would start dose modified 20 mg daily and can titrate up to 40 mg if tolerated after a few cycles.  Reviewed side effects with pt/sister and he would like to  proceed. Treatment plan started.  -Reviewed use of Zofran as needed for nausea. Encouraged use of imodium as needed for diarrhea. Keep hands/feet well-moisturized to limit dermatologic side effects.   -Get baseline urine protein today  -Return every 2 weeks x 2 after starting with lab and toxicity evaluation. Then, would typically see monthly.  -NGS testing on prior pathology.  -Trend AFP  -Repeat imaging after 3 cycles    #. Sleep apnea, daytime fatigue  Non-compliant with CPAP.    Plan:  -Encouraged routine use of CPAP, but I don't think he will comply       Oncology history  7/2023: Unresectable multifocal HCC, in setting of background DEWITT-related cirrhosis.   -dominant L hepatic lobe mass, treated by embolization; then followed  -1/2024 MRI: cirrhotic appearing liver with evidence of portal hypertension. Post-embolization change of hepatic segments 2 and 3 with persistent nodular internal enhancement measuring 3.5 cm suspicious for residual tumor. Enlarging 3.1 cm lesion in the segment 8 with washout and pseudocapsule formation consistent with HCC. Additionally,  other arterially enhancing lesions which are all consistent with HCC. Further local therapies not an option.    Treatment:  -7/31/2023: radioembolization to L hepatic lobe mass; partial response      -3/7/2024 - 7/5/2024: palliative durvalumab and tremelimumab (cycle 1 only); then durvalumab maintenance every 28 days (not felt to be a candidate for bevacizumab). Continued until progression.   --AFP 10.2 (9/2023) ahead of start  --AFP after cycle 4, slight rise in AFP 11.2. MRI liver showed progression in 3 dominant masses. So, therapy stopped.     -8/1/2024: initiate 2nd line cabozantinib 20 mg daily (dose-reduced for age/liver disease)    Interval History   Flash Brown is a 83 year old male with advanced HCC who initiated palliative durvalumab and tremelimumab 5 mths ago.  Returns for cycle 6.    Tolerating treatment well. Progressive/cumulative  fatigue with daytime somnolence. Hx of sleep apnea but not compliant on CPAP, sister thinks could be a contributor. Daily diarrhea, 2-4/day x 4-6 mths is stable. Not using antidiarrheal. No n/v. No increased dyspnea. No pruritus/rashes.     Denies any abdominal pain/distension.  Eats well, gaining some weight. No LE edema.      ECOG Performance    2 - Ambulatory and independent in all ADLs; cannot work; up > 50% of the time      Physical Exam    General: alert and cooperative. Fatigued-appearing. Accompanied by sister  HEENT: No icterus  Lymph: No palpable adenopathy.   Heart: RRR  Lungs: regular respiratory effort. Clear bilaterally.   Abd: Soft, non-tender, non-distended.   Extremities: No LE edema  Neuro: non-focal    Lab Results    Recent Results (from the past 168 hour(s))   Comprehensive metabolic panel   Result Value Ref Range    Sodium 137 135 - 145 mmol/L    Potassium 4.4 3.4 - 5.3 mmol/L    Carbon Dioxide (CO2) 26 22 - 29 mmol/L    Anion Gap 10 7 - 15 mmol/L    Urea Nitrogen 15.2 8.0 - 23.0 mg/dL    Creatinine 0.90 0.67 - 1.17 mg/dL    GFR Estimate 86 >60 mL/min/1.73m2    Calcium 8.8 8.8 - 10.4 mg/dL    Chloride 101 98 - 107 mmol/L    Glucose 365 (H) 70 - 99 mg/dL    Alkaline Phosphatase 94 40 - 150 U/L    AST 33 0 - 45 U/L    ALT 26 0 - 70 U/L    Protein Total 6.8 6.4 - 8.3 g/dL    Albumin 3.5 3.5 - 5.2 g/dL    Bilirubin Total 0.8 <=1.2 mg/dL   TSH with free T4 reflex   Result Value Ref Range    TSH 3.35 0.30 - 4.20 uIU/mL   Extra Purple Top Tube   Result Value Ref Range    Hold Specimen JIC    CBC with platelets and differential   Result Value Ref Range    WBC Count 2.1 (L) 4.0 - 11.0 10e3/uL    RBC Count 4.03 (L) 4.40 - 5.90 10e6/uL    Hemoglobin 11.2 (L) 13.3 - 17.7 g/dL    Hematocrit 34.8 (L) 40.0 - 53.0 %    MCV 86 78 - 100 fL    MCH 27.8 26.5 - 33.0 pg    MCHC 32.2 31.5 - 36.5 g/dL    RDW 15.7 (H) 10.0 - 15.0 %    Platelet Count 63 (L) 150 - 450 10e3/uL    % Neutrophils 66 %    % Lymphocytes 16 %     % Monocytes 15 %    % Eosinophils 2 %    % Basophils 1 %    % Immature Granulocytes 1 %    NRBCs per 100 WBC 0 <1 /100    Absolute Neutrophils 1.4 (L) 1.6 - 8.3 10e3/uL    Absolute Lymphocytes 0.3 (L) 0.8 - 5.3 10e3/uL    Absolute Monocytes 0.3 0.0 - 1.3 10e3/uL    Absolute Eosinophils 0.1 0.0 - 0.7 10e3/uL    Absolute Basophils 0.0 0.0 - 0.2 10e3/uL    Absolute Immature Granulocytes 0.0 <=0.4 10e3/uL    Absolute NRBCs 0.0 10e3/uL   Protein  random urine   Result Value Ref Range    Total Protein Urine mg/dL <6.0   mg/dL    Total Protein Urine mg/mg Creat      Creatinine Urine mg/dL 50.1 mg/dL         Imaging    MR Abdomen w/o & w Contrast    Result Date: 7/8/2024  MRI ABDOMEN CLINICAL HISTORY:  Hepatocellular carcinoma TECHNIQUE:  Images were acquired with and without intravenous contrast through the abdomen. The following MR images were acquired: TrueFISP, multiplanar T2 weighted, axial T1 in/out of phase, axial fat-saturated T1, diffusion-weighted. Multiplanar T1-weighted images with fat saturation were before contrast administration and at multiple time points following the administration of intravenous contrast. Contrast dose: 10mL Gadavist COMPARISON: 1/22/2024 FINDINGS: Comparison study: 1/22/2024, CT chest 7/6/2024 Liver: Cirrhotic configuration liver with prominent caudate lobe atrophic left hepatic lobe. No significant signal change in in and out of phase. Lesion 1: Radio embolization changes in hepatic segment 2/3 with geographic arterial and progressive enhancement surrounding the targeted lesion with increased nodular enhancement internally measuring 3.5 cm along the anterior margin of the lesion(series 19 image #26). LR-TR viable. Lesion 2: Enlarging 3.5 cm arterial enhancing observation with central washout and pseudocapsule in segment 8, previously measuring 3cm (series 19 image 28). The previously described likely satellite nodule is now contiguous with the main lesion. LR-5. Lesion 3: Enlarging 1.9  cm arterial enhancing observation in segment 4A (series 19 image 32), previously measuring 18 mm. Mild increased signal on associated T2 and diffusion weighted imaging. There is washout without pseudocapsule formation. LR-4. Lesion 4: Enlarging 17 mm arterial enhancing observation in segment 6 (series 19 image #45), previously measuring 12 mm. No definite washout or pseudocapsule formation. No meeting threshold growth criteria. There is associated increased signal on T2 and diffusion on DWI. LR-4. Lesion 5: Stable 13 mm arterial enhancing observation in segment 7 (series 19 image 35) without convincing washout on delayed imaging. LR-4. Multiple additional subcentimeter arterially enhancing lesions for example segment 6 (series 19 image 44) and in segment 4A (series 19 image #16). LR-3 Gallbladder: Surgically absent Spleen: The spleen is enlarged measuring 16.7 cm. Kidneys: Simple renal cysts. No hydronephrosis. Adrenal glands: Unremarkable. Pancreas: Diffuse fatty atrophy. No pancreatic duct dilation. No suspicious mass. Unchanged appearance of trapped lobule of fat in the pancreatic body/tail (series 3 image 35 Bowel: No evidence of bowel obstruction. Lymph nodes: Slightly increased size of 12 mm lymph node at the franklyn hepatis (series 22 image 42) no new lymphadenopathy. Blood vessels: Patent vasculature. No abdominal aortic aneurysm. Lung bases: Trace bilateral effusions. Bones and soft tissues: No suspicious or aggressive osseous lesions. Mesentery and abdominal wall: Mildly increased T2 signal throughout the mesentery likely representing congestion. Ascites: Trace fluid along the liver edge.     IMPRESSION: 1. Cirrhosis and findings of portal hypertension. 2. Radioembolization changes in hepatic segment 2/3 with persistent nodular internal enhancement measuring 3.5 cm along the anterior superior aspect of the treated lesion suspicious for residual tumor. LR-TR viable. 3. Enlarging 3.5 cm observation in segment  8 with washout and pseudocapsule formation. LR5/OPTN-5b. 4. 1.7 cm arterially enhancing lesion in segment 6 with associated increased signal on T2 and DWI imaging criteria for hepatocellular carcinoma, previously 1.3 cm. LR-4. 5. 2 additional LR-4 lesions in segment 4A and 7 as described above. 6. Multiple subcentimeter subcapsular foci of arterial enhancement without washout or pseudocapsule formation. LR-3. 7. Based on this exam only the patient does not meet Godwin criteria. 8. Stable size of mildly enlarged lymph node at the franklyn hepatis. OPTN/LIRADS definitions. LIRADS v2018. LIRADS 1: Definitely benign. LIRADS 2: Probably benign. LIRADS 3: Indeterminate for HCC. LIRADS 4: Probably HCC. LIRADS M: Probably malignant. Not specific for HCC. LIRADS 5TR- nonviable: Previously treated HCC without residual malignancy identified LIRADS 5TR- viable: Previously treated HCC with findings indicating residual viable malignancy LIRADS 5TR- equivocal: Previously treated HCC with findings that may be treatment changes or viable malignancy OPTN 5a: Diagnostic for HCC. < 2 cm. OPTN 5b: Diagnostic for HCC. > Or = 2 cm and < 5 cm. OPTN 5x: Diagnostic for HCC. > Or = 5 cm. [place]ore criteria for liver transplantation: 1. Presence of no HCCs greater than 5 cm. One HCC measuring 3-5 cm is allowed if no other HCCs are present. 2. Maximum of 3 HCCs measuring 3 cm or less. 3. No vascular invasion. 4. No extrahepatic metastases. I have personally reviewed the examination and initial interpretation and I agree with the findings. WAYLON BAUMAN MD   SYSTEM ID:  [id number]    CT Chest w/o contrast    Result Date: 7/6/2024  CT chest without contrast INDICATION: Hepatocellular carcinoma COMPARISON: 1/22/2024 FINDINGS: No contrast. Prominent upper thoracic kyphosis. Atherosclerotic calcification in the aortic arch branch vessels, thoracic aorta itself and multiple coronary arteries heart size normal. Left atrium is borderline enlarged. There are  mitral annular calcifications, please correlate for mitral valve disorder and possible arrhythmia. Faint aortic valve leaflet calcifications are also suggested. No pleural or pericardial effusion. Right axillary surgical clips. No fluid collections. No enlarged lymph nodes in the chest. Bone detail shows osteopenia with diffuse degenerative changes throughout the spine in addition to the thoracic kyphosis. No suspicious sclerotic or lytic/destructive lesions. Detail of the lungs shows right upper lobe lateral pleural thickening near the level of the checo with somewhat oval component appearing similar to previous, measuring approximately 2.0 x 1.4 cm. Calcified granulomas throughout the right lung. Subsegmental atelectasis medially in the left lung base and also laterally in the left lower lobe. No dominant left-sided nodule. No other consolidation. The included upper abdomen again shows vague hypodensity laterally in the left hepatic lobe unchanged likely indicative of treatment zone. No other discretely visible suspicious lesions although there is some faint geographic hypodensity in the right hepatic lobe. Please correlate with MRI abdomen for better detail. Cholecystectomy. Pancreatic atrophy.     IMPRESSION: No significant change in appearance the treatment zone in the left hepatic lobe for hepatocellular carcinoma with other pleural-based irregularly marginated right upper lobe opacity. Other granulomatous disease. Aortic valve leaflet calcifications mitral annular calcifications. Diffuse atherosclerosis. AVE FAIRBANKS MD   SYSTEM ID:  [id number]     Total time 50 minutes, to include face to face visit, review of EMR, ordering, documentation and coordination of care on date of service.    complexity modifier for longitudinal care.       Signed by: Ely Flores NP      Oncology Rooming Note    August 1, 2024 10:45 AM   Flash Brown is a 83 year old male who presents for:    Chief Complaint  "  Patient presents with     Oncology Clinic Visit     Hepatocellular carcinoma - Labs provider and infusion     Initial Vitals: BP (!) 146/42 (BP Location: Left arm, Patient Position: Sitting, Cuff Size: Adult Large)   Pulse 71   Temp 98  F (36.7  C) (Tympanic)   Resp 16   Ht 1.664 m (5' 5.5\")   Wt 100.7 kg (222 lb)   SpO2 97%   BMI 36.38 kg/m   Estimated body mass index is 36.38 kg/m  as calculated from the following:    Height as of this encounter: 1.664 m (5' 5.5\").    Weight as of this encounter: 100.7 kg (222 lb). Body surface area is 2.16 meters squared.  No Pain (0) Comment: Data Unavailable   No LMP for male patient.  Allergies reviewed: Yes  Medications reviewed: No unsure of medications taking    Medications: Medication refills not needed today.  Pharmacy name entered into [employer]:    [id number] [place] [id number]  GUARDIAN OF [address] [address] [address]    Frailty Screening:   Is the patient here for a new oncology consult visit in cancer care? 2. No      Clinical concerns:  None      Marcelle Arias CMA                Again, thank you for allowing me to participate in the care of your patient.        Sincerely,        Ely Flores NP  "

## 2024-08-01 NOTE — ORAL ONC MGMT
"Oral Chemotherapy Monitoring Program    Primary Oncologist: Dr. Watts  Primary Oncology Clinic: [place]  Cancer Diagnosis: Hepatocellular Cancer    Drug: cabozantinib  Start Date: TBD  Expected duration of therapy: Until disease progression or unacceptable toxicity    Drug Interaction Assessment: There were no significant drug interactions identified upon review of medication list with chemotherapy agents.    Lab Monitoring Plan  Per treatment plan    Subjective/Objective:  Flash Brown is a 83 year old male contacted by phone for an initial visit for oral chemotherapy education. I spoke with his sister Rea. She states that Daniel lives in a nursing home and she will pass all the information along to the nursing staff.        8/1/2024     1:00 PM   ORAL CHEMOTHERAPY   Assessment Type New Teach;Initial Work up   Diagnosis Code Hepatocellular Cancer   Providers Mac   Clinic Name/Location [place]   Drug Name Cabometyx (cabozantinib)   Dose 20 mg   Current Schedule Daily   Cycle Details Continuous   Any new drug interactions? No   Is the dose as ordered appropriate for the patient? Yes       Vitals:  BP:   BP Readings from Last 1 Encounters:   08/01/24 (!) 146/42     Wt Readings from Last 1 Encounters:   08/01/24 100.7 kg (222 lb)     Estimated body surface area is 2.16 meters squared as calculated from the following:    Height as of an earlier encounter on 8/1/24: 1.664 m (5' 5.5\").    Weight as of an earlier encounter on 8/1/24: 100.7 kg (222 lb).    Labs:  _  Result Component Current Result Ref Range   Sodium 137 (8/1/2024) 135 - 145 mmol/L     _  Result Component Current Result Ref Range   Potassium 4.4 (8/1/2024) 3.4 - 5.3 mmol/L     _  Result Component Current Result Ref Range   Calcium 8.8 (8/1/2024) 8.8 - 10.4 mg/dL     No results found for Mag within last 30 days.     No results found for Phos within last 30 days.     _  Result Component Current Result Ref Range   Albumin 3.5 (8/1/2024) 3.5 - 5.2 " g/dL     _  Result Component Current Result Ref Range   Urea Nitrogen 15.2 (8/1/2024) 8.0 - 23.0 mg/dL     _  Result Component Current Result Ref Range   Creatinine 0.90 (8/1/2024) 0.67 - 1.17 mg/dL       _  Result Component Current Result Ref Range   AST 33 (8/1/2024) 0 - 45 U/L     _  Result Component Current Result Ref Range   ALT 26 (8/1/2024) 0 - 70 U/L     _  Result Component Current Result Ref Range   Bilirubin Total 0.8 (8/1/2024) <=1.2 mg/dL       No results found for WBC within last 30 days.     No results found for HGB within last 30 days.     No results found for PLT within last 30 days.     No results found for ANC within last 30 days.         Assessment:  Patient is appropriate to start therapy.    Plan:  Basic chemotherapy teaching was reviewed with the patient including indication, start date of therapy, dose, administration, adverse effects, missed doses, food and drug interactions, monitoring, side effect management, office contact information, and safe handling. Written materials were provided and all questions answered.    Follow-Up:  We will call Rea in about one week to see how Don is doing after initiating therapy.     Nicho Robert, PharmD  Oral Chemotherapy Pharmacist  [phone]

## 2024-08-01 NOTE — PROGRESS NOTES
[facility] Hematology and Oncology Progress Note    Patient: Flash Brown  MRN: [id number]  8/01/24        Reason for Visit    HCC    Primary Oncologist: Dr. Watts    Assessment and Plan  #. Advanced/multifocal HCC  #. 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 Pancytopenias (r/t cirrhosis)  Don has been on durvalumab x 5 mths.  Tolerating this very well.    In June, his AFP was up a bit to 11.2 but then declined to 9.1. Pending today.    MRI liver and CT chest were done a few weeks ago.   Chest CT negative.   MRI shows progression in 3 dominant hepatic masses by >20%, consistent with progression.     Reviewed with Dr. Watts, assessment is he's progressing on durvalumab.     Labs: Hgb 11.2, WBC 2.1, ANC 1.4, plat 65; CMP: WNL (aside from elevated glucose), TSH WNL.    Clinically he is doing well.  Trying to be active is much as possible but fatigued and PS 2.  Asymptomatic from the cancer at this time.     Reviewed scan results of progression with pt and his sister who assists him in his healthcare decisions. They understand goals of care are palliative and treatment is aimed to slow cancer progression, but not cure it. Chemo side effects/risks have to be carefully weighed. He desires continuing treatment at this time.     Plan (discussed/recs per Dr. Watts):  -With progression, will stop durvalumab  -He's not a candidate for further local hepatic therapies  -2nd line options include cabozantinib or regorafenib. Dr. Watts favors cabozantinib as 2nd line. Given age and underlying liver disease, would start dose modified 20 mg daily and can titrate up to 40 mg if tolerated after a few cycles.  Reviewed side effects with pt/sister and he would like to proceed. Treatment plan started.  -Reviewed use of Zofran as needed for nausea. Encouraged use of imodium as needed for diarrhea. Keep hands/feet well-moisturized to limit dermatologic side effects.   -Get baseline urine protein today  -Return every  2 weeks x 2 after starting with lab and toxicity evaluation. Then, would typically see monthly.  -NGS testing on prior pathology.  -Trend AFP  -Repeat imaging after 3 cycles    #. Sleep apnea, daytime fatigue  Non-compliant with CPAP.    Plan:  -Encouraged routine use of CPAP, but I don't think he will comply       Oncology history  7/2023: Unresectable multifocal HCC, in setting of background DEWITT-related cirrhosis.   -dominant L hepatic lobe mass, treated by embolization; then followed  -1/2024 MRI: cirrhotic appearing liver with evidence of portal hypertension. Post-embolization change of hepatic segments 2 and 3 with persistent nodular internal enhancement measuring 3.5 cm suspicious for residual tumor. Enlarging 3.1 cm lesion in the segment 8 with washout and pseudocapsule formation consistent with HCC. Additionally,  other arterially enhancing lesions which are all consistent with HCC. Further local therapies not an option.    Treatment:  -7/31/2023: radioembolization to L hepatic lobe mass; partial response      -3/7/2024 - 7/5/2024: palliative durvalumab and tremelimumab (cycle 1 only); then durvalumab maintenance every 28 days (not felt to be a candidate for bevacizumab). Continued until progression.   --AFP 10.2 (9/2023) ahead of start  --AFP after cycle 4, slight rise in AFP 11.2. MRI liver showed progression in 3 dominant masses. So, therapy stopped.     -8/1/2024: initiate 2nd line cabozantinib 20 mg daily (dose-reduced for age/liver disease)    Interval History   Flash Brown is a 83 year old male with advanced HCC who initiated palliative durvalumab and tremelimumab 5 mths ago.  Returns for cycle 6.    Tolerating treatment well. Progressive/cumulative fatigue with daytime somnolence. Hx of sleep apnea but not compliant on CPAP, sister thinks could be a contributor. Daily diarrhea, 2-4/day x 4-6 mths is stable. Not using antidiarrheal. No n/v. No increased dyspnea. No pruritus/rashes.     Denies any  abdominal pain/distension.  Eats well, gaining some weight. No LE edema.      ECOG Performance    2 - Ambulatory and independent in all ADLs; cannot work; up > 50% of the time      Physical Exam    General: alert and cooperative. Fatigued-appearing. Accompanied by sister  HEENT: No icterus  Lymph: No palpable adenopathy.   Heart: RRR  Lungs: regular respiratory effort. Clear bilaterally.   Abd: Soft, non-tender, non-distended.   Extremities: No LE edema  Neuro: non-focal    Lab Results    Recent Results (from the past 168 hour(s))   Comprehensive metabolic panel   Result Value Ref Range    Sodium 137 135 - 145 mmol/L    Potassium 4.4 3.4 - 5.3 mmol/L    Carbon Dioxide (CO2) 26 22 - 29 mmol/L    Anion Gap 10 7 - 15 mmol/L    Urea Nitrogen 15.2 8.0 - 23.0 mg/dL    Creatinine 0.90 0.67 - 1.17 mg/dL    GFR Estimate 86 >60 mL/min/1.73m2    Calcium 8.8 8.8 - 10.4 mg/dL    Chloride 101 98 - 107 mmol/L    Glucose 365 (H) 70 - 99 mg/dL    Alkaline Phosphatase 94 40 - 150 U/L    AST 33 0 - 45 U/L    ALT 26 0 - 70 U/L    Protein Total 6.8 6.4 - 8.3 g/dL    Albumin 3.5 3.5 - 5.2 g/dL    Bilirubin Total 0.8 <=1.2 mg/dL   TSH with free T4 reflex   Result Value Ref Range    TSH 3.35 0.30 - 4.20 uIU/mL   Extra Purple Top Tube   Result Value Ref Range    Hold Specimen JIC    CBC with platelets and differential   Result Value Ref Range    WBC Count 2.1 (L) 4.0 - 11.0 10e3/uL    RBC Count 4.03 (L) 4.40 - 5.90 10e6/uL    Hemoglobin 11.2 (L) 13.3 - 17.7 g/dL    Hematocrit 34.8 (L) 40.0 - 53.0 %    MCV 86 78 - 100 fL    MCH 27.8 26.5 - 33.0 pg    MCHC 32.2 31.5 - 36.5 g/dL    RDW 15.7 (H) 10.0 - 15.0 %    Platelet Count 63 (L) 150 - 450 10e3/uL    % Neutrophils 66 %    % Lymphocytes 16 %    % Monocytes 15 %    % Eosinophils 2 %    % Basophils 1 %    % Immature Granulocytes 1 %    NRBCs per 100 WBC 0 <1 /100    Absolute Neutrophils 1.4 (L) 1.6 - 8.3 10e3/uL    Absolute Lymphocytes 0.3 (L) 0.8 - 5.3 10e3/uL    Absolute Monocytes 0.3 0.0 -  1.3 10e3/uL    Absolute Eosinophils 0.1 0.0 - 0.7 10e3/uL    Absolute Basophils 0.0 0.0 - 0.2 10e3/uL    Absolute Immature Granulocytes 0.0 <=0.4 10e3/uL    Absolute NRBCs 0.0 10e3/uL   Protein  random urine   Result Value Ref Range    Total Protein Urine mg/dL <6.0   mg/dL    Total Protein Urine mg/mg Creat      Creatinine Urine mg/dL 50.1 mg/dL         Imaging    MR Abdomen w/o & w Contrast    Result Date: 7/8/2024  MRI ABDOMEN CLINICAL HISTORY:  Hepatocellular carcinoma TECHNIQUE:  Images were acquired with and without intravenous contrast through the abdomen. The following MR images were acquired: TrueFISP, multiplanar T2 weighted, axial T1 in/out of phase, axial fat-saturated T1, diffusion-weighted. Multiplanar T1-weighted images with fat saturation were before contrast administration and at multiple time points following the administration of intravenous contrast. Contrast dose: 10mL Gadavist COMPARISON: 1/22/2024 FINDINGS: Comparison study: 1/22/2024, CT chest 7/6/2024 Liver: Cirrhotic configuration liver with prominent caudate lobe atrophic left hepatic lobe. No significant signal change in in and out of phase. Lesion 1: Radio embolization changes in hepatic segment 2/3 with geographic arterial and progressive enhancement surrounding the targeted lesion with increased nodular enhancement internally measuring 3.5 cm along the anterior margin of the lesion(series 19 image #26). LR-TR viable. Lesion 2: Enlarging 3.5 cm arterial enhancing observation with central washout and pseudocapsule in segment 8, previously measuring 3cm (series 19 image 28). The previously described likely satellite nodule is now contiguous with the main lesion. LR-5. Lesion 3: Enlarging 1.9 cm arterial enhancing observation in segment 4A (series 19 image 32), previously measuring 18 mm. Mild increased signal on associated T2 and diffusion weighted imaging. There is washout without pseudocapsule formation. LR-4. Lesion 4: Enlarging 17 mm  arterial enhancing observation in segment 6 (series 19 image #45), previously measuring 12 mm. No definite washout or pseudocapsule formation. No meeting threshold growth criteria. There is associated increased signal on T2 and diffusion on DWI. LR-4. Lesion 5: Stable 13 mm arterial enhancing observation in segment 7 (series 19 image 35) without convincing washout on delayed imaging. LR-4. Multiple additional subcentimeter arterially enhancing lesions for example segment 6 (series 19 image 44) and in segment 4A (series 19 image #16). LR-3 Gallbladder: Surgically absent Spleen: The spleen is enlarged measuring 16.7 cm. Kidneys: Simple renal cysts. No hydronephrosis. Adrenal glands: Unremarkable. Pancreas: Diffuse fatty atrophy. No pancreatic duct dilation. No suspicious mass. Unchanged appearance of trapped lobule of fat in the pancreatic body/tail (series 3 image 35 Bowel: No evidence of bowel obstruction. Lymph nodes: Slightly increased size of 12 mm lymph node at the franklyn hepatis (series 22 image 42) no new lymphadenopathy. Blood vessels: Patent vasculature. No abdominal aortic aneurysm. Lung bases: Trace bilateral effusions. Bones and soft tissues: No suspicious or aggressive osseous lesions. Mesentery and abdominal wall: Mildly increased T2 signal throughout the mesentery likely representing congestion. Ascites: Trace fluid along the liver edge.     IMPRESSION: 1. Cirrhosis and findings of portal hypertension. 2. Radioembolization changes in hepatic segment 2/3 with persistent nodular internal enhancement measuring 3.5 cm along the anterior superior aspect of the treated lesion suspicious for residual tumor. LR-TR viable. 3. Enlarging 3.5 cm observation in segment 8 with washout and pseudocapsule formation. LR5/OPTN-5b. 4. 1.7 cm arterially enhancing lesion in segment 6 with associated increased signal on T2 and DWI imaging criteria for hepatocellular carcinoma, previously 1.3 cm. LR-4. 5. 2 additional LR-4  lesions in segment 4A and 7 as described above. 6. Multiple subcentimeter subcapsular foci of arterial enhancement without washout or pseudocapsule formation. LR-3. 7. Based on this exam only the patient does not meet [place] criteria. 8. Stable size of mildly enlarged lymph node at the franklyn hepatis. OPTN/LIRADS definitions. LIRADS v2018. LIRADS 1: Definitely benign. LIRADS 2: Probably benign. LIRADS 3: Indeterminate for HCC. LIRADS 4: Probably HCC. LIRADS M: Probably malignant. Not specific for HCC. LIRADS 5TR- nonviable: Previously treated HCC without residual malignancy identified LIRADS 5TR- viable: Previously treated HCC with findings indicating residual viable malignancy LIRADS 5TR- equivocal: Previously treated HCC with findings that may be treatment changes or viable malignancy OPTN 5a: Diagnostic for HCC. < 2 cm. OPTN 5b: Diagnostic for HCC. > Or = 2 cm and < 5 cm. OPTN 5x: Diagnostic for HCC. > Or = 5 cm. Godwin criteria for liver transplantation: 1. Presence of no HCCs greater than 5 cm. One HCC measuring 3-5 cm is allowed if no other HCCs are present. 2. Maximum of 3 HCCs measuring 3 cm or less. 3. No vascular invasion. 4. No extrahepatic metastases. I have personally reviewed the examination and initial interpretation and I agree with the findings. WAYLON BAUMAN MD   SYSTEM ID:  [id number]    CT Chest w/o contrast    Result Date: 7/6/2024  CT chest without contrast INDICATION: Hepatocellular carcinoma COMPARISON: 1/22/2024 FINDINGS: No contrast. Prominent upper thoracic kyphosis. Atherosclerotic calcification in the aortic arch branch vessels, thoracic aorta itself and multiple coronary arteries heart size normal. Left atrium is borderline enlarged. There are mitral annular calcifications, please correlate for mitral valve disorder and possible arrhythmia. Faint aortic valve leaflet calcifications are also suggested. No pleural or pericardial effusion. Right axillary surgical clips. No fluid collections.  No enlarged lymph nodes in the chest. Bone detail shows osteopenia with diffuse degenerative changes throughout the spine in addition to the thoracic kyphosis. No suspicious sclerotic or lytic/destructive lesions. Detail of the lungs shows right upper lobe lateral pleural thickening near the level of the checo with somewhat oval component appearing similar to previous, measuring approximately 2.0 x 1.4 cm. Calcified granulomas throughout the right lung. Subsegmental atelectasis medially in the left lung base and also laterally in the left lower lobe. No dominant left-sided nodule. No other consolidation. The included upper abdomen again shows vague hypodensity laterally in the left hepatic lobe unchanged likely indicative of treatment zone. No other discretely visible suspicious lesions although there is some faint geographic hypodensity in the right hepatic lobe. Please correlate with MRI abdomen for better detail. Cholecystectomy. Pancreatic atrophy.     IMPRESSION: No significant change in appearance the treatment zone in the left hepatic lobe for hepatocellular carcinoma with other pleural-based irregularly marginated right upper lobe opacity. Other granulomatous disease. Aortic valve leaflet calcifications mitral annular calcifications. Diffuse atherosclerosis. AVE FAIRBANKS MD   SYSTEM ID:  [id number]     Total time 50 minutes, to include face to face visit, review of EMR, ordering, documentation and coordination of care on date of service.    complexity modifier for longitudinal care.       Signed by: Ely Flores NP

## 2024-08-02 NOTE — CONFIDENTIAL NOTE
[facility]: Cancer Care                                                                                          Called received from Jessica from [facility] assisted [facility] on this patient requesting signed order for patients Cabometyx as the patients wife had brought the medication in. Faxed signed order to [phone].    Signature:  Taryn Avila RN

## 2024-08-05 ENCOUNTER — PATIENT OUTREACH (OUTPATIENT)
Dept: ONCOLOGY | Facility: CLINIC | Age: 84
End: 2024-08-05
Payer: MEDICARE

## 2024-08-05 LAB
INTERPRETATION: NORMAL
SIGNIFICANT RESULTS: NORMAL
SPECIMEN DESCRIPTION: NORMAL
TEST DETAILS, MDL: NORMAL

## 2024-08-05 PROCEDURE — 81455 SO/HL 51/>GSAP DNA/DNA&RNA: CPT | Performed by: INTERNAL MEDICINE

## 2024-08-05 PROCEDURE — G0452 MOLECULAR PATHOLOGY INTERPR: HCPCS | Mod: 26 | Performed by: PATHOLOGY

## 2024-08-05 NOTE — PROGRESS NOTES
[facility]: Cancer Care                                                                                          Enrollment status: enrolled  Follow up DUE: week of August 26    Signature:  NOAH CARNEY RN

## 2024-08-12 ENCOUNTER — TELEPHONE (OUTPATIENT)
Dept: ONCOLOGY | Facility: CLINIC | Age: 84
End: 2024-08-12
Payer: MEDICARE

## 2024-08-12 NOTE — TELEPHONE ENCOUNTER
"Oral Chemotherapy Monitoring Program    Primary Oncologist: Dr. Watts   Drug: Cabometyx (cabozantinib)  Start Date: 8/3/24     Subjective/Objective:  Flash Brown is a 83 year old male contacted by phone for a follow-up visit for oral chemotherapy.  Spoke with sister Rea regarding tolerability of medication and start date.         8/1/2024     1:00 PM 8/12/2024     9:00 AM   ORAL CHEMOTHERAPY   Assessment Type New Teach;Initial Work up Refill   Diagnosis Code Hepatocellular Cancer Hepatocellular Cancer   Providers Mac Watts   Clinic Name/Location [facility]   Drug Name Cabometyx (cabozantinib) Cabometyx (cabozantinib)   Dose 20 mg 20 mg   Current Schedule Daily Daily   Cycle Details Continuous Continuous   Start Date of Last Cycle  8/3/2024   Planned next cycle start date  9/2/2024   Doses missed in last 2 weeks  0   Adherence Assessment  Adherent   Adverse Effects  No AE identified during assessment   Home BPs  patient doesn't remember   Any new drug interactions? No No   Is the dose as ordered appropriate for the patient? Yes Yes   Is the patient currently in pain?  No   Has the patient been assessed within the past 6 months for depression?  Yes   Has the patient missed any days of school, work, or other routine activity?  No   Since the last time we talked, have you been hospitalized or used the emergency room?  No       Vitals:  BP:   BP Readings from Last 1 Encounters:   08/01/24 (!) 146/42     Wt Readings from Last 1 Encounters:   08/01/24 100.7 kg (222 lb)     Estimated body surface area is 2.16 meters squared as calculated from the following:    Height as of 8/1/24: 1.664 m (5' 5.5\").    Weight as of 8/1/24: 100.7 kg (222 lb).    Labs:  _  Result Component Current Result Ref Range   Sodium 137 (8/1/2024) 135 - 145 mmol/L     _  Result Component Current Result Ref Range   Potassium 4.4 (8/1/2024) 3.4 - 5.3 mmol/L     _  Result Component Current Result Ref Range   Calcium 8.8 " (8/1/2024) 8.8 - 10.4 mg/dL     No results found for Mag within last 30 days.     No results found for Phos within last 30 days.     _  Result Component Current Result Ref Range   Albumin 3.5 (8/1/2024) 3.5 - 5.2 g/dL     _  Result Component Current Result Ref Range   Urea Nitrogen 15.2 (8/1/2024) 8.0 - 23.0 mg/dL     _  Result Component Current Result Ref Range   Creatinine 0.90 (8/1/2024) 0.67 - 1.17 mg/dL       _  Result Component Current Result Ref Range   AST 33 (8/1/2024) 0 - 45 U/L     _  Result Component Current Result Ref Range   ALT 26 (8/1/2024) 0 - 70 U/L     _  Result Component Current Result Ref Range   Bilirubin Total 0.8 (8/1/2024) <=1.2 mg/dL       _  Result Component Current Result Ref Range   WBC Count 2.1 (L) (8/1/2024) 4.0 - 11.0 10e3/uL     _  Result Component Current Result Ref Range   Hemoglobin 11.2 (L) (8/1/2024) 13.3 - 17.7 g/dL     _  Result Component Current Result Ref Range   Platelet Count 63 (L) (8/1/2024) 150 - 450 10e3/uL     No results found for ANC within last 30 days.         Assessment/Plan:  I spoke with patient today. Patient reports doing well on therapy. No issues or concerns reported. Patient is compliant with therapy and no missed doses. Future appointments have been confirmed with patient. We will continue to follow.    Follow-Up:  8/20 Appt with Ely Flores + catie Frank, PharmD, [facility]  Oncology Pharmacy Manager  [facility]  [phone]

## 2024-08-20 ENCOUNTER — ONCOLOGY VISIT (OUTPATIENT)
Dept: ONCOLOGY | Facility: CLINIC | Age: 84
End: 2024-08-20
Attending: NURSE PRACTITIONER
Payer: MEDICARE

## 2024-08-20 ENCOUNTER — LAB (OUTPATIENT)
Dept: LAB | Facility: CLINIC | Age: 84
End: 2024-08-20
Payer: MEDICARE

## 2024-08-20 VITALS
DIASTOLIC BLOOD PRESSURE: 80 MMHG | HEART RATE: 67 BPM | HEIGHT: 66 IN | TEMPERATURE: 97.6 F | SYSTOLIC BLOOD PRESSURE: 164 MMHG | WEIGHT: 222 LBS | OXYGEN SATURATION: 95 % | BODY MASS INDEX: 35.68 KG/M2 | RESPIRATION RATE: 20 BRPM

## 2024-08-20 DIAGNOSIS — C22.0 HEPATOCELLULAR CARCINOMA (H): ICD-10-CM

## 2024-08-20 DIAGNOSIS — C22.0 HEPATOCELLULAR CARCINOMA (H): Primary | ICD-10-CM

## 2024-08-20 DIAGNOSIS — R17 ELEVATED BILIRUBIN: ICD-10-CM

## 2024-08-20 DIAGNOSIS — I10 BENIGN ESSENTIAL HYPERTENSION: ICD-10-CM

## 2024-08-20 LAB
ALBUMIN SERPL BCG-MCNC: 3.7 G/DL (ref 3.5–5.2)
ALP SERPL-CCNC: 105 U/L (ref 40–150)
ALT SERPL W P-5'-P-CCNC: 38 U/L (ref 0–70)
ANION GAP SERPL CALCULATED.3IONS-SCNC: 9 MMOL/L (ref 7–15)
AST SERPL W P-5'-P-CCNC: 47 U/L (ref 0–45)
BASOPHILS # BLD AUTO: 0 10E3/UL (ref 0–0.2)
BASOPHILS NFR BLD AUTO: 1 %
BILIRUB SERPL-MCNC: 1.5 MG/DL
BUN SERPL-MCNC: 12 MG/DL (ref 8–23)
CALCIUM SERPL-MCNC: 8.5 MG/DL (ref 8.8–10.4)
CHLORIDE SERPL-SCNC: 101 MMOL/L (ref 98–107)
CREAT SERPL-MCNC: 0.86 MG/DL (ref 0.67–1.17)
EGFRCR SERPLBLD CKD-EPI 2021: 87 ML/MIN/1.73M2
EOSINOPHIL # BLD AUTO: 0.1 10E3/UL (ref 0–0.7)
EOSINOPHIL NFR BLD AUTO: 4 %
ERYTHROCYTE [DISTWIDTH] IN BLOOD BY AUTOMATED COUNT: 15.6 % (ref 10–15)
GLUCOSE SERPL-MCNC: 211 MG/DL (ref 70–99)
HCO3 SERPL-SCNC: 27 MMOL/L (ref 22–29)
HCT VFR BLD AUTO: 38.6 % (ref 40–53)
HGB BLD-MCNC: 12.6 G/DL (ref 13.3–17.7)
IMM GRANULOCYTES # BLD: 0 10E3/UL
IMM GRANULOCYTES NFR BLD: 0 %
LYMPHOCYTES # BLD AUTO: 0.4 10E3/UL (ref 0.8–5.3)
LYMPHOCYTES NFR BLD AUTO: 19 %
MAGNESIUM SERPL-MCNC: 1.8 MG/DL (ref 1.7–2.3)
MCH RBC QN AUTO: 27.2 PG (ref 26.5–33)
MCHC RBC AUTO-ENTMCNC: 32.6 G/DL (ref 31.5–36.5)
MCV RBC AUTO: 83 FL (ref 78–100)
MONOCYTES # BLD AUTO: 0.2 10E3/UL (ref 0–1.3)
MONOCYTES NFR BLD AUTO: 8 %
NEUTROPHILS # BLD AUTO: 1.6 10E3/UL (ref 1.6–8.3)
NEUTROPHILS NFR BLD AUTO: 69 %
NRBC # BLD AUTO: 0 10E3/UL
NRBC BLD AUTO-RTO: 0 /100
PLATELET # BLD AUTO: 57 10E3/UL (ref 150–450)
POTASSIUM SERPL-SCNC: 4 MMOL/L (ref 3.4–5.3)
PROT SERPL-MCNC: 7.2 G/DL (ref 6.4–8.3)
RBC # BLD AUTO: 4.64 10E6/UL (ref 4.4–5.9)
SODIUM SERPL-SCNC: 137 MMOL/L (ref 135–145)
WBC # BLD AUTO: 2.3 10E3/UL (ref 4–11)

## 2024-08-20 PROCEDURE — G2211 COMPLEX E/M VISIT ADD ON: HCPCS | Performed by: NURSE PRACTITIONER

## 2024-08-20 PROCEDURE — 84155 ASSAY OF PROTEIN SERUM: CPT

## 2024-08-20 PROCEDURE — 99214 OFFICE O/P EST MOD 30 MIN: CPT | Performed by: NURSE PRACTITIONER

## 2024-08-20 PROCEDURE — 36415 COLL VENOUS BLD VENIPUNCTURE: CPT

## 2024-08-20 PROCEDURE — 85004 AUTOMATED DIFF WBC COUNT: CPT

## 2024-08-20 PROCEDURE — 82040 ASSAY OF SERUM ALBUMIN: CPT

## 2024-08-20 PROCEDURE — G0463 HOSPITAL OUTPT CLINIC VISIT: HCPCS | Performed by: NURSE PRACTITIONER

## 2024-08-20 PROCEDURE — 83735 ASSAY OF MAGNESIUM: CPT

## 2024-08-20 RX ORDER — LOPERAMIDE HCL 2 MG
2 CAPSULE ORAL PRN
COMMUNITY

## 2024-08-20 ASSESSMENT — PAIN SCALES - GENERAL: PAINLEVEL: NO PAIN (0)

## 2024-08-20 NOTE — PROGRESS NOTES
"Oncology Rooming Note    August 20, 2024 1:38 PM   Flash Brown is a 83 year old male who presents for:    Chief Complaint   Patient presents with    Oncology Clinic Visit     Hepatocellular carcinoma - Labs and provider visits     Initial Vitals: BP (!) 164/80 (BP Location: Left arm, Patient Position: Sitting, Cuff Size: Adult Regular)   Pulse 67   Temp 97.6  F (36.4  C) (Tympanic)   Resp 20   Ht 1.664 m (5' 5.5\")   Wt 100.7 kg (222 lb)   SpO2 95%   BMI 36.38 kg/m   Estimated body mass index is 36.38 kg/m  as calculated from the following:    Height as of this encounter: 1.664 m (5' 5.5\").    Weight as of this encounter: 100.7 kg (222 lb). Body surface area is 2.16 meters squared.  No Pain (0) Comment: Data Unavailable   No LMP for male patient.  Allergies reviewed: Yes  Medications reviewed: Yes, Patient states no changes to medications    Medications: Medication refills not needed today.  Pharmacy name entered into [email or web address]:    [id number] - [place], [place] - [address]  [facility] [place] [place] - [address]  [facility] - [place], [place] - [address] [address] [facility] - [place], [place] - [address]    Frailty Screening:   Is the patient here for a new oncology consult visit in cancer care? 2. No      Clinical concerns:  None      Marcelle Arias CMA              "

## 2024-08-20 NOTE — LETTER
8/20/2024      Flash Brown  [address]  [facility] [address]      Dear Colleague,    Thank you for referring your patient, Flash Brown, to the [facility]. Please see a copy of my visit note below.    [facility] Hematology and Oncology Progress Note    Patient: Flash Brown  MRN: [id number]  8/20/24        Reason for Visit    HCC    Primary Oncologist: Dr. Watts    Assessment and Plan  #. Advanced/multifocal HCC  #. 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 Pancytopenias (r/t cirrhosis)  #. Mild hyperbilirubinemia, likely chemo effect  #. 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   Therefore, started 2nd line cabozantinib 20 mg daily 2 weeks ago.     So far, tolerating this fair. May be noting more fatigue on it. Also, elevated in BP today from baseline and mild elevation in bili could be side effects. Baseline chronic diarrhea sounds stable, he is on lactulose for his cirrhosis.     Labs: Hgb 12.6, WBC 2.3, ANC 1.6, plat 57 (stable cytopenias to baseline); CMP: AST 47 (mild elevation, bili 1.5. Mag WNL.    Bili elevation likely a side effect, but requires no dose-modification at this time.   Baseline HTN; BP is running higher on cabozantinib, a common side effect. Today, this was 164/80 where baseline 120-140/40-60s.     NGS testing is being run on prior pathology, pending.    Plan:  -Continue low-dose cabozantinib 20 mg daily  -Monitor BP daily at nursing facility. Review log of readings at next visit. If averaging >gr 2-3, will need to treat the HTN +/- dose-reduce further (to cabozantinib 20 mg every other day)  -Monitor LFTs/bili  -Return in 2 weeks with lab/exam ahead of cycle 2. If doing well at next visit, will start seeing monthly.   Pending tolerance, can titrate up to 40 mg if tolerated after a few cycles.   -Reviewed use of Zofran as needed for nausea. Keep hands/feet well-moisturized to limit dermatologic side effects.  Imodium if loose stools increase >baseline 4-5/day   -Trend AFP  -Repeat imaging after 3 cycles  -NGS testing on prior pathology pending     #. Sleep apnea, daytime fatigue  Non-compliant with CPAP.    Plan:  -Encouraged routine use of CPAP, but I don't think he will comply       Oncology history  7/2023: Unresectable multifocal HCC, in setting of background DEWTIT-related cirrhosis.   -dominant L hepatic lobe mass, treated by embolization; then followed  -1/2024 MRI: cirrhotic appearing liver with evidence of portal hypertension. Post-embolization change of hepatic segments 2 and 3 with persistent nodular internal enhancement measuring 3.5 cm suspicious for residual tumor. Enlarging 3.1 cm lesion in the segment 8 with washout and pseudocapsule formation consistent with HCC. Additionally,  other arterially enhancing lesions which are all consistent with HCC. Further local therapies not an option.    Treatment:  -7/31/2023: radioembolization to L hepatic lobe mass; partial response      -3/7/2024 - 7/5/2024: palliative durvalumab and tremelimumab (cycle 1 only); then durvalumab maintenance every 28 days (not felt to be a candidate for bevacizumab). Continued until progression.   --AFP 10.2 (9/2023) ahead of start  --AFP after cycle 4, slight rise in AFP 11.2. MRI liver showed progression in 3 dominant masses. So, therapy stopped.     -8/1/2024: initiate 2nd line cabozantinib 20 mg daily (dose-reduced for age/liver disease)    Interval History   Flash Brown is a 83 year old male with advanced HCC who completed first line durvalumab and tremelimumab x 5 mths and was progressing in late July. He started 2nd line cabozantinib 20 mg daily early August; returns for 2-week toxicity evaluation.      Progressive/cumulative fatigue with daytime somnolence and feels a bit more confused at times, but only slightly worse than his baseline. Hx of sleep apnea but not compliant on CPAP, sister thinks could be a contributor. He's  on lactulose for his cirrhosis. Daily diarrhea, 2-4/day is stable. Not using antidiarrheal routinely. No n/v. No increased dyspnea. No pruritus/rashes. No hand/foot syndrome     Denies any abdominal pain/distension.  Eats well, gaining some weight. No LE edema.      ECOG Performance    2 - Ambulatory and independent in all ADLs; cannot work; up > 50% of the time      Physical Exam    General: alert and cooperative. Fatigued-appearing. A/Ox3. Accompanied by sister  HEENT: No icterus  Lymph: No palpable adenopathy.   Heart: RRR  Lungs: regular respiratory effort. Clear bilaterally.   Abd: Soft, non-tender, non-distended.   Extremities: No LE edema  Neuro: non-focal    Lab Results    Recent Results (from the past 168 hour(s))   Magnesium   Result Value Ref Range    Magnesium 1.8 1.7 - 2.3 mg/dL   Comprehensive metabolic panel (BMP + Alb, Alk Phos, ALT, AST, Total. Bili, TP)   Result Value Ref Range    Sodium 137 135 - 145 mmol/L    Potassium 4.0 3.4 - 5.3 mmol/L    Carbon Dioxide (CO2) 27 22 - 29 mmol/L    Anion Gap 9 7 - 15 mmol/L    Urea Nitrogen 12.0 8.0 - 23.0 mg/dL    Creatinine 0.86 0.67 - 1.17 mg/dL    GFR Estimate 87 >60 mL/min/1.73m2    Calcium 8.5 (L) 8.8 - 10.4 mg/dL    Chloride 101 98 - 107 mmol/L    Glucose 211 (H) 70 - 99 mg/dL    Alkaline Phosphatase 105 40 - 150 U/L    AST 47 (H) 0 - 45 U/L    ALT 38 0 - 70 U/L    Protein Total 7.2 6.4 - 8.3 g/dL    Albumin 3.7 3.5 - 5.2 g/dL    Bilirubin Total 1.5 (H) <=1.2 mg/dL   CBC with platelets and differential   Result Value Ref Range    WBC Count 2.3 (L) 4.0 - 11.0 10e3/uL    RBC Count 4.64 4.40 - 5.90 10e6/uL    Hemoglobin 12.6 (L) 13.3 - 17.7 g/dL    Hematocrit 38.6 (L) 40.0 - 53.0 %    MCV 83 78 - 100 fL    MCH 27.2 26.5 - 33.0 pg    MCHC 32.6 31.5 - 36.5 g/dL    RDW 15.6 (H) 10.0 - 15.0 %    Platelet Count 57 (L) 150 - 450 10e3/uL    % Neutrophils 69 %    % Lymphocytes 19 %    % Monocytes 8 %    % Eosinophils 4 %    % Basophils 1 %    % Immature  "Granulocytes 0 %    NRBCs per 100 WBC 0 <1 /100    Absolute Neutrophils 1.6 1.6 - 8.3 10e3/uL    Absolute Lymphocytes 0.4 (L) 0.8 - 5.3 10e3/uL    Absolute Monocytes 0.2 0.0 - 1.3 10e3/uL    Absolute Eosinophils 0.1 0.0 - 0.7 10e3/uL    Absolute Basophils 0.0 0.0 - 0.2 10e3/uL    Absolute Immature Granulocytes 0.0 <=0.4 10e3/uL    Absolute NRBCs 0.0 10e3/uL         Imaging    No results found.    Total time 30 minutes, to include face to face visit, review of EMR, ordering, documentation and coordination of care on date of service.    complexity modifier for longitudinal care.       Signed by: Ely Flores NP      Oncology Rooming Note    August 20, 2024 1:38 PM   Flash Brown is a 83 year old male who presents for:    Chief Complaint   Patient presents with     Oncology Clinic Visit     Hepatocellular carcinoma - Labs and provider visits     Initial Vitals: BP (!) 164/80 (BP Location: Left arm, Patient Position: Sitting, Cuff Size: Adult Regular)   Pulse 67   Temp 97.6  F (36.4  C) (Tympanic)   Resp 20   Ht 1.664 m (5' 5.5\")   Wt 100.7 kg (222 lb)   SpO2 95%   BMI 36.38 kg/m   Estimated body mass index is 36.38 kg/m  as calculated from the following:    Height as of this encounter: 1.664 m (5' 5.5\").    Weight as of this encounter: 100.7 kg (222 lb). Body surface area is 2.16 meters squared.  No Pain (0) Comment: Data Unavailable   No LMP for male patient.  Allergies reviewed: Yes  Medications reviewed: Yes, Patient states no changes to medications    Medications: Medication refills not needed today.  Pharmacy name entered into [employer]:    [id number] [place] [facility] - [place], [place] - [address]  [facility] [place], [place] - [address]  [facility] - [place], [place] - [address] [address] [facility] - [place], [place] - [address]    Frailty Screening:   Is the patient here for a new oncology consult visit in cancer care? 2. No      Clinical " concerns:  None      Marcelle Arias, CMA                Again, thank you for allowing me to participate in the care of your patient.        Sincerely,        Ely Flores, NP

## 2024-08-20 NOTE — PROGRESS NOTES
[facility] Hematology and Oncology Progress Note    Patient: Flash Brown  MRN: [id number]  8/20/24        Reason for Visit    HCC    Primary Oncologist: Dr. Watts    Assessment and Plan  #. Advanced/multifocal HCC  #. 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 Pancytopenias (r/t cirrhosis)  #. Mild hyperbilirubinemia, likely chemo effect  #. HTN, gr 1-2  Don was progressing on immunotherapy last month.   Therefore, started 2nd line cabozantinib 20 mg daily 2 weeks ago.     So far, tolerating this fair. May be noting more fatigue on it. Also, elevated in BP today from baseline and mild elevation in bili could be side effects. Baseline chronic diarrhea sounds stable, he is on lactulose for his cirrhosis.     Labs: Hgb 12.6, WBC 2.3, ANC 1.6, plat 57 (stable cytopenias to baseline); CMP: AST 47 (mild elevation, bili 1.5. Mag WNL.    Bili elevation likely a side effect, but requires no dose-modification at this time.   Baseline HTN; BP is running higher on cabozantinib, a common side effect. Today, this was 164/80 where baseline 120-140/40-60s.     NGS testing is being run on prior pathology, pending.    Plan:  -Continue low-dose cabozantinib 20 mg daily  -Monitor BP daily at nursing facility. Review log of readings at next visit. If averaging >gr 2-3, will need to treat the HTN +/- dose-reduce further (to cabozantinib 20 mg every other day)  -Monitor LFTs/bili  -Return in 2 weeks with lab/exam ahead of cycle 2. If doing well at next visit, will start seeing monthly.   Pending tolerance, can titrate up to 40 mg if tolerated after a few cycles.   -Reviewed use of Zofran as needed for nausea. Keep hands/feet well-moisturized to limit dermatologic side effects. Imodium if loose stools increase >baseline 4-5/day   -Trend AFP  -Repeat imaging after 3 cycles  -NGS testing on prior pathology pending     #. Sleep apnea, daytime fatigue  Non-compliant with CPAP.    Plan:  -Encouraged routine use of CPAP, but I  don't think he will comply       Oncology history  7/2023: Unresectable multifocal HCC, in setting of background DEWITT-related cirrhosis.   -dominant L hepatic lobe mass, treated by embolization; then followed  -1/2024 MRI: cirrhotic appearing liver with evidence of portal hypertension. Post-embolization change of hepatic segments 2 and 3 with persistent nodular internal enhancement measuring 3.5 cm suspicious for residual tumor. Enlarging 3.1 cm lesion in the segment 8 with washout and pseudocapsule formation consistent with HCC. Additionally,  other arterially enhancing lesions which are all consistent with HCC. Further local therapies not an option.    Treatment:  -7/31/2023: radioembolization to L hepatic lobe mass; partial response      -3/7/2024 - 7/5/2024: palliative durvalumab and tremelimumab (cycle 1 only); then durvalumab maintenance every 28 days (not felt to be a candidate for bevacizumab). Continued until progression.   --AFP 10.2 (9/2023) ahead of start  --AFP after cycle 4, slight rise in AFP 11.2. MRI liver showed progression in 3 dominant masses. So, therapy stopped.     -8/1/2024: initiate 2nd line cabozantinib 20 mg daily (dose-reduced for age/liver disease)    Interval History   Flash Brown is a 83 year old male with advanced HCC who completed first line durvalumab and tremelimumab x 5 mths and was progressing in late July. He started 2nd line cabozantinib 20 mg daily early August; returns for 2-week toxicity evaluation.      Progressive/cumulative fatigue with daytime somnolence and feels a bit more confused at times, but only slightly worse than his baseline. Hx of sleep apnea but not compliant on CPAP, sister thinks could be a contributor. He's on lactulose for his cirrhosis. Daily diarrhea, 2-4/day is stable. Not using antidiarrheal routinely. No n/v. No increased dyspnea. No pruritus/rashes. No hand/foot syndrome     Denies any abdominal pain/distension.  Eats well, gaining some weight.  No LE edema.      ECOG Performance    2 - Ambulatory and independent in all ADLs; cannot work; up > 50% of the time      Physical Exam    General: alert and cooperative. Fatigued-appearing. A/Ox3. Accompanied by sister  HEENT: No icterus  Lymph: No palpable adenopathy.   Heart: RRR  Lungs: regular respiratory effort. Clear bilaterally.   Abd: Soft, non-tender, non-distended.   Extremities: No LE edema  Neuro: non-focal    Lab Results    Recent Results (from the past 168 hour(s))   Magnesium   Result Value Ref Range    Magnesium 1.8 1.7 - 2.3 mg/dL   Comprehensive metabolic panel (BMP + Alb, Alk Phos, ALT, AST, Total. Bili, TP)   Result Value Ref Range    Sodium 137 135 - 145 mmol/L    Potassium 4.0 3.4 - 5.3 mmol/L    Carbon Dioxide (CO2) 27 22 - 29 mmol/L    Anion Gap 9 7 - 15 mmol/L    Urea Nitrogen 12.0 8.0 - 23.0 mg/dL    Creatinine 0.86 0.67 - 1.17 mg/dL    GFR Estimate 87 >60 mL/min/1.73m2    Calcium 8.5 (L) 8.8 - 10.4 mg/dL    Chloride 101 98 - 107 mmol/L    Glucose 211 (H) 70 - 99 mg/dL    Alkaline Phosphatase 105 40 - 150 U/L    AST 47 (H) 0 - 45 U/L    ALT 38 0 - 70 U/L    Protein Total 7.2 6.4 - 8.3 g/dL    Albumin 3.7 3.5 - 5.2 g/dL    Bilirubin Total 1.5 (H) <=1.2 mg/dL   CBC with platelets and differential   Result Value Ref Range    WBC Count 2.3 (L) 4.0 - 11.0 10e3/uL    RBC Count 4.64 4.40 - 5.90 10e6/uL    Hemoglobin 12.6 (L) 13.3 - 17.7 g/dL    Hematocrit 38.6 (L) 40.0 - 53.0 %    MCV 83 78 - 100 fL    MCH 27.2 26.5 - 33.0 pg    MCHC 32.6 31.5 - 36.5 g/dL    RDW 15.6 (H) 10.0 - 15.0 %    Platelet Count 57 (L) 150 - 450 10e3/uL    % Neutrophils 69 %    % Lymphocytes 19 %    % Monocytes 8 %    % Eosinophils 4 %    % Basophils 1 %    % Immature Granulocytes 0 %    NRBCs per 100 WBC 0 <1 /100    Absolute Neutrophils 1.6 1.6 - 8.3 10e3/uL    Absolute Lymphocytes 0.4 (L) 0.8 - 5.3 10e3/uL    Absolute Monocytes 0.2 0.0 - 1.3 10e3/uL    Absolute Eosinophils 0.1 0.0 - 0.7 10e3/uL    Absolute Basophils 0.0  0.0 - 0.2 10e3/uL    Absolute Immature Granulocytes 0.0 <=0.4 10e3/uL    Absolute NRBCs 0.0 10e3/uL         Imaging    No results found.    Total time 30 minutes, to include face to face visit, review of EMR, ordering, documentation and coordination of care on date of service.    complexity modifier for longitudinal care.       Signed by: Ely Flores NP

## 2024-08-20 NOTE — PATIENT INSTRUCTIONS
Continue cabozantinib 20 mg daily (oral chemo pill)    Check BP daily (left arm only). Bring back BP reading log to next Oncology appt in 2 weeks.     Imodium as needed for loose stools over baseline (>4-5/day)    Return to Oncology for labs/exam in 2 weeks to reassess on his cancer treatment.

## 2024-08-22 DIAGNOSIS — C22.0 HEPATOCELLULAR CARCINOMA (H): Primary | ICD-10-CM

## 2024-09-05 ENCOUNTER — ONCOLOGY VISIT (OUTPATIENT)
Dept: ONCOLOGY | Facility: CLINIC | Age: 84
End: 2024-09-05
Attending: NURSE PRACTITIONER
Payer: MEDICARE

## 2024-09-05 ENCOUNTER — LAB (OUTPATIENT)
Dept: LAB | Facility: CLINIC | Age: 84
End: 2024-09-05
Payer: MEDICARE

## 2024-09-05 VITALS
HEART RATE: 77 BPM | OXYGEN SATURATION: 96 % | DIASTOLIC BLOOD PRESSURE: 74 MMHG | RESPIRATION RATE: 16 BRPM | TEMPERATURE: 97.6 F | WEIGHT: 217 LBS | BODY MASS INDEX: 34.87 KG/M2 | SYSTOLIC BLOOD PRESSURE: 167 MMHG | HEIGHT: 66 IN

## 2024-09-05 DIAGNOSIS — D69.6 THROMBOCYTOPENIA (H): ICD-10-CM

## 2024-09-05 DIAGNOSIS — C22.0 HEPATOCELLULAR CARCINOMA (H): ICD-10-CM

## 2024-09-05 DIAGNOSIS — D61.818 OTHER PANCYTOPENIA (H): ICD-10-CM

## 2024-09-05 DIAGNOSIS — C22.0 HEPATOCELLULAR CARCINOMA (H): Primary | ICD-10-CM

## 2024-09-05 DIAGNOSIS — I10 BENIGN ESSENTIAL HYPERTENSION: ICD-10-CM

## 2024-09-05 LAB
AFP SERPL-MCNC: 8.5 NG/ML
ALBUMIN SERPL BCG-MCNC: 3.5 G/DL (ref 3.5–5.2)
ALP SERPL-CCNC: 102 U/L (ref 40–150)
ALT SERPL W P-5'-P-CCNC: 43 U/L (ref 0–70)
ANION GAP SERPL CALCULATED.3IONS-SCNC: 9 MMOL/L (ref 7–15)
AST SERPL W P-5'-P-CCNC: 54 U/L (ref 0–45)
BASOPHILS # BLD AUTO: 0 10E3/UL (ref 0–0.2)
BASOPHILS NFR BLD AUTO: 1 %
BILIRUB SERPL-MCNC: 1.4 MG/DL
BUN SERPL-MCNC: 9.3 MG/DL (ref 8–23)
CALCIUM SERPL-MCNC: 8.6 MG/DL (ref 8.8–10.4)
CHLORIDE SERPL-SCNC: 102 MMOL/L (ref 98–107)
CREAT SERPL-MCNC: 0.91 MG/DL (ref 0.67–1.17)
EGFRCR SERPLBLD CKD-EPI 2021: 85 ML/MIN/1.73M2
EOSINOPHIL # BLD AUTO: 0.1 10E3/UL (ref 0–0.7)
EOSINOPHIL NFR BLD AUTO: 4 %
ERYTHROCYTE [DISTWIDTH] IN BLOOD BY AUTOMATED COUNT: 16 % (ref 10–15)
GLUCOSE SERPL-MCNC: 139 MG/DL (ref 70–99)
HCO3 SERPL-SCNC: 25 MMOL/L (ref 22–29)
HCT VFR BLD AUTO: 38.7 % (ref 40–53)
HGB BLD-MCNC: 13.2 G/DL (ref 13.3–17.7)
IMM GRANULOCYTES # BLD: 0 10E3/UL
IMM GRANULOCYTES NFR BLD: 0 %
LYMPHOCYTES # BLD AUTO: 0.5 10E3/UL (ref 0.8–5.3)
LYMPHOCYTES NFR BLD AUTO: 21 %
MCH RBC QN AUTO: 28.4 PG (ref 26.5–33)
MCHC RBC AUTO-ENTMCNC: 34.1 G/DL (ref 31.5–36.5)
MCV RBC AUTO: 83 FL (ref 78–100)
MONOCYTES # BLD AUTO: 0.3 10E3/UL (ref 0–1.3)
MONOCYTES NFR BLD AUTO: 12 %
NEUTROPHILS # BLD AUTO: 1.5 10E3/UL (ref 1.6–8.3)
NEUTROPHILS NFR BLD AUTO: 62 %
NRBC # BLD AUTO: 0 10E3/UL
NRBC BLD AUTO-RTO: 0 /100
PLAT MORPH BLD: NORMAL
PLATELET # BLD AUTO: 45 10E3/UL (ref 150–450)
POTASSIUM SERPL-SCNC: 3.8 MMOL/L (ref 3.4–5.3)
PROT SERPL-MCNC: 6.6 G/DL (ref 6.4–8.3)
RBC # BLD AUTO: 4.65 10E6/UL (ref 4.4–5.9)
RBC MORPH BLD: NORMAL
SODIUM SERPL-SCNC: 136 MMOL/L (ref 135–145)
TSH SERPL DL<=0.005 MIU/L-ACNC: 4.2 UIU/ML (ref 0.3–4.2)
WBC # BLD AUTO: 2.4 10E3/UL (ref 4–11)

## 2024-09-05 PROCEDURE — 36415 COLL VENOUS BLD VENIPUNCTURE: CPT

## 2024-09-05 PROCEDURE — 85025 COMPLETE CBC W/AUTO DIFF WBC: CPT

## 2024-09-05 PROCEDURE — G0463 HOSPITAL OUTPT CLINIC VISIT: HCPCS | Performed by: NURSE PRACTITIONER

## 2024-09-05 PROCEDURE — 82105 ALPHA-FETOPROTEIN SERUM: CPT

## 2024-09-05 PROCEDURE — 84443 ASSAY THYROID STIM HORMONE: CPT

## 2024-09-05 PROCEDURE — 99214 OFFICE O/P EST MOD 30 MIN: CPT | Performed by: NURSE PRACTITIONER

## 2024-09-05 PROCEDURE — 82040 ASSAY OF SERUM ALBUMIN: CPT

## 2024-09-05 PROCEDURE — G2211 COMPLEX E/M VISIT ADD ON: HCPCS | Performed by: NURSE PRACTITIONER

## 2024-09-05 PROCEDURE — 84450 TRANSFERASE (AST) (SGOT): CPT

## 2024-09-05 ASSESSMENT — PAIN SCALES - GENERAL: PAINLEVEL: NO PAIN (0)

## 2024-09-05 NOTE — PROGRESS NOTES
"Oncology Rooming Note    September 5, 2024 1:54 PM   Flash Brown is a 83 year old male who presents for:    Chief Complaint   Patient presents with    Oncology Clinic Visit     Hepatocellular carcinoma - Labs and provider visits     Initial Vitals: BP (!) 167/74 (BP Location: Left arm, Patient Position: Sitting, Cuff Size: Adult Large)   Pulse 77   Temp 97.6  F (36.4  C) (Tympanic)   Resp 16   Ht 1.664 m (5' 5.5\")   Wt 93.9 kg (207 lb)   SpO2 96%   BMI 33.92 kg/m   Estimated body mass index is 33.92 kg/m  as calculated from the following:    Height as of this encounter: 1.664 m (5' 5.5\").    Weight as of this encounter: 93.9 kg (207 lb). Body surface area is 2.08 meters squared.  No Pain (0) Comment: Data Unavailable   No LMP for male patient.  Allergies reviewed: Yes  Medications reviewed: No, unsure of medications being taken.     Medications: Medication refills not needed today.  Pharmacy name entered into [employer]:    [id number] [place] [facility] - [place], [place] - [address]  [facility] [place], [place] - [address]  [facility] - [place], [place] - [address] [address] [facility] - [place], [place] - [address]    Frailty Screening:   Is the patient here for a new oncology consult visit in cancer care? 2. No      Clinical concerns:  None      Marcelle Arias CMA            "

## 2024-09-05 NOTE — LETTER
"9/5/2024      Flash Brown  [address]  [facility] [address]      Dear Colleague,    Thank you for referring your patient, Flash Brown, to the [facility] [place]. Please see a copy of my visit note below.    Oncology Rooming Note    September 5, 2024 1:54 PM   Flash Brown is a 83 year old male who presents for:    Chief Complaint   Patient presents with     Oncology Clinic Visit     Hepatocellular carcinoma - Labs and provider visits     Initial Vitals: BP (!) 167/74 (BP Location: Left arm, Patient Position: Sitting, Cuff Size: Adult Large)   Pulse 77   Temp 97.6  F (36.4  C) (Tympanic)   Resp 16   Ht 1.664 m (5' 5.5\")   Wt 93.9 kg (207 lb)   SpO2 96%   BMI 33.92 kg/m   Estimated body mass index is 33.92 kg/m  as calculated from the following:    Height as of this encounter: 1.664 m (5' 5.5\").    Weight as of this encounter: 93.9 kg (207 lb). Body surface area is 2.08 meters squared.  No Pain (0) Comment: Data Unavailable   No LMP for male patient.  Allergies reviewed: Yes  Medications reviewed: No, unsure of medications being taken.     Medications: Medication refills not needed today.  Pharmacy name entered into [employer]:    [id number] [place] [facility] - [place], [place] - [address]  [facility] [place] - [address] [place] [address]  [facility] - [place], [place] - [address] [address] [facility] - [place], [place] - [address]    Frailty Screening:   Is the patient here for a new oncology consult visit in cancer care? 2. No      Clinical concerns:  None      [facility]elle Arias [facility] Hematology and Oncology Progress Note    Patient: Flash Brown  MRN: [id number]  9/05/24        Reason for Visit    HCC    Primary Oncologist: Dr. Watts    Assessment and Plan  #. Advanced/multifocal HCC  #. 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 Pancytopenias (r/t cirrhosis)  #. Mild hyperbilirubinemia, " likely chemo effect  #. Significant cardiac history  #.  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  Therefore, started 2nd line cabozantinib 20 mg daily 4 weeks ago.     So far, tolerating this fair. May be noting more fatigue on it, but overall his fatigue/PS are generally stable per his sister. Chronically low appetite; 5 lb weight loss over the month since on this. Denies GI side effects.  Also, elevated in BP from baseline and mild elevation in bili since starting it are likely side effects. Baseline chronic diarrhea (4-5/day) sounds stable, he is on lactulose for his cirrhosis.     Labs: Hgb 13.2, WBC 2.4, ANC 1.5, plat 45 (stable to slightly lower cytopenias to baseline); CMP: AST 54 (mild elevation), bili 1.4 (mild elevation). TSH WNL. Urine protein pending.     Bili elevation likely a side effect, but requires no dose-modification at this time.   Baseline HTN; BP is running higher on cabozantinib, a common side effect. Has been 160/70-80s since starting it, where baseline 120-140/40-60s. He has significant cardiovascular history, cardiac meds managed by PCP/Cardiology.    NGS testing showed low TMB and single CTNNB1 D32G mutation.     AFP improved, 8.5 (from 10.3).    Plan:  -Continue low-dose cabozantinib 20 mg daily  -Monitor BP daily at nursing facility. If averaging >gr 2-3, will need to treat the HTN +/- dose-reduce further (to cabozantinib 20 mg every other day). Recommended he follow-up with his managing PCP/Cardiologist in next few weeks to review whether he needs his antihypertensive meds adjusted.   -Monitor LFTs/bili, stable/mild elevation on caboznatinib  -Monitor labs every 2 weeks through this cycle to follow LFTs and CBC. If stable, can go to monthly labs  -Return with Dr. Watts in 4 weeks   -We had discussed possibility of titrating dose up a bit at some point, but I don't think he'd tolerate higher dose.   -Baseline diarrhea on lactulose  4-5/day. If progressive on cabozantinib, can use Imodium as needed.   -Trend AFP  -Repeat imaging after 3 cycles    #. Daytime fatigue  Chronic, but progressive over last several months.   Hx sleep apnea, but non-compliant with CPAP.    Plan:  -Encouraged routine use of CPAP, but I don't think he will comply  -I will check ammonia level due to his history of cirrhosis. If elevated, he should manage that through his Hepatologist/PCP       Oncology history  7/2023: Unresectable multifocal HCC, in setting of background DEWITT-related cirrhosis.   -dominant L hepatic lobe mass, treated by embolization; then followed  -1/2024 MRI: cirrhotic appearing liver with evidence of portal hypertension. Post-embolization change of hepatic segments 2 and 3 with persistent nodular internal enhancement measuring 3.5 cm suspicious for residual tumor. Enlarging 3.1 cm lesion in the segment 8 with washout and pseudocapsule formation consistent with HCC. Additionally,  other arterially enhancing lesions which are all consistent with HCC. Further local therapies not an option.    Treatment:  -7/31/2023: radioembolization to L hepatic lobe mass; partial response      -3/7/2024 - 7/5/2024: palliative durvalumab and tremelimumab (cycle 1 only); then durvalumab maintenance every 28 days (not felt to be a candidate for bevacizumab). Continued until progression.   --AFP 10.2 (9/2023) ahead of start  --AFP after cycle 4, slight rise in AFP 11.2. MRI liver showed progression in 3 dominant masses. So, therapy stopped.     -8/1/2024: initiate 2nd line cabozantinib 20 mg daily (dose-reduced for age/liver disease)    Interval History   Flash Brown is a 83 year old male with advanced HCC who completed first line durvalumab and tremelimumab x 5 mths and was progressing in late July. He started 2nd line cabozantinib 20 mg daily 4 weeks ago. Returns for 2-week reassessment.    Chronic progressive/cumulative fatigue with daytime somnolence and feels a  bit more confused at times, similar to baseline  Hx of sleep apnea but not compliant on CPAP, sister thinks could be a contributor. He's on lactulose for his cirrhosis. Daily diarrhea, 4/day is stable (on lactulose) and using imodium as needed. Chronic low appetite, weight is down 5 lb. No n/v. No increased dyspnea. No pruritus/rashes. No hand/foot syndrome.    BP since last visit has been stable at the nursing facility (160s/70s) per sister.     Denies any abdominal pain/distension.  No LE edema.      ECOG Performance    2 - Ambulatory and independent in all ADLs; cannot work; up > 50% of the time      Physical Exam    General: alert and cooperative. Fatigued-appearing. A/Ox3. Accompanied by sister  HEENT: No icterus  Lymph: No palpable adenopathy.   Heart: RRR  Lungs: regular respiratory effort. Clear bilaterally.   Abd: Soft, non-tender, non-distended.   Extremities: No LE edema  Neuro: non-focal    Lab Results    Recent Results (from the past 168 hour(s))   TSH with free T4 reflex   Result Value Ref Range    TSH 4.20 0.30 - 4.20 uIU/mL   Comprehensive metabolic panel   Result Value Ref Range    Sodium 136 135 - 145 mmol/L    Potassium 3.8 3.4 - 5.3 mmol/L    Carbon Dioxide (CO2) 25 22 - 29 mmol/L    Anion Gap 9 7 - 15 mmol/L    Urea Nitrogen 9.3 8.0 - 23.0 mg/dL    Creatinine 0.91 0.67 - 1.17 mg/dL    GFR Estimate 85 >60 mL/min/1.73m2    Calcium 8.6 (L) 8.8 - 10.4 mg/dL    Chloride 102 98 - 107 mmol/L    Glucose 139 (H) 70 - 99 mg/dL    Alkaline Phosphatase 102 40 - 150 U/L    AST 54 (H) 0 - 45 U/L    ALT 43 0 - 70 U/L    Protein Total 6.6 6.4 - 8.3 g/dL    Albumin 3.5 3.5 - 5.2 g/dL    Bilirubin Total 1.4 (H) <=1.2 mg/dL   CBC with platelets and differential   Result Value Ref Range    WBC Count 2.4 (L) 4.0 - 11.0 10e3/uL    RBC Count 4.65 4.40 - 5.90 10e6/uL    Hemoglobin 13.2 (L) 13.3 - 17.7 g/dL    Hematocrit 38.7 (L) 40.0 - 53.0 %    MCV 83 78 - 100 fL    MCH 28.4 26.5 - 33.0 pg    MCHC 34.1 31.5 - 36.5  g/dL    RDW 16.0 (H) 10.0 - 15.0 %    Platelet Count 45 (LL) 150 - 450 10e3/uL    % Neutrophils 62 %    % Lymphocytes 21 %    % Monocytes 12 %    % Eosinophils 4 %    % Basophils 1 %    % Immature Granulocytes 0 %    NRBCs per 100 WBC 0 <1 /100    Absolute Neutrophils 1.5 (L) 1.6 - 8.3 10e3/uL    Absolute Lymphocytes 0.5 (L) 0.8 - 5.3 10e3/uL    Absolute Monocytes 0.3 0.0 - 1.3 10e3/uL    Absolute Eosinophils 0.1 0.0 - 0.7 10e3/uL    Absolute Basophils 0.0 0.0 - 0.2 10e3/uL    Absolute Immature Granulocytes 0.0 <=0.4 10e3/uL    Absolute NRBCs 0.0 10e3/uL   RBC and Platelet Morphology   Result Value Ref Range    RBC Morphology Confirmed RBC Indices     Platelet Assessment  Automated Count Confirmed. Platelet morphology is normal.     Automated Count Confirmed. Platelet morphology is normal.         Imaging    No results found.    Total time 35 minutes, to include face to face visit, review of EMR, ordering, documentation and coordination of care on date of service.    complexity modifier for longitudinal care.       Signed by: Ely Flores NP      Again, thank you for allowing me to participate in the care of your patient.        Sincerely,        Ely Flores NP

## 2024-09-05 NOTE — PATIENT INSTRUCTIONS
BP still running high. This could be a side effect of the cabozantinib (chemo drug).   Plan:  --Follow-up with your primary provider at your nursing facility or we can refer you to Cardiology to manage your HTN. May need medication adjustments on cabozantinib.   --If BP is not improving with medication adjustments, we will need to consider reducing the chemo doses more    Diarrhea  --Baseline 4-5 stools/day on lactulose  --Only, if the #stools increases >4-5 does he need imodium as needed    2 weeks - lab only  4 weeks - lab, Dr. Watts follow-up

## 2024-09-05 NOTE — PROGRESS NOTES
[facility] Hematology and Oncology Progress Note    Patient: Flash Brown  MRN: [id number]  9/05/24        Reason for Visit    HCC    Primary Oncologist: Dr. Watts    Assessment and Plan  #. Advanced/multifocal HCC  #. 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 Pancytopenias (r/t cirrhosis)  #. Mild hyperbilirubinemia, likely chemo effect  #. Significant cardiac history  #.  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  Therefore, started 2nd line cabozantinib 20 mg daily 4 weeks ago.     So far, tolerating this fair. May be noting more fatigue on it, but overall his fatigue/PS are generally stable per his sister. Chronically low appetite; 5 lb weight loss over the month since on this. Denies GI side effects.  Also, elevated in BP from baseline and mild elevation in bili since starting it are likely side effects. Baseline chronic diarrhea (4-5/day) sounds stable, he is on lactulose for his cirrhosis.     Labs: Hgb 13.2, WBC 2.4, ANC 1.5, plat 45 (stable to slightly lower cytopenias to baseline); CMP: AST 54 (mild elevation), bili 1.4 (mild elevation). TSH WNL. Urine protein pending.     Bili elevation likely a side effect, but requires no dose-modification at this time.   Baseline HTN; BP is running higher on cabozantinib, a common side effect. Has been 160/70-80s since starting it, where baseline 120-140/40-60s. He has significant cardiovascular history, cardiac meds managed by PCP/Cardiology.    NGS testing showed low TMB and single CTNNB1 D32G mutation.     AFP improved, 8.5 (from 10.3).    Plan:  -Continue low-dose cabozantinib 20 mg daily  -Monitor BP daily at nursing facility. If averaging >gr 2-3, will need to treat the HTN +/- dose-reduce further (to cabozantinib 20 mg every other day). Recommended he follow-up with his managing PCP/Cardiologist in next few weeks to review whether he needs his antihypertensive meds adjusted.   -Monitor LFTs/bili,  stable/mild elevation on caboznatinib  -Monitor labs every 2 weeks through this cycle to follow LFTs and CBC. If stable, can go to monthly labs  -Return with Dr. Watts in 4 weeks   -We had discussed possibility of titrating dose up a bit at some point, but I don't think he'd tolerate higher dose.   -Baseline diarrhea on lactulose 4-5/day. If progressive on cabozantinib, can use Imodium as needed.   -Trend AFP  -Repeat imaging after 3 cycles    #. Daytime fatigue  Chronic, but progressive over last several months.   Hx sleep apnea, but non-compliant with CPAP.    Plan:  -Encouraged routine use of CPAP, but I don't think he will comply  -I will check ammonia level due to his history of cirrhosis. If elevated, he should manage that through his Hepatologist/PCP       Oncology history  7/2023: Unresectable multifocal HCC, in setting of background DEWITT-related cirrhosis.   -dominant L hepatic lobe mass, treated by embolization; then followed  -1/2024 MRI: cirrhotic appearing liver with evidence of portal hypertension. Post-embolization change of hepatic segments 2 and 3 with persistent nodular internal enhancement measuring 3.5 cm suspicious for residual tumor. Enlarging 3.1 cm lesion in the segment 8 with washout and pseudocapsule formation consistent with HCC. Additionally,  other arterially enhancing lesions which are all consistent with HCC. Further local therapies not an option.    Treatment:  -7/31/2023: radioembolization to L hepatic lobe mass; partial response      -3/7/2024 - 7/5/2024: palliative durvalumab and tremelimumab (cycle 1 only); then durvalumab maintenance every 28 days (not felt to be a candidate for bevacizumab). Continued until progression.   --AFP 10.2 (9/2023) ahead of start  --AFP after cycle 4, slight rise in AFP 11.2. MRI liver showed progression in 3 dominant masses. So, therapy stopped.     -8/1/2024: initiate 2nd line cabozantinib 20 mg daily (dose-reduced for age/liver  disease)    Interval History   Flash Brown is a 83 year old male with advanced HCC who completed first line durvalumab and tremelimumab x 5 mths and was progressing in late July. He started 2nd line cabozantinib 20 mg daily 4 weeks ago. Returns for 2-week reassessment.    Chronic progressive/cumulative fatigue with daytime somnolence and feels a bit more confused at times, similar to baseline  Hx of sleep apnea but not compliant on CPAP, sister thinks could be a contributor. He's on lactulose for his cirrhosis. Daily diarrhea, 4/day is stable (on lactulose) and using imodium as needed. Chronic low appetite, weight is down 5 lb. No n/v. No increased dyspnea. No pruritus/rashes. No hand/foot syndrome.    BP since last visit has been stable at the nursing facility (160s/70s) per sister.     Denies any abdominal pain/distension.  No LE edema.      ECOG Performance    2 - Ambulatory and independent in all ADLs; cannot work; up > 50% of the time      Physical Exam    General: alert and cooperative. Fatigued-appearing. A/Ox3. Accompanied by sister  HEENT: No icterus  Lymph: No palpable adenopathy.   Heart: RRR  Lungs: regular respiratory effort. Clear bilaterally.   Abd: Soft, non-tender, non-distended.   Extremities: No LE edema  Neuro: non-focal    Lab Results    Recent Results (from the past 168 hour(s))   TSH with free T4 reflex   Result Value Ref Range    TSH 4.20 0.30 - 4.20 uIU/mL   Comprehensive metabolic panel   Result Value Ref Range    Sodium 136 135 - 145 mmol/L    Potassium 3.8 3.4 - 5.3 mmol/L    Carbon Dioxide (CO2) 25 22 - 29 mmol/L    Anion Gap 9 7 - 15 mmol/L    Urea Nitrogen 9.3 8.0 - 23.0 mg/dL    Creatinine 0.91 0.67 - 1.17 mg/dL    GFR Estimate 85 >60 mL/min/1.73m2    Calcium 8.6 (L) 8.8 - 10.4 mg/dL    Chloride 102 98 - 107 mmol/L    Glucose 139 (H) 70 - 99 mg/dL    Alkaline Phosphatase 102 40 - 150 U/L    AST 54 (H) 0 - 45 U/L    ALT 43 0 - 70 U/L    Protein Total 6.6 6.4 - 8.3 g/dL    Albumin  3.5 3.5 - 5.2 g/dL    Bilirubin Total 1.4 (H) <=1.2 mg/dL   CBC with platelets and differential   Result Value Ref Range    WBC Count 2.4 (L) 4.0 - 11.0 10e3/uL    RBC Count 4.65 4.40 - 5.90 10e6/uL    Hemoglobin 13.2 (L) 13.3 - 17.7 g/dL    Hematocrit 38.7 (L) 40.0 - 53.0 %    MCV 83 78 - 100 fL    MCH 28.4 26.5 - 33.0 pg    MCHC 34.1 31.5 - 36.5 g/dL    RDW 16.0 (H) 10.0 - 15.0 %    Platelet Count 45 (LL) 150 - 450 10e3/uL    % Neutrophils 62 %    % Lymphocytes 21 %    % Monocytes 12 %    % Eosinophils 4 %    % Basophils 1 %    % Immature Granulocytes 0 %    NRBCs per 100 WBC 0 <1 /100    Absolute Neutrophils 1.5 (L) 1.6 - 8.3 10e3/uL    Absolute Lymphocytes 0.5 (L) 0.8 - 5.3 10e3/uL    Absolute Monocytes 0.3 0.0 - 1.3 10e3/uL    Absolute Eosinophils 0.1 0.0 - 0.7 10e3/uL    Absolute Basophils 0.0 0.0 - 0.2 10e3/uL    Absolute Immature Granulocytes 0.0 <=0.4 10e3/uL    Absolute NRBCs 0.0 10e3/uL   RBC and Platelet Morphology   Result Value Ref Range    RBC Morphology Confirmed RBC Indices     Platelet Assessment  Automated Count Confirmed. Platelet morphology is normal.     Automated Count Confirmed. Platelet morphology is normal.         Imaging    No results found.    Total time 35 minutes, to include face to face visit, review of EMR, ordering, documentation and coordination of care on date of service.    complexity modifier for longitudinal care.       Signed by: Ely Flores NP

## 2024-09-19 ENCOUNTER — PATIENT OUTREACH (OUTPATIENT)
Dept: ONCOLOGY | Facility: CLINIC | Age: 84
End: 2024-09-19

## 2024-09-19 ENCOUNTER — LAB (OUTPATIENT)
Dept: LAB | Facility: CLINIC | Age: 84
End: 2024-09-19
Payer: MEDICARE

## 2024-09-19 DIAGNOSIS — C22.0 HEPATOCELLULAR CARCINOMA (H): ICD-10-CM

## 2024-09-19 LAB
ALBUMIN MFR UR ELPH: 39.5 MG/DL
ALBUMIN SERPL BCG-MCNC: 3.4 G/DL (ref 3.5–5.2)
ALP SERPL-CCNC: 108 U/L (ref 40–150)
ALT SERPL W P-5'-P-CCNC: 40 U/L (ref 0–70)
AMMONIA PLAS-SCNC: 47 UMOL/L (ref 16–60)
AST SERPL W P-5'-P-CCNC: 56 U/L (ref 0–45)
BASOPHILS # BLD AUTO: 0 10E3/UL (ref 0–0.2)
BASOPHILS NFR BLD AUTO: 1 %
BILIRUB DIRECT SERPL-MCNC: 0.47 MG/DL (ref 0–0.3)
BILIRUB SERPL-MCNC: 1.4 MG/DL
CREAT UR-MCNC: 52.2 MG/DL
EOSINOPHIL # BLD AUTO: 0.1 10E3/UL (ref 0–0.7)
EOSINOPHIL NFR BLD AUTO: 4 %
ERYTHROCYTE [DISTWIDTH] IN BLOOD BY AUTOMATED COUNT: 17.2 % (ref 10–15)
HCT VFR BLD AUTO: 35.9 % (ref 40–53)
HGB BLD-MCNC: 12.3 G/DL (ref 13.3–17.7)
IMM GRANULOCYTES # BLD: 0 10E3/UL
IMM GRANULOCYTES NFR BLD: 0 %
LYMPHOCYTES # BLD AUTO: 0.4 10E3/UL (ref 0.8–5.3)
LYMPHOCYTES NFR BLD AUTO: 20 %
MCH RBC QN AUTO: 28.8 PG (ref 26.5–33)
MCHC RBC AUTO-ENTMCNC: 34.3 G/DL (ref 31.5–36.5)
MCV RBC AUTO: 84 FL (ref 78–100)
MONOCYTES # BLD AUTO: 0.2 10E3/UL (ref 0–1.3)
MONOCYTES NFR BLD AUTO: 11 %
NEUTROPHILS # BLD AUTO: 1.2 10E3/UL (ref 1.6–8.3)
NEUTROPHILS NFR BLD AUTO: 64 %
NRBC # BLD AUTO: 0 10E3/UL
NRBC BLD AUTO-RTO: 0 /100
PLAT MORPH BLD: NORMAL
PLATELET # BLD AUTO: 46 10E3/UL (ref 150–450)
PROT SERPL-MCNC: 6.4 G/DL (ref 6.4–8.3)
PROT/CREAT 24H UR: 0.76 MG/MG CR (ref 0–0.2)
RBC # BLD AUTO: 4.27 10E6/UL (ref 4.4–5.9)
RBC MORPH BLD: NORMAL
WBC # BLD AUTO: 1.9 10E3/UL (ref 4–11)

## 2024-09-19 PROCEDURE — 82140 ASSAY OF AMMONIA: CPT

## 2024-09-19 PROCEDURE — 36415 COLL VENOUS BLD VENIPUNCTURE: CPT

## 2024-09-19 PROCEDURE — 84156 ASSAY OF PROTEIN URINE: CPT

## 2024-09-19 PROCEDURE — 85025 COMPLETE CBC W/AUTO DIFF WBC: CPT

## 2024-09-19 PROCEDURE — 82040 ASSAY OF SERUM ALBUMIN: CPT

## 2024-09-19 NOTE — PROGRESS NOTES
DATE/TIME OF CALL RECEIVED FROM LAB:  09/19/24 at 2:21 PM   LAB TEST:  PLT  LAB VALUE:  46  PROVIDER NOTIFIED?: Yes  PROVIDER NAME: Ely jordan from last visit  DATE/TIME LAB VALUE REPORTED TO PROVIDER: 09.19 at 221pm  MECHANISM OF PROVIDER NOTIFICATION:  message  Mary Wasserman RN on 9/19/2024 at 2:21 PM

## 2024-09-23 DIAGNOSIS — C22.0 HEPATOCELLULAR CARCINOMA (H): Primary | ICD-10-CM

## 2024-10-03 ENCOUNTER — LAB (OUTPATIENT)
Dept: LAB | Facility: CLINIC | Age: 84
End: 2024-10-03
Attending: INTERNAL MEDICINE
Payer: MEDICARE

## 2024-10-03 ENCOUNTER — VIRTUAL VISIT (OUTPATIENT)
Dept: ONCOLOGY | Facility: CLINIC | Age: 84
End: 2024-10-03
Attending: INTERNAL MEDICINE
Payer: MEDICARE

## 2024-10-03 VITALS
SYSTOLIC BLOOD PRESSURE: 150 MMHG | HEART RATE: 75 BPM | WEIGHT: 217 LBS | DIASTOLIC BLOOD PRESSURE: 63 MMHG | HEIGHT: 66 IN | BODY MASS INDEX: 34.87 KG/M2 | OXYGEN SATURATION: 93 % | RESPIRATION RATE: 16 BRPM

## 2024-10-03 DIAGNOSIS — C22.0 HEPATOCELLULAR CARCINOMA (H): ICD-10-CM

## 2024-10-03 DIAGNOSIS — T50.905A DRUG-INDUCED THROMBOCYTOPENIA: Primary | ICD-10-CM

## 2024-10-03 DIAGNOSIS — R74.01 TRANSAMINITIS: ICD-10-CM

## 2024-10-03 DIAGNOSIS — D69.59 DRUG-INDUCED THROMBOCYTOPENIA: Primary | ICD-10-CM

## 2024-10-03 LAB
ALBUMIN MFR UR ELPH: 209.2 MG/DL
ALBUMIN SERPL BCG-MCNC: 3.1 G/DL (ref 3.5–5.2)
ALP SERPL-CCNC: 106 U/L (ref 40–150)
ALT SERPL W P-5'-P-CCNC: 34 U/L (ref 0–70)
ANION GAP SERPL CALCULATED.3IONS-SCNC: 8 MMOL/L (ref 7–15)
AST SERPL W P-5'-P-CCNC: 58 U/L (ref 0–45)
BASOPHILS # BLD AUTO: 0 10E3/UL (ref 0–0.2)
BASOPHILS NFR BLD AUTO: 0 %
BILIRUB SERPL-MCNC: 1.6 MG/DL
BUN SERPL-MCNC: 11.7 MG/DL (ref 8–23)
CALCIUM SERPL-MCNC: 8.1 MG/DL (ref 8.8–10.4)
CHLORIDE SERPL-SCNC: 102 MMOL/L (ref 98–107)
CREAT SERPL-MCNC: 0.97 MG/DL (ref 0.67–1.17)
CREAT UR-MCNC: 304.4 MG/DL
EGFRCR SERPLBLD CKD-EPI 2021: 78 ML/MIN/1.73M2
EOSINOPHIL # BLD AUTO: 0.1 10E3/UL (ref 0–0.7)
EOSINOPHIL NFR BLD AUTO: 3 %
ERYTHROCYTE [DISTWIDTH] IN BLOOD BY AUTOMATED COUNT: 18 % (ref 10–15)
GLUCOSE SERPL-MCNC: 113 MG/DL (ref 70–99)
HCO3 SERPL-SCNC: 27 MMOL/L (ref 22–29)
HCT VFR BLD AUTO: 37.3 % (ref 40–53)
HGB BLD-MCNC: 12.7 G/DL (ref 13.3–17.7)
IMM GRANULOCYTES # BLD: 0 10E3/UL
IMM GRANULOCYTES NFR BLD: 0 %
LYMPHOCYTES # BLD AUTO: 0.6 10E3/UL (ref 0.8–5.3)
LYMPHOCYTES NFR BLD AUTO: 24 %
MCH RBC QN AUTO: 29.1 PG (ref 26.5–33)
MCHC RBC AUTO-ENTMCNC: 34 G/DL (ref 31.5–36.5)
MCV RBC AUTO: 85 FL (ref 78–100)
MONOCYTES # BLD AUTO: 0.3 10E3/UL (ref 0–1.3)
MONOCYTES NFR BLD AUTO: 11 %
NEUTROPHILS # BLD AUTO: 1.5 10E3/UL (ref 1.6–8.3)
NEUTROPHILS NFR BLD AUTO: 62 %
NRBC # BLD AUTO: 0 10E3/UL
NRBC BLD AUTO-RTO: 0 /100
PLATELET # BLD AUTO: 37 10E3/UL (ref 150–450)
POTASSIUM SERPL-SCNC: 3.9 MMOL/L (ref 3.4–5.3)
PROT SERPL-MCNC: 5.9 G/DL (ref 6.4–8.3)
PROT/CREAT 24H UR: 0.69 MG/MG CR (ref 0–0.2)
RBC # BLD AUTO: 4.37 10E6/UL (ref 4.4–5.9)
SODIUM SERPL-SCNC: 137 MMOL/L (ref 135–145)
WBC # BLD AUTO: 2.4 10E3/UL (ref 4–11)

## 2024-10-03 PROCEDURE — 84156 ASSAY OF PROTEIN URINE: CPT | Performed by: NURSE PRACTITIONER

## 2024-10-03 PROCEDURE — 85025 COMPLETE CBC W/AUTO DIFF WBC: CPT | Performed by: NURSE PRACTITIONER

## 2024-10-03 PROCEDURE — 82105 ALPHA-FETOPROTEIN SERUM: CPT | Performed by: NURSE PRACTITIONER

## 2024-10-03 PROCEDURE — 80053 COMPREHEN METABOLIC PANEL: CPT | Performed by: NURSE PRACTITIONER

## 2024-10-03 PROCEDURE — 99215 OFFICE O/P EST HI 40 MIN: CPT | Mod: 95 | Performed by: INTERNAL MEDICINE

## 2024-10-03 PROCEDURE — 36415 COLL VENOUS BLD VENIPUNCTURE: CPT | Performed by: INTERNAL MEDICINE

## 2024-10-03 PROCEDURE — G2211 COMPLEX E/M VISIT ADD ON: HCPCS | Mod: 95 | Performed by: INTERNAL MEDICINE

## 2024-10-03 RX ORDER — LORAZEPAM 1 MG/1
1 TABLET ORAL SEE ADMIN INSTRUCTIONS
Qty: 1 TABLET | Refills: 0 | Status: SHIPPED | OUTPATIENT
Start: 2024-10-03

## 2024-10-03 NOTE — LETTER
10/3/2024      Flash Brown  [address]  [facility] [address]      Dear Colleague,    Thank you for referring your patient, Flash Brown, to the [facility] [place]. Please see a copy of my visit note below.    Virtual Visit Details    Type of service:  Video Visit   Video Start Time:  2:34 PM  Video End Time: 2:48 PM    Originating Location (pt. Location): Other in clinic    Distant Location (provider location):  Off-site  Platform used for Video Visit: Christina Watts MD on 10/3/2024 at 2:28 PM    [facility] Hematology and Oncology Progress Note    Patient: Flash Brown  MRN: [id number]  10/03/24        Reason for Visit    HCC    Primary Oncologist: Dr. Watts    Assessment and Plan  #. Advanced/multifocal HCC  #. 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 Pancytopenias (r/t cirrhosis)  #. Mild hyperbilirubinemia, likely chemo effect  #. Significant cardiac history  #.  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  Therefore, started 2nd line cabozantinib 20 mg daily 8 weeks ago.     Has done fairly well. No significant side effects.  No skin rash.  He has some loose stools but it could be due to lactulose.  He has had some low blood counts with platelet count being less than 50,000 and mild neutropenia with ANC between 1-1.5.  We have been treating him despite this since his baseline platelet count was low due to liver cirrhosis.  He is here with repeat labs. CBC shows further decline in the platelet count to 37,000.  Hemoglobin is mildly low at 12.7.  Total white count is 2.4 with an ANC of 1.5.  LFTs are pretty stable.  Minimally elevated AST at 58.  Normal ALT.  Bilirubin mildly up at 1.6.  Normal alkaline phosphatase.  Electrolytes are actually within normal limits.  So overall he has done fairly well on cabozantinib.  But thrombocytopenia is a bit worrisome.  Although his baseline platelet count was  low, the current drop is a bit too steep so I will ask him to hold cabozantinib for 1 week and restart after that.  Will continue the same dose of 20 mg daily.  Plan is to recheck an MRI of the liver later this month to look for response.  He is agreeable to the plan.  Will communicate this plan to his assisted living.  His sister is leaving town tomorrow and will not be back until 23 October.  So cannot do any labs in between.  I did ask him to look for any evidence of bleeding.       Oncology history  7/2023: Unresectable multifocal HCC, in setting of background DEWITT-related cirrhosis.   -dominant L hepatic lobe mass, treated by embolization; then followed  -1/2024 MRI: cirrhotic appearing liver with evidence of portal hypertension. Post-embolization change of hepatic segments 2 and 3 with persistent nodular internal enhancement measuring 3.5 cm suspicious for residual tumor. Enlarging 3.1 cm lesion in the segment 8 with washout and pseudocapsule formation consistent with HCC. Additionally,  other arterially enhancing lesions which are all consistent with HCC. Further local therapies not an option.    Treatment:  -7/31/2023: radioembolization to L hepatic lobe mass; partial response      -3/7/2024 - 7/5/2024: palliative durvalumab and tremelimumab (cycle 1 only); then durvalumab maintenance every 28 days (not felt to be a candidate for bevacizumab). Continued until progression.   --AFP 10.2 (9/2023) ahead of start  --AFP after cycle 4, slight rise in AFP 11.2. MRI liver showed progression in 3 dominant masses. So, therapy stopped.     -8/1/2024: initiate 2nd line cabozantinib 20 mg daily (dose-reduced for age/liver disease)    Interval History   Flash Brown is a 83 year old male with advanced HCC who completed first line durvalumab and tremelimumab x 5 mths and was progressing in late July. He started 2nd line cabozantinib 20 mg daily 2 months ago who is seen as a video visit for follow-up.    Has done fairly  well on cabozantinib.  Has expected side effects including cytopenias.  But we have been continuing him on low-dose cabozantinib.  Has some diarrhea which is probably from both lactulose and cabozantinib.  No significant skin rash.  He is here with repeat labs.      ECOG Performance    2 - Ambulatory and independent in all ADLs; cannot work; up > 50% of the time      Physical Exam    General: alert and cooperative. Fatigued-appearing. A/Ox3. Accompanied by sister      Lab Results    Recent Results (from the past 168 hour(s))   Comprehensive metabolic panel   Result Value Ref Range    Sodium 137 135 - 145 mmol/L    Potassium 3.9 3.4 - 5.3 mmol/L    Carbon Dioxide (CO2) 27 22 - 29 mmol/L    Anion Gap 8 7 - 15 mmol/L    Urea Nitrogen 11.7 8.0 - 23.0 mg/dL    Creatinine 0.97 0.67 - 1.17 mg/dL    GFR Estimate 78 >60 mL/min/1.73m2    Calcium 8.1 (L) 8.8 - 10.4 mg/dL    Chloride 102 98 - 107 mmol/L    Glucose 113 (H) 70 - 99 mg/dL    Alkaline Phosphatase 106 40 - 150 U/L    AST 58 (H) 0 - 45 U/L    ALT 34 0 - 70 U/L    Protein Total 5.9 (L) 6.4 - 8.3 g/dL    Albumin 3.1 (L) 3.5 - 5.2 g/dL    Bilirubin Total 1.6 (H) <=1.2 mg/dL   CBC with platelets and differential   Result Value Ref Range    WBC Count 2.4 (L) 4.0 - 11.0 10e3/uL    RBC Count 4.37 (L) 4.40 - 5.90 10e6/uL    Hemoglobin 12.7 (L) 13.3 - 17.7 g/dL    Hematocrit 37.3 (L) 40.0 - 53.0 %    MCV 85 78 - 100 fL    MCH 29.1 26.5 - 33.0 pg    MCHC 34.0 31.5 - 36.5 g/dL    RDW 18.0 (H) 10.0 - 15.0 %    Platelet Count 37 (LL) 150 - 450 10e3/uL    % Neutrophils 62 %    % Lymphocytes 24 %    % Monocytes 11 %    % Eosinophils 3 %    % Basophils 0 %    % Immature Granulocytes 0 %    NRBCs per 100 WBC 0 <1 /100    Absolute Neutrophils 1.5 (L) 1.6 - 8.3 10e3/uL    Absolute Lymphocytes 0.6 (L) 0.8 - 5.3 10e3/uL    Absolute Monocytes 0.3 0.0 - 1.3 10e3/uL    Absolute Eosinophils 0.1 0.0 - 0.7 10e3/uL    Absolute Basophils 0.0 0.0 - 0.2 10e3/uL    Absolute Immature Granulocytes  0.0 <=0.4 10e3/uL    Absolute NRBCs 0.0 10e3/uL         Imaging    No results found.    Complex patient with advanced hepatocellular carcinoma on targeted therapy requiring monitoring and follow-up due to complications.    The longitudinal plan of care for the diagnosis(es)/condition(s) as documented were addressed during this visit. Due to the added complexity in care, I will continue to support Don in the subsequent management and with ongoing continuity of care.      Signed by: Bk Watts MD        Again, thank you for allowing me to participate in the care of your patient.        Sincerely,        Bk Watts MD

## 2024-10-03 NOTE — PROGRESS NOTES
DATE/TIME OF CALL RECEIVED FROM LAB:  10/03/24 at 2:56 PM   LAB TEST:  platelets  LAB VALUE:  37  PROVIDER NOTIFIED?: Yes  PROVIDER NAME: Dr. Watts  DATE/TIME LAB VALUE REPORTED TO PROVIDER: 1458  MECHANISM OF PROVIDER NOTIFICATION:  secure chat  Katlyn WYNNE

## 2024-10-03 NOTE — LETTER
10/3/2024      Flash Brown  [address]  [facility] [address]      Dear Colleague,    Thank you for referring your patient, Flash Brown, to the [facility]. Please see a copy of my visit note below.    Virtual Visit Details    Type of service:  Video Visit   Video Start Time:  2:34 PM  Video End Time: 2:48 PM    Originating Location (pt. Location): Other in clinic    Distant Location (provider location):  Off-site  Platform used for Video Visit: Christina Watts MD on 10/3/2024 at 2:28 PM    [facility] Hematology and Oncology Progress Note    Patient: Flash Brown  MRN: [id number]  10/03/24        Reason for Visit    HCC    Primary Oncologist: Dr. Watts    Assessment and Plan  #. Advanced/multifocal HCC  #. 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 Pancytopenias (r/t cirrhosis)  #. Mild hyperbilirubinemia, likely chemo effect  #. Significant cardiac history  #.  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  Therefore, started 2nd line cabozantinib 20 mg daily 8 weeks ago.     Has done fairly well. No significant side effects.  No skin rash.  He has some loose stools but it could be due to lactulose.  He has had some low blood counts with platelet count being less than 50,000 and mild neutropenia with ANC between 1-1.5.  We have been treating him despite this since his baseline platelet count was low due to liver cirrhosis.  He is here with repeat labs. CBC shows further decline in the platelet count to 37,000.  Hemoglobin is mildly low at 12.7.  Total white count is 2.4 with an ANC of 1.5.  LFTs are pretty stable.  Minimally elevated AST at 58.  Normal ALT.  Bilirubin mildly up at 1.6.  Normal alkaline phosphatase.  Electrolytes are actually within normal limits.  So overall he has done fairly well on cabozantinib.  But thrombocytopenia is a bit worrisome.  Although his baseline platelet count was  low, the current drop is a bit too steep so I will ask him to hold cabozantinib for 1 week and restart after that.  Will continue the same dose of 20 mg daily.  Plan is to recheck an MRI of the liver later this month to look for response.  He is agreeable to the plan.  Will communicate this plan to his assisted living.  His sister is leaving town tomorrow and will not be back until 23 October.  So cannot do any labs in between.  I did ask him to look for any evidence of bleeding.       Oncology history  7/2023: Unresectable multifocal HCC, in setting of background DEWITT-related cirrhosis.   -dominant L hepatic lobe mass, treated by embolization; then followed  -1/2024 MRI: cirrhotic appearing liver with evidence of portal hypertension. Post-embolization change of hepatic segments 2 and 3 with persistent nodular internal enhancement measuring 3.5 cm suspicious for residual tumor. Enlarging 3.1 cm lesion in the segment 8 with washout and pseudocapsule formation consistent with HCC. Additionally,  other arterially enhancing lesions which are all consistent with HCC. Further local therapies not an option.    Treatment:  -7/31/2023: radioembolization to L hepatic lobe mass; partial response      -3/7/2024 - 7/5/2024: palliative durvalumab and tremelimumab (cycle 1 only); then durvalumab maintenance every 28 days (not felt to be a candidate for bevacizumab). Continued until progression.   --AFP 10.2 (9/2023) ahead of start  --AFP after cycle 4, slight rise in AFP 11.2. MRI liver showed progression in 3 dominant masses. So, therapy stopped.     -8/1/2024: initiate 2nd line cabozantinib 20 mg daily (dose-reduced for age/liver disease)    Interval History   Flash Brown is a 83 year old male with advanced HCC who completed first line durvalumab and tremelimumab x 5 mths and was progressing in late July. He started 2nd line cabozantinib 20 mg daily 2 months ago who is seen as a video visit for follow-up.    Has done fairly  well on cabozantinib.  Has expected side effects including cytopenias.  But we have been continuing him on low-dose cabozantinib.  Has some diarrhea which is probably from both lactulose and cabozantinib.  No significant skin rash.  He is here with repeat labs.      ECOG Performance    2 - Ambulatory and independent in all ADLs; cannot work; up > 50% of the time      Physical Exam    General: alert and cooperative. Fatigued-appearing. A/Ox3. Accompanied by sister      Lab Results    Recent Results (from the past 168 hour(s))   Comprehensive metabolic panel   Result Value Ref Range    Sodium 137 135 - 145 mmol/L    Potassium 3.9 3.4 - 5.3 mmol/L    Carbon Dioxide (CO2) 27 22 - 29 mmol/L    Anion Gap 8 7 - 15 mmol/L    Urea Nitrogen 11.7 8.0 - 23.0 mg/dL    Creatinine 0.97 0.67 - 1.17 mg/dL    GFR Estimate 78 >60 mL/min/1.73m2    Calcium 8.1 (L) 8.8 - 10.4 mg/dL    Chloride 102 98 - 107 mmol/L    Glucose 113 (H) 70 - 99 mg/dL    Alkaline Phosphatase 106 40 - 150 U/L    AST 58 (H) 0 - 45 U/L    ALT 34 0 - 70 U/L    Protein Total 5.9 (L) 6.4 - 8.3 g/dL    Albumin 3.1 (L) 3.5 - 5.2 g/dL    Bilirubin Total 1.6 (H) <=1.2 mg/dL   CBC with platelets and differential   Result Value Ref Range    WBC Count 2.4 (L) 4.0 - 11.0 10e3/uL    RBC Count 4.37 (L) 4.40 - 5.90 10e6/uL    Hemoglobin 12.7 (L) 13.3 - 17.7 g/dL    Hematocrit 37.3 (L) 40.0 - 53.0 %    MCV 85 78 - 100 fL    MCH 29.1 26.5 - 33.0 pg    MCHC 34.0 31.5 - 36.5 g/dL    RDW 18.0 (H) 10.0 - 15.0 %    Platelet Count 37 (LL) 150 - 450 10e3/uL    % Neutrophils 62 %    % Lymphocytes 24 %    % Monocytes 11 %    % Eosinophils 3 %    % Basophils 0 %    % Immature Granulocytes 0 %    NRBCs per 100 WBC 0 <1 /100    Absolute Neutrophils 1.5 (L) 1.6 - 8.3 10e3/uL    Absolute Lymphocytes 0.6 (L) 0.8 - 5.3 10e3/uL    Absolute Monocytes 0.3 0.0 - 1.3 10e3/uL    Absolute Eosinophils 0.1 0.0 - 0.7 10e3/uL    Absolute Basophils 0.0 0.0 - 0.2 10e3/uL    Absolute Immature Granulocytes  0.0 <=0.4 10e3/uL    Absolute NRBCs 0.0 10e3/uL         Imaging    No results found.    Complex patient with advanced hepatocellular carcinoma on targeted therapy requiring monitoring and follow-up due to complications.    The longitudinal plan of care for the diagnosis(es)/condition(s) as documented were addressed during this visit. Due to the added complexity in care, I will continue to support Don in the subsequent management and with ongoing continuity of care.      Signed by: Bk Watts MD        Again, thank you for allowing me to participate in the care of your patient.        Sincerely,        Bk Watts MD

## 2024-10-03 NOTE — PROGRESS NOTES
Virtual Visit Details    Type of service:  Video Visit   Video Start Time:  2:34 PM  Video End Time: 2:48 PM    Originating Location (pt. Location): Other in clinic    Distant Location (provider location):  Off-site  Platform used for Video Visit: Christina Watts MD on 10/3/2024 at 2:28 PM    [facility] Hematology and Oncology Progress Note    Patient: Flash Brown  MRN: [id number]  10/03/24        Reason for Visit    HCC    Primary Oncologist: Dr. Watts    Assessment and Plan  #. Advanced/multifocal HCC  #. 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 Pancytopenias (r/t cirrhosis)  #. Mild hyperbilirubinemia, likely chemo effect  #. Significant cardiac history  #.  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  Therefore, started 2nd line cabozantinib 20 mg daily 8 weeks ago.     Has done fairly well. No significant side effects.  No skin rash.  He has some loose stools but it could be due to lactulose.  He has had some low blood counts with platelet count being less than 50,000 and mild neutropenia with ANC between 1-1.5.  We have been treating him despite this since his baseline platelet count was low due to liver cirrhosis.  He is here with repeat labs. CBC shows further decline in the platelet count to 37,000.  Hemoglobin is mildly low at 12.7.  Total white count is 2.4 with an ANC of 1.5.  LFTs are pretty stable.  Minimally elevated AST at 58.  Normal ALT.  Bilirubin mildly up at 1.6.  Normal alkaline phosphatase.  Electrolytes are actually within normal limits.  So overall he has done fairly well on cabozantinib.  But thrombocytopenia is a bit worrisome.  Although his baseline platelet count was low, the current drop is a bit too steep so I will ask him to hold cabozantinib for 1 week and restart after that.  Will continue the same dose of 20 mg daily.  Plan is to recheck an MRI of the liver later this month to look for response.  He is  agreeable to the plan.  Will communicate this plan to his assisted living.  His sister is leaving town tomorrow and will not be back until 23 October.  So cannot do any labs in between.  I did ask him to look for any evidence of bleeding.       Oncology history  7/2023: Unresectable multifocal HCC, in setting of background DEWITT-related cirrhosis.   -dominant L hepatic lobe mass, treated by embolization; then followed  -1/2024 MRI: cirrhotic appearing liver with evidence of portal hypertension. Post-embolization change of hepatic segments 2 and 3 with persistent nodular internal enhancement measuring 3.5 cm suspicious for residual tumor. Enlarging 3.1 cm lesion in the segment 8 with washout and pseudocapsule formation consistent with HCC. Additionally,  other arterially enhancing lesions which are all consistent with HCC. Further local therapies not an option.    Treatment:  -7/31/2023: radioembolization to L hepatic lobe mass; partial response      -3/7/2024 - 7/5/2024: palliative durvalumab and tremelimumab (cycle 1 only); then durvalumab maintenance every 28 days (not felt to be a candidate for bevacizumab). Continued until progression.   --AFP 10.2 (9/2023) ahead of start  --AFP after cycle 4, slight rise in AFP 11.2. MRI liver showed progression in 3 dominant masses. So, therapy stopped.     -8/1/2024: initiate 2nd line cabozantinib 20 mg daily (dose-reduced for age/liver disease)    Interval History   Flash Brown is a 83 year old male with advanced HCC who completed first line durvalumab and tremelimumab x 5 mths and was progressing in late July. He started 2nd line cabozantinib 20 mg daily 2 months ago who is seen as a video visit for follow-up.    Has done fairly well on cabozantinib.  Has expected side effects including cytopenias.  But we have been continuing him on low-dose cabozantinib.  Has some diarrhea which is probably from both lactulose and cabozantinib.  No significant skin rash.  He is here  with repeat labs.      ECOG Performance    2 - Ambulatory and independent in all ADLs; cannot work; up > 50% of the time      Physical Exam    General: alert and cooperative. Fatigued-appearing. A/Ox3. Accompanied by sister      Lab Results    Recent Results (from the past 168 hour(s))   Comprehensive metabolic panel   Result Value Ref Range    Sodium 137 135 - 145 mmol/L    Potassium 3.9 3.4 - 5.3 mmol/L    Carbon Dioxide (CO2) 27 22 - 29 mmol/L    Anion Gap 8 7 - 15 mmol/L    Urea Nitrogen 11.7 8.0 - 23.0 mg/dL    Creatinine 0.97 0.67 - 1.17 mg/dL    GFR Estimate 78 >60 mL/min/1.73m2    Calcium 8.1 (L) 8.8 - 10.4 mg/dL    Chloride 102 98 - 107 mmol/L    Glucose 113 (H) 70 - 99 mg/dL    Alkaline Phosphatase 106 40 - 150 U/L    AST 58 (H) 0 - 45 U/L    ALT 34 0 - 70 U/L    Protein Total 5.9 (L) 6.4 - 8.3 g/dL    Albumin 3.1 (L) 3.5 - 5.2 g/dL    Bilirubin Total 1.6 (H) <=1.2 mg/dL   CBC with platelets and differential   Result Value Ref Range    WBC Count 2.4 (L) 4.0 - 11.0 10e3/uL    RBC Count 4.37 (L) 4.40 - 5.90 10e6/uL    Hemoglobin 12.7 (L) 13.3 - 17.7 g/dL    Hematocrit 37.3 (L) 40.0 - 53.0 %    MCV 85 78 - 100 fL    MCH 29.1 26.5 - 33.0 pg    MCHC 34.0 31.5 - 36.5 g/dL    RDW 18.0 (H) 10.0 - 15.0 %    Platelet Count 37 (LL) 150 - 450 10e3/uL    % Neutrophils 62 %    % Lymphocytes 24 %    % Monocytes 11 %    % Eosinophils 3 %    % Basophils 0 %    % Immature Granulocytes 0 %    NRBCs per 100 WBC 0 <1 /100    Absolute Neutrophils 1.5 (L) 1.6 - 8.3 10e3/uL    Absolute Lymphocytes 0.6 (L) 0.8 - 5.3 10e3/uL    Absolute Monocytes 0.3 0.0 - 1.3 10e3/uL    Absolute Eosinophils 0.1 0.0 - 0.7 10e3/uL    Absolute Basophils 0.0 0.0 - 0.2 10e3/uL    Absolute Immature Granulocytes 0.0 <=0.4 10e3/uL    Absolute NRBCs 0.0 10e3/uL         Imaging    No results found.    Complex patient with advanced hepatocellular carcinoma on targeted therapy requiring monitoring and follow-up due to complications.    The longitudinal plan  of care for the diagnosis(es)/condition(s) as documented were addressed during this visit. Due to the added complexity in care, I will continue to support Don in the subsequent management and with ongoing continuity of care.      Signed by: Bk Watts MD

## 2024-10-04 LAB — AFP SERPL-MCNC: 5.9 NG/ML

## 2024-10-17 ENCOUNTER — APPOINTMENT (OUTPATIENT)
Dept: CT IMAGING | Facility: CLINIC | Age: 84
End: 2024-10-17
Attending: EMERGENCY MEDICINE
Payer: MEDICARE

## 2024-10-17 ENCOUNTER — HOSPITAL ENCOUNTER (EMERGENCY)
Facility: CLINIC | Age: 84
Discharge: HOME | End: 2024-10-17
Attending: EMERGENCY MEDICINE | Admitting: EMERGENCY MEDICINE
Payer: MEDICARE

## 2024-10-17 VITALS
HEIGHT: 66 IN | BODY MASS INDEX: 34.87 KG/M2 | OXYGEN SATURATION: 96 % | SYSTOLIC BLOOD PRESSURE: 103 MMHG | HEART RATE: 63 BPM | TEMPERATURE: 97.4 F | DIASTOLIC BLOOD PRESSURE: 84 MMHG | RESPIRATION RATE: 11 BRPM | WEIGHT: 217 LBS

## 2024-10-17 DIAGNOSIS — R41.0 CONFUSION: ICD-10-CM

## 2024-10-17 LAB
ALBUMIN SERPL BCG-MCNC: 3 G/DL (ref 3.5–5.2)
ALBUMIN UR-MCNC: 30 MG/DL
ALP SERPL-CCNC: 119 U/L (ref 40–150)
ALT SERPL W P-5'-P-CCNC: 39 U/L (ref 0–70)
AMMONIA PLAS-SCNC: 25 UMOL/L (ref 16–60)
ANION GAP SERPL CALCULATED.3IONS-SCNC: 14 MMOL/L (ref 7–15)
APPEARANCE UR: CLEAR
AST SERPL W P-5'-P-CCNC: 67 U/L (ref 0–45)
BACTERIA #/AREA URNS HPF: ABNORMAL /HPF
BASOPHILS # BLD AUTO: 0 10E3/UL (ref 0–0.2)
BASOPHILS NFR BLD AUTO: 1 %
BILIRUB SERPL-MCNC: 1.6 MG/DL
BILIRUB UR QL STRIP: NEGATIVE
BUN SERPL-MCNC: 10.4 MG/DL (ref 8–23)
CALCIUM SERPL-MCNC: 8.1 MG/DL (ref 8.8–10.4)
CHLORIDE SERPL-SCNC: 102 MMOL/L (ref 98–107)
COLOR UR AUTO: YELLOW
CREAT SERPL-MCNC: 0.8 MG/DL (ref 0.67–1.17)
EGFRCR SERPLBLD CKD-EPI 2021: 89 ML/MIN/1.73M2
EOSINOPHIL # BLD AUTO: 0.1 10E3/UL (ref 0–0.7)
EOSINOPHIL NFR BLD AUTO: 4 %
ERYTHROCYTE [DISTWIDTH] IN BLOOD BY AUTOMATED COUNT: 18.9 % (ref 10–15)
GLUCOSE SERPL-MCNC: 107 MG/DL (ref 70–99)
GLUCOSE UR STRIP-MCNC: NEGATIVE MG/DL
HCO3 SERPL-SCNC: 22 MMOL/L (ref 22–29)
HCT VFR BLD AUTO: 34.1 % (ref 40–53)
HGB BLD-MCNC: 11.7 G/DL (ref 13.3–17.7)
HGB UR QL STRIP: NEGATIVE
HOLD SPECIMEN: NORMAL
HOLD SPECIMEN: NORMAL
HYALINE CASTS: 3 /LPF
IMM GRANULOCYTES # BLD: 0 10E3/UL
IMM GRANULOCYTES NFR BLD: 1 %
KETONES UR STRIP-MCNC: NEGATIVE MG/DL
LEUKOCYTE ESTERASE UR QL STRIP: NEGATIVE
LYMPHOCYTES # BLD AUTO: 0.5 10E3/UL (ref 0.8–5.3)
LYMPHOCYTES NFR BLD AUTO: 28 %
MCH RBC QN AUTO: 29.4 PG (ref 26.5–33)
MCHC RBC AUTO-ENTMCNC: 34.3 G/DL (ref 31.5–36.5)
MCV RBC AUTO: 86 FL (ref 78–100)
MONOCYTES # BLD AUTO: 0.2 10E3/UL (ref 0–1.3)
MONOCYTES NFR BLD AUTO: 11 %
MUCOUS THREADS #/AREA URNS LPF: PRESENT /LPF
NEUTROPHILS # BLD AUTO: 1.1 10E3/UL (ref 1.6–8.3)
NEUTROPHILS NFR BLD AUTO: 56 %
NITRATE UR QL: NEGATIVE
NRBC # BLD AUTO: 0 10E3/UL
NRBC BLD AUTO-RTO: 0 /100
PH UR STRIP: 7 [PH] (ref 5–7)
PLATELET # BLD AUTO: 52 10E3/UL (ref 150–450)
POTASSIUM SERPL-SCNC: 3.8 MMOL/L (ref 3.4–5.3)
PROT SERPL-MCNC: 6.1 G/DL (ref 6.4–8.3)
RBC # BLD AUTO: 3.98 10E6/UL (ref 4.4–5.9)
RBC URINE: 1 /HPF
SODIUM SERPL-SCNC: 138 MMOL/L (ref 135–145)
SP GR UR STRIP: 1.01 (ref 1–1.03)
SQUAMOUS EPITHELIAL: <1 /HPF
TRANSITIONAL EPI: <1 /HPF
UROBILINOGEN UR STRIP-MCNC: 4 MG/DL
WBC # BLD AUTO: 1.9 10E3/UL (ref 4–11)
WBC URINE: <1 /HPF

## 2024-10-17 PROCEDURE — 93010 ELECTROCARDIOGRAM REPORT: CPT | Performed by: EMERGENCY MEDICINE

## 2024-10-17 PROCEDURE — 82140 ASSAY OF AMMONIA: CPT | Performed by: EMERGENCY MEDICINE

## 2024-10-17 PROCEDURE — 81003 URINALYSIS AUTO W/O SCOPE: CPT | Performed by: EMERGENCY MEDICINE

## 2024-10-17 PROCEDURE — G1010 CDSM STANSON: HCPCS

## 2024-10-17 PROCEDURE — 85025 COMPLETE CBC W/AUTO DIFF WBC: CPT | Performed by: EMERGENCY MEDICINE

## 2024-10-17 PROCEDURE — 36415 COLL VENOUS BLD VENIPUNCTURE: CPT | Performed by: EMERGENCY MEDICINE

## 2024-10-17 PROCEDURE — 99285 EMERGENCY DEPT VISIT HI MDM: CPT | Mod: 25

## 2024-10-17 PROCEDURE — 82247 BILIRUBIN TOTAL: CPT | Performed by: EMERGENCY MEDICINE

## 2024-10-17 PROCEDURE — 99284 EMERGENCY DEPT VISIT MOD MDM: CPT | Mod: 25 | Performed by: EMERGENCY MEDICINE

## 2024-10-17 PROCEDURE — 93005 ELECTROCARDIOGRAM TRACING: CPT

## 2024-10-17 RX ORDER — FUROSEMIDE 40 MG/1
40 TABLET ORAL DAILY
COMMUNITY
Start: 2024-09-30

## 2024-10-17 ASSESSMENT — ACTIVITIES OF DAILY LIVING (ADL)
ADLS_ACUITY_SCORE: 38

## 2024-10-17 NOTE — ED NOTES
Spoke with Nadya WYNNE from [facility]. She was calling to give background information on patient. No new information was provided that had not been reported by EMS. Staff at [facility] sent patient in for evaluation due to the patient being so concerned regarding his laps in memory. Patient's sister Daylin is involved in patient care and would be best to call for any information needed from family members. Patient's nurse Eliud from [facility] can be reached at [phone].

## 2024-10-17 NOTE — ED TRIAGE NOTES
"Patient presents to the ED (lives in assisted living) was told by assisted living to go the ED to be worked up for confusion. Pt does not remember what he was doing today. Pt is able to state name, , why he went to the ED and that he \"is [place] [facility], I've been here before\", cannot remember name of hospital, able to state \"its  or  I think\" Pts assisted living facility \"takes care of my medication for me\". Pt walks with walker does not remember falling today. Pts skin has bruising and scratches over extremities. Pt denied physical pain \"its just emotional\", pt wanting to talk about emotional pain of what his family is going through and verbalized sadness with family problems.      Triage Assessment (Adult)       Row Name 10/17/24 9912          Triage Assessment    Airway WDL WDL        Respiratory WDL    Respiratory WDL WDL        Cardiac WDL    Cardiac WDL WDL        Peripheral/Neurovascular WDL    Peripheral Neurovascular WDL WDL        Cognitive/Neuro/Behavioral WDL    Cognitive/Neuro/Behavioral WDL X;orientation     Level of Consciousness confused     Orientation person;time     Mood/Behavior calm;cooperative                     "

## 2024-10-17 NOTE — ED PROVIDER NOTES
[facility]  Emergency Department Visit Note    PATIENT:  Flash Brown     83 year old     male      [id number]    Chief complaint:  Chief Complaint   Patient presents with    Altered Mental Status     Does not remember earlier today, not A&O x3, unable to remember the year. Pts walks with walker does not remember falling.         History of present illness:  Patient is a 83 year old male who presents to the emergency department today via EMS from his assisted living facility with a chief complaint of confusion.  Patient is upset in regards to his difficulty with memory today.  Reports that he was behaving in an erratic fashion and does not remember exactly how but notes that it was very upsetting to him.  Attempted to call a facility for additional information however they did not answer, voice message left.  Patient is able to tell us where we are, his name, that we are at a hospital.  Does not exactly remember the name.  Majority of my history obtained via chart review.  Patient has a history of advanced hepatocellular carcinoma for which she is currently receiving monoclonal antibody therapy.  Is on lactulose for his declining liver function.  He has been confused and this is documented especially in a note seen on 9-5-2024.    I spoke with his loved 1 Daylin who states that he struggles with anxiety in regards to who his memory issues.  She notes that he has some days where his memory issues are worse than others and this is his baseline.  She reports that he gets very anxious about this and then the care facility ultimately called 911.  She reports that she lives in [place] and that he gets very lonely and when this happens he gets very anxious and he is in a cycle of being anxious about his memory issues.  He denies any symptoms at this time including but not limited to chest pain, back pain, belly pain, shortness of breath.  He does report that he has diarrhea but he has this because he  takes his lactulose.  I am unable to get a hold of the facility.        Review of Systems:  As in HPI above    There were no vitals taken for this visit.    Physical Exam  Constitutional: laying in hospital bed, alert, oriented, in no apparent distress, and conversant  HEENT: normocephalic, atraumatic and pupils 2mm, equal, round, and reactive to light moist mucous membranes, no trauma appreciated  Neck: able to fully range, no midline tenderness, and no stridor  Cardiovascular: regular rate and rhythm  Pulmonary: breathing comfortably on room air and lungs clear to auscultation bilaterally  Abdominal: Obese, nontender, soft  Genitourinary: deferred  Extremities/MSK: no peripheral edema  Skin: warm, dry and non-diaphoretic diffuse scratch marks   Neurologic: moves all four extremities spontaneously and GCS 15  Psychiatric: calm, appropriate      MDM:  Patient is a 83 year old male with above history presenting for evaluation of confusion and forgetfulness.    Vitals reassuring and within normal limits. Exam is largely reassuring.  Patient has no symptoms and no discomfort at this time.  Does have some scratching and some dried stool on his pants otherwise he looks well.  Is emotional..      Differential diagnosis for confusion includes but is not limited to worsening dementia, medication issues, side effect from his chemotherapy, UTI, other infection, electrolyte abnormality, stroke, trauma.    After speaking with patient's sister and considering patient has no symptoms at this time, I most concerned that patient is experiencing anxiety related to his dementia and forgetfulness.  After speaking with his sister, it seems that this is his baseline and that he frequently gets gets anxious about his memory issues.  Patient's awareness and insight into this as well as his lack of other concerns at that time make me most concerned about this.    Remainder of ED course below.    ED COURSE:  ED Course as of 10/17/24 1940    Thu Oct 17, 2024   1508 Spoke with Daylin his contact and she states that he is confused from time to time. He was his normal confused today per Daylin.    1610 Head CT w/o contrast  IMPRESSION:   1. No acute intracranial pathology.   2. Stable chronic findings.     1610 ECG:  - Ventricular rate 72 bpm, regular  - NH, QRS, QT intervals normal  - Axis normal  - inverted T waves in 3, otherwise no ST segment or T wave changes concerning for acute ischemia  - Comparison to prior ECGs: reassuring  - My independent interpretation: overall reassuring      1715 This findings with the patient.  He states that he is comfortable going home at this time.  He spoke with his   1932 WBC(!): 1.9  At his baseline        Encounter Diagnoses:  Final diagnoses:   Confusion       Final disposition: discharge    Galina Rush DO   Physician  [facility]       Galina Rush DO  10/17/24 1721       Galina Rush,   10/17/24 1940

## 2024-10-17 NOTE — DISCHARGE INSTRUCTIONS
You were seen in the emergency department today for confusion. We did tests including labs and a head CT that showed reassuring findings.     Please follow up with your primary doctor  for ongoing evaluation and management of your symptoms.    Return to the emergency department with new or worsening symptoms that you find concerning.

## 2024-10-17 NOTE — ED NOTES
"Patient presents to the ED (lives in assisted living) was told by assisted living to go the ED to be worked up for confusion. Pt does not remember what he was doing today. Pt is able to state name, , why he went to the ED and that he \"is [place] [facility], I've been here before\", cannot remember name of hospital, able to state \"its  or  I think\" Pts assisted living facility \"takes care of my medication for me\". Pt walks with walker does not remember falling today. Pts skin has bruising and scratches over extremities. Pt denied physical pain \"its just emotional\", pt wanting to talk about emotional pain of what his family is going through and verbalized sadness with family problems.   "

## 2024-10-23 ENCOUNTER — LAB (OUTPATIENT)
Dept: LAB | Facility: CLINIC | Age: 84
End: 2024-10-23
Payer: MEDICARE

## 2024-10-23 ENCOUNTER — HOSPITAL ENCOUNTER (OUTPATIENT)
Dept: MRI IMAGING | Facility: CLINIC | Age: 84
Discharge: HOME | End: 2024-10-23
Attending: INTERNAL MEDICINE | Admitting: INTERNAL MEDICINE
Payer: MEDICARE

## 2024-10-23 DIAGNOSIS — C22.0 HEPATOCELLULAR CARCINOMA (H): ICD-10-CM

## 2024-10-23 DIAGNOSIS — Z79.899 ENCOUNTER FOR LONG-TERM (CURRENT) USE OF MEDICATIONS: Primary | ICD-10-CM

## 2024-10-23 LAB
AFP SERPL-MCNC: 7 NG/ML
ALBUMIN MFR UR ELPH: 37.5 MG/DL
ALBUMIN SERPL BCG-MCNC: 3 G/DL (ref 3.5–5.2)
ALP SERPL-CCNC: 121 U/L (ref 40–150)
ALT SERPL W P-5'-P-CCNC: 39 U/L (ref 0–70)
ANION GAP SERPL CALCULATED.3IONS-SCNC: 8 MMOL/L (ref 7–15)
AST SERPL W P-5'-P-CCNC: 57 U/L (ref 0–45)
BASOPHILS # BLD AUTO: 0 10E3/UL (ref 0–0.2)
BASOPHILS NFR BLD AUTO: 1 %
BILIRUB SERPL-MCNC: 1.5 MG/DL
BUN SERPL-MCNC: 7.2 MG/DL (ref 8–23)
CALCIUM SERPL-MCNC: 8.3 MG/DL (ref 8.8–10.4)
CHLORIDE SERPL-SCNC: 104 MMOL/L (ref 98–107)
CREAT SERPL-MCNC: 0.9 MG/DL (ref 0.67–1.17)
CREAT UR-MCNC: 174.7 MG/DL
EGFRCR SERPLBLD CKD-EPI 2021: 86 ML/MIN/1.73M2
EOSINOPHIL # BLD AUTO: 0.1 10E3/UL (ref 0–0.7)
EOSINOPHIL NFR BLD AUTO: 3 %
ERYTHROCYTE [DISTWIDTH] IN BLOOD BY AUTOMATED COUNT: 19.6 % (ref 10–15)
GLUCOSE SERPL-MCNC: 121 MG/DL (ref 70–99)
HCO3 SERPL-SCNC: 28 MMOL/L (ref 22–29)
HCT VFR BLD AUTO: 36 % (ref 40–53)
HGB BLD-MCNC: 12.3 G/DL (ref 13.3–17.7)
IMM GRANULOCYTES # BLD: 0 10E3/UL
IMM GRANULOCYTES NFR BLD: 0 %
LYMPHOCYTES # BLD AUTO: 0.7 10E3/UL (ref 0.8–5.3)
LYMPHOCYTES NFR BLD AUTO: 30 %
MCH RBC QN AUTO: 30.2 PG (ref 26.5–33)
MCHC RBC AUTO-ENTMCNC: 34.2 G/DL (ref 31.5–36.5)
MCV RBC AUTO: 89 FL (ref 78–100)
MONOCYTES # BLD AUTO: 0.3 10E3/UL (ref 0–1.3)
MONOCYTES NFR BLD AUTO: 13 %
NEUTROPHILS # BLD AUTO: 1.2 10E3/UL (ref 1.6–8.3)
NEUTROPHILS NFR BLD AUTO: 52 %
NRBC # BLD AUTO: 0 10E3/UL
NRBC BLD AUTO-RTO: 0 /100
PLATELET # BLD AUTO: 54 10E3/UL (ref 150–450)
POTASSIUM SERPL-SCNC: 3.7 MMOL/L (ref 3.4–5.3)
PROT SERPL-MCNC: 6.1 G/DL (ref 6.4–8.3)
PROT/CREAT 24H UR: 0.21 MG/MG CR (ref 0–0.2)
RBC # BLD AUTO: 4.07 10E6/UL (ref 4.4–5.9)
SODIUM SERPL-SCNC: 140 MMOL/L (ref 135–145)
TSH SERPL DL<=0.005 MIU/L-ACNC: 3.86 UIU/ML (ref 0.3–4.2)
WBC # BLD AUTO: 2.4 10E3/UL (ref 4–11)

## 2024-10-23 PROCEDURE — 80053 COMPREHEN METABOLIC PANEL: CPT | Performed by: INTERNAL MEDICINE

## 2024-10-23 PROCEDURE — 85025 COMPLETE CBC W/AUTO DIFF WBC: CPT | Performed by: INTERNAL MEDICINE

## 2024-10-23 PROCEDURE — 82105 ALPHA-FETOPROTEIN SERUM: CPT | Performed by: INTERNAL MEDICINE

## 2024-10-23 PROCEDURE — 84156 ASSAY OF PROTEIN URINE: CPT | Performed by: INTERNAL MEDICINE

## 2024-10-23 PROCEDURE — A9585 GADOBUTROL INJECTION: HCPCS | Performed by: INTERNAL MEDICINE

## 2024-10-23 PROCEDURE — 36415 COLL VENOUS BLD VENIPUNCTURE: CPT | Performed by: INTERNAL MEDICINE

## 2024-10-23 PROCEDURE — G1010 CDSM STANSON: HCPCS

## 2024-10-23 PROCEDURE — 255N000002 HC RX 255 OP 636: Performed by: INTERNAL MEDICINE

## 2024-10-23 PROCEDURE — 84443 ASSAY THYROID STIM HORMONE: CPT | Performed by: INTERNAL MEDICINE

## 2024-10-23 PROCEDURE — 258N000003 HC RX IP 258 OP 636: Performed by: INTERNAL MEDICINE

## 2024-10-23 RX ORDER — GADOBUTROL 604.72 MG/ML
10 INJECTION INTRAVENOUS ONCE
Status: COMPLETED | OUTPATIENT
Start: 2024-10-23 | End: 2024-10-23

## 2024-10-23 RX ADMIN — SODIUM CHLORIDE 50 ML: 9 INJECTION, SOLUTION INTRAVENOUS at 12:44

## 2024-10-23 RX ADMIN — GADOBUTROL 10 ML: 604.72 INJECTION INTRAVENOUS at 12:36

## 2024-10-29 ENCOUNTER — ONCOLOGY VISIT (OUTPATIENT)
Dept: ONCOLOGY | Facility: CLINIC | Age: 84
End: 2024-10-29
Attending: INTERNAL MEDICINE
Payer: MEDICARE

## 2024-10-29 ENCOUNTER — TELEPHONE (OUTPATIENT)
Dept: ONCOLOGY | Facility: CLINIC | Age: 84
End: 2024-10-29

## 2024-10-29 VITALS
HEIGHT: 66 IN | BODY MASS INDEX: 34.76 KG/M2 | HEART RATE: 66 BPM | RESPIRATION RATE: 14 BRPM | DIASTOLIC BLOOD PRESSURE: 70 MMHG | TEMPERATURE: 98 F | WEIGHT: 216.3 LBS | SYSTOLIC BLOOD PRESSURE: 154 MMHG | OXYGEN SATURATION: 95 %

## 2024-10-29 DIAGNOSIS — K75.81 LIVER CIRRHOSIS SECONDARY TO NONALCOHOLIC STEATOHEPATITIS (NASH) (H): ICD-10-CM

## 2024-10-29 DIAGNOSIS — R74.01 TRANSAMINITIS: ICD-10-CM

## 2024-10-29 DIAGNOSIS — C22.0 HEPATOCELLULAR CARCINOMA (H): Primary | ICD-10-CM

## 2024-10-29 DIAGNOSIS — K74.60 LIVER CIRRHOSIS SECONDARY TO NONALCOHOLIC STEATOHEPATITIS (NASH) (H): ICD-10-CM

## 2024-10-29 DIAGNOSIS — D69.6 THROMBOCYTOPENIA (H): ICD-10-CM

## 2024-10-29 PROCEDURE — 99214 OFFICE O/P EST MOD 30 MIN: CPT | Performed by: INTERNAL MEDICINE

## 2024-10-29 PROCEDURE — G0463 HOSPITAL OUTPT CLINIC VISIT: HCPCS | Performed by: INTERNAL MEDICINE

## 2024-10-29 PROCEDURE — G2211 COMPLEX E/M VISIT ADD ON: HCPCS | Performed by: INTERNAL MEDICINE

## 2024-10-29 ASSESSMENT — PAIN SCALES - GENERAL: PAINLEVEL_OUTOF10: NO PAIN (0)

## 2024-10-29 NOTE — PROGRESS NOTES
[facility] Hematology and Oncology Progress Note    Patient: Flash Brown  MRN: [id number]  10/29/24        Reason for Visit    HCC    Primary Oncologist: Dr. Watts    Assessment and Plan  #. Advanced/multifocal HCC  #. 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 Pancytopenias (r/t cirrhosis)  #. Mild hyperbilirubinemia, likely chemo effect  #. Significant cardiac history  #.  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  Therefore, started 2nd line cabozantinib 20 mg daily in August 2024.    Has done fairly okay on cabozantinib.  Loose stools which we thought was probably multifactorial.  No significant skin rash.  Elevated blood pressures have has been an issue at times.  But mostly controlled.  His biggest issue has been cytopenias.  Especially thrombocytopenia.  When I saw him last, had to hold his treatment for a week.  He is here with repeat MRI.    I reviewed the MRI images and report personally and independently interpreted the results.  Unfortunately it is showing evidence of disease progression. The previously noted multiple enhancing lesions in the liver have increased in size.  For example the segment 4A lesion which was measuring 1.9 cm in the past is currently measuring 2.4 cm.  And a segment 6 lesion which was measuring 1.7 cm is now measuring 2.2 cm.  Although this is not a significant increase in the size, there is evidence of progression.  So I discussed further options.  He is still on a pretty low dose of cabozantinib so there is room to go up on the dose and see how he does with that.  The main issue with this will be cytopenias again.  Other option is to switch to different medication.  Options include regorafenib or ramucirumab.    I would like to give an increased dose of cabozantinib a try.  Potentially we could do a slightly higher dose on alternating days.  Will probably increase the dose to 20 mg and 40 mg every other day.   Watch for drop in platelet counts and increasing blood pressure.  His blood pressure today is 154 systolic.  He is on multiple antihypertensive medications.  I will check his labs on a weekly basis.  Will see him back in 1 month.  He and his sister are in agreement with the plan.    Plan to recheck imaging in 6 to 8 weeks.       Oncology history  7/2023: Unresectable multifocal HCC, in setting of background DEWITT-related cirrhosis.   -dominant L hepatic lobe mass, treated by embolization; then followed  -1/2024 MRI: cirrhotic appearing liver with evidence of portal hypertension. Post-embolization change of hepatic segments 2 and 3 with persistent nodular internal enhancement measuring 3.5 cm suspicious for residual tumor. Enlarging 3.1 cm lesion in the segment 8 with washout and pseudocapsule formation consistent with HCC. Additionally,  other arterially enhancing lesions which are all consistent with HCC. Further local therapies not an option.    Treatment:  -7/31/2023: radioembolization to L hepatic lobe mass; partial response      -3/7/2024 - 7/5/2024: palliative durvalumab and tremelimumab (cycle 1 only); then durvalumab maintenance every 28 days (not felt to be a candidate for bevacizumab). Continued until progression.   --AFP 10.2 (9/2023) ahead of start  --AFP after cycle 4, slight rise in AFP 11.2. MRI liver showed progression in 3 dominant masses. So, therapy stopped.     -8/1/2024: initiate 2nd line cabozantinib 20 mg daily (dose-reduced for age/liver disease)    Interval History   Flash Brown is a 83 year old male with advanced HCC who completed first line durvalumab and tremelimumab x 5 mths and was progressing in late July. He started 2nd line cabozantinib 20 mg daily 2 months ago who is seen as a video visit for follow-up.    Has done fairly well on cabozantinib.  Has expected side effects including cytopenias.  But we have been continuing him on low-dose cabozantinib.  Has some diarrhea which is  probably from both lactulose and cabozantinib.  No significant skin rash.  He is here with repea MRI of the liver and labs.    His weight has been stable.  Denies any dizziness.  No abdominal pain.    ECOG Performance    2 - Ambulatory and independent in all ADLs; cannot work; up > 50% of the time      Physical Exam    General: alert and cooperative. Fatigued-appearing. A/Ox3. Accompanied by sister      Lab Results    Recent Results (from the past week)   AFP tumor marker   Result Value Ref Range    AFP tumor marker 7.0 <=8.3 ng/mL   Comprehensive metabolic panel   Result Value Ref Range    Sodium 140 135 - 145 mmol/L    Potassium 3.7 3.4 - 5.3 mmol/L    Carbon Dioxide (CO2) 28 22 - 29 mmol/L    Anion Gap 8 7 - 15 mmol/L    Urea Nitrogen 7.2 (L) 8.0 - 23.0 mg/dL    Creatinine 0.90 0.67 - 1.17 mg/dL    GFR Estimate 86 >60 mL/min/1.73m2    Calcium 8.3 (L) 8.8 - 10.4 mg/dL    Chloride 104 98 - 107 mmol/L    Glucose 121 (H) 70 - 99 mg/dL    Alkaline Phosphatase 121 40 - 150 U/L    AST 57 (H) 0 - 45 U/L    ALT 39 0 - 70 U/L    Protein Total 6.1 (L) 6.4 - 8.3 g/dL    Albumin 3.0 (L) 3.5 - 5.2 g/dL    Bilirubin Total 1.5 (H) <=1.2 mg/dL   Protein  random urine   Result Value Ref Range    Total Protein Urine mg/dL 37.5   mg/dL    Total Protein Urine mg/mg Creat 0.21 (H) 0.00 - 0.20 mg/mg Cr    Creatinine Urine mg/dL 174.7 mg/dL   CBC with platelets and differential   Result Value Ref Range    WBC Count 2.4 (L) 4.0 - 11.0 10e3/uL    RBC Count 4.07 (L) 4.40 - 5.90 10e6/uL    Hemoglobin 12.3 (L) 13.3 - 17.7 g/dL    Hematocrit 36.0 (L) 40.0 - 53.0 %    MCV 89 78 - 100 fL    MCH 30.2 26.5 - 33.0 pg    MCHC 34.2 31.5 - 36.5 g/dL    RDW 19.6 (H) 10.0 - 15.0 %    Platelet Count 54 (L) 150 - 450 10e3/uL    % Neutrophils 52 %    % Lymphocytes 30 %    % Monocytes 13 %    % Eosinophils 3 %    % Basophils 1 %    % Immature Granulocytes 0 %    NRBCs per 100 WBC 0 <1 /100    Absolute Neutrophils 1.2 (L) 1.6 - 8.3 10e3/uL    Absolute  Lymphocytes 0.7 (L) 0.8 - 5.3 10e3/uL    Absolute Monocytes 0.3 0.0 - 1.3 10e3/uL    Absolute Eosinophils 0.1 0.0 - 0.7 10e3/uL    Absolute Basophils 0.0 0.0 - 0.2 10e3/uL    Absolute Immature Granulocytes 0.0 <=0.4 10e3/uL    Absolute NRBCs 0.0 10e3/uL   TSH with free T4 reflex   Result Value Ref Range    TSH 3.86 0.30 - 4.20 uIU/mL         Imaging    MR Liver wo & w Contrast    Result Date: 10/23/2024  MRI LIVER WITHOUT AND WITH CONTRAST 10/23/2024 1:25 PM HISTORY: Hepatocellular carcinoma (H). COMPARISON: July 6, 2024 TECHNIQUE: Multiplanar multisequence imaging of the abdomen acquired before and after administration of 10 mL Gadavist intravenous contrast. FINDINGS: Liver: Cirrhotic liver morphology. Embolization change with persistent nodular enhancement in segment II/III measuring 3.4 cm, unchanged from previous, LR-TR viable. 3.4 cm area of enhancement in segment VIII not significantly changed since the comparison study. LR-5. 2.4 cm lesion in segment Chris previously measured 1.9 cm, LR-4. 2.2 cm lesion in segment VI previously measured 1.7 cm. Additional smaller lesions are less well seen, likely due to differences in phase of contrast. Arterial phase imaging was acquired at 20 seconds, but appears earlier than the comparison study. Enlarged spleen at 16 cm slightly improved from previous. Adrenal glands, visualized kidneys, and visualized pancreas unremarkable.     IMPRESSION: Progressive hepatocellular carcinoma. CELSO CARROLL MD   SYSTEM ID:  [id number]    Head CT w/o contrast    Result Date: 10/17/2024  EXAM: CT HEAD W/O CONTRAST  10/17/2024 3:47 PM HISTORY:  on blood thinners, confused, no hx of fall   COMPARISON:  Head CT from 8/23/2022 TECHNIQUE: Using multidetector thin collimation helical acquisition technique, axial, coronal and sagittal CT images from the skull base to the vertex were obtained without intravenous contrast.  (topogram) image(s) also obtained and reviewed. Dose reduction  techniques were performed. FINDINGS: No intracranial hemorrhage, mass effect, or midline shift. No acute loss of gray-white matter differentiation in the cerebral hemispheres. Ventricles are proportionate to the cerebral sulci. Clear basal cisterns. Mild-to-moderate generalized cerebral atrophy. Periventricular hypoattenuation, compatible with chronic small vessel ischemic disease. The bony calvaria and the bones of the skull base are normal. The visualized portions of the paranasal sinuses and mastoid air cells are clear. Grossly normal orbits.     IMPRESSION: 1. No acute intracranial pathology. 2. Stable chronic findings. DARON GARCIA MD   SYSTEM ID:  [id number]     The longitudinal plan of care for the diagnosis(es)/condition(s) as documented were addressed during this visit. Due to the added complexity in care, I will continue to support Don in the subsequent management and with ongoing continuity of care.    I independently reviewed and interpreted lab studies and imaging, reviewed pertinent notes from physicians and care providers from other specialties and ordered lab tests.    I have personally reviewed the MRI images and report and independently interpreted the results to my ability.    A total of 35 min was spent today on this visit which included face to face conversation with the patient, EMR review, counseling and co-ordination of care and medical documentation.        Signed by: Bk Watts MD

## 2024-10-29 NOTE — PROGRESS NOTES
"Oncology Rooming Note    October 29, 2024 8:55 AM   Flash Brown is a 83 year old male who presents for:    Chief Complaint   Patient presents with    Oncology Clinic Visit     HCC - provider visit only     Initial Vitals: BP (!) 154/70 (BP Location: Left arm, Patient Position: Sitting, Cuff Size: Adult Large)   Pulse 66   Temp 98  F (36.7  C) (Tympanic)   Resp 14   Ht 1.676 m (5' 6\")   Wt 98.1 kg (216 lb 4.8 oz)   SpO2 95%   BMI 34.91 kg/m   Estimated body mass index is 34.91 kg/m  as calculated from the following:    Height as of this encounter: 1.676 m (5' 6\").    Weight as of this encounter: 98.1 kg (216 lb 4.8 oz). Body surface area is 2.14 meters squared.  No Pain (0) Comment: Data Unavailable   No LMP for male patient.  Allergies reviewed: Yes  Medications reviewed: Yes    Medications: Medication refills not needed today.  Pharmacy name entered into [employer]:    [id number] [place], [place] - [address]  [facility] [place] - [address]  [facility] [place] [place] [facility] [address] [address] [facility] - [place], [place] - [address]    Frailty Screening:   Is the patient here for a new oncology consult visit in cancer care? 2. No      Clinical concerns: None today.       Paola Garcia MA              "

## 2024-10-29 NOTE — TELEPHONE ENCOUNTER
PA Initiation    Medication: CABOMETYX PO  Insurance Company: [employer] Part D - Phone [phone] Fax [phone]  Pharmacy Filling the Rx: [facility] - [place], [place] - [facility] [address]  Filling Pharmacy Phone:    Filling Pharmacy Fax:    Start Date: 10/29/2024

## 2024-10-29 NOTE — LETTER
10/29/2024      Flash Brown  [address]  [facility] [address]      Dear Colleague,    Thank you for referring your patient, Flash Brown, to the [facility]. Please see a copy of my visit note below.        [facility] Hematology and Oncology Progress Note    Patient: Flash Brown  MRN: [id number]  10/29/24        Reason for Visit    HCC    Primary Oncologist: Dr. Watts    Assessment and Plan  #. Advanced/multifocal HCC  #. 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 Pancytopenias (r/t cirrhosis)  #. Mild hyperbilirubinemia, likely chemo effect  #. Significant cardiac history  #.  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  Therefore, started 2nd line cabozantinib 20 mg daily in August 2024.    Has done fairly okay on cabozantinib.  Loose stools which we thought was probably multifactorial.  No significant skin rash.  Elevated blood pressures have has been an issue at times.  But mostly controlled.  His biggest issue has been cytopenias.  Especially thrombocytopenia.  When I saw him last, had to hold his treatment for a week.  He is here with repeat MRI.    I reviewed the MRI images and report personally and independently interpreted the results.  Unfortunately it is showing evidence of disease progression. The previously noted multiple enhancing lesions in the liver have increased in size.  For example the segment 4A lesion which was measuring 1.9 cm in the past is currently measuring 2.4 cm.  And a segment 6 lesion which was measuring 1.7 cm is now measuring 2.2 cm.  Although this is not a significant increase in the size, there is evidence of progression.  So I discussed further options.  He is still on a pretty low dose of cabozantinib so there is room to go up on the dose and see how he does with that.  The main issue with this will be cytopenias again.  Other option is to switch to different medication.   Options include regorafenib or ramucirumab.    I would like to give an increased dose of cabozantinib a try.  Potentially we could do a slightly higher dose on alternating days.  Will probably increase the dose to 20 mg and 40 mg every other day.  Watch for drop in platelet counts and increasing blood pressure.  His blood pressure today is 154 systolic.  He is on multiple antihypertensive medications.  I will check his labs on a weekly basis.  Will see him back in 1 month.  He and his sister are in agreement with the plan.    Plan to recheck imaging in 6 to 8 weeks.       Oncology history  7/2023: Unresectable multifocal HCC, in setting of background DEWITT-related cirrhosis.   -dominant L hepatic lobe mass, treated by embolization; then followed  -1/2024 MRI: cirrhotic appearing liver with evidence of portal hypertension. Post-embolization change of hepatic segments 2 and 3 with persistent nodular internal enhancement measuring 3.5 cm suspicious for residual tumor. Enlarging 3.1 cm lesion in the segment 8 with washout and pseudocapsule formation consistent with HCC. Additionally,  other arterially enhancing lesions which are all consistent with HCC. Further local therapies not an option.    Treatment:  -7/31/2023: radioembolization to L hepatic lobe mass; partial response      -3/7/2024 - 7/5/2024: palliative durvalumab and tremelimumab (cycle 1 only); then durvalumab maintenance every 28 days (not felt to be a candidate for bevacizumab). Continued until progression.   --AFP 10.2 (9/2023) ahead of start  --AFP after cycle 4, slight rise in AFP 11.2. MRI liver showed progression in 3 dominant masses. So, therapy stopped.     -8/1/2024: initiate 2nd line cabozantinib 20 mg daily (dose-reduced for age/liver disease)    Interval History   Flash Brown is a 83 year old male with advanced HCC who completed first line durvalumab and tremelimumab x 5 mths and was progressing in late July. He started 2nd line cabozantinib  20 mg daily 2 months ago who is seen as a video visit for follow-up.    Has done fairly well on cabozantinib.  Has expected side effects including cytopenias.  But we have been continuing him on low-dose cabozantinib.  Has some diarrhea which is probably from both lactulose and cabozantinib.  No significant skin rash.  He is here with repea MRI of the liver and labs.    His weight has been stable.  Denies any dizziness.  No abdominal pain.    ECOG Performance    2 - Ambulatory and independent in all ADLs; cannot work; up > 50% of the time      Physical Exam    General: alert and cooperative. Fatigued-appearing. A/Ox3. Accompanied by sister      Lab Results    Recent Results (from the past week)   AFP tumor marker   Result Value Ref Range    AFP tumor marker 7.0 <=8.3 ng/mL   Comprehensive metabolic panel   Result Value Ref Range    Sodium 140 135 - 145 mmol/L    Potassium 3.7 3.4 - 5.3 mmol/L    Carbon Dioxide (CO2) 28 22 - 29 mmol/L    Anion Gap 8 7 - 15 mmol/L    Urea Nitrogen 7.2 (L) 8.0 - 23.0 mg/dL    Creatinine 0.90 0.67 - 1.17 mg/dL    GFR Estimate 86 >60 mL/min/1.73m2    Calcium 8.3 (L) 8.8 - 10.4 mg/dL    Chloride 104 98 - 107 mmol/L    Glucose 121 (H) 70 - 99 mg/dL    Alkaline Phosphatase 121 40 - 150 U/L    AST 57 (H) 0 - 45 U/L    ALT 39 0 - 70 U/L    Protein Total 6.1 (L) 6.4 - 8.3 g/dL    Albumin 3.0 (L) 3.5 - 5.2 g/dL    Bilirubin Total 1.5 (H) <=1.2 mg/dL   Protein  random urine   Result Value Ref Range    Total Protein Urine mg/dL 37.5   mg/dL    Total Protein Urine mg/mg Creat 0.21 (H) 0.00 - 0.20 mg/mg Cr    Creatinine Urine mg/dL 174.7 mg/dL   CBC with platelets and differential   Result Value Ref Range    WBC Count 2.4 (L) 4.0 - 11.0 10e3/uL    RBC Count 4.07 (L) 4.40 - 5.90 10e6/uL    Hemoglobin 12.3 (L) 13.3 - 17.7 g/dL    Hematocrit 36.0 (L) 40.0 - 53.0 %    MCV 89 78 - 100 fL    MCH 30.2 26.5 - 33.0 pg    MCHC 34.2 31.5 - 36.5 g/dL    RDW 19.6 (H) 10.0 - 15.0 %    Platelet Count 54 (L) 150 -  450 10e3/uL    % Neutrophils 52 %    % Lymphocytes 30 %    % Monocytes 13 %    % Eosinophils 3 %    % Basophils 1 %    % Immature Granulocytes 0 %    NRBCs per 100 WBC 0 <1 /100    Absolute Neutrophils 1.2 (L) 1.6 - 8.3 10e3/uL    Absolute Lymphocytes 0.7 (L) 0.8 - 5.3 10e3/uL    Absolute Monocytes 0.3 0.0 - 1.3 10e3/uL    Absolute Eosinophils 0.1 0.0 - 0.7 10e3/uL    Absolute Basophils 0.0 0.0 - 0.2 10e3/uL    Absolute Immature Granulocytes 0.0 <=0.4 10e3/uL    Absolute NRBCs 0.0 10e3/uL   TSH with free T4 reflex   Result Value Ref Range    TSH 3.86 0.30 - 4.20 uIU/mL         Imaging    MR Liver wo & w Contrast    Result Date: 10/23/2024  MRI LIVER WITHOUT AND WITH CONTRAST 10/23/2024 1:25 PM HISTORY: Hepatocellular carcinoma (H). COMPARISON: July 6, 2024 TECHNIQUE: Multiplanar multisequence imaging of the abdomen acquired before and after administration of 10 mL Gadavist intravenous contrast. FINDINGS: Liver: Cirrhotic liver morphology. Embolization change with persistent nodular enhancement in segment II/III measuring 3.4 cm, unchanged from previous, LR-TR viable. 3.4 cm area of enhancement in segment VIII not significantly changed since the comparison study. LR-5. 2.4 cm lesion in segment Chris previously measured 1.9 cm, LR-4. 2.2 cm lesion in segment VI previously measured 1.7 cm. Additional smaller lesions are less well seen, likely due to differences in phase of contrast. Arterial phase imaging was acquired at 20 seconds, but appears earlier than the comparison study. Enlarged spleen at 16 cm slightly improved from previous. Adrenal glands, visualized kidneys, and visualized pancreas unremarkable.     IMPRESSION: Progressive hepatocellular carcinoma. CELSO CARROLL MD   SYSTEM ID:  [id number]    Head CT w/o contrast    Result Date: 10/17/2024  EXAM: CT HEAD W/O CONTRAST  10/17/2024 3:47 PM HISTORY:  on blood thinners, confused, no hx of fall   COMPARISON:  Head CT from 8/23/2022 TECHNIQUE: Using multidetector  thin collimation helical acquisition technique, axial, coronal and sagittal CT images from the skull base to the vertex were obtained without intravenous contrast.  (topogram) image(s) also obtained and reviewed. Dose reduction techniques were performed. FINDINGS: No intracranial hemorrhage, mass effect, or midline shift. No acute loss of gray-white matter differentiation in the cerebral hemispheres. Ventricles are proportionate to the cerebral sulci. Clear basal cisterns. Mild-to-moderate generalized cerebral atrophy. Periventricular hypoattenuation, compatible with chronic small vessel ischemic disease. The bony calvaria and the bones of the skull base are normal. The visualized portions of the paranasal sinuses and mastoid air cells are clear. Grossly normal orbits.     IMPRESSION: 1. No acute intracranial pathology. 2. Stable chronic findings. DARON GARCIA MD   SYSTEM ID:  [id number]     The longitudinal plan of care for the diagnosis(es)/condition(s) as documented were addressed during this visit. Due to the added complexity in care, I will continue to support Don in the subsequent management and with ongoing continuity of care.    I independently reviewed and interpreted lab studies and imaging, reviewed pertinent notes from physicians and care providers from other specialties and ordered lab tests.    I have personally reviewed the MRI images and report and independently interpreted the results to my ability.    A total of 35 min was spent today on this visit which included face to face conversation with the patient, EMR review, counseling and co-ordination of care and medical documentation.        Signed by: Bk Watts MD        Oncology Rooming Note    October 29, 2024 8:55 AM   Flash Brown is a 83 year old male who presents for:    Chief Complaint   Patient presents with     Oncology Clinic Visit     HCC - provider visit only     Initial Vitals: BP (!) 154/70 (BP Location: Left arm,  "Patient Position: Sitting, Cuff Size: Adult Large)   Pulse 66   Temp 98  F (36.7  C) (Tympanic)   Resp 14   Ht 1.676 m (5' 6\")   Wt 98.1 kg (216 lb 4.8 oz)   SpO2 95%   BMI 34.91 kg/m   Estimated body mass index is 34.91 kg/m  as calculated from the following:    Height as of this encounter: 1.676 m (5' 6\").    Weight as of this encounter: 98.1 kg (216 lb 4.8 oz). Body surface area is 2.14 meters squared.  No Pain (0) Comment: Data Unavailable   No LMP for male patient.  Allergies reviewed: Yes  Medications reviewed: Yes    Medications: Medication refills not needed today.  Pharmacy name entered into [employer]:    [id number] [place] [facility] [address]  [facility] [address]  [facility] [address] [address] [facility] - [place], [place] - [address]    Frailty Screening:   Is the patient here for a new oncology consult visit in cancer care? 2. No      Clinical concerns: None today.       Paola Garcia MA                Again, thank you for allowing me to participate in the care of your patient.        Sincerely,        Bk Watts MD  "

## 2024-10-30 DIAGNOSIS — C22.0 HEPATOCELLULAR CARCINOMA (H): Primary | ICD-10-CM

## 2024-10-31 NOTE — TELEPHONE ENCOUNTER
Prior Authorization Not Needed per Insurance    Medication: CABOMETYX PO  Insurance Company: [employer] Part D - Phone [phone] Fax [phone]  Expected CoPay: $    Pharmacy Filling the Rx: [facility] - [place], [place] - [address]  Pharmacy Notified: Yes  Patient Notified: Yes         PA not needed for two separate script for the altering dose

## 2024-11-04 NOTE — PROGRESS NOTES
Daniel Barba has a lab appointment with us tomorrow 11/05. Appointment note says Mac. Please review and place orders if needed.  Thank you,  [facility]

## 2024-11-05 ENCOUNTER — PATIENT OUTREACH (OUTPATIENT)
Dept: ONCOLOGY | Facility: CLINIC | Age: 84
End: 2024-11-05

## 2024-11-05 ENCOUNTER — DOCUMENTATION ONLY (OUTPATIENT)
Dept: ONCOLOGY | Facility: CLINIC | Age: 84
End: 2024-11-05

## 2024-11-05 ENCOUNTER — TELEPHONE (OUTPATIENT)
Dept: ONCOLOGY | Facility: CLINIC | Age: 84
End: 2024-11-05

## 2024-11-05 ENCOUNTER — LAB (OUTPATIENT)
Dept: LAB | Facility: CLINIC | Age: 84
End: 2024-11-05
Payer: MEDICARE

## 2024-11-05 DIAGNOSIS — C22.0 HEPATOCELLULAR CARCINOMA (H): Primary | ICD-10-CM

## 2024-11-05 DIAGNOSIS — Z79.899 ENCOUNTER FOR LONG-TERM (CURRENT) USE OF MEDICATIONS: ICD-10-CM

## 2024-11-05 DIAGNOSIS — C22.0 HEPATOCELLULAR CARCINOMA (H): ICD-10-CM

## 2024-11-05 LAB
AFP SERPL-MCNC: 8.2 NG/ML
ALBUMIN MFR UR ELPH: 29.3 MG/DL
ALBUMIN SERPL BCG-MCNC: 3 G/DL (ref 3.5–5.2)
ALP SERPL-CCNC: 120 U/L (ref 40–150)
ALT SERPL W P-5'-P-CCNC: 35 U/L (ref 0–70)
ANION GAP SERPL CALCULATED.3IONS-SCNC: 7 MMOL/L (ref 7–15)
AST SERPL W P-5'-P-CCNC: 52 U/L (ref 0–45)
BASOPHILS # BLD AUTO: 0 10E3/UL (ref 0–0.2)
BASOPHILS NFR BLD AUTO: 1 %
BILIRUB SERPL-MCNC: 1.5 MG/DL
BUN SERPL-MCNC: 9.2 MG/DL (ref 8–23)
CALCIUM SERPL-MCNC: 8.1 MG/DL (ref 8.8–10.4)
CHLORIDE SERPL-SCNC: 103 MMOL/L (ref 98–107)
CREAT SERPL-MCNC: 0.77 MG/DL (ref 0.67–1.17)
CREAT UR-MCNC: 92 MG/DL
EGFRCR SERPLBLD CKD-EPI 2021: 89 ML/MIN/1.73M2
EOSINOPHIL # BLD AUTO: 0.1 10E3/UL (ref 0–0.7)
EOSINOPHIL NFR BLD AUTO: 3 %
ERYTHROCYTE [DISTWIDTH] IN BLOOD BY AUTOMATED COUNT: 20 % (ref 10–15)
GLUCOSE SERPL-MCNC: 134 MG/DL (ref 70–99)
HCO3 SERPL-SCNC: 29 MMOL/L (ref 22–29)
HCT VFR BLD AUTO: 34.8 % (ref 40–53)
HGB BLD-MCNC: 11.9 G/DL (ref 13.3–17.7)
IMM GRANULOCYTES # BLD: 0 10E3/UL
IMM GRANULOCYTES NFR BLD: 0 %
LYMPHOCYTES # BLD AUTO: 0.6 10E3/UL (ref 0.8–5.3)
LYMPHOCYTES NFR BLD AUTO: 26 %
MCH RBC QN AUTO: 30.2 PG (ref 26.5–33)
MCHC RBC AUTO-ENTMCNC: 34.2 G/DL (ref 31.5–36.5)
MCV RBC AUTO: 88 FL (ref 78–100)
MONOCYTES # BLD AUTO: 0.3 10E3/UL (ref 0–1.3)
MONOCYTES NFR BLD AUTO: 11 %
NEUTROPHILS # BLD AUTO: 1.4 10E3/UL (ref 1.6–8.3)
NEUTROPHILS NFR BLD AUTO: 59 %
NRBC # BLD AUTO: 0 10E3/UL
NRBC BLD AUTO-RTO: 0 /100
PLATELET # BLD AUTO: 50 10E3/UL (ref 150–450)
POTASSIUM SERPL-SCNC: 4 MMOL/L (ref 3.4–5.3)
PROT SERPL-MCNC: 6.2 G/DL (ref 6.4–8.3)
PROT/CREAT 24H UR: 0.32 MG/MG CR (ref 0–0.2)
RBC # BLD AUTO: 3.94 10E6/UL (ref 4.4–5.9)
SODIUM SERPL-SCNC: 139 MMOL/L (ref 135–145)
T4 FREE SERPL-MCNC: 0.95 NG/DL (ref 0.9–1.7)
TSH SERPL DL<=0.005 MIU/L-ACNC: 5.06 UIU/ML (ref 0.3–4.2)
WBC # BLD AUTO: 2.3 10E3/UL (ref 4–11)

## 2024-11-05 PROCEDURE — 84443 ASSAY THYROID STIM HORMONE: CPT | Performed by: INTERNAL MEDICINE

## 2024-11-05 PROCEDURE — 36415 COLL VENOUS BLD VENIPUNCTURE: CPT | Performed by: INTERNAL MEDICINE

## 2024-11-05 PROCEDURE — 80053 COMPREHEN METABOLIC PANEL: CPT | Performed by: INTERNAL MEDICINE

## 2024-11-05 PROCEDURE — 82105 ALPHA-FETOPROTEIN SERUM: CPT | Performed by: INTERNAL MEDICINE

## 2024-11-05 PROCEDURE — 84156 ASSAY OF PROTEIN URINE: CPT | Performed by: INTERNAL MEDICINE

## 2024-11-05 PROCEDURE — 84439 ASSAY OF FREE THYROXINE: CPT | Performed by: INTERNAL MEDICINE

## 2024-11-05 PROCEDURE — 85025 COMPLETE CBC W/AUTO DIFF WBC: CPT | Performed by: INTERNAL MEDICINE

## 2024-11-05 NOTE — TELEPHONE ENCOUNTER
PANCHO APPROVED    Medication: Cabometyx  Amount: $ 6,000  Foundation Name: PAN [facility] Phone: [phone]  Foundation Effective Date: 5/9/2024  Foundation Expiration Date: 11/5/2025  Additional Information: N/A  Patient Notified: Yes

## 2024-11-05 NOTE — PROGRESS NOTES
Oral Chemotherapy Program  Lab review     Reviewed labs from 11/5/2024     Labs are remarkable for a platelet of 50K (Grade 3), ANC stable at 1.4, Tbili stable at 1.5, Elevated TSH at 5.06  and the following actions are recommended: Per Dr. Watts continue with his cabozantinib and continue with weekly labs.  Attempted to call Daylin his sister but she did not answer and the mailbox was full.     Follow-up/plan  11/12 weekly lab appt    Sandor Frank, PharmD, [facility]  Oncology Pharmacy Manager  [facility]  [phone]

## 2024-11-05 NOTE — PROGRESS NOTES
DATE/TIME OF CALL RECEIVED FROM LAB:  11/05/24 at 12:40 PM   LAB TEST:  PLT preliminary  LAB VALUE:  41  PROVIDER NOTIFIED?: Yes  PROVIDER NAME: Dr. Watts  DATE/TIME LAB VALUE REPORTED TO PROVIDER: 1241  MECHANISM OF PROVIDER NOTIFICATION: Face-To-Face  PROVIDER RESPONSE: Continue treatment for now. Will check labs next week.     Mary Wasserman RN on 11/5/2024 at 12:55 PM

## 2024-11-12 ENCOUNTER — LAB (OUTPATIENT)
Dept: LAB | Facility: CLINIC | Age: 84
End: 2024-11-12
Payer: MEDICARE

## 2024-11-12 DIAGNOSIS — C22.0 HEPATOCELLULAR CARCINOMA (H): ICD-10-CM

## 2024-11-12 LAB
ALBUMIN SERPL BCG-MCNC: 3.2 G/DL (ref 3.5–5.2)
ALP SERPL-CCNC: 129 U/L (ref 40–150)
ALT SERPL W P-5'-P-CCNC: 38 U/L (ref 0–70)
ANION GAP SERPL CALCULATED.3IONS-SCNC: 13 MMOL/L (ref 7–15)
AST SERPL W P-5'-P-CCNC: 65 U/L (ref 0–45)
BASOPHILS # BLD AUTO: 0 10E3/UL (ref 0–0.2)
BASOPHILS NFR BLD AUTO: 1 %
BILIRUB SERPL-MCNC: 1.7 MG/DL
BUN SERPL-MCNC: 10.6 MG/DL (ref 8–23)
CALCIUM SERPL-MCNC: 8.4 MG/DL (ref 8.8–10.4)
CHLORIDE SERPL-SCNC: 102 MMOL/L (ref 98–107)
CREAT SERPL-MCNC: 0.84 MG/DL (ref 0.67–1.17)
EGFRCR SERPLBLD CKD-EPI 2021: 87 ML/MIN/1.73M2
EOSINOPHIL # BLD AUTO: 0.1 10E3/UL (ref 0–0.7)
EOSINOPHIL NFR BLD AUTO: 3 %
ERYTHROCYTE [DISTWIDTH] IN BLOOD BY AUTOMATED COUNT: 20 % (ref 10–15)
GLUCOSE SERPL-MCNC: 105 MG/DL (ref 70–99)
HCO3 SERPL-SCNC: 27 MMOL/L (ref 22–29)
HCT VFR BLD AUTO: 37.8 % (ref 40–53)
HGB BLD-MCNC: 13 G/DL (ref 13.3–17.7)
IMM GRANULOCYTES # BLD: 0 10E3/UL
IMM GRANULOCYTES NFR BLD: 0 %
LYMPHOCYTES # BLD AUTO: 1 10E3/UL (ref 0.8–5.3)
LYMPHOCYTES NFR BLD AUTO: 30 %
MCH RBC QN AUTO: 31 PG (ref 26.5–33)
MCHC RBC AUTO-ENTMCNC: 34.4 G/DL (ref 31.5–36.5)
MCV RBC AUTO: 90 FL (ref 78–100)
MONOCYTES # BLD AUTO: 0.3 10E3/UL (ref 0–1.3)
MONOCYTES NFR BLD AUTO: 10 %
NEUTROPHILS # BLD AUTO: 1.8 10E3/UL (ref 1.6–8.3)
NEUTROPHILS NFR BLD AUTO: 55 %
NRBC # BLD AUTO: 0 10E3/UL
NRBC BLD AUTO-RTO: 0 /100
PLATELET # BLD AUTO: 58 10E3/UL (ref 150–450)
POTASSIUM SERPL-SCNC: 3.6 MMOL/L (ref 3.4–5.3)
PROT SERPL-MCNC: 6.5 G/DL (ref 6.4–8.3)
RBC # BLD AUTO: 4.19 10E6/UL (ref 4.4–5.9)
SODIUM SERPL-SCNC: 142 MMOL/L (ref 135–145)
WBC # BLD AUTO: 3.3 10E3/UL (ref 4–11)

## 2024-11-19 ENCOUNTER — PATIENT OUTREACH (OUTPATIENT)
Dept: ONCOLOGY | Facility: CLINIC | Age: 84
End: 2024-11-19

## 2024-11-19 ENCOUNTER — LAB (OUTPATIENT)
Dept: LAB | Facility: CLINIC | Age: 84
End: 2024-11-19
Payer: MEDICARE

## 2024-11-19 ENCOUNTER — DOCUMENTATION ONLY (OUTPATIENT)
Dept: ONCOLOGY | Facility: CLINIC | Age: 84
End: 2024-11-19

## 2024-11-19 DIAGNOSIS — C22.0 HEPATOCELLULAR CARCINOMA (H): Primary | ICD-10-CM

## 2024-11-19 DIAGNOSIS — C22.0 HEPATOCELLULAR CARCINOMA (H): ICD-10-CM

## 2024-11-19 LAB
ALBUMIN SERPL BCG-MCNC: 3 G/DL (ref 3.5–5.2)
ALP SERPL-CCNC: 123 U/L (ref 40–150)
ALT SERPL W P-5'-P-CCNC: 33 U/L (ref 0–70)
ANION GAP SERPL CALCULATED.3IONS-SCNC: 10 MMOL/L (ref 7–15)
AST SERPL W P-5'-P-CCNC: 56 U/L (ref 0–45)
BASOPHILS # BLD AUTO: 0 10E3/UL (ref 0–0.2)
BASOPHILS NFR BLD AUTO: 1 %
BILIRUB SERPL-MCNC: 1.8 MG/DL
BUN SERPL-MCNC: 13.7 MG/DL (ref 8–23)
CALCIUM SERPL-MCNC: 8.5 MG/DL (ref 8.8–10.4)
CHLORIDE SERPL-SCNC: 103 MMOL/L (ref 98–107)
CREAT SERPL-MCNC: 0.94 MG/DL (ref 0.67–1.17)
EGFRCR SERPLBLD CKD-EPI 2021: 81 ML/MIN/1.73M2
EOSINOPHIL # BLD AUTO: 0.1 10E3/UL (ref 0–0.7)
EOSINOPHIL NFR BLD AUTO: 2 %
ERYTHROCYTE [DISTWIDTH] IN BLOOD BY AUTOMATED COUNT: 20.1 % (ref 10–15)
GLUCOSE SERPL-MCNC: 113 MG/DL (ref 70–99)
HCO3 SERPL-SCNC: 29 MMOL/L (ref 22–29)
HCT VFR BLD AUTO: 36.3 % (ref 40–53)
HGB BLD-MCNC: 12.4 G/DL (ref 13.3–17.7)
IMM GRANULOCYTES # BLD: 0 10E3/UL
IMM GRANULOCYTES NFR BLD: 1 %
LYMPHOCYTES # BLD AUTO: 0.6 10E3/UL (ref 0.8–5.3)
LYMPHOCYTES NFR BLD AUTO: 22 %
MCH RBC QN AUTO: 30.5 PG (ref 26.5–33)
MCHC RBC AUTO-ENTMCNC: 34.2 G/DL (ref 31.5–36.5)
MCV RBC AUTO: 89 FL (ref 78–100)
MONOCYTES # BLD AUTO: 0.2 10E3/UL (ref 0–1.3)
MONOCYTES NFR BLD AUTO: 8 %
NEUTROPHILS # BLD AUTO: 1.8 10E3/UL (ref 1.6–8.3)
NEUTROPHILS NFR BLD AUTO: 66 %
NRBC # BLD AUTO: 0 10E3/UL
NRBC BLD AUTO-RTO: 0 /100
PLATELET # BLD AUTO: 51 10E3/UL (ref 150–450)
POTASSIUM SERPL-SCNC: 3.9 MMOL/L (ref 3.4–5.3)
PROT SERPL-MCNC: 6.2 G/DL (ref 6.4–8.3)
RBC # BLD AUTO: 4.07 10E6/UL (ref 4.4–5.9)
SODIUM SERPL-SCNC: 142 MMOL/L (ref 135–145)
WBC # BLD AUTO: 2.7 10E3/UL (ref 4–11)

## 2024-11-19 NOTE — PROGRESS NOTES
Oral Chemotherapy Program  Lab review     Reviewed CMP and CBC from 11/19/24.      Labs are unremarkable and do not require any dose adjustments at this time.     Follow-up/plan  11/26: Repeat weekly labs     Cyndie RaiD, [facility]  Hematology/Oncology Clinical Pharmacist  [facility] [place]  [phone]

## 2024-11-19 NOTE — PROGRESS NOTES
DATE/TIME OF CALL RECEIVED FROM LAB:  11/19/24 at 12:54 PM   LAB TEST:  PLT  LAB VALUE:  44  PROVIDER NOTIFIED?: Yes  PROVIDER NAME: Dr. Watts  DATE/TIME LAB VALUE REPORTED TO PROVIDER: 0366  MECHANISM OF PROVIDER NOTIFICATION: Face-To-Face  PROVIDER RESPONSE: ok to continue and monitor labs.   Mary Wasserman RN on 11/19/2024 at 12:55 PM

## 2024-11-21 DIAGNOSIS — C22.0 HEPATOCELLULAR CARCINOMA (H): Primary | ICD-10-CM

## 2024-11-26 ENCOUNTER — PATIENT OUTREACH (OUTPATIENT)
Dept: ONCOLOGY | Facility: CLINIC | Age: 84
End: 2024-11-26

## 2024-11-26 ENCOUNTER — LAB (OUTPATIENT)
Dept: LAB | Facility: CLINIC | Age: 84
End: 2024-11-26
Payer: MEDICARE

## 2024-11-26 DIAGNOSIS — C22.0 HEPATOCELLULAR CARCINOMA (H): ICD-10-CM

## 2024-11-26 LAB
ALBUMIN SERPL BCG-MCNC: 3 G/DL (ref 3.5–5.2)
ALP SERPL-CCNC: 120 U/L (ref 40–150)
ALT SERPL W P-5'-P-CCNC: 34 U/L (ref 0–70)
ANION GAP SERPL CALCULATED.3IONS-SCNC: 9 MMOL/L (ref 7–15)
AST SERPL W P-5'-P-CCNC: 65 U/L (ref 0–45)
BASOPHILS # BLD AUTO: 0 10E3/UL (ref 0–0.2)
BASOPHILS NFR BLD AUTO: 1 %
BILIRUB SERPL-MCNC: 2.1 MG/DL
BUN SERPL-MCNC: 13.5 MG/DL (ref 8–23)
CALCIUM SERPL-MCNC: 8.3 MG/DL (ref 8.8–10.4)
CHLORIDE SERPL-SCNC: 104 MMOL/L (ref 98–107)
CREAT SERPL-MCNC: 0.8 MG/DL (ref 0.67–1.17)
EGFRCR SERPLBLD CKD-EPI 2021: 88 ML/MIN/1.73M2
EOSINOPHIL # BLD AUTO: 0.1 10E3/UL (ref 0–0.7)
EOSINOPHIL NFR BLD AUTO: 2 %
ERYTHROCYTE [DISTWIDTH] IN BLOOD BY AUTOMATED COUNT: 19.6 % (ref 10–15)
GLUCOSE SERPL-MCNC: 120 MG/DL (ref 70–99)
HCO3 SERPL-SCNC: 28 MMOL/L (ref 22–29)
HCT VFR BLD AUTO: 36.4 % (ref 40–53)
HGB BLD-MCNC: 12.5 G/DL (ref 13.3–17.7)
IMM GRANULOCYTES # BLD: 0 10E3/UL
IMM GRANULOCYTES NFR BLD: 1 %
LYMPHOCYTES # BLD AUTO: 0.6 10E3/UL (ref 0.8–5.3)
LYMPHOCYTES NFR BLD AUTO: 28 %
MCH RBC QN AUTO: 30.6 PG (ref 26.5–33)
MCHC RBC AUTO-ENTMCNC: 34.3 G/DL (ref 31.5–36.5)
MCV RBC AUTO: 89 FL (ref 78–100)
MONOCYTES # BLD AUTO: 0.2 10E3/UL (ref 0–1.3)
MONOCYTES NFR BLD AUTO: 9 %
NEUTROPHILS # BLD AUTO: 1.3 10E3/UL (ref 1.6–8.3)
NEUTROPHILS NFR BLD AUTO: 59 %
PLATELET # BLD AUTO: 41 10E3/UL (ref 150–450)
POTASSIUM SERPL-SCNC: 4.3 MMOL/L (ref 3.4–5.3)
PROT SERPL-MCNC: 6.2 G/DL (ref 6.4–8.3)
RBC # BLD AUTO: 4.09 10E6/UL (ref 4.4–5.9)
SODIUM SERPL-SCNC: 141 MMOL/L (ref 135–145)
WBC # BLD AUTO: 2.2 10E3/UL (ref 4–11)

## 2024-11-26 PROCEDURE — 36415 COLL VENOUS BLD VENIPUNCTURE: CPT | Performed by: INTERNAL MEDICINE

## 2024-11-26 PROCEDURE — 85025 COMPLETE CBC W/AUTO DIFF WBC: CPT | Performed by: INTERNAL MEDICINE

## 2024-11-26 PROCEDURE — 80053 COMPREHEN METABOLIC PANEL: CPT | Performed by: INTERNAL MEDICINE

## 2024-11-26 NOTE — PROGRESS NOTES
DATE/TIME OF CALL RECEIVED FROM LAB:  11/26/24 at 3:29 PM   LAB TEST:  PLT  LAB VALUE:  42  PROVIDER NOTIFIED?: Yes  PROVIDER NAME: Dr. Watts  DATE/TIME LAB VALUE REPORTED TO PROVIDER: 3:30  MECHANISM OF PROVIDER NOTIFICATION: Face-To-Face  PROVIDER RESPONSE: No change, pt will continue on oral treatment.     Mary Wasserman RN on 11/26/2024 at 3:31 PM

## 2024-11-29 ENCOUNTER — TELEPHONE (OUTPATIENT)
Dept: GASTROENTEROLOGY | Facility: CLINIC | Age: 84
End: 2024-11-29

## 2024-11-29 NOTE — TELEPHONE ENCOUNTER
"Attempted to contact patient in order to complete pre assessment questions.     Daylin, patient's sister (on C2C) answered the phone. She states patient is going through chemo and is \"pretty weak\". Wonders if the EGD is necessary.    Advised Daylin to consult with ordering provider and the oncologist about her questions.    Daylin states will do that next week at a scheduled office visit.      Procedure details:    Patient scheduled for Upper endoscopy (EGD) on 12.12.24.     Arrival time: 0700. Procedure time 0830    Facility location: [facility]; [address] [place] [address], [address] Floor, [place] [address]. Check in location: Main entrance at registration desk.    Sedation type: MAC    Pre op exam needed? No.    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 No    Recent diagnosis of diverticulitis within the last 6 weeks? No      Medication review:    Diabetic? Yes. Oral diabetic medications: Glipizide (glucotrol): HOLD day of procedure.  Metformin (glucophage): HOLD day of procedure.    Anticoagulants? No    Weight loss medication/injectable? No.    Other medication HOLDING recommendations:  N/A      Prep for procedure:     Prep instructions sent via letter.       Noemí Rasmussen RN  Endoscopy Procedure Pre Assessment         "

## 2024-12-01 ENCOUNTER — APPOINTMENT (OUTPATIENT)
Dept: CT IMAGING | Facility: CLINIC | Age: 84
End: 2024-12-01
Attending: EMERGENCY MEDICINE
Payer: MEDICARE

## 2024-12-01 ENCOUNTER — HOSPITAL ENCOUNTER (OUTPATIENT)
Facility: CLINIC | Age: 84
Setting detail: OBSERVATION
Discharge: NURSING, REHAB OR LONG-TERM CARE | End: 2024-12-02
Attending: EMERGENCY MEDICINE | Admitting: INTERNAL MEDICINE
Payer: MEDICARE

## 2024-12-01 DIAGNOSIS — I95.1 ORTHOSTATIC HYPOTENSION: Primary | ICD-10-CM

## 2024-12-01 DIAGNOSIS — R53.1 GENERALIZED WEAKNESS: ICD-10-CM

## 2024-12-01 DIAGNOSIS — L29.9 PRURITIC DISORDER: ICD-10-CM
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
## 2024-12-01 DIAGNOSIS — R55 SYNCOPE, UNSPECIFIED SYNCOPE TYPE: ICD-10-CM

## 2024-12-01 DIAGNOSIS — S09.90XA CLOSED HEAD INJURY, INITIAL ENCOUNTER: ICD-10-CM

## 2024-12-01 DIAGNOSIS — C22.0 HEPATOCELLULAR CARCINOMA (H): ICD-10-CM

## 2024-12-01 PROBLEM — N17.9 ACUTE KIDNEY FAILURE, UNSPECIFIED (H): Status: RESOLVED | Noted: 2022-08-27 | Resolved: 2024-12-01

## 2024-12-01 PROBLEM — R07.9 CHEST PAIN: Status: RESOLVED | Noted: 2020-08-20 | Resolved: 2024-12-01

## 2024-12-01 PROBLEM — I70.208: Status: ACTIVE | Noted: 2024-12-01

## 2024-12-01 PROBLEM — R06.00 DYSPNEA, UNSPECIFIED: Status: RESOLVED | Noted: 2022-09-03 | Resolved: 2024-12-01

## 2024-12-01 PROBLEM — I50.31 ACUTE DIASTOLIC HEART FAILURE (H): Status: RESOLVED | Noted: 2022-10-06 | Resolved: 2024-12-01

## 2024-12-01 PROBLEM — I50.9 ACUTE CONGESTIVE HEART FAILURE, UNSPECIFIED HEART FAILURE TYPE (H): Status: RESOLVED | Noted: 2022-08-20 | Resolved: 2024-12-01

## 2024-12-01 LAB
ANION GAP SERPL CALCULATED.3IONS-SCNC: 12 MMOL/L (ref 7–15)
ATRIAL RATE - MUSE: 72 BPM
BASOPHILS # BLD AUTO: 0 10E3/UL (ref 0–0.2)
BASOPHILS NFR BLD AUTO: 0 %
BUN SERPL-MCNC: 11.8 MG/DL (ref 8–23)
CALCIUM SERPL-MCNC: 8 MG/DL (ref 8.8–10.4)
CHLORIDE SERPL-SCNC: 104 MMOL/L (ref 98–107)
CREAT SERPL-MCNC: 0.77 MG/DL (ref 0.67–1.17)
DIASTOLIC BLOOD PRESSURE - MUSE: NORMAL MMHG
EGFRCR SERPLBLD CKD-EPI 2021: 89 ML/MIN/1.73M2
EOSINOPHIL # BLD AUTO: 0.1 10E3/UL (ref 0–0.7)
EOSINOPHIL NFR BLD AUTO: 2 %
ERYTHROCYTE [DISTWIDTH] IN BLOOD BY AUTOMATED COUNT: 19.5 % (ref 10–15)
EST. AVERAGE GLUCOSE BLD GHB EST-MCNC: 108 MG/DL
GLUCOSE BLDC GLUCOMTR-MCNC: 107 MG/DL (ref 70–99)
GLUCOSE BLDC GLUCOMTR-MCNC: 99 MG/DL (ref 70–99)
GLUCOSE SERPL-MCNC: 117 MG/DL (ref 70–99)
HBA1C MFR BLD: 5.4 %
HCO3 SERPL-SCNC: 24 MMOL/L (ref 22–29)
HCT VFR BLD AUTO: 35.2 % (ref 40–53)
HGB BLD-MCNC: 12.2 G/DL (ref 13.3–17.7)
IMM GRANULOCYTES # BLD: 0 10E3/UL
IMM GRANULOCYTES NFR BLD: 1 %
INTERPRETATION ECG - MUSE: NORMAL
LYMPHOCYTES # BLD AUTO: 0.4 10E3/UL (ref 0.8–5.3)
LYMPHOCYTES NFR BLD AUTO: 17 %
MCH RBC QN AUTO: 30.3 PG (ref 26.5–33)
MCHC RBC AUTO-ENTMCNC: 34.7 G/DL (ref 31.5–36.5)
MCV RBC AUTO: 88 FL (ref 78–100)
MONOCYTES # BLD AUTO: 0.3 10E3/UL (ref 0–1.3)
MONOCYTES NFR BLD AUTO: 10 %
NEUTROPHILS # BLD AUTO: 1.8 10E3/UL (ref 1.6–8.3)
NEUTROPHILS NFR BLD AUTO: 70 %
NRBC # BLD AUTO: 0 10E3/UL
NRBC BLD AUTO-RTO: 0 /100
P AXIS - MUSE: 67 DEGREES
PLAT MORPH BLD: NORMAL
PLATELET # BLD AUTO: 33 10E3/UL (ref 150–450)
POTASSIUM SERPL-SCNC: 3.4 MMOL/L (ref 3.4–5.3)
PR INTERVAL - MUSE: 180 MS
QRS DURATION - MUSE: 96 MS
QT - MUSE: 462 MS
QTC - MUSE: 505 MS
R AXIS - MUSE: -1 DEGREES
RBC # BLD AUTO: 4.02 10E6/UL (ref 4.4–5.9)
RBC MORPH BLD: NORMAL
SODIUM SERPL-SCNC: 140 MMOL/L (ref 135–145)
SYSTOLIC BLOOD PRESSURE - MUSE: NORMAL MMHG
T AXIS - MUSE: 200 DEGREES
TROPONIN T SERPL HS-MCNC: 32 NG/L
TROPONIN T SERPL HS-MCNC: 33 NG/L
VENTRICULAR RATE- MUSE: 72 BPM
WBC # BLD AUTO: 2.6 10E3/UL (ref 4–11)

## 2024-12-01 PROCEDURE — 82374 ASSAY BLOOD CARBON DIOXIDE: CPT | Performed by: EMERGENCY MEDICINE

## 2024-12-01 PROCEDURE — 80048 BASIC METABOLIC PNL TOTAL CA: CPT | Performed by: EMERGENCY MEDICINE

## 2024-12-01 PROCEDURE — 84132 ASSAY OF SERUM POTASSIUM: CPT | Performed by: EMERGENCY MEDICINE

## 2024-12-01 PROCEDURE — 99285 EMERGENCY DEPT VISIT HI MDM: CPT | Performed by: EMERGENCY MEDICINE

## 2024-12-01 PROCEDURE — 250N000013 HC RX MED GY IP 250 OP 250 PS 637: Performed by: PHYSICIAN ASSISTANT

## 2024-12-01 PROCEDURE — 84484 ASSAY OF TROPONIN QUANT: CPT | Performed by: PHYSICIAN ASSISTANT

## 2024-12-01 PROCEDURE — 85025 COMPLETE CBC W/AUTO DIFF WBC: CPT | Performed by: EMERGENCY MEDICINE

## 2024-12-01 PROCEDURE — G0378 HOSPITAL OBSERVATION PER HR: HCPCS

## 2024-12-01 PROCEDURE — 93010 ELECTROCARDIOGRAM REPORT: CPT | Performed by: EMERGENCY MEDICINE

## 2024-12-01 PROCEDURE — 83036 HEMOGLOBIN GLYCOSYLATED A1C: CPT | Performed by: PHYSICIAN ASSISTANT

## 2024-12-01 PROCEDURE — G1010 CDSM STANSON: HCPCS

## 2024-12-01 PROCEDURE — 72125 CT NECK SPINE W/O DYE: CPT | Mod: ME

## 2024-12-01 PROCEDURE — 36415 COLL VENOUS BLD VENIPUNCTURE: CPT | Performed by: PHYSICIAN ASSISTANT

## 2024-12-01 PROCEDURE — 99223 1ST HOSP IP/OBS HIGH 75: CPT | Performed by: PHYSICIAN ASSISTANT

## 2024-12-01 PROCEDURE — 93005 ELECTROCARDIOGRAM TRACING: CPT | Performed by: EMERGENCY MEDICINE

## 2024-12-01 PROCEDURE — 36415 COLL VENOUS BLD VENIPUNCTURE: CPT | Performed by: EMERGENCY MEDICINE

## 2024-12-01 PROCEDURE — 70450 CT HEAD/BRAIN W/O DYE: CPT | Mod: ME

## 2024-12-01 PROCEDURE — 82962 GLUCOSE BLOOD TEST: CPT

## 2024-12-01 PROCEDURE — 99285 EMERGENCY DEPT VISIT HI MDM: CPT | Mod: 25 | Performed by: EMERGENCY MEDICINE

## 2024-12-01 RX ORDER — ONDANSETRON 2 MG/ML
4 INJECTION INTRAMUSCULAR; INTRAVENOUS EVERY 6 HOURS PRN
Status: DISCONTINUED | OUTPATIENT
Start: 2024-12-01 | End: 2024-12-02 | Stop reason: HOSPADM

## 2024-12-01 RX ORDER — FUROSEMIDE 40 MG/1
40 TABLET ORAL DAILY
Status: DISCONTINUED | OUTPATIENT
Start: 2024-12-02 | End: 2024-12-02 | Stop reason: HOSPADM

## 2024-12-01 RX ORDER — NICOTINE POLACRILEX 4 MG
15-30 LOZENGE BUCCAL
Status: DISCONTINUED | OUTPATIENT
Start: 2024-12-01 | End: 2024-12-02 | Stop reason: HOSPADM

## 2024-12-01 RX ORDER — DEXTROSE MONOHYDRATE 25 G/50ML
25-50 INJECTION, SOLUTION INTRAVENOUS
Status: DISCONTINUED | OUTPATIENT
Start: 2024-12-01 | End: 2024-12-02 | Stop reason: HOSPADM

## 2024-12-01 RX ORDER — MAGNESIUM OXIDE 400 MG/1
400 TABLET ORAL 2 TIMES DAILY
Status: DISCONTINUED | OUTPATIENT
Start: 2024-12-01 | End: 2024-12-02 | Stop reason: HOSPADM

## 2024-12-01 RX ORDER — AMOXICILLIN 250 MG
2 CAPSULE ORAL 2 TIMES DAILY PRN
Status: DISCONTINUED | OUTPATIENT
Start: 2024-12-01 | End: 2024-12-02 | Stop reason: HOSPADM

## 2024-12-01 RX ORDER — ASPIRIN 81 MG
1 TABLET,CHEWABLE ORAL DAILY
COMMUNITY
Start: 2024-11-26

## 2024-12-01 RX ORDER — TAMSULOSIN HYDROCHLORIDE 0.4 MG/1
0.4 CAPSULE ORAL EVERY EVENING
Status: DISCONTINUED | OUTPATIENT
Start: 2024-12-01 | End: 2024-12-02 | Stop reason: HOSPADM

## 2024-12-01 RX ORDER — FERROUS SULFATE 325(65) MG
325 TABLET ORAL DAILY
Status: DISCONTINUED | OUTPATIENT
Start: 2024-12-02 | End: 2024-12-02 | Stop reason: HOSPADM

## 2024-12-01 RX ORDER — ACETAMINOPHEN 325 MG/1
650 TABLET ORAL EVERY 4 HOURS PRN
Status: DISCONTINUED | OUTPATIENT
Start: 2024-12-01 | End: 2024-12-02 | Stop reason: HOSPADM

## 2024-12-01 RX ORDER — ATORVASTATIN CALCIUM 20 MG/1
20 TABLET, FILM COATED ORAL EVERY EVENING
Status: DISCONTINUED | OUTPATIENT
Start: 2024-12-01 | End: 2024-12-02 | Stop reason: HOSPADM

## 2024-12-01 RX ORDER — ACETAMINOPHEN 500 MG
1000 TABLET ORAL 2 TIMES DAILY
Status: DISCONTINUED | OUTPATIENT
Start: 2024-12-01 | End: 2024-12-02 | Stop reason: HOSPADM

## 2024-12-01 RX ORDER — LACTULOSE 10 G/15ML
20 SOLUTION ORAL DAILY
Status: DISCONTINUED | OUTPATIENT
Start: 2024-12-02 | End: 2024-12-02 | Stop reason: HOSPADM

## 2024-12-01 RX ORDER — HYDROXYZINE HYDROCHLORIDE 25 MG/1
25 TABLET, FILM COATED ORAL 2 TIMES DAILY
Status: DISCONTINUED | OUTPATIENT
Start: 2024-12-01 | End: 2024-12-02 | Stop reason: HOSPADM

## 2024-12-01 RX ORDER — ASPIRIN 81 MG/1
81 TABLET, CHEWABLE ORAL DAILY
Status: DISCONTINUED | OUTPATIENT
Start: 2024-12-02 | End: 2024-12-02 | Stop reason: HOSPADM

## 2024-12-01 RX ORDER — AMOXICILLIN 250 MG
1 CAPSULE ORAL 2 TIMES DAILY PRN
Status: DISCONTINUED | OUTPATIENT
Start: 2024-12-01 | End: 2024-12-02 | Stop reason: HOSPADM

## 2024-12-01 RX ORDER — ONDANSETRON 4 MG/1
4 TABLET, ORALLY DISINTEGRATING ORAL EVERY 6 HOURS PRN
Status: DISCONTINUED | OUTPATIENT
Start: 2024-12-01 | End: 2024-12-02 | Stop reason: HOSPADM

## 2024-12-01 RX ORDER — PROCHLORPERAZINE MALEATE 5 MG/1
5 TABLET ORAL EVERY 6 HOURS PRN
Status: DISCONTINUED | OUTPATIENT
Start: 2024-12-01 | End: 2024-12-02 | Stop reason: HOSPADM

## 2024-12-01 RX ADMIN — ATORVASTATIN CALCIUM 20 MG: 20 TABLET, FILM COATED ORAL at 19:31

## 2024-12-01 RX ADMIN — ACETAMINOPHEN 1000 MG: 500 TABLET ORAL at 20:10

## 2024-12-01 RX ADMIN — MAGNESIUM OXIDE TAB 400 MG (241.3 MG ELEMENTAL MG) 400 MG: 400 (241.3 MG) TAB at 20:10

## 2024-12-01 RX ADMIN — TAMSULOSIN HYDROCHLORIDE 0.4 MG: 0.4 CAPSULE ORAL at 19:31

## 2024-12-01 RX ADMIN — HYDROXYZINE HYDROCHLORIDE 25 MG: 25 TABLET, FILM COATED ORAL at 20:10

## 2024-12-01 RX ADMIN — METFORMIN HYDROCHLORIDE 500 MG: 500 TABLET, FILM COATED ORAL at 19:31

## 2024-12-01 ASSESSMENT — ACTIVITIES OF DAILY LIVING (ADL)
ADLS_ACUITY_SCORE: 50
ADLS_ACUITY_SCORE: 54
ADLS_ACUITY_SCORE: 50
ADLS_ACUITY_SCORE: 50
ADLS_ACUITY_SCORE: 54
ADLS_ACUITY_SCORE: 48
ADLS_ACUITY_SCORE: 54
ADLS_ACUITY_SCORE: 50
ADLS_ACUITY_SCORE: 54
ADLS_ACUITY_SCORE: 50

## 2024-12-01 NOTE — MEDICATION SCRIBE - ADMISSION MEDICATION HISTORY
Medication Scribe Admission Medication History    Admission medication history is complete. The information provided in this note is only as accurate as the sources available at the time of the update.    Information Source(s): Facility ([facility] medication list/MAR via PTA medications reviewed by phone and with med list from [facility], [phone].     Pertinent Information:     Changes made to PTA medication list:  Added: None  Deleted: lexapro, glipizide, lorazepam, ramipril, spironolactone  Changed: metformin 500, 1 tab at breakfast, to 2 tabs at breakfast, hydroxyzine 25, 1 qid to 1 tab BID, tylenol 500 prn to 2 tabs BID    Allergies reviewed with patient and updates made in EHR: yes    Medication History Completed By: Margarette Cabello 12/1/2024 3:25 PM    PTA Med List   Medication Sig Note Last Dose/Taking    acetaminophen (TYLENOL) 500 MG tablet Take 2 tablets by mouth 2 times daily.  12/1/2024 Morning    ASPIRIN LOW DOSE 81 MG chewable tablet Take 1 tablet by mouth daily.  12/1/2024 Morning    atorvastatin (LIPITOR) 20 MG tablet Take 1 tablet (20 mg) by mouth daily  11/30/2024 at  8:00 PM    Cabozantinib S-Malate (CABOMETYX) 20 MG Take 1 tablet (20 mg) by mouth See Admin Instructions Do not crush.  Give at least 1 hour before or 2 hours after eating. 12/1/2024: Takes Tues, Thurs, Sat 11/30/2024 Morning    Cabozantinib S-Malate (CABOMETYX) 40 MG Take 1 tablet (40 mg) by mouth See Admin Instructions 12/1/2024: Takes every Mon, Wed, Fri, Sun 12/1/2024 Morning    ferrous sulfate (FE TABS) 325 (65 Fe) MG EC tablet Take 1 tablet (325 mg) by mouth daily  12/1/2024 Morning    furosemide (LASIX) 40 MG tablet Take 1 tablet by mouth daily.  12/1/2024 Morning    hydrOXYzine HCl (ATARAX) 25 MG tablet Take 1 tablet (25 mg) by mouth 4 times daily (Patient taking differently: Take 1 tablet by mouth 2 times daily.)  12/1/2024 Morning    lactulose (CHRONULAC) 10 GM/15ML solution Take 30 mLs (20 g) by mouth  daily  12/1/2024 Morning    lidocaine (XYLOCAINE) 5 % external ointment Apply topically 4 times daily as needed for moderate pain  Unknown    loperamide (IMODIUM) 2 MG capsule Take 2 mg by mouth as needed for diarrhea (>4-5 loose stools/day)  Unknown    magnesium oxide (MAG-OX) 400 MG tablet Take 1 tablet (400 mg) by mouth 2 times daily  12/1/2024 Morning    metFORMIN (GLUCOPHAGE) 500 MG tablet Take 2 tablets by mouth daily (with breakfast).  12/1/2024 Morning    metFORMIN (GLUCOPHAGE) 500 MG tablet Take 1 tablet by mouth daily (with dinner).  11/30/2024 at  5:00 PM    nitroGLYcerin (NITROSTAT) 0.4 MG sublingual tablet Place 1 tablet (0.4 mg) under the tongue See Admin Instructions for chest pain 12/1/2024: Has on hand Taking    ondansetron (ZOFRAN) 8 MG tablet Take 1 tablet (8 mg) by mouth every 8 hours as needed for nausea  Unknown    tamsulosin (FLOMAX) 0.4 MG capsule Take 1 capsule (0.4 mg) by mouth every evening  11/30/2024 at  8:00 PM    triamcinolone (KENALOG) 0.1 % external cream Apply topically 2 times daily as needed for irritation  Unknown

## 2024-12-01 NOTE — ED PROVIDER NOTES
History     Chief Complaint   Patient presents with    Fall    Lip Laceration     HPI  Flash Brown is a 84 year old male who presents for evaluation after a fall.  The patient says that he is not sure what happened.  He who is in assisted living and reportedly he has had 2 falls in the past 24 hours.  He says that he does not really remember falling, just remembers being on the ground.  He has some pain to his face and head and neck.  Denies any chest pain or difficulty breathing.  He does not think he had a loss of consciousness but he is not sure.  No abdominal pain, nausea, vomiting, or extremity pain.  He is not on blood thinners.    Allergies:  Allergies   Allergen Reactions    Iodine Swelling     Patient states reaction was 20-30 years ago. Has had CT dye and coronary angiograms since then without premedication and did not have reaction.    Iodinated Contrast Media     Metoprolol Difficulty breathing     Difficulty breathing and bradycardia        Problem List:    Patient Active Problem List    Diagnosis Date Noted    Generalized weakness 12/01/2024     Priority: Medium    Closed head injury, initial encounter 12/01/2024     Priority: Medium    Fall, initial encounter 12/01/2024     Priority: Medium    Syncope 12/01/2024     Priority: Medium    Elevated bilirubin 08/20/2024     Priority: Medium    History of non-Hodgkin's lymphoma 05/15/2024     Priority: Medium    Edema 05/15/2024     Priority: Medium    Polyp of colon 05/15/2024     Priority: Medium    Conductive hearing loss, bilateral 05/15/2024     Priority: Medium    Actinic keratosis 05/15/2024     Priority: Medium    Pruritic disorder 04/26/2024     Priority: Medium    Generalized muscle weakness 04/26/2024     Priority: Medium    Generalized edema 04/26/2024     Priority: Medium    Symptomatic bradycardia 04/26/2024     Priority: Medium    Diabetes mellitus without complication (H) 08/16/2023     Priority: Medium    Hepatocellular carcinoma (H)  08/16/2023     Priority: Medium    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) 03/01/2023     Priority: Medium    Congestive splenomegaly 03/01/2023     Priority: Medium    Other decreased white blood cell count (due to liver cirrhosis) 03/01/2023     Priority: Medium    Other iron deficiency anemia 03/01/2023     Priority: Medium    Chronic diastolic heart failure (H) 11/10/2022     Priority: Medium    Mild cognitive impairment of uncertain or unknown etiology 09/02/2022     Priority: Medium    Difficulty in walking, not elsewhere classified 09/02/2022     Priority: Medium    Cognitive communication deficit 09/02/2022     Priority: Medium    Hypomagnesemia 08/31/2022     Priority: Medium    Sinus bradycardia 08/20/2022     Priority: Medium    BPH (benign prostatic hyperplasia) 08/20/2022     Priority: Medium    (HFpEF) heart failure with preserved ejection fraction (H) 08/20/2022     Priority: Medium    History of lymphoma 08/03/2021     Priority: Medium    Other pancytopenia (H) 10/20/2020     Priority: Medium    Obesity (BMI 35.0-39.9) with comorbidity (H) 06/11/2019     Priority: Medium    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 Marie  [facility] update changed this record. Please review for accuracy      Mixed hyperlipidemia 01/30/2008     Priority: Medium     Last Exam: April 15, 2008  Last Lipids: CHOL      137   03/13/2008 LDL        76   03/13/2008 HDL       37   03/13/2008  Last LFTs:: N  ALT       47   03/13/2008    Meds: Vytorin 10/40, Niacin 500 bid      Obstructive sleep apnea 01/30/2008     Priority: Medium     Problem list name updated by automated process. Provider to review      ? exposure predisposing to CA 01/30/2008     Priority: Medium    Benign essential hypertension 01/30/2004     Priority: Medium     Last exam: April 15, 2008  BPs: 128/74  Meds: Atenolol 50, ASA 81, Prinizide 20/25        RT axillary lymphoma s/p resection, with adjuvant radiation - [place], [place] 01/30/1983     Priority: Medium    Malignant melanoma s/p resection in [place], [place] 01/30/1978     Priority: Medium        Past Medical History:    Past Medical History:   Diagnosis Date    Acute kidney failure, unspecified (H) 08/27/2022    Basal cell carcinoma     CAD (coronary artery disease)     Depression     Diabetes (H)     Hyperlipidemia     Hypertension     Lymphoma (H)     Malignant melanoma (H)     Melanoma (H)     Squamous cell carcinoma        Past Surgical History:    Past Surgical History:   Procedure Laterality Date    AXILLARY SURGERY      S/P resection and adjuvant radiation    BONE MARROW BIOPSY, BONE SPECIMEN, NEEDLE/TROCAR N/A 7/8/2019    Procedure: BIOPSY, BONE MARROW;  Surgeon: Husam Issa MD;  Location: [facility]    BONE MARROW BIOPSY, BONE SPECIMEN, NEEDLE/TROCAR Left 2/24/2022    Procedure: BIOPSY, BONE MARROW;  Surgeon: Cailin Anthony PA-C;  Location: [facility] OR    CARDIAC SURGERY      CHOLECYSTECTOMY      HERNIA REPAIR, UMBILICAL      IR LIVER BIOPSY PERCUTANEOUS  6/2/2023    IR SIRT (SELECTIVE INTERNAL RADIO THERAPY)  7/26/2023    IR VISCERAL ANGIOGRAM  7/26/2023    IR VISCERAL EMBOLIZATION  7/31/2023    PHACOEMULSIFICATION WITH STANDARD INTRAOCULAR LENS IMPLANT Right 10/2/2019    Procedure: Cataract Removal with Implant;  Surgeon: Dinesh Ivey MD;  Location: [facility]    PHACOEMULSIFICATION WITH STANDARD INTRAOCULAR LENS  IMPLANT Left 10/21/2019    Procedure: Cataract Removal with Implant;  Surgeon: Dinesh Ivey MD;  Location: [facility]    STENT      PTCA with drug-eluting stent of RCA, circumflex, and LAD    VASCULAR SURGERY         Family History:    Family History   Problem Relation Age of Onset    Asthma Other     Cancer Other         melanoma    Blood Disease Other         bleeding disorder    Lung Cancer Mother         Smoker    Prostate Cancer Father     Melanoma No family hx of        Social History:  Marital Status:  Single [1]  Social History     Tobacco Use    Smoking status: Never     Passive exposure: Never    Smokeless tobacco: Never   Vaping Use    Vaping status: Never Used   Substance Use Topics    Alcohol use: Yes     Comment: very rarely    Drug use: Not Currently        Medications:    No current outpatient medications on file.        Review of Systems    Physical Exam   BP: (!) 138/117  Pulse: 73  Temp: 97.4  F (36.3  C)  Resp: 18  Weight: 95.3 kg (210 lb)  SpO2: 100 %      Physical Exam  /53   Pulse 74   Temp 97.4  F (36.3  C) (Oral)   Resp 16   Wt 95.3 kg (210 lb)   SpO2 100%   BMI 33.89 kg/m     GENERAL: Alert, cooperative, mild distress  HEAD: No scalp laceration, hematoma, or abrasion.  No tenderness.  EYES: Conjunctivae clear, EOM intact. PERLL, Pupils are 2 mm.  HEENT:  Normal external ears.  No nasal discharge or evidence of septal hematoma. No facial instability or asymmetry.  Small laceration on the inside part of his mouth.  He has multiple decayed teeth and the patient denies any new sensation fractured tooth at this time.  Intact bite without malalignment.  NECK: C-collar in place.   CHEST:  No crepitus or tenderness with AP or lateral compression  CARDIOVASCULAR : Nml rate, distal pulses are normal and symmetric  LUNGS: No respiratory distress.  GI: Soft, ND, NT.   PELVIS: No crepitus or tenderness with AP or lateral compression  BACK: No step-offs palpated, no tenderness to  palpation of thoracic or lumbar spine.  EXTREMITIES: No obvious deformities, no tenderness to palpation.  Normal range of motion of the major joints of all 4 extremities.  NEUROLOGICAL : GCS= 15.  Awake, alert, and oriented x 3.  Cranial nerves II-XII grossly intact. Normal muscle strength and tone, sensation to light touch grossly intact in all extremities.  No focal deficits.   SKIN : Normal color, no jaundice or rash    ED Course        Procedures              EKG Interpretation:      Interpreted by Emerson Humphreys MD  Time reviewed: 1445  Symptoms at time of EKG: Fall   Rhythm: sinus   Rate: normal  Axis: normal  Ectopy: none  Conduction: prolonged QT interval  ST Segments/ T Waves: No ST-T wave changes  Q Waves: none  Comparison to prior: Unchanged    Clinical Impression: normal EKG      Critical Care time:  none              Results for orders placed or performed during the hospital encounter of 12/01/24 (from the past 24 hours)   [place] Draw *Canceled*    Narrative    The following orders were created for panel order [place] Draw.  Procedure                               Abnormality         Status                     ---------                               -----------         ------                       Please view results for these tests on the individual orders.   [place] Draw *Canceled*    Narrative    The following orders were created for panel order [place] Draw.  Procedure                               Abnormality         Status                     ---------                               -----------         ------                     Extra Blue Top Tube[[id number]]                                                           Please view results for these tests on the individual orders.   EKG 12 lead   Result Value Ref Range    Systolic Blood Pressure  mmHg    Diastolic Blood Pressure  mmHg    Ventricular Rate 72 BPM    Atrial Rate 72 BPM    MI Interval 180 ms    QRS Duration 96 ms     ms     QTc 505 ms    P Axis 67 degrees    R AXIS -1 degrees    T Axis 200 degrees    Interpretation ECG       Sinus rhythm with sinus arrhythmia  Left ventricular hypertrophy with repolarization abnormality ( R in aVL )  Prolonged QT  Abnormal ECG  No previous ECGs available  Confirmed by SEE ED PROVIDER NOTE FOR, ECG INTERPRETATION ([id number]),  Dorothy Andres ([id number]) on 12/1/2024 2:50:41 PM     CBC with Platelets & Differential    Narrative    The following orders were created for panel order CBC with Platelets & Differential.  Procedure                               Abnormality         Status                     ---------                               -----------         ------                     CBC with platelets and d...[[id number]]  Abnormal            Final result               RBC and Platelet Morphology[[id number]]                      Final result                 Please view results for these tests on the individual orders.   Basic metabolic panel   Result Value Ref Range    Sodium 140 135 - 145 mmol/L    Potassium 3.4 3.4 - 5.3 mmol/L    Chloride 104 98 - 107 mmol/L    Carbon Dioxide (CO2) 24 22 - 29 mmol/L    Anion Gap 12 7 - 15 mmol/L    Urea Nitrogen 11.8 8.0 - 23.0 mg/dL    Creatinine 0.77 0.67 - 1.17 mg/dL    GFR Estimate 89 >60 mL/min/1.73m2    Calcium 8.0 (L) 8.8 - 10.4 mg/dL    Glucose 117 (H) 70 - 99 mg/dL   CBC with platelets and differential   Result Value Ref Range    WBC Count 2.6 (L) 4.0 - 11.0 10e3/uL    RBC Count 4.02 (L) 4.40 - 5.90 10e6/uL    Hemoglobin 12.2 (L) 13.3 - 17.7 g/dL    Hematocrit 35.2 (L) 40.0 - 53.0 %    MCV 88 78 - 100 fL    MCH 30.3 26.5 - 33.0 pg    MCHC 34.7 31.5 - 36.5 g/dL    RDW 19.5 (H) 10.0 - 15.0 %    Platelet Count 33 (LL) 150 - 450 10e3/uL    % Neutrophils 70 %    % Lymphocytes 17 %    % Monocytes 10 %    % Eosinophils 2 %    % Basophils 0 %    % Immature Granulocytes 1 %    NRBCs per 100 WBC 0 <1 /100    Absolute Neutrophils 1.8 1.6 - 8.3 10e3/uL    Absolute  Lymphocytes 0.4 (L) 0.8 - 5.3 10e3/uL    Absolute Monocytes 0.3 0.0 - 1.3 10e3/uL    Absolute Eosinophils 0.1 0.0 - 0.7 10e3/uL    Absolute Basophils 0.0 0.0 - 0.2 10e3/uL    Absolute Immature Granulocytes 0.0 <=0.4 10e3/uL    Absolute NRBCs 0.0 10e3/uL   RBC and Platelet Morphology   Result Value Ref Range    RBC Morphology Confirmed RBC Indices     Platelet Assessment  Automated Count Confirmed. Platelet morphology is normal.     Automated Count Confirmed. Platelet morphology is normal.   CT Head w/o Contrast    Narrative    EXAM: CT HEAD W/O CONTRAST  LOCATION: [facility]  DATE: 12/1/2024    INDICATION: Fall, head trauma.  COMPARISON: Head CT 10/17/2024.  TECHNIQUE: Routine CT Head without IV contrast. Multiplanar reformats. Dose reduction techniques were used.    FINDINGS:  INTRACRANIAL CONTENTS: No intracranial hemorrhage, extraaxial collection, or mass effect. No CT evidence of acute infarct. Mild presumed chronic small vessel ischemic changes. Moderate generalized volume loss. No hydrocephalus.     VISUALIZED ORBITS/SINUSES/MASTOIDS: No intraorbital abnormality. No paranasal sinus mucosal disease. No middle ear or mastoid effusion.    BONES/SOFT TISSUES: No acute abnormality.      Impression    IMPRESSION:  1.  No CT evidence for acute intracranial process.  2.  Brain atrophy and presumed chronic microvascular ischemic changes as above.   CT Cervical Spine w/o Contrast    Narrative    EXAM: CT CERVICAL SPINE W/O CONTRAST  LOCATION: [facility]  DATE: 12/1/2024    INDICATION: Fall, head trauma.  COMPARISON: None.  TECHNIQUE: Routine CT Cervical Spine without IV contrast. Multiplanar reformats. Dose reduction techniques were used.      Impression    IMPRESSION: Minimal degenerative anterolisthesis of C4 upon C5. Alignment of the cervical vertebrae is otherwise normal. Vertebral body heights of the cervical spine are normal. Craniocervical alignment is  normal. No fractures. There is loss of disc   space height and degenerative endplate spurring at C6-C7. Facet arthropathy throughout the cervical spine. No high-grade spinal canal stenosis. No prevertebral soft tissue swelling.       Medications - No data to display    Assessments & Plan (with Medical Decision Making)   84-year-old male who presents for evaluation after a fall, unclear loss of consciousness or unclear mechanism.  Vital signs are reassuring with exception of mild hypertension.  He has a laceration to the upper lip that does not need to be repaired.  EKG shows sinus rhythm with slightly prolonged QTc but this appears to be similar to the patient's prior.  CT of the head and cervical spine obtained, images interpreted independently as well as radiology read reviewed, no signs of intracranial hemorrhage or cervical spine fracture.  Electrolytes are within normal limits.  He has pancytopenia which is his baseline related to his oncologic therapy.  Given the patient's falls I recommend admission to the hospital.  I have discussed the case with the on-call hospitalist, we discussed ongoing management the patient need for cardiac telemetry and close observation.  They have agreed to accept the patient to their service.    I have reviewed the nursing notes.    I have reviewed the findings, diagnosis, plan and need for follow up with the patient.         Current Discharge Medication List          Final diagnoses:   Fall, initial encounter   Closed head injury, initial encounter   Generalized weakness       12/1/2024   [facility] EMERGENCY DEPT       Emerson Humphreys MD  12/01/24 0928

## 2024-12-01 NOTE — PROGRESS NOTES
[facility] ADMISSION NOTE    Patient admitted to room 2300 at approximately 1733 via cart from emergency room. Patient was accompanied by transport tech.     Verbal SBAR report received from Pratima prior to patient arrival.     Patient ambulated to bed with one assist. Patient alert and oriented X 3. The patient is not having any pain.  . Admission vital signs: Blood pressure (!) 145/70, pulse 78, temperature 97.4  F (36.3  C), temperature source Oral, resp. rate 18, weight 95.3 kg (210 lb), SpO2 100%. Patient was oriented to plan of care, call light, bed controls, tv, telephone, bathroom, and visiting hours.     Risk Assessment    The following safety risks were identified during admission: fall. Yellow risk band applied: YES.     Skin Initial Assessment    This writer admitted this patient and completed a full skin assessment and Haider score in the Adult PCS flowsheet.   Photo documentation of skin problem and/or wound competed via [place] application (located under Media):  N/A    Appropriate interventions initiated as needed.     Secondary skin check completed by Jessica WYNNE.         Education    Patient has a [place] to Observation order: Yes  Observation education completed and documented: Yes      Bushra Cabello RN

## 2024-12-01 NOTE — ED TRIAGE NOTES
Arrives by EMS from [facility] following 2nd fall in 24hrs. Unwitnessed, unsure of what happened, not on thinners. Hit front of head as he has small lac to inside of upper lip. Arrives in C-collar, EMS states no pain to palpation of neck but c/o 10/10 right sided neck pain on arrival. Endorses some new numbness to arms, strength stong & equal bilaterally.      Triage Assessment (Adult)       Row Name 12/01/24 1348          Triage Assessment    Airway WDL WDL        Respiratory WDL    Respiratory WDL WDL        Skin Circulation/Temperature WDL    Skin Circulation/Temperature WDL WDL        Cardiac WDL    Cardiac WDL WDL        Peripheral/Neurovascular WDL    Peripheral Neurovascular WDL WDL        Cognitive/Neuro/Behavioral WDL    Cognitive/Neuro/Behavioral WDL X     Level of Consciousness alert     Arousal Level opens eyes spontaneously     Orientation oriented x 4     Speech clear;spontaneous;logical     Mood/Behavior cooperative;calm        Pupils (CN II)    Pupil PERRLA yes     Pupil Size Left 3 mm     Pupil Size Right 3 mm        [place] Coma Scale    Best Eye Response 4-->(E4) spontaneous     Best Motor Response 6-->(M6) obeys commands     Best Verbal Response 5-->(V5) oriented     [place] Coma Scale Score 15

## 2024-12-01 NOTE — ED NOTES
[facility]   Admission Handoff    The patient is Flash Brown, 84 year old who arrived in the ED by AMBULANCE from home with a complaint of Fall and Lip Laceration  . The patient's current symptoms are new and during this time the symptoms have decreased. In the ED, patient was diagnosed with   Final diagnoses:   None         Needed?: No    Allergies:    Allergies   Allergen Reactions    Iodine Swelling     Patient states reaction was 20-30 years ago. Has had CT dye and coronary angiograms since then without premedication and did not have reaction.    Iodinated Contrast Media     Metoprolol Difficulty breathing     Difficulty breathing and bradycardia        Past Medical Hx:   Past Medical History:   Diagnosis Date    Basal cell carcinoma     CAD (coronary artery disease)     Depression     Diabetes (H)     Hyperlipidemia     Hypertension     Lymphoma (H)     Malignant melanoma (H)     Melanoma (H)     Squamous cell carcinoma        Initial vitals were: BP: (!) 138/117  Pulse: 73  Temp: 97.4  F (36.3  C)  Resp: 18  Weight: 95.3 kg (210 lb)  SpO2: 100 %   Recent vital Signs: BP (!) 156/78   Pulse 76   Temp 97.4  F (36.3  C) (Oral)   Resp (!) 8   Wt 95.3 kg (210 lb)   SpO2 99%   BMI 33.89 kg/m      Elimination Status: Continent: Yes     Activity Level: SBA w/ walker    Fall Status: Reason for falls risk:  Mobility, Reason for falls risk: Elimination, and Reason for falls risk: Cognition  nonskid shoes/slippers when out of bed, arm band in place, patient and family education, assistive device/personal items within reach, and activity supervised    Baseline Mental status: WDL/forgetful  Current Mental Status changes: at basesline    Infection present or suspected this encounter: no  Sepsis suspected: No    Isolation type: n/a    Bariatric equipment needed?: No    In the ED these meds were given: Medications - No data to display    Drips running?  No    Home pump   No    Current LDAs: Peripheral IV: Site left AC; Gauge 20g  none     Results:   Labs/Imaging  Ordered and Resulted from Time of ED Arrival Up to the Time of Departure from the ED  Results for orders placed or performed during the hospital encounter of 12/01/24 (from the past 24 hours)   [place] [facility]    Narrative    The following orders were created for panel order [place] [facility].  Procedure                               Abnormality         Status                     ---------                               -----------         ------                     Extra Blue Top Tube[[id number]]                                                           Please view results for these tests on the individual orders.   EKG 12 lead   Result Value Ref Range    Systolic Blood Pressure  mmHg    Diastolic Blood Pressure  mmHg    Ventricular Rate 72 BPM    Atrial Rate 72 BPM    WI Interval 180 ms    QRS Duration 96 ms     ms    QTc 505 ms    P Axis 67 degrees    R AXIS -1 degrees    T Axis 200 degrees    Interpretation ECG       Sinus rhythm with sinus arrhythmia  Left ventricular hypertrophy with repolarization abnormality ( R in aVL )  Prolonged QT  Abnormal ECG  No previous ECGs available  Confirmed by SEE ED PROVIDER NOTE FOR, ECG INTERPRETATION ([id number]),  Dorothy Andres ([id number]) on 12/1/2024 2:50:41 PM     CBC with Platelets & Differential    Narrative    The following orders were created for panel order CBC with Platelets & Differential.  Procedure                               Abnormality         Status                     ---------                               -----------         ------                     CBC with platelets and d...[[id number]]  Abnormal            Final result               RBC and Platelet Morphology[[id number]]                      Final result                 Please view results for these tests on the individual orders.   Basic metabolic panel   Result Value Ref Range    Sodium 140 135 - 145 mmol/L     Potassium 3.4 3.4 - 5.3 mmol/L    Chloride 104 98 - 107 mmol/L    Carbon Dioxide (CO2) 24 22 - 29 mmol/L    Anion Gap 12 7 - 15 mmol/L    Urea Nitrogen 11.8 8.0 - 23.0 mg/dL    Creatinine 0.77 0.67 - 1.17 mg/dL    GFR Estimate 89 >60 mL/min/1.73m2    Calcium 8.0 (L) 8.8 - 10.4 mg/dL    Glucose 117 (H) 70 - 99 mg/dL   CBC with platelets and differential   Result Value Ref Range    WBC Count 2.6 (L) 4.0 - 11.0 10e3/uL    RBC Count 4.02 (L) 4.40 - 5.90 10e6/uL    Hemoglobin 12.2 (L) 13.3 - 17.7 g/dL    Hematocrit 35.2 (L) 40.0 - 53.0 %    MCV 88 78 - 100 fL    MCH 30.3 26.5 - 33.0 pg    MCHC 34.7 31.5 - 36.5 g/dL    RDW 19.5 (H) 10.0 - 15.0 %    Platelet Count 33 (LL) 150 - 450 10e3/uL    % Neutrophils 70 %    % Lymphocytes 17 %    % Monocytes 10 %    % Eosinophils 2 %    % Basophils 0 %    % Immature Granulocytes 1 %    NRBCs per 100 WBC 0 <1 /100    Absolute Neutrophils 1.8 1.6 - 8.3 10e3/uL    Absolute Lymphocytes 0.4 (L) 0.8 - 5.3 10e3/uL    Absolute Monocytes 0.3 0.0 - 1.3 10e3/uL    Absolute Eosinophils 0.1 0.0 - 0.7 10e3/uL    Absolute Basophils 0.0 0.0 - 0.2 10e3/uL    Absolute Immature Granulocytes 0.0 <=0.4 10e3/uL    Absolute NRBCs 0.0 10e3/uL   RBC and Platelet Morphology   Result Value Ref Range    RBC Morphology Confirmed RBC Indices     Platelet Assessment  Automated Count Confirmed. Platelet morphology is normal.     Automated Count Confirmed. Platelet morphology is normal.   CT Head w/o Contrast    Narrative    EXAM: CT HEAD W/O CONTRAST  LOCATION: [facility]  DATE: 12/1/2024    INDICATION: Fall, head trauma.  COMPARISON: Head CT 10/17/2024.  TECHNIQUE: Routine CT Head without IV contrast. Multiplanar reformats. Dose reduction techniques were used.    FINDINGS:  INTRACRANIAL CONTENTS: No intracranial hemorrhage, extraaxial collection, or mass effect. No CT evidence of acute infarct. Mild presumed chronic small vessel ischemic changes. Moderate generalized volume loss. No  hydrocephalus.     VISUALIZED ORBITS/SINUSES/MASTOIDS: No intraorbital abnormality. No paranasal sinus mucosal disease. No middle ear or mastoid effusion.    BONES/SOFT TISSUES: No acute abnormality.      Impression    IMPRESSION:  1.  No CT evidence for acute intracranial process.  2.  Brain atrophy and presumed chronic microvascular ischemic changes as above.   CT Cervical Spine w/o Contrast    Narrative    EXAM: CT CERVICAL SPINE W/O CONTRAST  LOCATION: [facility]  DATE: 12/1/2024    INDICATION: Fall, head trauma.  COMPARISON: None.  TECHNIQUE: Routine CT Cervical Spine without IV contrast. Multiplanar reformats. Dose reduction techniques were used.      Impression    IMPRESSION: Minimal degenerative anterolisthesis of C4 upon C5. Alignment of the cervical vertebrae is otherwise normal. Vertebral body heights of the cervical spine are normal. Craniocervical alignment is normal. No fractures. There is loss of disc   space height and degenerative endplate spurring at C6-C7. Facet arthropathy throughout the cervical spine. No high-grade spinal canal stenosis. No prevertebral soft tissue swelling.       For the majority of the shift this patient's behavior was Green     Cardiac Rhythm: N/A  Pt needs tele? No  Skin/wound Issues:  laceration to inner upper lip    Code Status: Full Code    Pain control: fair    Nausea control: pt had none    Abnormal labs/tests/findings requiring intervention: frequent falls    Patient tested for COVID 19 prior to admission: NO     OBS brochure/video discussed/provided to patient/family: N/A     Family present during ED course? Yes     Family Comments/Social Situation comments: sister at bedside    Tasks needing completion: None    Elizabeth Cohen RN

## 2024-12-01 NOTE — H&P
[facility]    History and Physical - Hospitalist Service       Date of Admission:  12/1/2024    Assessment & Plan      Flash Brown is a 84 year old male admitted on 12/1/2024. He has a past medical history significant for coronary artery disease s/p PCI , HFpEF, hypertension, dyslipidemia, type 2 diabetes mellitus, hepatocellular carcinoma with chronic pancytopenia on current chemotherapy, BPH, generalized edema, and obstructive sleep apnea who presented to the emergency department for evaluation of non-prodromal and unwitnessed syncope x2 with head trauma.     Syncope x2  Had two unwitnessed syncopal episodes on 12/1/2024, no prodromal symptoms. No significant associated injury (see below). Blood pressure stable. Admission EKG demonstrates sinus rhythm, no acute ST changes. Fairly unremarkable echo in 2022, no evidence of cardiomyopathy. CareEverywhere mentions history of possible Mobitz type 1 (Wenckebach) AV block (see cardiology note 5/16/24), but not sustained. Patient had Zio patch 4/24-5/7/24 with one brief non-sustained run of vtach and two of SVT, but no persisting AV block.  No focal deficits on neuro exam. Unclear cause of syncope, differential includes orthostasis, arrhythmia, cardiomyopathy, micturation syncope, or brain mets.  - Monitor on telemetry   - Orthostatic vitals (monitor blood pressure on left arm only per family)  - Echo in am   - Discussed the possibility of pacemaker if an arrhythmia is identified; patient has cancer with unclear prognosis but upcoming oncology visit on Tuesday 12/3; patient and family would want to know cancer prognosis first before making any decisions about a pacemaker  - Could consider brain MRI to rule out mets if work-up otherwise unrevealing    Fall, initial encounter  Closed head injury, initial encounter  Lip laceration   Generalized weakness  Two unwitnessed falls as noted above, patient does not remember any details. He denies any  acute pain since his falls. No headache, but acquired obvious head trauma due to a small lip laceration and blood in his mouth. Head CT and CT C-spine were both negative for acute fractures or traumatic injury. Patient is not on anticoagulation prior to admission. No acute cognitive changes.   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   Admit hemoglobin 12.2 (baseline 11.9 - 13.0), admit WBC 2.6 (baseline 2.2 - 3.3), admit platelets 33 (baseline 41 - 58). No neutropenia (ANC 1.8). Takes iron 325 mg daily prior to admission.  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 Follows with oncology Dr. Watts, last visit 10/29/24. Had been on immunotherapy and still had disease progression, currently on cabozantinib alternating 20/40 mg every other day.   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 Follows with oncology Dr. Watts. Had a bone marrow biopsy in Feb 2022, which was normal.  - Continue with outpatient oncology surveillance    Right upper extremity arterial stenosis  Family reports prior inaccurate blood pressure readings of right arm. Upper extremity ultrasound 7/19/23 demonstrates - \"1.  Abnormal waveforms and velocities of the right upper extremity arterial system suggesting an inflow stenosis. The recent CTA shows significant atherosclerotic disease of the distal brachiocephalic artery likely reflecting areas of stenosis causing the inflow abnormality. 2.  Normal left upper extremity arterial ultrasound. No stenosis visualized.\"  - Known problem, only obtain blood pressure readings from left upper extremity     Diabetes mellitus without complication  HgbA1c 5.4. Managed prior to admission with metformin 1000 mg q am / 500 mg q pm. Admission glucose 117.   - Continue metformin  - Glucose checks qid  - Medium sliding scale insulin   - Hypoglycemia protocol available  - Consistent carbohydrate diet    (HFpEF) heart failure with preserved ejection fraction  Generalized edema  Known history of HFpEF. Echo in 2022 with EF 60-65%, no significant LV or RV abnormalities. Edema stable. Managed prior to admission with furosemide 40 mg daily.   - Continue home furosemide     Coronary artery disease involving native coronary artery of native heart without angina pectoris  Benign essential hypertension  Hyperlipidemia LDL goal <70  Previously followed with [facility] Dr. Tate, was also seen by [facility] on 5/16/24. History of PCI with LUDIVINA in 2010 (prox/mid RCA, distal circumflex, prox/mid LAD). Stress test 2015 with 70% stenosis of proximal " "circumflex, did not have subsequent angiogram for unclear reasons. Negative stress test during hospitalization at [facility] in August 2020. Managed prior to admission with aspirin 81 mg daily and atorvastatin 20 mg daily. No evidence of acute coronary syndrome at time of admission.  - Continue aspirin and statin    BPH (benign prostatic hyperplasia)  Previously diagnosed. Managed prior to admission with Flomax 0.4 mg q pm, continue.     Hypomagnesemia  Takes MgOx 400 mg bid, continue.     Obstructive sleep apnea  Not using CPAP.          Observation Goals: -diagnostic tests and consults completed and resulted, -vital signs normal or at patient baseline, -safe disposition plan has been identified, Nurse to notify provider when observation goals have been met and patient is ready for discharge.  Diet: Combination Diet Moderate Consistent Carb (60 g CHO per Meal) Diet; Low Saturated Fat Na <2400mg Diet, No Caffeine Diet    DVT Prophylaxis: Ambulate every shift  Caruso Catheter: Not present  Lines: None     Cardiac Monitoring: ACTIVE order. Indication: Syncope- high cardiac risk (48 hours)  Code Status: No CPR- Do NOT Intubate - discussed with patient and family, POLST also reviewed    Clinically Significant Risk Factors Present on Admission                 # Drug Induced Platelet Defect: home medication list includes an antiplatelet medication   # Hypertension: Noted on problem list                      Disposition Plan     Medically Ready for Discharge: Anticipated Tomorrow         The patient's care was discussed with the Attending Physician, Dr. Zander Zavaleta, Patient, and Patient's Family.    Catia Davis PA-C  Hospitalist Service  [facility]  Securely message with [employer] (more info)  Text page via [facility] Paging/Directory     ______________________________________________________________________    Chief Complaint   \"I fell twice today.\"    History is obtained from the patient and his " family (sister and brother-in-law), review of EMR, and emergency department sign out from Dr. Raj Humphreys.     History of Present Illness   Flash Brown is a 84 year old male with a past medical history significant for coronary artery disease s/p PCI , HFpEF, hypertension, dyslipidemia, type 2 diabetes mellitus, hepatocellular carcinoma with chronic pancytopenia on current chemotherapy, BPH, generalized edema, and obstructive sleep apnea who presented to the emergency department for evaluation of non-prodromal and unwitnessed syncope x2 with head trauma.     Patient resides at an assisted living facility.   Sometime around 4 am patient was trying to get out of bed to use the bathroom and slid out of bed onto the floor and ended up on the ground under his bed.   He was able to get back into bed and sleep.  Later today around 1 pm patient was ambulating with his walker to the bathroom when he woke up on the floor.   He was brought to the emergency department to evaluate for any resulting injuries and to also get work-up to possibly identify the cause of his falls.     With both falls, patient does not remember having any prodromal symptoms - no palpitations, dizziness, tunnel vision, chest pain, shortness of breath, or other symptoms.  He did not have any new pain after his falls, no acute cognitive changes.  No headache, vision changes, or focal deficits.     No nausea, did vomit once earlier this week, but no ongoing symptoms.     Has chronic diarrhea at baseline due to his chemo, has 1-2 loose stools per day typically.  Has chronic edema, stable and mild.   Has pruritus with excoriation marks on his legs.  Feels generally weak and fatigued, does not have the stamina that he once did, but no acute changes.  Has some confusion at baseline, but no acute changes per family.  Has poor appetite at baseline which is chronic.   The remainder review of systems is negative.       Past Medical History    Past Medical  History:   Diagnosis Date    Acute kidney failure, unspecified (H) 08/27/2022    Basal cell carcinoma     CAD (coronary artery disease)     Depression     Diabetes (H)     Hyperlipidemia     Hypertension     Lymphoma (H)     Malignant melanoma (H)     Melanoma (H)     Squamous cell carcinoma        Past Surgical History   Past Surgical History:   Procedure Laterality Date    AXILLARY SURGERY      S/P resection and adjuvant radiation    BONE MARROW BIOPSY, BONE SPECIMEN, NEEDLE/TROCAR N/A 7/8/2019    Procedure: BIOPSY, BONE MARROW;  Surgeon: Husam Issa MD;  Location: [facility]    BONE MARROW BIOPSY, BONE SPECIMEN, NEEDLE/TROCAR Left 2/24/2022    Procedure: BIOPSY, BONE MARROW;  Surgeon: Cailin Anthony PA-C;  Location: [facility] OR    CARDIAC SURGERY      CHOLECYSTECTOMY      HERNIA REPAIR, UMBILICAL      IR LIVER BIOPSY PERCUTANEOUS  6/2/2023    IR SIRT (SELECTIVE INTERNAL RADIO THERAPY)  7/26/2023    IR VISCERAL ANGIOGRAM  7/26/2023    IR VISCERAL EMBOLIZATION  7/31/2023    PHACOEMULSIFICATION WITH STANDARD INTRAOCULAR LENS IMPLANT Right 10/2/2019    Procedure: Cataract Removal with Implant;  Surgeon: Dinesh Ivey MD;  Location: [facility]    PHACOEMULSIFICATION WITH STANDARD INTRAOCULAR LENS IMPLANT Left 10/21/2019    Procedure: Cataract Removal with Implant;  Surgeon: Dinesh Ivey MD;  Location: [facility]    STENT      PTCA with drug-eluting stent of RCA, circumflex, and LAD    VASCULAR SURGERY         Prior to Admission Medications   Prior to Admission Medications   Prescriptions Last Dose Informant Patient Reported? Taking?   ASPIRIN LOW DOSE 81 MG chewable tablet 12/1/2024 Morning Nursing Home Yes Yes   Sig: Take 1 tablet by mouth daily.   Cabozantinib S-Malate (CABOMETYX) 20 MG  Nursing Home No No   Sig: Take 1 tablet (20 mg) by mouth See Admin Instructions Do not crush.  Give at least 1 hour before or 2 hours after eating.   Cabozantinib S-Malate (CABOMETYX) 20 MG 11/30/2024 Morning Nursing Home  No Yes   Sig: Take 1 tablet (20 mg) by mouth See Admin Instructions Do not crush.  Give at least 1 hour before or 2 hours after eating.   Cabozantinib S-Malate (CABOMETYX) 20 MG  Nursing Home No No   Sig: Take 1 tablet (20 mg) by mouth See Admin Instructions Do not crush.  Give at least 1 hour before or 2 hours after eating.   Cabozantinib S-Malate (CABOMETYX) 40 MG 12/1/2024 Morning Nursing Home No Yes   Sig: Take 1 tablet (40 mg) by mouth See Admin Instructions   Cabozantinib S-Malate (CABOMETYX) 40 MG  Nursing Home No No   Sig: Take 1 tablet (40 mg) by mouth See Admin Instructions   acetaminophen (TYLENOL) 500 MG tablet 12/1/2024 Morning Nursing Home Yes Yes   Sig: Take 2 tablets by mouth 2 times daily.   atorvastatin (LIPITOR) 20 MG tablet 11/30/2024 at  8:00 PM Nursing Home No Yes   Sig: Take 1 tablet (20 mg) by mouth daily   ferrous sulfate (FE TABS) 325 (65 Fe) MG EC tablet 12/1/2024 Morning Nursing Home No Yes   Sig: Take 1 tablet (325 mg) by mouth daily   furosemide (LASIX) 40 MG tablet 12/1/2024 Morning Nursing Home Yes Yes   Sig: Take 1 tablet by mouth daily.   hydrOXYzine HCl (ATARAX) 25 MG tablet 12/1/2024 Morning Nursing Home No Yes   Sig: Take 1 tablet (25 mg) by mouth 4 times daily   Patient taking differently: Take 1 tablet by mouth 2 times daily.   lactulose (CHRONULAC) 10 GM/15ML solution 12/1/2024 Morning Nursing Home No Yes   Sig: Take 30 mLs (20 g) by mouth daily   lidocaine (XYLOCAINE) 5 % external ointment Unknown Nursing Home No Yes   Sig: Apply topically 4 times daily as needed for moderate pain   loperamide (IMODIUM) 2 MG capsule Unknown Nursing Home Yes Yes   Sig: Take 2 mg by mouth as needed for diarrhea (>4-5 loose stools/day)   magnesium oxide (MAG-OX) 400 MG tablet 12/1/2024 Morning Nursing Home No Yes   Sig: Take 1 tablet (400 mg) by mouth 2 times daily   metFORMIN (GLUCOPHAGE) 500 MG tablet 12/1/2024 Morning Nursing Home Yes Yes   Sig: Take 2 tablets by mouth daily (with breakfast).    metFORMIN (GLUCOPHAGE) 500 MG tablet 11/30/2024 at  5:00 PM Nursing Home Yes Yes   Sig: Take 1 tablet by mouth daily (with dinner).   nitroGLYcerin (NITROSTAT) 0.4 MG sublingual tablet  Nursing Home No Yes   Sig: Place 1 tablet (0.4 mg) under the tongue See Admin Instructions for chest pain   ondansetron (ZOFRAN) 8 MG tablet Unknown Nursing Home No Yes   Sig: Take 1 tablet (8 mg) by mouth every 8 hours as needed for nausea   tamsulosin (FLOMAX) 0.4 MG capsule 11/30/2024 at  8:00 PM Nursing Home No Yes   Sig: Take 1 capsule (0.4 mg) by mouth every evening   triamcinolone (KENALOG) 0.1 % external cream Unknown Nursing Home Yes Yes   Sig: Apply topically 2 times daily as needed for irritation      Facility-Administered Medications: None        Review of Systems    The 10 point Review of Systems is negative other than noted in the HPI or here.     Social History   I have reviewed this patient's social history and updated it with pertinent information if needed.  Social History     Tobacco Use    Smoking status: Never     Passive exposure: Never    Smokeless tobacco: Never   Vaping Use    Vaping status: Never Used   Substance Use Topics    Alcohol use: Yes     Comment: very rarely    Drug use: Not Currently         Family History   I have reviewed this patient's family history and updated it with pertinent information if needed.  Family History   Problem Relation Age of Onset    Asthma Other     Cancer Other         melanoma    Blood Disease Other         bleeding disorder    Lung Cancer Mother         Smoker    Prostate Cancer Father     Melanoma No family hx of         Physical Exam   Vital Signs: Temp: 97.4  F (36.3  C) Temp src: Oral BP: 137/53 Pulse: 74   Resp: 16 SpO2: 100 % O2 Device: None (Room air)    Weight: 210 lbs 0 oz    Constitutional: Alert, oriented, cooperative. No apparent distress, appears nontoxic. Speaking in full coherent sentences.     Eyes: Eyes are clear, pupils are reactive. No scleral  icterus. Extraocular movements intact.     HEENT: Oropharynx with blood present and small laceration on inner lip. Poor dentition. Normocephalic, no evidence of cranial trauma.      Cardiovascular: Regular rhythm and rate, normal S1 and S2. No murmur, rubs, or gallops. Peripheral pulses intact bilaterally (right radial pulse palpable but diminished compared to right). No lower extremity edema.    Respiratory: Non-labored breathing on room air. Lung sounds are clear to auscultation bilaterally without wheezes, rhonchi, or crackles.    GI: Soft, non-distended. Minimally tender to palpation of all quadrants, but no rebound or guarding. No hepatosplenomegaly or masses appreciated. Normal bowel sounds.     Musculoskeletal: Without obvious deformity, normal range of motion. Normal muscle bulk and tone. Distal CMS intact.      Skin: Warm and dry, no ecchymoses. No mottling of skin. Excoriation marks present on bilateral lower extremities.      Neurologic: Patient moves all extremities. Gross strength and sensation are equal bilaterally, 5/5 strength. Cranial nerves intact. No pronator drift. Normal rapid alternating movements. Normal finger to nose test with the exception of some shakiness when using right hand.    Genitourinary: Deferred      Medical Decision Making       95 MINUTES SPENT BY ME on the date of service doing chart review, history, exam, documentation & further activities per the note.  MANAGEMENT DISCUSSED with the following over the past 24 hours: Dr. Zander Zavaleta   NOTE(S)/MEDICAL RECORDS REVIEWED over the past 24 hours: Hospitalization 4/22/24 (Care Everywhere), cardiology note 5/16/24 (CareEverywhere), emergency department notes  Tests ORDERED & REVIEWED in the past 24 hours:  - BMP  - CBC  SUPPLEMENTAL HISTORY, in addition to the patient's history, over the past 24 hours obtained from:   - Sibling      Data     I have personally reviewed the following data over the past 24 hrs:    2.6 (L)  \   12.2 (L)    / 33 (LL)     140 104 11.8 /  107 (H)   3.4 24 0.77 \     TSH: N/A T4: N/A A1C: 5.4       Imaging results reviewed over the past 24 hrs:   Recent Results (from the past 24 hours)   CT Head w/o Contrast    Narrative    EXAM: CT HEAD W/O CONTRAST  LOCATION: [facility]  DATE: 12/1/2024    INDICATION: Fall, head trauma.  COMPARISON: Head CT 10/17/2024.  TECHNIQUE: Routine CT Head without IV contrast. Multiplanar reformats. Dose reduction techniques were used.    FINDINGS:  INTRACRANIAL CONTENTS: No intracranial hemorrhage, extraaxial collection, or mass effect. No CT evidence of acute infarct. Mild presumed chronic small vessel ischemic changes. Moderate generalized volume loss. No hydrocephalus.     VISUALIZED ORBITS/SINUSES/MASTOIDS: No intraorbital abnormality. No paranasal sinus mucosal disease. No middle ear or mastoid effusion.    BONES/SOFT TISSUES: No acute abnormality.      Impression    IMPRESSION:  1.  No CT evidence for acute intracranial process.  2.  Brain atrophy and presumed chronic microvascular ischemic changes as above.   CT Cervical Spine w/o Contrast    Narrative    EXAM: CT CERVICAL SPINE W/O CONTRAST  LOCATION: [facility]  DATE: 12/1/2024    INDICATION: Fall, head trauma.  COMPARISON: None.  TECHNIQUE: Routine CT Cervical Spine without IV contrast. Multiplanar reformats. Dose reduction techniques were used.      Impression    IMPRESSION: Minimal degenerative anterolisthesis of C4 upon C5. Alignment of the cervical vertebrae is otherwise normal. Vertebral body heights of the cervical spine are normal. Craniocervical alignment is normal. No fractures. There is loss of disc   space height and degenerative endplate spurring at C6-C7. Facet arthropathy throughout the cervical spine. No high-grade spinal canal stenosis. No prevertebral soft tissue swelling.

## 2024-12-02 ENCOUNTER — TELEPHONE (OUTPATIENT)
Dept: PHARMACY | Facility: CLINIC | Age: 84
End: 2024-12-02

## 2024-12-02 VITALS
OXYGEN SATURATION: 98 % | RESPIRATION RATE: 18 BRPM | WEIGHT: 210 LBS | SYSTOLIC BLOOD PRESSURE: 107 MMHG | DIASTOLIC BLOOD PRESSURE: 50 MMHG | TEMPERATURE: 97.3 F | HEART RATE: 79 BPM | BODY MASS INDEX: 33.89 KG/M2

## 2024-12-02 PROBLEM — I95.1 ORTHOSTATIC HYPOTENSION: Status: ACTIVE | Noted: 2024-12-02

## 2024-12-02 PROBLEM — R41.89 COGNITIVE IMPAIRMENT: Status: ACTIVE | Noted: 2022-09-02

## 2024-12-02 LAB
ALBUMIN SERPL BCG-MCNC: 2.7 G/DL (ref 3.5–5.2)
ALP SERPL-CCNC: 121 U/L (ref 40–150)
ALT SERPL W P-5'-P-CCNC: 29 U/L (ref 0–70)
ANION GAP SERPL CALCULATED.3IONS-SCNC: 14 MMOL/L (ref 7–15)
AST SERPL W P-5'-P-CCNC: 52 U/L (ref 0–45)
BILIRUB SERPL-MCNC: 3 MG/DL
BUN SERPL-MCNC: 13.9 MG/DL (ref 8–23)
CALCIUM SERPL-MCNC: 7.9 MG/DL (ref 8.8–10.4)
CHLORIDE SERPL-SCNC: 104 MMOL/L (ref 98–107)
CREAT SERPL-MCNC: 0.82 MG/DL (ref 0.67–1.17)
EGFRCR SERPLBLD CKD-EPI 2021: 88 ML/MIN/1.73M2
ERYTHROCYTE [DISTWIDTH] IN BLOOD BY AUTOMATED COUNT: 19.8 % (ref 10–15)
GLUCOSE BLDC GLUCOMTR-MCNC: 104 MG/DL (ref 70–99)
GLUCOSE BLDC GLUCOMTR-MCNC: 83 MG/DL (ref 70–99)
GLUCOSE SERPL-MCNC: 98 MG/DL (ref 70–99)
HCO3 SERPL-SCNC: 23 MMOL/L (ref 22–29)
HCT VFR BLD AUTO: 34 % (ref 40–53)
HGB BLD-MCNC: 11.7 G/DL (ref 13.3–17.7)
LVEF ECHO: NORMAL
MCH RBC QN AUTO: 30.4 PG (ref 26.5–33)
MCHC RBC AUTO-ENTMCNC: 34.4 G/DL (ref 31.5–36.5)
MCV RBC AUTO: 88 FL (ref 78–100)
PLATELET # BLD AUTO: 41 10E3/UL (ref 150–450)
POTASSIUM SERPL-SCNC: 3.3 MMOL/L (ref 3.4–5.3)
POTASSIUM SERPL-SCNC: 3.7 MMOL/L (ref 3.4–5.3)
PROT SERPL-MCNC: 5.6 G/DL (ref 6.4–8.3)
RBC # BLD AUTO: 3.85 10E6/UL (ref 4.4–5.9)
SODIUM SERPL-SCNC: 141 MMOL/L (ref 135–145)
WBC # BLD AUTO: 2.9 10E3/UL (ref 4–11)

## 2024-12-02 PROCEDURE — 99239 HOSP IP/OBS DSCHRG MGMT >30: CPT

## 2024-12-02 PROCEDURE — G0378 HOSPITAL OBSERVATION PER HR: HCPCS

## 2024-12-02 PROCEDURE — 80053 COMPREHEN METABOLIC PANEL: CPT | Performed by: PHYSICIAN ASSISTANT

## 2024-12-02 PROCEDURE — 250N000013 HC RX MED GY IP 250 OP 250 PS 637: Performed by: PHYSICIAN ASSISTANT

## 2024-12-02 PROCEDURE — 97535 SELF CARE MNGMENT TRAINING: CPT | Mod: GO

## 2024-12-02 PROCEDURE — 97165 OT EVAL LOW COMPLEX 30 MIN: CPT | Mod: GO

## 2024-12-02 PROCEDURE — 36415 COLL VENOUS BLD VENIPUNCTURE: CPT | Performed by: PHYSICIAN ASSISTANT

## 2024-12-02 PROCEDURE — 36415 COLL VENOUS BLD VENIPUNCTURE: CPT | Performed by: FAMILY MEDICINE

## 2024-12-02 PROCEDURE — 97161 PT EVAL LOW COMPLEX 20 MIN: CPT | Mod: GP | Performed by: PHYSICAL THERAPIST

## 2024-12-02 PROCEDURE — 85014 HEMATOCRIT: CPT | Performed by: PHYSICIAN ASSISTANT

## 2024-12-02 PROCEDURE — 82310 ASSAY OF CALCIUM: CPT | Performed by: PHYSICIAN ASSISTANT

## 2024-12-02 PROCEDURE — 97530 THERAPEUTIC ACTIVITIES: CPT | Mod: GP | Performed by: PHYSICAL THERAPIST

## 2024-12-02 PROCEDURE — 250N000013 HC RX MED GY IP 250 OP 250 PS 637: Performed by: FAMILY MEDICINE

## 2024-12-02 PROCEDURE — 82962 GLUCOSE BLOOD TEST: CPT

## 2024-12-02 PROCEDURE — 84132 ASSAY OF SERUM POTASSIUM: CPT | Performed by: FAMILY MEDICINE

## 2024-12-02 PROCEDURE — 82105 ALPHA-FETOPROTEIN SERUM: CPT

## 2024-12-02 RX ORDER — HYDROXYZINE HYDROCHLORIDE 25 MG/1
25 TABLET, FILM COATED ORAL 2 TIMES DAILY
Qty: 30 TABLET | Refills: 0 | Status: SHIPPED | OUTPATIENT
Start: 2024-12-02

## 2024-12-02 RX ORDER — POTASSIUM CHLORIDE 1500 MG/1
40 TABLET, EXTENDED RELEASE ORAL ONCE
Status: COMPLETED | OUTPATIENT
Start: 2024-12-02 | End: 2024-12-02

## 2024-12-02 RX ADMIN — MAGNESIUM OXIDE TAB 400 MG (241.3 MG ELEMENTAL MG) 400 MG: 400 (241.3 MG) TAB at 08:51

## 2024-12-02 RX ADMIN — FERROUS SULFATE TAB 325 MG (65 MG ELEMENTAL FE) 325 MG: 325 (65 FE) TAB at 08:48

## 2024-12-02 RX ADMIN — LACTULOSE 20 G: 20 SOLUTION ORAL at 08:51

## 2024-12-02 RX ADMIN — ASPIRIN 81 MG 81 MG: 81 TABLET ORAL at 08:47

## 2024-12-02 RX ADMIN — HYDROXYZINE HYDROCHLORIDE 25 MG: 25 TABLET, FILM COATED ORAL at 08:48

## 2024-12-02 RX ADMIN — POTASSIUM CHLORIDE 40 MEQ: 1500 TABLET, EXTENDED RELEASE ORAL at 08:50

## 2024-12-02 RX ADMIN — FUROSEMIDE 40 MG: 40 TABLET ORAL at 08:49

## 2024-12-02 RX ADMIN — ACETAMINOPHEN 1000 MG: 500 TABLET ORAL at 08:46

## 2024-12-02 RX ADMIN — METFORMIN HYDROCHLORIDE 1000 MG: 500 TABLET, FILM COATED ORAL at 08:51

## 2024-12-02 ASSESSMENT — ACTIVITIES OF DAILY LIVING (ADL)
ADLS_ACUITY_SCORE: 50
DEPENDENT_IADLS:: TRANSPORTATION;CLEANING;COOKING;SHOPPING
ADLS_ACUITY_SCORE: 50
ADLS_ACUITY_SCORE: 54
ADLS_ACUITY_SCORE: 50
ADLS_ACUITY_SCORE: 50
ADLS_ACUITY_SCORE: 54
ADLS_ACUITY_SCORE: 54
ADLS_ACUITY_SCORE: 50
ADLS_ACUITY_SCORE: 62
ADLS_ACUITY_SCORE: 54
ADLS_ACUITY_SCORE: 62
ADLS_ACUITY_SCORE: 62
ADLS_ACUITY_SCORE: 54
ADLS_ACUITY_SCORE: 54
ADLS_ACUITY_SCORE: 50

## 2024-12-02 NOTE — TELEPHONE ENCOUNTER
PA Initiation    Medication: CABOMETYX 40 MG PO TABS  Insurance Company: [employer] Part D - Phone [phone] Fax [phone]  Pharmacy Filling the Rx: [facility] - [place], [place] - [facility] [address]   Start Date: 12/2/2024        Thank you,  Svitlana Jose Oncology/Transplant Liaison  Phone: [phone]  Fax: [phone]

## 2024-12-02 NOTE — PROGRESS NOTES
"   12/02/24 0900   Appointment Info   Signing Clinician's Name / Credentials (OT) Margo Chavira, OTR/L   Quick Adds   Quick Adds Certification   Living Environment   People in Home alone   Current Living Arrangements assisted living   Home Accessibility no concerns   Self-Care   Equipment Currently Used at Home walker, rolling;shower chair;grab bar, toilet;grab bar, tub/shower   Fall history within last six months yes   Number of times patient has fallen within last six months 2  (states one fall was from \"everyone moving around him quickly\".)   Activity/Exercise/Self-Care Comment at baseline: independent with dressing, toileting, functional mobility using FWW. States he has assist with showers 1x/week utilizing shower chair and grab bars.   General Information   Onset of Illness/Injury or Date of Surgery 12/01/24   Referring Physician Catia Davis PA-C   Patient/Family Therapy Goal Statement (OT) to return home.   Additional Occupational Profile Info/Pertinent History of Current Problem Flash Brown is a 84 year old male admitted on 12/1/2024. He has a past medical history significant for coronary artery disease s/p PCI , HFpEF, hypertension, dyslipidemia, type 2 diabetes mellitus, hepatocellular carcinoma with chronic pancytopenia on current chemotherapy, BPH, generalized edema, and obstructive sleep apnea who presented to the emergency department for evaluation of non-prodromal and unwitnessed syncope x2 with head trauma.   Existing Precautions/Restrictions fall   General Observations and Info Per chart review \"family states that he already has the option to move from assisted living to the memory care unit at his facility and they are considering that (patient does not have significant dementia, but memory care would offer additional services that he does not currently have)\"   Cognitive Status Examination   Cognitive Status Comments answer all PLOF questions appropriately. and follows 100% " commands.   Pain Assessment   Patient Currently in Pain No   Strength Comprehensive (MMT)   Comment, General Manual Muscle Testing (MMT) Assessment fatigues with activity.   Bed Mobility   Comment (Bed Mobility) SBA- quick to ask for assist, however with encouragement pt is able to complete with supervision. States slight dizziness upon sitting up- needs cues to sit EOB until dizziness resolves.   Transfers   Transfer Comments SBA   Balance   Balance Comments Ambulates within room with SBA and FWW   Activities of Daily Living   BADL Assessment/Intervention lower body dressing;toileting   Toileting   Penn Level (Toileting) supervision   Clinical Impression   Criteria for Skilled Therapeutic Interventions Met (OT) Yes, treatment indicated   OT Diagnosis decreased safety with ADLs   Influenced by the following impairments general safety and activity tolerance for ADLs.   OT Problem List-Impairments impacting ADL problems related to;activity tolerance impaired  (dizziness)   Assessment of Occupational Performance 1-3 Performance Deficits   Identified Performance Deficits general activity tolerance for ADLs.   Planned Therapy Interventions (OT) ADL retraining;strengthening;progressive activity/exercise   Clinical Decision Making Complexity (OT) problem focused assessment/low complexity   Risk & Benefits of therapy have been explained patient;participants included;participants voiced agreement with care plan;current/potential barriers reviewed;risks/benefits reviewed;care plan/treatment goals reviewed;evaluation/treatment results reviewed   OT Total Evaluation Time   OT Eval, Low Complexity Minutes (99077) 10   Therapy Certification   Medical Diagnosis falls   Start of Care Date 12/02/24   Certification date from 12/02/24   Certification date to 12/09/24   OT Goals   Therapy Frequency (OT) 5 times/week   OT Predicted Duration/Target Date for Goal Attainment 12/09/24   OT Goals Lower Body  Dressing;Hygiene/Grooming;Toilet Transfer/Toileting   OT: Hygiene/Grooming supervision/stand-by assist;while standing   OT: Lower Body Dressing Supervision/stand-by assist   OT: Toilet Transfer/Toileting Modified independent   Interventions   Interventions Quick Adds Self-Care/Home Management   Self-Care/Home Management   Self-Care/Home Mgmt/ADL, Compensatory, Meal Prep Minutes (45350) 10   Symptoms Noted During/After Treatment (Meal Preparation/Planning Training) fatigue   Treatment Detail/Skilled Intervention Pt needing education/cues on importance of transferring slowly and sitting EOB for 30-60 seconds then standing 30-60 seconds to allow time for dizziness to resolve. To increase activity to address activity tolerance- pt ambulates to/from doorway with SBA and FWW. Does appear fatigued with task. Educated pt on POC, verbalizes understanding. Pt left supine with all needs within reach, bed alarm and staff present- as pt is getting set-up for ECHO.   OT Discharge Planning   OT Plan POC Monday 1/5; standing g/h, LB dressing   OT Discharge Recommendation (DC Rec) home with assist;home with home care occupational therapy  (return to [facility] with continued services. Per chart review family is considering transitioning him to [facility] to increase services- this appears appropriate.)   OT Rationale for DC Rec pt appears to be near/at baseline for ADLs and related mobility. Home OT to maximize independence/safety with ADLs.   OT Brief overview of current status SBA and FWW   Total Session Time   Timed Code Treatment Minutes 10   Total Session Time (sum of timed and untimed services) 20        [facility]  OUTPATIENT OCCUPATIONAL THERAPY  EVALUATION  PLAN OF TREATMENT FOR OUTPATIENT REHABILITATION  (COMPLETE FOR INITIAL CLAIMS ONLY)  Patient's Last Name, First Name, M.I.  YOB: 1940  Flash Brown                          Provider's Name  [facility]  Medical Record No.  [id number]                             Onset Date:  12/01/24   Start of Care Date:  12/02/24   Type:     ___PT   _X_OT   ___SLP Medical Diagnosis:  falls                    OT Diagnosis:  decreased safety with ADLs Visits from SOC:  1     See note for plan of treatment, functional goals and certification details    I CERTIFY THE NEED FOR THESE SERVICES FURNISHED UNDER        THIS PLAN OF TREATMENT AND WHILE UNDER MY CARE     (Physician co-signature of this document indicates review and certification of the therapy plan).

## 2024-12-02 NOTE — TELEPHONE ENCOUNTER
Prior Authorization Approval    Medication: CABOMETYX 40 MG PO TABS  Authorization Effective Date: 12/2/2024  Authorization Expiration Date: 12/31/2025  Approved Dose/Quantity: 15/30  Reference #: [id number]   Insurance Company: [employer] Part D - Phone [phone] Fax [phone]  Expected CoPay: $    CoPay Card Available:      Financial Assistance Needed: N/A - Renewal  Which Pharmacy is filling the prescription: [facility] - [place] [address]   Pharmacy Notified: N/A - Renewal  Patient Notified: N/A - Renewal        Thank you,  Svitlana Jose Oncology/Transplant Liaison  Phone: [phone]  Fax: [phone]

## 2024-12-02 NOTE — PROGRESS NOTES
Reviewed troponin levels with BEAR Pittman. No further Troponin levels ordered at this time.    Mirvaso Counseling: Mirvaso is a topical medication which can decrease superficial blood flow where applied. Side effects are uncommon and include stinging, redness and allergic reactions.

## 2024-12-02 NOTE — PROGRESS NOTES
"PRIMARY DIAGNOSIS: Syncope  OUTPATIENT/OBSERVATION GOALS TO BE MET BEFORE DISCHARGE:  ADLs back to baseline: Yes    Activity and level of assistance: Assistance of 1 with rolling walker and gait belt.      Pain status: Pain free.    Return to near baseline physical activity: Yes      Patient is alert to self and day, states, \"I had a fall and then all these people were moving around so I fell again.\"  Later in shift, patient attempting to crawl out of bed, states, \"I am having a panic attack, I do not know what I am doing here.\"  Patient redirected frequently and calmed with redirection.  Patient denied pain.  Patient had large, loose watery green stools, per patient, \"My normal.\"    "

## 2024-12-02 NOTE — PLAN OF CARE
Pt discharged home at 1540, transported by sister Daylin.  Discharge instructions reviewed with , copy provided.  Prescription sent to pharmacy.  All belongings sent home with pt.

## 2024-12-02 NOTE — PROGRESS NOTES
"   12/02/24 1100   Appointment Info   Signing Clinician's Name / Credentials (PT) Ken Guillen, PT, DPT, OCS   Living Environment   People in Home alone   Current Living Arrangements assisted living   Home Accessibility no concerns   Self-Care   Usual Activity Tolerance good   Current Activity Tolerance fair   Equipment Currently Used at Home walker, rolling;shower chair;grab bar, toilet;grab bar, tub/shower   Fall history within last six months yes   Number of times patient has fallen within last six months 2  (states one fall was from \"everyone moving around him quickly\".)   Activity/Exercise/Self-Care Comment at baseline: independent with dressing, toileting, functional mobility using FWW. States he has assist with showers 1x/week utilizing shower chair and grab bars.   General Information   Onset of Illness/Injury or Date of Surgery 12/01/24   Referring Physician Dr. Elvis Bennett   Patient/Family Therapy Goals Statement (PT) Return to [facility]   Pertinent History of Current Problem (include personal factors and/or comorbidities that impact the POC) From H&P: Flash Brown is a 84 year old male admitted on 12/1/2024. He has a past medical history significant for coronary artery disease s/p PCI , HFpEF, hypertension, dyslipidemia, type 2 diabetes mellitus, hepatocellular carcinoma with chronic pancytopenia on current chemotherapy, BPH, generalized edema, and obstructive sleep apnea who presented to the emergency department for evaluation of non-prodromal and unwitnessed syncope x2 with head trauma.   Existing Precautions/Restrictions fall   Weight-Bearing Status - LUE full weight-bearing   Weight-Bearing Status - RUE full weight-bearing   Weight-Bearing Status - LLE full weight-bearing   Weight-Bearing Status - RLE full weight-bearing   Heart Disease Risk Factors High blood pressure;Lack of physical activity;Dislipidemia;Age;Medical history   Cognition   Affect/Mental Status (Cognition) confused   Orientation Status " (Cognition) oriented to;person   Follows Commands (Cognition) WFL   Pain Assessment   Patient Currently in Pain No   Integumentary/Edema   Integumentary/Edema Comments Abrasions along B LE's   Posture    Posture Forward head position   Range of Motion (ROM)   Range of Motion ROM is WFL   Strength (Manual Muscle Testing)   Strength Comments B LE's are 4/5 globally.   Transfers   Transfers sit-stand transfer   Sit-Stand Transfer   Sit-Stand Lane (Transfers) supervision   Assistive Device (Sit-Stand Transfers) walker, front-wheeled   Gait/Stairs (Locomotion)   Lane Level (Gait) contact guard   Assistive Device (Gait) walker, front-wheeled   Distance in Feet (Gait) 50   Pattern (Gait) step-through   Deviations/Abnormal Patterns (Gait) base of support, wide   Comment, (Gait/Stairs) Near LOB due to recliner leg rest pushing into the back of his legs, required min assist to prevent fall.   Balance   Balance Comments supervision in standing with 2WW.   Sensory Examination   Sensory Perception patient reports no sensory changes   Coordination   Coordination no deficits were identified   Muscle Tone   Muscle Tone no deficits were identified   Clinical Impression   Criteria for Skilled Therapeutic Intervention Yes, treatment indicated   PT Diagnosis (PT) Generalized weakness.   Influenced by the following impairments weakness, imbalance.   Functional limitations due to impairments transfers, ambulation   Clinical Presentation (PT Evaluation Complexity) stable   Clinical Presentation Rationale Clinical judgement   Clinical Decision Making (Complexity) low complexity   Planned Therapy Interventions (PT) balance training;bed mobility training;gait training;strengthening;stair training;ROM (range of motion);patient/family education;transfer training   Risk & Benefits of therapy have been explained evaluation/treatment results reviewed;care plan/treatment goals reviewed;risks/benefits reviewed;current/potential  barriers reviewed;participants voiced agreement with care plan;participants included;patient   PT Total Evaluation Time   PT Eval, Low Complexity Minutes (90248) 15   Physical Therapy Goals   PT Frequency 3x/week   PT Predicted Duration/Target Date for Goal Attainment 12/02/24   PT Goals Gait;Transfers   PT: Transfers Modified independent   PT: Gait Modified independent;50 feet;Rolling walker   Interventions   Interventions Quick Adds Therapeutic Activity   Therapeutic Activity   Therapeutic Activities: dynamic activities to improve functional performance Minutes (22014) 10   Symptoms Noted During/After Treatment Fatigue   Treatment Detail/Skilled Intervention Pt transferred sit to stand with 2WW and CGA.  Pt had near LOB requiring min assist to stay standing as the result of his leg rest pushing into his legs upon standing.  Pt ambulated 50 feet with 2WW with CGA before returning to chair.  Chair alarm activated and call light in reach upon PT departure   PT Discharge Planning   PT Plan DC   PT Discharge Recommendation (DC Rec) home with home care physical therapy;home with assist  (Return to group home)   PT Rationale for DC Rec Pt demonstrated appropriate function to return to [facility].  Pt would benefit from homecare PT as he is currently below functional baseline with limited activity tolerance and strength.   PT Brief overview of current status CGA with transfers and ambulation to 50 feet.   Physical Therapy Time and Intention   Timed Code Treatment Minutes 10   Total Session Time (sum of timed and untimed services) 25   [facility]  OUTPATIENT PHYSICAL THERAPY EVALUATION  PLAN OF TREATMENT FOR OUTPATIENT REHABILITATION  (COMPLETE FOR INITIAL CLAIMS ONLY)  Patient's Last Name, First Name, M.I.  YOB: 1940  Flash Brown                        Provider's Name  [facility] Medical Record No.  [id number]                             Onset Date:   12/01/24   Start of Care Date:   12/2/24   Type:     _X_PT   ___OT   ___SLP Medical Diagnosis: Generalized weakness                PT Diagnosis:  Generalized weakness. Visits from SOC:  1   POC dates: 12/2/24-12/6/24  See note for plan of treatment, functional goals and certification details    I CERTIFY THE NEED FOR THESE SERVICES FURNISHED UNDER        THIS PLAN OF TREATMENT AND WHILE UNDER MY CARE     (Physician co-signature of this document indicates review and certification of the therapy plan).

## 2024-12-02 NOTE — CONSULTS
Care Management Initial Consult    General Information  Assessment completed with: Patient, Family, Sister-in-law Daylin  Type of CM/SW Visit: Offer D/C Planning    Primary Care Provider verified and updated as needed: Yes ([facility])   Readmission within the last 30 days: no previous admission in last 30 days      Reason for Consult: discharge planning  Advance Care Planning: Advance Care Planning Reviewed: present on chart          Communication Assessment  Patient's communication style: spoken language (English or Bilingual)    Hearing Difficulty or Deaf: yes   Wear Glasses or Blind: no    Cognitive  Cognitive/Neuro/Behavioral: .WDL except  Level of Consciousness: confused, alert  Arousal Level: opens eyes spontaneously  Orientation: disoriented to, time, situation  Mood/Behavior: cooperative  Best Language: 0 - No aphasia  Speech: spontaneous    Living Environment:   People in home: alone     Current living Arrangements: assisted living  Name of Facility: [facility]   Able to return to prior arrangements: yes       Family/Social Support:  Care provided by: self (Staff at the [facility])  Provides care for: no one, unable/limited ability to care for self  Marital Status: Single  Support system: Facility resident(s)/Staff (Sister-in-law)          Description of Support System: Supportive, Involved    Support Assessment: Adequate family and caregiver support, Adequate social supports    Current Resources:   Patient receiving home care services: No  Community Resources: None  Equipment currently used at home: walker, rolling, shower chair, grab bar, toilet, grab bar, tub/shower  Supplies currently used at home: Wound Care Supplies, Diabetic Supplies    Employment/Financial:  Employment Status: retired        Financial Concerns: none        Does the patient's insurance plan have a 3 day qualifying hospital stay waiver?  No    Lifestyle & Psychosocial Needs:  Social Drivers of Health     Food  Insecurity: Low Risk  (12/1/2024)    Food Insecurity     Within the past 12 months, did you worry that your food would run out before you got money to buy more?: No     Within the past 12 months, did the food you bought just not last and you didn t have money to get more?: No   Depression: Not at risk (10/10/2023)    PHQ-2     PHQ-2 Score: 0   Housing Stability: Low Risk  (12/1/2024)    Housing Stability     Do you have housing? : Yes     Are you worried about losing your housing?: No   Tobacco Use: Low Risk  (10/29/2024)    Patient History     Smoking Tobacco Use: Never     Smokeless Tobacco Use: Never     Passive Exposure: Never   Financial Resource Strain: Low Risk  (12/1/2024)    Financial Resource Strain     Within the past 12 months, have you or your family members you live with been unable to get utilities (heat, electricity) when it was really needed?: No   Alcohol Use: Not At Risk (11/9/2021)    AUDIT-C     Frequency of Alcohol Consumption: 2-3 times a week     Average Number of Drinks: 1 or 2     Frequency of Binge Drinking: Never   Transportation Needs: Low Risk  (12/1/2024)    Transportation Needs     Within the past 12 months, has lack of transportation kept you from medical appointments, getting your medicines, non-medical meetings or appointments, work, or from getting things that you need?: No   Physical Activity: Inactive (11/9/2021)    Exercise Vital Sign     Days of Exercise per Week: 0 days     Minutes of Exercise per Session: 0 min   Interpersonal Safety: Not on file   Stress: No Stress Concern Present (11/9/2021)    [employer] - Occupational Stress Questionnaire     Feeling of Stress : Not at all   Social Connections: Socially Integrated (4/21/2024)    Received from [employer] [facility]    Social Connections     Do you often feel lonely or isolated from those around you?: 0   Health Literacy: Not on file       Functional Status:  Prior to  admission patient needed assistance:   Dependent ADLs:: Independent, Ambulation-walker  Dependent IADLs:: Transportation, Cleaning, Cooking, Shopping       Mental Health Status:  Mental Health Status: No Current Concerns       Chemical Dependency Status:  Chemical Dependency Status: No Current Concerns             Values/Beliefs:  Spiritual, Cultural Beliefs, [employer] Practices, Values that affect care: no               Discussed  Partnership in Safe Discharge Planning  document with patient/family: Yes    Additional Information:  Referral received to assist with discharge planning.     Lives at the [facility]   # [phone]    Rosa -RN confirmed the pt resides in the [facility]  Receives assistance with showers and medications       CM spoke with the Pt's Sister-in-law Daylin # [phone]  Family stated they have been working with the [facility] to transition the patient to the Memory Care section of the facility.   They do not want to consider TCU. Stating the pt has been to TCU in the past.     Would be more interested in having the patient receive outpatient PT/OT at the [facility].     --Confirmed with staff at the [facility]. They can provide outpatient therapies.     Family will continue the planning for Memory Care at the [facility] once the move can take place. RYAN Mao will continue to work with the RN Rosa to coordinate     Daylin stated she will assist with transportation   Plans to pick him up ~3:00pm today       External Handoff to [facility]       Discharge Plan: Return back to [facility]   phone: [phone]   Fax: [phone]     Outpatient PT/OT     RYAN Mao - Transport   3:00pm       Pharmacy - [facility]      ROSALIO Recinos  [facility] [phone]   [facility]  [phone]

## 2024-12-02 NOTE — PROGRESS NOTES
PRIMARY DIAGNOSIS: Syncope  OUTPATIENT/OBSERVATION GOALS TO BE MET BEFORE DISCHARGE:  ADLs back to baseline: Yes    Activity and level of assistance: Up with assist of 1    Pain status: Pain free.    Return to near baseline physical activity: Yes     Discharge Planner Nurse   Safe discharge environment identified: Yes  Barriers to discharge: No       Entered by: Bushra Cabello RN 12/01/2024 10:16 PM     Alert to self and place. Denied pain. Ambulates assist of 1 with walker and gait belt. Uses call light. Bed alarm on. PCDs in place. Small laceration on lip from previous fall.   Patient and his sister both expressed wanting patient to return to [facility] living but transition over to their memory care unit.

## 2024-12-02 NOTE — DISCHARGE SUMMARY
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)    Hyperlipidemia LDL goal <70    Other pancytopenia (H)    BPH (benign prostatic hyperplasia)    (HFpEF) heart failure with preserved ejection fraction (H)    Hypomagnesemia    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)    Congestive splenomegaly    Other iron deficiency anemia    Diabetes mellitus without complication (H)    Hepatocellular carcinoma (H)    Generalized edema    Elevated bilirubin    Generalized weakness    Closed head injury, initial encounter    Fall, initial encounter    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            Discharge Disposition   Discharged to assisted living  Condition at discharge: Stable    Hospital Course   Flash Brown is a 84 year old male admitted on 12/1/2024. He has a past medical history significant for coronary artery disease s/p PCI , HFpEF, hypertension, dyslipidemia, type 2 diabetes mellitus, hepatocellular carcinoma with chronic pancytopenia on current chemotherapy, BPH, generalized edema, and obstructive sleep apnea who presented to the emergency  department for evaluation of non-prodromal and unwitnessed falls x2 with head trauma. Workup was unrevealing. Patient initially did not remember any details surrounding the falls, but later stated one of them was due to standing out of bed too fast. Sister mentions a history of orthostatic hypotension, and orthostatics positive on admission.    Unwitnessed falls (2) due to orthostatic hypotension  Closed head injury, initial encounter  Lip laceration   Generalized weakness    Had two unwitnessed falls on 12/1/2024. Patient initially did not remember any details, but later stated one of the falls was from him standing up too fast out of bed. No significant injury besides a lip laceration. Head and c-spine CT negative. Blood pressure stable. Admission EKG NSR. Fairly unremarkable echo in 2022, no evidence of cardiomyopathy. CareEverywhere mentions history of possible Mobitz type 1 (Wenckebach) AV block (see cardiology note 5/16/24), but not sustained. Patient had Zio patch 4/24-5/7/24 with one brief non-sustained run of vtach and two episodes of SVT, but no persisting AV block.    No focal deficits on neuro exam. Orthostatics positive (150/69 ?  142/74 ?  96/46). Suspect falls are 2/2 orthostatic hypotension. His sister mentioned history of orthostatic hypotension. Also considered syncope, seizures, arrhythmia, subclavian steal, hypoglycemia, or brain mets. Telemetry during admission showed NSR with frequent PVCs. Echo unchanged from 2022.     PT/OT/SW consulted and recommended discharge back to [facility]. Family is working on getting him to the [facility] at his facility per the recommendation of the [facility] staff due to declining memory and worsening weakness since starting new chemo.       - Stopped PTA Flomax due to orthostatic hypotension. Follow up with PCP or urology  - Continue working on getting to the MCU unit at his facility, which will have more intensive therapy/monitoring to prevent further falls  - Discharged with Zio  "Patch  - Consider brain MRI to rule out mets if work-up otherwise unrevealing  - Per ER, no intervention required for lip laceration  - Consider vascular referral for possible subclavian steal (see below), however likely a poor surgical candidate     Mild cognitive impairment  Oriented to self, person, place. Disoriented to parts of situation. Sister stated the new chemo has made his memory worse. [facility] staff recommended transfer to the MCU unit due to worsening memory.  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 Follows with oncology Dr. Watts, last visit 10/29/24. Had been on immunotherapy and had disease progression, currently on cabozantinib alternating 20/40 mg every other day. LFTs stable on admission.    - Cabozanitib on hold in the hospital (non-formulary), resumed on discharge  - Patient has next oncology visit on 12/3/24, awaiting to see if he has improvement / response to the increased cabozanitnib dose    Pancytopenia, chronic  Thrombocytopenia (due to congestive splenomegaly), chronic  Other iron deficiency anemia    Presented with pancytopenia that is chronic and stable in the setting of known hepatocellular cancer on chemo, congestive splenomegaly, and iron deficiency anemia.   Admit hemoglobin 12.2 (baseline 11.9 - 13.0), admit WBC 2.6 (baseline 2.2 - 3.3), admit platelets 33 (baseline 41 - 58). No neutropenia (ANC 1.8).   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 Upper extremity ultrasound 7/19/23 demonstrates - \"abnormal waveforms and velocities of the right upper extremity arterial " "system suggesting an inflow stenosis. The recent CTA shows significant atherosclerotic disease of the distal brachiocephalic artery likely reflecting areas of stenosis causing the inflow abnormality.\"     Mild bruit noted in right subclavian. No other bruits identified in bilateral carotid or brachial arteries. Right radial pulse 1+ and left radial pulse 2+.  on left arm and 107 on right.    - Known problem, only obtain blood pressure readings from left upper extremity     History of grade 3 follicular lymphoma of lymph nodes of axilla   History of lymphoma diagnosed in 1983, s/p axillary nodule dissection and radiation. Follows with oncology Dr. Watts. Had a bone marrow biopsy in Feb 2022, which was normal.  - Continue with outpatient oncology surveillance    T2DM without complication  HgbA1c 5.4%. Admission glucose 117. Well controlled.    - Continued PTA Metformin 1000 in am and 500 in pm  - Medium sliding scale insulin   - Consistent carbohydrate diet    (HFpEF) heart failure with preserved ejection fraction  Known history of HFpEF. Echo in 2022 with EF 60-65%, no significant LV or RV abnormalities. Edema stable on admission. Echo on admission stable from prior.   - Continued PTA Furosemide 40    CAD s/p PCI  Benign essential hypertension  Hyperlipidemia LDL goal <70  Previously followed with [facility] Dr. Tate, was also seen by [facility] on 5/16/24. History of PCI with LUDIVINA in 2010 (prox/mid RCA, distal circumflex, prox/mid LAD). Stress test 2015 with 70% stenosis of proximal circumflex, did not have subsequent angiogram for unclear reasons. Negative stress test during hospitalization at [facility] in August 2020. No evidence of acute coronary syndrome at time of admission.  - Continued PTA Aspirin 81 and Atorvastatin 20    BPH (benign prostatic hyperplasia)  Previously diagnosed. Managed prior to admission with Flomax 0.4 mg q pm, continue.     Hypomagnesemia  Takes MgOx 400 mg bid, " continue.     Obstructive sleep apnea  Not using CPAP.       Consultations This Hospital Stay   CARE MANAGEMENT / SOCIAL WORK IP CONSULT  PHYSICAL THERAPY ADULT IP CONSULT  OCCUPATIONAL THERAPY ADULT IP CONSULT    Code Status   No CPR- Do NOT Intubate    Time Spent on this Encounter   I, Sergio Wallace PA-C, personally saw the patient today and spent greater than 30 minutes discharging this patient.       Sergio Wallace PA-C  [facility] SURGICAL  [address] [address]  Phone: [phone]  Fax: [phone]  ______________________________________________________________________    Physical Exam   Vital Signs: Temp: 97.7  F (36.5  C) Temp src: Oral BP: (!) 154/54 Pulse: 81   Resp: 18 SpO2: 96 % O2 Device: None (Room air)    Weight: 210 lbs 0 oz  Constitutional: Alert, cooperative, in no acute distress, appears nontoxic.  HENT: Oropharynx is clear and dry. Poor dentition. No evidence of cranial trauma. Normocephalic. Eyes anicteric. EOM intact without nystagmus. Pupils ~2mm bilaterally.  Cardiovascular: Regular rate and rhythm, normal S1 and S2, and no murmur noted. 1+ right radial pulse, 2+ left radial pulse. No brachial or carotid bruit bilaterally. Mild right subclavian bruit. No left subclavian bruit. No lower extremity edema.  Respiratory: Clear to auscultation bilaterally with no adventitious breath sounds.   GI: Protuberant, non-tender, normal bowel sounds.  Musculoskeletal: Normal muscle bulk and tone. FROM in all extremities   Skin: Several areas of ecchymosis. Scattered spider telangiectasias.  Psych: Normal affect and speech.  Neurologic: Cranial nerves 2-12 are grossly intact. Tongue midline. Oriented to place, time, self, parts of situation. Strength 5/5 in all extremities. Sensation intact bilaterally. Right eyebrow doesn't raise. Right ptosis. Smile symmetrical. No pronator drift.       Primary Care Physician   MATTIE SONI    Discharge Orders   No  discharge procedures on file.    Significant Results and Procedures   Most Recent 3 CBC's:  Recent Labs   Lab Test 12/02/24  0610 12/01/24  1448 11/26/24  1433   WBC 2.9* 2.6* 2.2*   HGB 11.7* 12.2* 12.5*   MCV 88 88 89   PLT 41* 33* 41*     Most Recent 3 BMP's:  Recent Labs   Lab Test 12/02/24  0735 12/02/24  0610 12/01/24 2211 12/01/24  1829 12/01/24  1448 11/26/24  1433   NA  --  141  --   --  140 141   POTASSIUM  --  3.3*  --   --  3.4 4.3   CHLORIDE  --  104  --   --  104 104   CO2  --  23  --   --  24 28   BUN  --  13.9  --   --  11.8 13.5   CR  --  0.82  --   --  0.77 0.80   ANIONGAP  --  14  --   --  12 9   NATALIIA  --  7.9*  --   --  8.0* 8.3*   GLC 83 98 99   < > 117* 120*    < > = values in this interval not displayed.   Most Recent TSH and T4:  Recent Labs   Lab Test 11/05/24  1133   TSH 5.06*   T4 0.95     Most Recent Hemoglobin A1c:  Recent Labs   Lab Test 12/01/24  1448   A1C 5.4     Most Recent 6 glucoses:  Recent Labs   Lab Test 12/02/24  0735 12/02/24  0610 12/01/24 2211 12/01/24 1829 12/01/24 1448 11/26/24  1433   GLC 83 98 99 107* 117* 120*   ,   Results for orders placed or performed during the hospital encounter of 12/01/24   CT Head w/o Contrast    Narrative    EXAM: CT HEAD W/O CONTRAST  LOCATION: [facility]  DATE: 12/1/2024    INDICATION: Fall, head trauma.  COMPARISON: Head CT 10/17/2024.  TECHNIQUE: Routine CT Head without IV contrast. Multiplanar reformats. Dose reduction techniques were used.    FINDINGS:  INTRACRANIAL CONTENTS: No intracranial hemorrhage, extraaxial collection, or mass effect. No CT evidence of acute infarct. Mild presumed chronic small vessel ischemic changes. Moderate generalized volume loss. No hydrocephalus.     VISUALIZED ORBITS/SINUSES/MASTOIDS: No intraorbital abnormality. No paranasal sinus mucosal disease. No middle ear or mastoid effusion.    BONES/SOFT TISSUES: No acute abnormality.      Impression    IMPRESSION:  1.  No CT  evidence for acute intracranial process.  2.  Brain atrophy and presumed chronic microvascular ischemic changes as above.   CT Cervical Spine w/o Contrast    Narrative    EXAM: CT CERVICAL SPINE W/O CONTRAST  LOCATION: [facility]  DATE: 2024    INDICATION: Fall, head trauma.  COMPARISON: None.  TECHNIQUE: Routine CT Cervical Spine without IV contrast. Multiplanar reformats. Dose reduction techniques were used.      Impression    IMPRESSION: Minimal degenerative anterolisthesis of C4 upon C5. Alignment of the cervical vertebrae is otherwise normal. Vertebral body heights of the cervical spine are normal. Craniocervical alignment is normal. No fractures. There is loss of disc   space height and degenerative endplate spurring at C6-C7. Facet arthropathy throughout the cervical spine. No high-grade spinal canal stenosis. No prevertebral soft tissue swelling.   Echocardiogram Complete     Value    LVEF  65-70%    Narrative    [id number]  [id number]  [id number]  [id number]^ARIELLE^L     [facility]  Echocardiography Laboratory  [address] [place] [address]     Name: HOLLIE CARRERO  MRN: [id number]  : 1940  Study Date: 2024 09:04 AM  Age: 84 yrs  Gender: Male  Patient Location: [facility]  Reason For Study: Syncope and Collapse  Ordering Physician: ARIELLE LUNDBERG  Referring Physician: ARIELLE LUNDBERG  Performed By: Marina Calero     BSA: 2.0 m2  Height: 66 in  Weight: 210 lb  HR: 86  BP: 137/53 mmHg  ______________________________________________________________________________  Procedure  Complete Portable Echo Adult.  ______________________________________________________________________________  Interpretation Summary     1. The left ventricle is normal in size. There is mild concentric left  ventricular hypertrophy. Hyperdynamic left ventricular function. The visual  ejection fraction is 65-70%. Diastolic Doppler findings (E/E' ratio and/or  other  parameters) suggest left ventricular filling pressures are  indeterminate. No regional wall motion abnormalities noted.  2. The right ventricle is normal size. The right ventricular systolic function  is normal.  3. Trace mitral and tricuspid regurgitation.  4. The aortic Sinus(es) of Valsalva are mildly dilated.  5. No pericardial effusion.  6. In comparison to the previous report dated 08/12/2022, the findings are  similar.  ______________________________________________________________________________  Left Ventricle  The left ventricle is normal in size. There is mild concentric left  ventricular hypertrophy. Hyperdynamic left ventricular function. The visual  ejection fraction is 65-70%. Diastolic Doppler findings (E/E' ratio and/or  other parameters) suggest left ventricular filling pressures are  indeterminate. No regional wall motion abnormalities noted.     Right Ventricle  The right ventricle is normal size. The right ventricular systolic function is  normal.     Atria  Normal left atrial size. Right atrial size is normal. There is no color  Doppler evidence of an atrial shunt.     Mitral Valve  There is mild mitral annular calcification. There is trace mitral  regurgitation.     Tricuspid Valve  There is trace tricuspid regurgitation. Right ventricular systolic pressure  could not be approximated due to inadequate tricuspid regurgitation.     Aortic Valve  No aortic regurgitation is present.     Pulmonic Valve  There is mild (1+) pulmonic valvular regurgitation. There is no pulmonic  valvular stenosis.     Vessels  The aortic Sinus(es) of Valsalva are mildly dilated. Normal size ascending  aorta.     Pericardium  There is no pericardial effusion.     Rhythm  Sinus rhythm was noted.  ______________________________________________________________________________  MMode/2D Measurements & Calculations  IVSd: 1.3 cm     LVIDd: 4.2 cm  LVIDs: 2.9 cm  LVPWd: 1.3 cm  FS: 30.8 %  LV mass(C)d: 197.1 grams  LV  mass(C)dI: 96.5 grams/m2  Ao root diam: 4.3 cm  asc Aorta Diam: 3.7 cm  LVOT diam: 2.3 cm  LVOT area: 4.1 cm2  Ao root diam index Ht(cm/m): 2.5  Ao root diam index BSA (cm/m2): 2.1  Asc Ao diam index BSA (cm/m2): 1.8  Asc Ao diam index Ht(cm/m): 2.2  LA Volume (BP): 60.6 ml     LA Volume Index (BP): 29.7 ml/m2  LA Volume Indexed (AL/bp): 32.0 ml/m2  RWT: 0.61  TAPSE: 1.6 cm     Doppler Measurements & Calculations  MV E max jorge l: 62.7 cm/sec  MV A max jorge l: 105.0 cm/sec  MV E/A: 0.60  MV dec time: 0.24 sec     LV V1 max P.4 mmHg  LV V1 max: 77.6 cm/sec  LV V1 VTI: 16.6 cm  SV(LVOT): 68.9 ml  SI(LVOT): 33.7 ml/m2  PA V2 max: 106.6 cm/sec  PA max P.5 mmHg  E/E' avg: 10.2  Lateral E/e': 9.0  Medial E/e': 11.3  RV S Jorge L: 15.0 cm/sec     ______________________________________________________________________________  Report approved by: Bashir Patterson 2024 10:37 AM             Discharge Medications   Current Discharge Medication List        CONTINUE these medications which have NOT CHANGED    Details   acetaminophen (TYLENOL) 500 MG tablet Take 2 tablets by mouth 2 times daily.      ASPIRIN LOW DOSE 81 MG chewable tablet Take 1 tablet by mouth daily.      atorvastatin (LIPITOR) 20 MG tablet Take 1 tablet (20 mg) by mouth daily  Qty: 90 tablet, Refills: 3    Associated Diagnoses: Other hyperlipidemia      !! Cabozantinib S-Malate (CABOMETYX) 20 MG Take 1 tablet (20 mg) by mouth See Admin Instructions Do not crush.  Give at least 1 hour before or 2 hours after eating.  Qty: 15 tablet, Refills: 0    Comments: Take one 20 mg tablet daily, alternating with one 40 mg tablet daily.  Associated Diagnoses: Hepatocellular carcinoma (H)      !! Cabozantinib S-Malate (CABOMETYX) 40 MG Take 1 tablet (40 mg) by mouth See Admin Instructions  Qty: 15 tablet, Refills: 0    Comments: Take one 40 mg tablet daily, alternating with one 20 mg tablet daily.  Associated Diagnoses: Hepatocellular carcinoma (H)      ferrous sulfate (FE  TABS) 325 (65 Fe) MG EC tablet Take 1 tablet (325 mg) by mouth daily  Qty: 90 tablet, Refills: 0    Associated Diagnoses: Mild anemia      furosemide (LASIX) 40 MG tablet Take 1 tablet by mouth daily.      hydrOXYzine HCl (ATARAX) 25 MG tablet Take 1 tablet (25 mg) by mouth 4 times daily    Associated Diagnoses: Pruritic disorder      lactulose (CHRONULAC) 10 GM/15ML solution Take 30 mLs (20 g) by mouth daily  Qty: 946 mL, Refills: 11    Associated Diagnoses: Hepatic encephalopathy (H)      lidocaine (XYLOCAINE) 5 % external ointment Apply topically 4 times daily as needed for moderate pain  Qty: 50 g, Refills: 1    Associated Diagnoses: Strain of lumbar region, initial encounter      loperamide (IMODIUM) 2 MG capsule Take 2 mg by mouth as needed for diarrhea (>4-5 loose stools/day)      magnesium oxide (MAG-OX) 400 MG tablet Take 1 tablet (400 mg) by mouth 2 times daily  Qty: 60 tablet, Refills: 1    Associated Diagnoses: Hypomagnesemia      !! metFORMIN (GLUCOPHAGE) 500 MG tablet Take 2 tablets by mouth daily (with breakfast).      !! metFORMIN (GLUCOPHAGE) 500 MG tablet Take 1 tablet by mouth daily (with dinner).      nitroGLYcerin (NITROSTAT) 0.4 MG sublingual tablet Place 1 tablet (0.4 mg) under the tongue See Admin Instructions for chest pain  Qty: 30 tablet, Refills: 0    Associated Diagnoses: Coronary artery disease involving native coronary artery of native heart without angina pectoris      ondansetron (ZOFRAN) 8 MG tablet Take 1 tablet (8 mg) by mouth every 8 hours as needed for nausea  Qty: 30 tablet, Refills: 2    Associated Diagnoses: Hepatocellular carcinoma (H)      tamsulosin (FLOMAX) 0.4 MG capsule Take 1 capsule (0.4 mg) by mouth every evening  Qty: 90 capsule, Refills: 3    Associated Diagnoses: Benign prostatic hyperplasia with urinary obstruction      triamcinolone (KENALOG) 0.1 % external cream Apply topically 2 times daily as needed for irritation      !! Cabozantinib S-Malate (CABOMETYX) 20  MG Take 1 tablet (20 mg) by mouth See Admin Instructions Do not crush.  Give at least 1 hour before or 2 hours after eating.  Qty: 15 tablet, Refills: 0    Comments: Take one 20 mg tablet daily, alternating with one 40 mg tablet daily.  Associated Diagnoses: Hepatocellular carcinoma (H)      !! Cabozantinib S-Malate (CABOMETYX) 20 MG Take 1 tablet (20 mg) by mouth See Admin Instructions Do not crush.  Give at least 1 hour before or 2 hours after eating.  Qty: 45 tablet, Refills: 0    Comments: Take 20mg and 40mg on alternating days.  Associated Diagnoses: Hepatocellular carcinoma (H)      !! Cabozantinib S-Malate (CABOMETYX) 40 MG Take 1 tablet (40 mg) by mouth See Admin Instructions  Qty: 15 tablet, Refills: 0    Comments: Take one 40 mg tablet daily, alternating with one 20 mg tablet daily.  Associated Diagnoses: Hepatocellular carcinoma (H)       !! - Potential duplicate medications found. Please discuss with provider.        Allergies   Allergies   Allergen Reactions    Iodine Swelling     Patient states reaction was 20-30 years ago. Has had CT dye and coronary angiograms since then without premedication and did not have reaction.    Iodinated Contrast Media     Metoprolol Difficulty breathing     Difficulty breathing and bradycardia

## 2024-12-02 NOTE — PLAN OF CARE
Goal Outcome Evaluation:                 Outcome Evaluation: Return back to [facility]

## 2024-12-03 ENCOUNTER — ONCOLOGY VISIT (OUTPATIENT)
Dept: ONCOLOGY | Facility: CLINIC | Age: 84
End: 2024-12-03
Attending: INTERNAL MEDICINE
Payer: MEDICARE

## 2024-12-03 VITALS
OXYGEN SATURATION: 99 % | DIASTOLIC BLOOD PRESSURE: 64 MMHG | HEART RATE: 72 BPM | SYSTOLIC BLOOD PRESSURE: 147 MMHG | WEIGHT: 193.2 LBS | BODY MASS INDEX: 31.05 KG/M2 | TEMPERATURE: 97.6 F | RESPIRATION RATE: 16 BRPM | HEIGHT: 66 IN

## 2024-12-03 DIAGNOSIS — E80.6 HYPERBILIRUBINEMIA: ICD-10-CM

## 2024-12-03 DIAGNOSIS — C22.0 HEPATOCELLULAR CARCINOMA (H): Primary | ICD-10-CM

## 2024-12-03 DIAGNOSIS — D61.818 OTHER PANCYTOPENIA (H): ICD-10-CM

## 2024-12-03 DIAGNOSIS — Z71.89 GOALS OF CARE, COUNSELING/DISCUSSION: ICD-10-CM

## 2024-12-03 PROCEDURE — G0463 HOSPITAL OUTPT CLINIC VISIT: HCPCS | Performed by: NURSE PRACTITIONER

## 2024-12-03 PROCEDURE — 99215 OFFICE O/P EST HI 40 MIN: CPT | Performed by: NURSE PRACTITIONER

## 2024-12-03 PROCEDURE — G2211 COMPLEX E/M VISIT ADD ON: HCPCS | Performed by: NURSE PRACTITIONER

## 2024-12-03 ASSESSMENT — PAIN SCALES - GENERAL: PAINLEVEL_OUTOF10: NO PAIN (0)

## 2024-12-03 NOTE — LETTER
"12/3/2024      Flash Brown  [address]  [facility] [address]      Dear Colleague,    Thank you for referring your patient, Flash Brown, to the [facility] [place]. Please see a copy of my visit note below.    Oncology Rooming Note    December 3, 2024 2:07 PM   Flash Brown is a 84 year old male who presents for:    Chief Complaint   Patient presents with     Oncology Clinic Visit     Hepatocellular carcinoma - provider visit only     Initial Vitals: BP (!) 147/64 (BP Location: Left arm, Patient Position: Sitting, Cuff Size: Adult Large)   Pulse 72   Temp 97.6  F (36.4  C) (Tympanic)   Resp 16   Ht 1.676 m (5' 6\")   Wt 87.6 kg (193 lb 3.2 oz)   SpO2 99%   BMI 31.18 kg/m   Estimated body mass index is 31.18 kg/m  as calculated from the following:    Height as of this encounter: 1.676 m (5' 6\").    Weight as of this encounter: 87.6 kg (193 lb 3.2 oz). Body surface area is 2.02 meters squared.  No Pain (0) Comment: Data Unavailable   No LMP for male patient.  Allergies reviewed: Yes  Medications reviewed: Yes    Medications: Medication refills not needed today.  Pharmacy name entered into EPIC:    [facility] [place], [place] - [address] [place] [address]  [facility] [place], [place] - [address] [address] [facility] - [place], [place] [address]    Frailty Screening:   Is the patient here for a new oncology consult visit in cancer care? 2. No      Clinical concerns: Updates from recent admissions.        Paola Garcia MA                  [facility] Hematology and Oncology Progress Note    Patient: Flash Brown  MRN: [id number]  12/03/24        Reason for Visit    HCC    Primary Oncologist: Dr. Watts    Assessment and Plan  #. Advanced/multifocal HCC  #. 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 Pancytopenias (r/t cirrhosis)  #. " Progressive hyperbilirubinemia, possible chemo side effect  #. Significant cardiac history  #. 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 Weight loss, orthostasis/weakness with falls  Don was progressing on immunotherapy in July.  Therefore, started 2nd line cabozantinib 20 mg daily in August 2024. After 2 months on this dose, was progressing on 10/2024 MRI. Therefore, 4 weeks ago, dose was increased to 20 mg/40 mg alternating every other day.     On the increased dose, he has lost 10-15 lb and having orthostasis/weakness with falls. He just got out of hospital after a few falls this week. His PS and baseline mental status has been worse over the month.     Labs have shown slight further decline in platelets to 40-45K (from 50K range on prior dose) but overall stable mild anemia and neutropenia. His bili has elevated further to 3.0 (from 1.5 on prior dose and 1.1 baseline pre-chemo) - this is >2.5 xULN.  Dosing criteria is to withhold cabozantinib if >3 xULN, so he's borderline for this .    Plan:  -HOLD cabozantinib at this time since this is contributing to his weight loss, weakness and rise in bili  -Return in 2 weeks with updated labs, liver MRI and visit with Dr. Watts.   -Discussed goals of treatment. I believe he's developing more cumulative complications of his chemo side effects without a lot of meaningful benefit. -Next line options if he chooses to continue treating include regorafenib or ramucirumab carry less chance of response and best supportive care/Hospice may need to be more strongly considered. At their request, discussed prognosis without further chemo <6 mths and with additional chemo if it brings response being 6-12 months.       Oncology history  7/2023: Unresectable multifocal HCC, in setting of background DEWITT-related cirrhosis.   -dominant L hepatic lobe mass, treated by embolization; then followed  -1/2024 MRI: cirrhotic appearing liver with evidence of portal hypertension.  Post-embolization change of hepatic segments 2 and 3 with persistent nodular internal enhancement measuring 3.5 cm suspicious for residual tumor. Enlarging 3.1 cm lesion in the segment 8 with washout and pseudocapsule formation consistent with HCC. Additionally,  other arterially enhancing lesions which are all consistent with HCC. Further local therapies not an option.    Treatment:  -7/31/2023: radioembolization to L hepatic lobe mass; partial response      -3/7/2024 - 7/5/2024: palliative durvalumab and tremelimumab (cycle 1 only); then durvalumab maintenance every 28 days (not felt to be a candidate for bevacizumab). Continued until progression.   --AFP 10.2 (9/2023) ahead of start  --AFP after cycle 4, slight rise in AFP 11.2. MRI liver showed progression in 3 dominant masses. So, therapy stopped.     -8/1/2024: initiate 2nd line cabozantinib 20 mg daily (dose-reduced for age/liver disease)  ---10/2024: MRI showed progression in liver. Cabozantinib dose increased to 40 mg/alternating with 20 mg every other day.    Interval History   Flash Brown is a 84 year old male with advanced HCC who completed first line durvalumab and tremelimumab x 5 mths and was progressing in late July. He started 2nd line cabozantinib 20 mg daily x 2 mths and was progressing last month, so dose was increased to 20 mg/alternating with 40 mg every other day 4 weeks ago. Returns for 4-week lab/visit.     Weekly interim labs have noted slight decline in platelets on the higher dose (40-45K, down from 50K range on prior dose) and stable anemia/mild neutropenia (ANC 1.1-1.5).    Has has felt generally worse on the increased dose of cabozantinib. 10-15 lb weight loss with no appetite. Weaker and was just in hospital following 2 orthostatic falls at his [facility]isted [facility]. Cardiac eval negative, but Holter pending. Chronic diarrhea stable, on lactulose.   No significant skin rash.    No abd pain. No fevers.    ECOG  Performance    3    Physical Exam    General: alert and cooperative. Fatigued-appearing. A/Ox3. Accompanied by sister  HEENT: No icterus  Heart: RRR  Lungs: Clear bilaterally  Abd: Soft, non-distended, non-tender  Extremities: Trace edema LE      Lab Results    Recent Results (from the past week)   EKG 12 lead   Result Value Ref Range    Systolic Blood Pressure  mmHg    Diastolic Blood Pressure  mmHg    Ventricular Rate 72 BPM    Atrial Rate 72 BPM    UT Interval 180 ms    QRS Duration 96 ms     ms    QTc 505 ms    P Axis 67 degrees    R AXIS -1 degrees    T Axis 200 degrees    Interpretation ECG       Sinus rhythm with sinus arrhythmia  Left ventricular hypertrophy with repolarization abnormality ( R in aVL )  Prolonged QT  Abnormal ECG  No previous ECGs available  Confirmed by SEE ED PROVIDER NOTE FOR, ECG INTERPRETATION ([id number]),  Dorothy Andres ([id number]) on 12/1/2024 2:50:41 PM     Basic metabolic panel   Result Value Ref Range    Sodium 140 135 - 145 mmol/L    Potassium 3.4 3.4 - 5.3 mmol/L    Chloride 104 98 - 107 mmol/L    Carbon Dioxide (CO2) 24 22 - 29 mmol/L    Anion Gap 12 7 - 15 mmol/L    Urea Nitrogen 11.8 8.0 - 23.0 mg/dL    Creatinine 0.77 0.67 - 1.17 mg/dL    GFR Estimate 89 >60 mL/min/1.73m2    Calcium 8.0 (L) 8.8 - 10.4 mg/dL    Glucose 117 (H) 70 - 99 mg/dL   CBC with platelets and differential   Result Value Ref Range    WBC Count 2.6 (L) 4.0 - 11.0 10e3/uL    RBC Count 4.02 (L) 4.40 - 5.90 10e6/uL    Hemoglobin 12.2 (L) 13.3 - 17.7 g/dL    Hematocrit 35.2 (L) 40.0 - 53.0 %    MCV 88 78 - 100 fL    MCH 30.3 26.5 - 33.0 pg    MCHC 34.7 31.5 - 36.5 g/dL    RDW 19.5 (H) 10.0 - 15.0 %    Platelet Count 33 (LL) 150 - 450 10e3/uL    % Neutrophils 70 %    % Lymphocytes 17 %    % Monocytes 10 %    % Eosinophils 2 %    % Basophils 0 %    % Immature Granulocytes 1 %    NRBCs per 100 WBC 0 <1 /100    Absolute Neutrophils 1.8 1.6 - 8.3 10e3/uL    Absolute Lymphocytes 0.4 (L) 0.8 - 5.3 10e3/uL     Absolute Monocytes 0.3 0.0 - 1.3 10e3/uL    Absolute Eosinophils 0.1 0.0 - 0.7 10e3/uL    Absolute Basophils 0.0 0.0 - 0.2 10e3/uL    Absolute Immature Granulocytes 0.0 <=0.4 10e3/uL    Absolute NRBCs 0.0 10e3/uL   RBC and Platelet Morphology   Result Value Ref Range    RBC Morphology Confirmed RBC Indices     Platelet Assessment  Automated Count Confirmed. Platelet morphology is normal.     Automated Count Confirmed. Platelet morphology is normal.   Hemoglobin A1c   Result Value Ref Range    Estimated Average Glucose 108 <117 mg/dL    Hemoglobin A1C 5.4 <5.7 %   Troponin T, High Sensitivity   Result Value Ref Range    Troponin T, High Sensitivity 33 (H) <=22 ng/L   Glucose by meter   Result Value Ref Range    GLUCOSE BY METER POCT 107 (H) 70 - 99 mg/dL   Troponin T, High Sensitivity   Result Value Ref Range    Troponin T, High Sensitivity 32 (H) <=22 ng/L   Glucose by meter   Result Value Ref Range    GLUCOSE BY METER POCT 99 70 - 99 mg/dL   Comprehensive metabolic panel   Result Value Ref Range    Sodium 141 135 - 145 mmol/L    Potassium 3.3 (L) 3.4 - 5.3 mmol/L    Carbon Dioxide (CO2) 23 22 - 29 mmol/L    Anion Gap 14 7 - 15 mmol/L    Urea Nitrogen 13.9 8.0 - 23.0 mg/dL    Creatinine 0.82 0.67 - 1.17 mg/dL    GFR Estimate 88 >60 mL/min/1.73m2    Calcium 7.9 (L) 8.8 - 10.4 mg/dL    Chloride 104 98 - 107 mmol/L    Glucose 98 70 - 99 mg/dL    Alkaline Phosphatase 121 40 - 150 U/L    AST 52 (H) 0 - 45 U/L    ALT 29 0 - 70 U/L    Protein Total 5.6 (L) 6.4 - 8.3 g/dL    Albumin 2.7 (L) 3.5 - 5.2 g/dL    Bilirubin Total 3.0 (H) <=1.2 mg/dL   CBC with platelets   Result Value Ref Range    WBC Count 2.9 (L) 4.0 - 11.0 10e3/uL    RBC Count 3.85 (L) 4.40 - 5.90 10e6/uL    Hemoglobin 11.7 (L) 13.3 - 17.7 g/dL    Hematocrit 34.0 (L) 40.0 - 53.0 %    MCV 88 78 - 100 fL    MCH 30.4 26.5 - 33.0 pg    MCHC 34.4 31.5 - 36.5 g/dL    RDW 19.8 (H) 10.0 - 15.0 %    Platelet Count 41 (LL) 150 - 450 10e3/uL   Glucose by meter   Result  Value Ref Range    GLUCOSE BY METER POCT 83 70 - 99 mg/dL   Echocardiogram Complete   Result Value Ref Range    LVEF  65-70%    Glucose by meter   Result Value Ref Range    GLUCOSE BY METER POCT 104 (H) 70 - 99 mg/dL   Potassium   Result Value Ref Range    Potassium 3.7 3.4 - 5.3 mmol/L         Imaging    Echocardiogram Complete    Result Date: 2024  [id number]^[id number]^L  [facility] Echocardiography Laboratory [address] [facility] [place] [address]  Name: HOLLIE CARRERO MRN: [id number] : 1940 Study Date: 2024 09:04 AM Age: 84 yrs Gender: Male Patient Location: [facility] Reason For Study: Syncope and Collapse Ordering Physician: ARIELLE LUNDBERG Referring Physician: ARIELLE LUNDBERG Performed By: Marina Calero  BSA: 2.0 m2 Height: 66 in Weight: 210 lb HR: 86 BP: 137/53 mmHg ______________________________________________________________________________ Procedure Complete Portable Echo Adult. ______________________________________________________________________________ Interpretation Summary  1. The left ventricle is normal in size. There is mild concentric left ventricular hypertrophy. Hyperdynamic left ventricular function. The visual ejection fraction is 65-70%. Diastolic Doppler findings (E/E' ratio and/or other parameters) suggest left ventricular filling pressures are indeterminate. No regional wall motion abnormalities noted. 2. The right ventricle is normal size. The right ventricular systolic function is normal. 3. Trace mitral and tricuspid regurgitation. 4. The aortic Sinus(es) of Valsalva are mildly dilated. 5. No pericardial effusion. 6. In comparison to the previous report dated 2022, the findings are similar. ______________________________________________________________________________ Left Ventricle The left ventricle is normal in size. There is mild concentric left ventricular hypertrophy. Hyperdynamic left ventricular function. The  visual ejection fraction is 65-70%. Diastolic Doppler findings (E/E' ratio and/or other parameters) suggest left ventricular filling pressures are indeterminate. No regional wall motion abnormalities noted.  Right Ventricle The right ventricle is normal size. The right ventricular systolic function is normal.  Atria Normal left atrial size. Right atrial size is normal. There is no color Doppler evidence of an atrial shunt.  Mitral Valve There is mild mitral annular calcification. There is trace mitral regurgitation.  Tricuspid Valve There is trace tricuspid regurgitation. Right ventricular systolic pressure could not be approximated due to inadequate tricuspid regurgitation.  Aortic Valve No aortic regurgitation is present.  Pulmonic Valve There is mild (1+) pulmonic valvular regurgitation. There is no pulmonic valvular stenosis.  Vessels The aortic Sinus(es) of Valsalva are mildly dilated. Normal size ascending aorta.  Pericardium There is no pericardial effusion.  Rhythm Sinus rhythm was noted. ______________________________________________________________________________ MMode/2D Measurements & Calculations IVSd: 1.3 cm  LVIDd: 4.2 cm LVIDs: 2.9 cm LVPWd: 1.3 cm FS: 30.8 % LV mass(C)d: 197.1 grams LV mass(C)dI: 96.5 grams/m2 Ao root diam: 4.3 cm asc Aorta Diam: 3.7 cm LVOT diam: 2.3 cm LVOT area: 4.1 cm2 Ao root diam index Ht(cm/m): 2.5 Ao root diam index BSA (cm/m2): 2.1 Asc Ao diam index BSA (cm/m2): 1.8 Asc Ao diam index Ht(cm/m): 2.2 LA Volume (BP): 60.6 ml  LA Volume Index (BP): 29.7 ml/m2 LA Volume Indexed (AL/bp): 32.0 ml/m2 RWT: 0.61 TAPSE: 1.6 cm  Doppler Measurements & Calculations MV E max jorge l: 62.7 cm/sec MV A max jorge l: 105.0 cm/sec MV E/A: 0.60 MV dec time: 0.24 sec  LV V1 max P.4 mmHg LV V1 max: 77.6 cm/sec LV V1 VTI: 16.6 cm SV(LVOT): 68.9 ml SI(LVOT): 33.7 ml/m2 PA V2 max: 106.6 cm/sec PA max P.5 mmHg E/E' avg: 10.2 Lateral E/e': 9.0 Medial E/e': 11.3 RV S Jorge L: 15.0 cm/sec   ______________________________________________________________________________ Report approved by: Bashir Patterson 12/02/2024 10:37 AM       CT Cervical Spine w/o Contrast    Result Date: 12/1/2024  EXAM: CT CERVICAL SPINE W/O CONTRAST LOCATION: [facility] DATE: 12/1/2024 INDICATION: Fall, head trauma. COMPARISON: None. TECHNIQUE: Routine CT Cervical Spine without IV contrast. Multiplanar reformats. Dose reduction techniques were used.     IMPRESSION: Minimal degenerative anterolisthesis of C4 upon C5. Alignment of the cervical vertebrae is otherwise normal. Vertebral body heights of the cervical spine are normal. Craniocervical alignment is normal. No fractures. There is loss of disc space height and degenerative endplate spurring at C6-C7. Facet arthropathy throughout the cervical spine. No high-grade spinal canal stenosis. No prevertebral soft tissue swelling.    CT Head w/o Contrast    Result Date: 12/1/2024  EXAM: CT HEAD W/O CONTRAST LOCATION: [facility] DATE: 12/1/2024 INDICATION: Fall, head trauma. COMPARISON: Head CT 10/17/2024. TECHNIQUE: Routine CT Head without IV contrast. Multiplanar reformats. Dose reduction techniques were used. FINDINGS: INTRACRANIAL CONTENTS: No intracranial hemorrhage, extraaxial collection, or mass effect. No CT evidence of acute infarct. Mild presumed chronic small vessel ischemic changes. Moderate generalized volume loss. No hydrocephalus. VISUALIZED ORBITS/SINUSES/MASTOIDS: No intraorbital abnormality. No paranasal sinus mucosal disease. No middle ear or mastoid effusion. BONES/SOFT TISSUES: No acute abnormality.     IMPRESSION: 1.  No CT evidence for acute intracranial process. 2.  Brain atrophy and presumed chronic microvascular ischemic changes as above.     Total time 45 minutes, to include face to face visit, review of EMR, ordering, documentation and coordination of care on date of service.    complexity  modifier for longitudinal care.       Signed by: Ely Flores NP        Again, thank you for allowing me to participate in the care of your patient.        Sincerely,        Ely Flores NP

## 2024-12-03 NOTE — PROGRESS NOTES
"Oncology Rooming Note    December 3, 2024 2:07 PM   Flash Brown is a 84 year old male who presents for:    Chief Complaint   Patient presents with    Oncology Clinic Visit     Hepatocellular carcinoma - provider visit only     Initial Vitals: BP (!) 147/64 (BP Location: Left arm, Patient Position: Sitting, Cuff Size: Adult Large)   Pulse 72   Temp 97.6  F (36.4  C) (Tympanic)   Resp 16   Ht 1.676 m (5' 6\")   Wt 87.6 kg (193 lb 3.2 oz)   SpO2 99%   BMI 31.18 kg/m   Estimated body mass index is 31.18 kg/m  as calculated from the following:    Height as of this encounter: 1.676 m (5' 6\").    Weight as of this encounter: 87.6 kg (193 lb 3.2 oz). Body surface area is 2.02 meters squared.  No Pain (0) Comment: Data Unavailable   No LMP for male patient.  Allergies reviewed: Yes  Medications reviewed: Yes    Medications: Medication refills not needed today.  Pharmacy name entered into EPIC:    [facility] [place], [place] - [address] [address]  [facility] - [place], [place] - [address] [address] [facility] - [place], [place] - [address]  [facility] [address]    Frailty Screening:   Is the patient here for a new oncology consult visit in cancer care? 2. No      Clinical concerns: Updates from recent admissions.        Paola Garcia MA            "

## 2024-12-03 NOTE — PROGRESS NOTES
[facility] Hematology and Oncology Progress Note    Patient: Flash Brown  MRN: [id number]  12/03/24        Reason for Visit    HCC    Primary Oncologist: Dr. Watts    Assessment and Plan  #. Advanced/multifocal HCC  #. 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 Pancytopenias (r/t cirrhosis)  #. Progressive hyperbilirubinemia, possible chemo side effect  #. Significant cardiac history  #. 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 Weight loss, orthostasis/weakness with falls  Don was progressing on immunotherapy in July.  Therefore, started 2nd line cabozantinib 20 mg daily in August 2024. After 2 months on this dose, was progressing on 10/2024 MRI. Therefore, 4 weeks ago, dose was increased to 20 mg/40 mg alternating every other day.     On the increased dose, he has lost 10-15 lb and having orthostasis/weakness with falls. He just got out of hospital after a few falls this week. His PS and baseline mental status has been worse over the month.     Labs have shown slight further decline in platelets to 40-45K (from 50K range on prior dose) but overall stable mild anemia and neutropenia. His bili has elevated further to 3.0 (from 1.5 on prior dose and 1.1 baseline pre-chemo) - this is >2.5 xULN.  Dosing criteria is to withhold cabozantinib if >3 xULN, so he's borderline for this .    Plan:  -HOLD cabozantinib at this time since this is contributing to his weight loss, weakness and rise in bili  -Return in 2 weeks with updated labs, liver MRI and visit with Dr. Watts.   -Discussed goals of treatment. I believe he's developing more cumulative complications of his chemo side effects without a lot of meaningful benefit. -Next line options if he chooses to continue treating include regorafenib or ramucirumab carry less chance of response and best supportive care/Hospice may need to be more strongly considered. At their request, discussed prognosis without further chemo <6 mths and with additional  chemo if it brings response being 6-12 months.       Oncology history  7/2023: Unresectable multifocal HCC, in setting of background DEWITT-related cirrhosis.   -dominant L hepatic lobe mass, treated by embolization; then followed  -1/2024 MRI: cirrhotic appearing liver with evidence of portal hypertension. Post-embolization change of hepatic segments 2 and 3 with persistent nodular internal enhancement measuring 3.5 cm suspicious for residual tumor. Enlarging 3.1 cm lesion in the segment 8 with washout and pseudocapsule formation consistent with HCC. Additionally,  other arterially enhancing lesions which are all consistent with HCC. Further local therapies not an option.    Treatment:  -7/31/2023: radioembolization to L hepatic lobe mass; partial response      -3/7/2024 - 7/5/2024: palliative durvalumab and tremelimumab (cycle 1 only); then durvalumab maintenance every 28 days (not felt to be a candidate for bevacizumab). Continued until progression.   --AFP 10.2 (9/2023) ahead of start  --AFP after cycle 4, slight rise in AFP 11.2. MRI liver showed progression in 3 dominant masses. So, therapy stopped.     -8/1/2024: initiate 2nd line cabozantinib 20 mg daily (dose-reduced for age/liver disease)  ---10/2024: MRI showed progression in liver. Cabozantinib dose increased to 40 mg/alternating with 20 mg every other day.    Interval History   Flash Brown is a 84 year old male with advanced HCC who completed first line durvalumab and tremelimumab x 5 mths and was progressing in late July. He started 2nd line cabozantinib 20 mg daily x 2 mths and was progressing last month, so dose was increased to 20 mg/alternating with 40 mg every other day 4 weeks ago. Returns for 4-week lab/visit.     Weekly interim labs have noted slight decline in platelets on the higher dose (40-45K, down from 50K range on prior dose) and stable anemia/mild neutropenia (ANC 1.1-1.5).    Has has felt generally worse on the increased dose of  cabozantinib. 10-15 lb weight loss with no appetite. Weaker and was just in hospital following 2 orthostatic falls at his [facility]. Cardiac eval negative, but Holter pending. Chronic diarrhea stable, on lactulose.   No significant skin rash.    No abd pain. No fevers.    ECOG Performance    3    Physical Exam    General: alert and cooperative. Fatigued-appearing. A/Ox3. Accompanied by sister  HEENT: No icterus  Heart: RRR  Lungs: Clear bilaterally  Abd: Soft, non-distended, non-tender  Extremities: Trace edema LE      Lab Results    Recent Results (from the past week)   EKG 12 lead   Result Value Ref Range    Systolic Blood Pressure  mmHg    Diastolic Blood Pressure  mmHg    Ventricular Rate 72 BPM    Atrial Rate 72 BPM    NE Interval 180 ms    QRS Duration 96 ms     ms    QTc 505 ms    P Axis 67 degrees    R AXIS -1 degrees    T Axis 200 degrees    Interpretation ECG       Sinus rhythm with sinus arrhythmia  Left ventricular hypertrophy with repolarization abnormality ( R in aVL )  Prolonged QT  Abnormal ECG  No previous ECGs available  Confirmed by SEE ED PROVIDER NOTE FOR, ECG INTERPRETATION ([id number]),  Dorothy Andres ([id number]) on 12/1/2024 2:50:41 PM     Basic metabolic panel   Result Value Ref Range    Sodium 140 135 - 145 mmol/L    Potassium 3.4 3.4 - 5.3 mmol/L    Chloride 104 98 - 107 mmol/L    Carbon Dioxide (CO2) 24 22 - 29 mmol/L    Anion Gap 12 7 - 15 mmol/L    Urea Nitrogen 11.8 8.0 - 23.0 mg/dL    Creatinine 0.77 0.67 - 1.17 mg/dL    GFR Estimate 89 >60 mL/min/1.73m2    Calcium 8.0 (L) 8.8 - 10.4 mg/dL    Glucose 117 (H) 70 - 99 mg/dL   CBC with platelets and differential   Result Value Ref Range    WBC Count 2.6 (L) 4.0 - 11.0 10e3/uL    RBC Count 4.02 (L) 4.40 - 5.90 10e6/uL    Hemoglobin 12.2 (L) 13.3 - 17.7 g/dL    Hematocrit 35.2 (L) 40.0 - 53.0 %    MCV 88 78 - 100 fL    MCH 30.3 26.5 - 33.0 pg    MCHC 34.7 31.5 - 36.5 g/dL    RDW 19.5 (H) 10.0 - 15.0 %    Platelet Count 33 (LL) 150  - 450 10e3/uL    % Neutrophils 70 %    % Lymphocytes 17 %    % Monocytes 10 %    % Eosinophils 2 %    % Basophils 0 %    % Immature Granulocytes 1 %    NRBCs per 100 WBC 0 <1 /100    Absolute Neutrophils 1.8 1.6 - 8.3 10e3/uL    Absolute Lymphocytes 0.4 (L) 0.8 - 5.3 10e3/uL    Absolute Monocytes 0.3 0.0 - 1.3 10e3/uL    Absolute Eosinophils 0.1 0.0 - 0.7 10e3/uL    Absolute Basophils 0.0 0.0 - 0.2 10e3/uL    Absolute Immature Granulocytes 0.0 <=0.4 10e3/uL    Absolute NRBCs 0.0 10e3/uL   RBC and Platelet Morphology   Result Value Ref Range    RBC Morphology Confirmed RBC Indices     Platelet Assessment  Automated Count Confirmed. Platelet morphology is normal.     Automated Count Confirmed. Platelet morphology is normal.   Hemoglobin A1c   Result Value Ref Range    Estimated Average Glucose 108 <117 mg/dL    Hemoglobin A1C 5.4 <5.7 %   Troponin T, High Sensitivity   Result Value Ref Range    Troponin T, High Sensitivity 33 (H) <=22 ng/L   Glucose by meter   Result Value Ref Range    GLUCOSE BY METER POCT 107 (H) 70 - 99 mg/dL   Troponin T, High Sensitivity   Result Value Ref Range    Troponin T, High Sensitivity 32 (H) <=22 ng/L   Glucose by meter   Result Value Ref Range    GLUCOSE BY METER POCT 99 70 - 99 mg/dL   Comprehensive metabolic panel   Result Value Ref Range    Sodium 141 135 - 145 mmol/L    Potassium 3.3 (L) 3.4 - 5.3 mmol/L    Carbon Dioxide (CO2) 23 22 - 29 mmol/L    Anion Gap 14 7 - 15 mmol/L    Urea Nitrogen 13.9 8.0 - 23.0 mg/dL    Creatinine 0.82 0.67 - 1.17 mg/dL    GFR Estimate 88 >60 mL/min/1.73m2    Calcium 7.9 (L) 8.8 - 10.4 mg/dL    Chloride 104 98 - 107 mmol/L    Glucose 98 70 - 99 mg/dL    Alkaline Phosphatase 121 40 - 150 U/L    AST 52 (H) 0 - 45 U/L    ALT 29 0 - 70 U/L    Protein Total 5.6 (L) 6.4 - 8.3 g/dL    Albumin 2.7 (L) 3.5 - 5.2 g/dL    Bilirubin Total 3.0 (H) <=1.2 mg/dL   CBC with platelets   Result Value Ref Range    WBC Count 2.9 (L) 4.0 - 11.0 10e3/uL    RBC Count 3.85 (L)  4.40 - 5.90 10e6/uL    Hemoglobin 11.7 (L) 13.3 - 17.7 g/dL    Hematocrit 34.0 (L) 40.0 - 53.0 %    MCV 88 78 - 100 fL    MCH 30.4 26.5 - 33.0 pg    MCHC 34.4 31.5 - 36.5 g/dL    RDW 19.8 (H) 10.0 - 15.0 %    Platelet Count 41 (LL) 150 - 450 10e3/uL   Glucose by meter   Result Value Ref Range    GLUCOSE BY METER POCT 83 70 - 99 mg/dL   Echocardiogram Complete   Result Value Ref Range    LVEF  65-70%    Glucose by meter   Result Value Ref Range    GLUCOSE BY METER POCT 104 (H) 70 - 99 mg/dL   Potassium   Result Value Ref Range    Potassium 3.7 3.4 - 5.3 mmol/L         Imaging    Echocardiogram Complete    Result Date: 2024  [id number]^ARIELLE^L  [facility] Echocardiography Laboratory [address] [place] [address]  Name: HOLLIE CARRERO MRN: [id number] : 1940 Study Date: 2024 09:04 AM Age: 84 yrs Gender: Male Patient Location: [facility] Reason For Study: Syncope and Collapse Ordering Physician: ARIELLE LUNDBERG Referring Physician: ARIELLE LUNDBERG Performed By: Marina Calero  BSA: 2.0 m2 Height: 66 in Weight: 210 lb HR: 86 BP: 137/53 mmHg ______________________________________________________________________________ Procedure Complete Portable Echo Adult. ______________________________________________________________________________ Interpretation Summary  1. The left ventricle is normal in size. There is mild concentric left ventricular hypertrophy. Hyperdynamic left ventricular function. The visual ejection fraction is 65-70%. Diastolic Doppler findings (E/E' ratio and/or other parameters) suggest left ventricular filling pressures are indeterminate. No regional wall motion abnormalities noted. 2. The right ventricle is normal size. The right ventricular systolic function is normal. 3. Trace mitral and tricuspid regurgitation. 4. The aortic Sinus(es) of Valsalva are mildly dilated. 5. No pericardial effusion. 6. In comparison to the previous  report dated 08/12/2022, the findings are similar. ______________________________________________________________________________ Left Ventricle The left ventricle is normal in size. There is mild concentric left ventricular hypertrophy. Hyperdynamic left ventricular function. The visual ejection fraction is 65-70%. Diastolic Doppler findings (E/E' ratio and/or other parameters) suggest left ventricular filling pressures are indeterminate. No regional wall motion abnormalities noted.  Right Ventricle The right ventricle is normal size. The right ventricular systolic function is normal.  Atria Normal left atrial size. Right atrial size is normal. There is no color Doppler evidence of an atrial shunt.  Mitral Valve There is mild mitral annular calcification. There is trace mitral regurgitation.  Tricuspid Valve There is trace tricuspid regurgitation. Right ventricular systolic pressure could not be approximated due to inadequate tricuspid regurgitation.  Aortic Valve No aortic regurgitation is present.  Pulmonic Valve There is mild (1+) pulmonic valvular regurgitation. There is no pulmonic valvular stenosis.  Vessels The aortic Sinus(es) of Valsalva are mildly dilated. Normal size ascending aorta.  Pericardium There is no pericardial effusion.  Rhythm Sinus rhythm was noted. ______________________________________________________________________________ MMode/2D Measurements & Calculations IVSd: 1.3 cm  LVIDd: 4.2 cm LVIDs: 2.9 cm LVPWd: 1.3 cm FS: 30.8 % LV mass(C)d: 197.1 grams LV mass(C)dI: 96.5 grams/m2 Ao root diam: 4.3 cm asc Aorta Diam: 3.7 cm LVOT diam: 2.3 cm LVOT area: 4.1 cm2 Ao root diam index Ht(cm/m): 2.5 Ao root diam index BSA (cm/m2): 2.1 Asc Ao diam index BSA (cm/m2): 1.8 Asc Ao diam index Ht(cm/m): 2.2 LA Volume (BP): 60.6 ml  LA Volume Index (BP): 29.7 ml/m2 LA Volume Indexed (AL/bp): 32.0 ml/m2 RWT: 0.61 TAPSE: 1.6 cm  Doppler Measurements & Calculations MV E max darius: 62.7 cm/sec MV A max darius: 105.0  cm/sec MV E/A: 0.60 MV dec time: 0.24 sec  LV V1 max P.4 mmHg LV V1 max: 77.6 cm/sec LV V1 VTI: 16.6 cm SV(LVOT): 68.9 ml SI(LVOT): 33.7 ml/m2 PA V2 max: 106.6 cm/sec PA max P.5 mmHg E/E' avg: 10.2 Lateral E/e': 9.0 Medial E/e': 11.3 RV S Jorge L: 15.0 cm/sec  ______________________________________________________________________________ Report approved by: Bashir Patterson 2024 10:37 AM       CT Cervical Spine w/o Contrast    Result Date: 2024  EXAM: CT CERVICAL SPINE W/O CONTRAST LOCATION: [facility] DATE: 2024 INDICATION: Fall, head trauma. COMPARISON: None. TECHNIQUE: Routine CT Cervical Spine without IV contrast. Multiplanar reformats. Dose reduction techniques were used.     IMPRESSION: Minimal degenerative anterolisthesis of C4 upon C5. Alignment of the cervical vertebrae is otherwise normal. Vertebral body heights of the cervical spine are normal. Craniocervical alignment is normal. No fractures. There is loss of disc space height and degenerative endplate spurring at C6-C7. Facet arthropathy throughout the cervical spine. No high-grade spinal canal stenosis. No prevertebral soft tissue swelling.    CT Head w/o Contrast    Result Date: 2024  EXAM: CT HEAD W/O CONTRAST LOCATION: [facility] DATE: 2024 INDICATION: Fall, head trauma. COMPARISON: Head CT 10/17/2024. TECHNIQUE: Routine CT Head without IV contrast. Multiplanar reformats. Dose reduction techniques were used. FINDINGS: INTRACRANIAL CONTENTS: No intracranial hemorrhage, extraaxial collection, or mass effect. No CT evidence of acute infarct. Mild presumed chronic small vessel ischemic changes. Moderate generalized volume loss. No hydrocephalus. VISUALIZED ORBITS/SINUSES/MASTOIDS: No intraorbital abnormality. No paranasal sinus mucosal disease. No middle ear or mastoid effusion. BONES/SOFT TISSUES: No acute abnormality.     IMPRESSION: 1.  No CT evidence for acute  intracranial process. 2.  Brain atrophy and presumed chronic microvascular ischemic changes as above.     Total time 45 minutes, to include face to face visit, review of EMR, ordering, documentation and coordination of care on date of service.    complexity modifier for longitudinal care.       Signed by: Ely Flores, NP

## 2024-12-04 ENCOUNTER — TELEPHONE (OUTPATIENT)
Dept: GASTROENTEROLOGY | Facility: CLINIC | Age: 84
End: 2024-12-04

## 2024-12-04 LAB — AFP SERPL-MCNC: 6.2 NG/ML

## 2024-12-04 NOTE — TELEPHONE ENCOUNTER
Caller: RN spoke with patient's sister    Reason for Reschedule/Cancellation   (please be detailed, any staff messages or encounters to note?): Increased weakness and falls.       Prior to reschedule please review:  Ordering Provider: Brittney Uriostegui PA-C   Sedation Determined: MAC  Does patient have any ASC Exclusions, please identify?: Y - cirrhosis      Notes on Cancelled Procedure:  Procedure: Upper Endoscopy [EGD]   Date: 12/12/2024  Location: [facility]; [address] [place] [address] 3rd [facility], [place] [phone]   Surgeon: Wesly      Rescheduled: No, not needed.        Did you cancel or rescheduled an EUS procedure? No.

## 2024-12-04 NOTE — TELEPHONE ENCOUNTER
----- Message from Cailin MARTÍNEZ sent at 12/4/2024  9:36 AM CST -----  Regarding: please cx per pt request  Hi,     Please cancel pt's EGD on 12/12/2024 per pt request. This is due to increased weakness with falls.    Thank you,  Cailin Jarrell RN

## 2024-12-04 NOTE — TELEPHONE ENCOUNTER
Pt's sister Daylin answered the call and states they wish to cancel this EGD at this time. Per pt: 's office states it would be best to wait due to pt's increased weakness with falls. Writer sent message to scheduling

## 2024-12-14 ENCOUNTER — APPOINTMENT (OUTPATIENT)
Dept: CT IMAGING | Facility: CLINIC | Age: 84
End: 2024-12-14
Attending: EMERGENCY MEDICINE
Payer: MEDICARE

## 2024-12-14 ENCOUNTER — HOSPITAL ENCOUNTER (OUTPATIENT)
Facility: CLINIC | Age: 84
Setting detail: OBSERVATION
Discharge: NURSING, REHAB OR LONG-TERM CARE | End: 2024-12-17
Attending: EMERGENCY MEDICINE | Admitting: INTERNAL MEDICINE
Payer: MEDICARE

## 2024-12-14 DIAGNOSIS — I95.1 ORTHOSTATIC HYPOTENSION: Primary | ICD-10-CM

## 2024-12-14 DIAGNOSIS — R41.89 COGNITIVE IMPAIRMENT: ICD-10-CM

## 2024-12-14 DIAGNOSIS — R53.1 GENERALIZED WEAKNESS: ICD-10-CM
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
## 2024-12-14 DIAGNOSIS — Z66 DNAR (DO NOT ATTEMPT RESUSCITATION): ICD-10-CM

## 2024-12-14 DIAGNOSIS — F03.90 DEMENTIA WITHOUT BEHAVIORAL DISTURBANCE, PSYCHOTIC DISTURBANCE, MOOD DISTURBANCE, OR ANXIETY, UNSPECIFIED DEMENTIA SEVERITY, UNSPECIFIED DEMENTIA TYPE (H): ICD-10-CM

## 2024-12-14 DIAGNOSIS — S22.059A CLOSED FRACTURE OF SIXTH THORACIC VERTEBRA, UNSPECIFIED FRACTURE MORPHOLOGY, INITIAL ENCOUNTER (H): ICD-10-CM

## 2024-12-14 DIAGNOSIS — R29.6 MULTIPLE FALLS: ICD-10-CM

## 2024-12-14 DIAGNOSIS — C22.0 HEPATOCELLULAR CARCINOMA (H): ICD-10-CM

## 2024-12-14 DIAGNOSIS — T07.XXXA MULTIPLE CONTUSIONS: ICD-10-CM

## 2024-12-14 LAB
ALBUMIN SERPL BCG-MCNC: 2.9 G/DL (ref 3.5–5.2)
ALBUMIN UR-MCNC: NEGATIVE MG/DL
ALP SERPL-CCNC: 163 U/L (ref 40–150)
ALT SERPL W P-5'-P-CCNC: 33 U/L (ref 0–70)
ANION GAP SERPL CALCULATED.3IONS-SCNC: 16 MMOL/L (ref 7–15)
APPEARANCE UR: CLEAR
AST SERPL W P-5'-P-CCNC: 60 U/L (ref 0–45)
ATRIAL RATE - MUSE: 87 BPM
BASOPHILS # BLD AUTO: 0 10E3/UL (ref 0–0.2)
BASOPHILS NFR BLD AUTO: 1 %
BILIRUB SERPL-MCNC: 2.8 MG/DL
BILIRUB UR QL STRIP: NEGATIVE
BUN SERPL-MCNC: 16.6 MG/DL (ref 8–23)
CALCIUM SERPL-MCNC: 8.1 MG/DL (ref 8.8–10.4)
CHLORIDE SERPL-SCNC: 98 MMOL/L (ref 98–107)
CK SERPL-CCNC: 242 U/L (ref 39–308)
COLOR UR AUTO: YELLOW
CREAT SERPL-MCNC: 0.89 MG/DL (ref 0.67–1.17)
DIASTOLIC BLOOD PRESSURE - MUSE: NORMAL MMHG
EGFRCR SERPLBLD CKD-EPI 2021: 86 ML/MIN/1.73M2
EOSINOPHIL # BLD AUTO: 0 10E3/UL (ref 0–0.7)
EOSINOPHIL NFR BLD AUTO: 1 %
ERYTHROCYTE [DISTWIDTH] IN BLOOD BY AUTOMATED COUNT: 20.4 % (ref 10–15)
GLUCOSE SERPL-MCNC: 107 MG/DL (ref 70–99)
GLUCOSE UR STRIP-MCNC: NEGATIVE MG/DL
HCO3 SERPL-SCNC: 24 MMOL/L (ref 22–29)
HCT VFR BLD AUTO: 33.6 % (ref 40–53)
HGB BLD-MCNC: 11.4 G/DL (ref 13.3–17.7)
HGB UR QL STRIP: NEGATIVE
HOLD SPECIMEN: NORMAL
IMM GRANULOCYTES # BLD: 0 10E3/UL
IMM GRANULOCYTES NFR BLD: 0 %
INTERPRETATION ECG - MUSE: NORMAL
KETONES UR STRIP-MCNC: 5 MG/DL
LEUKOCYTE ESTERASE UR QL STRIP: NEGATIVE
LIPASE SERPL-CCNC: 33 U/L (ref 13–60)
LYMPHOCYTES # BLD AUTO: 0.6 10E3/UL (ref 0.8–5.3)
LYMPHOCYTES NFR BLD AUTO: 21 %
MAGNESIUM SERPL-MCNC: 1.3 MG/DL (ref 1.7–2.3)
MCH RBC QN AUTO: 31.4 PG (ref 26.5–33)
MCHC RBC AUTO-ENTMCNC: 33.9 G/DL (ref 31.5–36.5)
MCV RBC AUTO: 93 FL (ref 78–100)
MONOCYTES # BLD AUTO: 0.3 10E3/UL (ref 0–1.3)
MONOCYTES NFR BLD AUTO: 9 %
NEUTROPHILS # BLD AUTO: 2 10E3/UL (ref 1.6–8.3)
NEUTROPHILS NFR BLD AUTO: 68 %
NITRATE UR QL: NEGATIVE
NRBC # BLD AUTO: 0 10E3/UL
NRBC BLD AUTO-RTO: 0 /100
P AXIS - MUSE: 67 DEGREES
PH UR STRIP: 7 [PH] (ref 5–7)
PLATELET # BLD AUTO: 55 10E3/UL (ref 150–450)
POTASSIUM SERPL-SCNC: 3.9 MMOL/L (ref 3.4–5.3)
PR INTERVAL - MUSE: 170 MS
PROT SERPL-MCNC: 6 G/DL (ref 6.4–8.3)
QRS DURATION - MUSE: 82 MS
QT - MUSE: 428 MS
QTC - MUSE: 515 MS
R AXIS - MUSE: 13 DEGREES
RBC # BLD AUTO: 3.63 10E6/UL (ref 4.4–5.9)
RBC URINE: 8 /HPF
SODIUM SERPL-SCNC: 138 MMOL/L (ref 135–145)
SP GR UR STRIP: 1.01 (ref 1–1.03)
SYSTOLIC BLOOD PRESSURE - MUSE: NORMAL MMHG
T AXIS - MUSE: 198 DEGREES
TROPONIN T SERPL HS-MCNC: 53 NG/L
TROPONIN T SERPL HS-MCNC: 54 NG/L
TSH SERPL DL<=0.005 MIU/L-ACNC: 3.06 UIU/ML (ref 0.3–4.2)
UROBILINOGEN UR STRIP-MCNC: 4 MG/DL
VENTRICULAR RATE- MUSE: 87 BPM
WBC # BLD AUTO: 3 10E3/UL (ref 4–11)
WBC URINE: 0 /HPF

## 2024-12-14 PROCEDURE — 258N000003 HC RX IP 258 OP 636

## 2024-12-14 PROCEDURE — 258N000003 HC RX IP 258 OP 636: Performed by: EMERGENCY MEDICINE

## 2024-12-14 PROCEDURE — 36415 COLL VENOUS BLD VENIPUNCTURE: CPT | Performed by: EMERGENCY MEDICINE

## 2024-12-14 PROCEDURE — 96366 THER/PROPH/DIAG IV INF ADDON: CPT | Performed by: EMERGENCY MEDICINE

## 2024-12-14 PROCEDURE — 71260 CT THORAX DX C+: CPT | Mod: MA

## 2024-12-14 PROCEDURE — 70450 CT HEAD/BRAIN W/O DYE: CPT | Mod: MA

## 2024-12-14 PROCEDURE — 82040 ASSAY OF SERUM ALBUMIN: CPT | Performed by: EMERGENCY MEDICINE

## 2024-12-14 PROCEDURE — 99223 1ST HOSP IP/OBS HIGH 75: CPT

## 2024-12-14 PROCEDURE — 99285 EMERGENCY DEPT VISIT HI MDM: CPT | Performed by: EMERGENCY MEDICINE

## 2024-12-14 PROCEDURE — 93010 ELECTROCARDIOGRAM REPORT: CPT | Performed by: EMERGENCY MEDICINE

## 2024-12-14 PROCEDURE — 250N000011 HC RX IP 250 OP 636: Performed by: EMERGENCY MEDICINE

## 2024-12-14 PROCEDURE — 84443 ASSAY THYROID STIM HORMONE: CPT | Performed by: EMERGENCY MEDICINE

## 2024-12-14 PROCEDURE — 84484 ASSAY OF TROPONIN QUANT: CPT | Performed by: EMERGENCY MEDICINE

## 2024-12-14 PROCEDURE — 83735 ASSAY OF MAGNESIUM: CPT | Performed by: EMERGENCY MEDICINE

## 2024-12-14 PROCEDURE — G0378 HOSPITAL OBSERVATION PER HR: HCPCS

## 2024-12-14 PROCEDURE — 72128 CT CHEST SPINE W/O DYE: CPT | Mod: MA

## 2024-12-14 PROCEDURE — 250N000013 HC RX MED GY IP 250 OP 250 PS 637: Performed by: INTERNAL MEDICINE

## 2024-12-14 PROCEDURE — 250N000009 HC RX 250: Performed by: EMERGENCY MEDICINE

## 2024-12-14 PROCEDURE — 83690 ASSAY OF LIPASE: CPT | Performed by: EMERGENCY MEDICINE

## 2024-12-14 PROCEDURE — 96365 THER/PROPH/DIAG IV INF INIT: CPT | Mod: 59 | Performed by: EMERGENCY MEDICINE

## 2024-12-14 PROCEDURE — 93005 ELECTROCARDIOGRAM TRACING: CPT | Performed by: EMERGENCY MEDICINE

## 2024-12-14 PROCEDURE — 250N000013 HC RX MED GY IP 250 OP 250 PS 637

## 2024-12-14 PROCEDURE — 72131 CT LUMBAR SPINE W/O DYE: CPT | Mod: MA

## 2024-12-14 PROCEDURE — 72125 CT NECK SPINE W/O DYE: CPT | Mod: MA

## 2024-12-14 PROCEDURE — 85025 COMPLETE CBC W/AUTO DIFF WBC: CPT | Performed by: EMERGENCY MEDICINE

## 2024-12-14 PROCEDURE — 81003 URINALYSIS AUTO W/O SCOPE: CPT | Performed by: EMERGENCY MEDICINE

## 2024-12-14 PROCEDURE — 99285 EMERGENCY DEPT VISIT HI MDM: CPT | Mod: 25 | Performed by: EMERGENCY MEDICINE

## 2024-12-14 PROCEDURE — 81001 URINALYSIS AUTO W/SCOPE: CPT | Performed by: EMERGENCY MEDICINE

## 2024-12-14 PROCEDURE — 82550 ASSAY OF CK (CPK): CPT | Performed by: EMERGENCY MEDICINE

## 2024-12-14 RX ORDER — LOPERAMIDE HYDROCHLORIDE 2 MG/1
4 CAPSULE ORAL EVERY 4 HOURS PRN
Status: DISCONTINUED | OUTPATIENT
Start: 2024-12-14 | End: 2024-12-17 | Stop reason: HOSPADM

## 2024-12-14 RX ORDER — OXYCODONE HYDROCHLORIDE 5 MG/1
5 TABLET ORAL EVERY 4 HOURS PRN
Status: DISCONTINUED | OUTPATIENT
Start: 2024-12-14 | End: 2024-12-17 | Stop reason: HOSPADM

## 2024-12-14 RX ORDER — NALOXONE HYDROCHLORIDE 0.4 MG/ML
0.2 INJECTION, SOLUTION INTRAMUSCULAR; INTRAVENOUS; SUBCUTANEOUS
Status: DISCONTINUED | OUTPATIENT
Start: 2024-12-14 | End: 2024-12-17 | Stop reason: HOSPADM

## 2024-12-14 RX ORDER — POLYETHYLENE GLYCOL 3350 17 G/17G
17 POWDER, FOR SOLUTION ORAL 2 TIMES DAILY PRN
Status: DISCONTINUED | OUTPATIENT
Start: 2024-12-14 | End: 2024-12-17 | Stop reason: HOSPADM

## 2024-12-14 RX ORDER — FERROUS SULFATE 325(65) MG
325 TABLET ORAL DAILY
Status: DISCONTINUED | OUTPATIENT
Start: 2024-12-15 | End: 2024-12-17 | Stop reason: HOSPADM

## 2024-12-14 RX ORDER — MAGNESIUM OXIDE 400 MG/1
400 TABLET ORAL 2 TIMES DAILY
Status: DISCONTINUED | OUTPATIENT
Start: 2024-12-14 | End: 2024-12-17 | Stop reason: HOSPADM

## 2024-12-14 RX ORDER — ATORVASTATIN CALCIUM 20 MG/1
20 TABLET, FILM COATED ORAL EVERY EVENING
Status: DISCONTINUED | OUTPATIENT
Start: 2024-12-14 | End: 2024-12-17 | Stop reason: HOSPADM

## 2024-12-14 RX ORDER — ASPIRIN 81 MG/1
81 TABLET, CHEWABLE ORAL DAILY
Status: DISCONTINUED | OUTPATIENT
Start: 2024-12-15 | End: 2024-12-17 | Stop reason: HOSPADM

## 2024-12-14 RX ORDER — CALCIUM CARBONATE 500 MG/1
1000 TABLET, CHEWABLE ORAL 4 TIMES DAILY PRN
Status: DISCONTINUED | OUTPATIENT
Start: 2024-12-14 | End: 2024-12-17 | Stop reason: HOSPADM

## 2024-12-14 RX ORDER — TRAZODONE HYDROCHLORIDE 50 MG/1
50 TABLET, FILM COATED ORAL
COMMUNITY

## 2024-12-14 RX ORDER — MAGNESIUM SULFATE HEPTAHYDRATE 40 MG/ML
2 INJECTION, SOLUTION INTRAVENOUS ONCE
Status: COMPLETED | OUTPATIENT
Start: 2024-12-14 | End: 2024-12-14

## 2024-12-14 RX ORDER — NALOXONE HYDROCHLORIDE 0.4 MG/ML
0.4 INJECTION, SOLUTION INTRAMUSCULAR; INTRAVENOUS; SUBCUTANEOUS
Status: DISCONTINUED | OUTPATIENT
Start: 2024-12-14 | End: 2024-12-17 | Stop reason: HOSPADM

## 2024-12-14 RX ORDER — TRAZODONE HYDROCHLORIDE 50 MG/1
50 TABLET, FILM COATED ORAL AT BEDTIME
COMMUNITY
Start: 2024-12-10 | End: 2024-12-14

## 2024-12-14 RX ORDER — AMOXICILLIN 250 MG
2 CAPSULE ORAL 2 TIMES DAILY PRN
Status: DISCONTINUED | OUTPATIENT
Start: 2024-12-14 | End: 2024-12-17 | Stop reason: HOSPADM

## 2024-12-14 RX ORDER — HYDROXYZINE HYDROCHLORIDE 25 MG/1
25 TABLET, FILM COATED ORAL 2 TIMES DAILY
Status: DISCONTINUED | OUTPATIENT
Start: 2024-12-14 | End: 2024-12-17 | Stop reason: HOSPADM

## 2024-12-14 RX ORDER — FUROSEMIDE 40 MG/1
40 TABLET ORAL DAILY
Status: DISCONTINUED | OUTPATIENT
Start: 2024-12-15 | End: 2024-12-17 | Stop reason: HOSPADM

## 2024-12-14 RX ORDER — ONDANSETRON 2 MG/ML
4 INJECTION INTRAMUSCULAR; INTRAVENOUS EVERY 6 HOURS PRN
Status: DISCONTINUED | OUTPATIENT
Start: 2024-12-14 | End: 2024-12-17 | Stop reason: HOSPADM

## 2024-12-14 RX ORDER — TRAZODONE HYDROCHLORIDE 50 MG/1
50 TABLET, FILM COATED ORAL
Status: DISCONTINUED | OUTPATIENT
Start: 2024-12-14 | End: 2024-12-17 | Stop reason: HOSPADM

## 2024-12-14 RX ORDER — IOPAMIDOL 755 MG/ML
94 INJECTION, SOLUTION INTRAVASCULAR ONCE
Status: COMPLETED | OUTPATIENT
Start: 2024-12-14 | End: 2024-12-14

## 2024-12-14 RX ORDER — ONDANSETRON 4 MG/1
4 TABLET, ORALLY DISINTEGRATING ORAL EVERY 6 HOURS PRN
Status: DISCONTINUED | OUTPATIENT
Start: 2024-12-14 | End: 2024-12-17 | Stop reason: HOSPADM

## 2024-12-14 RX ORDER — LACTULOSE 10 G/15ML
20 SOLUTION ORAL DAILY
Status: DISCONTINUED | OUTPATIENT
Start: 2024-12-15 | End: 2024-12-17 | Stop reason: HOSPADM

## 2024-12-14 RX ORDER — TAMSULOSIN HYDROCHLORIDE 0.4 MG/1
0.4 CAPSULE ORAL DAILY
COMMUNITY
End: 2024-12-14

## 2024-12-14 RX ORDER — PROCHLORPERAZINE MALEATE 5 MG/1
5 TABLET ORAL EVERY 6 HOURS PRN
Status: DISCONTINUED | OUTPATIENT
Start: 2024-12-14 | End: 2024-12-17 | Stop reason: HOSPADM

## 2024-12-14 RX ORDER — AMOXICILLIN 250 MG
1 CAPSULE ORAL 2 TIMES DAILY PRN
Status: DISCONTINUED | OUTPATIENT
Start: 2024-12-14 | End: 2024-12-17 | Stop reason: HOSPADM

## 2024-12-14 RX ORDER — NYSTATIN 100000 U/G
CREAM TOPICAL 2 TIMES DAILY
Status: DISCONTINUED | OUTPATIENT
Start: 2024-12-14 | End: 2024-12-17 | Stop reason: HOSPADM

## 2024-12-14 RX ORDER — LIDOCAINE 40 MG/G
CREAM TOPICAL
Status: DISCONTINUED | OUTPATIENT
Start: 2024-12-14 | End: 2024-12-17 | Stop reason: HOSPADM

## 2024-12-14 RX ADMIN — NYSTATIN: 100000 CREAM TOPICAL at 21:34

## 2024-12-14 RX ADMIN — METFORMIN HYDROCHLORIDE 500 MG: 500 TABLET, FILM COATED ORAL at 19:17

## 2024-12-14 RX ADMIN — HYDROXYZINE HYDROCHLORIDE 25 MG: 25 TABLET, FILM COATED ORAL at 20:15

## 2024-12-14 RX ADMIN — ATORVASTATIN CALCIUM 20 MG: 20 TABLET, FILM COATED ORAL at 19:17

## 2024-12-14 RX ADMIN — MAGNESIUM OXIDE TAB 400 MG (241.3 MG ELEMENTAL MG) 400 MG: 400 (241.3 MG) TAB at 20:15

## 2024-12-14 RX ADMIN — SODIUM CHLORIDE, POTASSIUM CHLORIDE, SODIUM LACTATE AND CALCIUM CHLORIDE 1000 ML: 600; 310; 30; 20 INJECTION, SOLUTION INTRAVENOUS at 19:17

## 2024-12-14 RX ADMIN — MAGNESIUM SULFATE HEPTAHYDRATE 2 G: 40 INJECTION, SOLUTION INTRAVENOUS at 13:07

## 2024-12-14 RX ADMIN — SODIUM CHLORIDE 1000 ML: 9 INJECTION, SOLUTION INTRAVENOUS at 12:42

## 2024-12-14 RX ADMIN — SODIUM CHLORIDE 64 ML: 9 INJECTION, SOLUTION INTRAVENOUS at 10:57

## 2024-12-14 RX ADMIN — IOPAMIDOL 94 ML: 755 INJECTION, SOLUTION INTRAVENOUS at 10:57

## 2024-12-14 ASSESSMENT — ACTIVITIES OF DAILY LIVING (ADL)
ADLS_ACUITY_SCORE: 58
ADLS_ACUITY_SCORE: 58
ADLS_ACUITY_SCORE: 52
ADLS_ACUITY_SCORE: 58
ADLS_ACUITY_SCORE: 52
ADLS_ACUITY_SCORE: 58
ADLS_ACUITY_SCORE: 54
ADLS_ACUITY_SCORE: 58
ADLS_ACUITY_SCORE: 58
ADLS_ACUITY_SCORE: 54
ADLS_ACUITY_SCORE: 58
ADLS_ACUITY_SCORE: 54

## 2024-12-14 ASSESSMENT — COLUMBIA-SUICIDE SEVERITY RATING SCALE - C-SSRS: IS THE PATIENT NOT ABLE TO COMPLETE C-SSRS: UNABLE TO VERBALIZE

## 2024-12-14 NOTE — ED PROVIDER NOTES
History     Chief Complaint   Patient presents with    Fall     Unwitnessed falls. ? LOC, - stroke     HPI  History per patient, review of [facility] EMR and Care Everywhere EMR.   Flash Brown is a 84 year old male with history of dementia/cognitive deficit and hepatocellular carcinoma, DNR/DNI, who presents from his long-term care facility for evaluation of frequent falls and generalized weakness, with 2 falls in just past 24 hours.  He has multiple contusions of the extremities and is unable to reliably describe any injuries or pain.  He resides in a memory care facility and just transferred there from assisted living.  He was hospitalized here earlier this month, approxi-2 weeks ago on 12/1/2024 - 12/2/2024 for similar issue with 2 falls felt to be secondary to syncope and Flomax was discontinued due to orthostatic hypotension, and a Zio patch cardiac monitor study was ordered.  I spoke with his sister who reports that in his memory care unit, where he just transitioned from to memory care, assisted living status, he gets q 2 hours checks by Memory Care facility staff and his room was located just across from the nursing area.  Despite this close monitoring he fell twice in the past 18 hours or so, unwitnessed and with unclear cause or mechanism.  He is a poor and unreliable historian and unable to provide any further history of why or how he fell.  Apparently he has a chronic unsteady gait.    Previous Records Reviewed:  MRI LIVER WITHOUT AND WITH CONTRAST 10/23/2024 1:25 PM      HISTORY: Hepatocellular carcinoma (H).     COMPARISON: July 6, 2024     TECHNIQUE: Multiplanar multisequence imaging of the abdomen acquired  before and after administration of 10 mL Gadavist intravenous  contrast.     FINDINGS:  Liver: Cirrhotic liver morphology. Embolization change with persistent  nodular enhancement in segment II/III measuring 3.4 cm, unchanged from  previous, LR-TR viable. 3.4 cm area of enhancement in segment VIII  not  significantly changed since the comparison study. LR-5. 2.4 cm lesion  in segment Chris previously measured 1.9 cm, LR-4. 2.2 cm lesion in  segment VI previously measured 1.7 cm. Additional smaller lesions are  less well seen, likely due to differences in phase of contrast.  Arterial phase imaging was acquired at 20 seconds, but appears earlier  than the comparison study. Enlarged spleen at 16 cm slightly improved  from previous.     Adrenal glands, visualized kidneys, and visualized pancreas  unremarkable.                                                                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)    Hyperlipidemia LDL goal <70    Other pancytopenia (H)    BPH (benign prostatic hyperplasia)    (HFpEF) heart failure with preserved ejection fraction (H)    Hypomagnesemia    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 He has a past medical history significant for coronary artery disease s/p PCI , HFpEF, hypertension, dyslipidemia, type 2 diabetes mellitus, hepatocellular  carcinoma with chronic pancytopenia on current chemotherapy, BPH, generalized edema, and obstructive sleep apnea who presented to the emergency department for evaluation of non-prodromal and unwitnessed falls x2 with head trauma. Workup was unrevealing. Patient initially did not remember any details surrounding the falls, but later stated one of them was due to standing out of bed too fast. Sister mentions a history of orthostatic hypotension, and orthostatics positive on admission.    Unwitnessed falls (2) due to orthostatic hypotension  Closed head injury, initial encounter  Lip laceration  Generalized weakness  Had two unwitnessed falls on 12/1/2024. Patient initially did not remember any details, but later stated one of the falls was from him standing up too fast out of bed. No significant injury besides a lip laceration. Head and c-spine CT negative. Blood pressure stable. Admission EKG NSR. Fairly unremarkable echo in 2022, no evidence of cardiomyopathy. CareEverywhere mentions history of possible Mobitz type 1 (Wenckebach) AV block (see cardiology note 5/16/24), but not sustained. Patient had Zio patch 4/24-5/7/24 with one brief non-sustained run of vtach and two episodes of SVT, but no persisting AV block.  No focal deficits on neuro exam. Orthostatics positive (150/69 ? 142/74 ? 96/46). Suspect falls are 2/2 orthostatic hypotension. His sister mentioned history of orthostatic hypotension. Also considered syncope, seizures, arrhythmia, subclavian steal, hypoglycemia, or brain mets. Telemetry during admission showed NSR with frequent PVCs. Echo unchanged from 2022.  PT/OT/SW consulted and recommended discharge back to [facility]. Family is working on getting him to the MCU at his facility per the recommendation of the [facility] staff due to declining memory and worsening weakness since starting new chemo.    - Stopped PTA Flomax due to orthostatic hypotension. Follow up with PCP or urology  - Continue working on getting  "to the MCU unit at his facility, which will have more intensive therapy/monitoring to prevent further falls  - Discharged with Zio Patch  - Consider brain MRI to rule out mets if work-up otherwise unrevealing     Allergies:  Allergies   Allergen Reactions    Iodine Swelling     Patient states reaction was 20-30 years ago. Has had CT dye and coronary angiograms since then without premedication and did not have reaction.    Iodinated Contrast Media     Metoprolol Difficulty breathing     Difficulty breathing and bradycardia        Problem List:    Patient Active Problem List    Diagnosis Date Noted    Multiple contusions 12/14/2024     Priority: Medium    Multiple falls 12/14/2024     Priority: Medium    Closed fracture of sixth thoracic vertebra, unspecified fracture morphology, initial encounter (H) 12/14/2024     Priority: Medium    Dementia without behavioral disturbance, psychotic disturbance, mood disturbance, or anxiety, unspecified dementia severity, unspecified dementia type (H) 12/14/2024     Priority: Medium    Orthostatic hypotension 12/02/2024     Priority: Medium    Generalized weakness 12/01/2024     Priority: Medium    Closed head injury, initial encounter 12/01/2024     Priority: Medium    Fall, initial encounter 12/01/2024     Priority: Medium    Syncope 12/01/2024     Priority: Medium    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  Abnormal waveforms and velocities of the right upper extremity arterial system suggesting an inflow stenosis. The recent CTA shows significant atherosclerotic disease of the distal brachiocephalic artery likely reflecting areas of stenosis causing the inflow abnormality. 2.  Normal left upper extremity arterial ultrasound. No stenosis visualized.\"      Elevated bilirubin 08/20/2024     Priority: Medium    History of non-Hodgkin's lymphoma 05/15/2024     Priority: Medium    Edema " 05/15/2024     Priority: Medium    Polyp of colon 05/15/2024     Priority: Medium    Conductive hearing loss, bilateral 05/15/2024     Priority: Medium    Actinic keratosis 05/15/2024     Priority: Medium    Pruritic disorder 04/26/2024     Priority: Medium    Generalized muscle weakness 04/26/2024     Priority: Medium    Generalized edema 04/26/2024     Priority: Medium    Symptomatic bradycardia 04/26/2024     Priority: Medium    Diabetes mellitus without complication (H) 08/16/2023     Priority: Medium    Hepatocellular carcinoma (H) 08/16/2023     Priority: Medium    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) 03/01/2023     Priority: Medium    Congestive splenomegaly 03/01/2023     Priority: Medium    Other decreased white blood cell count (due to liver cirrhosis) 03/01/2023     Priority: Medium    Other iron deficiency anemia 03/01/2023     Priority: Medium    Chronic diastolic heart failure (H) 11/10/2022     Priority: Medium    Cognitive impairment 09/02/2022     Priority: Medium    Difficulty in walking, not elsewhere classified 09/02/2022     Priority: Medium    Cognitive communication deficit 09/02/2022     Priority: Medium    Hypomagnesemia 08/31/2022     Priority: Medium    Sinus bradycardia 08/20/2022     Priority: Medium    BPH (benign prostatic hyperplasia) 08/20/2022     Priority: Medium    (HFpEF) heart failure with preserved ejection fraction (H) 08/20/2022     Priority: Medium    History of lymphoma 08/03/2021     Priority: Medium    Other pancytopenia (H) 10/20/2020     Priority: Medium    Obesity (BMI 35.0-39.9) with comorbidity (H) 06/11/2019     Priority: Medium    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 Cynthia FRYE update changed this record. Please review for accuracy      Mixed hyperlipidemia 01/30/2008     Priority: Medium     Last Exam: April 15, 2008  Last Lipids: CHOL      137   03/13/2008 LDL       76   03/13/2008 HDL       37   03/13/2008  Last LFTs:: N  ALT       47   03/13/2008    Meds: Vytorin 10/40, Niacin 500 bid      Obstructive sleep apnea 01/30/2008     Priority: Medium     Problem list name updated by automated process. Provider to review      ? exposure predisposing to CA 01/30/2008     Priority: Medium    Benign essential hypertension 01/30/2004     Priority: Medium     Last exam: April 15, 2008  BPs: 128/74  Meds: Atenolol 50, ASA 81, Prinizide 20/25        RT axillary lymphoma s/p resection, with adjuvant radiation - [place], [place] 01/30/1983     Priority: Medium    Malignant melanoma s/p resection in [place], [place] 01/30/1978     Priority: Medium        Past Medical History:    Past Medical History:   Diagnosis Date    Acute kidney failure, unspecified (H) 08/27/2022    Basal cell carcinoma     CAD (coronary artery disease)     Depression     Diabetes (H)     Hyperlipidemia     Hypertension     Lymphoma (H)     Malignant melanoma (H)     Melanoma (H)     Squamous cell carcinoma        Past Surgical History:    Past Surgical History:   Procedure Laterality Date    AXILLARY SURGERY      S/P resection and adjuvant radiation    BONE MARROW BIOPSY, BONE SPECIMEN, NEEDLE/TROCAR N/A 7/8/2019    Procedure: BIOPSY, BONE MARROW;  Surgeon: Husam Issa MD;  [facility]    BONE MARROW BIOPSY, BONE SPECIMEN, NEEDLE/TROCAR Left 2/24/2022    Procedure: BIOPSY, BONE MARROW;  Surgeon: Cailin Anthony  GER[place] Flores;  Location: [facility] OR    CARDIAC SURGERY      CHOLECYSTECTOMY      HERNIA REPAIR, UMBILICAL      IR LIVER BIOPSY PERCUTANEOUS  6/2/2023    IR SIRT (SELECTIVE INTERNAL RADIO THERAPY)  7/26/2023    IR VISCERAL ANGIOGRAM  7/26/2023    IR VISCERAL EMBOLIZATION  7/31/2023    PHACOEMULSIFICATION WITH STANDARD INTRAOCULAR LENS IMPLANT Right 10/2/2019    Procedure: Cataract Removal with Implant;  Surgeon: Dinesh Ivey MD;  Location: [facility] OR    PHACOEMULSIFICATION WITH STANDARD INTRAOCULAR LENS IMPLANT Left 10/21/2019    Procedure: Cataract Removal with Implant;  Surgeon: Dinesh Ivey MD;  Location: [facility] OR    STENT      PTCA with drug-eluting stent of RCA, circumflex, and LAD    VASCULAR SURGERY         Family History:    Family History   Problem Relation Age of Onset    Asthma Other     Cancer Other         melanoma    Blood Disease Other         bleeding disorder    Lung Cancer Mother         Smoker    Prostate Cancer Father     Melanoma No family hx of        Social History:  Marital Status:  Single 1  Social History     Tobacco Use    Smoking status: Never     Passive exposure: Never    Smokeless tobacco: Never   Vaping Use    Vaping status: Never Used   Substance Use Topics    Alcohol use: Yes     Comment: very rarely    Drug use: Not Currently        Medications:    acetaminophen (TYLENOL) 500 MG tablet  ASPIRIN LOW DOSE 81 MG chewable tablet  atorvastatin (LIPITOR) 20 MG tablet  ferrous sulfate (FE TABS) 325 (65 Fe) MG EC tablet  furosemide (LASIX) 40 MG tablet  hydrOXYzine HCl (ATARAX) 25 MG tablet  lactulose (CHRONULAC) 10 GM/15ML solution  lidocaine (XYLOCAINE) 5 % external ointment  loperamide (IMODIUM) 2 MG capsule  magnesium oxide (MAG-OX) 400 MG tablet  metFORMIN (GLUCOPHAGE) 500 MG tablet  metFORMIN (GLUCOPHAGE) 500 MG tablet  nitroGLYcerin (NITROSTAT) 0.4 MG sublingual tablet  ondansetron (ZOFRAN) 8 MG tablet  traZODone (DESYREL) 50 MG tablet  triamcinolone (KENALOG) 0.1 %  "external cream      Review of Systems  As mentioned in the HPI, in addition focused review of systems was negative.    Physical Exam   BP: 110/76  Pulse: 88  Temp: 97.7  F (36.5  C)  Resp: 18  Height: 167.6 cm (5' 6\")  Weight: 87.5 kg (193 lb)  SpO2: 100 %      Physical Exam  Vitals and nursing note reviewed.   Constitutional:       General: He is not in acute distress.     Appearance: He is well-developed. He is not ill-appearing, toxic-appearing or diaphoretic.      Comments: Frail elderly debilitated appearing gentleman in no acute distress   HENT:      Head: Normocephalic and atraumatic.      Right Ear: External ear normal.      Left Ear: External ear normal.      Mouth/Throat:      Mouth: Mucous membranes are dry.   Eyes:      General: No scleral icterus.     Extraocular Movements: Extraocular movements intact.      Conjunctiva/sclera: Conjunctivae normal.      Pupils: Pupils are equal, round, and reactive to light.   Neck:      Trachea: No tracheal deviation.   Cardiovascular:      Rate and Rhythm: Normal rate and regular rhythm.      Heart sounds: Normal heart sounds. No murmur heard.     No friction rub. No gallop.   Pulmonary:      Effort: Pulmonary effort is normal. No respiratory distress.      Breath sounds: Normal breath sounds. No wheezing, rhonchi or rales.   Abdominal:      General: There is no distension.      Palpations: Abdomen is soft.      Tenderness: There is no abdominal tenderness.   Musculoskeletal:         General: Signs of injury (Scattered nontender extremity contusions) present. No swelling or tenderness. Normal range of motion.      Cervical back: Normal range of motion and neck supple.      Right lower leg: No edema.      Left lower leg: No edema.   Skin:     General: Skin is warm and dry.      Coloration: Skin is not pale.      Findings: No erythema or rash.   Neurological:      General: No focal deficit present.      Mental Status: He is alert. Mental status is at baseline. He is " disoriented.      Cranial Nerves: No cranial nerve deficit.      Sensory: No sensory deficit.      Motor: No weakness (Generalized nonfocal weakness).      Coordination: Coordination normal.      Gait: Gait abnormal (Able to stand and bear weight, steady gait).   Psychiatric:         Mood and Affect: Mood normal.         Behavior: Behavior normal.         ED Course        Procedures              EKG Interpretation:      Interpreted by Praful Thurston MD  Time reviewed: Upon completion  Symptoms at time of EKG: Generalized weakness, frequent falls  Rhythm: normal sinus   Rate: normal  Axis: normal  Ectopy: Single PAC  Conduction: normal  ST Segments/ T Waves: Non-specific ST-T wave changes/flattening  Q Waves: none  Comparison to prior: Unchanged from 10/17/2024  Clinical Impression: No acute EKG changes or change from baseline         Results for orders placed or performed during the hospital encounter of 12/14/24 (from the past 24 hours)   CBC with platelets differential    Narrative    The following orders were created for panel order CBC with platelets differential.  Procedure                               Abnormality         Status                     ---------                               -----------         ------                     CBC with platelets and d...[[id number]]  Abnormal            Final result               RBC and Platelet Morphology[[id number]]                                                   Please view results for these tests on the individual orders.   Comprehensive metabolic panel   Result Value Ref Range    Sodium 138 135 - 145 mmol/L    Potassium 3.9 3.4 - 5.3 mmol/L    Carbon Dioxide (CO2) 24 22 - 29 mmol/L    Anion Gap 16 (H) 7 - 15 mmol/L    Urea Nitrogen 16.6 8.0 - 23.0 mg/dL    Creatinine 0.89 0.67 - 1.17 mg/dL    GFR Estimate 86 >60 mL/min/1.73m2    Calcium 8.1 (L) 8.8 - 10.4 mg/dL    Chloride 98 98 - 107 mmol/L    Glucose 107 (H) 70 - 99 mg/dL    Alkaline Phosphatase 163 (H) 40 -  150 U/L    AST 60 (H) 0 - 45 U/L    ALT 33 0 - 70 U/L    Protein Total 6.0 (L) 6.4 - 8.3 g/dL    Albumin 2.9 (L) 3.5 - 5.2 g/dL    Bilirubin Total 2.8 (H) <=1.2 mg/dL   Lipase   Result Value Ref Range    Lipase 33 13 - 60 U/L   Magnesium   Result Value Ref Range    Magnesium 1.3 (L) 1.7 - 2.3 mg/dL   TSH with free T4 reflex   Result Value Ref Range    TSH 3.06 0.30 - 4.20 uIU/mL   Troponin T, High Sensitivity   Result Value Ref Range    Troponin T, High Sensitivity 53 (H) <=22 ng/L   CK total   Result Value Ref Range     39 - 308 U/L   CBC with platelets and differential   Result Value Ref Range    WBC Count 3.0 (L) 4.0 - 11.0 10e3/uL    RBC Count 3.63 (L) 4.40 - 5.90 10e6/uL    Hemoglobin 11.4 (L) 13.3 - 17.7 g/dL    Hematocrit 33.6 (L) 40.0 - 53.0 %    MCV 93 78 - 100 fL    MCH 31.4 26.5 - 33.0 pg    MCHC 33.9 31.5 - 36.5 g/dL    RDW 20.4 (H) 10.0 - 15.0 %    Platelet Count 55 (L) 150 - 450 10e3/uL    % Neutrophils 68 %    % Lymphocytes 21 %    % Monocytes 9 %    % Eosinophils 1 %    % Basophils 1 %    % Immature Granulocytes 0 %    NRBCs per 100 WBC 0 <1 /100    Absolute Neutrophils 2.0 1.6 - 8.3 10e3/uL    Absolute Lymphocytes 0.6 (L) 0.8 - 5.3 10e3/uL    Absolute Monocytes 0.3 0.0 - 1.3 10e3/uL    Absolute Eosinophils 0.0 0.0 - 0.7 10e3/uL    Absolute Basophils 0.0 0.0 - 0.2 10e3/uL    Absolute Immature Granulocytes 0.0 <=0.4 10e3/uL    Absolute NRBCs 0.0 10e3/uL   [place] [facility]    Narrative    The following orders were created for panel order [place] [facility].  Procedure                               Abnormality         Status                     ---------                               -----------         ------                     Extra Blue Top Tube[[id number]]                              Final result                 Please view results for these tests on the individual orders.   Extra Blue Top Tube   Result Value Ref Range    Hold Specimen JIC    EKG 12 lead   Result Value Ref Range    Systolic Blood  Pressure  mmHg    Diastolic Blood Pressure  mmHg    Ventricular Rate 87 BPM    Atrial Rate 87 BPM    VT Interval 170 ms    QRS Duration 82 ms     ms    QTc 515 ms    P Axis 67 degrees    R AXIS 13 degrees    T Axis 198 degrees    Interpretation ECG       Sinus rhythm with Premature atrial complexes  ST & T wave abnormality, consider inferolateral ischemia  Abnormal ECG  When compared with ECG of 01-Dec-2024 14:39,  Premature atrial complexes are now Present  Confirmed by SEE ED PROVIDER NOTE FOR, ECG INTERPRETATION ([id number]),  Jocelyne Nguyen ([id number]) on 12/14/2024 10:36:11 AM     Head CT w/o contrast    Narrative    EXAM: CT HEAD W/O CONTRAST  LOCATION: [facility]  DATE: 12/14/2024    INDICATION: Fall, closed head injury  COMPARISON: CT head without contrast 12/1/2024.  TECHNIQUE: Routine CT Head without IV contrast. Multiplanar reformats. Dose reduction techniques were used.    FINDINGS:  INTRACRANIAL CONTENTS: No intracranial hemorrhage, extraaxial collection, or mass effect.  No CT evidence of acute infarct. Mild presumed chronic small vessel ischemic changes. Moderate generalized volume loss. No hydrocephalus.     VISUALIZED ORBITS/SINUSES/MASTOIDS: Prior bilateral cataract surgery. Visualized portions of the orbits are otherwise unremarkable. No paranasal sinus mucosal disease. No middle ear or mastoid effusion.    BONES/SOFT TISSUES: No acute abnormality.      Impression    IMPRESSION:  1.  No CT evidence for acute intracranial process.  2.  Brain atrophy and presumed chronic microvascular ischemic changes as above.   CT Chest/Abdomen/Pelvis w Contrast    Narrative    EXAM: CT CHEST/ABDOMEN/PELVIS WITH CONTRAST  LOCATION: [facility]  DATE: 12/14/2024    INDICATION: Falls, dementia, poorly localized chest pain and abdominal pain with no contusions.  COMPARISON: CT chest 07/06/2024, MRI abdomen 10/03/2024.  TECHNIQUE: CT scan of the chest, abdomen,  and pelvis was performed following injection of IV contrast. Multiplanar reformats were obtained. Dose reduction techniques were used.   CONTRAST: 94 mL Isovue 370.    FINDINGS:   LUNGS AND PLEURA: No effusions. Stable subpleural nodular opacity along the fissures measuring 19 x 12 mm series 6 image 101. No new airspace disease. A few calcified granulomas.    MEDIASTINUM/AXILLAE: No acute mediastinal abnormality. No enlarged lymph nodes or fluid collections identified. Small hiatal hernia.    CORONARY ARTERY CALCIFICATION: Severe.    HEPATOBILIARY: Cirrhotic liver. Cholecystectomy. Stable size of a 3.4 cm observation at the site of embolization segment 2/3 series 5 image 112. There is some peripheral nodular soft tissue component again identified appearing similar to prior. Segment   8, 3 cm nodule is very ill-defined series 5 image 118 and comparison is limited. Ill-defined segment 4A nodule suggested on image 117 with comparison being limited. Stable segment 6 nodule showing enhancement that is 1.8 cm image 146.    PANCREAS: Normal.    SPLEEN: Splenomegaly is 14.2 cm, stable.    ADRENAL GLANDS: Normal.    KIDNEYS/BLADDER: No significant mass, stones, or hydronephrosis. There are simple or benign cysts. No follow up is needed. Bladder is unremarkable.    BOWEL: No obstruction or acute inflammation. Appendix not seen. Colonic diverticula.    LYMPH NODES: No new adenopathy identified.    VASCULATURE: Diffuse vascular calcifications.    PELVIC ORGANS: Prostate is 4.2 cm.    MUSCULOSKELETAL: Fracture through the anterior osteophyte at the T6-T7 level series 10 image 54. This is described at the CT thoracic spine exam performed today. Inferior endplate T6 fracture is also described in this region. No additional acute fracture   identified. Subacute right rib fractures appear stable. Diffuse degenerative changes throughout the spine. Bilateral hip DJD. Small ascites is stable.      Impression    IMPRESSION:  1.  Refer to  CT thoracic spine regarding the nondisplaced anterior osteophyte fracture of T6-T7, and T6 inferior endplate fracture.  2.  No other acute fracture identified. No other acute traumatic abnormality identified.  3.  Cirrhotic liver and evidence of portal hypertension. Stable splenomegaly. Stable small ascites. Multiple hepatic observations again identified without significant interval change compared to the MRI from 10/23/2024.  4.  Stable nodular opacity at the periphery of the right lung along the fissures. Recommend surveillance imaging to establish long-term stability. Suggest follow-up CT chest in 3-6 months.       CT Thoracic Spine w/o Contrast    Narrative    EXAM: CT THORACIC SPINE W/O CONTRAST  LOCATION: [facility]  DATE: 12/14/2024    INDICATION: Falls, closed head injury, posterior neck pain, dementia and unreliable historian  COMPARISON: CT chest 7/6/2024.  TECHNIQUE: Routine CT Thoracic Spine without IV contrast. Multiplanar reformats. Dose reduction techniques were used.     FINDINGS:  VERTEBRA: There is an acute fracture seen through anterior osteophytes at the T6-T7 level with extension of fracture into the inferior endplate of the T6 vertebral body. No significant vertebral body height loss.      CANAL/FORAMINA: No canal or neural foraminal stenosis.    PARASPINAL: Vascular calcifications involving the aorta. Surgical clips in the gallbladder fossa.      Impression    IMPRESSION:  1.  Acute fracture involving previously bridging anterior osteophytes at the T6-T7 level with fracture involvement of the inferior T6 endplate.  2.  Multilevel fusion including bridging ventral endplate osteophytes in the thoracic spine which may reflect changes of diffuse hepatic skeletal hyperostosis or ankylosing spondylitis.    Results discussed with Dr. Thurston on 12/14/2024  11:50 PM CST   CT Lumbar Spine w/o Contrast    Narrative    EXAM: CT LUMBAR SPINE W/O CONTRAST  LOCATION: [facility]  [facility]  DATE: 12/14/2024    INDICATION: Falls, closed head injury, posterior neck pain, dementia and unreliable historian  COMPARISON: None.  TECHNIQUE: Routine CT Lumbar Spine without IV contrast. Multiplanar reformats. Dose reduction techniques were used.     FINDINGS:  VERTEBRA: Lumbar spine lordosis is maintained. There is fusion across L5-S1 disc space. Fusion of the bilateral sacroiliac joints. Lumbar vertebral body heights are maintained. The lumbar spinal canal is narrowed on a developmental basis.      CANAL/FORAMINA: Moderate spinal canal stenosis at L3-L4. Mild to moderate multilevel degenerative facet changes.    PARASPINAL: No extraspinal abnormality.      Impression    IMPRESSION:  1.  No acute lumbar spine fracture.  2.  Multilevel degenerative changes in the lumbar spine with moderate spinal canal stenosis at L3-L4.   CT Cervical Spine w/o Contrast    Narrative    EXAM: CT CERVICAL SPINE W/O CONTRAST  LOCATION: [facility]  DATE: 12/14/2024    INDICATION: Neck pain.  COMPARISON: None.  TECHNIQUE: Routine CT Cervical Spine without IV contrast. Multiplanar reformats. Dose reduction techniques were used.    FINDINGS:  VERTEBRA: Cervical vertebral body heights are maintained. Grade 1 anterolisthesis of C4 on C5 and C5 on C6. No acute cervical spine fracture.     CANAL/FORAMINA: Severe right and moderate left neural foraminal stenosis at C3-C4. Moderate bilateral neural foraminal stenosis at C4-C5.    PARASPINAL: No extraspinal abnormality.      Impression    IMPRESSION:  1.  No acute cervical spine fracture.   Troponin T, High Sensitivity   Result Value Ref Range    Troponin T, High Sensitivity 54 (H) <=22 ng/L   UA with Microscopic reflex to Culture    Specimen: Urine, Clean Catch   Result Value Ref Range    Color Urine Yellow Colorless, Straw, Light Yellow, Yellow    Appearance Urine Clear Clear    Glucose Urine Negative Negative mg/dL    Bilirubin  Urine Negative Negative    Ketones Urine 5 (A) Negative mg/dL    Specific Gravity Urine 1.010 1.003 - 1.035    Blood Urine Negative Negative    pH Urine 7.0 5.0 - 7.0    Protein Albumin Urine Negative Negative mg/dL    Urobilinogen Urine 4.0 (A) Normal, 2.0 mg/dL    Nitrite Urine Negative Negative    Leukocyte Esterase Urine Negative Negative    RBC Urine 8 (H) <=2 /HPF    WBC Urine 0 <=5 /HPF    Narrative    Urine Culture not indicated       Medications   iopamidol (ISOVUE-370) solution 94 mL (94 mLs Intravenous $Given 12/14/24 1057)   sodium chloride 0.9 % bag 500mL for CT scan flush use (64 mLs As instructed $Given 12/14/24 1057)   magnesium sulfate 2 g in 50 mL sterile water intermittent infusion (0 g Intravenous Stopped 12/14/24 1724)   sodium chloride 0.9% BOLUS 1,000 mL (0 mLs Intravenous Stopped 12/14/24 1724)       3:43 PM - Dr. Montoya, Neurosurgeon on call.  We reviewed his thoracic spine injuries/fractures and we agree he appears to be appropriate for management nonoperatively with fitting for TLSO brace and he can follow-up in spine clinic in approximately 2 weeks.    I spoke with his sister, Rea Guevara and updated her regarding the evaluation and evaluation results today.    I reviewed the case with the RABIA on-call for the hospitalist service and she accepted care of the patient upon admission here.    Assessments & Plan (with Medical Decision Making)   84 year old male with history of dementia/cognitive deficit and hepatocellular carcinoma, DNR/DNI, who presents from his memory care facility for evaluation of 2 falls in just the past 24 hours.  He has multiple contusions of the extremities and is unable to reliably describe any injuries or pain. He was hospitalized here earlier this month, ~ 2 weeks ago on 12/1/2024 - 12/2/2024 for similar issue with 2 falls felt to be secondary which were to syncope and Flomax was discontinued due to orthostatic hypotension, and a Zio patch cardiac monitor study  was ordered.  I spoke with his sister who reports that in his memory care unit, where he just transitioned from to memory care, assisted living status, he gets q 2 hours checks by Memory Care facility staff and his room was located just across from the nursing area.  Despite this close monitoring he fell twice in the past 18 hours or so, unwitnessed and with unclear cause or mechanism.   Extensive evaluation was remarkable for an acute appearing fracture of a bridging anterior osteophyte at the T6-T7 level with fracture involvement of the inferior T6 endplate.  His thoracic spine shows multilevel fusion including bridging ventral endplate osteophytes in the thoracic spine, which renders the bridging anterior osteophyte fracture potentially unstable.  I consulted the N updated her on the diagnoses and eurosurgeon on-call he felt it was acceptable and appropriate to manage this conservatively with a TLSO brace and follow-up in approximately 2 weeks in Spine Clinic.  He appears stable for admission here in a TLSO  brace can be obtained and fitted as soon as possible with careful precautions to prevent further injury or fall until that time.  In addition laboratory evaluation was remarkable for hypomagnesemia, Mg+ 1.3, given magnesium sulfate 2 g IV.  Clinically appears dehydrated was given 1 L IV fluid bolus.  I reviewed the case with his sister and treatment plan.  Case was reviewed with the Hospitalist service who accepted his care upon admission here.    I have reviewed the nursing notes.    I have reviewed the findings, diagnosis, plan and need for follow up with the patient.    Medical Decision Making: High complexity        New Prescriptions    No medications on file       Final diagnoses:   Multiple falls   Closed fracture of sixth thoracic vertebra, unspecified fracture morphology, initial encounter (H) - Inferior T6 endplate fracture and T6-T7 anterior bridging osteophyte fracture   Multiple contusions    Cognitive impairment   Dementia without behavioral disturbance, psychotic disturbance, mood disturbance, or anxiety, unspecified dementia severity, unspecified dementia type (H)   Generalized weakness       12/14/2024   [facility] EMERGENCY DEPT       Praful Thurston MD  12/14/24 [id number]

## 2024-12-14 NOTE — H&P
[facility]    History and Physical  Hospital Medicine       Date of Admission:  12/14/2024  Date of Service: 12/14/2024     Assessment & Plan   Flash Brown is a 84 year old male with past medical history significant for coronary artery disease s/p PCI, HFpEF, hypertension, dyslipidemia, type 2 diabetes mellitus, hepatocellular carcinoma with chronic pancytopenia on current chemotherapy, BPH, generalized edema, obstructive sleep apnea , recent admission (12/01-12/02) for falls secondary to orthostatic hypotension, who presents on 12/14/2024 with generalized weakness, frequent falls.     Acute closed fracture of sixth thoracic vertebrae   Frequent falls as below. CT Thoracic spine on day of admission with Acute fracture involving previously bridging anterior osteophytes at T6-T7 level with fracture involvement of inferior T6 endplate.   Patient denies pain on admission exam. VS stable, reassuring.   - TLSO brace ordered  - bedrest until TLSO in place  - analgesia: oxycodone 2.5-5mg q4hrs prn     Frequent falls   Generalized weakness   Orthostatic hypotension   Recently admitted for frequent falls, thought to be secondary to orthostatic hypotension. No hx of seizure, CVA. Fairly unremarkable echo in 2022, no evidence of cardiomyopathy. CareEverywhere mentions history of possible Mobitz type 1 (Wenckebach) AV block (see cardiology note 5/16/24), but not sustained. Patient had Zio patch 4/24-5/7/24 with one brief non-sustained run of vtach and two of SVT, but no persisting AV block.  Frequent falls since recent hospital discharge, staff at [facility] note 2 falls within 18 hrs pta. Limited hx from patient, endorses chronic unsteady gait. Admission EKG with sinus rhythm, unchanged from prior.   No focal neuro deficits on admission exam. Suspect mechanical fall secondary to generalized weakness, failure to thrive.   - PT/OT/ Care management consult  - fall precautions   - IVF: LR @100/hr x10 hrs    - orthostatic vs     Multilevel fusion of thoracic spine   CT T-spine on admission revealed Multilevel fusion including bridging ventral endplate osteophytes in the thoracic spine which may reflect changes of diffuse hepatic skeletal hyperostosis or ankylosing spondylitis.    Cognitive impairment   On admission patient is alert, oriented to self, place, month, year, but disoriented to situation. Was transferred to memory care unit after recent admission.   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 Follows with oncology, last visit 12/03/24. 7/31/2023: radioembolization to L hepatic lobe mass; partial response. 3/7/2024 - 7/5/2024: palliative durvalumab and tremelimumab (cycle 1 only); then durvalumab maintenance every 28 days (not felt to be a candidate for bevacizumab). Continued until progression. AFP 10.2 (9/2023) ahead of start. AFP after cycle 4, slight rise in AFP 11.2. MRI liver showed progression in 3 dominant masses. So, therapy stopped. 08/01/2024 started 2nd line cabozantinib 20 mg daily in August 2024. After 2 months on this dose, was progressing on 10/2024 MRI. Therefore, 4 weeks ago, dose was increased to 20 mg/40 mg alternating every other day cabozantinib held 12/03/24 due to weight loss, weakness, and increasing bilirubin. At last oncology visit goals of treatment dicussed given cumulative complications of chemotherapy, Ely Flores NP dicussed with patient and patient's sister that  prognosis without further chemo <6 mths and with additional chemo if it brings response being 6-12 months. Suggested that best supportive care/hospice may need to be considered.  Brief goals of care discussion with patient on admission, patient stated  "he would like all available life-sustaining measures taken. Has AD/POLST which lists his sister Rea as HCA and notes he is DNR/DNI.   - continue outpatient follow up as directed   - consider another goals of care discussion with patient and family as appropriate.      Pancytopenia, chronic   Thrombocytopenia, secondary to congestive splenomegaly, chronic   Other iron deficiency anemia   Presented with pancytopenia that is chronic and stable in the setting of known hepatocellular cancer with recent chemo, congestive splenomegaly, and iron deficiency anemia.   Admit hemoglobin 11.4 (baseline 11.2 - 13.2), admit WBC 3.0 (baseline 2.2 - 3.3), admit platelets 55 (baseline 41 - 58). No neutropenia (ANC 2.0). Takes iron 325 mg daily prior to admission.  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  Abnormal waveforms and velocities of the right upper extremity arterial system suggesting an inflow stenosis. The recent CTA shows significant atherosclerotic disease of the distal brachiocephalic artery likely reflecting areas of stenosis causing the inflow abnormality. 2.  Normal left upper extremity arterial ultrasound. No stenosis visualized.\"  - known problem, only obtain BP reading from left upper extremity     Hypomagnesemia   Initial Mg 1.3. received 2g mag sulfate in the ED. Was hypomagnesemic on last admission as well. Takes MgOx 400 mg BID.   - continue pta MgOx  - RN managed potassium replacement protocol     Elevated troponin   Troponins 53 ? 54. On previous admission 12/1, troponins 33 ? 32.     T2DM without complication   HgbA1c 5.4%. Admission . Managed prior to admission with metformin 1000mg with breakfast and 500mg with dinner.   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 Follows with oncology Dr. Watts. Had a bone marrow biopsy in Feb 2022, which was normal.  - Continue with outpatient oncology surveillance    Heart failure with preserved ejection fraction   Recent echocardiogram 12/02/24 with mild concentric LV hypertrophy, hyperdynamic LV function. EF 65-70%. Trace mitral and tricuspid regurg. Similar to 08/2022. Managed prior to admission with furosemide 40mg daily.   Appears euvolemic on admission, without LE edema.   - continue pta furosemide     Coronary artery disease s/p PCI   Benign essential hypertension   Hyperlipidemia   Previously followed with [facility] Dr. Tate, was also seen by [facility], [place], operated by [facility] on 5/16/24. History of PCI with LUDIVINA in 2010 (prox/mid RCA, distal circumflex, prox/mid LAD). Stress test 2015 with 70% stenosis of proximal circumflex, did not have subsequent angiogram for unclear reasons. Negative stress test during hospitalization at [facility] in August 2020. Managed prior to admission with aspirin 81 mg daily and atorvastatin 20 mg daily. No evidence of acute coronary syndrome at time of admission, EKG with NSR as above.   - Continue aspirin and statin    BPH  Previously treated with tamsulosin, this was held during recent hospitalization due to orthostatic hypotension. Patient denies acute urinary symptoms. UA unremarkable.     Obstructive sleep apnea   Noted on chart review. Does not use CPAP pta.       Clinically Significant Risk Factors Present on Admission             # Hypomagnesemia: Lowest Mg = 1.3 mg/dL in last 2 days, will replace as needed   # Hypoalbuminemia: Lowest albumin = 2.9 g/dL at 12/14/2024 10:09 AM, will monitor as appropriate   # Drug Induced Platelet Defect: home medication list includes an antiplatelet medication   # Hypertension: Noted on problem list     # Dementia: noted on problem list       # Obesity: Estimated body mass index is 31.15  "kg/m  as calculated from the following:    Height as of this encounter: 1.676 m (5' 6\").    Weight as of this encounter: 87.5 kg (193 lb).         # Financial/Environmental Concerns:                Diet: Moderate Consistent Carb (60 g CHO per Meal) Diet    DVT Prophylaxis: Pneumatic Compression Devices  Caruso Catheter: Not present  Code Status: No CPR- Do NOT Intubate      Disposition Plan   Medically Ready for Discharge: Anticipated Tomorrow       The patient's care was discussed with the Patient.  I have discussed patient and formulated plan with attending hospitalist physician, Dr. Tye Sampson PA-C        Primary Care Physician   Vijaya Tubbs [phone]    History is obtained from the patient, and review of old records via the EMR.    History of Present Illness   Flash Brown is a 84 year old male with past medical history significant for coronary artery disease s/p PCI, HFpEF, hypertension, dyslipidemia, type 2 diabetes mellitus, hepatocellular carcinoma with chronic pancytopenia on current chemotherapy, BPH, generalized edema, obstructive sleep apnea , recent admission (12/01-12/02) for falls secondary to orthostatic hypotension, who presents on 12/14/2024 with generalized weakness, frequent falls.     Patient provides limited history. Reports he has had increased generalized weakness and frequent falls for the past few weeks. He transitioned to [facility] from [facility] after recent hospitalization 12/01-12/02. He has q2hour checks. Despite this close monitoring he has had two falls within 18 hours pta, unwitnessed and without clear cause or mechanism. Was sent to the ED via EMS. Patient does not recall falling on day of admission, believes he last fell one day pta but again unsure of mechanism. Endorses unsteady gait at baseline.   He denies pain, headache, vision changes, dizziness, lightheadedness, focal weakness. Denies dysuria, hematuria, urinary frequency, urinary hesitancy. Endorses chronic " diarrhea which is unchanged. Denies melena, hematochezia, abdominal pain, nausea, vomiting. Denies fevers, chills.    He denies alcohol use, tobacco use, illicit drug use. Endorses good medication compliance.   Sister Rea is HCA, ED provider discussed with her.     Review of Systems   The 10 point Review of Systems is negative other than noted in the HPI or here.     Past Medical History    Past Medical History:   Diagnosis Date    Acute kidney failure, unspecified (H) 08/27/2022    Basal cell carcinoma     CAD (coronary artery disease)     Depression     Diabetes (H)     Hyperlipidemia     Hypertension     Lymphoma (H)     Malignant melanoma (H)     Melanoma (H)     Squamous cell carcinoma          Past Surgical History   Past Surgical History:   Procedure Laterality Date    AXILLARY SURGERY      S/P resection and adjuvant radiation    BONE MARROW BIOPSY, BONE SPECIMEN, NEEDLE/TROCAR N/A 7/8/2019    Procedure: BIOPSY, BONE MARROW;  Surgeon: Husam Issa MD;  Location: [facility]    BONE MARROW BIOPSY, BONE SPECIMEN, NEEDLE/TROCAR Left 2/24/2022    Procedure: BIOPSY, BONE MARROW;  Surgeon: Cailin Anthony PA-C;  [place] [facility] [place]    CARDIAC SURGERY      CHOLECYSTECTOMY      HERNIA REPAIR, UMBILICAL      IR LIVER BIOPSY PERCUTANEOUS  6/2/2023    IR SIRT (SELECTIVE INTERNAL RADIO THERAPY)  7/26/2023    IR VISCERAL ANGIOGRAM  7/26/2023    IR VISCERAL EMBOLIZATION  7/31/2023    PHACOEMULSIFICATION WITH STANDARD INTRAOCULAR LENS IMPLANT Right 10/2/2019    Procedure: Cataract Removal with Implant;  Surgeon: Dinesh Ivey MD;  Location: [facility]    PHACOEMULSIFICATION WITH STANDARD INTRAOCULAR LENS IMPLANT Left 10/21/2019    Procedure: Cataract Removal with Implant;  Surgeon: Dinesh Ivey MD;  Location: [facility]    STENT      PTCA with drug-eluting stent of RCA, circumflex, and LAD    VASCULAR SURGERY          Prior to Admission Medications   Prior to Admission Medications   Prescriptions Last Dose  Informant Patient Reported? Taking?   ASPIRIN LOW DOSE 81 MG chewable tablet 12/13/2024 Morning Nursing Home Yes Yes   Sig: Take 1 tablet by mouth daily.   acetaminophen (TYLENOL) 500 MG tablet 12/13/2024 Evening Nursing Home Yes Yes   Sig: Take 2 tablets by mouth 2 times daily.   atorvastatin (LIPITOR) 20 MG tablet 12/13/2024 at  5:00 PM Nursing Home No Yes   Sig: Take 1 tablet (20 mg) by mouth daily   ferrous sulfate (FE TABS) 325 (65 Fe) MG EC tablet 12/13/2024 Morning Nursing Home No Yes   Sig: Take 1 tablet (325 mg) by mouth daily   furosemide (LASIX) 40 MG tablet 12/13/2024 Morning Nursing Home Yes Yes   Sig: Take 1 tablet by mouth daily.   hydrOXYzine HCl (ATARAX) 25 MG tablet 12/13/2024 Bedtime Nursing Home No Yes   Sig: Take 1 tablet (25 mg) by mouth 2 times daily.   lactulose (CHRONULAC) 10 GM/15ML solution 12/13/2024 Morning Nursing Home No Yes   Sig: Take 30 mLs (20 g) by mouth daily   lidocaine (XYLOCAINE) 5 % external ointment Unknown Nursing Home No Yes   Sig: Apply topically 4 times daily as needed for moderate pain   loperamide (IMODIUM) 2 MG capsule Unknown Nursing Home Yes Yes   Sig: Take 4 mg by mouth every 4 hours as needed for diarrhea (>4-5 loose stools/day).   magnesium oxide (MAG-OX) 400 MG tablet 12/13/2024 Bedtime Nursing Home No Yes   Sig: Take 1 tablet (400 mg) by mouth 2 times daily   metFORMIN (GLUCOPHAGE) 500 MG tablet 12/13/2024 Evening Nursing Home Yes Yes   Sig: Take 500 mg by mouth daily (with dinner).   metFORMIN (GLUCOPHAGE) 500 MG tablet 12/13/2024 Morning Nursing Home Yes Yes   Sig: Take 1,000 mg by mouth daily (with breakfast).   nitroGLYcerin (NITROSTAT) 0.4 MG sublingual tablet Unknown Nursing Home No Yes   Sig: Place 1 tablet (0.4 mg) under the tongue See Admin Instructions for chest pain   ondansetron (ZOFRAN) 8 MG tablet Unknown Nursing Home No Yes   Sig: Take 1 tablet (8 mg) by mouth every 8 hours as needed for nausea   traZODone (DESYREL) 50 MG tablet 12/13/2024  Bedtime Nursing Home Yes Yes   Sig: Take 50 mg by mouth nightly as needed for sleep.   triamcinolone (KENALOG) 0.1 % external cream Unknown Nursing Home Yes Yes   Sig: Apply topically 2 times daily as needed for irritation      Facility-Administered Medications: None     Allergies   Allergies   Allergen Reactions    Iodine Swelling     Patient states reaction was 20-30 years ago. Has had CT dye and coronary angiograms since then without premedication and did not have reaction.    Iodinated Contrast Media     Metoprolol Difficulty breathing     Difficulty breathing and bradycardia        Family History    Family History   Problem Relation Age of Onset    Asthma Other     Cancer Other         melanoma    Blood Disease Other         bleeding disorder    Lung Cancer Mother         Smoker    Prostate Cancer Father     Melanoma No family hx of        Social History   Social History     Socioeconomic History    Marital status: Single     Spouse name: Not on file    Number of children: 0    Years of education: Not on file    Highest education level: Not on file   Occupational History    Occupation: Retired     Comment: [employer]    Tobacco Use    Smoking status: Never     Passive exposure: Never    Smokeless tobacco: Never   Vaping Use    Vaping status: Never Used   Substance and Sexual Activity    Alcohol use: Yes     Comment: very rarely    Drug use: Not Currently    Sexual activity: Not on file   Other Topics Concern    Parent/sibling w/ CABG, MI or angioplasty before 65F 55M? Not Asked   Social History Narrative    Not on file     Social Drivers of Health     Financial Resource Strain: Low Risk  (12/14/2024)    Financial Resource Strain     Within the past 12 months, have you or your family members you live with been unable to get utilities (heat, electricity) when it was really needed?: No   Food Insecurity: Low Risk  (12/14/2024)    Food Insecurity     Within the past 12 months, did you worry that your  "food would run out before you got money to buy more?: No     Within the past 12 months, did the food you bought just not last and you didn t have money to get more?: No   Transportation Needs: Low Risk  (12/14/2024)    Transportation Needs     Within the past 12 months, has lack of transportation kept you from medical appointments, getting your medicines, non-medical meetings or appointments, work, or from getting things that you need?: No   Physical Activity: Inactive (11/9/2021)    Exercise Vital Sign     Days of Exercise per Week: 0 days     Minutes of Exercise per Session: 0 min   Stress: No Stress Concern Present (11/9/2021)    [employer] - Occupational Stress Questionnaire     Feeling of Stress : Not at all   Social Connections: Socially Integrated (4/21/2024)    Received from [facility]    Social Connections     Do you often feel lonely or isolated from those around you?: 0   Interpersonal Safety: Not on file   Housing Stability: Low Risk  (12/14/2024)    Housing Stability     Do you have housing? : Yes     Are you worried about losing your housing?: No       Physical Exam   /56 (BP Location: Left arm)   Pulse 85   Temp 98  F (36.7  C) (Oral)   Resp 16   Ht 1.676 m (5' 6\")   Wt 87.5 kg (193 lb)   SpO2 100%   BMI 31.15 kg/m       Weight: 193 lbs 0 oz Body mass index is 31.15 kg/m .     Constitutional: Frail, debilitated elderly gentleman. Alert, responding to questions appropriately, speaking in full sentences without difficulty. Appears comfortable, nontoxic, no acute distress.  Eyes: Eyes are clear, no scleral icterus, pupils are reactive, EOM intact bilaterally.  HENT: Oropharynx is clear and dry. No evidence of cranial trauma.  Cardiovascular: Regular rate and rhythm and no murmur, rub, or gallops noted. Radial & dorsalis pedis pulses palpable bilaterally, right radial pulse slightly diminished. No lower extremity " edema.  Respiratory: Respirations unlabored on room air, Clear to auscultation bilaterally without wheezes, crackles, rhonchi.   GI: Active bowel sounds throughout. Soft, not distended, non-tender to palpation, without rebound or guarding. No palpable masses, no hepatomegaly.   Genitourinary: Deferred  Musculoskeletal: Normal muscle bulk and tone. Moves all extremities spontaneously. No gross deformity.  Skin: Warm and dry, no rashes. Several areas of ecchymoses on bilateral upper extremities. Excoriations on bilateral lower extremities. Scattered spider telangiectasias.   Neurologic: Neck supple.  is symmetric. No notable tremor. Cranial nerves II-XII grossly intact. Oriented to self, location, month, year, but is unsure why he is in the hospital. Strength 5/5 in bilateral upper and lower extremities. Sensation intact in distal UE and LE bilaterally. Right eyebrow does not raise, right ptosis. No pronator drift. Facial movements symmetrical.       Data   Data reviewed today:     I have personally reviewed the following data over the past 24 hrs:    3.0 (L)  \   11.4 (L)   / 55 (L)     138 98 16.6 /  107 (H)   3.9 24 0.89 \     ALT: 33 AST: 60 (H) AP: 163 (H) TBILI: 2.8 (H)   ALB: 2.9 (L) TOT PROTEIN: 6.0 (L) LIPASE: 33     Trop: 54 (H) BNP: N/A     TSH: 3.06 T4: N/A A1C: N/A         Recent Results (from the past 24 hours)   Head CT w/o contrast    Narrative    EXAM: CT HEAD W/O CONTRAST  LOCATION: [facility]  DATE: 12/14/2024    INDICATION: Fall, closed head injury  COMPARISON: CT head without contrast 12/1/2024.  TECHNIQUE: Routine CT Head without IV contrast. Multiplanar reformats. Dose reduction techniques were used.    FINDINGS:  INTRACRANIAL CONTENTS: No intracranial hemorrhage, extraaxial collection, or mass effect.  No CT evidence of acute infarct. Mild presumed chronic small vessel ischemic changes. Moderate generalized volume loss. No hydrocephalus.     VISUALIZED  ORBITS/SINUSES/MASTOIDS: Prior bilateral cataract surgery. Visualized portions of the orbits are otherwise unremarkable. No paranasal sinus mucosal disease. No middle ear or mastoid effusion.    BONES/SOFT TISSUES: No acute abnormality.      Impression    IMPRESSION:  1.  No CT evidence for acute intracranial process.  2.  Brain atrophy and presumed chronic microvascular ischemic changes as above.   CT Chest/Abdomen/Pelvis w Contrast    Narrative    EXAM: CT CHEST/ABDOMEN/PELVIS WITH CONTRAST  LOCATION: [facility]  DATE: 12/14/2024    INDICATION: Falls, dementia, poorly localized chest pain and abdominal pain with no contusions.  COMPARISON: CT chest 07/06/2024, MRI abdomen 10/03/2024.  TECHNIQUE: CT scan of the chest, abdomen, and pelvis was performed following injection of IV contrast. Multiplanar reformats were obtained. Dose reduction techniques were used.   CONTRAST: 94 mL Isovue 370.    FINDINGS:   LUNGS AND PLEURA: No effusions. Stable subpleural nodular opacity along the fissures measuring 19 x 12 mm series 6 image 101. No new airspace disease. A few calcified granulomas.    MEDIASTINUM/AXILLAE: No acute mediastinal abnormality. No enlarged lymph nodes or fluid collections identified. Small hiatal hernia.    CORONARY ARTERY CALCIFICATION: Severe.    HEPATOBILIARY: Cirrhotic liver. Cholecystectomy. Stable size of a 3.4 cm observation at the site of embolization segment 2/3 series 5 image 112. There is some peripheral nodular soft tissue component again identified appearing similar to prior. Segment   8, 3 cm nodule is very ill-defined series 5 image 118 and comparison is limited. Ill-defined segment 4A nodule suggested on image 117 with comparison being limited. Stable segment 6 nodule showing enhancement that is 1.8 cm image 146.    PANCREAS: Normal.    SPLEEN: Splenomegaly is 14.2 cm, stable.    ADRENAL GLANDS: Normal.    KIDNEYS/BLADDER: No significant mass, stones, or  hydronephrosis. There are simple or benign cysts. No follow up is needed. Bladder is unremarkable.    BOWEL: No obstruction or acute inflammation. Appendix not seen. Colonic diverticula.    LYMPH NODES: No new adenopathy identified.    VASCULATURE: Diffuse vascular calcifications.    PELVIC ORGANS: Prostate is 4.2 cm.    MUSCULOSKELETAL: Fracture through the anterior osteophyte at the T6-T7 level series 10 image 54. This is described at the CT thoracic spine exam performed today. Inferior endplate T6 fracture is also described in this region. No additional acute fracture   identified. Subacute right rib fractures appear stable. Diffuse degenerative changes throughout the spine. Bilateral hip DJD. Small ascites is stable.      Impression    IMPRESSION:  1.  Refer to CT thoracic spine regarding the nondisplaced anterior osteophyte fracture of T6-T7, and T6 inferior endplate fracture.  2.  No other acute fracture identified. No other acute traumatic abnormality identified.  3.  Cirrhotic liver and evidence of portal hypertension. Stable splenomegaly. Stable small ascites. Multiple hepatic observations again identified without significant interval change compared to the MRI from 10/23/2024.  4.  Stable nodular opacity at the periphery of the right lung along the fissures. Recommend surveillance imaging to establish long-term stability. Suggest follow-up CT chest in 3-6 months.       CT Thoracic Spine w/o Contrast    Narrative    EXAM: CT THORACIC SPINE W/O CONTRAST  LOCATION: [facility]  DATE: 12/14/2024    INDICATION: Falls, closed head injury, posterior neck pain, dementia and unreliable historian  COMPARISON: CT chest 7/6/2024.  TECHNIQUE: Routine CT Thoracic Spine without IV contrast. Multiplanar reformats. Dose reduction techniques were used.     FINDINGS:  VERTEBRA: There is an acute fracture seen through anterior osteophytes at the T6-T7 level with extension of fracture into the  inferior endplate of the T6 vertebral body. No significant vertebral body height loss.      CANAL/FORAMINA: No canal or neural foraminal stenosis.    PARASPINAL: Vascular calcifications involving the aorta. Surgical clips in the gallbladder fossa.      Impression    IMPRESSION:  1.  Acute fracture involving previously bridging anterior osteophytes at the T6-T7 level with fracture involvement of the inferior T6 endplate.  2.  Multilevel fusion including bridging ventral endplate osteophytes in the thoracic spine which may reflect changes of diffuse hepatic skeletal hyperostosis or ankylosing spondylitis.    Results discussed with Dr. [place] on 12/14/2024  11:50 PM CST   CT Lumbar Spine w/o Contrast    Narrative    EXAM: CT LUMBAR SPINE W/O CONTRAST  LOCATION: [facility]  DATE: 12/14/2024    INDICATION: Falls, closed head injury, posterior neck pain, dementia and unreliable historian  COMPARISON: None.  TECHNIQUE: Routine CT Lumbar Spine without IV contrast. Multiplanar reformats. Dose reduction techniques were used.     FINDINGS:  VERTEBRA: Lumbar spine lordosis is maintained. There is fusion across L5-S1 disc space. Fusion of the bilateral sacroiliac joints. Lumbar vertebral body heights are maintained. The lumbar spinal canal is narrowed on a developmental basis.      CANAL/FORAMINA: Moderate spinal canal stenosis at L3-L4. Mild to moderate multilevel degenerative facet changes.    PARASPINAL: No extraspinal abnormality.      Impression    IMPRESSION:  1.  No acute lumbar spine fracture.  2.  Multilevel degenerative changes in the lumbar spine with moderate spinal canal stenosis at L3-L4.   CT Cervical Spine w/o Contrast    Narrative    EXAM: CT CERVICAL SPINE W/O CONTRAST  LOCATION: [facility]  DATE: 12/14/2024    INDICATION: Neck pain.  COMPARISON: None.  TECHNIQUE: Routine CT Cervical Spine without IV contrast. Multiplanar reformats. Dose reduction  techniques were used.    FINDINGS:  VERTEBRA: Cervical vertebral body heights are maintained. Grade 1 anterolisthesis of C4 on C5 and C5 on C6. No acute cervical spine fracture.     CANAL/FORAMINA: Severe right and moderate left neural foraminal stenosis at C3-C4. Moderate bilateral neural foraminal stenosis at C4-C5.    PARASPINAL: No extraspinal abnormality.      Impression    IMPRESSION:  1.  No acute cervical spine fracture.       I personally reviewed no images or EKG's today

## 2024-12-14 NOTE — ED NOTES
"[facility]   Admission Handoff    The patient is Flash Brown, 84 year old who arrived in the ED by AMBULANCE from home with a complaint of Fall (Unwitnessed falls. ? LOC, - stroke)  . The patient's current symptoms are an exacerbation of a chronic illness and during this time the symptoms have remained the same. In the ED, patient was diagnosed with   Final diagnoses:   None         Needed?: No    Allergies:    Allergies   Allergen Reactions    Iodine Swelling     Patient states reaction was 20-30 years ago. Has had CT dye and coronary angiograms since then without premedication and did not have reaction.    Iodinated Contrast Media     Metoprolol Difficulty breathing     Difficulty breathing and bradycardia        Past Medical Hx:   Past Medical History:   Diagnosis Date    Acute kidney failure, unspecified (H) 08/27/2022    Basal cell carcinoma     CAD (coronary artery disease)     Depression     Diabetes (H)     Hyperlipidemia     Hypertension     Lymphoma (H)     Malignant melanoma (H)     Melanoma (H)     Squamous cell carcinoma        Initial vitals were: BP: 110/76  Pulse: 88  Temp: 97.7  F (36.5  C)  Resp: 18  Height: 167.6 cm (5' 6\")  Weight: 87.5 kg (193 lb)  SpO2: 100 %   Recent vital Signs: /57   Pulse 85   Temp 97.7  F (36.5  C) (Oral)   Resp 11   Ht 1.676 m (5' 6\")   Wt 87.5 kg (193 lb)   SpO2 99%   BMI 31.15 kg/m      Elimination Status: Continent: Yes     Activity Level: 2 assist    Fall Status: Reason for falls risk:  Mobility, Reason for falls risk: Elimination, and Reason for falls risk: Cognition  bed/chair alarm on, nonskid shoes/slippers when out of bed, arm band in place, patient and family education, assistive device/personal items within reach, activity supervised, room door open, and toileting schedule implemented    Baseline Mental status: mild dementia  Current Mental Status changes: at basesline    Infection present or suspected " this encounter: no  Sepsis suspected: No    Isolation type: None    Bariatric equipment needed?: No    In the ED these meds were given:   Medications   iopamidol (ISOVUE-370) solution 94 mL (94 mLs Intravenous $Given 12/14/24 1057)   sodium chloride 0.9 % bag 500mL for CT scan flush use (64 mLs As instructed $Given 12/14/24 1057)   magnesium sulfate 2 g in 50 mL sterile water intermittent infusion (2 g Intravenous $New Bag 12/14/24 1307)   sodium chloride 0.9% BOLUS 1,000 mL (1,000 mLs Intravenous $New Bag 12/14/24 1242)       Drips running?  No    Home pump  No    Current LDAs: Peripheral IV: Site R AC; Gauge 18  none     Results:   Labs/Imaging  Ordered and Resulted from Time of ED Arrival Up to the Time of Departure from the ED  Results for orders placed or performed during the hospital encounter of 12/14/24 (from the past 24 hours)   CBC with platelets differential    Narrative    The following orders were created for panel order CBC with platelets differential.  Procedure                               Abnormality         Status                     ---------                               -----------         ------                     CBC with platelets and d...[[id number]]  Abnormal            Final result               RBC and Platelet Morphology[[id number]]                                                   Please view results for these tests on the individual orders.   Comprehensive metabolic panel   Result Value Ref Range    Sodium 138 135 - 145 mmol/L    Potassium 3.9 3.4 - 5.3 mmol/L    Carbon Dioxide (CO2) 24 22 - 29 mmol/L    Anion Gap 16 (H) 7 - 15 mmol/L    Urea Nitrogen 16.6 8.0 - 23.0 mg/dL    Creatinine 0.89 0.67 - 1.17 mg/dL    GFR Estimate 86 >60 mL/min/1.73m2    Calcium 8.1 (L) 8.8 - 10.4 mg/dL    Chloride 98 98 - 107 mmol/L    Glucose 107 (H) 70 - 99 mg/dL    Alkaline Phosphatase 163 (H) 40 - 150 U/L    AST 60 (H) 0 - 45 U/L    ALT 33 0 - 70 U/L    Protein Total 6.0 (L) 6.4 - 8.3 g/dL    Albumin  2.9 (L) 3.5 - 5.2 g/dL    Bilirubin Total 2.8 (H) <=1.2 mg/dL   Lipase   Result Value Ref Range    Lipase 33 13 - 60 U/L   Magnesium   Result Value Ref Range    Magnesium 1.3 (L) 1.7 - 2.3 mg/dL   TSH with free T4 reflex   Result Value Ref Range    TSH 3.06 0.30 - 4.20 uIU/mL   Troponin T, High Sensitivity   Result Value Ref Range    Troponin T, High Sensitivity 53 (H) <=22 ng/L   CK total   Result Value Ref Range     39 - 308 U/L   CBC with platelets and differential   Result Value Ref Range    WBC Count 3.0 (L) 4.0 - 11.0 10e3/uL    RBC Count 3.63 (L) 4.40 - 5.90 10e6/uL    Hemoglobin 11.4 (L) 13.3 - 17.7 g/dL    Hematocrit 33.6 (L) 40.0 - 53.0 %    MCV 93 78 - 100 fL    MCH 31.4 26.5 - 33.0 pg    MCHC 33.9 31.5 - 36.5 g/dL    RDW 20.4 (H) 10.0 - 15.0 %    Platelet Count 55 (L) 150 - 450 10e3/uL    % Neutrophils 68 %    % Lymphocytes 21 %    % Monocytes 9 %    % Eosinophils 1 %    % Basophils 1 %    % Immature Granulocytes 0 %    NRBCs per 100 WBC 0 <1 /100    Absolute Neutrophils 2.0 1.6 - 8.3 10e3/uL    Absolute Lymphocytes 0.6 (L) 0.8 - 5.3 10e3/uL    Absolute Monocytes 0.3 0.0 - 1.3 10e3/uL    Absolute Eosinophils 0.0 0.0 - 0.7 10e3/uL    Absolute Basophils 0.0 0.0 - 0.2 10e3/uL    Absolute Immature Granulocytes 0.0 <=0.4 10e3/uL    Absolute NRBCs 0.0 10e3/uL   [place] Draw    Narrative    The following orders were created for panel order [place] Draw.  Procedure                               Abnormality         Status                     ---------                               -----------         ------                     Extra Blue Top Tube[[id number]]                              Final result                 Please view results for these tests on the individual orders.   Extra Blue Top Tube   Result Value Ref Range    Hold Specimen [facility]    EKG 12 lead   Result Value Ref Range    Systolic Blood Pressure  mmHg    Diastolic Blood Pressure  mmHg    Ventricular Rate 87 BPM    Atrial Rate 87 BPM    CO  Interval 170 ms    QRS Duration 82 ms     ms    QTc 515 ms    P Axis 67 degrees    R AXIS 13 degrees    T Axis 198 degrees    Interpretation ECG       Sinus rhythm with Premature atrial complexes  ST & T wave abnormality, consider inferolateral ischemia  Abnormal ECG  When compared with ECG of 01-Dec-2024 14:39,  Premature atrial complexes are now Present  Confirmed by SEE ED PROVIDER NOTE FOR, ECG INTERPRETATION ([id number]),  Jocelyne Nguyen ([id number]) on 12/14/2024 10:36:11 AM     Head CT w/o contrast    Narrative    EXAM: CT HEAD W/O CONTRAST  LOCATION: [facility]  DATE: 12/14/2024    INDICATION: Fall, closed head injury  COMPARISON: CT head without contrast 12/1/2024.  TECHNIQUE: Routine CT Head without IV contrast. Multiplanar reformats. Dose reduction techniques were used.    FINDINGS:  INTRACRANIAL CONTENTS: No intracranial hemorrhage, extraaxial collection, or mass effect.  No CT evidence of acute infarct. Mild presumed chronic small vessel ischemic changes. Moderate generalized volume loss. No hydrocephalus.     VISUALIZED ORBITS/SINUSES/MASTOIDS: Prior bilateral cataract surgery. Visualized portions of the orbits are otherwise unremarkable. No paranasal sinus mucosal disease. No middle ear or mastoid effusion.    BONES/SOFT TISSUES: No acute abnormality.      Impression    IMPRESSION:  1.  No CT evidence for acute intracranial process.  2.  Brain atrophy and presumed chronic microvascular ischemic changes as above.   CT Chest/Abdomen/Pelvis w Contrast    Narrative    EXAM: CT CHEST/ABDOMEN/PELVIS WITH CONTRAST  LOCATION: [facility]  DATE: 12/14/2024    INDICATION: Falls, dementia, poorly localized chest pain and abdominal pain with no contusions.  COMPARISON: CT chest 07/06/2024, MRI abdomen 10/03/2024.  TECHNIQUE: CT scan of the chest, abdomen, and pelvis was performed following injection of IV contrast. Multiplanar reformats were obtained. Dose  reduction techniques were used.   CONTRAST: 94 mL Isovue 370.    FINDINGS:   LUNGS AND PLEURA: No effusions. Stable subpleural nodular opacity along the fissures measuring 19 x 12 mm series 6 image 101. No new airspace disease. A few calcified granulomas.    MEDIASTINUM/AXILLAE: No acute mediastinal abnormality. No enlarged lymph nodes or fluid collections identified. Small hiatal hernia.    CORONARY ARTERY CALCIFICATION: Severe.    HEPATOBILIARY: Cirrhotic liver. Cholecystectomy. Stable size of a 3.4 cm observation at the site of embolization segment 2/3 series 5 image 112. There is some peripheral nodular soft tissue component again identified appearing similar to prior. Segment   8, 3 cm nodule is very ill-defined series 5 image 118 and comparison is limited. Ill-defined segment 4A nodule suggested on image 117 with comparison being limited. Stable segment 6 nodule showing enhancement that is 1.8 cm image 146.    PANCREAS: Normal.    SPLEEN: Splenomegaly is 14.2 cm, stable.    ADRENAL GLANDS: Normal.    KIDNEYS/BLADDER: No significant mass, stones, or hydronephrosis. There are simple or benign cysts. No follow up is needed. Bladder is unremarkable.    BOWEL: No obstruction or acute inflammation. Appendix not seen. Colonic diverticula.    LYMPH NODES: No new adenopathy identified.    VASCULATURE: Diffuse vascular calcifications.    PELVIC ORGANS: Prostate is 4.2 cm.    MUSCULOSKELETAL: Fracture through the anterior osteophyte at the T6-T7 level series 10 image 54. This is described at the CT thoracic spine exam performed today. Inferior endplate T6 fracture is also described in this region. No additional acute fracture   identified. Subacute right rib fractures appear stable. Diffuse degenerative changes throughout the spine. Bilateral hip DJD. Small ascites is stable.      Impression    IMPRESSION:  1.  Refer to CT thoracic spine regarding the nondisplaced anterior osteophyte fracture of T6-T7, and T6 inferior  endplate fracture.  2.  No other acute fracture identified. No other acute traumatic abnormality identified.  3.  Cirrhotic liver and evidence of portal hypertension. Stable splenomegaly. Stable small ascites. Multiple hepatic observations again identified without significant interval change compared to the MRI from 10/23/2024.  4.  Stable nodular opacity at the periphery of the right lung along the fissures. Recommend surveillance imaging to establish long-term stability. Suggest follow-up CT chest in 3-6 months.       CT Thoracic Spine w/o Contrast    Narrative    EXAM: CT THORACIC SPINE W/O CONTRAST  LOCATION: [facility]  DATE: 12/14/2024    INDICATION: Falls, closed head injury, posterior neck pain, dementia and unreliable historian  COMPARISON: CT chest 7/6/2024.  TECHNIQUE: Routine CT Thoracic Spine without IV contrast. Multiplanar reformats. Dose reduction techniques were used.     FINDINGS:  VERTEBRA: There is an acute fracture seen through anterior osteophytes at the T6-T7 level with extension of fracture into the inferior endplate of the T6 vertebral body. No significant vertebral body height loss.      CANAL/FORAMINA: No canal or neural foraminal stenosis.    PARASPINAL: Vascular calcifications involving the aorta. Surgical clips in the gallbladder fossa.      Impression    IMPRESSION:  1.  Acute fracture involving previously bridging anterior osteophytes at the T6-T7 level with fracture involvement of the inferior T6 endplate.  2.  Multilevel fusion including bridging ventral endplate osteophytes in the thoracic spine which may reflect changes of diffuse hepatic skeletal hyperostosis or ankylosing spondylitis.    Results discussed with Dr. Thurston on 12/14/2024  11:50 PM CST   CT Lumbar Spine w/o Contrast    Narrative    EXAM: CT LUMBAR SPINE W/O CONTRAST  LOCATION: [facility]  DATE: 12/14/2024    INDICATION: Falls, closed head injury, posterior neck  pain, dementia and unreliable historian  COMPARISON: None.  TECHNIQUE: Routine CT Lumbar Spine without IV contrast. Multiplanar reformats. Dose reduction techniques were used.     FINDINGS:  VERTEBRA: Lumbar spine lordosis is maintained. There is fusion across L5-S1 disc space. Fusion of the bilateral sacroiliac joints. Lumbar vertebral body heights are maintained. The lumbar spinal canal is narrowed on a developmental basis.      CANAL/FORAMINA: Moderate spinal canal stenosis at L3-L4. Mild to moderate multilevel degenerative facet changes.    PARASPINAL: No extraspinal abnormality.      Impression    IMPRESSION:  1.  No acute lumbar spine fracture.  2.  Multilevel degenerative changes in the lumbar spine with moderate spinal canal stenosis at L3-L4.   CT Cervical Spine w/o Contrast    Narrative    EXAM: CT CERVICAL SPINE W/O CONTRAST  LOCATION: [facility]  DATE: 12/14/2024    INDICATION: Neck pain.  COMPARISON: None.  TECHNIQUE: Routine CT Cervical Spine without IV contrast. Multiplanar reformats. Dose reduction techniques were used.    FINDINGS:  VERTEBRA: Cervical vertebral body heights are maintained. Grade 1 anterolisthesis of C4 on C5 and C5 on C6. No acute cervical spine fracture.     CANAL/FORAMINA: Severe right and moderate left neural foraminal stenosis at C3-C4. Moderate bilateral neural foraminal stenosis at C4-C5.    PARASPINAL: No extraspinal abnormality.      Impression    IMPRESSION:  1.  No acute cervical spine fracture.   Troponin T, High Sensitivity   Result Value Ref Range    Troponin T, High Sensitivity 54 (H) <=22 ng/L   UA with Microscopic reflex to Culture    Specimen: Urine, Clean Catch   Result Value Ref Range    Color Urine Yellow Colorless, Straw, Light Yellow, Yellow    Appearance Urine Clear Clear    Glucose Urine Negative Negative mg/dL    Bilirubin Urine Negative Negative    Ketones Urine 5 (A) Negative mg/dL    Specific Gravity Urine 1.010 1.003 - 1.035     Blood Urine Negative Negative    pH Urine 7.0 5.0 - 7.0    Protein Albumin Urine Negative Negative mg/dL    Urobilinogen Urine 4.0 (A) Normal, 2.0 mg/dL    Nitrite Urine Negative Negative    Leukocyte Esterase Urine Negative Negative    RBC Urine 8 (H) <=2 /HPF    WBC Urine 0 <=5 /HPF    Narrative    Urine Culture not indicated       For the majority of the shift this patient's behavior was Green     Cardiac Rhythm: Normal Sinus  Pt needs tele? No  Skin/wound Issues:  None identified    Code Status: DNR / DNI    Pain control: good    Nausea control: pt had none    Abnormal labs/tests/findings requiring intervention: See above    Patient tested for COVID 19 prior to admission: NO     OBS brochure/video discussed/provided to patient/family: Yes     Family present during ED course? Yes     Family Comments/Social Situation comments: Lives in memory care, daughter present during ED stay    Tasks needing completion: None    Cailin Brand RN

## 2024-12-14 NOTE — ED NOTES
Pt comes from memory care for multiple falls, generalized weakness and confusion. Pt yells out with movement, pt is collared. Pt is alert and orientated to baseline. Pt is anxious and wants HOB elevated, explained to pt we need to keep him flat until we can rule out neck fx. Pt does not seem to understand, continously asking to have HOB elevated, asking for water. MD at bedside, pt ok to have water. HOB elevated as tolerated. Pt inconsistent about where pain is at

## 2024-12-14 NOTE — MEDICATION SCRIBE - ADMISSION MEDICATION HISTORY
Medication Scribe Admission Medication History    Admission medication history is complete. The information provided in this note is only as accurate as the sources available at the time of the update.    Information Source(s): Facility ([facility]/) medication list/MAR via phone    Pertinent Information: Medication List sent from [facility] and than reviewed by phone with nurse to get last dose information. Cabometyx was recently discontinued per staff    Changes made to PTA medication list:  Added: Trazodone 50 mg PRN  Deleted: None  Changed: None    Allergies reviewed with patient and updates made in EHR: yes    Medication History Completed By: Ivonne Shea 12/14/2024 11:30 AM    PTA Med List   Medication Sig Last Dose/Taking    acetaminophen (TYLENOL) 500 MG tablet Take 2 tablets by mouth 2 times daily. 12/13/2024 Evening    ASPIRIN LOW DOSE 81 MG chewable tablet Take 1 tablet by mouth daily. 12/13/2024 Morning    atorvastatin (LIPITOR) 20 MG tablet Take 1 tablet (20 mg) by mouth daily 12/13/2024 at  5:00 PM    ferrous sulfate (FE TABS) 325 (65 Fe) MG EC tablet Take 1 tablet (325 mg) by mouth daily 12/13/2024 Morning    furosemide (LASIX) 40 MG tablet Take 1 tablet by mouth daily. 12/13/2024 Morning    hydrOXYzine HCl (ATARAX) 25 MG tablet Take 1 tablet (25 mg) by mouth 2 times daily. 12/13/2024 Bedtime    lactulose (CHRONULAC) 10 GM/15ML solution Take 30 mLs (20 g) by mouth daily 12/13/2024 Morning    lidocaine (XYLOCAINE) 5 % external ointment Apply topically 4 times daily as needed for moderate pain Unknown    loperamide (IMODIUM) 2 MG capsule Take 4 mg by mouth every 4 hours as needed for diarrhea (>4-5 loose stools/day). Unknown    magnesium oxide (MAG-OX) 400 MG tablet Take 1 tablet (400 mg) by mouth 2 times daily 12/13/2024 Bedtime    metFORMIN (GLUCOPHAGE) 500 MG tablet Take 500 mg by mouth daily (with dinner). 12/13/2024 Evening    metFORMIN (GLUCOPHAGE) 500 MG tablet Take 1,000 mg by mouth daily  (with breakfast). 12/13/2024 Morning    nitroGLYcerin (NITROSTAT) 0.4 MG sublingual tablet Place 1 tablet (0.4 mg) under the tongue See Admin Instructions for chest pain Unknown    ondansetron (ZOFRAN) 8 MG tablet Take 1 tablet (8 mg) by mouth every 8 hours as needed for nausea Unknown    traZODone (DESYREL) 50 MG tablet Take 50 mg by mouth nightly as needed for sleep. 12/13/2024 Bedtime    triamcinolone (KENALOG) 0.1 % external cream Apply topically 2 times daily as needed for irritation Unknown

## 2024-12-14 NOTE — ED TRIAGE NOTES
Patient comes from memory care after multiple falls. Facility reports having two unwitnessed falls in past day. Has had generalized weakness.

## 2024-12-15 ENCOUNTER — APPOINTMENT (OUTPATIENT)
Dept: OCCUPATIONAL THERAPY | Facility: CLINIC | Age: 84
End: 2024-12-15
Payer: MEDICARE

## 2024-12-15 ENCOUNTER — APPOINTMENT (OUTPATIENT)
Dept: PHYSICAL THERAPY | Facility: CLINIC | Age: 84
End: 2024-12-15
Payer: MEDICARE

## 2024-12-15 LAB
ALBUMIN SERPL BCG-MCNC: 2.5 G/DL (ref 3.5–5.2)
ALP SERPL-CCNC: 155 U/L (ref 40–150)
ALT SERPL W P-5'-P-CCNC: 27 U/L (ref 0–70)
ANION GAP SERPL CALCULATED.3IONS-SCNC: 9 MMOL/L (ref 7–15)
AST SERPL W P-5'-P-CCNC: 52 U/L (ref 0–45)
BASOPHILS # BLD AUTO: 0 10E3/UL (ref 0–0.2)
BASOPHILS NFR BLD AUTO: 0 %
BILIRUB SERPL-MCNC: 2.5 MG/DL
BUN SERPL-MCNC: 14.5 MG/DL (ref 8–23)
CALCIUM SERPL-MCNC: 7.6 MG/DL (ref 8.8–10.4)
CHLORIDE SERPL-SCNC: 104 MMOL/L (ref 98–107)
CREAT SERPL-MCNC: 0.69 MG/DL (ref 0.67–1.17)
EGFRCR SERPLBLD CKD-EPI 2021: >90 ML/MIN/1.73M2
EOSINOPHIL # BLD AUTO: 0 10E3/UL (ref 0–0.7)
EOSINOPHIL NFR BLD AUTO: 1 %
ERYTHROCYTE [DISTWIDTH] IN BLOOD BY AUTOMATED COUNT: 20.7 % (ref 10–15)
GLUCOSE BLDC GLUCOMTR-MCNC: 100 MG/DL (ref 70–99)
GLUCOSE BLDC GLUCOMTR-MCNC: 105 MG/DL (ref 70–99)
GLUCOSE BLDC GLUCOMTR-MCNC: 105 MG/DL (ref 70–99)
GLUCOSE BLDC GLUCOMTR-MCNC: 107 MG/DL (ref 70–99)
GLUCOSE SERPL-MCNC: 115 MG/DL (ref 70–99)
HCO3 SERPL-SCNC: 25 MMOL/L (ref 22–29)
HCT VFR BLD AUTO: 31.4 % (ref 40–53)
HGB BLD-MCNC: 10.3 G/DL (ref 13.3–17.7)
IMM GRANULOCYTES # BLD: 0 10E3/UL
IMM GRANULOCYTES NFR BLD: 1 %
LYMPHOCYTES # BLD AUTO: 0.5 10E3/UL (ref 0.8–5.3)
LYMPHOCYTES NFR BLD AUTO: 23 %
MCH RBC QN AUTO: 31 PG (ref 26.5–33)
MCHC RBC AUTO-ENTMCNC: 32.8 G/DL (ref 31.5–36.5)
MCV RBC AUTO: 95 FL (ref 78–100)
MONOCYTES # BLD AUTO: 0.3 10E3/UL (ref 0–1.3)
MONOCYTES NFR BLD AUTO: 11 %
NEUTROPHILS # BLD AUTO: 1.5 10E3/UL (ref 1.6–8.3)
NEUTROPHILS NFR BLD AUTO: 64 %
NRBC # BLD AUTO: 0 10E3/UL
NRBC BLD AUTO-RTO: 0 /100
PLATELET # BLD AUTO: 43 10E3/UL (ref 150–450)
POTASSIUM SERPL-SCNC: 3.9 MMOL/L (ref 3.4–5.3)
PROT SERPL-MCNC: 5.2 G/DL (ref 6.4–8.3)
RBC # BLD AUTO: 3.32 10E6/UL (ref 4.4–5.9)
SODIUM SERPL-SCNC: 138 MMOL/L (ref 135–145)
WBC # BLD AUTO: 2.3 10E3/UL (ref 4–11)

## 2024-12-15 PROCEDURE — 82962 GLUCOSE BLOOD TEST: CPT

## 2024-12-15 PROCEDURE — 84520 ASSAY OF UREA NITROGEN: CPT

## 2024-12-15 PROCEDURE — 97161 PT EVAL LOW COMPLEX 20 MIN: CPT | Mod: GP | Performed by: PHYSICAL THERAPIST

## 2024-12-15 PROCEDURE — 250N000013 HC RX MED GY IP 250 OP 250 PS 637: Performed by: INTERNAL MEDICINE

## 2024-12-15 PROCEDURE — 84460 ALANINE AMINO (ALT) (SGPT): CPT

## 2024-12-15 PROCEDURE — G0378 HOSPITAL OBSERVATION PER HR: HCPCS

## 2024-12-15 PROCEDURE — 97165 OT EVAL LOW COMPLEX 30 MIN: CPT | Mod: GO

## 2024-12-15 PROCEDURE — 99232 SBSQ HOSP IP/OBS MODERATE 35: CPT | Performed by: INTERNAL MEDICINE

## 2024-12-15 PROCEDURE — 85025 COMPLETE CBC W/AUTO DIFF WBC: CPT

## 2024-12-15 PROCEDURE — 82310 ASSAY OF CALCIUM: CPT

## 2024-12-15 PROCEDURE — 36415 COLL VENOUS BLD VENIPUNCTURE: CPT

## 2024-12-15 PROCEDURE — 250N000013 HC RX MED GY IP 250 OP 250 PS 637

## 2024-12-15 PROCEDURE — 97535 SELF CARE MNGMENT TRAINING: CPT | Mod: GO

## 2024-12-15 RX ORDER — ACETAMINOPHEN 325 MG/1
650 TABLET ORAL EVERY 4 HOURS PRN
Status: DISCONTINUED | OUTPATIENT
Start: 2024-12-15 | End: 2024-12-17 | Stop reason: HOSPADM

## 2024-12-15 RX ORDER — DEXTROSE MONOHYDRATE 25 G/50ML
25-50 INJECTION, SOLUTION INTRAVENOUS
Status: DISCONTINUED | OUTPATIENT
Start: 2024-12-15 | End: 2024-12-17 | Stop reason: HOSPADM

## 2024-12-15 RX ORDER — NICOTINE POLACRILEX 4 MG
15-30 LOZENGE BUCCAL
Status: DISCONTINUED | OUTPATIENT
Start: 2024-12-15 | End: 2024-12-17 | Stop reason: HOSPADM

## 2024-12-15 RX ADMIN — HYDROXYZINE HYDROCHLORIDE 25 MG: 25 TABLET, FILM COATED ORAL at 09:13

## 2024-12-15 RX ADMIN — FERROUS SULFATE TAB 325 MG (65 MG ELEMENTAL FE) 325 MG: 325 (65 FE) TAB at 09:13

## 2024-12-15 RX ADMIN — MAGNESIUM OXIDE TAB 400 MG (241.3 MG ELEMENTAL MG) 400 MG: 400 (241.3 MG) TAB at 20:41

## 2024-12-15 RX ADMIN — HYDROXYZINE HYDROCHLORIDE 25 MG: 25 TABLET, FILM COATED ORAL at 20:41

## 2024-12-15 RX ADMIN — NYSTATIN: 100000 CREAM TOPICAL at 09:14

## 2024-12-15 RX ADMIN — OXYCODONE HYDROCHLORIDE 2.5 MG: 5 TABLET ORAL at 04:30

## 2024-12-15 RX ADMIN — ASPIRIN 81 MG CHEWABLE TABLET 81 MG: 81 TABLET CHEWABLE at 09:13

## 2024-12-15 RX ADMIN — ACETAMINOPHEN 650 MG: 325 TABLET ORAL at 12:13

## 2024-12-15 RX ADMIN — MAGNESIUM OXIDE TAB 400 MG (241.3 MG ELEMENTAL MG) 400 MG: 400 (241.3 MG) TAB at 09:14

## 2024-12-15 RX ADMIN — FUROSEMIDE 40 MG: 40 TABLET ORAL at 09:13

## 2024-12-15 RX ADMIN — METFORMIN HYDROCHLORIDE 500 MG: 500 TABLET, FILM COATED ORAL at 17:24

## 2024-12-15 RX ADMIN — ATORVASTATIN CALCIUM 20 MG: 20 TABLET, FILM COATED ORAL at 17:24

## 2024-12-15 RX ADMIN — LACTULOSE 20 G: 20 SOLUTION ORAL at 09:14

## 2024-12-15 RX ADMIN — NYSTATIN: 100000 CREAM TOPICAL at 20:44

## 2024-12-15 RX ADMIN — OXYCODONE HYDROCHLORIDE 2.5 MG: 5 TABLET ORAL at 15:05

## 2024-12-15 RX ADMIN — METFORMIN HYDROCHLORIDE 1000 MG: 500 TABLET, FILM COATED ORAL at 09:16

## 2024-12-15 ASSESSMENT — ACTIVITIES OF DAILY LIVING (ADL)
ADLS_ACUITY_SCORE: 54
ADLS_ACUITY_SCORE: 56
ADLS_ACUITY_SCORE: 56
ADLS_ACUITY_SCORE: 54
ADLS_ACUITY_SCORE: 54
ADLS_ACUITY_SCORE: 56
ADLS_ACUITY_SCORE: 54
ADLS_ACUITY_SCORE: 54
ADLS_ACUITY_SCORE: 57
ADLS_ACUITY_SCORE: 59
ADLS_ACUITY_SCORE: 56
ADLS_ACUITY_SCORE: 54
ADLS_ACUITY_SCORE: 56
ADLS_ACUITY_SCORE: 59
ADLS_ACUITY_SCORE: 54
ADLS_ACUITY_SCORE: 59
ADLS_ACUITY_SCORE: 56
ADLS_ACUITY_SCORE: 56
ADLS_ACUITY_SCORE: 59
ADLS_ACUITY_SCORE: 54
ADLS_ACUITY_SCORE: 54
ADLS_ACUITY_SCORE: 56
ADLS_ACUITY_SCORE: 59

## 2024-12-15 NOTE — PROGRESS NOTES
[facility]  OUTPATIENT OCCUPATIONAL THERAPY  EVALUATION  PLAN OF TREATMENT FOR OUTPATIENT REHABILITATION  (COMPLETE FOR INITIAL CLAIMS ONLY)  Patient's Last Name, First Name, M.I.  YOB: 1940  Flash Brown                          Provider's Name  [facility] Medical Record No.  [id number]                             Onset Date:  12/14/24   Start of Care Date:  12/14/24   Type:     ___PT   _X_OT   ___SLP Medical Diagnosis:  fall, T6 fx                    OT Diagnosis:  decreased functional ind Visits from SOC:  1     See note for plan of treatment, functional goals and certification details    I CERTIFY THE NEED FOR THESE SERVICES FURNISHED UNDER        THIS PLAN OF TREATMENT AND WHILE UNDER MY CARE     (Physician co-signature of this document indicates review and certification of the therapy plan).                                    12/15/24 1300   Appointment Info   Signing Clinician's Name / Credentials (OT) ADELINA Carlin/L   Quick Adds   Quick Adds Certification   Living Environment   People in Home facility resident   Current Living Arrangements assisted living   Home Accessibility no concerns   Transportation Anticipated agency   Living Environment Comments Per chart lives at [facility]. Recently moved to [facility]   Self-Care   Usual Activity Tolerance good   Current Activity Tolerance fair   Equipment Currently Used at Home walker, rolling;shower chair;grab bar, toilet;grab bar, tub/shower   Fall history within last six months yes   Number of times patient has fallen within last six months 3   Activity/Exercise/Self-Care Comment PLOF unclear as Pt is unreliable historian. Has had multiple recent falls.   Instrumental Activities of Daily Living (IADL)   IADL Comments relies on cg   General Information   Onset of Illness/Injury or Date of Surgery 12/14/24   Referring Physician Bushra Sampson PA-C   Patient/Family Therapy Goal  Statement (OT) unstated   Additional Occupational Profile Info/Pertinent History of Current Problem Flash Brown is a 84 year old male with past medical history significant for coronary artery disease s/p PCI, HFpEF, hypertension, dyslipidemia, type 2 diabetes mellitus, hepatocellular carcinoma with chronic pancytopenia on current chemotherapy, BPH, generalized edema, obstructive sleep apnea , recent admission (12/01-12/02) for falls secondary to orthostatic hypotension, who presents on 12/14/2024 with generalized weakness, frequent falls.   Existing Precautions/Restrictions fall;brace worn when out of bed   Limitations/Impairments safety/cognitive   General Observations and Info pleasantly confused   Cognitive Status Examination   Orientation Status person   Cognitive Status Comments advanced dementia at baseline   Pain Assessment   Patient Currently in Pain Yes, see Vital Sign flowsheet   Range of Motion Comprehensive   General Range of Motion no range of motion deficits identified   Strength Comprehensive (MMT)   General Manual Muscle Testing (MMT) Assessment no strength deficits identified   Comment, General Manual Muscle Testing (MMT) Assessment generalized weakness noted   Coordination   Upper Extremity Coordination No deficits were identified   Bed Mobility   Bed Mobility supine-sit;sit-supine   Supine-Sit [place] (Bed Mobility) moderate assist (50% patient effort);2 person assist   Sit-Supine [place] (Bed Mobility) moderate assist (50% patient effort);2 person assist   Assistive Device (Bed Mobility) bed rails   Comment (Bed Mobility) initiated log roll training   Transfers   Transfers sit-stand transfer   Sit-Stand Transfer   Sit-Stand [place] (Transfers) minimum assist (75% patient effort);1 person to manage equipment   Assistive Device (Sit-Stand Transfers) walker, front-wheeled   Balance   Balance Comments Needs near CGA and FWW for all mobility, high fall risk   Activities of Daily  Living   BADL Assessment/Intervention other (see comments)  (TBD)   Clinical Impression   Criteria for Skilled Therapeutic Interventions Met (OT) Yes, treatment indicated   OT Diagnosis decreased functional ind   Influenced by the following impairments advanced dementia   OT Problem List-Impairments impacting ADL problems related to;activity tolerance impaired;balance;strength;pain   Assessment of Occupational Performance 1-3 Performance Deficits   Identified Performance Deficits safety with ADLs/mobility   Planned Therapy Interventions (OT) ADL retraining;strengthening;progressive activity/exercise   Clinical Decision Making Complexity (OT) problem focused assessment/low complexity   Risk & Benefits of therapy have been explained patient;participants included;participants voiced agreement with care plan;current/potential barriers reviewed;risks/benefits reviewed;care plan/treatment goals reviewed;evaluation/treatment results reviewed   Clinical Impression Comments Patient will benefit from ongoing OT to maximize safety and ind with ADLs/mobility and for ongoing dc recs   OT Total Evaluation Time   OT Eval, Low Complexity Minutes (24293) 10   Therapy Certification   Start of Care Date 12/14/24   Certification date from 12/14/24   Certification date to 12/21/24   Medical Diagnosis fall, T6 fx   OT Goals   Therapy Frequency (OT) 5 times/week   OT Predicted Duration/Target Date for Goal Attainment 12/22/24   OT Goals Hygiene/Grooming;Toilet Transfer/Toileting;Cognition   OT: Hygiene/Grooming supervision/stand-by assist;within precautions   OT: Toilet Transfer/Toileting Minimal assist;toilet transfer;cleaning and garment management;using adaptive equipment;within precautions   OT: Cognitive Patient/caregiver will verbalize understanding of cognitive assessment results/recommendations as needed for safe discharge planning   Self-Care/Home Management   Self-Care/Home Mgmt/ADL, Compensatory, Meal Prep Minutes (25954) 8    Symptoms Noted During/After Treatment (Meal Preparation/Planning Training) fatigue;increased pain   Treatment Detail/Skilled Intervention Pt in bed upon arrival, agreeable to participate in therapy. Needs mod Ax2 to tx supine > EOB via logroll. Demo's difficulty folliowing instructions dt dementia. Completes STS from EOB with min Ax1, demo's limited standing tolerance dt increased pain. Requesting to get back into bed. Pt needs min Ax1 to maintain balance while taking 4 side steps toward HOB. Transferred EOB > supine with mod Ax2. Positioned for comfort , all needs in reach, alarm engaged.   OT Discharge Planning   OT Plan Sun 1/5; SLUMS (MD request), standing g/h, toileting within precautions   OT Discharge Recommendation (DC Rec) Transitional Care Facility   OT Rationale for DC Rec Would benefit from TCU dt multiple recent falls, increased weakness/pain dt  T6 fx   OT Brief overview of current status mod Ax2 bed mobility, min Ax1 STS, very limited standing tolerance   Total Session Time   Timed Code Treatment Minutes 8   Total Session Time (sum of timed and untimed services) 18

## 2024-12-15 NOTE — PROGRESS NOTES
"[place] [facility] ADMISSION NOTE    Patient admitted to room 2402 at approximately 1840 via wheel chair from emergency room. Patient was accompanied by transport tech.     Verbal SBAR report received from Cailin prior to patient arrival.     Patient ambulated to bed with one assist. Patient alert and oriented X 3. The patient is not having any pain.  . Admission vital signs: Blood pressure 133/46, pulse 80, temperature 97.8  F (36.6  C), temperature source Oral, resp. rate 18, height 1.676 m (5' 6\"), weight 87.5 kg (193 lb), SpO2 100%. Patient was oriented to plan of care, call light, bed controls, tv, telephone, bathroom, and visiting hours.     Risk Assessment    The following safety risks were identified during admission: fall. Yellow risk band applied: YES.     Skin Initial Assessment    This writer admitted this patient and completed a full skin assessment and Haider score in the Adult PCS flowsheet.   Photo documentation of skin problem and/or wound competed via BreathalEyes application (located under Media):  N/A    Appropriate interventions initiated as needed.     Secondary skin check completed by Paola WYNNE.         Education    Patient has a [place] to Observation order: Yes  Observation education completed and documented: Yes      Bushra Cabello RN      "

## 2024-12-15 NOTE — PROGRESS NOTES
.PRIMARY DIAGNOSIS: Generalized weakness / Frequent falls   OUTPATIENT/OBSERVATION GOALS TO BE MET BEFORE DISCHARGE:  ADLs back to baseline: No    Activity and level of assistance: Bedrest until ortho fits pt for TLSO brace on 12/15.     Pain status: Pain free.    Return to near baseline physical activity: No     Discharge Planner Nurse   Safe discharge environment identified: Yes  Barriers to discharge: Yes       Entered by: Bushra Cabello RN 12/14/2024 9:25 PM     Please review provider order for any additional goals.   Nurse to notify provider when observation goals have been met and patient is ready for discharge.    Alert with intermittent confusion. PCDs in place. LR infusing at 100ml/hr. BP on left arm only.

## 2024-12-15 NOTE — PROGRESS NOTES
[facility]  OUTPATIENT PHYSICAL THERAPY EVALUATION  PLAN OF TREATMENT FOR OUTPATIENT REHABILITATION  (COMPLETE FOR INITIAL CLAIMS ONLY)  Patient's Last Name, First Name, M.I.  YOB: 1940  Flash Brown                        Provider's Name  [facility] Medical Record No.  [id number]                             Onset Date:  12/14/24   Start of Care Date:  (P) 12/15/24   Type:     _X_PT   ___OT   ___SLP Medical Diagnosis:  (P) T6 fracture, fall              PT Diagnosis:  (P) impaired functional mobility Visits from SOC:  1     See note for plan of treatment, functional goals and certification details    I CERTIFY THE NEED FOR THESE SERVICES FURNISHED UNDER        THIS PLAN OF TREATMENT AND WHILE UNDER MY CARE     (Physician co-signature of this document indicates review and certification of the therapy plan).                12/15/24 1304   Appointment Info   Signing Clinician's Name / Credentials (PT) Alexia Naidu, PT   Quick Adds   Quick Adds Certification   Living Environment   People in Home facility resident   Current Living Arrangements assisted living   Living Environment Comments moved into [facility] recently in [place]   Self-Care   Current Activity Tolerance poor   Activity/Exercise/Self-Care Comment FWW   General Information   Onset of Illness/Injury or Date of Surgery 12/14/24   Referring Physician Bushra Sampson PA-C   Patient/Family Therapy Goals Statement (PT) did not state   Pertinent History of Current Problem (include personal factors and/or comorbidities that impact the POC) falls, T6 fracture, recently here for syncope/hypotension and discharged back to [facility]   Existing Precautions/Restrictions fall   General Observations TLSO   Cognition   Affect/Mental Status (Cognition) confused   Orientation Status (Cognition) oriented to;person   Cognitive Status Comments did not know where he was or that he broke his back   Pain  Assessment   Patient Currently in Pain Yes, see Vital Sign flowsheet   Integumentary/Edema   Integumentary/Edema Comments bruising all over arms and legs   Bed Mobility   Comment, (Bed Mobility) mod A x 2, fearful   Sit-Stand Transfer   Sit-Stand Winston (Transfers) minimum assist (75% patient effort)   Comment, (Sit-Stand Transfer) min A x 1 FWW, TLSO on   Gait/Stairs (Locomotion)   Winston Level (Gait) minimum assist (75% patient effort);1 person assist   Assistive Device (Gait) walker, front-wheeled   Distance in Feet (Gait) 3' side stepping, marching in place, declined walking more   Clinical Impression   Criteria for Skilled Therapeutic Intervention Yes, treatment indicated   PT Diagnosis (PT) impaired functional mobility   Influenced by the following impairments weakness, pain, spinal precautions   Functional limitations due to impairments transfers, bed mob, ambulation   Clinical Presentation (PT Evaluation Complexity) stable   Clinical Presentation Rationale clinical judgement   Clinical Decision Making (Complexity) low complexity   Planned Therapy Interventions (PT) balance training;bed mobility training;gait training;strengthening;ROM (range of motion);patient/family education;transfer training;risk factor education;home program guidelines;progressive activity/exercise   Risk & Benefits of therapy have been explained evaluation/treatment results reviewed;care plan/treatment goals reviewed;risks/benefits reviewed;current/potential barriers reviewed;participants included;patient   PT Total Evaluation Time   PT Eval, Low Complexity Minutes (64725) 10   Therapy Certification   Start of care date 12/15/24   Certification date from 12/15/24   Certification date to 12/22/24   Medical Diagnosis T6 fracture, fall   Physical Therapy Goals   PT Frequency 5x/week   PT Predicted Duration/Target Date for Goal Attainment 12/22/24   PT Goals Bed Mobility   PT: Bed Mobility Minimal assist;Supine to/from  sit;Rolling;Within precautions   PT: Transfers Supervision/stand-by assist;Sit to/from stand;Bed to/from chair;Assistive device;Within precautions   PT: Gait Minimal assist;Rolling walker   PT Discharge Planning   PT Plan Sun 1/5- bed mob, transfers, gait, TLSO when OOB   PT Discharge Recommendation (DC Rec) Transitional Care Facility;home with assist;home with home care physical therapy   PT Rationale for DC Rec pt needing assist for all mobility, frequent recent falls   PT Brief overview of current status mod Ax2 bed mob, min A to stand and take a few steps, TLSO on, pt fearful

## 2024-12-15 NOTE — PROGRESS NOTES
Secondary skin check.  Writer was present for dual skin check, and I concur with RN's skin assessment.

## 2024-12-15 NOTE — PROGRESS NOTES
[facility] Medicine Progress Note  Date of Service (when I saw the patient): 12/15/2024    REASON FOR ADMISSION / INTERVAL HISTORY:  Flash Brown is a 84 year old male with past medical history significant for coronary artery disease s/p PCI, HFpEF, hypertension, dyslipidemia, type 2 diabetes mellitus, hepatocellular carcinoma with chronic pancytopenia on current chemotherapy, BPH, generalized edema, obstructive sleep apnea , recent admission (12/01-12/02) for falls secondary to orthostatic hypotension, who presents on 12/14/2024 with generalized weakness, frequent falls.       Assessment/ Plan     Acute closed fracture of sixth thoracic vertebrae   Frequent falls as below. CT Thoracic spine on day of admission with Acute fracture involving previously bridging anterior osteophytes at T6-T7 level with fracture involvement of inferior T6 endplate.   ED discussed with neurosurg-recommended TLSO brace  - TLSO brace ordered  - bedrest until TLSO in place  - analgesia: oxycodone 2.5-5mg q4hrs prn      Frequent falls   Generalized weakness   Orthostatic hypotension   Recently admitted for frequent falls, thought to be secondary to orthostatic hypotension. No hx of seizure, CVA. Fairly unremarkable echo in 2022, no evidence of cardiomyopathy. CareEverywhere mentions history of possible Mobitz type 1 (Wenckebach) AV block (see cardiology note 5/16/24), but not sustained. Patient had Zio patch 4/24-5/7/24 with one brief non-sustained run of vtach and two of SVT, but no persisting AV block.  Frequent falls since recent hospital discharge, staff at [facility] note 2 falls within 18 hrs pta. Limited hx from patient, endorses chronic unsteady gait. Admission EKG with sinus rhythm, unchanged from prior.   No focal neuro deficits on admission exam. Suspect mechanical fall secondary to generalized weakness, failure to thrive.   - PT/OT/ Care management consult  - fall precautions   - IVF: LR  @100/hr x10 hrs . Hold lasix  - orthostatic vs      Multilevel fusion of thoracic spine   CT T-spine on admission revealed Multilevel fusion including bridging ventral endplate osteophytes in the thoracic spine which may reflect changes of diffuse hepatic skeletal hyperostosis or ankylosing spondylitis.     Cognitive impairment   On admission patient is alert, oriented to self, place, month, year, but disoriented to situation. Was transferred to memory care unit after recent admission.   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 Follows with oncology, last visit 12/03/24. 7/31/2023: radioembolization to L hepatic lobe mass; partial response. 3/7/2024 - 7/5/2024: palliative durvalumab and tremelimumab (cycle 1 only); then durvalumab maintenance every 28 days (not felt to be a candidate for bevacizumab). Continued until progression. AFP 10.2 (9/2023) ahead of start. AFP after cycle 4, slight rise in AFP 11.2. MRI liver showed progression in 3 dominant masses. So, therapy stopped. 08/01/2024 started 2nd line cabozantinib 20 mg daily in August 2024. After 2 months on this dose, was progressing on 10/2024 MRI. Therefore, 4 weeks ago, dose was increased to 20 mg/40 mg alternating every other day cabozantinib held 12/03/24 due to weight loss, weakness, and increasing bilirubin. At last oncology visit goals of treatment dicussed given cumulative complications of chemotherapy, Ely Flores NP dicussed with patient and patient's sister that  prognosis without further chemo <6 mths and with additional chemo if it brings response being 6-12 months. Suggested that best supportive care/hospice may need to be considered.  Brief goals of care discussion with patient  "on admission, patient stated he would like all available life-sustaining measures taken. Has AD/POLST which lists his sister Rea as HCA and notes he is DNR/DNI.   - will consult palliative care to discuss goals of care     Pancytopenia, chronic   Thrombocytopenia, secondary to congestive splenomegaly, chronic   Other iron deficiency anemia   Presented with pancytopenia that is chronic and stable in the setting of known hepatocellular cancer with recent chemo, congestive splenomegaly, and iron deficiency anemia.   Admit hemoglobin 11.4 (baseline 11.2 - 13.2), admit WBC 3.0 (baseline 2.2 - 3.3), admit platelets 55 (baseline 41 - 58). No neutropenia (ANC 2.0). Takes iron 325 mg daily prior to admission.  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  Abnormal waveforms and velocities of the right upper extremity arterial system suggesting an inflow stenosis. The recent CTA shows significant atherosclerotic disease of the distal brachiocephalic artery likely reflecting areas of stenosis causing the inflow abnormality. 2.  Normal left upper extremity arterial ultrasound. No stenosis visualized.\"  - known problem, only obtain BP reading from left upper extremity      Hypomagnesemia   Initial Mg 1.3. received 2g mag sulfate in the ED. Was hypomagnesemic on last admission as well. Takes MgOx 400 mg BID.   - continue pta MgOx     Elevated troponin   Troponins 53 ? 54. On previous admission 12/1, troponins 33 ? 32.      T2DM without complication   HgbA1c 5.4%. Admission . Managed prior to admission with metformin 1000mg with breakfast and 500mg with dinner.   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 " Follows with oncology Dr. Watts. Had a bone marrow biopsy in Feb 2022, which was normal.  - Continue with outpatient oncology surveillance     Heart failure with preserved ejection fraction   Recent echocardiogram 12/02/24 with mild concentric LV hypertrophy, hyperdynamic LV function. EF 65-70%. Trace mitral and tricuspid regurg. Similar to 08/2022. Managed prior to admission with furosemide 40mg daily.   Appears euvolemic on admission, without LE edema.   Not sure if pt needs this lasix-falling/ weak-? Po intake  -will hold lasix     Coronary artery disease s/p PCI   Benign essential hypertension   Hyperlipidemia   Previously followed with [facility] Dr. Tate, was also seen by [facility] on 5/16/24. History of PCI with LUDIVINA in 2010 (prox/mid RCA, distal circumflex, prox/mid LAD). Stress test 2015 with 70% stenosis of proximal circumflex, did not have subsequent angiogram for unclear reasons. Negative stress test during hospitalization at [facility] in August 2020. Managed prior to admission with aspirin 81 mg daily and atorvastatin 20 mg daily. No evidence of acute coronary syndrome at time of admission, EKG with NSR as above.   - Continue aspirin and statin     BPH  Previously treated with tamsulosin, this was held during recent hospitalization due to orthostatic hypotension. Patient denies acute urinary symptoms. UA unremarkable.      Obstructive sleep apnea   Noted on chart review. Does not use CPAP pta.           Diet: Moderate Consistent Carb (60 g CHO per Meal) Diet    DVT Prophylaxis: Pneumatic Compression Devices  Caruso Catheter: Not present  Code Status: No CPR- Do NOT Intubate      Medically Ready for Discharge: Anticipated Tomorrow        JALEEL HAGER MD   Pg [phone]        ROS:  As described in A/P and Exam.  Otherwise ALL are  negative.    PHYSICAL EXAM:  All vitals have been reviewed    Blood pressure 137/57, pulse 80, temperature 97.5  F (36.4  C), temperature source Oral, resp. rate  "20, height 1.676 m (5' 6\"), weight 87.5 kg (193 lb), SpO2 100%.    I/O this shift:  In: -   Out: 150 [Urine:150]    GENERAL APPEARANCE: healthy, alert and no distress  EYES: conjunctiva clear, eyes grossly normal  HENT: external ears and nose normal   RESP: lungs clear to auscultation - no rales, rhonchi or wheezes  CV: regular rate and rhythm, normal S1 S2, no S3 or S4 and no murmur, click or rub   ABDOMEN: soft, nontender, no HSM or masses and bowel sounds normal  MS: no clubbing, cyanosis; no edema  SKIN: clear without significant rashes or lesions  NEURO: -non-focal moves all 4 extr    ROUTINE  LABS (Last four results)  CMP  Recent Labs   Lab 12/15/24  0744 12/15/24  0542 12/14/24  1009   NA  --  138 138   POTASSIUM  --  3.9 3.9   CHLORIDE  --  104 98   CO2  --  25 24   ANIONGAP  --  9 16*   * 115* 107*   BUN  --  14.5 16.6   CR  --  0.69 0.89   GFRESTIMATED  --  >90 86   NATALIIA  --  7.6* 8.1*   MAG  --   --  1.3*   PROTTOTAL  --  5.2* 6.0*   ALBUMIN  --  2.5* 2.9*   BILITOTAL  --  2.5* 2.8*   ALKPHOS  --  155* 163*   AST  --  52* 60*   ALT  --  27 33     CBC  Recent Labs   Lab 12/15/24  0542 12/14/24  1009   WBC 2.3* 3.0*   RBC 3.32* 3.63*   HGB 10.3* 11.4*   HCT 31.4* 33.6*   MCV 95 93   MCH 31.0 31.4   MCHC 32.8 33.9   RDW 20.7* 20.4*   PLT 43* 55*     INRNo lab results found in last 7 days.  Arterial Blood GasNo lab results found in last 7 days.    No results found for this or any previous visit (from the past 24 hours).        "

## 2024-12-15 NOTE — PROGRESS NOTES
"PRIMARY DIAGNOSIS: \"GENERIC\" NURSING  OUTPATIENT/OBSERVATION GOALS TO BE MET BEFORE DISCHARGE:  ADLs back to baseline: No    Activity and level of assistance: Bedrest until ortho comes to fit patient for TSLO brace today    Pain status: Improved-controlled with oral pain medications.     Return to near baseline physical activity: No     Discharge Planner Nurse      Please review provider order for any additional goals.   Nurse to notify provider when observation goals have been met and patient is ready for discharge.  "

## 2024-12-15 NOTE — PROVIDER NOTIFICATION
Paged Dr. Daniels at [phone] to notify of critical lab: Plt 43. Awaiting reply.   Addendum: Message still not read as of [phone]

## 2024-12-15 NOTE — PROGRESS NOTES
Observation Brochure and Video    Patient informed of observation status based on provider's order.  Observation brochure was given. Patient/Family stated understanding. Questions answered.     Bushra Cabello RN    Affect and characteristics of appearance, verbalizations, behaviors are appropriate

## 2024-12-15 NOTE — PROGRESS NOTES
Care Management Note:    ANETTE placed call to pt's sister, Rea, to complete CUMMINGS form.  Rea with several questions re: Medicare, payment for cares, and discharge planning.    ANETTE explained that PT/OT recommending TCU cares due to Ax2 at this time & writer uncertain if pt's prison able to accommodate his new level of care.    Rea shared that pt lives at [facility] in the [facility] & they should be able to accept the pt back for cares.  (phone: [phone] Fax: [phone])  ANETTE explained to Rea that this writer called THERESA and LM for RN today at 8:55 AM, but have not had a return call.    ANETTE informed Rea that CM team on 12/16/24 will continue to work on pt's safe plan of care and complete full assessment with her at that time.    ROSALIO Engel      Addendum:  Rea called writer at 4pm stating she went to [facility] to speak with the staff re: pt's ability to return.  Staff said there is not a RN on duty today to call writer back.  Staff also reported they cannot manage Ax2 transfers, so pt must go to a TCU.  Rea voice concern about hospital sending pt out tonight.  ANETTE educated that CM will call her tomorrow to discuss a safe discharge plan of care, including TCU locations, before pt is discharged from the hospital.      Care Management team to follow for discharge plans.  ROSALIO Rodrigues on 12/15/2024 at 4:11 PM

## 2024-12-15 NOTE — PLAN OF CARE
"Goal Outcome Evaluation:       PRIMARY DIAGNOSIS: \"GENERIC\" NURSING  OUTPATIENT/OBSERVATION GOALS TO BE MET BEFORE DISCHARGE:  ADLs back to baseline: No    Activity and level of assistance: Up with walker and gait belt Ax1    Pain status: Improved-controlled with oral pain medications.    Return to near baseline physical activity: No    Please review provider order for any additional goals.   Nurse to notify provider when observation goals have been met and patient is ready for discharge.                 "

## 2024-12-16 ENCOUNTER — HOSPITAL ENCOUNTER (OUTPATIENT)
Dept: MRI IMAGING | Facility: CLINIC | Age: 84
Discharge: HOME | End: 2024-12-16
Attending: NURSE PRACTITIONER | Admitting: NURSE PRACTITIONER
Payer: MEDICARE

## 2024-12-16 DIAGNOSIS — C22.0 HEPATOCELLULAR CARCINOMA (H): ICD-10-CM

## 2024-12-16 LAB
GLUCOSE BLDC GLUCOMTR-MCNC: 101 MG/DL (ref 70–99)
GLUCOSE BLDC GLUCOMTR-MCNC: 110 MG/DL (ref 70–99)
GLUCOSE BLDC GLUCOMTR-MCNC: 126 MG/DL (ref 70–99)
GLUCOSE BLDC GLUCOMTR-MCNC: 82 MG/DL (ref 70–99)
GLUCOSE BLDC GLUCOMTR-MCNC: 85 MG/DL (ref 70–99)

## 2024-12-16 PROCEDURE — 250N000013 HC RX MED GY IP 250 OP 250 PS 637: Performed by: INTERNAL MEDICINE

## 2024-12-16 PROCEDURE — 250N000013 HC RX MED GY IP 250 OP 250 PS 637

## 2024-12-16 PROCEDURE — 74183 MRI ABD W/O CNTR FLWD CNTR: CPT | Mod: MG

## 2024-12-16 PROCEDURE — 258N000003 HC RX IP 258 OP 636: Performed by: NURSE PRACTITIONER

## 2024-12-16 PROCEDURE — 255N000002 HC RX 255 OP 636: Performed by: NURSE PRACTITIONER

## 2024-12-16 PROCEDURE — G0378 HOSPITAL OBSERVATION PER HR: HCPCS

## 2024-12-16 PROCEDURE — 99231 SBSQ HOSP IP/OBS SF/LOW 25: CPT | Performed by: INTERNAL MEDICINE

## 2024-12-16 PROCEDURE — 99223 1ST HOSP IP/OBS HIGH 75: CPT | Performed by: NURSE PRACTITIONER

## 2024-12-16 PROCEDURE — 82962 GLUCOSE BLOOD TEST: CPT

## 2024-12-16 PROCEDURE — G1010 CDSM STANSON: HCPCS

## 2024-12-16 PROCEDURE — A9585 GADOBUTROL INJECTION: HCPCS | Performed by: NURSE PRACTITIONER

## 2024-12-16 RX ORDER — LORAZEPAM 0.5 MG/1
0.5 TABLET ORAL
Status: COMPLETED | OUTPATIENT
Start: 2024-12-16 | End: 2024-12-16

## 2024-12-16 RX ORDER — LORAZEPAM 0.5 MG/1
0.5 TABLET ORAL ONCE
Status: DISCONTINUED | OUTPATIENT
Start: 2024-12-16 | End: 2024-12-16

## 2024-12-16 RX ORDER — GADOBUTROL 604.72 MG/ML
8.5 INJECTION INTRAVENOUS ONCE
Status: COMPLETED | OUTPATIENT
Start: 2024-12-16 | End: 2024-12-16

## 2024-12-16 RX ADMIN — NYSTATIN: 100000 CREAM TOPICAL at 07:36

## 2024-12-16 RX ADMIN — METFORMIN HYDROCHLORIDE 1000 MG: 500 TABLET, FILM COATED ORAL at 07:35

## 2024-12-16 RX ADMIN — HYDROXYZINE HYDROCHLORIDE 25 MG: 25 TABLET, FILM COATED ORAL at 07:35

## 2024-12-16 RX ADMIN — LORAZEPAM 0.5 MG: 0.5 TABLET ORAL at 15:41

## 2024-12-16 RX ADMIN — MAGNESIUM OXIDE TAB 400 MG (241.3 MG ELEMENTAL MG) 400 MG: 400 (241.3 MG) TAB at 19:56

## 2024-12-16 RX ADMIN — METFORMIN HYDROCHLORIDE 500 MG: 500 TABLET, FILM COATED ORAL at 17:49

## 2024-12-16 RX ADMIN — FERROUS SULFATE TAB 325 MG (65 MG ELEMENTAL FE) 325 MG: 325 (65 FE) TAB at 07:36

## 2024-12-16 RX ADMIN — MAGNESIUM OXIDE TAB 400 MG (241.3 MG ELEMENTAL MG) 400 MG: 400 (241.3 MG) TAB at 07:35

## 2024-12-16 RX ADMIN — GADOBUTROL 8.5 ML: 604.72 INJECTION INTRAVENOUS at 16:28

## 2024-12-16 RX ADMIN — ASPIRIN 81 MG CHEWABLE TABLET 81 MG: 81 TABLET CHEWABLE at 07:34

## 2024-12-16 RX ADMIN — HYDROXYZINE HYDROCHLORIDE 25 MG: 25 TABLET, FILM COATED ORAL at 19:56

## 2024-12-16 RX ADMIN — LACTULOSE 20 G: 20 SOLUTION ORAL at 07:36

## 2024-12-16 RX ADMIN — SODIUM CHLORIDE 50 ML: 9 INJECTION, SOLUTION INTRAVENOUS at 16:29

## 2024-12-16 RX ADMIN — ATORVASTATIN CALCIUM 20 MG: 20 TABLET, FILM COATED ORAL at 17:50

## 2024-12-16 RX ADMIN — NYSTATIN: 100000 CREAM TOPICAL at 19:58

## 2024-12-16 ASSESSMENT — ACTIVITIES OF DAILY LIVING (ADL)
ADLS_ACUITY_SCORE: 59
ADLS_ACUITY_SCORE: 63
ADLS_ACUITY_SCORE: 63
ADLS_ACUITY_SCORE: 59
ADLS_ACUITY_SCORE: 63
ADLS_ACUITY_SCORE: 63
DEPENDENT_IADLS:: TRANSPORTATION;CLEANING;COOKING;SHOPPING
ADLS_ACUITY_SCORE: 63
ADLS_ACUITY_SCORE: 59
ADLS_ACUITY_SCORE: 63
ADLS_ACUITY_SCORE: 59
ADLS_ACUITY_SCORE: 63
ADLS_ACUITY_SCORE: 59
ADLS_ACUITY_SCORE: 63
ADLS_ACUITY_SCORE: 63
ADLS_ACUITY_SCORE: 59
ADLS_ACUITY_SCORE: 63
ADLS_ACUITY_SCORE: 59
ADLS_ACUITY_SCORE: 63

## 2024-12-16 NOTE — PROGRESS NOTES
"PRIMARY DIAGNOSIS: \"GENERIC\" NURSING  OUTPATIENT/OBSERVATION GOALS TO BE MET BEFORE DISCHARGE:  ADLs back to baseline: No    Activity and level of assistance: Assist of 1 with walker and gait belt. TSLO brace on when ambulating or up in chair.     Pain status: Improved-controlled with oral pain medications.    Return to near baseline physical activity: No       Please review provider order for any additional goals.   Nurse to notify provider when observation goals have been met and patient is ready for discharge.  "

## 2024-12-16 NOTE — PROGRESS NOTES
[facility] Medicine Progress Note  Date of Service (when I saw the patient): 12/16/2024    REASON FOR ADMISSION / INTERVAL HISTORY:  Flash Brown is a 84 year old male with past medical history significant for coronary artery disease s/p PCI, HFpEF, hypertension, dyslipidemia, type 2 diabetes mellitus, hepatocellular carcinoma with chronic pancytopenia on current chemotherapy, BPH, generalized edema, obstructive sleep apnea , recent admission (12/01-12/02) for falls secondary to orthostatic hypotension, who presents on 12/14/2024 with generalized weakness, frequent falls.       Assessment/ Plan     Acute closed fracture of sixth thoracic vertebrae   Frequent falls as below. CT Thoracic spine on day of admission with Acute fracture involving previously bridging anterior osteophytes at T6-T7 level with fracture involvement of inferior T6 endplate.   ED discussed with neurosurg-recommended TLSO brace  -Continue TLSO brace with activity  - analgesia: oxycodone 2.5-5mg q4hrs prn      Frequent falls   Generalized weakness   Orthostatic hypotension   Recently admitted for frequent falls, thought to be secondary to orthostatic hypotension. No hx of seizure, CVA. Fairly unremarkable echo in 2022, no evidence of cardiomyopathy. CareEverywhere mentions history of possible Mobitz type 1 (Wenckebach) AV block (see cardiology note 5/16/24), but not sustained. Patient had Zio patch 4/24-5/7/24 with one brief non-sustained run of vtach and two of SVT, but no persisting AV block.  Frequent falls since recent hospital discharge, staff at [facility] note 2 falls within 18 hrs pta. Limited hx from patient, endorses chronic unsteady gait. Admission EKG with sinus rhythm, unchanged from prior.   No focal neuro deficits on admission exam. Suspect mechanical fall secondary to generalized weakness, failure to thrive.   - PT/OT/ Care management consult  - fall precautions   - IVF: LR @100/hr x10 hrs .  Continue to  Hold lasix  -ck orthostatics     Multilevel fusion of thoracic spine   CT T-spine on admission revealed Multilevel fusion including bridging ventral endplate osteophytes in the thoracic spine which may reflect changes of diffuse hepatic skeletal hyperostosis or ankylosing spondylitis.     Cognitive impairment   On admission patient is alert, oriented to self, place, month, year, but disoriented to situation. Was transferred to memory care unit after recent admission.   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 Follows with oncology, last visit 12/03/24. 7/31/2023: radioembolization to L hepatic lobe mass; partial response. 3/7/2024 - 7/5/2024: palliative durvalumab and tremelimumab (cycle 1 only); then durvalumab maintenance every 28 days (not felt to be a candidate for bevacizumab). Continued until progression. AFP 10.2 (9/2023) ahead of start. AFP after cycle 4, slight rise in AFP 11.2. MRI liver showed progression in 3 dominant masses. So, therapy stopped. 08/01/2024 started 2nd line cabozantinib 20 mg daily in August 2024. After 2 months on this dose, was progressing on 10/2024 MRI. Therefore, 4 weeks ago, dose was increased to 20 mg/40 mg alternating every other day cabozantinib held 12/03/24 due to weight loss, weakness, and increasing bilirubin. At last oncology visit goals of treatment dicussed given cumulative complications of chemotherapy, Ely Flores NP dicussed with patient and patient's sister that  prognosis without further chemo <6 mths and with additional chemo if it brings response being 6-12 months. Suggested that best supportive care/hospice may need to be considered.  Brief goals of care discussion with patient on  "admission, patient stated he would like all available life-sustaining measures taken. Has AD/POLST which lists his sister Rea as HCA and notes he is DNR/DNI.   - will consult palliative care to discuss goals of care     Pancytopenia, chronic   Thrombocytopenia, secondary to congestive splenomegaly, chronic   Other iron deficiency anemia   Presented with pancytopenia that is chronic and stable in the setting of known hepatocellular cancer with recent chemo, congestive splenomegaly, and iron deficiency anemia.   Admit hemoglobin 11.4 (baseline 11.2 - 13.2), admit WBC 3.0 (baseline 2.2 - 3.3), admit platelets 55 (baseline 41 - 58). No neutropenia (ANC 2.0). Takes iron 325 mg daily prior to admission.  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  Abnormal waveforms and velocities of the right upper extremity arterial system suggesting an inflow stenosis. The recent CTA shows significant atherosclerotic disease of the distal brachiocephalic artery likely reflecting areas of stenosis causing the inflow abnormality. 2.  Normal left upper extremity arterial ultrasound. No stenosis visualized.\"  - known problem, only obtain BP reading from left upper extremity      Hypomagnesemia   Initial Mg 1.3. received 2g mag sulfate in the ED. Was hypomagnesemic on last admission as well. Takes MgOx 400 mg BID.   - continue pta MgOx     Elevated troponin   Troponins 53 ? 54. On previous admission 12/1, troponins 33 ? 32.      T2DM without complication   HgbA1c 5.4%. Admission . Managed prior to admission with metformin 1000mg with breakfast and 500mg with dinner.   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 Follows " with oncology Dr. Watts. Had a bone marrow biopsy in Feb 2022, which was normal.  - Continue with outpatient oncology surveillance     Heart failure with preserved ejection fraction   Recent echocardiogram 12/02/24 with mild concentric LV hypertrophy, hyperdynamic LV function. EF 65-70%. Trace mitral and tricuspid regurg. Similar to 08/2022. Managed prior to admission with furosemide 40mg daily.   Appears euvolemic on admission, without LE edema.   Not sure if pt needs this lasix-falling/ weak-? Po intake  -continue to hold lasix     Coronary artery disease s/p PCI   Benign essential hypertension   Hyperlipidemia   Previously [facility] with [facility] Dr. Tate, was also seen by [facility] on 5/16/24. History of PCI with LUDIVINA in 2010 (prox/mid RCA, distal circumflex, prox/mid LAD). Stress test 2015 with 70% stenosis of proximal circumflex, did not have subsequent angiogram for unclear reasons. Negative stress test during hospitalization at [facility] in August 2020. Managed prior to admission with aspirin 81 mg daily and atorvastatin 20 mg daily. No evidence of acute coronary syndrome at time of admission, EKG with NSR as above.   - Continue aspirin and statin     BPH  Previously treated with tamsulosin, this was held during recent hospitalization due to orthostatic hypotension. Patient denies acute urinary symptoms. UA unremarkable.      Obstructive sleep apnea   Noted on chart review. Does not use CPAP pta.           Diet: Moderate Consistent Carb (60 g CHO per Meal) Diet    DVT Prophylaxis: Pneumatic Compression Devices  Caruso Catheter: Not present  Code Status: No CPR- Do NOT Intubate      Medically Ready for Discharge: Anticipated Tomorrow        JALEEL HAGER MD   Pg [phone]        ROS:  As described in A/P and Exam.  Otherwise ALL are  negative.    PHYSICAL EXAM:  All vitals have been reviewed    Blood pressure 135/55, pulse 77, temperature 97.6  F (36.4  C), temperature source Axillary, resp. rate  "18, height 1.676 m (5' 6\"), weight 87.5 kg (193 lb), SpO2 98%.    No intake/output data recorded.    GENERAL APPEARANCE: healthy, alert and no distress  EYES: conjunctiva clear, eyes grossly normal  HENT: external ears and nose normal   MS: no clubbing, cyanosis; no edema  SKIN: clear without significant rashes or lesions  NEURO: -non-focal moves all 4 extr    ROUTINE  LABS (Last four results)  CMP  Recent Labs   Lab 12/16/24  1137 12/16/24  0728 12/16/24  0212 12/15/24  2156 12/15/24  0744 12/15/24  0542 12/14/24  1009   NA  --   --   --   --   --  138 138   POTASSIUM  --   --   --   --   --  3.9 3.9   CHLORIDE  --   --   --   --   --  104 98   CO2  --   --   --   --   --  25 24   ANIONGAP  --   --   --   --   --  9 16*   * 85 82 100*   < > 115* 107*   BUN  --   --   --   --   --  14.5 16.6   CR  --   --   --   --   --  0.69 0.89   GFRESTIMATED  --   --   --   --   --  >90 86   NATALIIA  --   --   --   --   --  7.6* 8.1*   MAG  --   --   --   --   --   --  1.3*   PROTTOTAL  --   --   --   --   --  5.2* 6.0*   ALBUMIN  --   --   --   --   --  2.5* 2.9*   BILITOTAL  --   --   --   --   --  2.5* 2.8*   ALKPHOS  --   --   --   --   --  155* 163*   AST  --   --   --   --   --  52* 60*   ALT  --   --   --   --   --  27 33    < > = values in this interval not displayed.     CBC  Recent Labs   Lab 12/15/24  0542 12/14/24  1009   WBC 2.3* 3.0*   RBC 3.32* 3.63*   HGB 10.3* 11.4*   HCT 31.4* 33.6*   MCV 95 93   MCH 31.0 31.4   MCHC 32.8 33.9   RDW 20.7* 20.4*   PLT 43* 55*     INRNo lab results found in last 7 days.  Arterial Blood GasNo lab results found in last 7 days.    No results found for this or any previous visit (from the past 24 hours).        "

## 2024-12-16 NOTE — PLAN OF CARE
Goal Outcome Evaluation:                 Outcome Evaluation: Goal- Return back to [facility], Sister-in-law Daylin is looking into Hospice options. Prefers return back to Memory Care over SNF placement if possible

## 2024-12-16 NOTE — CONSULTS
Care Management Initial Consult    General Information  Assessment completed with: Patient, Family, JAVIER Mao  Type of CM/SW Visit: Offer D/C Planning    Primary Care Provider verified and updated as needed: Yes   Readmission within the last 30 days:  Yes:12/1/2024 - 12/2/2024  [facility]      Reason for Consult: discharge planning    Advance Care Planning: Advance Care Planning Reviewed: present on chart (JAVIER is the Health Care Agent)        Communication Assessment  Patient's communication style: spoken language (English or Bilingual)    Hearing Difficulty or Deaf: yes   Wear Glasses or Blind: no    Cognitive  Cognitive/Neuro/Behavioral: .WDL except, orientation  Level of Consciousness: alert  Arousal Level: opens eyes spontaneously  Orientation: disoriented to, place, time  Mood/Behavior: calm, cooperative  Best Language: 0 - No aphasia  Speech: clear, spontaneous    Living Environment:   People in home: alone     Current living Arrangements: Memory Care Assisted living        Able to return to prior arrangements: yes     Family/Social Support:  Care provided by: self : Staff at the [facility]    Provides care for: no one, unable/limited ability to care for self  Marital Status: Single  Support system: Facility resident(s)/Staff, Sister-in-law Daylin: Health Care Agent           Description of Support System: Supportive, Involved    Support Assessment: Caregiver experiencing high stress    Current Resources:   Patient receiving home care services: No  Community Resources: None  Equipment currently used at home: walker, rolling, shower chair, grab bar, toilet, grab bar, tub/shower    Supplies currently used at home: Wound Care Supplies, Diabetic Supplies    Employment/Financial:  Employment Status: retired        Financial Concerns: none -Currently on Medical Assistance    Does the patient's insurance plan have a 3 day qualifying hospital stay waiver?  No    Lifestyle & Psychosocial  Needs:  Social Drivers of Health     Food Insecurity: Low Risk  (12/14/2024)    Food Insecurity     Within the past 12 months, did you worry that your food would run out before you got money to buy more?: No     Within the past 12 months, did the food you bought just not last and you didn t have money to get more?: No   Depression: Not at risk (10/10/2023)    PHQ-2     PHQ-2 Score: 0   Housing Stability: Low Risk  (12/14/2024)    Housing Stability     Do you have housing? : Yes     Are you worried about losing your housing?: No   Tobacco Use: Low Risk  (12/14/2024)    Patient History     Smoking Tobacco Use: Never     Smokeless Tobacco Use: Never     Passive Exposure: Never   Financial Resource Strain: Low Risk  (12/14/2024)    Financial Resource Strain     Within the past 12 months, have you or your family members you live with been unable to get utilities (heat, electricity) when it was really needed?: No   Alcohol Use: Not At Risk (11/9/2021)    AUDIT-C     Frequency of Alcohol Consumption: 2-3 times a week     Average Number of Drinks: 1 or 2     Frequency of Binge Drinking: Never   Transportation Needs: Low Risk  (12/14/2024)    Transportation Needs     Within the past 12 months, has lack of transportation kept you from medical appointments, getting your medicines, non-medical meetings or appointments, work, or from getting things that you need?: No   Physical Activity: Inactive (11/9/2021)    Exercise Vital Sign     Days of Exercise per Week: 0 days     Minutes of Exercise per Session: 0 min   Interpersonal Safety: Not on file   Stress: No Stress Concern Present (11/9/2021)    [employer] - Occupational Stress Questionnaire     Feeling of Stress : Not at all   Social Connections: Socially Integrated (4/21/2024)    [facility] [employer] [facility]    Social Connections     Do you often feel lonely or isolated from those around you?: 0   Health Literacy: Not  "on file       Functional Status:  Prior to admission patient needed assistance:   Dependent ADLs:: Independent, Ambulation-walker  Dependent IADLs:: Transportation, Cleaning, Cooking, Shopping  Assesssment of Functional Status: At functional baseline    Mental Health Status:  Mental Health Status: No Current Concerns       Chemical Dependency Status:  Chemical Dependency Status: No Current Concerns           Values/Beliefs:  Spiritual, Cultural Beliefs, Jehovah's witness Practices, Values that affect care: no             Discussed  Partnership in Safe Discharge Planning  document with patient/family: Yes    Additional Information:  Referral received to assist with discharge planning.      Pt recently transitioned into the Memory Care section of the  [facility] # [phone]     REYMUNDO placed call to the [facility] to confirm current services and level of care.    Per Katherine-- Pt has fallen and has shown a decline in his cognitive status over the past month.    REYMUNDO spoke with the Pt's Sister-in-law Daylin # [phone]- Health Care Agent    Daylin expressed several questions about Pt's goals of care and \"what direction to go now?\" Stating she was anxious to get the results of the MRI which was scheduled for today.   Stated the Oncologist put his Chemo on hold, as the Pt's weight was dropping.     REYMUNDO discussed availability of receiving hospice in the [facility] vs SNF placement with hospice.     Daylin reported, \"I know nothing about hospice.\" CM spent time via phone to discuss the hospice philosophy and how services are provided     Daylin agreed to a informational session from a hospice agency if available.   [facility] at [facility] stated they have a contract [facility]. Currently are in their building daily seeing their [facility] residents.   Daylin agreed that would be the agency she would elect --IF she decides on hospice       Patient has been relocated 5 x since transferring to [place]. Daylin stated she is 72 years old " "and exhausted from all the moving.    She would prefer to keep the patient at [facility]--If possible as it is 5 minutes from her house.     IF they elect the hospice route--it would be her preference to have the Pt return back to the [facility] and enroll at the facility.          Next Steps: CM placed referral for a \"INFORMATION MEETING ONLY\" to [facility] to speak with Sister-in-law Daylin in person      Goal :   [facility]   phone: [phone]   Fax: [phone]      Pharmacy - [facility] [place]        ROSALIO Recinos  [facility] [phone]   [facility]  [phone]          "

## 2024-12-16 NOTE — PLAN OF CARE
Problem: Adult Inpatient Plan of Care  Goal: Plan of Care Review  Description: The Plan of Care Review/Shift note should be completed every shift.  The Outcome Evaluation is a brief statement about your assessment that the patient is improving, declining, or no change.  This information will be displayed automatically on your shift  note.  Flowsheets (Taken 12/16/2024 0557)  Outcome Evaluation: Pt alert but refused to get up to toilet. Pt stated he just wanted to sleep. Brief dry and pt denies pain.  Plan of Care Reviewed With: patient  Overall Patient Progress: no change       Goal Outcome Evaluation:      Plan of Care Reviewed With: patient    Overall Patient Progress: no changeOverall Patient Progress: no change    Outcome Evaluation: Pt alert but refused to get up to toilet. Pt stated he just wanted to sleep. Brief dry and pt denies pain.

## 2024-12-16 NOTE — CONSULTS
Palliative Care Consultation      Patient ID/Chief Complaint: Flash Brown 84 year old male being seen for palliative care consultation at the request of hospitalist secondary to symptom management/care planning.   The patient's primary care provider is:  Vijaya Tubbs       Impression & Recommendations:  84 year old male with hepatocellular carcinoma, chronic pancytopenia, history of melanoma approximately 40 years ago after serving in [place] status post extensive resection, history of lymphoma, CAD s/p PCI, HFpEF, hypertension, dyslipidemia, type 2 diabetes mellitus, BPH, generalized edema, obstructive sleep apnea, and cognitive impairment.    He had a recent admission (12/01-12/02) for falls secondary to orthostatic hypotension.  He is now admitted to observation status on 12/14/2024 with generalized weakness and frequent falls.     Oncology team recently discussed prognosis of less than 6 months without ongoing cancer therapy versus 6 to 12 months with ongoing therapy with patient and  healthcare agent/sister Rea.  Unfortunately his overall health status has continued to worsen breathing about discussion about hospice as reasonable care plan.      Symptoms/recommendations/discussion:  Advance care planning:  Present in EMR.  His Sister Rea is named as his agent.  Has baseline cognitive impairment and acknowledges that he cannot keep track of information.  He tells me that whatever his sister thinks is best for him he is in agreement with.  Phone discussion with his sister about his overall condition.  Outcomes:  She is in agreement with evaluation for hospice.  I assured that she understands hospice philosophy and focus on comfort measures.  She expressed understanding that cancer directed therapy would stop.  She also seemed to express understanding that comfort care would be provided when expected issues of ongoing decline, increasing weakness, poor appetite, and infection occur in the future.  I  "assured that she understands that hospice will not advocate for sending him to the hospital when expected issues arise.  She wants him to return at his assisted living facility in [place] likely with hospice.   She request that MRI be completed today to assess status of cancer  Discussion with patient and his bedside.  As above, he states what ever his sister thinks is best for him he is in agreement with.  He tells me repeatedly that he has had a long and productive life.  He says that he is not scared to die.  He did not object to hospice.  No symptom management medication adjustments recommended at this time.  Continue hydroxyzine for lower extremity itching.  Palliative performance scale is 30 to 40%.  Hospice diagnosis is hepatocellular carcinoma.   will check with assisted living facility to see if they have a preferred hospice and update his sister.         Thank you for the opportunity to participate in the care of this patient and family. Our team: will continue to follow.   FIONA Cavazos CNP  Securely message with Vocera (more info)  Text page via [employer] Paging/Directory     History:  History gathered today from: patient, family/loved ones, medical chart, medical team members, health care directive/s    ROS:  10 point ROS is negative unless exceptions noted below    PE: /55 (BP Location: Left arm)   Pulse 77   Temp 97.6  F (36.4  C) (Axillary)   Resp 18   Ht 1.676 m (5' 6\")   Wt 87.5 kg (193 lb)   SpO2 98%   BMI 31.15 kg/m     Wt Readings from Last 3 Encounters:   12/14/24 87.5 kg (193 lb)   12/03/24 87.6 kg (193 lb 3.2 oz)   12/01/24 95.3 kg (210 lb)     Gen alert, comfortable appearing  Head NCAT.  Eyes anicteric without injection  Face symmetric, eyes conjugate  Heart regular and rhythmic  Lungs unlabored, no cough, speaking full sentences  Skin no rashes or lesions evident on face/neck  Neuro Face symmetric, eyes conjugate; speech fluent, confused, does not understand " cancer diagnosis   Neuropsych not obviously anxious or depressed        Data reviewed:  I reviewed electrolytes, BUN/creatinine, liver profile, hemoglobin and hematocrit, platelet count, and most recent imaging  Recent oncology notes        80 minutes spent on the date of the encounter doing chart review, history and exam, patient education & counseling, documentation and other activities as noted above.        Thank you for involving us in the patient's care.   FIONA Cavazos, HARLEEN  [facility]

## 2024-12-16 NOTE — PLAN OF CARE
"PRIMARY DIAGNOSIS: \"GENERIC\" NURSING  OUTPATIENT/OBSERVATION GOALS TO BE MET BEFORE DISCHARGE:  ADLs back to baseline: No, assist of X1 with bed bath and walking to the bathroom    Activity and level of assistance: Up with standby assistance, TLSO brace on when out of bed.    Pain status: Improved-controlled with oral pain medications.    Return to near baseline physical activity: Yes   No pain medication needed for this writers shift.  Please review provider order for any additional goals.   Nurse to notify provider when observation goals have been met and patient is ready for discharge.Goal Outcome Evaluation:                        "

## 2024-12-16 NOTE — PLAN OF CARE
Problem: Adult Inpatient Plan of Care  Goal: Readiness for Transition of Care  Outcome: Progressing       Goal Outcome Evaluation:    Patient's sister-in-law Daylin reports that she spoke with patient's memory care facility and patient has been approved to return on hospice. She would like to transport patient to facility. Writer anticipates that  will contact her tomorrow.     Mar Kline RN on 12/16/2024 at 5:30 PM

## 2024-12-17 VITALS
RESPIRATION RATE: 18 BRPM | HEART RATE: 86 BPM | SYSTOLIC BLOOD PRESSURE: 158 MMHG | WEIGHT: 193 LBS | BODY MASS INDEX: 31.02 KG/M2 | DIASTOLIC BLOOD PRESSURE: 80 MMHG | TEMPERATURE: 98.1 F | HEIGHT: 66 IN | OXYGEN SATURATION: 98 %

## 2024-12-17 DIAGNOSIS — K75.81 LIVER CIRRHOSIS SECONDARY TO NONALCOHOLIC STEATOHEPATITIS (NASH) (H): Primary | ICD-10-CM

## 2024-12-17 DIAGNOSIS — K74.60 LIVER CIRRHOSIS SECONDARY TO NONALCOHOLIC STEATOHEPATITIS (NASH) (H): Primary | ICD-10-CM

## 2024-12-17 LAB
GLUCOSE BLDC GLUCOMTR-MCNC: 105 MG/DL (ref 70–99)
GLUCOSE BLDC GLUCOMTR-MCNC: 128 MG/DL (ref 70–99)

## 2024-12-17 PROCEDURE — G0378 HOSPITAL OBSERVATION PER HR: HCPCS

## 2024-12-17 PROCEDURE — 82962 GLUCOSE BLOOD TEST: CPT

## 2024-12-17 PROCEDURE — 99239 HOSP IP/OBS DSCHRG MGMT >30: CPT | Performed by: INTERNAL MEDICINE

## 2024-12-17 PROCEDURE — 250N000013 HC RX MED GY IP 250 OP 250 PS 637

## 2024-12-17 RX ADMIN — LACTULOSE 20 G: 20 SOLUTION ORAL at 07:51

## 2024-12-17 RX ADMIN — ASPIRIN 81 MG CHEWABLE TABLET 81 MG: 81 TABLET CHEWABLE at 07:51

## 2024-12-17 RX ADMIN — NYSTATIN: 100000 CREAM TOPICAL at 07:51

## 2024-12-17 RX ADMIN — MAGNESIUM OXIDE TAB 400 MG (241.3 MG ELEMENTAL MG) 400 MG: 400 (241.3 MG) TAB at 07:51

## 2024-12-17 RX ADMIN — METFORMIN HYDROCHLORIDE 1000 MG: 500 TABLET, FILM COATED ORAL at 07:51

## 2024-12-17 RX ADMIN — FERROUS SULFATE TAB 325 MG (65 MG ELEMENTAL FE) 325 MG: 325 (65 FE) TAB at 07:51

## 2024-12-17 RX ADMIN — HYDROXYZINE HYDROCHLORIDE 25 MG: 25 TABLET, FILM COATED ORAL at 07:51

## 2024-12-17 ASSESSMENT — ACTIVITIES OF DAILY LIVING (ADL)
ADLS_ACUITY_SCORE: 61

## 2024-12-17 NOTE — PLAN OF CARE
"Goal Outcome Evaluation:      Plan of Care Reviewed With: patient    Overall Patient Progress: no changeOverall Patient Progress: no change    Outcome Evaluation: Pt free of falls during shift, pt did not show s/s of discomfort or pain.      Problem: Adult Inpatient Plan of Care  Goal: Plan of Care Review  Description: The Plan of Care Review/Shift note should be completed every shift.  The Outcome Evaluation is a brief statement about your assessment that the patient is improving, declining, or no change.  This information will be displayed automatically on your shift  note.  Outcome: Progressing  Flowsheets (Taken 12/17/2024 0624)  Outcome Evaluation: Pt free of falls during shift, pt did not show s/s of discomfort or pain.  Plan of Care Reviewed With: patient  Overall Patient Progress: no change  Goal: Patient-Specific Goal (Individualized)  Description: You can add care plan individualizations to a care plan. Examples of Individualization might be:  \"Parent requests to be called daily at 9am for status\", \"I have a hard time hearing out of my right ear\", or \"Do not touch me to wake me up as it startles  me\".  Outcome: Progressing  Goal: Absence of Hospital-Acquired Illness or Injury  Outcome: Progressing  Goal: Optimal Comfort and Wellbeing  Outcome: Progressing  Goal: Readiness for Transition of Care  Outcome: Progressing     "

## 2024-12-17 NOTE — PROGRESS NOTES
Occupational Therapy Discharge Summary    Reason for therapy discharge:    No further expectations of functional progress.    Progress towards therapy goal(s). See goals on Care Plan in [facility] electronic health record for goal details.  Goals not met.  Barriers to achieving goals:   transitioning to Hospice.    Therapy recommendation(s):    No further therapy is recommended.

## 2024-12-17 NOTE — PLAN OF CARE
Goal Outcome Evaluation:    Plan of Care Reviewed With: patient    Overall Patient Progress: no change    Problem: Adult Inpatient Plan of Care  Goal: Plan of Care Review    Outcome: Progressing  Flowsheets (Taken 12/17/2024 0921)  Plan of Care Reviewed With: patient  Overall Patient Progress: no change    [place] NSG DISCHARGE NOTE    Patient discharged to memory care unit where pt resides at 11:29 AM via wheel chair. Accompanied by sister and staff. Discharge instructions reviewed with patient and other, opportunity offered to ask questions. Prescriptions filled and sent with patient upon discharge. All belongings sent with patient.    Lilibeth Boland RN

## 2024-12-17 NOTE — PLAN OF CARE
Problem: Adult Inpatient Plan of Care  Goal: Optimal Comfort and Wellbeing  Outcome: Progressing  Goal: Readiness for Transition of Care  Outcome: Progressing       Goal Outcome Evaluation:    Patient alert and oriented to self only. Given Ativan prior to Liver MRI per order. Up with assist of 1, TLSO brace on with activity, off when in bed.     Mar Kline RN on 12/16/2024 at 10:16 PM

## 2024-12-17 NOTE — PROGRESS NOTES
Care Management Discharge Note    Discharge Date: 12/17/2024     Discharge Disposition: Assisted Living--[facility]    Discharge Services: [facility]    Discharge DME: Walker    Discharge Transportation: Sister-in-law Daylin    Private pay costs discussed: Not applicable    Does the patient's insurance plan have a 3 day qualifying hospital stay waiver?  No    Patient/family educated on Medicare website which has current facility and service quality ratings: yes    Education Provided on the Discharge Plan: Yes  Persons Notified of Discharge Plans: JAVIER Mao  Patient/Family in Agreement with the Plan: yes    Handoff Referral Completed: Yes, non-[facility] PCP: External handoff communication completed--Kareem     Additional Information:  Update received during IDT rounds. Informed Pt is medically stable for discharge today    CM placed call to Daylin--Sister-in-law  Discussed her plan. States she would like to have the patient discharge back to the [facility] -Today  Also requested to have the Patient enroll in the [facility] program this afternoon.     Expressed that she was grateful for the consultation with hospice yesterday and the discussions with Palliative Care.   Stated she is feeling like they are now going down the path that is best for the patient and focusing on his best interests.     Goal is to get the Patient back to his [facility] apartment --which is where he prefers to be and comfortable.   CM addressed any further questions or concerns.     Call placed to Rc at the [facility]. Informed of the Discharge plan for today.   Staff verbalized approval. Requested to received discharge orders when completed   Pt has a hospital bed already in place at the [facility].    Handoff to Nadya Schaefer RN  [facility]   # [phone]      Discharge Plan:     [facility]   Pharmacy - [facility]  #[phone]   Fax:  [phone]     [facility] --Enrolling at 12:30pm today   Phone [phone]  Fax: [phone]     Pharmacy - [facility] [place]    Transport : JAVIER Daylin at 11:30am        ROSALIO Recinos  [facility] [phone]   [facility]  [phone]

## 2024-12-17 NOTE — PLAN OF CARE
Physical Therapy Discharge Summary    Reason for therapy discharge:    No further expectations of functional progress.    Progress towards therapy goal(s). See goals on Care Plan in Epic electronic health record for goal details.  Goals not met.  Barriers to achieving goals:   transitioning to hospice.    Therapy recommendation(s):    No further therapy is recommended.

## 2024-12-17 NOTE — DISCHARGE SUMMARY
[facility] Discharge Summary    Flash Bronw MRN# [id number]   Age: 84 year old YOB: 1940     Date of Admission:  12/14/2024  Date of Discharge::  12/17/2024  Admitting Physician:  Tye Daniels MD  Discharge Physician:  Tye Daniels MD  Primary Physician: Vijaya Tubbs  Transferring Facility: N/A     Home clinic: unknown          Admission Diagnoses:   Orthostatic hypotension [I95.1]  Generalized weakness [R53.1]  Multiple contusions [T07.XXXA]  Cognitive impairment [R41.89]  Multiple falls [R29.6]  Fall, initial encounter [W19.XXXA]  Closed fracture of sixth thoracic vertebra, unspecified fracture morphology, initial encounter (H) [S22.320B]  Dementia without behavioral disturbance, psychotic disturbance, mood disturbance, or anxiety, unspecified dementia severity, unspecified dementia type (H) [F03.90]          Discharge Diagnosis:     Principle diagnosis:   Frequent falls   Generalized weakness   Acute closed fracture of sixth thoracic vertebrae   Secondary diagnoses:  Patient Active Problem List   Diagnosis    Benign essential hypertension    Malignant melanoma s/p resection in [place], [place]    RT axillary lymphoma s/p resection, with adjuvant radiation - [place], [place]    Basal cell skin cancer over nose s/p resection - [place], [place]    Mixed hyperlipidemia    Obstructive sleep apnea    ? 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)    Congestive splenomegaly    Other decreased white blood cell count (due to liver cirrhosis)    Other iron deficiency anemia    Diabetes mellitus without complication (H)    Hepatocellular carcinoma (H)    Pruritic disorder    Generalized muscle weakness    Generalized edema    Symptomatic bradycardia    History of non-Hodgkin's lymphoma    Edema    Polyp of colon    Cognitive impairment    Difficulty in walking, not elsewhere classified    Conductive hearing loss, bilateral    Cognitive communication deficit    Actinic keratosis    Elevated bilirubin    Generalized weakness    Closed head injury, initial encounter    Fall, initial encounter    Syncope    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)    Orthostatic hypotension    Multiple contusions    Multiple falls    Closed fracture of sixth thoracic vertebra, unspecified fracture morphology, initial encounter (H)    Dementia without behavioral disturbance, psychotic disturbance, mood disturbance, or anxiety, unspecified dementia severity, unspecified dementia type (H)          Brief History of Presenting Illness:   As per admit hx  Flash Brown is a 84 year old male with past medical history significant for coronary artery disease s/p PCI, HFpEF, hypertension, dyslipidemia, type 2 diabetes mellitus, hepatocellular carcinoma with chronic pancytopenia on current chemotherapy, BPH, generalized edema, obstructive sleep apnea , recent admission (12/01-12/02) for falls secondary to orthostatic hypotension, who presents on 12/14/2024 with generalized weakness, frequent falls.      Patient provides limited history. Reports he has had increased generalized weakness and frequent falls for the past few weeks. He transitioned to [facility] from [facility] after recent hospitalization 12/01-12/02. He has q2hour checks. Despite this close monitoring he has had two falls within 18 hours pta, unwitnessed and without clear cause or mechanism. Was  sent to the ED via EMS. Patient does not recall falling on day of admission, believes he last fell one day pta but again unsure of mechanism. Endorses unsteady gait at baseline.   He denies pain, headache, vision changes, dizziness, lightheadedness, focal weakness. Denies dysuria, hematuria, urinary frequency, urinary hesitancy. Endorses chronic diarrhea which is unchanged. Denies melena, hematochezia, abdominal pain, nausea, vomiting. Denies fevers, chills.            Hospital Course:   Acute closed fracture of sixth thoracic vertebrae   Frequent falls as below. CT Thoracic spine on day of admission with Acute fracture involving previously bridging anterior osteophytes at T6-T7 level with fracture involvement of inferior T6 endplate.   ED discussed with neurosurg-recommended TLSO brace  -Continue TLSO brace with activity. Will be getting hospice care     Frequent falls   Generalized weakness   Orthostatic hypotension   Recently admitted for frequent falls, thought to be secondary to orthostatic hypotension. No hx of seizure, CVA. Fairly unremarkable echo in 2022, no evidence of cardiomyopathy. CareEverywhere mentions history of possible Mobitz type 1 (Wenckebach) AV block (see cardiology note 5/16/24), but not sustained. Patient had Zio patch 4/24-5/7/24 with one brief non-sustained run of vtach and two of SVT, but no persisting AV block.  Frequent falls since recent hospital discharge, staff at [facility] note 2 falls within 18 hrs pta. Limited hx from patient, endorses chronic unsteady gait. Admission EKG with sinus rhythm, unchanged from prior.   No focal neuro deficits on admission exam. Suspect mechanical fall secondary to generalized weakness, failure to thrive.   AL with hospice care     Multilevel fusion of thoracic spine   CT T-spine on admission revealed Multilevel fusion including bridging ventral endplate osteophytes in the thoracic spine which may reflect changes of diffuse hepatic skeletal hyperostosis or  ankylosing spondylitis.     Cognitive impairment   On admission patient is alert, oriented to self, place, month, year, but disoriented to situation. Was transferred to memory care unit after recent admission.      Heptaocellular carcinoma   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)  Congestive splenomegaly   Elevated bilirubin, chronic   Known hepatocellular carcinoma initially diagnosed in July 2023, unresectable multifocal HCC in setting of known DEWITT-related cirrhosis. Follows with oncology, last visit 12/03/24. 7/31/2023: radioembolization to L hepatic lobe mass; partial response. 3/7/2024 - 7/5/2024: palliative durvalumab and tremelimumab (cycle 1 only); then durvalumab maintenance every 28 days (not felt to be a candidate for bevacizumab). Continued until progression. AFP 10.2 (9/2023) ahead of start. AFP after cycle 4, slight rise in AFP 11.2. MRI liver showed progression in 3 dominant masses. So, therapy stopped. 08/01/2024 started 2nd line cabozantinib 20 mg daily in August 2024. After 2 months on this dose, was progressing on 10/2024 MRI. Therefore, 4 weeks ago, dose was increased to 20 mg/40 mg alternating every other day cabozantinib held 12/03/24 due to weight loss, weakness, and increasing bilirubin. At last oncology visit goals of treatment dicussed given cumulative complications of chemotherapy, Ely Flores NP dicussed with patient and patient's sister that  prognosis without further chemo <6 mths and with additional chemo if it brings response being 6-12 months. Suggested that best supportive care/hospice may need to be considered.  Brief goals of care discussion with patient on admission, patient stated he would like all available life-sustaining measures taken. Has AD/POLST which lists his sister Rea as HCA and notes he is DNR/DNI.   Palliative care seen. To AL with hospice     Pancytopenia, chronic   Thrombocytopenia, secondary to congestive splenomegaly, chronic   Other  "iron deficiency anemia   Presented with pancytopenia that is chronic and stable in the setting of known hepatocellular cancer with recent chemo, congestive splenomegaly, and iron deficiency anemia.   Admit hemoglobin 11.4 (baseline 11.2 - 13.2), admit WBC 3.0 (baseline 2.2 - 3.3), admit platelets 55 (baseline 41 - 58). No neutropenia (ANC 2.0). Takes iron 325 mg daily prior to admission.  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  Abnormal waveforms and velocities of the right upper extremity arterial system suggesting an inflow stenosis. The recent CTA shows significant atherosclerotic disease of the distal brachiocephalic artery likely reflecting areas of stenosis causing the inflow abnormality. 2.  Normal left upper extremity arterial ultrasound. No stenosis visualized.\"  - known problem, only obtain BP reading from left upper extremity      Hypomagnesemia   Initial Mg 1.3. received 2g mag sulfate in the ED. Was hypomagnesemic on last admission as well. Takes MgOx 400 mg BID.   - continue pta MgOx     Elevated troponin   Troponins 53 ? 54. On previous admission 12/1, troponins 33 ? 32.      T2DM without complication   HgbA1c 5.4%. Admission . Managed prior to admission with metformin 1000mg with breakfast and 500mg with dinner.      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 Follows with oncology Dr. Watts. Had a bone marrow biopsy in Feb 2022, which was normal.  Going with hospice care     Heart failure with preserved ejection fraction   Recent echocardiogram 12/02/24 with mild concentric LV hypertrophy, hyperdynamic LV function. EF 65-70%. Trace mitral and tricuspid regurg. Similar to 08/2022. Managed prior to admission with furosemide 40mg daily.   Appears euvolemic on admission, " without LE edema.   Not sure if pt needs this lasix-falling/ weak-? Po intake  Will stop lasix on discharge.      Coronary artery disease s/p PCI   Benign essential hypertension   Hyperlipidemia   Previously [facility] with [facility] Dr. Tate, was also seen by [facility] on 5/16/24. History of PCI with LUDIVINA in 2010 (prox/mid RCA, distal circumflex, prox/mid LAD). Stress test 2015 with 70% stenosis of proximal circumflex, did not have subsequent angiogram for unclear reasons. Negative stress test during hospitalization at [facility] in August 2020. Managed prior to admission with aspirin 81 mg daily and atorvastatin 20 mg daily. No evidence of acute coronary syndrome at time of admission, EKG with NSR as above.   - Continue aspirin and statin     BPH  Previously treated with tamsulosin, this was held during recent hospitalization due to orthostatic hypotension. Patient denies acute urinary symptoms. UA unremarkable.      Obstructive sleep apnea   Noted on chart review. Does not use CPAP pta.     DISPO  Will be seen by hospice today. All meds will be managed per hospice                Procedures:   No procedures performed during this admission         Allergies:      Allergies   Allergen Reactions    Iodine Swelling     Patient states reaction was 20-30 years ago. Has had CT dye and coronary angiograms since then without premedication and did not have reaction.    Iodinated Contrast Media     Metoprolol Difficulty breathing     Difficulty breathing and bradycardia              Medications Prior to Admission:     Medications Prior to Admission   Medication Sig Dispense Refill Last Dose/Taking    acetaminophen (TYLENOL) 500 MG tablet Take 2 tablets by mouth 2 times daily.   12/13/2024 Evening    ASPIRIN LOW DOSE 81 MG chewable tablet Take 1 tablet by mouth daily.   12/13/2024 Morning    atorvastatin (LIPITOR) 20 MG tablet Take 1 tablet (20 mg) by mouth daily 90 tablet 3 12/13/2024 at  5:00 PM    ferrous sulfate  (FE TABS) 325 (65 Fe) MG EC tablet Take 1 tablet (325 mg) by mouth daily 90 tablet 0 12/13/2024 Morning    hydrOXYzine HCl (ATARAX) 25 MG tablet Take 1 tablet (25 mg) by mouth 2 times daily. 30 tablet 0 12/13/2024 Bedtime    lactulose (CHRONULAC) 10 GM/15ML solution Take 30 mLs (20 g) by mouth daily 946 mL 11 12/13/2024 Morning    lidocaine (XYLOCAINE) 5 % external ointment Apply topically 4 times daily as needed for moderate pain 50 g 1 Unknown    loperamide (IMODIUM) 2 MG capsule Take 4 mg by mouth every 4 hours as needed for diarrhea (>4-5 loose stools/day).   Unknown    magnesium oxide (MAG-OX) 400 MG tablet Take 1 tablet (400 mg) by mouth 2 times daily 60 tablet 1 12/13/2024 Bedtime    metFORMIN (GLUCOPHAGE) 500 MG tablet Take 500 mg by mouth daily (with dinner).   12/13/2024 Evening    metFORMIN (GLUCOPHAGE) 500 MG tablet Take 1,000 mg by mouth daily (with breakfast).   12/13/2024 Morning    nitroGLYcerin (NITROSTAT) 0.4 MG sublingual tablet Place 1 tablet (0.4 mg) under the tongue See Admin Instructions for chest pain 30 tablet 0 Unknown    ondansetron (ZOFRAN) 8 MG tablet Take 1 tablet (8 mg) by mouth every 8 hours as needed for nausea 30 tablet 2 Unknown    traZODone (DESYREL) 50 MG tablet Take 50 mg by mouth nightly as needed for sleep.   12/13/2024 Bedtime    triamcinolone (KENALOG) 0.1 % external cream Apply topically 2 times daily as needed for irritation   Unknown    [DISCONTINUED] furosemide (LASIX) 40 MG tablet Take 1 tablet by mouth daily.   12/13/2024 Morning             Discharge Medications:     Current Discharge Medication List        CONTINUE these medications which have NOT CHANGED    Details   acetaminophen (TYLENOL) 500 MG tablet Take 2 tablets by mouth 2 times daily.      ASPIRIN LOW DOSE 81 MG chewable tablet Take 1 tablet by mouth daily.      atorvastatin (LIPITOR) 20 MG tablet Take 1 tablet (20 mg) by mouth daily  Qty: 90 tablet, Refills: 3    Associated Diagnoses: Other hyperlipidemia       ferrous sulfate (FE TABS) 325 (65 Fe) MG EC tablet Take 1 tablet (325 mg) by mouth daily  Qty: 90 tablet, Refills: 0    Associated Diagnoses: Mild anemia      hydrOXYzine HCl (ATARAX) 25 MG tablet Take 1 tablet (25 mg) by mouth 2 times daily.  Qty: 30 tablet, Refills: 0    Associated Diagnoses: Pruritic disorder      lactulose (CHRONULAC) 10 GM/15ML solution Take 30 mLs (20 g) by mouth daily  Qty: 946 mL, Refills: 11    Associated Diagnoses: Hepatic encephalopathy (H)      lidocaine (XYLOCAINE) 5 % external ointment Apply topically 4 times daily as needed for moderate pain  Qty: 50 g, Refills: 1    Associated Diagnoses: Strain of lumbar region, initial encounter      loperamide (IMODIUM) 2 MG capsule Take 4 mg by mouth every 4 hours as needed for diarrhea (>4-5 loose stools/day).      magnesium oxide (MAG-OX) 400 MG tablet Take 1 tablet (400 mg) by mouth 2 times daily  Qty: 60 tablet, Refills: 1    Associated Diagnoses: Hypomagnesemia      !! metFORMIN (GLUCOPHAGE) 500 MG tablet Take 500 mg by mouth daily (with dinner).      !! metFORMIN (GLUCOPHAGE) 500 MG tablet Take 1,000 mg by mouth daily (with breakfast).      nitroGLYcerin (NITROSTAT) 0.4 MG sublingual tablet Place 1 tablet (0.4 mg) under the tongue See Admin Instructions for chest pain  Qty: 30 tablet, Refills: 0    Associated Diagnoses: Coronary artery disease involving native coronary artery of native heart without angina pectoris      ondansetron (ZOFRAN) 8 MG tablet Take 1 tablet (8 mg) by mouth every 8 hours as needed for nausea  Qty: 30 tablet, Refills: 2    Associated Diagnoses: Hepatocellular carcinoma (H)      traZODone (DESYREL) 50 MG tablet Take 50 mg by mouth nightly as needed for sleep.      triamcinolone (KENALOG) 0.1 % external cream Apply topically 2 times daily as needed for irritation      LORazepam (ATIVAN) 1 MG tablet Take 1 tablet (1 mg) by mouth See Admin Instructions.  Qty: 1 tablet, Refills: 0    Associated Diagnoses:  "Hepatocellular carcinoma (H)       !! - Potential duplicate medications found. Please discuss with provider.        STOP taking these medications       furosemide (LASIX) 40 MG tablet Comments:   Reason for Stopping:                     Consultations:   No consultations were requested during this admission            Discharge Exam:   Blood pressure (!) 158/80, pulse 86, temperature 98.1  F (36.7  C), temperature source Tympanic, resp. rate 18, height 1.676 m (5' 6\"), weight 87.5 kg (193 lb), SpO2 98%.  GENERAL APPEARANCE: healthy, alert and no distress  EYES: conjunctiva clear, eyes grossly normal  HENT: external ears and nose normal   MS: no clubbing, cyanosis; no edema  SKIN: clear without significant rashes or lesions  NEURO: Normal strength and tone, sensory exam grossly normal, mentation intact and speech normal    Unresulted Labs Ordered in the Past 30 Days of this Admission       No orders found from 11/14/2024 to 12/15/2024.            No results found for this or any previous visit (from the past 24 hours).         Pending Tests at Discharge:   None         Discharge Instructions and Follow-Up:     Discharge diet: Regular   Discharge activity: Activity as tolerated   Discharge follow-up: Follow up with hospice           Discharge Disposition:     Discharged to home      Attestation:  I have reviewed today's vital signs, notes, medications, labs and imaging.    Time Spent on this Encounter   I, Tye Daniels MD, personally saw the patient today and spent greater than 30 minutes discharging this patient.    Tye Daniels MD       "

## 2024-12-18 ENCOUNTER — PATIENT OUTREACH (OUTPATIENT)
Dept: CARE COORDINATION | Facility: CLINIC | Age: 84
End: 2024-12-18
Payer: MEDICARE

## 2024-12-18 NOTE — PROGRESS NOTES
[facility]    Background: Transitional Care Management program identified per system criteria and reviewed by [facility] team for possible outreach.    Assessment: Upon chart review, [facility] Team member will not proceed with patient outreach related to this episode of Transitional Care Management program due to reason below:    Patient has discharged to a Memory Care, Long-term Care, Assisted Living or Group Home where patient is receiving on-site support with their daily cares, including support with hospital follow up plan.    [facility]   phone: [phone]   Fax: [phone]     Plan: Transitional Care Management episode addressed appropriately per reason noted above.        NEVA Oropeza  [phone]  [facility]

## 2025-02-25 ENCOUNTER — HOSPITAL ENCOUNTER (EMERGENCY)
Facility: CLINIC | Age: 85
End: 2025-02-25
Payer: MEDICARE

## 2025-05-20 ENCOUNTER — TELEPHONE (OUTPATIENT)
Dept: FAMILY MEDICINE | Facility: CLINIC | Age: 85
End: 2025-05-20
Payer: MEDICARE

## 2025-05-20 NOTE — TELEPHONE ENCOUNTER
Patient Quality Outreach    Patient is due for the following:   Diabetes -  A1C, LDL (Fasting), Eye Exam, Microalbumin, and Foot Exam  Physical Annual Wellness Visit      Topic Date Due    Hepatitis A Vaccine (1 of 2 - Risk 2-dose series) Never done    Hepatitis B Vaccine (1 of 3 - Risk 3-dose series) Never done    COVID-19 Vaccine (6 - 2024-25 season) 09/01/2024     BMP     Action(s) Taken:   Schedule a Annual Wellness Visit    Type of outreach:    Phone, left message for patient/parent to call back.    Questions for provider review:    None         Monique Verdugo MA  Chart routed to None.

## 2025-06-30 ENCOUNTER — PATIENT OUTREACH (OUTPATIENT)
Dept: ONCOLOGY | Facility: CLINIC | Age: 85
End: 2025-06-30
Payer: MEDICARE

## 2025-06-30 NOTE — PROGRESS NOTES
[facility]: Cancer Care                                                                                          Pt passed away. Updated enrollment status to graduated.     Signature:  NOAH CARNEY RN

## 2025-07-21 NOTE — LETTER
October 13, 2021      Flash DUARTE Stephanie  [address] [place] [address]        To Whom It May Concern:    Flash Brown was seen in our clinic.  Patient no longer drives and would benefit from a metro pass.     Sincerely,      Dr. Thomas Jackson           Please get your flu shot in the fall.      RSV vaccination (Arexvy) is advised at your pharmacy in the fall.

## (undated) DEVICE — EYE PREP BETADINE 5% SOLUTION 30ML 0065-0411-30

## (undated) DEVICE — NDL BX BONE MARROW 11GA 4"

## (undated) DEVICE — SOL WATER IRRIG 1000ML BOTTLE 07139-09

## (undated) DEVICE — EYE SOL BSS 500ML

## (undated) DEVICE — TRAY BX BONE MARROW JAMSHIDI

## (undated) DEVICE — SYR 20ML SLIP TIP

## (undated) DEVICE — DRSG GAUZE 4X4" TRAY

## (undated) DEVICE — GLOVE PROTEXIS W/NEU-THERA 6.5  2D73TE65

## (undated) DEVICE — DRSG GAUZE 4X4" 3033

## (undated) DEVICE — TRAY BONE MARROW BIOPSY ASC 640 31-0097A

## (undated) DEVICE — SOL NACL 0.9% IRRIG 1000ML BOTTLE 07138-09

## (undated) RX ORDER — HYDRALAZINE HYDROCHLORIDE 20 MG/ML
INJECTION INTRAMUSCULAR; INTRAVENOUS
Status: DISPENSED
Start: 2023-07-31

## (undated) RX ORDER — TROPICAMIDE 10 MG/ML
SOLUTION/ DROPS OPHTHALMIC
Status: DISPENSED
Start: 2019-10-21

## (undated) RX ORDER — SODIUM CHLORIDE 9 MG/ML
INJECTION, SOLUTION INTRAVENOUS
Status: DISPENSED
Start: 2023-07-31

## (undated) RX ORDER — LIDOCAINE HYDROCHLORIDE 10 MG/ML
INJECTION, SOLUTION EPIDURAL; INFILTRATION; INTRACAUDAL; PERINEURAL
Status: DISPENSED
Start: 2019-07-08

## (undated) RX ORDER — CYCLOPENTOLATE HYDROCHLORIDE 10 MG/ML
SOLUTION/ DROPS OPHTHALMIC
Status: DISPENSED
Start: 2019-10-02

## (undated) RX ORDER — FENTANYL CITRATE 50 UG/ML
INJECTION, SOLUTION INTRAMUSCULAR; INTRAVENOUS
Status: DISPENSED
Start: 2023-06-02

## (undated) RX ORDER — HYDROMORPHONE HCL IN WATER/PF 6 MG/30 ML
PATIENT CONTROLLED ANALGESIA SYRINGE INTRAVENOUS
Status: DISPENSED
Start: 2023-07-26

## (undated) RX ORDER — SODIUM CHLORIDE 9 MG/ML
INJECTION, SOLUTION INTRAVENOUS
Status: DISPENSED
Start: 2023-06-02

## (undated) RX ORDER — PHENYLEPHRINE HYDROCHLORIDE 25 MG/ML
SOLUTION/ DROPS OPHTHALMIC
Status: DISPENSED
Start: 2019-10-02

## (undated) RX ORDER — LIDOCAINE HYDROCHLORIDE 10 MG/ML
INJECTION, SOLUTION EPIDURAL; INFILTRATION; INTRACAUDAL; PERINEURAL
Status: DISPENSED
Start: 2023-06-02

## (undated) RX ORDER — FENTANYL CITRATE 50 UG/ML
INJECTION, SOLUTION INTRAMUSCULAR; INTRAVENOUS
Status: DISPENSED
Start: 2023-07-26

## (undated) RX ORDER — LIDOCAINE HYDROCHLORIDE 10 MG/ML
INJECTION, SOLUTION EPIDURAL; INFILTRATION; INTRACAUDAL; PERINEURAL
Status: DISPENSED
Start: 2023-07-26

## (undated) RX ORDER — TROPICAMIDE 10 MG/ML
SOLUTION/ DROPS OPHTHALMIC
Status: DISPENSED
Start: 2019-10-02

## (undated) RX ORDER — ONDANSETRON 2 MG/ML
INJECTION INTRAMUSCULAR; INTRAVENOUS
Status: DISPENSED
Start: 2023-07-31

## (undated) RX ORDER — FENTANYL CITRATE 50 UG/ML
INJECTION, SOLUTION INTRAMUSCULAR; INTRAVENOUS
Status: DISPENSED
Start: 2023-07-31

## (undated) RX ORDER — PHENYLEPHRINE HYDROCHLORIDE 25 MG/ML
SOLUTION/ DROPS OPHTHALMIC
Status: DISPENSED
Start: 2019-10-21

## (undated) RX ORDER — LIDOCAINE HYDROCHLORIDE 10 MG/ML
INJECTION, SOLUTION EPIDURAL; INFILTRATION; INTRACAUDAL; PERINEURAL
Status: DISPENSED
Start: 2023-07-31

## (undated) RX ORDER — PANTOPRAZOLE SODIUM 40 MG/10ML
INJECTION, POWDER, LYOPHILIZED, FOR SOLUTION INTRAVENOUS
Status: DISPENSED
Start: 2023-07-31

## (undated) RX ORDER — SODIUM CHLORIDE 9 MG/ML
INJECTION, SOLUTION INTRAVENOUS
Status: DISPENSED
Start: 2023-07-26

## (undated) RX ORDER — AMPICILLIN AND SULBACTAM 2; 1 G/1; G/1
INJECTION, POWDER, FOR SOLUTION INTRAMUSCULAR; INTRAVENOUS
Status: DISPENSED
Start: 2023-07-31

## (undated) RX ORDER — HEPARIN SODIUM 200 [USP'U]/100ML
INJECTION, SOLUTION INTRAVENOUS
Status: DISPENSED
Start: 2023-07-31

## (undated) RX ORDER — CYCLOPENTOLATE HYDROCHLORIDE 10 MG/ML
SOLUTION/ DROPS OPHTHALMIC
Status: DISPENSED
Start: 2019-10-21